# Patient Record
Sex: MALE | Race: WHITE | NOT HISPANIC OR LATINO | Employment: FULL TIME | URBAN - NONMETROPOLITAN AREA
[De-identification: names, ages, dates, MRNs, and addresses within clinical notes are randomized per-mention and may not be internally consistent; named-entity substitution may affect disease eponyms.]

---

## 2019-12-25 ENCOUNTER — APPOINTMENT (EMERGENCY)
Dept: RADIOLOGY | Facility: HOSPITAL | Age: 29
End: 2019-12-25
Payer: COMMERCIAL

## 2019-12-25 ENCOUNTER — HOSPITAL ENCOUNTER (EMERGENCY)
Facility: HOSPITAL | Age: 29
Discharge: HOME | End: 2019-12-26
Attending: EMERGENCY MEDICINE
Payer: COMMERCIAL

## 2019-12-25 VITALS
WEIGHT: 200 LBS | HEIGHT: 67 IN | TEMPERATURE: 98.3 F | HEART RATE: 114 BPM | RESPIRATION RATE: 16 BRPM | OXYGEN SATURATION: 98 % | SYSTOLIC BLOOD PRESSURE: 135 MMHG | DIASTOLIC BLOOD PRESSURE: 97 MMHG | BODY MASS INDEX: 31.39 KG/M2

## 2019-12-25 DIAGNOSIS — S93.602A SPRAIN OF LEFT FOOT, INITIAL ENCOUNTER: Primary | ICD-10-CM

## 2019-12-25 PROCEDURE — 99284 EMERGENCY DEPT VISIT MOD MDM: CPT | Performed by: EMERGENCY MEDICINE

## 2019-12-25 PROCEDURE — 73610 X-RAY EXAM OF ANKLE: CPT

## 2019-12-25 PROCEDURE — 73630 X-RAY EXAM OF FOOT: CPT

## 2019-12-25 PROCEDURE — 96372 THER/PROPH/DIAG INJ SC/IM: CPT

## 2019-12-25 PROCEDURE — 99283 EMERGENCY DEPT VISIT LOW MDM: CPT

## 2019-12-25 RX ORDER — KETOROLAC TROMETHAMINE 30 MG/ML
30 INJECTION, SOLUTION INTRAMUSCULAR; INTRAVENOUS ONCE
Status: COMPLETED | OUTPATIENT
Start: 2019-12-25 | End: 2019-12-25

## 2019-12-25 RX ADMIN — KETOROLAC TROMETHAMINE 30 MG: 30 INJECTION, SOLUTION INTRAMUSCULAR at 23:47

## 2019-12-26 NOTE — ED PROVIDER NOTES
History  Chief Complaint   Patient presents with    Ankle Pain     Patient has left ankle and foot pain since this morning  Patient denies any known injury  Atraumatic left ankle and foot pain since this AM  No fever  No other complaints      History provided by:  Patient   used: No    Ankle Pain   Location:  Foot and ankle  Ankle location:  L ankle  Foot location:  L foot  Pain details:     Quality:  Aching    Radiates to:  Does not radiate    Severity:  Moderate    Onset quality:  Gradual    Timing:  Constant    Progression:  Unchanged  Chronicity:  New  Dislocation: no    Foreign body present:  No foreign bodies  Prior injury to area:  No  Relieved by:  Nothing  Worsened by:  Bearing weight, extension and flexion  Ineffective treatments:  None tried  Associated symptoms: no back pain, no fever, no muscle weakness, no neck pain, no numbness, no stiffness, no swelling and no tingling    Risk factors: no concern for non-accidental trauma        None       History reviewed  No pertinent past medical history  History reviewed  No pertinent surgical history  History reviewed  No pertinent family history  I have reviewed and agree with the history as documented  Social History     Tobacco Use    Smoking status: Current Every Day Smoker     Packs/day: 0 50     Types: Cigarettes    Smokeless tobacco: Never Used   Substance Use Topics    Alcohol use: Yes    Drug use: Never        Review of Systems   Constitutional: Negative for chills and fever  HENT: Negative for ear pain, hearing loss, sore throat, trouble swallowing and voice change  Eyes: Negative for pain and discharge  Respiratory: Negative for cough, shortness of breath and wheezing  Cardiovascular: Negative for chest pain and palpitations  Gastrointestinal: Negative for abdominal pain, blood in stool, constipation, diarrhea, nausea and vomiting     Genitourinary: Negative for dysuria, flank pain, frequency and hematuria  Musculoskeletal: Negative for back pain, joint swelling, neck pain, neck stiffness and stiffness  Skin: Negative for rash and wound  Neurological: Negative for dizziness, seizures, syncope, facial asymmetry and headaches  Psychiatric/Behavioral: Negative for hallucinations, self-injury and suicidal ideas  All other systems reviewed and are negative  Physical Exam  Physical Exam   Constitutional: He appears well-developed and well-nourished  No distress  HENT:   Head: Normocephalic and atraumatic  Eyes: Conjunctivae and EOM are normal    Neck: Normal range of motion  Neck supple  Pulmonary/Chest: Effort normal  No respiratory distress     Musculoskeletal:   LEFT FOOT/ANKLE  No deformity  No bony ttp   Some ttp top of foot  Flexion/extension causes pain  Distal NVI    REMAINDER OF MS EXAM IS NORMAL       Vital Signs  ED Triage Vitals [12/25/19 2331]   Temperature Pulse Respirations Blood Pressure SpO2   98 3 °F (36 8 °C) (!) 114 16 135/97 98 %      Temp Source Heart Rate Source Patient Position - Orthostatic VS BP Location FiO2 (%)   Temporal Monitor Sitting Right arm --      Pain Score       5           Vitals:    12/25/19 2331   BP: 135/97   Pulse: (!) 114   Patient Position - Orthostatic VS: Sitting         Visual Acuity      ED Medications  Medications   ketorolac (TORADOL) injection 30 mg (30 mg Intramuscular Given 12/25/19 2347)       Diagnostic Studies  Results Reviewed     None                 XR ankle 3+ views LEFT   ED Interpretation by Dolores Rice MD (12/26 0200)   No acute fracture      XR foot 3+ views LEFT   ED Interpretation by Dolores Rice MD (12/26 0200)   No acute fracture                 Procedures  Procedures         ED Course  ED Course as of Dec 26 0205   Thu Dec 26, 2019   0203 Splint placed  Crutches provided  Pt advised to f/u ortho/PCP as needed  Ortho outpt f/u info provided                                  MDM  Number of Diagnoses or Management Options     Amount and/or Complexity of Data Reviewed  Tests in the radiology section of CPT®: ordered and reviewed          Disposition  Final diagnoses:   Sprain of left foot, initial encounter     Time reflects when diagnosis was documented in both MDM as applicable and the Disposition within this note     Time User Action Codes Description Comment    12/26/2019  2:04 AM Miguelito Salazar Add [S93 602A] Sprain of left foot, initial encounter       ED Disposition     ED Disposition Condition Date/Time Comment    Discharge Stable Thu Dec 26, 2019  2:04 AM Page Pacheco discharge to home/self care  Follow-up Information     Follow up With Specialties Details Why [address] [facility] Referral Cardiology, Gastroenterology, General Surgery, Anesthesiology, Hematology and Oncology, Urology, Emergency Medicine, Hematology, Oncology In 2 days As needed [address] [facility] [address]  500 [facility] Orthopedic Surgery In 2 days As needed 2669 [facility] 17  [phone]            Patient's Medications    No medications on file     No discharge procedures on file      ED Provider  Electronically Signed by           Chrissie Huff MD  12/26/19 9401

## 2020-07-01 ENCOUNTER — TRANSCRIBE ORDERS (OUTPATIENT)
Dept: ADMINISTRATIVE | Facility: HOSPITAL | Age: 30
End: 2020-07-01

## 2020-07-01 DIAGNOSIS — Z82.49 FAMILY HISTORY OF ISCHEMIC HEART DISEASE: Primary | ICD-10-CM

## 2020-07-21 DIAGNOSIS — Z82.49 FAMILY HISTORY OF ISCHEMIC HEART DISEASE AND OTHER DISEASES OF THE CIRCULATORY SYSTEM: ICD-10-CM

## 2020-08-27 ENCOUNTER — HOSPITAL ENCOUNTER (OUTPATIENT)
Dept: NON INVASIVE DIAGNOSTICS | Facility: HOSPITAL | Age: 30
Discharge: HOME | End: 2020-08-27
Payer: COMMERCIAL

## 2020-08-27 DIAGNOSIS — Z82.49 FAMILY HISTORY OF ISCHEMIC HEART DISEASE AND OTHER DISEASES OF THE CIRCULATORY SYSTEM: ICD-10-CM

## 2020-08-27 PROCEDURE — 93306 TTE W/DOPPLER COMPLETE: CPT

## 2023-10-03 ENCOUNTER — HOSPITAL ENCOUNTER (INPATIENT)
Facility: HOSPITAL | Age: 33
LOS: 2 days | Discharge: HOME | DRG: 872 | End: 2023-10-05
Attending: EMERGENCY MEDICINE | Admitting: FAMILY MEDICINE
Payer: COMMERCIAL

## 2023-10-03 ENCOUNTER — APPOINTMENT (EMERGENCY)
Dept: CT IMAGING | Facility: HOSPITAL | Age: 33
DRG: 872 | End: 2023-10-03
Payer: COMMERCIAL

## 2023-10-03 DIAGNOSIS — N12 PYELONEPHRITIS: Primary | ICD-10-CM

## 2023-10-03 DIAGNOSIS — Z72.0 TOBACCO ABUSE: ICD-10-CM

## 2023-10-03 PROBLEM — A41.9 SEPSIS (HCC): Status: ACTIVE | Noted: 2023-10-03

## 2023-10-03 LAB
ALBUMIN SERPL BCP-MCNC: 4 G/DL (ref 3.5–5)
ALP SERPL-CCNC: 85 U/L (ref 34–104)
ALT SERPL W P-5'-P-CCNC: 40 U/L (ref 7–52)
ANION GAP SERPL CALCULATED.3IONS-SCNC: 10 MMOL/L
AST SERPL W P-5'-P-CCNC: 33 U/L (ref 13–39)
BACTERIA UR QL AUTO: ABNORMAL /HPF
BASOPHILS # BLD AUTO: 0.07 THOUSANDS/ÂΜL (ref 0–0.1)
BASOPHILS NFR BLD AUTO: 1 % (ref 0–1)
BILIRUB SERPL-MCNC: 1.22 MG/DL (ref 0.2–1)
BILIRUB UR QL STRIP: ABNORMAL
BUN SERPL-MCNC: 8 MG/DL (ref 5–25)
CALCIUM SERPL-MCNC: 9.6 MG/DL (ref 8.4–10.2)
CHLORIDE SERPL-SCNC: 100 MMOL/L (ref 96–108)
CLARITY UR: ABNORMAL
CO2 SERPL-SCNC: 26 MMOL/L (ref 21–32)
COLOR UR: ABNORMAL
CREAT SERPL-MCNC: 1.02 MG/DL (ref 0.6–1.3)
EOSINOPHIL # BLD AUTO: 0.56 THOUSAND/ÂΜL (ref 0–0.61)
EOSINOPHIL NFR BLD AUTO: 4 % (ref 0–6)
ERYTHROCYTE [DISTWIDTH] IN BLOOD BY AUTOMATED COUNT: 12.2 % (ref 11.6–15.1)
GFR SERPL CREATININE-BSD FRML MDRD: 96 ML/MIN/1.73SQ M
GLUCOSE SERPL-MCNC: 111 MG/DL (ref 65–140)
GLUCOSE SERPL-MCNC: 94 MG/DL (ref 65–140)
GLUCOSE UR STRIP-MCNC: NEGATIVE MG/DL
HCT VFR BLD AUTO: 43.4 % (ref 36.5–49.3)
HGB BLD-MCNC: 14.4 G/DL (ref 12–17)
HGB UR QL STRIP.AUTO: ABNORMAL
IMM GRANULOCYTES # BLD AUTO: 0.04 THOUSAND/UL (ref 0–0.2)
IMM GRANULOCYTES NFR BLD AUTO: 0 % (ref 0–2)
KETONES UR STRIP-MCNC: NEGATIVE MG/DL
LACTATE SERPL-SCNC: 1.6 MMOL/L (ref 0.5–2)
LEUKOCYTE ESTERASE UR QL STRIP: ABNORMAL
LYMPHOCYTES # BLD AUTO: 1.88 THOUSANDS/ÂΜL (ref 0.6–4.47)
LYMPHOCYTES NFR BLD AUTO: 14 % (ref 14–44)
MCH RBC QN AUTO: 32.4 PG (ref 26.8–34.3)
MCHC RBC AUTO-ENTMCNC: 33.2 G/DL (ref 31.4–37.4)
MCV RBC AUTO: 98 FL (ref 82–98)
MONOCYTES # BLD AUTO: 1.02 THOUSAND/ÂΜL (ref 0.17–1.22)
MONOCYTES NFR BLD AUTO: 7 % (ref 4–12)
NEUTROPHILS # BLD AUTO: 10.14 THOUSANDS/ÂΜL (ref 1.85–7.62)
NEUTS SEG NFR BLD AUTO: 74 % (ref 43–75)
NITRITE UR QL STRIP: NEGATIVE
NON-SQ EPI CELLS URNS QL MICRO: ABNORMAL /HPF
NRBC BLD AUTO-RTO: 0 /100 WBCS
PH UR STRIP.AUTO: 7 [PH]
PLATELET # BLD AUTO: 176 THOUSANDS/UL (ref 149–390)
PMV BLD AUTO: 9.7 FL (ref 8.9–12.7)
POTASSIUM SERPL-SCNC: 4 MMOL/L (ref 3.5–5.3)
PROCALCITONIN SERPL-MCNC: 0.21 NG/ML
PROT SERPL-MCNC: 7.5 G/DL (ref 6.4–8.4)
PROT UR STRIP-MCNC: >=300 MG/DL
RBC # BLD AUTO: 4.44 MILLION/UL (ref 3.88–5.62)
RBC #/AREA URNS AUTO: ABNORMAL /HPF
SODIUM SERPL-SCNC: 136 MMOL/L (ref 135–147)
SP GR UR STRIP.AUTO: 1.02 (ref 1–1.03)
UROBILINOGEN UR QL STRIP.AUTO: 1 E.U./DL
WBC # BLD AUTO: 13.71 THOUSAND/UL (ref 4.31–10.16)
WBC #/AREA URNS AUTO: ABNORMAL /HPF

## 2023-10-03 PROCEDURE — 99223 1ST HOSP IP/OBS HIGH 75: CPT | Performed by: NURSE PRACTITIONER

## 2023-10-03 PROCEDURE — G1004 CDSM NDSC: HCPCS

## 2023-10-03 PROCEDURE — 80053 COMPREHEN METABOLIC PANEL: CPT

## 2023-10-03 PROCEDURE — 87086 URINE CULTURE/COLONY COUNT: CPT

## 2023-10-03 PROCEDURE — 85025 COMPLETE CBC W/AUTO DIFF WBC: CPT

## 2023-10-03 PROCEDURE — 84145 PROCALCITONIN (PCT): CPT | Performed by: NURSE PRACTITIONER

## 2023-10-03 PROCEDURE — 99284 EMERGENCY DEPT VISIT MOD MDM: CPT

## 2023-10-03 PROCEDURE — 81001 URINALYSIS AUTO W/SCOPE: CPT

## 2023-10-03 PROCEDURE — 96361 HYDRATE IV INFUSION ADD-ON: CPT

## 2023-10-03 PROCEDURE — 82948 REAGENT STRIP/BLOOD GLUCOSE: CPT

## 2023-10-03 PROCEDURE — 87040 BLOOD CULTURE FOR BACTERIA: CPT | Performed by: NURSE PRACTITIONER

## 2023-10-03 PROCEDURE — 36415 COLL VENOUS BLD VENIPUNCTURE: CPT

## 2023-10-03 PROCEDURE — 96375 TX/PRO/DX INJ NEW DRUG ADDON: CPT

## 2023-10-03 PROCEDURE — 99285 EMERGENCY DEPT VISIT HI MDM: CPT | Performed by: EMERGENCY MEDICINE

## 2023-10-03 PROCEDURE — 96365 THER/PROPH/DIAG IV INF INIT: CPT

## 2023-10-03 PROCEDURE — 74176 CT ABD & PELVIS W/O CONTRAST: CPT

## 2023-10-03 PROCEDURE — 83605 ASSAY OF LACTIC ACID: CPT | Performed by: NURSE PRACTITIONER

## 2023-10-03 RX ORDER — HYDROMORPHONE HCL/PF 1 MG/ML
1 SYRINGE (ML) INJECTION ONCE
Status: COMPLETED | OUTPATIENT
Start: 2023-10-03 | End: 2023-10-03

## 2023-10-03 RX ORDER — ALLOPURINOL 100 MG/1
100 TABLET ORAL DAILY
Status: DISCONTINUED | OUTPATIENT
Start: 2023-10-04 | End: 2023-10-05 | Stop reason: HOSPADM

## 2023-10-03 RX ORDER — ENOXAPARIN SODIUM 100 MG/ML
40 INJECTION SUBCUTANEOUS DAILY
Status: DISCONTINUED | OUTPATIENT
Start: 2023-10-04 | End: 2023-10-05 | Stop reason: HOSPADM

## 2023-10-03 RX ORDER — FLUOXETINE 20 MG/1
20 TABLET ORAL DAILY
COMMUNITY

## 2023-10-03 RX ORDER — ACETAMINOPHEN 325 MG/1
975 TABLET ORAL EVERY 8 HOURS SCHEDULED
Status: DISCONTINUED | OUTPATIENT
Start: 2023-10-03 | End: 2023-10-05 | Stop reason: HOSPADM

## 2023-10-03 RX ORDER — NICOTINE 21 MG/24HR
21 PATCH, TRANSDERMAL 24 HOURS TRANSDERMAL DAILY
Status: DISCONTINUED | OUTPATIENT
Start: 2023-10-03 | End: 2023-10-05 | Stop reason: HOSPADM

## 2023-10-03 RX ORDER — KETOROLAC TROMETHAMINE 30 MG/ML
30 INJECTION, SOLUTION INTRAMUSCULAR; INTRAVENOUS ONCE
Status: COMPLETED | OUTPATIENT
Start: 2023-10-03 | End: 2023-10-03

## 2023-10-03 RX ORDER — HYDROMORPHONE HCL/PF 1 MG/ML
0.5 SYRINGE (ML) INJECTION
Status: DISCONTINUED | OUTPATIENT
Start: 2023-10-03 | End: 2023-10-05 | Stop reason: HOSPADM

## 2023-10-03 RX ORDER — TAMSULOSIN HYDROCHLORIDE 0.4 MG/1
0.4 CAPSULE ORAL
COMMUNITY
Start: 2023-09-15 | End: 2023-10-15

## 2023-10-03 RX ORDER — INSULIN LISPRO 100 [IU]/ML
1-6 INJECTION, SOLUTION INTRAVENOUS; SUBCUTANEOUS
Status: DISCONTINUED | OUTPATIENT
Start: 2023-10-04 | End: 2023-10-05 | Stop reason: HOSPADM

## 2023-10-03 RX ORDER — INSULIN LISPRO 100 [IU]/ML
1-6 INJECTION, SOLUTION INTRAVENOUS; SUBCUTANEOUS
Status: DISCONTINUED | OUTPATIENT
Start: 2023-10-03 | End: 2023-10-05 | Stop reason: HOSPADM

## 2023-10-03 RX ORDER — NICOTINE 21 MG/24HR
21 PATCH, TRANSDERMAL 24 HOURS TRANSDERMAL DAILY
Status: DISCONTINUED | OUTPATIENT
Start: 2023-10-03 | End: 2023-10-03

## 2023-10-03 RX ORDER — LISINOPRIL 5 MG/1
5 TABLET ORAL DAILY
Status: ON HOLD | COMMUNITY
End: 2023-10-03 | Stop reason: SDUPTHER

## 2023-10-03 RX ORDER — SODIUM CHLORIDE 9 MG/ML
50 INJECTION, SOLUTION INTRAVENOUS CONTINUOUS
Status: DISCONTINUED | OUTPATIENT
Start: 2023-10-03 | End: 2023-10-04

## 2023-10-03 RX ORDER — MORPHINE SULFATE 4 MG/ML
4 INJECTION, SOLUTION INTRAMUSCULAR; INTRAVENOUS ONCE
Status: COMPLETED | OUTPATIENT
Start: 2023-10-03 | End: 2023-10-03

## 2023-10-03 RX ORDER — LISINOPRIL 10 MG/1
10 TABLET ORAL DAILY
COMMUNITY

## 2023-10-03 RX ORDER — MIRTAZAPINE 15 MG/1
15 TABLET, FILM COATED ORAL
COMMUNITY
Start: 2023-08-31

## 2023-10-03 RX ORDER — CEFTRIAXONE 1 G/50ML
1000 INJECTION, SOLUTION INTRAVENOUS EVERY 24 HOURS
Status: DISCONTINUED | OUTPATIENT
Start: 2023-10-04 | End: 2023-10-05 | Stop reason: HOSPADM

## 2023-10-03 RX ORDER — LISINOPRIL 10 MG/1
10 TABLET ORAL DAILY
Status: DISCONTINUED | OUTPATIENT
Start: 2023-10-04 | End: 2023-10-05 | Stop reason: HOSPADM

## 2023-10-03 RX ORDER — PHENAZOPYRIDINE HYDROCHLORIDE 100 MG/1
200 TABLET, FILM COATED ORAL ONCE
Status: COMPLETED | OUTPATIENT
Start: 2023-10-03 | End: 2023-10-03

## 2023-10-03 RX ORDER — TAMSULOSIN HYDROCHLORIDE 0.4 MG/1
0.4 CAPSULE ORAL
Status: DISCONTINUED | OUTPATIENT
Start: 2023-10-03 | End: 2023-10-05 | Stop reason: HOSPADM

## 2023-10-03 RX ORDER — MIRTAZAPINE 15 MG/1
15 TABLET, FILM COATED ORAL
Status: DISCONTINUED | OUTPATIENT
Start: 2023-10-03 | End: 2023-10-05 | Stop reason: HOSPADM

## 2023-10-03 RX ORDER — CEFTRIAXONE 1 G/50ML
1000 INJECTION, SOLUTION INTRAVENOUS ONCE
Status: COMPLETED | OUTPATIENT
Start: 2023-10-03 | End: 2023-10-03

## 2023-10-03 RX ORDER — OXYCODONE HYDROCHLORIDE 5 MG/1
5 TABLET ORAL EVERY 6 HOURS PRN
Status: DISCONTINUED | OUTPATIENT
Start: 2023-10-03 | End: 2023-10-05 | Stop reason: HOSPADM

## 2023-10-03 RX ORDER — ALLOPURINOL 100 MG/1
100 TABLET ORAL DAILY
COMMUNITY
Start: 2023-06-23

## 2023-10-03 RX ORDER — FLUOXETINE 10 MG/1
20 CAPSULE ORAL DAILY
Status: DISCONTINUED | OUTPATIENT
Start: 2023-10-04 | End: 2023-10-05 | Stop reason: HOSPADM

## 2023-10-03 RX ADMIN — MORPHINE SULFATE 4 MG: 4 INJECTION INTRAVENOUS at 21:06

## 2023-10-03 RX ADMIN — NICOTINE 21 MG: 21 PATCH, EXTENDED RELEASE TRANSDERMAL at 23:01

## 2023-10-03 RX ADMIN — CEFTRIAXONE 1000 MG: 1 INJECTION, SOLUTION INTRAVENOUS at 19:35

## 2023-10-03 RX ADMIN — TAMSULOSIN HYDROCHLORIDE 0.4 MG: 0.4 CAPSULE ORAL at 23:01

## 2023-10-03 RX ADMIN — SODIUM CHLORIDE 1000 ML: 0.9 INJECTION, SOLUTION INTRAVENOUS at 18:19

## 2023-10-03 RX ADMIN — HYDROMORPHONE HYDROCHLORIDE 1 MG: 1 INJECTION, SOLUTION INTRAMUSCULAR; INTRAVENOUS; SUBCUTANEOUS at 23:01

## 2023-10-03 RX ADMIN — PHENAZOPYRIDINE 200 MG: 100 TABLET ORAL at 19:34

## 2023-10-03 RX ADMIN — KETOROLAC TROMETHAMINE 30 MG: 30 INJECTION, SOLUTION INTRAMUSCULAR; INTRAVENOUS at 19:33

## 2023-10-03 RX ADMIN — ACETAMINOPHEN 975 MG: 325 TABLET, FILM COATED ORAL at 23:01

## 2023-10-03 RX ADMIN — SODIUM CHLORIDE 150 ML/HR: 0.9 INJECTION, SOLUTION INTRAVENOUS at 22:03

## 2023-10-03 NOTE — ED NOTES
Pt given urinal at this time to provide urine sample     Van Nelson, [address]  10/03/23 6138

## 2023-10-04 PROBLEM — E11.65 TYPE 2 DIABETES MELLITUS WITH HYPERGLYCEMIA, WITHOUT LONG-TERM CURRENT USE OF INSULIN (HCC): Status: ACTIVE | Noted: 2023-10-04

## 2023-10-04 PROBLEM — Z86.79 HISTORY OF HYPERTENSION: Status: ACTIVE | Noted: 2023-10-04

## 2023-10-04 PROBLEM — N12 PYELONEPHRITIS: Status: ACTIVE | Noted: 2023-10-04

## 2023-10-04 PROBLEM — Z72.0 TOBACCO ABUSE: Status: ACTIVE | Noted: 2023-10-04

## 2023-10-04 PROBLEM — Z87.39 HISTORY OF GOUT: Status: ACTIVE | Noted: 2023-10-04

## 2023-10-04 LAB
ANION GAP SERPL CALCULATED.3IONS-SCNC: 7 MMOL/L
BASOPHILS # BLD AUTO: 0.05 THOUSANDS/ÂΜL (ref 0–0.1)
BASOPHILS NFR BLD AUTO: 1 % (ref 0–1)
BUN SERPL-MCNC: 12 MG/DL (ref 5–25)
CALCIUM SERPL-MCNC: 8.4 MG/DL (ref 8.4–10.2)
CHLORIDE SERPL-SCNC: 103 MMOL/L (ref 96–108)
CO2 SERPL-SCNC: 27 MMOL/L (ref 21–32)
CREAT SERPL-MCNC: 1.23 MG/DL (ref 0.6–1.3)
EOSINOPHIL # BLD AUTO: 0.44 THOUSAND/ÂΜL (ref 0–0.61)
EOSINOPHIL NFR BLD AUTO: 6 % (ref 0–6)
ERYTHROCYTE [DISTWIDTH] IN BLOOD BY AUTOMATED COUNT: 12.3 % (ref 11.6–15.1)
GFR SERPL CREATININE-BSD FRML MDRD: 76 ML/MIN/1.73SQ M
GLUCOSE SERPL-MCNC: 101 MG/DL (ref 65–140)
GLUCOSE SERPL-MCNC: 124 MG/DL (ref 65–140)
GLUCOSE SERPL-MCNC: 129 MG/DL (ref 65–140)
GLUCOSE SERPL-MCNC: 146 MG/DL (ref 65–140)
GLUCOSE SERPL-MCNC: 167 MG/DL (ref 65–140)
HCT VFR BLD AUTO: 37.7 % (ref 36.5–49.3)
HGB BLD-MCNC: 12.7 G/DL (ref 12–17)
IMM GRANULOCYTES # BLD AUTO: 0.05 THOUSAND/UL (ref 0–0.2)
IMM GRANULOCYTES NFR BLD AUTO: 1 % (ref 0–2)
LYMPHOCYTES # BLD AUTO: 1.65 THOUSANDS/ÂΜL (ref 0.6–4.47)
LYMPHOCYTES NFR BLD AUTO: 21 % (ref 14–44)
MAGNESIUM SERPL-MCNC: 1.3 MG/DL (ref 1.9–2.7)
MCH RBC QN AUTO: 33.3 PG (ref 26.8–34.3)
MCHC RBC AUTO-ENTMCNC: 33.7 G/DL (ref 31.4–37.4)
MCV RBC AUTO: 99 FL (ref 82–98)
MONOCYTES # BLD AUTO: 0.66 THOUSAND/ÂΜL (ref 0.17–1.22)
MONOCYTES NFR BLD AUTO: 9 % (ref 4–12)
NEUTROPHILS # BLD AUTO: 4.95 THOUSANDS/ÂΜL (ref 1.85–7.62)
NEUTS SEG NFR BLD AUTO: 62 % (ref 43–75)
NRBC BLD AUTO-RTO: 0 /100 WBCS
PLATELET # BLD AUTO: 120 THOUSANDS/UL (ref 149–390)
PMV BLD AUTO: 9.9 FL (ref 8.9–12.7)
POTASSIUM SERPL-SCNC: 3.6 MMOL/L (ref 3.5–5.3)
PROCALCITONIN SERPL-MCNC: 0.22 NG/ML
RBC # BLD AUTO: 3.81 MILLION/UL (ref 3.88–5.62)
SODIUM SERPL-SCNC: 137 MMOL/L (ref 135–147)
WBC # BLD AUTO: 7.8 THOUSAND/UL (ref 4.31–10.16)

## 2023-10-04 PROCEDURE — 99232 SBSQ HOSP IP/OBS MODERATE 35: CPT | Performed by: FAMILY MEDICINE

## 2023-10-04 PROCEDURE — 80048 BASIC METABOLIC PNL TOTAL CA: CPT | Performed by: NURSE PRACTITIONER

## 2023-10-04 PROCEDURE — 83735 ASSAY OF MAGNESIUM: CPT | Performed by: NURSE PRACTITIONER

## 2023-10-04 PROCEDURE — 84145 PROCALCITONIN (PCT): CPT | Performed by: NURSE PRACTITIONER

## 2023-10-04 PROCEDURE — 82948 REAGENT STRIP/BLOOD GLUCOSE: CPT

## 2023-10-04 PROCEDURE — 85025 COMPLETE CBC W/AUTO DIFF WBC: CPT | Performed by: NURSE PRACTITIONER

## 2023-10-04 PROCEDURE — 99222 1ST HOSP IP/OBS MODERATE 55: CPT | Performed by: UROLOGY

## 2023-10-04 RX ORDER — SODIUM CHLORIDE 9 MG/ML
50 INJECTION, SOLUTION INTRAVENOUS CONTINUOUS
Status: DISPENSED | OUTPATIENT
Start: 2023-10-04 | End: 2023-10-05

## 2023-10-04 RX ORDER — POLYETHYLENE GLYCOL 3350 17 G/17G
17 POWDER, FOR SOLUTION ORAL DAILY PRN
Status: DISCONTINUED | OUTPATIENT
Start: 2023-10-04 | End: 2023-10-05 | Stop reason: HOSPADM

## 2023-10-04 RX ORDER — PHENAZOPYRIDINE HYDROCHLORIDE 100 MG/1
100 TABLET, FILM COATED ORAL 3 TIMES DAILY PRN
Status: DISCONTINUED | OUTPATIENT
Start: 2023-10-04 | End: 2023-10-05 | Stop reason: HOSPADM

## 2023-10-04 RX ORDER — MAGNESIUM SULFATE HEPTAHYDRATE 40 MG/ML
2 INJECTION, SOLUTION INTRAVENOUS ONCE
Status: COMPLETED | OUTPATIENT
Start: 2023-10-04 | End: 2023-10-04

## 2023-10-04 RX ORDER — ONDANSETRON 2 MG/ML
4 INJECTION INTRAMUSCULAR; INTRAVENOUS EVERY 6 HOURS PRN
Status: DISCONTINUED | OUTPATIENT
Start: 2023-10-04 | End: 2023-10-05 | Stop reason: HOSPADM

## 2023-10-04 RX ORDER — DOCUSATE SODIUM 100 MG/1
100 CAPSULE, LIQUID FILLED ORAL 2 TIMES DAILY
Status: DISCONTINUED | OUTPATIENT
Start: 2023-10-04 | End: 2023-10-05 | Stop reason: HOSPADM

## 2023-10-04 RX ADMIN — PHENAZOPYRIDINE 100 MG: 100 TABLET ORAL at 15:29

## 2023-10-04 RX ADMIN — SODIUM CHLORIDE 100 ML/HR: 0.9 INJECTION, SOLUTION INTRAVENOUS at 10:53

## 2023-10-04 RX ADMIN — INSULIN LISPRO 1 UNITS: 100 INJECTION, SOLUTION INTRAVENOUS; SUBCUTANEOUS at 16:55

## 2023-10-04 RX ADMIN — LISINOPRIL 10 MG: 10 TABLET ORAL at 09:05

## 2023-10-04 RX ADMIN — MAGNESIUM SULFATE HEPTAHYDRATE 2 G: 40 INJECTION, SOLUTION INTRAVENOUS at 07:47

## 2023-10-04 RX ADMIN — ONDANSETRON 4 MG: 2 INJECTION INTRAMUSCULAR; INTRAVENOUS at 07:47

## 2023-10-04 RX ADMIN — ACETAMINOPHEN 975 MG: 325 TABLET, FILM COATED ORAL at 05:59

## 2023-10-04 RX ADMIN — OXYCODONE HYDROCHLORIDE 5 MG: 5 TABLET ORAL at 18:10

## 2023-10-04 RX ADMIN — OXYCODONE HYDROCHLORIDE 5 MG: 5 TABLET ORAL at 02:56

## 2023-10-04 RX ADMIN — FLUOXETINE HYDROCHLORIDE 20 MG: 10 CAPSULE ORAL at 09:05

## 2023-10-04 RX ADMIN — ACETAMINOPHEN 975 MG: 325 TABLET, FILM COATED ORAL at 13:49

## 2023-10-04 RX ADMIN — HYDROMORPHONE HYDROCHLORIDE 0.5 MG: 1 INJECTION, SOLUTION INTRAMUSCULAR; INTRAVENOUS; SUBCUTANEOUS at 07:54

## 2023-10-04 RX ADMIN — TAMSULOSIN HYDROCHLORIDE 0.4 MG: 0.4 CAPSULE ORAL at 21:47

## 2023-10-04 RX ADMIN — ENOXAPARIN SODIUM 40 MG: 40 INJECTION SUBCUTANEOUS at 09:05

## 2023-10-04 RX ADMIN — ALLOPURINOL 100 MG: 100 TABLET ORAL at 09:05

## 2023-10-04 RX ADMIN — CEFTRIAXONE 1000 MG: 1 INJECTION, SOLUTION INTRAVENOUS at 19:51

## 2023-10-04 RX ADMIN — MAGNESIUM SULFATE HEPTAHYDRATE 2 G: 40 INJECTION, SOLUTION INTRAVENOUS at 12:01

## 2023-10-04 RX ADMIN — HYDROMORPHONE HYDROCHLORIDE 0.5 MG: 1 INJECTION, SOLUTION INTRAMUSCULAR; INTRAVENOUS; SUBCUTANEOUS at 14:41

## 2023-10-04 RX ADMIN — HYDROMORPHONE HYDROCHLORIDE 0.5 MG: 1 INJECTION, SOLUTION INTRAMUSCULAR; INTRAVENOUS; SUBCUTANEOUS at 20:23

## 2023-10-04 RX ADMIN — ACETAMINOPHEN 975 MG: 325 TABLET, FILM COATED ORAL at 21:47

## 2023-10-04 RX ADMIN — OXYCODONE HYDROCHLORIDE 5 MG: 5 TABLET ORAL at 12:02

## 2023-10-04 RX ADMIN — NICOTINE 21 MG: 21 PATCH, EXTENDED RELEASE TRANSDERMAL at 21:47

## 2023-10-04 RX ADMIN — DOCUSATE SODIUM 100 MG: 100 CAPSULE, LIQUID FILLED ORAL at 12:02

## 2023-10-04 NOTE — PROGRESS NOTES
[address] [facility]  Progress Note  Name: Danial Gottron  MRN: [id number]  Unit/Bed#: -01 I Date of Admission: 10/3/2023   Date of Service: 10/4/2023 I Hospital Day: 1    Assessment/Plan   Sepsis [facility])  Assessment & Plan  · Meets criteria with tachycardia, leukocytosis  · Normal lactic acid  · UA with pyuria, bacteriuria  · CT abdomen pelvis C/W pyelonephritis, currently with bilateral ureteral stents  · Blood and urine cultures pending  · Empiric ceftriaxone  · Trend fever curve, leukocytosis    * Pyelonephritis  Assessment & Plan  · Presents with bilateral flank pain and dysuria, hematuria  · Hx ureteral stents placed and scheduled for removal 10/16  · CT abdomen pelvis revealing - obstructive bilateral ureteral calculi with mild bilateral hydronephrosis. Ureteral stents are in place. Numerous subcentimeter right abdominal and retroperitoneal lymph nodes likely present on a reactive basis possibly secondary to pyelonephritis. · Urine and blood cultures pending  · Empiric ceftriaxone  · Continue Flomax  · Pain control  · Appreciate urology consultation  · acute bilateral pyelonephritis which is not related to bilateral ureteral stents placed by [facility] on 9/14/2023 but probably secondary to bilateral obstructive ureterolithiasis    Tobacco abuse  Assessment & Plan  · Tobacco 1/2 pack/day  · Nicotine patch  · Cessation counseling    Type 2 diabetes mellitus with hyperglycemia, without long-term current use of insulin [facility])  Assessment & Plan  Lab Results   Component Value Date    HGBA1C 9.4 (H) 09/29/2023       Recent Labs     10/03/23  2242 10/04/23  0739 10/04/23  1113   POCGLU 94 129 146*       Blood Sugar Average: Last 72 hrs:  (P) 123     · Sliding scale insulin with coverage, Accu-Cheks  · Hypoglycemia protocol  · Carbohydrate controlled diet  · Jardiance and Ozempic on hold.   Blood sugars are well controlled    History of hypertension  Assessment & Plan  · Continue PTA lisinopril  · controlled blood pressures    History of gout  Assessment & Plan  · Continue allopurinol         VTE Pharmacologic Prophylaxis:   Pharmacologic: Enoxaparin (Lovenox)  Mechanical VTE Prophylaxis in Place: Yes    Patient Centered Rounds: I have performed bedside rounds with nursing staff today. Discussions with Specialists or Other Care Team Provider: discussed with urology    Education and Discussions with Family / Patient: Discussed with patient at bedside    Time Spent for Care: 30 minutes. More than 50% of total time spent on counseling and coordination of care as described above. Current Length of Stay: 1 day(s)    Current Patient Status: Inpatient   Certification Statement: The patient will continue to require additional inpatient hospital stay due to Bilateral pyelonephritis    Discharge Plan: discharge in 2 to 3 days    Code Status: Level 1 - Full Code      Subjective:   Patient states that he is having bilateral flank pain but right is worse than the left denies any fever today    Objective:     Vitals:   Temp (24hrs), Av.4 °F (36.9 °C), Min:97.7 °F (36.5 °C), Max:99 °F (37.2 °C)    Temp:  [97.7 °F (36.5 °C)-99 °F (37.2 °C)] 97.7 °F (36.5 °C)  HR:  [] 97  Resp:  [16-17] 16  BP: (112-148)/() 132/90  SpO2:  [96 %-100 %] 100 %  Body mass index is 30.51 kg/m². Input and Output Summary (last 24 hours): Intake/Output Summary (Last 24 hours) at 10/4/2023 1141  Last data filed at 10/4/2023 1053  Gross per 24 hour   Intake 2598.33 ml   Output 600 ml   Net 1998.33 ml       Physical Exam:     Physical Exam  Vitals and nursing note reviewed. Constitutional:       Appearance: Normal appearance. HENT:      Head: Normocephalic and atraumatic. Right Ear: External ear normal.      Left Ear: External ear normal.      Nose: Nose normal.      Mouth/Throat:      Pharynx: Oropharynx is clear. Eyes:      Pupils: Pupils are equal, round, and reactive to light. Cardiovascular:      Rate and Rhythm: Normal rate and regular rhythm. Heart sounds: Normal heart sounds. Pulmonary:      Effort: Pulmonary effort is normal.      Breath sounds: Normal breath sounds. Abdominal:      General: Bowel sounds are normal.      Palpations: Abdomen is soft. Tenderness: There is no abdominal tenderness. There is right CVA tenderness and left CVA tenderness. Musculoskeletal:         General: Normal range of motion. Cervical back: Normal range of motion and neck supple. Skin:     General: Skin is warm and dry. Capillary Refill: Capillary refill takes less than 2 seconds. Neurological:      General: No focal deficit present. Mental Status: He is alert and oriented to person, place, and time. Psychiatric:         Mood and Affect: Mood normal.           Additional Data:     Labs:    Results from last 7 days   Lab Units 10/04/23  0547   WBC Thousand/uL 7.80   HEMOGLOBIN g/dL 12.7   HEMATOCRIT % 37.7   PLATELETS Thousands/uL 120*   NEUTROS PCT % 62   LYMPHS PCT % 21   MONOS PCT % 9   EOS PCT % 6     Results from last 7 days   Lab Units 10/04/23  0547 10/03/23  1819   SODIUM mmol/L 137 136   POTASSIUM mmol/L 3.6 4.0   CHLORIDE mmol/L 103 100   CO2 mmol/L 27 26   BUN mg/dL 12 8   CREATININE mg/dL 1.23 1.02   ANION GAP mmol/L 7 10   CALCIUM mg/dL 8.4 9.6   ALBUMIN g/dL  --  4.0   TOTAL BILIRUBIN mg/dL  --  1.22*   ALK PHOS U/L  --  85   ALT U/L  --  40   AST U/L  --  33   GLUCOSE RANDOM mg/dL 124 111         Results from last 7 days   Lab Units 10/04/23  1113 10/04/23  0739 10/03/23  2242   POC GLUCOSE mg/dl 146* 129 94     Results from last 7 days   Lab Units 09/29/23  0809   HEMOGLOBIN A1C % 9.4*     Results from last 7 days   Lab Units 10/04/23  0547 10/03/23  2247   LACTIC ACID mmol/L  --  1.6   PROCALCITONIN ng/ml 0.22 0.21           * I Have Reviewed All Lab Data Listed Above. * Additional Pertinent Lab Tests Reviewed:  [facility] Admission Reviewed    Imaging:    Imaging Reports Reviewed Today Include: CT renal stone abdomen pelvis  Imaging Personally Reviewed by Myself Includes: cT renal stone abdomen pelvis    Recent Cultures (last 7 days):           Last 24 Hours Medication List:   Current Facility-Administered Medications   Medication Dose Route Frequency Provider Last Rate   • acetaminophen  975 mg Oral Q8H [facility] Yuly S Edenilson, CRNP     • allopurinol  100 mg Oral Daily Yuly S Edenilson, CRNP     • cefTRIAXone  1,000 mg Intravenous Q24H Yuly S Edenilson, CRNP     • enoxaparin  40 mg Subcutaneous Daily Yuly S Edenilson, CRNP     • FLUoxetine  20 mg Oral Daily Yuly S Edenilson, CRNP     • HYDROmorphone  0.5 mg Intravenous Q3H PRN Yuly S Edenilson, CRNP     • insulin lispro  1-6 Units Subcutaneous TID AC Yuly S Edenilson, CRNP     • insulin lispro  1-6 Units Subcutaneous HS Yuly S Edenilson, CRNP     • lisinopril  10 mg Oral Daily Yuly S Edenilson, CRNP     • mirtazapine  15 mg Oral HS PRN Yuly S Edenilson, CRNP     • nicotine  21 mg Transdermal Daily Chas Castro MD     • ondansetron  4 mg Intravenous Q6H PRN Shaylee Hernadez PA-C     • oxyCODONE  5 mg Oral Q6H PRN Yuly S Edenilson, CRNP     • sodium chloride  100 mL/hr Intravenous Continuous Yuly S Edenilson, CRNP 100 mL/hr (10/04/23 1053)   • tamsulosin  0.4 mg Oral HS Yuly S Edenilson, CRNP          Today, Patient Was Seen By: Chas Castro MD    ** Please Note: Dictation voice to text software may have been used in the creation of this document.  **

## 2023-10-04 NOTE — UTILIZATION REVIEW
Initial Clinical Review    Admission: Date/Time/Statement:   Admission Orders (From admission, onward)     Ordered        10/03/23 2118  INPATIENT ADMISSION  Once                      Orders Placed This Encounter   Procedures   • INPATIENT ADMISSION     Standing Status:   Standing     Number of Occurrences:   1     Order Specific Question:   Level of Care     Answer:   Med Surg [16]     Order Specific Question:   Estimated length of stay     Answer:   More than 2 Midnights     Order Specific Question:   Certification     Answer:   I certify that inpatient services are medically necessary for this patient for a duration of greater than two midnights. See H&P and MD Progress Notes for additional information about the patient's course of treatment. ED Arrival Information     Expected   -    Arrival   10/3/2023 17:59    Acuity   Urgent            Means of arrival   Walk-In    Escorted by   Family Member    Service   Hospitalist    Admission type   Emergency            Arrival complaint   blood in urine  flank pain           Chief Complaint   Patient presents with   • Flank Pain     Pt c/o right flank pain radiating to abdomen and penis over past couple week and now noticed blood in urine this morning. Pt seen couple weeks ago at other hospital dx kidney stones and recent stent placement. Surgery scheduled 10/16. Denies fevers       Initial Presentation: 35 y.o. male with a PMH of renal calculi status post cystoscopy with bilateral ureteral stent placement 9/14 at [facility], depression, anxiety, hypertension, diabetes mellitus non-insulin-dependent who presents with flank pain and dysuria. In the emergency department meeting criteria for sepsis, CT abdomen pelvis concerning for  pyelonephritis.  Plan: Inpatient admission for evaluation and treatment of pyelonephritis, sepsis, DM, HTN, gout: urine and blood cultures pending, IV ceftriaxone, continue Flomax, pain control, Urology consult, carb controlled diet, hold oral antihyperglycemics, start SSI, continue lisinopriland allopurinol. Date: 10/4   Day 2:     Urology consult: Agree with Flomax, IV ceftriaxone, Pyridium. Also recommend a heating pad over the penis and bladder area and may also give Ditropan XL 10 mg. No  surgical intervention needed at this time. Internal medicine: acute bilateral pyelonephritis which is not related to bilateral ureteral stents placed by [facility] on 9/14/2023 but probably secondary to bilateral obstructive ureterolithiasis. Blood and urine cultures pending. Continue IV ceftriaxone. Plan for ureteral stent removal 10/16. Continue Flomax. Continue pain control. ED Triage Vitals [10/03/23 1814]   Temperature Pulse Respirations Blood Pressure SpO2   99 °F (37.2 °C) (!) 111 17 146/89 98 %      Temp Source Heart Rate Source Patient Position - Orthostatic VS BP Location FiO2 (%)   Tympanic Monitor Lying Left arm --      Pain Score       6          Wt Readings from Last 1 Encounters:   10/03/23 88.4 kg (194 lb 12.8 oz)     Additional Vital Signs:     Date/Time Temp Pulse Resp BP MAP (mmHg) SpO2 O2 Device   10/04/23 0750 -- -- -- -- -- 100 % None (Room air)   10/04/23 07:39:34 -- 97 -- 132/90 104 99 % --   10/04/23 05:49:39 -- -- -- 127/88 101 -- --   10/04/23 02:59:32 97.7 °F (36.5 °C) 82 -- 112/74 87 98 % --   10/03/23 22:21:31 -- 98 16 148/106 Abnormal  120 97 % --   10/03/23 2210 -- -- -- -- -- 96 % None (Room air)   10/03/23 2200 -- 94 17 141/95 112 98 % None (Room air)   10/03/23 2130 -- 93 16 133/99 114 96 % None (Room air)   10/03/23 1900 -- 107 Abnormal  16 147/96 117 99 % None (Room air)   10/03/23 1830 -- 109 Abnormal  16 138/86 104 97 % None (Room air)     Pertinent Labs/Diagnostic Test Results:   CT renal stone study abdomen pelvis wo contrast   Final Result by Ruben Cabrera MD (10/03 2025)      Obstructive bilateral ureteral calculi with mild bilateral hydronephrosis.  Ureteral stents are in place.      Numerous subcentimeter right abdominal and retroperitoneal lymph nodes likely present on a reactive basis possibly secondary to pyelonephritis. The study was marked in [facility] for immediate notification.       Workstation performed: [id number]               Results from last 7 days   Lab Units 10/04/23  0547 10/03/23  1819   WBC Thousand/uL 7.80 13.71*   HEMOGLOBIN g/dL 12.7 14.4   HEMATOCRIT % 37.7 43.4   PLATELETS Thousands/uL 120* 176   NEUTROS ABS Thousands/µL 4.95 10.14*         Results from last 7 days   Lab Units 10/04/23  0547 10/03/23  1819   SODIUM mmol/L 137 136   POTASSIUM mmol/L 3.6 4.0   CHLORIDE mmol/L 103 100   CO2 mmol/L 27 26   ANION GAP mmol/L 7 10   BUN mg/dL 12 8   CREATININE mg/dL 1.23 1.02   EGFR ml/min/1.73sq m 76 96   CALCIUM mg/dL 8.4 9.6   MAGNESIUM mg/dL 1.3*  --      Results from last 7 days   Lab Units 10/03/23  1819   AST U/L 33   ALT U/L 40   ALK PHOS U/L 85   TOTAL PROTEIN g/dL 7.5   ALBUMIN g/dL 4.0   TOTAL BILIRUBIN mg/dL 1.22*     Results from last 7 days   Lab Units 10/04/23  0739 10/03/23  2242   POC GLUCOSE mg/dl 129 94     Results from last 7 days   Lab Units 10/04/23  0547 10/03/23  1819   GLUCOSE RANDOM mg/dL 124 111         Results from last 7 days   Lab Units 09/29/23  0809   HEMOGLOBIN A1C % 9.4*         Results from last 7 days   Lab Units 10/04/23  0547 10/03/23  2247   PROCALCITONIN ng/ml 0.22 0.21     Results from last 7 days   Lab Units 10/03/23  2247   LACTIC ACID mmol/L 1.6           Results from last 7 days   Lab Units 10/03/23  1853   CLARITY UA  Turbid   COLOR UA  Red   SPEC GRAV UA  1.020   PH UA  7.0   GLUCOSE UA mg/dl Negative   KETONES UA mg/dl Negative   BLOOD UA  Large*   PROTEIN UA mg/dl >=300*   NITRITE UA  Negative   BILIRUBIN UA  Moderate*   UROBILINOGEN UA E.U./dl 1.0   LEUKOCYTES UA  Moderate*   WBC UA /hpf Field obscured, unable to enumerate*   RBC UA /hpf Innumerable*   BACTERIA UA /hpf Field obscured, unable to enumerate* EPITHELIAL CELLS WET PREP /hpf Field obscured, unable to enumerate*         ED Treatment:   Medication Administration from 10/03/2023 1756 to 10/03/2023 2206       Date/Time Order Dose Route Action     10/03/2023 1819 EDT sodium chloride 0.9 % bolus 1,000 mL 1,000 mL Intravenous New Bag     10/03/2023 1933 EDT ketorolac (TORADOL) injection 30 mg 30 mg Intravenous Given     10/03/2023 1934 EDT phenazopyridine (PYRIDIUM) tablet 200 mg 200 mg Oral Given     10/03/2023 1935 EDT cefTRIAXone (ROCEPHIN) IVPB (premix in dextrose) 1,000 mg 50 mL 1,000 mg Intravenous New Bag     10/03/2023 2106 EDT morphine injection 4 mg 4 mg Intravenous Given     10/03/2023 2203 EDT sodium chloride 0.9 % infusion 150 mL/hr Intravenous New Bag        Past Medical History:   Diagnosis Date   • Depression    • Diabetes mellitus ([address])    • Gout    • Hypertension    • Kidney stones      Present on Admission:  • Sepsis ([address])  • Pyelonephritis      Admitting Diagnosis: Pyelonephritis [N12]  Flank pain [R10.9]  Age/Sex: 35 y.o. male  Admission Orders:  Scheduled Medications:  acetaminophen, 975 mg, Oral, Q8H [facility] & assisted  allopurinol, 100 mg, Oral, Daily  cefTRIAXone, 1,000 mg, Intravenous, Q24H  enoxaparin, 40 mg, Subcutaneous, Daily  FLUoxetine, 20 mg, Oral, Daily  insulin lispro, 1-6 Units, Subcutaneous, TID AC  insulin lispro, 1-6 Units, Subcutaneous, HS  lisinopril, 10 mg, Oral, Daily  nicotine, 21 mg, Transdermal, Daily  tamsulosin, 0.4 mg, Oral, HS      Continuous IV Infusions:  sodium chloride, 100 mL/hr, Intravenous, Continuous      PRN Meds:  HYDROmorphone, 0.5 mg, Intravenous, Q3H PRN  mirtazapine, 15 mg, Oral, HS PRN  ondansetron, 4 mg, Intravenous, Q6H PRN  oxyCODONE, 5 mg, Oral, Q6H PRN        IP CONSULT TO UROLOGY    Network Utilization Review Department  ATTENTION: Please call with any questions or concerns to [phone] and carefully listen to the prompts so that you are directed to the right person.  All voicemails are confidential. For Discharge needs, contact Care Management DC Support Team at [phone] opt. [phone]  Send all requests for admission clinical reviews, approved or denied determinations and any other requests to dedicated fax number below belonging to the campus where the patient is receiving treatment.  List of dedicated fax numbers for the Facilities:  [place] DENIALS (Administrative/Medical Necessity) [phone]   DISCHARGE SUPPORT TEAM (NETWORK) [address] [facility] (Maternity/NICU/Pediatrics) [phone]   [address] [place] [address] Drive [address] [place] [address] 1000 [facility] [phone]   [address] 5220 [facility] [address] [address] [facility] [address] [facility] [address] [phone]

## 2023-10-04 NOTE — CONSULTS
Consultation - Urology   Arielle Sol 35 y.o. male MRN: [id number]  Unit/Bed#: -01 Encounter: [id number]      Assessment/Plan      Assessment:  Bilateral stents with bilateral with right renal pelvic calculi large, and x2 left proximal calculi. Patient with stent discomfort, polyuria and pyelonephritis. Plan:  Agree with Flomax, ceftriaxone, Pyridium. Also recommend a heating pad over the penis and bladder area and may also give Ditropan XL. 10 mg. As long as fevers are down. Recommend another 24 hours of IV antibiotics and if doing well discharge on oral Ceftin. Or if cultures come back give culture specific antibiotics. Recommend going home also on Pyridium and Ditropan and patient will keep follow-up with [facility] urology. No  surgical intervention needed at this time. Patient understands and agrees    History of Present Illness   Attending: Radha Modi MD  HPI: Arielle Sol is a 35y.o. year old male who presents with pyuria pyelonephritis with bilateral stents in place. Patient follows with [facility] urology. Patient states 10/16/2023 he is to have bilateral ureteroscopy and bilateral stent exchange. I did review his CT scan that shows a 1.6 cm right renal pelvic stone and a 8 mm and 4 mm left proximal stone along the stent. Patient states he is to have both the stones removed on the above date and then admitted November to have this stents removed. Patient mid to the medical service with bilateral flank pain, dysuria, hematuria. .  Patient patient with stent discomfort. REVIEW OF SYSTEMS  Const: Denies chills, fever and weight loss. CV: Denies chest pain. Resp: Denies SOB. GI: Denies abdominal pain, nausea and vomiting. : Denies symptoms other than stated above. Musculo: Denies back pain.     Historical Information   Past Medical History:   Diagnosis Date   • Depression    • Diabetes mellitus ([address])    • Gout    • Hypertension    • Kidney stones Past Surgical History:   Procedure Laterality Date   • CYSTOSCOPY W/ URETERAL STENT PLACEMENT  09/14/2023   • US GUIDED MASS BIOPSY (UNSPECIFIED)  02/19/2021     Social History   Social History     Substance and Sexual Activity   Alcohol Use Yes     E-Cigarette/Vaping   • E-Cigarette Use Never User      E-Cigarette/Vaping Substances     Social History     Tobacco Use   Smoking Status Every Day   • Packs/day: 0.25   • Types: Cigarettes   Smokeless Tobacco Never         Meds/Allergies   all current active meds have been reviewed, current meds:   Current Facility-Administered Medications   Medication Dose Route Frequency   • acetaminophen (TYLENOL) tablet 975 mg  975 mg Oral Q8H [address]   • allopurinol (ZYLOPRIM) tablet 100 mg  100 mg Oral Daily   • cefTRIAXone (ROCEPHIN) IVPB (premix in dextrose) 1,000 mg 50 mL  1,000 mg Intravenous Q24H   • enoxaparin (LOVENOX) subcutaneous injection 40 mg  40 mg Subcutaneous Daily   • FLUoxetine (PROzac) capsule 20 mg  20 mg Oral Daily   • HYDROmorphone (DILAUDID) injection 0.5 mg  0.5 mg Intravenous Q3H PRN   • insulin lispro (HumaLOG) 100 units/mL subcutaneous injection 1-6 Units  1-6 Units Subcutaneous TID AC   • insulin lispro (HumaLOG) 100 units/mL subcutaneous injection 1-6 Units  1-6 Units Subcutaneous HS   • lisinopril (ZESTRIL) tablet 10 mg  10 mg Oral Daily   • magnesium sulfate 2 g/50 mL IVPB (premix) 2 g  2 g Intravenous Once   • mirtazapine (REMERON) tablet 15 mg  15 mg Oral HS PRN   • nicotine (NICODERM CQ) 21 mg/24 hr TD 24 hr patch 21 mg  21 mg Transdermal Daily   • ondansetron (ZOFRAN) injection 4 mg  4 mg Intravenous Q6H PRN   • oxyCODONE (ROXICODONE) IR tablet 5 mg  5 mg Oral Q6H PRN   • sodium chloride 0.9 % infusion  100 mL/hr Intravenous Continuous   • tamsulosin (FLOMAX) capsule 0.4 mg  0.4 mg Oral HS   , and PTA meds:   Prior to Admission Medications   Prescriptions Last Dose Informant Patient Reported? Taking?    Empagliflozin 25 MG TABS   Yes Yes   Sig: Take 25 mg by mouth daily   Fluoxetine HCl, PMDD, 20 MG TABS   Yes No   Sig: Take 20 mg by mouth daily   allopurinol (ZYLOPRIM) 100 mg tablet   Yes Yes   Sig: Take 100 mg by mouth daily   lisinopril (ZESTRIL) 10 mg tablet   Yes No   Sig: Take 10 mg by mouth daily   mirtazapine (REMERON) 15 mg tablet   Yes Yes   Sig: Take 15 mg by mouth daily at bedtime as needed   semaglutide, 0.25 or 0.5 mg/dose, (Ozempic, 0.25 or 0.5 MG/DOSE,) 2 mg/1.5 mL injection pen   Yes Yes   Sig: Inject 0.5 mg under the skin once a week   tamsulosin (FLOMAX) 0.4 mg   Yes Yes   Sig: Take 0.4 mg by mouth daily at bedtime      Facility-Administered Medications: None     No Known Allergies    Objective   Vitals: Blood pressure 132/90, pulse 97, temperature 97.7 °F (36.5 °C), resp. rate 16, height 5' 7" (1.702 m), weight 88.4 kg (194 lb 12.8 oz), SpO2 100 %. I/O last 24 hours: In: 1050 [IV Piggyback:1050]  Out: 400 [Urine:400]    Invasive Devices     Peripheral Intravenous Line  Duration           Peripheral IV 10/03/23 Right Antecubital <1 day                PHYSICAL EXAM  Const: Appears healthy and well developed. No signs of acute distress present. Resp: Respirations are regular and unlabored. CV: Rate is regular. Rhythm is regular. Abdomen: Abdomen is soft, nontender, and nondistended. Kidneys are not palpable. : nl bilateral mild left flank pain. And mild right flank pain left greater than right. Normal external genitalia exam  Psych: Patient's attitude is cooperative. Mood is normal. Affect is normal.    Lab Results: I have personally reviewed pertinent reports.     CBC:   Lab Results   Component Value Date    WBC 7.80 10/04/2023    HGB 12.7 10/04/2023    HCT 37.7 10/04/2023    MCV 99 (H) 10/04/2023     (L) 10/04/2023    RBC 3.81 (L) 10/04/2023    MCH 33.3 10/04/2023    MCHC 33.7 10/04/2023    RDW 12.3 10/04/2023    MPV 9.9 10/04/2023    NRBC 0 10/04/2023     CMP:   Lab Results   Component Value Date    SODIUM 137 10/04/2023  10/04/2023    CO2 27 10/04/2023    BUN 12 10/04/2023    CREATININE 1.23 10/04/2023    CALCIUM 8.4 10/04/2023    AST 33 10/03/2023    ALT 40 10/03/2023    ALKPHOS 85 10/03/2023    EGFR 76 10/04/2023     Urinalysis:   Lab Results   Component Value Date    COLORU Red 10/03/2023    CLARITYU Turbid 10/03/2023    SPECGRAV 1.020 10/03/2023    PHUR 7.0 10/03/2023    LEUKOCYTESUR Moderate (A) 10/03/2023    NITRITE Negative 10/03/2023    GLUCOSEU Negative 10/03/2023    KETONESU Negative 10/03/2023    BILIRUBINUR Moderate (A) 10/03/2023    BLOODU Large (A) 10/03/2023     Urine Culture: No results found for: "URINECX"  PSA: No results found for: "PSA"  Imaging Studies: I have personally reviewed pertinent reports. and I have personally reviewed pertinent films in PACS  EKG, Pathology, and Other Studies: I have personally reviewed pertinent reports. and I have personally reviewed pertinent films in PACS    Code Status: Level 1 - Full Code  Advance Directive and Living Will:      Power of :    POLST:      Counseling / Coordination of Care  Total floor / unit time spent today  minutes. Greater than 50% of total time was spent with the patient and / or family counseling and / or coordination of care.  A description of the counseling / coordination of care:

## 2023-10-04 NOTE — ASSESSMENT & PLAN NOTE
Lab Results   Component Value Date    HGBA1C 9.4 (H) 09/29/2023       Recent Labs     10/03/23  2242   POCGLU 94       Blood Sugar Average: Last 72 hrs:  (P) 94     · Sliding scale insulin with coverage, Accu-Cheks  · Hypoglycemia protocol  · Carbohydrate controlled diet  · Jardiance and Ozempic on hold

## 2023-10-04 NOTE — ASSESSMENT & PLAN NOTE
Lab Results   Component Value Date    HGBA1C 9.4 (H) 09/29/2023       Recent Labs     10/03/23  2242 10/04/23  0739 10/04/23  1113   POCGLU 94 129 146*       Blood Sugar Average: Last 72 hrs:  (P) 123     · Sliding scale insulin with coverage, Accu-Cheks  · Hypoglycemia protocol  · Carbohydrate controlled diet  · Jardiance and Ozempic on hold.   Blood sugars are well controlled

## 2023-10-04 NOTE — PLAN OF CARE

## 2023-10-04 NOTE — H&P
[facility]  H&P  Name: Carey Mendez 35 y.o. male I MRN: [id number]  Unit/Bed#: -01 I Date of Admission: 10/3/2023   Date of Service: 10/4/2023 I Hospital Day: 1      Assessment/Plan   * Pyelonephritis  Assessment & Plan  · Presents with bilateral flank pain and dysuria, hematuria  · Hx ureteral stents placed for scheduled for removal 10/16  · CT abdomen pelvis revealing - obstructive bilateral ureteral calculi with mild bilateral hydronephrosis. Ureteral stents are in place. Numerous subcentimeter right abdominal and retroperitoneal lymph nodes likely present on a reactive basis possibly secondary to pyelonephritis. · Urine and blood cultures pending  · Empiric ceftriaxone  · Continue Flomax  · Pain control  · Appreciate urology consultation    Sepsis [facility])  Assessment & Plan  · Meets criteria with tachycardia, leukocytosis  · Normal lactic acid  · UA with pyuria, bacteriuria  · CT abdomen pelvis C/W pyelonephritis, currently with bilateral ureteral stents  · Blood and urine cultures pending  · Empiric ceftriaxone  · Trend fever curve, leukocytosis    Type 2 diabetes mellitus with hyperglycemia, without long-term current use of insulin ([facility])  Assessment & Plan  Lab Results   Component Value Date    HGBA1C 9.4 (H) 09/29/2023       Recent Labs     10/03/23  2242   POCGLU 94       Blood Sugar Average: Last 72 hrs:  (P) 94     · Sliding scale insulin with coverage, Accu-Cheks  · Hypoglycemia protocol  · Carbohydrate controlled diet  · Jardiance and Ozempic on hold    History of hypertension  Assessment & Plan  · Continue PTA lisinopril  · Trend blood pressures    History of gout  Assessment & Plan  · Continue allopurinol       VTE Pharmacologic Prophylaxis: VTE Score: 3 High Risk (Score >/= 5) - Pharmacological DVT Prophylaxis Ordered: enoxaparin (Lovenox). Sequential Compression Devices Ordered.   Code Status: Level 1 - Full Code   Discussion with family: Patient declined call to . Anticipated Length of Stay: Patient will be admitted on an inpatient basis with an anticipated length of stay of greater than 2 midnights secondary to Pyelonephritis. Total Time Spent on Date of Encounter in care of patient: 45 mins. This time was spent on one or more of the following: performing physical exam; counseling and coordination of care; obtaining or reviewing history; documenting in the medical record; reviewing/ordering tests, medications or procedures; communicating with other healthcare professionals and discussing with patient's family/caregivers. Chief Complaint: Flank pain    History of Present Illness:  Keshia Valdovinos is a 35 y.o. male with a PMH of renal calculi status post cystoscopy with bilateral ureteral stent placement 9/14 at [facility], depression, anxiety, hypertension, diabetes mellitus non-insulin-dependent who presents with flank pain and dysuria. In the emergency department meeting criteria for sepsis, CT abdomen pelvis concerning for pyelonephritis. Initiated empiric ceftriaxone and presented to the medical service for further treatment and evaluation. Review of Systems:  Review of Systems   Constitutional: Negative for chills and fever. HENT: Negative for ear pain and sore throat. Eyes: Negative for pain and visual disturbance. Respiratory: Negative for cough and shortness of breath. Cardiovascular: Negative for chest pain and palpitations. Gastrointestinal: Negative for abdominal pain and vomiting. Genitourinary: Positive for dysuria, frequency and hematuria. Musculoskeletal: Positive for back pain. Negative for arthralgias. Skin: Negative for color change and rash. Neurological: Negative for seizures and syncope. All other systems reviewed and are negative.       Past Medical and Surgical History:   Past Medical History:   Diagnosis Date   • Depression    • Diabetes mellitus ([facility])    • Gout    • Hypertension    • Kidney stones        Past Surgical History:   Procedure Laterality Date   • CYSTOSCOPY W/ URETERAL STENT PLACEMENT  09/14/2023   • US GUIDED MASS BIOPSY (UNSPECIFIED)  02/19/2021       Meds/Allergies:  Prior to Admission medications    Medication Sig Start Date End Date Taking? Authorizing Provider   lisinopril (ZESTRIL) 5 mg tablet Take 5 mg by mouth daily   Yes Historical Provider, MD     I have reviewed home medications with patient personally. Allergies: No Known Allergies    Social History:  Marital Status: Single   Patient Pre-hospital Living Situation: With spouse  Patient Pre-hospital Level of Mobility: walks  Patient Pre-hospital Diet Restrictions: none  Substance Use History:   Social History     Substance and Sexual Activity   Alcohol Use Yes     Social History     Tobacco Use   Smoking Status Every Day   • Packs/day: 0.25   • Types: Cigarettes   Smokeless Tobacco Never     Social History     Substance and Sexual Activity   Drug Use Never       Family History:  History reviewed. No pertinent family history. Physical Exam:     Vitals:   Blood Pressure: (!) 148/106 (10/03/23 2221)  Pulse: 98 (10/03/23 2221)  Temperature: 99 °F (37.2 °C) (10/03/23 1814)  Temp Source: Tympanic (10/03/23 1814)  Respirations: 16 (10/03/23 2221)  Height: 5' 7" (170.2 cm) (10/03/23 2210)  Weight - Scale: 88.4 kg (194 lb 12.8 oz) (10/03/23 2210)  SpO2: 97 % (10/03/23 2221)    Physical Exam  Vitals and nursing note reviewed. Constitutional:       General: He is not in acute distress. Appearance: He is well-developed. HENT:      Head: Normocephalic and atraumatic. Mouth/Throat:      Mouth: Mucous membranes are dry. Eyes:      Conjunctiva/sclera: Conjunctivae normal.   Cardiovascular:      Rate and Rhythm: Normal rate and regular rhythm. Heart sounds: No murmur heard. Pulmonary:      Effort: Pulmonary effort is normal. No respiratory distress. Breath sounds: Normal breath sounds.    Abdominal: Palpations: Abdomen is soft. Tenderness: There is no abdominal tenderness. There is right CVA tenderness and left CVA tenderness. Musculoskeletal:         General: No swelling. Normal range of motion. Cervical back: Neck supple. Skin:     General: Skin is warm and dry. Capillary Refill: Capillary refill takes less than 2 seconds. Neurological:      General: No focal deficit present. Mental Status: He is alert and oriented to person, place, and time. Psychiatric:         Mood and Affect: Mood normal.         Behavior: Behavior normal.          Additional Data:     Lab Results:  Results from last 7 days   Lab Units 10/03/23  1819   WBC Thousand/uL 13.71*   HEMOGLOBIN g/dL 14.4   HEMATOCRIT % 43.4   PLATELETS Thousands/uL 176   NEUTROS PCT % 74   LYMPHS PCT % 14   MONOS PCT % 7   EOS PCT % 4     Results from last 7 days   Lab Units 10/03/23  1819   SODIUM mmol/L 136   POTASSIUM mmol/L 4.0   CHLORIDE mmol/L 100   CO2 mmol/L 26   BUN mg/dL 8   CREATININE mg/dL 1.02   ANION GAP mmol/L 10   CALCIUM mg/dL 9.6   ALBUMIN g/dL 4.0   TOTAL BILIRUBIN mg/dL 1.22*   ALK PHOS U/L 85   ALT U/L 40   AST U/L 33   GLUCOSE RANDOM mg/dL 111         Results from last 7 days   Lab Units 10/03/23  2242   POC GLUCOSE mg/dl 94     Results from last 7 days   Lab Units 09/29/23  0809   HEMOGLOBIN A1C % 9.4*     Results from last 7 days   Lab Units 10/03/23  2247   LACTIC ACID mmol/L 1.6   PROCALCITONIN ng/ml 0.21       Lines/Drains:  Invasive Devices     Peripheral Intravenous Line  Duration           Peripheral IV 10/03/23 Right Antecubital <1 day                    Imaging: Reviewed radiology reports from this admission including: abdominal/pelvic CT  CT renal stone study abdomen pelvis wo contrast   Final Result by Analia Escalera MD (10/03 2025)      Obstructive bilateral ureteral calculi with mild bilateral hydronephrosis. Ureteral stents are in place.       Numerous subcentimeter right abdominal and retroperitoneal lymph nodes likely present on a reactive basis possibly secondary to pyelonephritis. The study was marked in [facility] for immediate notification. Workstation performed: [id number]             EKG and Other Studies Reviewed on Admission:   · All    ** Please Note: This note has been constructed using a voice recognition system.  **

## 2023-10-04 NOTE — ASSESSMENT & PLAN NOTE
· Presents with bilateral flank pain and dysuria, hematuria  · Hx ureteral stents placed for scheduled for removal 10/16  · CT abdomen pelvis revealing - obstructive bilateral ureteral calculi with mild bilateral hydronephrosis. Ureteral stents are in place. Numerous subcentimeter right abdominal and retroperitoneal lymph nodes likely present on a reactive basis possibly secondary to pyelonephritis.   · Urine and blood cultures pending  · Empiric ceftriaxone  · Continue Flomax  · Pain control  · Appreciate urology consultation

## 2023-10-04 NOTE — PLAN OF CARE
Problem: Potential for Falls  Goal: Patient will remain free of falls  Description: INTERVENTIONS:  - Educate patient/family on patient safety including physical limitations  - Instruct patient to call for assistance with activity   - Consult OT/PT to assist with strengthening/mobility   - Keep Call bell within reach  - Keep bed low and locked with side rails adjusted as appropriate  - Keep care items and personal belongings within reach  - Initiate and maintain comfort rounds  - Make Fall Risk Sign visible to staff  - Offer Toileting every  Hours, in advance of need  - Initiate/Maintain alarm  - Obtain necessary fall risk management equipment:   - Apply yellow socks and bracelet for high fall risk patients  - Consider moving patient to room near nurses station  Outcome: Progressing     Problem: PAIN - ADULT  Goal: Verbalizes/displays adequate comfort level or baseline comfort level  Description: Interventions:  - Encourage patient to monitor pain and request assistance  - Assess pain using appropriate pain scale  - Administer analgesics based on type and severity of pain and evaluate response  - Implement non-pharmacological measures as appropriate and evaluate response  - Consider cultural and social influences on pain and pain management  - Notify physician/advanced practitioner if interventions unsuccessful or patient reports new pain  Outcome: Progressing     Problem: INFECTION - ADULT  Goal: Absence or prevention of progression during hospitalization  Description: INTERVENTIONS:  - Assess and monitor for signs and symptoms of infection  - Monitor lab/diagnostic results  - Monitor all insertion sites, i.e. indwelling lines, tubes, and drains  - Monitor endotracheal if appropriate and nasal secretions for changes in amount and color  - [place] appropriate cooling/warming therapies per order  - Administer medications as ordered  - Instruct and encourage patient and family to use good hand hygiene technique  - Identify and instruct in appropriate isolation precautions for identified infection/condition  Outcome: Progressing  Goal: Absence of fever/infection during neutropenic period  Description: INTERVENTIONS:  - Monitor WBC    Outcome: Progressing     Problem: SAFETY ADULT  Goal: Patient will remain free of falls  Description: INTERVENTIONS:  - Educate patient/family on patient safety including physical limitations  - Instruct patient to call for assistance with activity   - Consult OT/PT to assist with strengthening/mobility   - Keep Call bell within reach  - Keep bed low and locked with side rails adjusted as appropriate  - Keep care items and personal belongings within reach  - Initiate and maintain comfort rounds  - Make Fall Risk Sign visible to staff  - Offer Toileting every  Hours, in advance of need  - Initiate/Maintain alarm  - Obtain necessary fall risk management equipment:   - Apply yellow socks and bracelet for high fall risk patients  - Consider moving patient to room near nurses station  Outcome: Progressing  Goal: Maintain or return to baseline ADL function  Description: INTERVENTIONS:  -  Assess patient's ability to carry out ADLs; assess patient's baseline for ADL function and identify physical deficits which impact ability to perform ADLs (bathing, care of mouth/teeth, toileting, grooming, dressing, etc.)  - Assess/evaluate cause of self-care deficits   - Assess range of motion  - Assess patient's mobility; develop plan if impaired  - Assess patient's need for assistive devices and provide as appropriate  - Encourage maximum independence but intervene and supervise when necessary  - Involve family in performance of ADLs  - Assess for home care needs following discharge   - Consider OT consult to assist with ADL evaluation and planning for discharge  - Provide patient education as appropriate  Outcome: Progressing  Goal: Maintains/Returns to pre admission functional level  Description: INTERVENTIONS:  - Perform BMAT or MOVE assessment daily.   - Set and communicate daily mobility goal to care team and patient/family/caregiver. - Collaborate with rehabilitation services on mobility goals if consulted  - Perform Range of Motion  times a day. - Reposition patient every  hours. - Dangle patient  times a day  - Stand patient  times a day  - Ambulate patient  times a day  - Out of bed to chair  times a day   - Out of bed for meal times a day  - Out of bed for toileting  - Record patient progress and toleration of activity level   Outcome: Progressing     Problem: DISCHARGE PLANNING  Goal: Discharge to home or other facility with appropriate resources  Description: INTERVENTIONS:  - Identify barriers to discharge w/patient and caregiver  - Arrange for needed discharge resources and transportation as appropriate  - Identify discharge learning needs (meds, wound care, etc.)  - Arrange for interpretive services to assist at discharge as needed  - Refer to Case Management Department for coordinating discharge planning if the patient needs post-hospital services based on physician/advanced practitioner order or complex needs related to functional status, cognitive ability, or social support system  Outcome: Progressing     Problem: Knowledge Deficit  Goal: Patient/family/caregiver demonstrates understanding of disease process, treatment plan, medications, and discharge instructions  Description: Complete learning assessment and assess knowledge base.   Interventions:  - Provide teaching at level of understanding  - Provide teaching via preferred learning methods  Outcome: Progressing

## 2023-10-04 NOTE — UTILIZATION REVIEW
NOTIFICATION OF INPATIENT ADMISSION   AUTHORIZATION REQUEST   SERVICING FACILITY:   [address]  Tax ID: [id number]  NPI: [id number] ATTENDING PROVIDER:  Attending Name and NPI#: Enzo Whiteside Md [[id number]]  Address: [address] [place] [address]  Phone: [phone]   ADMISSION INFORMATION:  Place of Service: [address] [place] [address]  Place of Service Code: 21  Inpatient Admission Date/Time: 10/3/23  9:18 PM  Discharge Date/Time: No discharge date for patient encounter. Admitting Diagnosis Code/Description:  Pyelonephritis [N12]  Flank pain [R10.9]     UTILIZATION REVIEW CONTACT:  Marta Jackson Utilization   Network Utilization Review Department  Phone: [phone]  Fax [phone]  Email: [email or web address]  Contact for approvals/pending authorizations, clinical reviews, and discharge. PHYSICIAN ADVISORY SERVICES:  Medical Necessity Denial & Agwq-tf-Cbrv Review  Phone: [phone]  Fax: [phone]  Email: [email or web address]     DISCHARGE SUPPORT TEAM:  For Patients Discharge Needs & Updates  Phone: [phone] opt. 2 Fax: [phone]  Email: [email or web address]

## 2023-10-04 NOTE — ASSESSMENT & PLAN NOTE
· Presents with bilateral flank pain and dysuria, hematuria  · Hx ureteral stents placed and scheduled for removal 10/16  · CT abdomen pelvis revealing - obstructive bilateral ureteral calculi with mild bilateral hydronephrosis. Ureteral stents are in place. Numerous subcentimeter right abdominal and retroperitoneal lymph nodes likely present on a reactive basis possibly secondary to pyelonephritis.   · Urine and blood cultures pending  · Empiric ceftriaxone  · Continue Flomax  · Pain control  · Appreciate urology consultation  · acute bilateral pyelonephritis which is not related to bilateral ureteral stents placed by [facility] on 9/14/2023 but probably secondary to bilateral obstructive ureterolithiasis

## 2023-10-04 NOTE — ED PROVIDER NOTES
History  Chief Complaint   Patient presents with   • Flank Pain     Pt c/o right flank pain radiating to abdomen and penis over past couple week and now noticed blood in urine this morning. Pt seen couple weeks ago at other hospital dx kidney stones and recent stent placement. Surgery scheduled 10/16. Denies fevers     80-year-old male presents to the ED for evaluation of bilateral flank pain radiating to his abdomen for several hours. Patient was seen at [facility] in September and diagnosed with kidney stones and at that time had bilateral ureteral stents placed. Patient states that he has a follow-up outpatient with urology for cystoscopy in October. Patient adamantly denies any fever or chills but states that he became concerned when he noticed he had blood in his urine today. States that his pain is constant and is 10 out of 10. None       History reviewed. No pertinent past medical history. Past Surgical History:   Procedure Laterality Date   • US GUIDED MASS BIOPSY (UNSPECIFIED)  2/19/2021       History reviewed. No pertinent family history. I have reviewed and agree with the history as documented. E-Cigarette/Vaping   • E-Cigarette Use Never User      E-Cigarette/Vaping Substances     Social History     Tobacco Use   • Smoking status: Every Day     Packs/day: 0.25     Types: Cigarettes   • Smokeless tobacco: Never   Vaping Use   • Vaping Use: Never used   Substance Use Topics   • Alcohol use: Yes   • Drug use: Never       Review of Systems   Constitutional: Negative for chills and fever. HENT: Negative for ear pain and sore throat. Eyes: Negative for pain and visual disturbance. Respiratory: Negative for cough and shortness of breath. Cardiovascular: Negative for chest pain and palpitations. Gastrointestinal: Positive for abdominal pain. Negative for vomiting. Genitourinary: Positive for flank pain and hematuria. Negative for dysuria.    Musculoskeletal: Negative for arthralgias and back pain. Skin: Negative for color change and rash. Neurological: Negative for seizures and syncope. All other systems reviewed and are negative. Physical Exam  Physical Exam  Vitals and nursing note reviewed. Constitutional:       General: He is in acute distress. Appearance: Normal appearance. He is well-developed and normal weight. HENT:      Head: Normocephalic and atraumatic. Right Ear: External ear normal.      Left Ear: External ear normal.      Nose: Nose normal. No congestion or rhinorrhea. Mouth/Throat:      Mouth: Mucous membranes are dry. Eyes:      Extraocular Movements: Extraocular movements intact. Conjunctiva/sclera: Conjunctivae normal.   Neck:      Vascular: No carotid bruit. Cardiovascular:      Rate and Rhythm: Normal rate and regular rhythm. Pulses: Normal pulses. Heart sounds: Normal heart sounds. No murmur heard. Pulmonary:      Effort: Pulmonary effort is normal. No respiratory distress. Breath sounds: Normal breath sounds. No stridor. No wheezing, rhonchi or rales. Chest:      Chest wall: No tenderness. Abdominal:      General: Abdomen is flat. Bowel sounds are normal. There is distension. Palpations: Abdomen is soft. There is no mass. Tenderness: There is abdominal tenderness. There is right CVA tenderness. There is no left CVA tenderness, guarding or rebound. Hernia: No hernia is present. Comments: +rlq tenderness, mild right cva tenderness   Musculoskeletal:         General: No swelling, tenderness, deformity or signs of injury. Normal range of motion. Cervical back: Normal range of motion and neck supple. No rigidity or tenderness. Right lower leg: No edema. Left lower leg: No edema. Lymphadenopathy:      Cervical: No cervical adenopathy. Skin:     General: Skin is warm and dry. Capillary Refill: Capillary refill takes less than 2 seconds.       Coloration: Skin is not jaundiced or pale. Findings: No bruising, erythema, lesion or rash. Neurological:      General: No focal deficit present. Mental Status: He is alert and oriented to person, place, and time. Mental status is at baseline. Cranial Nerves: No cranial nerve deficit. Sensory: No sensory deficit. Motor: No weakness.       Coordination: Coordination normal.      Gait: Gait normal.      Deep Tendon Reflexes: Reflexes normal.   Psychiatric:         Mood and Affect: Mood normal.         Vital Signs  ED Triage Vitals [10/03/23 1814]   Temperature Pulse Respirations Blood Pressure SpO2   99 °F (37.2 °C) (!) 111 17 146/89 98 %      Temp Source Heart Rate Source Patient Position - Orthostatic VS BP Location FiO2 (%)   Tympanic Monitor Lying Left arm --      Pain Score       6           Vitals:    10/03/23 1814 10/03/23 1830 10/03/23 1900   BP: 146/89 138/86 147/96   Pulse: (!) 111 (!) 109 (!) 107   Patient Position - Orthostatic VS: Lying Lying          Visual Acuity      ED Medications  Medications   sodium chloride 0.9 % bolus 1,000 mL (0 mL Intravenous Stopped 10/3/23 2024)   ketorolac (TORADOL) injection 30 mg (30 mg Intravenous Given 10/3/23 1933)   phenazopyridine (PYRIDIUM) tablet 200 mg (200 mg Oral Given 10/3/23 1934)   cefTRIAXone (ROCEPHIN) IVPB (premix in dextrose) 1,000 mg 50 mL (0 mg Intravenous Stopped 10/3/23 2025)   morphine injection 4 mg (4 mg Intravenous Given 10/3/23 2106)       Diagnostic Studies  Results Reviewed     Procedure Component Value Units Date/Time    Urine Microscopic [[id number]]  (Abnormal) Collected: 10/03/23 1853    Lab Status: Final result Specimen: Urine, Other Updated: 10/03/23 1907     RBC, UA Innumerable /hpf      WBC, UA       Field obscured, unable to enumerate     /hpf     Epithelial Cells       Field obscured, unable to enumerate     /hpf     Bacteria, UA       Field obscured, unable to enumerate     /hpf    Urine culture [[id number]] Collected: 10/03/23 1853    Lab Status:  In process Specimen: Urine, Other Updated: 10/03/23 1907    UA w Reflex to Microscopic w Reflex to Culture [[id number]]  (Abnormal) Collected: 10/03/23 1853    Lab Status: Final result Specimen: Urine, Other Updated: 10/03/23 1905     Color, UA Red     Clarity, UA Turbid     Specific Gravity, UA 1.020     pH, UA 7.0     Leukocytes, UA Moderate     Nitrite, UA Negative     Protein, UA >=300 mg/dl      Glucose, UA Negative mg/dl      Ketones, UA Negative mg/dl      Urobilinogen, UA 1.0 E.U./dl      Bilirubin, UA Moderate     Occult Blood, UA Large    Comprehensive metabolic panel [[id number]]  (Abnormal) Collected: 10/03/23 1819    Lab Status: Final result Specimen: Blood from Arm, Right Updated: 10/03/23 1848     Sodium 136 mmol/L      Potassium 4.0 mmol/L      Chloride 100 mmol/L      CO2 26 mmol/L      ANION GAP 10 mmol/L      BUN 8 mg/dL      Creatinine 1.02 mg/dL      Glucose 111 mg/dL      Calcium 9.6 mg/dL      AST 33 U/L      ALT 40 U/L      Alkaline Phosphatase 85 U/L      Total Protein 7.5 g/dL      Albumin 4.0 g/dL      Total Bilirubin 1.22 mg/dL      eGFR 96 ml/min/1.73sq m     Narrative:      [place] guidelines for Chronic Kidney Disease (CKD):   •  Stage 1 with normal or high GFR (GFR > 90 mL/min/1.73 square meters)  •  Stage 2 Mild CKD (GFR = 60-89 mL/min/1.73 square meters)  •  Stage 3A Moderate CKD (GFR = 45-59 mL/min/1.73 square meters)  •  Stage 3B Moderate CKD (GFR = 30-44 mL/min/1.73 square meters)  •  Stage 4 Severe CKD (GFR = 15-29 mL/min/1.73 square meters)  •  Stage 5 End Stage CKD (GFR <15 mL/min/1.73 square meters)  Note: GFR calculation is accurate only with a steady state creatinine    CBC and differential [[id number]]  (Abnormal) Collected: 10/03/23 1819    Lab Status: Final result Specimen: Blood from Arm, Right Updated: 10/03/23 1826     WBC 13.71 Thousand/uL      RBC 4.44 Million/uL      Hemoglobin 14.4 g/dL      Hematocrit 43.4 % MCV 98 fL      MCH 32.4 pg      MCHC 33.2 g/dL      RDW 12.2 %      MPV 9.7 fL      Platelets 856 Thousands/uL      nRBC 0 /100 WBCs      Neutrophils Relative 74 %      Immat GRANS % 0 %      Lymphocytes Relative 14 %      Monocytes Relative 7 %      Eosinophils Relative 4 %      Basophils Relative 1 %      Neutrophils Absolute 10.14 Thousands/µL      Immature Grans Absolute 0.04 Thousand/uL      Lymphocytes Absolute 1.88 Thousands/µL      Monocytes Absolute 1.02 Thousand/µL      Eosinophils Absolute 0.56 Thousand/µL      Basophils Absolute 0.07 Thousands/µL                  CT renal stone study abdomen pelvis wo contrast   Final Result by Wendy Stubbs MD (10/03 2025)      Obstructive bilateral ureteral calculi with mild bilateral hydronephrosis. Ureteral stents are in place. Numerous subcentimeter right abdominal and retroperitoneal lymph nodes likely present on a reactive basis possibly secondary to pyelonephritis. The study was marked in [facility] for immediate notification. Workstation performed: [id number]                    Procedures  Procedures         ED Course  ED Course as of 10/03/23 2118   Tue Oct 03, 2023   2037 Case discussed with the urology mid level provider. After careful review they recommend admission and agree with antibiotics   2117 Case was discussed with the hospitalist who agrees with admission                               SBIRT 22yo+    Flowsheet Row Most Recent Value   Initial Alcohol Screen: US AUDIT-C     1. How often do you have a drink containing alcohol? 0 Filed at: 10/03/2023 1817   2. How many drinks containing alcohol do you have on a typical day you are drinking? 0 Filed at: 10/03/2023 1817   3a. Male UNDER 65: How often do you have five or more drinks on one occasion? 0 Filed at: 10/03/2023 1817   3b. FEMALE Any Age, or MALE 65+: How often do you have 4 or more drinks on one occassion?  0 Filed at: 10/03/2023 1817   Audit-C Score 0 Filed at: 10/03/2023 1817   DIANE: How many times in the past year have you. .. Used an illegal drug or used a prescription medication for non-medical reasons? Never Filed at: 10/03/2023 1817                    Medical Decision Making  51-year-old male presents to the ED for evaluation of bilateral flank pain radiating to his abdomen for several hours. Differential diagnosis includes but not limited to: UTI, cystitis, pyelonephritis, kidney stones. Will order CT renal study as well as address patient's pain. He will be hydrated with normal saline. Amount and/or Complexity of Data Reviewed  Radiology: ordered. Risk  Prescription drug management. Disposition  Final diagnoses:   Pyelonephritis     Time reflects when diagnosis was documented in both MDM as applicable and the Disposition within this note     Time User Action Codes Description Comment    10/3/2023  9:17 PM Savita Sawant [N12] Pyelonephritis       ED Disposition     ED Disposition   Admit    Condition   Stable    Date/Time   Tue Oct 3, 2023  9:17 PM    Comment   --         Follow-up Information    None         Patient's Medications    No medications on file       No discharge procedures on file.     PDMP Review     None          ED Provider  Electronically Signed by           Zac Wong DO  10/03/23 3228

## 2023-10-04 NOTE — ASSESSMENT & PLAN NOTE
· Meets criteria with tachycardia, leukocytosis  · Normal lactic acid  · UA with pyuria, bacteriuria  · CT abdomen pelvis C/W pyelonephritis, currently with bilateral ureteral stents  · Blood and urine cultures pending  · Empiric ceftriaxone  · Trend fever curve, leukocytosis

## 2023-10-05 VITALS
OXYGEN SATURATION: 95 % | TEMPERATURE: 97.9 F | HEART RATE: 108 BPM | WEIGHT: 194.8 LBS | BODY MASS INDEX: 30.57 KG/M2 | SYSTOLIC BLOOD PRESSURE: 131 MMHG | DIASTOLIC BLOOD PRESSURE: 85 MMHG | RESPIRATION RATE: 19 BRPM | HEIGHT: 67 IN

## 2023-10-05 LAB
ANION GAP SERPL CALCULATED.3IONS-SCNC: 4 MMOL/L
BACTERIA UR CULT: NORMAL
BUN SERPL-MCNC: 9 MG/DL (ref 5–25)
CALCIUM SERPL-MCNC: 8.2 MG/DL (ref 8.4–10.2)
CHLORIDE SERPL-SCNC: 106 MMOL/L (ref 96–108)
CO2 SERPL-SCNC: 26 MMOL/L (ref 21–32)
CREAT SERPL-MCNC: 0.98 MG/DL (ref 0.6–1.3)
GFR SERPL CREATININE-BSD FRML MDRD: 100 ML/MIN/1.73SQ M
GLUCOSE SERPL-MCNC: 132 MG/DL (ref 65–140)
GLUCOSE SERPL-MCNC: 89 MG/DL (ref 65–140)
MAGNESIUM SERPL-MCNC: 1.8 MG/DL (ref 1.9–2.7)
POTASSIUM SERPL-SCNC: 3.9 MMOL/L (ref 3.5–5.3)
SODIUM SERPL-SCNC: 136 MMOL/L (ref 135–147)

## 2023-10-05 PROCEDURE — 99239 HOSP IP/OBS DSCHRG MGMT >30: CPT | Performed by: FAMILY MEDICINE

## 2023-10-05 PROCEDURE — 83735 ASSAY OF MAGNESIUM: CPT | Performed by: FAMILY MEDICINE

## 2023-10-05 PROCEDURE — 80048 BASIC METABOLIC PNL TOTAL CA: CPT | Performed by: FAMILY MEDICINE

## 2023-10-05 PROCEDURE — 82948 REAGENT STRIP/BLOOD GLUCOSE: CPT

## 2023-10-05 RX ORDER — POLYETHYLENE GLYCOL 3350 17 G/17G
17 POWDER, FOR SOLUTION ORAL
Qty: 100 G | Refills: 0 | Status: SHIPPED | OUTPATIENT
Start: 2023-10-05

## 2023-10-05 RX ORDER — OXYCODONE HYDROCHLORIDE AND ACETAMINOPHEN 5; 325 MG/1; MG/1
1 TABLET ORAL EVERY 4 HOURS PRN
Qty: 10 TABLET | Refills: 0 | Status: SHIPPED | OUTPATIENT
Start: 2023-10-05 | End: 2023-10-15

## 2023-10-05 RX ORDER — PHENAZOPYRIDINE HYDROCHLORIDE 100 MG/1
100 TABLET, FILM COATED ORAL 3 TIMES DAILY PRN
Qty: 20 TABLET | Refills: 0 | Status: SHIPPED | OUTPATIENT
Start: 2023-10-05

## 2023-10-05 RX ORDER — CEFUROXIME AXETIL 500 MG/1
500 TABLET ORAL EVERY 12 HOURS SCHEDULED
Qty: 10 TABLET | Refills: 0 | Status: SHIPPED | OUTPATIENT
Start: 2023-10-05 | End: 2023-10-10

## 2023-10-05 RX ORDER — ACETAMINOPHEN 325 MG/1
650 TABLET ORAL EVERY 8 HOURS SCHEDULED
Refills: 0
Start: 2023-10-05

## 2023-10-05 RX ORDER — NICOTINE 21 MG/24HR
1 PATCH, TRANSDERMAL 24 HOURS TRANSDERMAL DAILY
Qty: 28 PATCH | Refills: 0 | Status: SHIPPED | OUTPATIENT
Start: 2023-10-05

## 2023-10-05 RX ADMIN — ENOXAPARIN SODIUM 40 MG: 40 INJECTION SUBCUTANEOUS at 08:22

## 2023-10-05 RX ADMIN — LISINOPRIL 10 MG: 10 TABLET ORAL at 08:22

## 2023-10-05 RX ADMIN — OXYCODONE HYDROCHLORIDE 5 MG: 5 TABLET ORAL at 08:22

## 2023-10-05 RX ADMIN — OXYCODONE HYDROCHLORIDE 5 MG: 5 TABLET ORAL at 02:21

## 2023-10-05 RX ADMIN — ACETAMINOPHEN 975 MG: 325 TABLET, FILM COATED ORAL at 05:04

## 2023-10-05 RX ADMIN — ALLOPURINOL 100 MG: 100 TABLET ORAL at 08:22

## 2023-10-05 RX ADMIN — FLUOXETINE HYDROCHLORIDE 20 MG: 10 CAPSULE ORAL at 08:23

## 2023-10-05 RX ADMIN — HYDROMORPHONE HYDROCHLORIDE 0.5 MG: 1 INJECTION, SOLUTION INTRAMUSCULAR; INTRAVENOUS; SUBCUTANEOUS at 06:34

## 2023-10-05 RX ADMIN — ONDANSETRON 4 MG: 2 INJECTION INTRAMUSCULAR; INTRAVENOUS at 04:53

## 2023-10-05 NOTE — ASSESSMENT & PLAN NOTE
· Presents with bilateral flank pain and dysuria, hematuria  · Hx ureteral stents placed and scheduled for removal 10/16  · CT abdomen pelvis revealing - obstructive bilateral ureteral calculi with mild bilateral hydronephrosis. Ureteral stents are in place. Numerous subcentimeter right abdominal and retroperitoneal lymph nodes likely present on a reactive basis possibly secondary to pyelonephritis. · Urine and blood cultures neg  · Empiric ceftriaxone. switch to ceftin for 5 more days  · Continue Flomax  · Pain control  · Appreciate urology consultation  · acute bilateral pyelonephritis which is not related to bilateral ureteral stents placed by [facility] on 9/14/2023 but probably secondary to bilateral obstructive ureterolithiasis. plan for urology intervention next week for the stone

## 2023-10-05 NOTE — DISCHARGE SUMMARY
[address] [facility]  Discharge- Niraj Chandler 1990, 35 y.o. male MRN: [id number]  Unit/Bed#: -01 Encounter: [id number]  Primary Care Provider: Ryne Allen Referral   Date and time admitted to hospital: 10/3/2023  6:09 PM    Sepsis [facility])  Assessment & Plan  · Meets criteria with tachycardia, leukocytosis  · Normal lactic acid  · UA with pyuria, bacteriuria  · CT abdomen pelvis C/W pyelonephritis, currently with bilateral ureteral stents  · Blood and urine cultures neg  · Empiric ceftriaxone. switch to ceftin  · Trend fever curve, leukocytosis    * Pyelonephritis  Assessment & Plan  · Presents with bilateral flank pain and dysuria, hematuria  · Hx ureteral stents placed and scheduled for removal 10/16  · CT abdomen pelvis revealing - obstructive bilateral ureteral calculi with mild bilateral hydronephrosis. Ureteral stents are in place. Numerous subcentimeter right abdominal and retroperitoneal lymph nodes likely present on a reactive basis possibly secondary to pyelonephritis. · Urine and blood cultures neg  · Empiric ceftriaxone. switch to ceftin for 5 more days  · Continue Flomax  · Pain control  · Appreciate urology consultation  · acute bilateral pyelonephritis which is not related to bilateral ureteral stents placed by [facility] on 9/14/2023 but probably secondary to bilateral obstructive ureterolithiasis. plan for urology intervention next week for the stone    Tobacco abuse  Assessment & Plan  · Tobacco 1/2 pack/day  · Nicotine patch  · Cessation counseling    Type 2 diabetes mellitus with hyperglycemia, without long-term current use of insulin [facility])  Assessment & Plan  Lab Results   Component Value Date    HGBA1C 9.4 (H) 09/29/2023       Recent Labs     10/04/23  1113 10/04/23  1627 10/04/23  2111 10/05/23  0647   POCGLU 146* 167* 101 89       Blood Sugar Average: Last 72 hrs:  (P) 121     · Sliding scale insulin with coverage, Accu-Cheks  · Hypoglycemia protocol  · Carbohydrate controlled diet  · Jardiance and Ozempic on hold. Blood sugars are well controlled    History of hypertension  Assessment & Plan  · Continue PTA lisinopril  · controlled blood pressures    History of gout  Assessment & Plan  · Continue allopurinol    Discharging Physician / Practitioner: Romeo Laughlin MD  PCP: [id number] [facility] Referral  Admission Date:   Admission Orders (From admission, onward)     Ordered        10/03/23 2118  INPATIENT ADMISSION  Once                      Discharge Date: 10/05/23    Medical Problems     Resolved Problems  Date Reviewed: 10/5/2023   None         Consultations During Hospital Stay:  · urology    Procedures Performed:   · none    Significant Findings / Test Results:   CT renal stone study abdomen pelvis wo contrast    Result Date: 10/3/2023  Impression: Obstructive bilateral ureteral calculi with mild bilateral hydronephrosis. Ureteral stents are in place. Numerous subcentimeter right abdominal and retroperitoneal lymph nodes likely present on a reactive basis possibly secondary to pyelonephritis. The study was marked in [facility] for immediate notification.  Workstation performed: [id number]     Incidental Findings:   · none    Test Results Pending at Discharge (will require follow up):   · none     Outpatient Tests Requested:  · outpt fu with urology    Complications:  none    Reason for Admission: Pyelonephritis    Hospital Course:     Carey Mendez is a 35 y.o. male patient who originally presented to the hospital on 10/3/2023 due to pyelonephritis and already has known obstructive bilateral ureteral calculi with mild hydronephrosis ureteral stents were placed however is waiting to have further definitive treatment done with urology treated with IV fluid hydration Flomax antibiotics cultures are negative recommend continuing on oral Ceftin, Pyridium and follow-up with [facility] for further intervention    The patient, initially admitted to the hospital as inpatient, was discharged earlier than expected given the following: clinical improvement. Please see above list of diagnoses and related plan for additional information. Condition at Discharge: good     Discharge Day Visit / Exam:     Subjective: he denies any chest pain or shortness of breath and his flank pain is also slowly improving  Vitals: Blood Pressure: 131/85 (10/05/23 0648)  Pulse: (!) 108 (10/05/23 0648)  Temperature: 97.9 °F (36.6 °C) (10/05/23 0648)  Temp Source: Tympanic (10/03/23 1814)  Respirations: 19 (10/04/23 2237)  Height: 5' 7" (170.2 cm) (10/03/23 2210)  Weight - Scale: 88.4 kg (194 lb 12.8 oz) (10/03/23 2210)  SpO2: 95 % (10/05/23 0704)  Exam:   Physical Exam  Vitals and nursing note reviewed. Constitutional:       Appearance: Normal appearance. HENT:      Head: Normocephalic and atraumatic. Right Ear: External ear normal.      Left Ear: External ear normal.      Nose: Nose normal.      Mouth/Throat:      Pharynx: Oropharynx is clear. Eyes:      Pupils: Pupils are equal, round, and reactive to light. Cardiovascular:      Rate and Rhythm: Normal rate and regular rhythm. Heart sounds: Normal heart sounds. Pulmonary:      Effort: Pulmonary effort is normal.      Breath sounds: Normal breath sounds. Abdominal:      General: Bowel sounds are normal.      Palpations: Abdomen is soft. Tenderness: There is no abdominal tenderness. Musculoskeletal:         General: Normal range of motion. Cervical back: Normal range of motion and neck supple. Skin:     General: Skin is warm and dry. Capillary Refill: Capillary refill takes less than 2 seconds. Neurological:      General: No focal deficit present. Mental Status: He is alert and oriented to person, place, and time.    Psychiatric:         Mood and Affect: Mood normal.         Discussion with Family: None    Discharge instructions/Information to patient and family:   See after visit summary for information provided to patient and family. Provisions for Follow-Up Care:  See after visit summary for information related to follow-up care and any pertinent home health orders. Disposition:     Home    For Discharges to [facility]:   · Not Applicable to this Patient - Not Applicable to this Patient    Planned Readmission: none     Discharge Statement:  I spent 35 minutes discharging the patient. This time was spent on the day of discharge. I had direct contact with the patient on the day of discharge. Greater than 50% of the total time was spent examining patient, answering all patient questions, arranging and discussing plan of care with patient as well as directly providing post-discharge instructions. Additional time then spent on discharge activities. Discharge Medications:  See after visit summary for reconciled discharge medications provided to patient and family.       ** Please Note: This note has been constructed using a voice recognition system **

## 2023-10-05 NOTE — PLAN OF CARE

## 2023-10-05 NOTE — PLAN OF CARE
Problem: Potential for Falls  Goal: Patient will remain free of falls  Description: INTERVENTIONS:  - Educate patient/family on patient safety including physical limitations  - Instruct patient to call for assistance with activity   - Consult OT/PT to assist with strengthening/mobility   - Keep Call bell within reach  - Keep bed low and locked with side rails adjusted as appropriate  - Keep care items and personal belongings within reach  - Initiate and maintain comfort rounds  - Make Fall Risk Sign visible to staff  - Offer Toileting every  Hours, in advance of need  - Initiate/Maintain alarm  - Obtain necessary fall risk management equipment:   - Apply yellow socks and bracelet for high fall risk patients  - Consider moving patient to room near nurses station  Outcome: Progressing     Problem: PAIN - ADULT  Goal: Verbalizes/displays adequate comfort level or baseline comfort level  Description: Interventions:  - Encourage patient to monitor pain and request assistance  - Assess pain using appropriate pain scale  - Administer analgesics based on type and severity of pain and evaluate response  - Implement non-pharmacological measures as appropriate and evaluate response  - Consider cultural and social influences on pain and pain management  - Notify physician/advanced practitioner if interventions unsuccessful or patient reports new pain  Outcome: Progressing     Problem: INFECTION - ADULT  Goal: Absence or prevention of progression during hospitalization  Description: INTERVENTIONS:  - Assess and monitor for signs and symptoms of infection  - Monitor lab/diagnostic results  - Monitor all insertion sites, i.e. indwelling lines, tubes, and drains  - Monitor endotracheal if appropriate and nasal secretions for changes in amount and color  - [place] appropriate cooling/warming therapies per order  - Administer medications as ordered  - Instruct and encourage patient and family to use good hand hygiene technique  - Identify and instruct in appropriate isolation precautions for identified infection/condition  Outcome: Progressing     Problem: SAFETY ADULT  Goal: Patient will remain free of falls  Description: INTERVENTIONS:  - Educate patient/family on patient safety including physical limitations  - Instruct patient to call for assistance with activity   - Consult OT/PT to assist with strengthening/mobility   - Keep Call bell within reach  - Keep bed low and locked with side rails adjusted as appropriate  - Keep care items and personal belongings within reach  - Initiate and maintain comfort rounds  - Make Fall Risk Sign visible to staff  - Offer Toileting every Hours, in advance of need  - Initiate/Maintain alarm  - Obtain necessary fall risk management equipment:   - Apply yellow socks and bracelet for high fall risk patients  - Consider moving patient to room near nurses station  Outcome: Progressing

## 2023-10-05 NOTE — ASSESSMENT & PLAN NOTE
· Meets criteria with tachycardia, leukocytosis  · Normal lactic acid  · UA with pyuria, bacteriuria  · CT abdomen pelvis C/W pyelonephritis, currently with bilateral ureteral stents  · Blood and urine cultures neg  · Empiric ceftriaxone. switch to ceftin  · Trend fever curve, leukocytosis

## 2023-10-05 NOTE — ASSESSMENT & PLAN NOTE
Lab Results   Component Value Date    HGBA1C 9.4 (H) 09/29/2023       Recent Labs     10/04/23  1113 10/04/23  1627 10/04/23  2111 10/05/23  0647   POCGLU 146* 167* 101 89       Blood Sugar Average: Last 72 hrs:  (P) 121     · Sliding scale insulin with coverage, Accu-Cheks  · Hypoglycemia protocol  · Carbohydrate controlled diet  · Jardiance and Ozempic on hold.   Blood sugars are well controlled

## 2023-10-06 NOTE — UTILIZATION REVIEW
NOTIFICATION OF ADMISSION DISCHARGE   This is a Notification of Discharge from [address] [facility]. Please be advised that this patient has been discharge from our facility. Below you will find the admission and discharge date and time including the patient’s disposition. UTILIZATION REVIEW CONTACT:  Bonita Cao  Utilization   Network Utilization Review Department  Phone: [phone] x carefully listen to the prompts. All voicemails are confidential.  Email: [email or web address]     ADMISSION INFORMATION  PRESENTATION DATE: 10/3/2023  6:09 PM  OBERVATION ADMISSION DATE:   INPATIENT ADMISSION DATE: 10/3/23  9:18 PM   DISCHARGE DATE: 10/5/2023 10:59 AM   DISPOSITION:Home/Self Care    Network Utilization Review Department  ATTENTION: Please call with any questions or concerns to [phone] and carefully listen to the prompts so that you are directed to the right person. All voicemails are confidential.   For Discharge needs, contact Care Management DC Support Team at [phone] opt. [phone]  Send all requests for admission clinical reviews, approved or denied determinations and any other requests to dedicated fax number below belonging to the campus where the patient is receiving treatment.  List of dedicated fax numbers for the Facilities:  [place] DENIALS (Administrative/Medical Necessity) [phone]   DISCHARGE SUPPORT TEAM (Network) [phone] [facility] [phone]   [address] [facility] [address] [place] [address] [address] [phone]

## 2023-10-08 LAB
BACTERIA BLD CULT: NORMAL
BACTERIA BLD CULT: NORMAL

## 2023-10-09 LAB
BACTERIA BLD CULT: NORMAL
BACTERIA BLD CULT: NORMAL

## 2023-10-30 ENCOUNTER — HOSPITAL ENCOUNTER (INPATIENT)
Facility: HOSPITAL | Age: 33
LOS: 3 days | Discharge: HOME | DRG: 872 | End: 2023-11-02
Attending: EMERGENCY MEDICINE | Admitting: FAMILY MEDICINE
Payer: COMMERCIAL

## 2023-10-30 ENCOUNTER — APPOINTMENT (EMERGENCY)
Dept: CT IMAGING | Facility: HOSPITAL | Age: 33
DRG: 872 | End: 2023-10-30
Payer: COMMERCIAL

## 2023-10-30 DIAGNOSIS — N12 PYELONEPHRITIS: ICD-10-CM

## 2023-10-30 DIAGNOSIS — K51.00 PANCOLITIS (HCC): Primary | ICD-10-CM

## 2023-10-30 DIAGNOSIS — Z96.0 URETERAL STENT PRESENT: ICD-10-CM

## 2023-10-30 DIAGNOSIS — N39.0 UTI (URINARY TRACT INFECTION): ICD-10-CM

## 2023-10-30 PROBLEM — N13.4 HYDROURETER: Status: ACTIVE | Noted: 2023-10-30

## 2023-10-30 PROBLEM — N30.00 ACUTE CYSTITIS WITHOUT HEMATURIA: Status: ACTIVE | Noted: 2023-10-30

## 2023-10-30 LAB
ALBUMIN SERPL BCP-MCNC: 4 G/DL (ref 3.5–5)
ALP SERPL-CCNC: 136 U/L (ref 34–104)
ALT SERPL W P-5'-P-CCNC: 43 U/L (ref 7–52)
ANION GAP SERPL CALCULATED.3IONS-SCNC: 16 MMOL/L
APTT PPP: 26 SECONDS (ref 23–37)
AST SERPL W P-5'-P-CCNC: 50 U/L (ref 13–39)
BACTERIA UR QL AUTO: ABNORMAL /HPF
BASOPHILS # BLD MANUAL: 0 THOUSAND/UL (ref 0–0.1)
BASOPHILS NFR MAR MANUAL: 0 % (ref 0–1)
BILIRUB SERPL-MCNC: 0.73 MG/DL (ref 0.2–1)
BILIRUB UR QL STRIP: ABNORMAL
BUN SERPL-MCNC: 5 MG/DL (ref 5–25)
CALCIUM SERPL-MCNC: 9.6 MG/DL (ref 8.4–10.2)
CAOX CRY URNS QL MICRO: ABNORMAL /HPF
CHLORIDE SERPL-SCNC: 101 MMOL/L (ref 96–108)
CLARITY UR: CLEAR
CO2 SERPL-SCNC: 21 MMOL/L (ref 21–32)
COLOR UR: ABNORMAL
CREAT SERPL-MCNC: 1.03 MG/DL (ref 0.6–1.3)
EOSINOPHIL # BLD MANUAL: 0.43 THOUSAND/UL (ref 0–0.4)
EOSINOPHIL NFR BLD MANUAL: 3 % (ref 0–6)
ERYTHROCYTE [DISTWIDTH] IN BLOOD BY AUTOMATED COUNT: 13.7 % (ref 11.6–15.1)
FLUAV RNA RESP QL NAA+PROBE: NEGATIVE
FLUBV RNA RESP QL NAA+PROBE: NEGATIVE
GFR SERPL CREATININE-BSD FRML MDRD: 94 ML/MIN/1.73SQ M
GLUCOSE SERPL-MCNC: 127 MG/DL (ref 65–140)
GLUCOSE SERPL-MCNC: 81 MG/DL (ref 65–140)
GLUCOSE SERPL-MCNC: 99 MG/DL (ref 65–140)
GLUCOSE UR STRIP-MCNC: NEGATIVE MG/DL
HCT VFR BLD AUTO: 42.9 % (ref 36.5–49.3)
HGB BLD-MCNC: 14.9 G/DL (ref 12–17)
HGB UR QL STRIP.AUTO: ABNORMAL
INR PPP: 0.97 (ref 0.84–1.19)
KETONES UR STRIP-MCNC: ABNORMAL MG/DL
LACTATE SERPL-SCNC: 2.3 MMOL/L (ref 0.5–2)
LACTATE SERPL-SCNC: 3.8 MMOL/L (ref 0.5–2)
LEUKOCYTE ESTERASE UR QL STRIP: ABNORMAL
LIPASE SERPL-CCNC: 28 U/L (ref 11–82)
LYMPHOCYTES # BLD AUTO: 16 % (ref 14–44)
LYMPHOCYTES # BLD AUTO: 2.3 THOUSAND/UL (ref 0.6–4.47)
MCH RBC QN AUTO: 32 PG (ref 26.8–34.3)
MCHC RBC AUTO-ENTMCNC: 34.7 G/DL (ref 31.4–37.4)
MCV RBC AUTO: 92 FL (ref 82–98)
MONOCYTES # BLD AUTO: 1.58 THOUSAND/UL (ref 0–1.22)
MONOCYTES NFR BLD: 11 % (ref 4–12)
MUCOUS THREADS UR QL AUTO: ABNORMAL
NEUTROPHILS # BLD MANUAL: 10.06 THOUSAND/UL (ref 1.85–7.62)
NEUTS SEG NFR BLD AUTO: 70 % (ref 43–75)
NITRITE UR QL STRIP: NEGATIVE
NON-SQ EPI CELLS URNS QL MICRO: ABNORMAL /HPF
PH UR STRIP.AUTO: 6.5 [PH]
PLATELET # BLD AUTO: 238 THOUSANDS/UL (ref 149–390)
PLATELET BLD QL SMEAR: ADEQUATE
PMV BLD AUTO: 9.4 FL (ref 8.9–12.7)
POTASSIUM SERPL-SCNC: 3.5 MMOL/L (ref 3.5–5.3)
PROCALCITONIN SERPL-MCNC: 0.17 NG/ML
PROT SERPL-MCNC: 7.7 G/DL (ref 6.4–8.4)
PROT UR STRIP-MCNC: >=300 MG/DL
PROTHROMBIN TIME: 13.1 SECONDS (ref 11.6–14.5)
RBC # BLD AUTO: 4.65 MILLION/UL (ref 3.88–5.62)
RBC #/AREA URNS AUTO: ABNORMAL /HPF
RBC MORPH BLD: NORMAL
RSV RNA RESP QL NAA+PROBE: NEGATIVE
SARS-COV-2 RNA RESP QL NAA+PROBE: NEGATIVE
SODIUM SERPL-SCNC: 138 MMOL/L (ref 135–147)
SP GR UR STRIP.AUTO: 1.02 (ref 1–1.03)
UROBILINOGEN UR QL STRIP.AUTO: 0.2 E.U./DL
WBC # BLD AUTO: 14.37 THOUSAND/UL (ref 4.31–10.16)
WBC #/AREA URNS AUTO: ABNORMAL /HPF

## 2023-10-30 PROCEDURE — 83605 ASSAY OF LACTIC ACID: CPT | Performed by: EMERGENCY MEDICINE

## 2023-10-30 PROCEDURE — 87086 URINE CULTURE/COLONY COUNT: CPT | Performed by: EMERGENCY MEDICINE

## 2023-10-30 PROCEDURE — 81001 URINALYSIS AUTO W/SCOPE: CPT | Performed by: EMERGENCY MEDICINE

## 2023-10-30 PROCEDURE — 83690 ASSAY OF LIPASE: CPT | Performed by: EMERGENCY MEDICINE

## 2023-10-30 PROCEDURE — 85027 COMPLETE CBC AUTOMATED: CPT | Performed by: EMERGENCY MEDICINE

## 2023-10-30 PROCEDURE — 82948 REAGENT STRIP/BLOOD GLUCOSE: CPT

## 2023-10-30 PROCEDURE — 99285 EMERGENCY DEPT VISIT HI MDM: CPT | Performed by: EMERGENCY MEDICINE

## 2023-10-30 PROCEDURE — 74177 CT ABD & PELVIS W/CONTRAST: CPT

## 2023-10-30 PROCEDURE — G1004 CDSM NDSC: HCPCS

## 2023-10-30 PROCEDURE — 99223 1ST HOSP IP/OBS HIGH 75: CPT | Performed by: FAMILY MEDICINE

## 2023-10-30 PROCEDURE — 87040 BLOOD CULTURE FOR BACTERIA: CPT | Performed by: EMERGENCY MEDICINE

## 2023-10-30 PROCEDURE — 99285 EMERGENCY DEPT VISIT HI MDM: CPT

## 2023-10-30 PROCEDURE — 36415 COLL VENOUS BLD VENIPUNCTURE: CPT | Performed by: EMERGENCY MEDICINE

## 2023-10-30 PROCEDURE — 87154 CUL TYP ID BLD PTHGN 6+ TRGT: CPT | Performed by: EMERGENCY MEDICINE

## 2023-10-30 PROCEDURE — 99221 1ST HOSP IP/OBS SF/LOW 40: CPT | Performed by: UROLOGY

## 2023-10-30 PROCEDURE — 85610 PROTHROMBIN TIME: CPT | Performed by: EMERGENCY MEDICINE

## 2023-10-30 PROCEDURE — 93005 ELECTROCARDIOGRAM TRACING: CPT

## 2023-10-30 PROCEDURE — 80053 COMPREHEN METABOLIC PANEL: CPT | Performed by: EMERGENCY MEDICINE

## 2023-10-30 PROCEDURE — 84145 PROCALCITONIN (PCT): CPT | Performed by: FAMILY MEDICINE

## 2023-10-30 PROCEDURE — 83605 ASSAY OF LACTIC ACID: CPT | Performed by: FAMILY MEDICINE

## 2023-10-30 PROCEDURE — 0241U HB NFCT DS VIR RESP RNA 4 TRGT: CPT | Performed by: EMERGENCY MEDICINE

## 2023-10-30 PROCEDURE — 85007 BL SMEAR W/DIFF WBC COUNT: CPT | Performed by: EMERGENCY MEDICINE

## 2023-10-30 PROCEDURE — 96374 THER/PROPH/DIAG INJ IV PUSH: CPT

## 2023-10-30 PROCEDURE — 96375 TX/PRO/DX INJ NEW DRUG ADDON: CPT

## 2023-10-30 PROCEDURE — 85730 THROMBOPLASTIN TIME PARTIAL: CPT | Performed by: EMERGENCY MEDICINE

## 2023-10-30 RX ORDER — KETOROLAC TROMETHAMINE 30 MG/ML
15 INJECTION, SOLUTION INTRAMUSCULAR; INTRAVENOUS ONCE
Status: COMPLETED | OUTPATIENT
Start: 2023-10-30 | End: 2023-10-30

## 2023-10-30 RX ORDER — CELECOXIB 100 MG/1
100 CAPSULE ORAL DAILY
COMMUNITY
Start: 2023-09-16

## 2023-10-30 RX ORDER — NICOTINE 21 MG/24HR
1 PATCH, TRANSDERMAL 24 HOURS TRANSDERMAL DAILY
Status: DISCONTINUED | OUTPATIENT
Start: 2023-10-31 | End: 2023-11-02 | Stop reason: HOSPADM

## 2023-10-30 RX ORDER — INSULIN LISPRO 100 [IU]/ML
1-6 INJECTION, SOLUTION INTRAVENOUS; SUBCUTANEOUS
Status: DISCONTINUED | OUTPATIENT
Start: 2023-10-30 | End: 2023-11-01

## 2023-10-30 RX ORDER — PSEUDOEPHEDRINE HCL 30 MG
100 TABLET ORAL 2 TIMES DAILY
COMMUNITY
Start: 2023-10-16 | End: 2023-11-02

## 2023-10-30 RX ORDER — HYDRALAZINE HYDROCHLORIDE 20 MG/ML
5 INJECTION INTRAMUSCULAR; INTRAVENOUS EVERY 6 HOURS PRN
Status: DISCONTINUED | OUTPATIENT
Start: 2023-10-30 | End: 2023-11-01

## 2023-10-30 RX ORDER — POLYETHYLENE GLYCOL 3350 17 G/17G
17 POWDER, FOR SOLUTION ORAL DAILY
Status: DISCONTINUED | OUTPATIENT
Start: 2023-10-30 | End: 2023-10-30

## 2023-10-30 RX ORDER — LISINOPRIL 10 MG/1
10 TABLET ORAL DAILY
Status: DISCONTINUED | OUTPATIENT
Start: 2023-10-31 | End: 2023-11-02 | Stop reason: HOSPADM

## 2023-10-30 RX ORDER — OMEPRAZOLE 40 MG/1
40 CAPSULE, DELAYED RELEASE ORAL DAILY
COMMUNITY
Start: 2023-06-23

## 2023-10-30 RX ORDER — SODIUM CHLORIDE 9 MG/ML
50 INJECTION, SOLUTION INTRAVENOUS CONTINUOUS
Status: DISCONTINUED | OUTPATIENT
Start: 2023-10-30 | End: 2023-11-01

## 2023-10-30 RX ORDER — SODIUM CHLORIDE, SODIUM LACTATE, POTASSIUM CHLORIDE, CALCIUM CHLORIDE 600; 310; 30; 20 MG/100ML; MG/100ML; MG/100ML; MG/100ML
125 INJECTION, SOLUTION INTRAVENOUS CONTINUOUS
Status: DISCONTINUED | OUTPATIENT
Start: 2023-10-30 | End: 2023-10-30

## 2023-10-30 RX ORDER — FLUOXETINE 10 MG/1
20 CAPSULE ORAL DAILY
Status: DISCONTINUED | OUTPATIENT
Start: 2023-10-31 | End: 2023-11-02 | Stop reason: HOSPADM

## 2023-10-30 RX ORDER — TOLTERODINE 4 MG/1
4 CAPSULE, EXTENDED RELEASE ORAL DAILY
COMMUNITY
Start: 2023-10-16

## 2023-10-30 RX ORDER — COLCHICINE 0.6 MG/1
0.6 TABLET ORAL AS NEEDED
COMMUNITY
Start: 2023-09-17

## 2023-10-30 RX ORDER — ACETAMINOPHEN 325 MG/1
650 TABLET ORAL EVERY 6 HOURS PRN
Status: DISCONTINUED | OUTPATIENT
Start: 2023-10-30 | End: 2023-11-02 | Stop reason: HOSPADM

## 2023-10-30 RX ORDER — DROPERIDOL 2.5 MG/ML
1.25 INJECTION, SOLUTION INTRAMUSCULAR; INTRAVENOUS ONCE
Status: COMPLETED | OUTPATIENT
Start: 2023-10-30 | End: 2023-10-30

## 2023-10-30 RX ORDER — HYDROMORPHONE HCL/PF 1 MG/ML
0.5 SYRINGE (ML) INJECTION EVERY 4 HOURS PRN
Status: DISCONTINUED | OUTPATIENT
Start: 2023-10-30 | End: 2023-10-31

## 2023-10-30 RX ORDER — DOCUSATE SODIUM 100 MG/1
100 CAPSULE, LIQUID FILLED ORAL 2 TIMES DAILY
Status: DISCONTINUED | OUTPATIENT
Start: 2023-10-30 | End: 2023-10-30

## 2023-10-30 RX ORDER — ALLOPURINOL 100 MG/1
100 TABLET ORAL DAILY
Status: DISCONTINUED | OUTPATIENT
Start: 2023-10-31 | End: 2023-11-02 | Stop reason: HOSPADM

## 2023-10-30 RX ORDER — HEPARIN SODIUM 5000 [USP'U]/ML
5000 INJECTION, SOLUTION INTRAVENOUS; SUBCUTANEOUS EVERY 8 HOURS SCHEDULED
Status: DISCONTINUED | OUTPATIENT
Start: 2023-10-30 | End: 2023-11-02 | Stop reason: HOSPADM

## 2023-10-30 RX ORDER — ONDANSETRON 2 MG/ML
4 INJECTION INTRAMUSCULAR; INTRAVENOUS ONCE
Status: COMPLETED | OUTPATIENT
Start: 2023-10-30 | End: 2023-10-30

## 2023-10-30 RX ORDER — CEFTRIAXONE 2 G/50ML
2000 INJECTION, SOLUTION INTRAVENOUS EVERY 24 HOURS
Status: DISCONTINUED | OUTPATIENT
Start: 2023-10-31 | End: 2023-11-01

## 2023-10-30 RX ORDER — ONDANSETRON 4 MG/1
1 TABLET, FILM COATED ORAL EVERY 8 HOURS PRN
COMMUNITY
Start: 2023-06-29 | End: 2023-11-02

## 2023-10-30 RX ORDER — PANTOPRAZOLE SODIUM 20 MG/1
20 TABLET, DELAYED RELEASE ORAL
Status: DISCONTINUED | OUTPATIENT
Start: 2023-10-31 | End: 2023-11-02 | Stop reason: HOSPADM

## 2023-10-30 RX ORDER — DIPHENHYDRAMINE HYDROCHLORIDE 50 MG/ML
12.5 INJECTION INTRAMUSCULAR; INTRAVENOUS ONCE
Status: COMPLETED | OUTPATIENT
Start: 2023-10-30 | End: 2023-10-30

## 2023-10-30 RX ORDER — OXYCODONE HYDROCHLORIDE AND ACETAMINOPHEN 5; 325 MG/1; MG/1
1 TABLET ORAL EVERY 4 HOURS PRN
Status: DISCONTINUED | OUTPATIENT
Start: 2023-10-30 | End: 2023-11-01

## 2023-10-30 RX ORDER — LABETALOL HYDROCHLORIDE 5 MG/ML
10 INJECTION, SOLUTION INTRAVENOUS ONCE
Status: COMPLETED | OUTPATIENT
Start: 2023-10-30 | End: 2023-10-30

## 2023-10-30 RX ORDER — HYDROXYZINE HYDROCHLORIDE 25 MG/1
25 TABLET, FILM COATED ORAL AS NEEDED
COMMUNITY
Start: 2023-08-08

## 2023-10-30 RX ORDER — ONDANSETRON 2 MG/ML
4 INJECTION INTRAMUSCULAR; INTRAVENOUS EVERY 6 HOURS PRN
Status: DISCONTINUED | OUTPATIENT
Start: 2023-10-30 | End: 2023-11-02 | Stop reason: HOSPADM

## 2023-10-30 RX ORDER — NICOTINE 21 MG/24HR
1 PATCH, TRANSDERMAL 24 HOURS TRANSDERMAL DAILY
Status: DISCONTINUED | OUTPATIENT
Start: 2023-10-31 | End: 2023-10-30

## 2023-10-30 RX ORDER — TAMSULOSIN HYDROCHLORIDE 0.4 MG/1
0.4 CAPSULE ORAL
Status: DISCONTINUED | OUTPATIENT
Start: 2023-10-30 | End: 2023-11-02 | Stop reason: HOSPADM

## 2023-10-30 RX ADMIN — KETOROLAC TROMETHAMINE 15 MG: 30 INJECTION, SOLUTION INTRAMUSCULAR; INTRAVENOUS at 13:57

## 2023-10-30 RX ADMIN — SODIUM CHLORIDE 1000 ML: 0.9 INJECTION, SOLUTION INTRAVENOUS at 13:56

## 2023-10-30 RX ADMIN — SODIUM CHLORIDE 1000 ML: 0.9 INJECTION, SOLUTION INTRAVENOUS at 16:40

## 2023-10-30 RX ADMIN — PIPERACILLIN AND TAZOBACTAM 3.38 G: 36; 4.5 INJECTION, POWDER, FOR SOLUTION INTRAVENOUS at 14:52

## 2023-10-30 RX ADMIN — HYDROMORPHONE HYDROCHLORIDE 0.5 MG: 1 INJECTION, SOLUTION INTRAMUSCULAR; INTRAVENOUS; SUBCUTANEOUS at 20:35

## 2023-10-30 RX ADMIN — OXYCODONE HYDROCHLORIDE AND ACETAMINOPHEN 1 TABLET: 5; 325 TABLET ORAL at 17:33

## 2023-10-30 RX ADMIN — LABETALOL HYDROCHLORIDE 10 MG: 5 INJECTION, SOLUTION INTRAVENOUS at 18:43

## 2023-10-30 RX ADMIN — IOHEXOL 100 ML: 350 INJECTION, SOLUTION INTRAVENOUS at 14:00

## 2023-10-30 RX ADMIN — HEPARIN SODIUM 5000 UNITS: 5000 INJECTION INTRAVENOUS; SUBCUTANEOUS at 16:32

## 2023-10-30 RX ADMIN — ONDANSETRON 4 MG: 2 INJECTION INTRAMUSCULAR; INTRAVENOUS at 13:57

## 2023-10-30 RX ADMIN — SODIUM CHLORIDE 1000 ML: 0.9 INJECTION, SOLUTION INTRAVENOUS at 14:39

## 2023-10-30 RX ADMIN — HEPARIN SODIUM 5000 UNITS: 5000 INJECTION INTRAVENOUS; SUBCUTANEOUS at 23:35

## 2023-10-30 RX ADMIN — MORPHINE SULFATE 2 MG: 2 INJECTION, SOLUTION INTRAMUSCULAR; INTRAVENOUS at 13:56

## 2023-10-30 RX ADMIN — OXYCODONE HYDROCHLORIDE AND ACETAMINOPHEN 1 TABLET: 5; 325 TABLET ORAL at 23:35

## 2023-10-30 RX ADMIN — DROPERIDOL 1.25 MG: 2.5 INJECTION, SOLUTION INTRAMUSCULAR; INTRAVENOUS at 13:57

## 2023-10-30 RX ADMIN — SODIUM CHLORIDE 125 ML/HR: 0.9 INJECTION, SOLUTION INTRAVENOUS at 17:33

## 2023-10-30 RX ADMIN — TAMSULOSIN HYDROCHLORIDE 0.4 MG: 0.4 CAPSULE ORAL at 21:51

## 2023-10-30 RX ADMIN — HYDROMORPHONE HYDROCHLORIDE 0.5 MG: 1 INJECTION, SOLUTION INTRAMUSCULAR; INTRAVENOUS; SUBCUTANEOUS at 16:32

## 2023-10-30 RX ADMIN — DIPHENHYDRAMINE HYDROCHLORIDE 12.5 MG: 50 INJECTION, SOLUTION INTRAMUSCULAR; INTRAVENOUS at 13:56

## 2023-10-30 NOTE — ASSESSMENT & PLAN NOTE
Met criteria with tachycardia, leukocytosis with source of infection of UTI/pancolitis  Patient lactic acid also elevated.   Sepsis alert initiated  Patient already received 2 L bolus in ER, will give another liter of bolus in the floor  Patient is status post 1 dose of IV antibiotic, will continue ceftriaxone  For now hydration, pain management,  And lactic acid, blood culture, urine culture

## 2023-10-30 NOTE — ASSESSMENT & PLAN NOTE
On imaging, patient has mild right greater than left hydroureter  Patient also has bilateral ureteral stent. Continue IV hydration, intake and output, retention protocol. Follow urology consult.

## 2023-10-30 NOTE — ASSESSMENT & PLAN NOTE
Lab Results   Component Value Date    HGBA1C 9.4 (H) 09/29/2023       No results for input(s): "POCGLU" in the last 72 hours. Blood Sugar Average: Last 72 hrs:  No signs or symptoms of hyperosmolar state  Continue IV hydration,  Patient takes Ozempic and Luci Muster, which remain on hold  Continue sliding scale, follow hypoglycemia precaution.

## 2023-10-30 NOTE — ASSESSMENT & PLAN NOTE
Imaging reviewed, no obstruction or perforation  Continue IV antibiotic, clear liquid diet  During abdominal exam, if any concern, can rescan and consult with surgery.

## 2023-10-30 NOTE — H&P
[address] [facility]  H&P  Name: Jermaine Cortes 35 y.o. male I MRN: [id number]  Unit/Bed#: -01 I Date of Admission: 10/30/2023   Date of Service: 10/30/2023 I Hospital Day: 0      Assessment/Plan   Acute cystitis without hematuria  Assessment & Plan  Patient does have bilateral stent in place  Patient also reports recently had procedure done for kidney stone at [facility]  Pending urine culture  Continue IV antibiotic, follow urology consult. Per imaging patient does have mild right greater than left hydroureter-follow retention protocol    Pancolitis [facility])  Assessment & Plan  Imaging reviewed, no obstruction or perforation  Continue IV antibiotic, clear liquid diet  During abdominal exam, if any concern, can rescan and consult with surgery. * Sepsis ([address])  Assessment & Plan  Met criteria with tachycardia, leukocytosis with source of infection of UTI/pancolitis  Patient lactic acid also elevated. Sepsis alert initiated  Patient already received 2 L bolus in ER, will give another liter of bolus in the floor  Patient is status post 1 dose of IV antibiotic, will continue ceftriaxone  For now hydration, pain management,  And lactic acid, blood culture, urine culture      Hydroureter  Assessment & Plan  On imaging, patient has mild right greater than left hydroureter  Patient also has bilateral ureteral stent. Continue IV hydration, intake and output, retention protocol. Follow urology consult. Tobacco abuse  Assessment & Plan  NicoDerm patch. Type 2 diabetes mellitus with hyperglycemia, without long-term current use of insulin ([facility])  Assessment & Plan  Lab Results   Component Value Date    HGBA1C 9.4 (H) 09/29/2023       No results for input(s): "POCGLU" in the last 72 hours.     Blood Sugar Average: Last 72 hrs:  No signs or symptoms of hyperosmolar state  Continue IV hydration,  Patient takes Ozempic and Luci Muster, which remain on hold  Continue sliding scale, follow hypoglycemia precaution. VTE Pharmacologic Prophylaxis: VTE Score: 3 Moderate Risk (Score 3-4) - Pharmacological DVT Prophylaxis Ordered: heparin. Code Status: Level 1 - Full Code per patient  Discussion with family: Updated  (family member) at bedside. Anticipated Length of Stay: Patient will be admitted on an inpatient basis with an anticipated length of stay of greater than 2 midnights secondary to to monitor above conditions. Chief Complaint: Abdominal pain, diarrhea, vomiting    History of Present Illness:  Ron Thornton is a 35 y.o. male with a PMH of diabetes, recent history of pyelonephritis who presents with lower abdominal pain, sharp in nature, nonradiating, constant, aggravates with any kind of movements cough sneezing, no significant elevating factor. Associated with diarrhea, and vomiting. Denies any blood. Denies any previous history of surgery in the abdomen. .    Review of Systems:  Review of Systems   Constitutional:  Positive for activity change. Negative for appetite change, chills, diaphoresis, fatigue, fever and unexpected weight change. HENT:  Negative for congestion, dental problem, rhinorrhea, sinus pressure, sinus pain and sneezing. Respiratory:  Negative for apnea, choking, chest tightness, shortness of breath and stridor. Cardiovascular:  Negative for chest pain, palpitations and leg swelling. Gastrointestinal:  Positive for abdominal pain, diarrhea, nausea and vomiting. Negative for abdominal distention. Genitourinary:  Positive for dysuria. Negative for difficulty urinating, hematuria, penile discharge and penile pain. Musculoskeletal:  Negative for arthralgias and back pain. Skin:  Negative for color change, pallor, rash and wound. Neurological:  Negative for dizziness, tremors, syncope, facial asymmetry, speech difficulty, light-headedness and headaches.    Psychiatric/Behavioral:  Negative for agitation, behavioral problems, confusion and decreased concentration. All other systems reviewed and are negative. Past Medical and Surgical History:   Past Medical History:   Diagnosis Date    Depression     Diabetes mellitus ([address])     Gout     Hypertension     Kidney stones        Past Surgical History:   Procedure Laterality Date    CYSTOSCOPY W/ URETERAL STENT PLACEMENT  09/14/2023    US GUIDED MASS BIOPSY (UNSPECIFIED)  02/19/2021       Meds/Allergies:  Prior to Admission medications    Medication Sig Start Date End Date Taking?  Authorizing Provider   acetaminophen (TYLENOL) 325 mg tablet Take 2 tablets (650 mg total) by mouth every 8 (eight) hours 10/5/23  Yes Tita Rodriguez MD   allopurinol (ZYLOPRIM) 100 mg tablet Take 100 mg by mouth daily 6/23/23  Yes Historical Provider, MD   celecoxib (CeleBREX) 100 mg capsule Take 100 mg by mouth daily 9/16/23  Yes Historical Provider, MD   Docusate Sodium (DSS) 100 MG CAPS Take 100 mg by mouth 2 (two) times a day 10/16/23 10/31/23 Yes Historical Provider, MD   Fluoxetine HCl, PMDD, 20 MG TABS Take 20 mg by mouth daily   Yes Historical Provider, MD   lisinopril (ZESTRIL) 10 mg tablet Take 10 mg by mouth daily   Yes Historical Provider, MD   mirtazapine (REMERON) 15 mg tablet Take 15 mg by mouth daily at bedtime as needed 8/31/23  Yes Historical Provider, MD   nicotine (NICODERM CQ) 21 mg/24 hr TD 24 hr patch Place 1 patch on the skin over 24 hours daily 10/5/23  Yes Tita Rodriguez MD   omeprazole (PriLOSEC) 40 MG capsule Take 40 mg by mouth daily 6/23/23  Yes Historical Provider, MD   ondansetron (ZOFRAN) 4 mg tablet Take 1 tablet by mouth every 8 (eight) hours as needed for nausea or vomiting 6/29/23  Yes Historical Provider, MD   phenazopyridine (PYRIDIUM) 100 mg tablet Take 1 tablet (100 mg total) by mouth 3 (three) times a day as needed for bladder spasms 10/5/23  Yes Tita Rodriguez MD   semaglutide, 0.25 or 0.5 mg/dose, (Ozempic, 0.25 or 0.5 MG/DOSE,) 2 mg/1.5 mL injection pen Inject 0.5 mg under the skin once a week 9/29/23  Yes Historical Provider, MD   tolterodine (DETROL LA) 4 mg 24 hr capsule Take 4 mg by mouth daily 10/16/23  Yes Historical Provider, MD   colchicine (COLCRYS) 0.6 mg tablet Take 0.6 mg by mouth if needed for muscle/joint pain 9/17/23   Historical Provider, MD   hydrocortisone 2.5 % ointment Apply 1 Application topically if needed for irritation 8/8/23   Historical Provider, MD   hydrOXYzine HCL (ATARAX) 25 mg tablet Take 25 mg by mouth if needed for anxiety 8/8/23   Historical Provider, MD   polyethylene glycol (MIRALAX) 17 g packet Take 17 g by mouth every other day as needed (if no bm in 2 days)  Patient not taking: Reported on 10/30/2023 10/5/23   Sinan Mckeon MD   [facility]) 0.4 mg Take 0.4 mg by mouth daily at bedtime 9/15/23 10/15/23  Historical Provider, MD     I have reviewed home medications with patient personally. Allergies: No Known Allergies    Social History:  Marital Status: /Civil Union   Occupation: Employed  Patient Pre-hospital Living Situation: Home  Patient Pre-hospital Level of Mobility: walks  Patient Pre-hospital Diet Restrictions: Diabetic diet  Substance Use History:   Social History     Substance and Sexual Activity   Alcohol Use Yes     Social History     Tobacco Use   Smoking Status Every Day    Packs/day: 0.25    Types: Cigarettes   Smokeless Tobacco Never     Social History     Substance and Sexual Activity   Drug Use Never       Family History:  History reviewed. No pertinent family history. Physical Exam:     Vitals:   Blood Pressure: 143/98 (10/30/23 1544)  Pulse: (!) 115 (10/30/23 1600)  Temperature: 98.4 °F (36.9 °C) (10/30/23 1539)  Temp Source: Temporal (10/30/23 1539)  Respirations: 16 (10/30/23 1517)  Height: 5' 7" (170.2 cm) (10/30/23 1334)  Weight - Scale: 87 kg (191 lb 12.8 oz) (10/30/23 1622)  SpO2: 98 % (10/30/23 1600)    Physical Exam  Vitals and nursing note reviewed. Constitutional:       Appearance: Normal appearance. He is obese. He is ill-appearing. He is not diaphoretic. HENT:      Head: Normocephalic. Nose: Nose normal. No congestion. Mouth/Throat:      Mouth: Mucous membranes are moist.      Pharynx: Oropharynx is clear. No oropharyngeal exudate or posterior oropharyngeal erythema. Eyes:      General: No scleral icterus. Left eye: No discharge. Extraocular Movements: Extraocular movements intact. Conjunctiva/sclera: Conjunctivae normal.      Pupils: Pupils are equal, round, and reactive to light. Cardiovascular:      Rate and Rhythm: Tachycardia present. Heart sounds: No murmur heard. No friction rub. No gallop. Pulmonary:      Effort: Pulmonary effort is normal. No respiratory distress. Breath sounds: No stridor. No wheezing or rhonchi. Abdominal:      General: Abdomen is flat. Palpations: Abdomen is soft. Tenderness: There is abdominal tenderness. There is guarding and rebound. There is no right CVA tenderness or left CVA tenderness. Hernia: No hernia is present. Musculoskeletal:      Right lower leg: No edema. Left lower leg: No edema. Skin:     General: Skin is warm. Capillary Refill: Capillary refill takes less than 2 seconds. Coloration: Skin is not jaundiced or pale. Findings: No bruising or erythema. Neurological:      General: No focal deficit present. Mental Status: He is alert and oriented to person, place, and time. Cranial Nerves: No cranial nerve deficit. Sensory: No sensory deficit. Motor: No weakness.       Coordination: Coordination normal.         Additional Data:     Lab Results:  Results from last 7 days   Lab Units 10/30/23  1355   WBC Thousand/uL 14.37*   HEMOGLOBIN g/dL 14.9   HEMATOCRIT % 42.9   PLATELETS Thousands/uL 238   LYMPHO PCT % 16   MONO PCT % 11   EOS PCT % 3     Results from last 7 days   Lab Units 10/30/23  1355   SODIUM mmol/L 138   POTASSIUM mmol/L 3.5   CHLORIDE mmol/L 101 CO2 mmol/L 21   BUN mg/dL 5   CREATININE mg/dL 1.03   ANION GAP mmol/L 16   CALCIUM mg/dL 9.6   ALBUMIN g/dL 4.0   TOTAL BILIRUBIN mg/dL 0.73   ALK PHOS U/L 136*   ALT U/L 43   AST U/L 50*   GLUCOSE RANDOM mg/dL 127     Results from last 7 days   Lab Units 10/30/23  1355   INR  0.97             Results from last 7 days   Lab Units 10/30/23  1355   LACTIC ACID mmol/L 3.8*       Lines/Drains:  Invasive Devices       Peripheral Intravenous Line  Duration             Peripheral IV 10/30/23 Left;Ventral (anterior) Forearm <1 day                        Imaging: Reviewed radiology reports from this admission including: abdominal/pelvic CT  CT abdomen pelvis with contrast   Final Result by Shree Vasquez MD (10/30 1436)      Bilateral ureteral JJ stents in expected position, as detailed above. No obstructive ureteral stone. Mild right greater than left hydroureter. Bilateral renal calculi, nonobstructive. Nonvisualization of the previously seen large calculi in the renal pelves. Diffuse pericolic stranding, and fibrofatty proliferation in the right and transverse colon. Correlate to exclude symptoms of mild nonspecific pancolitis. No abscess or perforation. Other findings, as per the body of the report. Workstation performed: [id number]             EKG and Other Studies Reviewed on Admission:   EKG:  none. ** Please Note: This note has been constructed using a voice recognition system.  **

## 2023-10-30 NOTE — PLAN OF CARE
Problem: GASTROINTESTINAL - ADULT  Goal: Minimal or absence of nausea and/or vomiting  Description: INTERVENTIONS:  - Administer IV fluids if ordered to ensure adequate hydration  - Maintain NPO status until nausea and vomiting are resolved  - Nasogastric tube if ordered  - Administer ordered antiemetic medications as needed  - Provide nonpharmacologic comfort measures as appropriate  - Advance diet as tolerated, if ordered  - Consider nutrition services referral to assist patient with adequate nutrition and appropriate food choices  Outcome: Progressing  Goal: Maintains or returns to baseline bowel function  Description: INTERVENTIONS:  - Assess bowel function  - Encourage oral fluids to ensure adequate hydration  - Administer IV fluids if ordered to ensure adequate hydration  - Administer ordered medications as needed  - Encourage mobilization and activity  - Consider nutritional services referral to assist patient with adequate nutrition and appropriate food choices  Outcome: Progressing  Goal: Maintains adequate nutritional intake  Description: INTERVENTIONS:  - Monitor percentage of each meal consumed  - Identify factors contributing to decreased intake, treat as appropriate  - Assist with meals as needed  - Monitor I&O, weight, and lab values if indicated  - Obtain nutrition services referral as needed  Outcome: Progressing     Problem: GENITOURINARY - ADULT  Goal: Maintains or returns to baseline urinary function  Description: INTERVENTIONS:  - Assess urinary function  - Encourage oral fluids to ensure adequate hydration if ordered  - Administer IV fluids as ordered to ensure adequate hydration  - Administer ordered medications as needed  - Offer frequent toileting  - Follow urinary retention protocol if ordered  Outcome: Progressing  Goal: Absence of urinary retention  Description: INTERVENTIONS:  - Assess patient’s ability to void and empty bladder  - Monitor I/O  - Bladder scan as needed  - Discuss with physician/AP medications to alleviate retention as needed  - Discuss catheterization for long term situations as appropriate  Outcome: Progressing     Problem: METABOLIC, FLUID AND ELECTROLYTES - ADULT  Goal: Electrolytes maintained within normal limits  Description: INTERVENTIONS:  - Monitor labs and assess patient for signs and symptoms of electrolyte imbalances  - Administer electrolyte replacement as ordered  - Monitor response to electrolyte replacements, including repeat lab results as appropriate  - Instruct patient on fluid and nutrition as appropriate  Outcome: Progressing     Problem: PAIN - ADULT  Goal: Verbalizes/displays adequate comfort level or baseline comfort level  Description: Interventions:  - Encourage patient to monitor pain and request assistance  - Assess pain using appropriate pain scale  - Administer analgesics based on type and severity of pain and evaluate response  - Implement non-pharmacological measures as appropriate and evaluate response  - Consider cultural and social influences on pain and pain management  - Notify physician/advanced practitioner if interventions unsuccessful or patient reports new pain  Outcome: Progressing     Problem: DISCHARGE PLANNING  Goal: Discharge to home or other facility with appropriate resources  Description: INTERVENTIONS:  - Identify barriers to discharge w/patient and caregiver  - Arrange for needed discharge resources and transportation as appropriate  - Identify discharge learning needs (meds, wound care, etc.)  - Arrange for interpretive services to assist at discharge as needed  - Refer to Case Management Department for coordinating discharge planning if the patient needs post-hospital services based on physician/advanced practitioner order or complex needs related to functional status, cognitive ability, or social support system  Outcome: Progressing     Problem: Knowledge Deficit  Goal: Patient/family/caregiver demonstrates understanding of disease process, treatment plan, medications, and discharge instructions  Description: Complete learning assessment and assess knowledge base.   Interventions:  - Provide teaching at level of understanding  - Provide teaching via preferred learning methods  Outcome: Progressing

## 2023-10-30 NOTE — ED PROVIDER NOTES
History  Chief Complaint   Patient presents with    Abdominal Pain     Ongoing abdominal pain x few days; worsening today - hx of kidney stones w/ stents placed; scheduled to be removed on wedn     Patient with bilateral ureteral stents in September. Complains of worsening abdominal pain and back pain for the past 3 days. Has nausea and vomiting. No fevers or chills. No dysuria or frequency. Patient was admitted here at the beginning of the month for pyelonephritis. States this is somewhat similar but worse. Patient states he been having issues with diarrhea since the stents were placed. History provided by:  Patient   used: No    Abdominal Pain  Pain location:  Generalized  Pain quality: aching    Pain severity:  Mild  Onset quality:  Gradual  Duration:  3 days  Timing:  Constant  Progression:  Worsening  Chronicity:  New  Context: not alcohol use, not awakening from sleep, not recent sexual activity and not suspicious food intake    Relieved by:  Nothing  Worsened by:  Nothing  Ineffective treatments:  None tried  Associated symptoms: anorexia, chills, nausea and vomiting    Associated symptoms: no chest pain, no constipation, no cough, no diarrhea, no dysuria, no fatigue, no fever, no flatus, no hematuria, no shortness of breath and no sore throat        Prior to Admission Medications   Prescriptions Last Dose Informant Patient Reported? Taking?    Fluoxetine HCl, PMDD, 20 MG TABS   Yes No   Sig: Take 20 mg by mouth daily   acetaminophen (TYLENOL) 325 mg tablet   No No   Sig: Take 2 tablets (650 mg total) by mouth every 8 (eight) hours   allopurinol (ZYLOPRIM) 100 mg tablet   Yes No   Sig: Take 100 mg by mouth daily   lisinopril (ZESTRIL) 10 mg tablet   Yes No   Sig: Take 10 mg by mouth daily   mirtazapine (REMERON) 15 mg tablet   Yes No   Sig: Take 15 mg by mouth daily at bedtime as needed   nicotine (NICODERM CQ) 21 mg/24 hr TD 24 hr patch   No No   Sig: Place 1 patch on the skin over 24 hours daily   phenazopyridine (PYRIDIUM) 100 mg tablet   No No   Sig: Take 1 tablet (100 mg total) by mouth 3 (three) times a day as needed for bladder spasms   polyethylene glycol (MIRALAX) 17 g packet   No No   Sig: Take 17 g by mouth every other day as needed (if no bm in 2 days)   semaglutide, 0.25 or 0.5 mg/dose, (Ozempic, 0.25 or 0.5 MG/DOSE,) 2 mg/1.5 mL injection pen   Yes No   Sig: Inject 0.5 mg under the skin once a week   tamsulosin (FLOMAX) 0.4 mg   Yes No   Sig: Take 0.4 mg by mouth daily at bedtime      Facility-Administered Medications: None       Past Medical History:   Diagnosis Date    Depression     Diabetes mellitus ([address])     Gout     Hypertension     Kidney stones        Past Surgical History:   Procedure Laterality Date    CYSTOSCOPY W/ URETERAL STENT PLACEMENT  09/14/2023    US GUIDED MASS BIOPSY (UNSPECIFIED)  02/19/2021       History reviewed. No pertinent family history. I have reviewed and agree with the history as documented. E-Cigarette/Vaping    E-Cigarette Use Never User      E-Cigarette/Vaping Substances     Social History     Tobacco Use    Smoking status: Every Day     Packs/day: 0.25     Types: Cigarettes    Smokeless tobacco: Never   Vaping Use    Vaping Use: Never used   Substance Use Topics    Alcohol use: Yes    Drug use: Never       Review of Systems   Constitutional:  Positive for chills. Negative for fatigue and fever. HENT:  Negative for ear pain, hearing loss, sore throat, trouble swallowing and voice change. Eyes:  Negative for pain and discharge. Respiratory:  Negative for cough, shortness of breath and wheezing. Cardiovascular:  Negative for chest pain and palpitations. Gastrointestinal:  Positive for abdominal pain, anorexia, nausea and vomiting. Negative for blood in stool, constipation, diarrhea and flatus. Genitourinary:  Negative for dysuria, flank pain, frequency and hematuria.    Musculoskeletal:  Negative for joint swelling, neck pain and neck stiffness. Skin:  Negative for rash and wound. Neurological:  Negative for dizziness, seizures, syncope, facial asymmetry and headaches. Psychiatric/Behavioral:  Negative for hallucinations, self-injury and suicidal ideas. All other systems reviewed and are negative. Physical Exam  Physical Exam  Vitals and nursing note reviewed. Constitutional:       General: He is in acute distress. Appearance: He is well-developed. HENT:      Head: Normocephalic and atraumatic. Right Ear: External ear normal.      Left Ear: External ear normal.   Eyes:      General: No scleral icterus. Right eye: No discharge. Left eye: No discharge. Extraocular Movements: Extraocular movements intact. Conjunctiva/sclera: Conjunctivae normal.   Cardiovascular:      Rate and Rhythm: Normal rate and regular rhythm. Heart sounds: Normal heart sounds. No murmur heard. Pulmonary:      Effort: Pulmonary effort is normal.      Breath sounds: Normal breath sounds. No wheezing or rales. Abdominal:      General: Bowel sounds are normal. There is no distension. Palpations: Abdomen is soft. Tenderness: There is no abdominal tenderness. There is no guarding or rebound. Musculoskeletal:         General: No deformity. Normal range of motion. Cervical back: Normal range of motion and neck supple. Skin:     General: Skin is warm and dry. Findings: No rash. Neurological:      General: No focal deficit present. Mental Status: He is alert and oriented to person, place, and time. Cranial Nerves: No cranial nerve deficit. Psychiatric:         Mood and Affect: Mood normal.         Behavior: Behavior normal.         Thought Content:  Thought content normal.         Judgment: Judgment normal.         Vital Signs  ED Triage Vitals   Temperature Pulse Respirations Blood Pressure SpO2   10/30/23 1334 10/30/23 1335 10/30/23 1334 10/30/23 1335 10/30/23 1334   (!) 97.3 °F (36.3 °C) 80 18 168/97 98 %      Temp Source Heart Rate Source Patient Position - Orthostatic VS BP Location FiO2 (%)   10/30/23 1334 10/30/23 1334 10/30/23 1334 10/30/23 1334 --   Tympanic Monitor Sitting Right arm       Pain Score       10/30/23 1334       10 - Worst Possible Pain           Vitals:    10/30/23 1334 10/30/23 1335   BP:  168/97   Pulse:  80   Patient Position - Orthostatic VS: Sitting          Visual Acuity      ED Medications  Medications   sodium chloride 0.9 % bolus 1,000 mL (1,000 mL Intravenous New Bag 10/30/23 1439)   piperacillin-tazobactam (ZOSYN) 3.375 g in sodium chloride 0.9 % 100 mL IVPB (3.375 g Intravenous New Bag 10/30/23 1452)   sodium chloride 0.9 % bolus 1,000 mL (0 mL Intravenous Stopped 10/30/23 1423)   morphine injection 2 mg (2 mg Intravenous Given 10/30/23 1356)   ondansetron (ZOFRAN) injection 4 mg (4 mg Intravenous Given 10/30/23 1357)   droperidol (INAPSINE) injection 1.25 mg (1.25 mg Intravenous Given 10/30/23 1357)   diphenhydrAMINE (BENADRYL) injection 12.5 mg (12.5 mg Intravenous Given 10/30/23 1356)   ketorolac (TORADOL) injection 15 mg (15 mg Intravenous Given 10/30/23 1357)   iohexol (OMNIPAQUE) 350 MG/ML injection (MULTI-DOSE) 100 mL (100 mL Intravenous Given 10/30/23 1400)       Diagnostic Studies  Results Reviewed       Procedure Component Value Units Date/Time    Lactic acid, plasma (w/reflex if result > 2.0) [[id number]]  (Abnormal) Collected: 10/30/23 1355    Lab Status: Final result Specimen: Blood from Arm, Right Updated: 10/30/23 1439     LACTIC ACID 3.8 mmol/L     Narrative:      Result may be elevated if tourniquet was used during collection.     Lactic acid 2 Hours [[id number]]     Lab Status: No result Specimen: Blood     Comprehensive metabolic panel [[id number]]  (Abnormal) Collected: 10/30/23 1355    Lab Status: Final result Specimen: Blood from Arm, Right Updated: 10/30/23 1434     Sodium 138 mmol/L      Potassium 3.5 mmol/L      Chloride 101 mmol/L CO2 21 mmol/L      ANION GAP 16 mmol/L      BUN 5 mg/dL      Creatinine 1.03 mg/dL      Glucose 127 mg/dL      Calcium 9.6 mg/dL      AST 50 U/L      ALT 43 U/L      Alkaline Phosphatase 136 U/L      Total Protein 7.7 g/dL      Albumin 4.0 g/dL      Total Bilirubin 0.73 mg/dL      eGFR 94 ml/min/1.73sq m     Narrative:      [facility] guidelines for Chronic Kidney Disease (CKD):     Stage 1 with normal or high GFR (GFR > 90 mL/min/1.73 square meters)    Stage 2 Mild CKD (GFR = 60-89 mL/min/1.73 square meters)    Stage 3A Moderate CKD (GFR = 45-59 mL/min/1.73 square meters)    Stage 3B Moderate CKD (GFR = 30-44 mL/min/1.73 square meters)    Stage 4 Severe CKD (GFR = 15-29 mL/min/1.73 square meters)    Stage 5 End Stage CKD (GFR <15 mL/min/1.73 square meters)  Note: GFR calculation is accurate only with a steady state creatinine    Lipase [[id number]]  (Normal) Collected: 10/30/23 1355    Lab Status: Final result Specimen: Blood from Arm, Right Updated: 10/30/23 1434     Lipase 28 u/L     Urine Microscopic [[id number]]  (Abnormal) Collected: 10/30/23 1357    Lab Status: Final result Specimen: Urine, Clean Catch Updated: 10/30/23 1422     RBC, UA 4-10 /hpf      WBC, UA 10-20 /hpf      Epithelial Cells Occasional /hpf      Bacteria, UA Moderate /hpf      Ca Oxalate Kim, UA Occasional /hpf      MUCUS THREADS Occasional    Urine culture [[id number]] Collected: 10/30/23 1357    Lab Status:  In process Specimen: Urine, Clean Catch Updated: 10/30/23 1422    UA w Reflex to Microscopic w Reflex to Culture [[id number]]  (Abnormal) Collected: 10/30/23 1357    Lab Status: Final result Specimen: Urine, Clean Catch Updated: 10/30/23 1421     Color, UA Dk Yellow     Clarity, UA Clear     Specific Gravity, UA 1.025     pH, UA 6.5     Leukocytes, UA Small     Nitrite, UA Negative     Protein, UA >=300 mg/dl      Glucose, UA Negative mg/dl      Ketones, UA Trace mg/dl      Urobilinogen, UA 0.2 E.U./dl Bilirubin, UA Small     Occult Blood, UA Moderate    CBC and differential [[id number]]  (Abnormal) Collected: 10/30/23 1355    Lab Status: Final result Specimen: Blood from Arm, Right Updated: 10/30/23 1420     WBC 14.37 Thousand/uL      RBC 4.65 Million/uL      Hemoglobin 14.9 g/dL      Hematocrit 42.9 %      MCV 92 fL      MCH 32.0 pg      MCHC 34.7 g/dL      RDW 13.7 %      MPV 9.4 fL      Platelets 311 Thousands/uL     Protime-INR [[id number]]  (Normal) Collected: 10/30/23 1355    Lab Status: Final result Specimen: Blood from Arm, Right Updated: 10/30/23 1420     Protime 13.1 seconds      INR 0.97    APTT [[id number]]  (Normal) Collected: 10/30/23 1355    Lab Status: Final result Specimen: Blood from Arm, Right Updated: 10/30/23 1420     PTT 26 seconds     Manual Differential(PHLEBS Do Not Order) [[id number]] Collected: 10/30/23 1355    Lab Status: In process Specimen: Blood from Arm, Right Updated: 10/30/23 1419    FLU/RSV/COVID - if FLU/RSV clinically relevant [[id number]] Collected: 10/30/23 1355    Lab Status: In process Specimen: Nares from Nasopharyngeal Swab Updated: 10/30/23 1405    Blood culture #1 [[id number]] Collected: 10/30/23 1355    Lab Status: In process Specimen: Blood from Arm, Right Updated: 10/30/23 1405    Blood culture #2 [[id number]] Collected: 10/30/23 1355    Lab Status: In process Specimen: Blood from Arm, Left Updated: 10/30/23 1404                   CT abdomen pelvis with contrast   Final Result by Reza Omalley MD (10/30 1436)      Bilateral ureteral JJ stents in expected position, as detailed above. No obstructive ureteral stone. Mild right greater than left hydroureter. Bilateral renal calculi, nonobstructive. Nonvisualization of the previously seen large calculi in the renal pelves. Diffuse pericolic stranding, and fibrofatty proliferation in the right and transverse colon. Correlate to exclude symptoms of mild nonspecific pancolitis. No abscess or perforation.    Other findings, as per the body of the report. Workstation performed: [id number]                    Procedures  ECG 12 Lead Documentation Only    Date/Time: 10/30/2023 1:55 PM    Performed by: Tamica Ridley MD  Authorized by: Tamica Ridley MD    ECG reviewed by me, the ED Provider: yes    Patient location:  ED  Rate:     ECG rate:  130  Rhythm:     Rhythm: sinus rhythm    Ectopy:     Ectopy: none    QRS:     QRS axis:  Normal           ED Course  ED Course as of 10/30/23 1500   Mon Oct 30, 2023   1438 Patient with a BMI greater than 30. We will use the ideal body weight for the IV fluid bolus. SBIRT 20yo+      Flowsheet Row Most Recent Value   Initial Alcohol Screen: US AUDIT-C     1. How often do you have a drink containing alcohol? 0 Filed at: 10/30/2023 1334   2. How many drinks containing alcohol do you have on a typical day you are drinking? 0 Filed at: 10/30/2023 1334   3a. Male UNDER 65: How often do you have five or more drinks on one occasion? 0 Filed at: 10/30/2023 1334   3b. FEMALE Any Age, or MALE 65+: How often do you have 4 or more drinks on one occassion? 0 Filed at: 10/30/2023 1334   Audit-C Score 0 Filed at: 10/30/2023 1334   DIANE: How many times in the past year have you. .. Used an illegal drug or used a prescription medication for non-medical reasons? Never Filed at: 10/30/2023 1334                      Medical Decision Making  Amount and/or Complexity of Data Reviewed  Labs: ordered. Decision-making details documented in ED Course. Radiology: ordered. Decision-making details documented in ED Course. Discussion of management or test interpretation with external provider(s): Differential diagnosis includes but not limited to STEMI, NSTEMI, cholelithiasis, cholecystitis, peptic ulcer disease, gastritis, pancreatitis, diverticulitis, colitis, bowel obstruction, appendicitis, UTI, ureteral stone, kidney stone, inflammatory bowel disease.     Risk  Prescription drug management. Disposition  Final diagnoses:   Pancolitis ([address])   UTI (urinary tract infection)     Time reflects when diagnosis was documented in both MDM as applicable and the Disposition within this note       Time User Action Codes Description Comment    10/30/2023  2:42 PM Ozzy CAZARES Add [K51.00] Pancolitis ([address])     10/30/2023  2:43 PM Pooja Clio Kayser Add [N39.0] UTI (urinary tract infection)           ED Disposition       ED Disposition   Admit    Condition   Stable    Date/Time   Mon Oct 30, 2023 1500    Comment   Case was discussed with Dr. Eva Brian and the patient's admission status was agreed to be Admission Status: inpatient status to the service of Dr. Eva Brian.               Follow-up Information    None         Patient's Medications   Discharge Prescriptions    No medications on file       No discharge procedures on file.     PDMP Review       None            ED Provider  Electronically Signed by             Jennifer Olson MD  10/30/23 1500

## 2023-10-30 NOTE — ASSESSMENT & PLAN NOTE
Patient does have bilateral stent in place  Patient also reports recently had procedure done for kidney stone at [facility]  Pending urine culture  Continue IV antibiotic, follow urology consult.   Per imaging patient does have mild right greater than left hydroureter-follow retention protocol

## 2023-10-31 PROBLEM — R65.20 SEVERE SEPSIS (HCC): Status: ACTIVE | Noted: 2023-10-03

## 2023-10-31 LAB
ALBUMIN SERPL BCP-MCNC: 3.1 G/DL (ref 3.5–5)
ALP SERPL-CCNC: 111 U/L (ref 34–104)
ALT SERPL W P-5'-P-CCNC: 30 U/L (ref 7–52)
ANION GAP SERPL CALCULATED.3IONS-SCNC: 9 MMOL/L
AST SERPL W P-5'-P-CCNC: 33 U/L (ref 13–39)
ATRIAL RATE: 119 BPM
ATRIAL RATE: 134 BPM
BACTERIA UR CULT: NORMAL
BILIRUB SERPL-MCNC: 0.74 MG/DL (ref 0.2–1)
BUN SERPL-MCNC: 5 MG/DL (ref 5–25)
C DIFF TOX A+B STL QL IA: POSITIVE
C DIFF TOX GENS STL QL NAA+PROBE: POSITIVE
CALCIUM ALBUM COR SERPL-MCNC: 8.5 MG/DL (ref 8.3–10.1)
CALCIUM SERPL-MCNC: 7.8 MG/DL (ref 8.4–10.2)
CAMPYLOBACTER DNA SPEC NAA+PROBE: NORMAL
CHLORIDE SERPL-SCNC: 106 MMOL/L (ref 96–108)
CO2 SERPL-SCNC: 23 MMOL/L (ref 21–32)
CREAT SERPL-MCNC: 1.09 MG/DL (ref 0.6–1.3)
ERYTHROCYTE [DISTWIDTH] IN BLOOD BY AUTOMATED COUNT: 14 % (ref 11.6–15.1)
GFR SERPL CREATININE-BSD FRML MDRD: 88 ML/MIN/1.73SQ M
GLUCOSE SERPL-MCNC: 100 MG/DL (ref 65–140)
GLUCOSE SERPL-MCNC: 108 MG/DL (ref 65–140)
GLUCOSE SERPL-MCNC: 111 MG/DL (ref 65–140)
GLUCOSE SERPL-MCNC: 111 MG/DL (ref 65–140)
GLUCOSE SERPL-MCNC: 112 MG/DL (ref 65–140)
GLUCOSE SERPL-MCNC: 86 MG/DL (ref 65–140)
HCT VFR BLD AUTO: 36.1 % (ref 36.5–49.3)
HGB BLD-MCNC: 12.2 G/DL (ref 12–17)
LACTATE SERPL-SCNC: 1.9 MMOL/L (ref 0.5–2)
MCH RBC QN AUTO: 32.5 PG (ref 26.8–34.3)
MCHC RBC AUTO-ENTMCNC: 33.8 G/DL (ref 31.4–37.4)
MCV RBC AUTO: 96 FL (ref 82–98)
P AXIS: 36 DEGREES
P AXIS: 47 DEGREES
PLATELET # BLD AUTO: 132 THOUSANDS/UL (ref 149–390)
PMV BLD AUTO: 10 FL (ref 8.9–12.7)
POTASSIUM SERPL-SCNC: 3.4 MMOL/L (ref 3.5–5.3)
PR INTERVAL: 138 MS
PR INTERVAL: 156 MS
PROCALCITONIN SERPL-MCNC: 0.23 NG/ML
PROT SERPL-MCNC: 6.1 G/DL (ref 6.4–8.4)
QRS AXIS: 14 DEGREES
QRS AXIS: 18 DEGREES
QRSD INTERVAL: 82 MS
QRSD INTERVAL: 88 MS
QT INTERVAL: 304 MS
QT INTERVAL: 310 MS
QTC INTERVAL: 436 MS
QTC INTERVAL: 453 MS
RBC # BLD AUTO: 3.75 MILLION/UL (ref 3.88–5.62)
SALMONELLA DNA SPEC QL NAA+PROBE: NORMAL
SHIGA TOXIN STX GENE SPEC NAA+PROBE: NORMAL
SHIGELLA DNA SPEC QL NAA+PROBE: NORMAL
SODIUM SERPL-SCNC: 138 MMOL/L (ref 135–147)
T WAVE AXIS: 20 DEGREES
T WAVE AXIS: 31 DEGREES
VENTRICULAR RATE: 119 BPM
VENTRICULAR RATE: 134 BPM
WBC # BLD AUTO: 8.07 THOUSAND/UL (ref 4.31–10.16)

## 2023-10-31 PROCEDURE — 99232 SBSQ HOSP IP/OBS MODERATE 35: CPT | Performed by: PHYSICIAN ASSISTANT

## 2023-10-31 PROCEDURE — 82948 REAGENT STRIP/BLOOD GLUCOSE: CPT

## 2023-10-31 PROCEDURE — 87505 NFCT AGENT DETECTION GI: CPT | Performed by: FAMILY MEDICINE

## 2023-10-31 PROCEDURE — 83605 ASSAY OF LACTIC ACID: CPT | Performed by: PHYSICIAN ASSISTANT

## 2023-10-31 PROCEDURE — 84145 PROCALCITONIN (PCT): CPT | Performed by: FAMILY MEDICINE

## 2023-10-31 PROCEDURE — 85027 COMPLETE CBC AUTOMATED: CPT | Performed by: FAMILY MEDICINE

## 2023-10-31 PROCEDURE — 87493 C DIFF AMPLIFIED PROBE: CPT | Performed by: FAMILY MEDICINE

## 2023-10-31 PROCEDURE — 80053 COMPREHEN METABOLIC PANEL: CPT | Performed by: FAMILY MEDICINE

## 2023-10-31 RX ORDER — HYDROMORPHONE HCL/PF 1 MG/ML
0.5 SYRINGE (ML) INJECTION EVERY 4 HOURS PRN
Status: DISCONTINUED | OUTPATIENT
Start: 2023-10-31 | End: 2023-11-02 | Stop reason: HOSPADM

## 2023-10-31 RX ORDER — METRONIDAZOLE 500 MG/1
500 TABLET ORAL EVERY 8 HOURS SCHEDULED
Status: DISCONTINUED | OUTPATIENT
Start: 2023-10-31 | End: 2023-11-01

## 2023-10-31 RX ORDER — OXYCODONE HYDROCHLORIDE AND ACETAMINOPHEN 5; 325 MG/1; MG/1
2 TABLET ORAL EVERY 4 HOURS PRN
Status: DISCONTINUED | OUTPATIENT
Start: 2023-10-31 | End: 2023-11-01

## 2023-10-31 RX ORDER — VANCOMYCIN HYDROCHLORIDE 1 G/200ML
15 INJECTION, SOLUTION INTRAVENOUS ONCE
Status: COMPLETED | OUTPATIENT
Start: 2023-10-31 | End: 2023-10-31

## 2023-10-31 RX ADMIN — OXYCODONE HYDROCHLORIDE AND ACETAMINOPHEN 1 TABLET: 5; 325 TABLET ORAL at 07:50

## 2023-10-31 RX ADMIN — ONDANSETRON 4 MG: 2 INJECTION INTRAMUSCULAR; INTRAVENOUS at 18:15

## 2023-10-31 RX ADMIN — ALLOPURINOL 100 MG: 100 TABLET ORAL at 08:47

## 2023-10-31 RX ADMIN — HYDROMORPHONE HYDROCHLORIDE 0.5 MG: 1 INJECTION, SOLUTION INTRAMUSCULAR; INTRAVENOUS; SUBCUTANEOUS at 08:56

## 2023-10-31 RX ADMIN — HEPARIN SODIUM 5000 UNITS: 5000 INJECTION INTRAVENOUS; SUBCUTANEOUS at 06:10

## 2023-10-31 RX ADMIN — SODIUM CHLORIDE 125 ML/HR: 0.9 INJECTION, SOLUTION INTRAVENOUS at 18:19

## 2023-10-31 RX ADMIN — METRONIDAZOLE 500 MG: 500 TABLET ORAL at 14:04

## 2023-10-31 RX ADMIN — OXYCODONE HYDROCHLORIDE AND ACETAMINOPHEN 1 TABLET: 5; 325 TABLET ORAL at 03:33

## 2023-10-31 RX ADMIN — TAMSULOSIN HYDROCHLORIDE 0.4 MG: 0.4 CAPSULE ORAL at 22:27

## 2023-10-31 RX ADMIN — HEPARIN SODIUM 5000 UNITS: 5000 INJECTION INTRAVENOUS; SUBCUTANEOUS at 22:27

## 2023-10-31 RX ADMIN — VANCOMYCIN HYDROCHLORIDE 1000 MG: 1 INJECTION, SOLUTION INTRAVENOUS at 15:30

## 2023-10-31 RX ADMIN — CEFTRIAXONE 2000 MG: 2 INJECTION, SOLUTION INTRAVENOUS at 06:09

## 2023-10-31 RX ADMIN — SODIUM CHLORIDE 125 ML/HR: 0.9 INJECTION, SOLUTION INTRAVENOUS at 08:58

## 2023-10-31 RX ADMIN — OXYCODONE HYDROCHLORIDE AND ACETAMINOPHEN 2 TABLET: 5; 325 TABLET ORAL at 20:27

## 2023-10-31 RX ADMIN — HYDROMORPHONE HYDROCHLORIDE 0.5 MG: 1 INJECTION, SOLUTION INTRAMUSCULAR; INTRAVENOUS; SUBCUTANEOUS at 14:04

## 2023-10-31 RX ADMIN — PANTOPRAZOLE SODIUM 20 MG: 20 TABLET, DELAYED RELEASE ORAL at 06:10

## 2023-10-31 RX ADMIN — OXYCODONE HYDROCHLORIDE AND ACETAMINOPHEN 2 TABLET: 5; 325 TABLET ORAL at 11:55

## 2023-10-31 RX ADMIN — NICOTINE 1 PATCH: 21 PATCH, EXTENDED RELEASE TRANSDERMAL at 08:48

## 2023-10-31 RX ADMIN — HYDROMORPHONE HYDROCHLORIDE 0.5 MG: 1 INJECTION, SOLUTION INTRAMUSCULAR; INTRAVENOUS; SUBCUTANEOUS at 00:49

## 2023-10-31 RX ADMIN — OXYCODONE HYDROCHLORIDE AND ACETAMINOPHEN 1 TABLET: 5; 325 TABLET ORAL at 16:07

## 2023-10-31 RX ADMIN — HYDROMORPHONE HYDROCHLORIDE 0.5 MG: 1 INJECTION, SOLUTION INTRAMUSCULAR; INTRAVENOUS; SUBCUTANEOUS at 22:27

## 2023-10-31 RX ADMIN — HYDROMORPHONE HYDROCHLORIDE 0.5 MG: 1 INJECTION, SOLUTION INTRAMUSCULAR; INTRAVENOUS; SUBCUTANEOUS at 18:09

## 2023-10-31 RX ADMIN — HEPARIN SODIUM 5000 UNITS: 5000 INJECTION INTRAVENOUS; SUBCUTANEOUS at 14:04

## 2023-10-31 RX ADMIN — HYDROMORPHONE HYDROCHLORIDE 0.5 MG: 1 INJECTION, SOLUTION INTRAMUSCULAR; INTRAVENOUS; SUBCUTANEOUS at 04:36

## 2023-10-31 RX ADMIN — METRONIDAZOLE 500 MG: 500 TABLET ORAL at 22:27

## 2023-10-31 RX ADMIN — LISINOPRIL 10 MG: 10 TABLET ORAL at 08:47

## 2023-10-31 RX ADMIN — SODIUM CHLORIDE 125 ML/HR: 0.9 INJECTION, SOLUTION INTRAVENOUS at 00:52

## 2023-10-31 RX ADMIN — METRONIDAZOLE 500 MG: 500 TABLET ORAL at 09:03

## 2023-10-31 RX ADMIN — FLUOXETINE HYDROCHLORIDE 20 MG: 10 CAPSULE ORAL at 08:47

## 2023-10-31 NOTE — ASSESSMENT & PLAN NOTE
Presented with abdominal pain, n/v/d  Imaging reviewed, no obstruction or perforation  Check stool studies   Continue IV antibiotic.   Will advance diet today and monitor

## 2023-10-31 NOTE — UTILIZATION REVIEW
Initial Clinical Review    Admission: Date/Time/Statement:   Admission Orders (From admission, onward)       Ordered        10/30/23 1500  INPATIENT ADMISSION  Once                          Orders Placed This Encounter   Procedures    INPATIENT ADMISSION     Standing Status:   Standing     Number of Occurrences:   1     Order Specific Question:   Level of Care     Answer:   Med Surg [16]     Order Specific Question:   Estimated length of stay     Answer:   More than 2 Midnights     Order Specific Question:   Certification     Answer:   I certify that inpatient services are medically necessary for this patient for a duration of greater than two midnights. See H&P and MD Progress Notes for additional information about the patient's course of treatment. ED Arrival Information       Expected   -    Arrival   10/30/2023 13:30    Acuity   Urgent              Means of arrival   Walk-In    Escorted by   Spouse    Service   Hospitalist    Admission type   Emergency              Arrival complaint   pain - abdominal, genital area             Chief Complaint   Patient presents with    Abdominal Pain     Ongoing abdominal pain x few days; worsening today - hx of kidney stones w/ stents placed; scheduled to be removed on wedn       Initial Presentation: 35 y.o. male with a PMH of  DM and recent procedure for kidney stone with B/L stent placement at outside hospital who presents to the ED from home for evaluation of lower abdominal pain, associated with diarrhea and vomiting. PE: AOx3. Tachycardia. Abdominal tenderness, guarding and rebound. 10/30 Inpatient admission for evaluation and treatment of sepsis, source of infection UTI/pancolitis:   IV ceftriaxone, IVF, clear liquid diet, follow lactic acid, blood and urine cultures, consult Urology. 10/30 Urology consult:  Patient status post bilateral ureteroscopy with laser lithotripsy of stones and bilateral stent placement at [address] on 10/16. Postop pain.   No intervention indicated. CT scan reviewed. Some residual fragments bilaterally in kidneys. Stents in position. Patient scheduled with his urologist for stent removal and follow up 11/1.     10/31 Internal Medicine: Patient still having intermittent lower abdominal pain, but nausea and vomiting resolved, diarrhea slowing down. Continue ceftriaxone, add Flagyl for pancolitis. Follow urine and blood cultures, monitor WBC. Check stool studies. Continue IVF today, continue Flomax. Advance diet today and monitor. PE:  AOx3. Tachycardia, abdominal tenderness.           ED Triage Vitals   Temperature Pulse Respirations Blood Pressure SpO2   10/30/23 1334 10/30/23 1335 10/30/23 1334 10/30/23 1335 10/30/23 1334   (!) 97.3 °F (36.3 °C) 80 18 168/97 98 %      Temp Source Heart Rate Source Patient Position - Orthostatic VS BP Location FiO2 (%)   10/30/23 1334 10/30/23 1334 10/30/23 1334 10/30/23 1334 --   Tympanic Monitor Sitting Right arm       Pain Score       10/30/23 1334       10 - Worst Possible Pain          Wt Readings from Last 1 Encounters:   10/30/23 87 kg (191 lb 12.8 oz)     Additional Vital Signs:      Date/Time Temp Pulse Resp BP MAP (mmHg) SpO2 O2 Device   10/31/23 08:45:09 98.4 °F (36.9 °C) 118 Abnormal  -- 145/102 Abnormal  116 92 % --   10/31/23 0720 -- -- -- -- -- -- None (Room air)   10/31/23 06:06:09 -- 121 Abnormal  -- 142/98 113 96 % --   10/30/23 23:35:06 98.2 °F (36.8 °C) 108 Abnormal  18 149/103 Abnormal  118 96 % --   10/30/23 2035 -- -- -- -- -- -- None (Room air)   10/30/23 18:19:24 -- 123 Abnormal  -- 154/103 Abnormal  120 95 % --   10/30/23 1733 -- 118 Abnormal  -- 151/110 Abnormal  -- -- --   10/30/23 1715 -- 113 Abnormal  -- 157/113 Abnormal  -- -- --   10/30/23 1700 -- 116 Abnormal  -- 159/110 Abnormal  -- -- --   10/30/23 1645 -- 120 Abnormal  -- 152/107 Abnormal  -- -- --   10/30/23 1600 -- 115 Abnormal  -- -- -- 98 % --   10/30/23 15:44:08 -- 109 Abnormal  -- 143/98 113 98 % --   10/30/23 15:42:48 -- 108 Abnormal  -- 134/87 103 98 % None (Room air)   10/30/23 15:39:02 98.4 °F (36.9 °C) 112 Abnormal  -- 149/106 Abnormal  120 97 % None (Room air)   10/30/23 1517 -- 114 Abnormal  16 124/84 -- 97 % None (Room air)         Pertinent Labs/Diagnostic Test Results:     CT abdomen pelvis with contrast   Final Result by Carolina Cruz MD (10/30 1436)      Bilateral ureteral JJ stents in expected position, as detailed above. No obstructive ureteral stone. Mild right greater than left hydroureter. Bilateral renal calculi, nonobstructive. Nonvisualization of the previously seen large calculi in the renal pelves. Diffuse pericolic stranding, and fibrofatty proliferation in the right and transverse colon. Correlate to exclude symptoms of mild nonspecific pancolitis. No abscess or perforation.            10/30 repeat EKG:      Sinus tachycardia  Otherwise normal ECG  When compared with ECG of 30-OCT-2023 13:51, (unconfirmed)  No significant change was found      10/30 EKG #1:      Sinus tachycardia  Otherwise normal ECG  No previous ECGs available      Results from last 7 days   Lab Units 10/30/23  1355   SARS-COV-2  Negative     Results from last 7 days   Lab Units 10/31/23  0604 10/30/23  1355   WBC Thousand/uL 8.07 14.37*   HEMOGLOBIN g/dL 12.2 14.9   HEMATOCRIT % 36.1* 42.9   PLATELETS Thousands/uL 132* 238         Results from last 7 days   Lab Units 10/31/23  0604 10/30/23  1355   SODIUM mmol/L 138 138   POTASSIUM mmol/L 3.4* 3.5   CHLORIDE mmol/L 106 101   CO2 mmol/L 23 21   ANION GAP mmol/L 9 16   BUN mg/dL 5 5   CREATININE mg/dL 1.09 1.03   EGFR ml/min/1.73sq m 88 94   CALCIUM mg/dL 7.8* 9.6     Results from last 7 days   Lab Units 10/31/23  0604 10/30/23  1355   AST U/L 33 50*   ALT U/L 30 43   ALK PHOS U/L 111* 136*   TOTAL PROTEIN g/dL 6.1* 7.7   ALBUMIN g/dL 3.1* 4.0   TOTAL BILIRUBIN mg/dL 0.74 0.73     Results from last 7 days   Lab Units 10/31/23  1116 10/31/23  0703 10/31/23  5570 10/30/23  2124 10/30/23  1714   POC GLUCOSE mg/dl 108 111 86 81 99     Results from last 7 days   Lab Units 10/31/23  0604 10/30/23  1355   GLUCOSE RANDOM mg/dL 111 127           Results from last 7 days   Lab Units 10/30/23  1355   PROTIME seconds 13.1   INR  0.97   PTT seconds 26         Results from last 7 days   Lab Units 10/31/23  0604 10/30/23  1706   PROCALCITONIN ng/ml 0.23 0.17     Results from last 7 days   Lab Units 10/31/23  0811 10/30/23  1711 10/30/23  1355   LACTIC ACID mmol/L 1.9 2.3* 3.8*                 Results from last 7 days   Lab Units 10/30/23  1355   LIPASE u/L 28                 Results from last 7 days   Lab Units 10/30/23  1357   CLARITY UA  Clear   COLOR UA  Dk Yellow   SPEC GRAV UA  1.025   PH UA  6.5   GLUCOSE UA mg/dl Negative   KETONES UA mg/dl Trace*   BLOOD UA  Moderate*   PROTEIN UA mg/dl >=300*   NITRITE UA  Negative   BILIRUBIN UA  Small*   UROBILINOGEN UA E.U./dl 0.2   LEUKOCYTES UA  Small*   WBC UA /hpf 10-20*   RBC UA /hpf 4-10*   BACTERIA UA /hpf Moderate*   EPITHELIAL CELLS WET PREP /hpf Occasional   MUCUS THREADS  Occasional*     Results from last 7 days   Lab Units 10/30/23  1355   INFLUENZA A PCR  Negative   INFLUENZA B PCR  Negative   RSV PCR  Negative             Results from last 7 days   Lab Units 10/30/23  1357 10/30/23  1355   BLOOD CULTURE   --  Received in Microbiology Lab. Culture in Progress. Received in Microbiology Lab. Culture in Progress.    URINE CULTURE  No Growth <1000 cfu/mL  --                    ED Treatment:   Medication Administration from 10/30/2023 1327 to 10/30/2023 1529         Date/Time Order Dose Route Action     10/30/2023 1423 EDT sodium chloride 0.9 % bolus 1,000 mL 0 mL Intravenous Stopped     10/30/2023 1356 EDT sodium chloride 0.9 % bolus 1,000 mL 1,000 mL Intravenous New Bag     10/30/2023 1356 EDT morphine injection 2 mg 2 mg Intravenous Given     10/30/2023 1357 EDT ondansetron (ZOFRAN) injection 4 mg 4 mg Intravenous Given 10/30/2023 1357 EDT droperidol (INAPSINE) injection 1.25 mg 1.25 mg Intravenous Given     10/30/2023 1356 EDT diphenhydrAMINE (BENADRYL) injection 12.5 mg 12.5 mg Intravenous Given     10/30/2023 1357 EDT ketorolac (TORADOL) injection 15 mg 15 mg Intravenous Given     10/30/2023 1400 EDT iohexol (OMNIPAQUE) 350 MG/ML injection (MULTI-DOSE) 100 mL 100 mL Intravenous Given     10/30/2023 1439 EDT sodium chloride 0.9 % bolus 1,000 mL 1,000 mL Intravenous New Bag     10/30/2023 1520 EDT piperacillin-tazobactam (ZOSYN) 3.375 g in sodium chloride 0.9 % 100 mL IVPB 0 g Intravenous Stopped     10/30/2023 1452 EDT piperacillin-tazobactam (ZOSYN) 3.375 g in sodium chloride 0.9 % 100 mL IVPB 3.375 g Intravenous New Bag          Past Medical History:   Diagnosis Date    Depression     Diabetes mellitus ([address] )     Gout     Hypertension     Kidney stones      Present on Admission:   Severe sepsis ([address])   Type 2 diabetes mellitus with hyperglycemia, without long-term current use of insulin (formerly [facility])   Tobacco abuse   Hydroureter      Admitting Diagnosis: UTI (urinary tract infection) [N39.0]  Pancolitis ([facility]) [K51.00]  Age/Sex: 35 y.o. male      Admission Orders: Clear liquid diet, SCD. Scheduled Medications:    allopurinol, 100 mg, Oral, Daily  cefTRIAXone, 2,000 mg, Intravenous, Q24H  FLUoxetine, 20 mg, Oral, Daily  heparin (porcine), 5,000 Units, Subcutaneous, Q8H MURALI  insulin lispro, 1-6 Units, Subcutaneous, TID AC  insulin lispro, 1-6 Units, Subcutaneous, HS  lisinopril, 10 mg, Oral, Daily  metroNIDAZOLE, 500 mg, Oral, Q8H MURALI  nicotine, 1 patch, Transdermal, Daily  pantoprazole, 20 mg, Oral, Early Morning  tamsulosin, 0.4 mg, Oral, HS      Continuous IV Infusions:      sodium chloride, 125 mL/hr, Intravenous, Continuous      PRN Meds:    acetaminophen, 650 mg, Oral, Q6H PRN  hydrALAZINE, 5 mg, Intravenous, Q6H PRN  HYDROmorphone, 0.5 mg, Intravenous, Q4H PRN x 2 doses 10/30 @ 1632, 2035, x 3 doses 10/31.    ondansetron, 4 mg, Intravenous, Q6H PRN  oxyCODONE-acetaminophen, 1 tablet, Oral, Q4H PRN x 2 doses 10/30, x 2 doses 10/31  oxyCODONE-acetaminophen, 2 tablet, Oral, Q4H PRN              IP CONSULT TO UROLOGY        Network Utilization Review Department  ATTENTION: Please call with any questions or concerns to [phone] and carefully listen to the prompts so that you are directed to the right person. All voicemails are confidential.   For Discharge needs, contact Care Management DC Support Team at [phone] opt. 2  Send all requests for admission clinical reviews, approved or denied determinations and any other requests to dedicated fax number below belonging to the campus where the patient is receiving treatment.  List of dedicated fax numbers for the Facilities:  [place] DENIALS (Administrative/Medical Necessity) [phone]   DISCHARGE SUPPORT TEAM (NETWORK) [id number] [facility] (Maternity/NICU/Pediatrics) [phone]   [address] [facility] [phone]   [address] [place] [address] [address] [place] [address] [address] [address] [facility] [address] [facility] [address] [place] [address] [phone]

## 2023-10-31 NOTE — PLAN OF CARE
Problem: GASTROINTESTINAL - ADULT  Goal: Minimal or absence of nausea and/or vomiting  Description: INTERVENTIONS:  - Administer IV fluids if ordered to ensure adequate hydration  - Maintain NPO status until nausea and vomiting are resolved  - Nasogastric tube if ordered  - Administer ordered antiemetic medications as needed  - Provide nonpharmacologic comfort measures as appropriate  - Advance diet as tolerated, if ordered  - Consider nutrition services referral to assist patient with adequate nutrition and appropriate food choices  Outcome: Progressing     Problem: GENITOURINARY - ADULT  Goal: Maintains or returns to baseline urinary function  Description: INTERVENTIONS:  - Assess urinary function  - Encourage oral fluids to ensure adequate hydration if ordered  - Administer IV fluids as ordered to ensure adequate hydration  - Administer ordered medications as needed  - Offer frequent toileting  - Follow urinary retention protocol if ordered  Outcome: Progressing     Problem: METABOLIC, FLUID AND ELECTROLYTES - ADULT  Goal: Electrolytes maintained within normal limits  Description: INTERVENTIONS:  - Monitor labs and assess patient for signs and symptoms of electrolyte imbalances  - Administer electrolyte replacement as ordered  - Monitor response to electrolyte replacements, including repeat lab results as appropriate  - Instruct patient on fluid and nutrition as appropriate  Outcome: Progressing     Problem: PAIN - ADULT  Goal: Verbalizes/displays adequate comfort level or baseline comfort level  Description: Interventions:  - Encourage patient to monitor pain and request assistance  - Assess pain using appropriate pain scale  - Administer analgesics based on type and severity of pain and evaluate response  - Implement non-pharmacological measures as appropriate and evaluate response  - Consider cultural and social influences on pain and pain management  - Notify physician/advanced practitioner if interventions unsuccessful or patient reports new pain  Outcome: Progressing     Problem: Knowledge Deficit  Goal: Patient/family/caregiver demonstrates understanding of disease process, treatment plan, medications, and discharge instructions  Description: Complete learning assessment and assess knowledge base.   Interventions:  - Provide teaching at level of understanding  - Provide teaching via preferred learning methods  Outcome: Progressing

## 2023-10-31 NOTE — UTILIZATION REVIEW
NOTIFICATION OF INPATIENT ADMISSION   AUTHORIZATION REQUEST   SERVICING FACILITY:   [address]  Tax ID: [id number]  NPI: [id number] ATTENDING PROVIDER:  Attending Name and NPI#: Danilo Diaz Md [[id number]]  Address: [address] [place] [address]  Phone: [phone]   ADMISSION INFORMATION:  Place of Service: Inpatient [address]  Place of Service Code: 21  Inpatient Admission Date/Time: 10/30/23  3:00 PM  Discharge Date/Time: No discharge date for patient encounter. Admitting Diagnosis Code/Description:  UTI (urinary tract infection) [N39.0]  Pancolitis ([address]) [K51.00]     UTILIZATION REVIEW CONTACT:  Steph Rainey Utilization   Network Utilization Review Department  Phone: [phone]  Fax [phone]  Email: [email or web address]  Contact for approvals/pending authorizations, clinical reviews, and discharge. PHYSICIAN ADVISORY SERVICES:  Medical Necessity Denial & [facility] Review  Phone: [phone]  Fax: [phone]  Email: [email or web address]     DISCHARGE SUPPORT TEAM:  For Patients Discharge Needs & Updates  Phone: [phone] opt. 2 Fax: [phone]  Email: [email or web address]

## 2023-10-31 NOTE — ASSESSMENT & PLAN NOTE
Patient does have bilateral stent in place  Patient also reports recently had procedure done for kidney stone at [facility]  Pending urine culture  Continue IV antibiotic

## 2023-10-31 NOTE — PROGRESS NOTES
[address] [facility]  Progress Note  Name: Gail Barron  MRN: [id number]  Unit/Bed#: -01 I Date of Admission: 10/30/2023   Date of Service: 10/31/2023 I Hospital Day: 1    Assessment/Plan   * Severe sepsis [facility])  Assessment & Plan  POA with tachycardia, leukocytosis and lactic acidosis with source of infection of UTI/pancolitis  S/p IV fluid resuscitation  Will contintinue maintenance IV fluids today   Given Zosyn in ED. Continue IV ceftriaxone on admission. With colitis, will add flagyl   F/u urine, blood cultures   Monitor temps, WBCs. Labs this AM pending. Acute cystitis without hematuria  Assessment & Plan  Patient does have bilateral stent in place  Patient also reports recently had procedure done for kidney stone at [facility]  Pending urine culture  Continue IV antibiotic    [facility])  Assessment & Plan  Presented with abdominal pain, n/v/d  Imaging reviewed, no obstruction or perforation  Check stool studies   Continue IV antibiotic. Will advance diet today and monitor     Hydroureter  Assessment & Plan  On imaging, patient has mild right greater than left hydroureter  Patient also has bilateral ureteral stent. Continue IV hydration, intake and output, retention protocol. Urology consult appreciated.   No intervention is recommended at this time, he can f/u with outpt urologist   Continue flomax     Tobacco abuse  Assessment & Plan  NRT     Type 2 diabetes mellitus with hyperglycemia, without long-term current use of insulin [facility])  Assessment & Plan  Lab Results   Component Value Date    HGBA1C 9.4 (H) 09/29/2023       Recent Labs     10/30/23  1714 10/30/23  2124 10/31/23  0337 10/31/23  0703   POCGLU 99 81 86 111       Blood Sugar Average: Last 72 hrs:  (P) 94.25    Not controlled outpt  Patient takes Ozempic and Marvene Geetha, which remain on hold  Continue sliding scale insulin    History of hypertension  Assessment & Plan  Continue lisinopril          VTE Pharmacologic Prophylaxis: VTE Score: 3 Moderate Risk (Score 3-4) - Pharmacological DVT Prophylaxis Ordered: heparin. Patient Centered Rounds: I performed bedside rounds with nursing staff today. Discussions with Specialists or Other Care Team Provider: angela higuera/w RISA. Education and Discussions with Family / Patient: Patient declined call to . Total Time Spent on Date of Encounter in care of patient: 20 mins. This time was spent on one or more of the following: performing physical exam; counseling and coordination of care; obtaining or reviewing history; documenting in the medical record; reviewing/ordering tests, medications or procedures; communicating with other healthcare professionals and discussing with patient's family/caregivers. Current Length of Stay: 1 day(s)  Current Patient Status: Inpatient   Certification Statement: The patient will continue to require additional inpatient hospital stay due to IV abx, IV fluids. Discharge Plan: Anticipate discharge in 24-48 hrs to home. Code Status: Level 1 - Full Code    Subjective:   No acute complaints today. Still having intermittent lower abdominal pain, but n/v resolved. Diarrhea is slowing down. Feels he wants to try solid food. Objective:     Vitals:   Temp (24hrs), Av.1 °F (36.7 °C), Min:97.3 °F (36.3 °C), Max:98.4 °F (36.9 °C)    Temp:  [97.3 °F (36.3 °C)-98.4 °F (36.9 °C)] 98.4 °F (36.9 °C)  HR:  [] 118  Resp:  [16-18] 18  BP: (124-168)/() 145/102  SpO2:  [92 %-98 %] 92 %  Body mass index is 30.04 kg/m². Input and Output Summary (last 24 hours): Intake/Output Summary (Last 24 hours) at 10/31/2023 0856  Last data filed at 10/31/2023 0052  Gross per 24 hour   Intake 2014.58 ml   Output --   Net 2014.58 ml       Physical Exam:   Physical Exam  Constitutional:       General: He is not in acute distress. Appearance: He is not toxic-appearing. HENT:      Head: Normocephalic and atraumatic.    Eyes: Extraocular Movements: Extraocular movements intact. Cardiovascular:      Rate and Rhythm: Tachycardia present. Pulmonary:      Effort: Pulmonary effort is normal. No respiratory distress. Breath sounds: Normal breath sounds. No wheezing or rales. Abdominal:      General: Bowel sounds are normal. There is no distension. Palpations: Abdomen is soft. Tenderness: There is abdominal tenderness. Musculoskeletal:         General: Normal range of motion. Neurological:      General: No focal deficit present. Mental Status: He is alert and oriented to person, place, and time. Psychiatric:         Mood and Affect: Mood normal.         Behavior: Behavior normal.         Thought Content:  Thought content normal.         Additional Data:     Labs:  Results from last 7 days   Lab Units 10/31/23  0604 10/30/23  1355   WBC Thousand/uL 8.07 14.37*   HEMOGLOBIN g/dL 12.2 14.9   HEMATOCRIT % 36.1* 42.9   PLATELETS Thousands/uL 132* 238   LYMPHO PCT %  --  16   MONO PCT %  --  11   EOS PCT %  --  3     Results from last 7 days   Lab Units 10/30/23  1355   SODIUM mmol/L 138   POTASSIUM mmol/L 3.5   CHLORIDE mmol/L 101   CO2 mmol/L 21   BUN mg/dL 5   CREATININE mg/dL 1.03   ANION GAP mmol/L 16   CALCIUM mg/dL 9.6   ALBUMIN g/dL 4.0   TOTAL BILIRUBIN mg/dL 0.73   ALK PHOS U/L 136*   ALT U/L 43   AST U/L 50*   GLUCOSE RANDOM mg/dL 127     Results from last 7 days   Lab Units 10/30/23  1355   INR  0.97     Results from last 7 days   Lab Units 10/31/23  0703 10/31/23  0337 10/30/23  2124 10/30/23  1714   POC GLUCOSE mg/dl 111 86 81 99         Results from last 7 days   Lab Units 10/31/23  0811 10/31/23  0604 10/30/23  1711 10/30/23  1706 10/30/23  1355   LACTIC ACID mmol/L 1.9  --  2.3*  --  3.8*   PROCALCITONIN ng/ml  --  0.23  --  0.17  --        Lines/Drains:  Invasive Devices       Peripheral Intravenous Line  Duration             Peripheral IV 10/30/23 Left;Ventral (anterior) Forearm <1 day Imaging: No pertinent imaging reviewed. Recent Cultures (last 7 days):   Results from last 7 days   Lab Units 10/30/23  1355   BLOOD CULTURE  Received in Microbiology Lab. Culture in Progress. Received in Microbiology Lab. Culture in Progress. Last 24 Hours Medication List:   Current Facility-Administered Medications   Medication Dose Route Frequency Provider Last Rate    acetaminophen  650 mg Oral Q6H PRN Abby Hernandes MD      allopurinol  100 mg Oral Daily Tiburcio Jean MD      cefTRIAXone  2,000 mg Intravenous Q24H Abby Hernandes MD 2,000 mg (10/31/23 8478)    FLUoxetine  20 mg Oral Daily Abby Hernandes MD      heparin (porcine)  5,000 Units Subcutaneous Q8H [address] Tiburcio Jean MD      hydrALAZINE  5 mg Intravenous Q6H PRN Tiburcio Jean MD      HYDROmorphone  0.5 mg Intravenous Q4H PRN Rhonda Chacko PA-C      insulin lispro  1-6 Units Subcutaneous TID AC Tiburcio Jean MD      insulin lispro  1-6 Units Subcutaneous HS Abby Hernandes MD      lisinopril  10 mg Oral Daily Tiburcio Jean MD      metroNIDAZOLE  500 mg Oral Q8H Chapo Mckeon PA-C      nicotine  1 patch Transdermal Daily Abby Hernandes MD      ondansetron  4 mg Intravenous Q6H PRN Tiburcio Jean MD      oxyCODONE-acetaminophen  1 tablet Oral Q4H PRN Deandre Paige Jean MD      oxyCODONE-acetaminophen  2 tablet Oral Q4H PRN Rhonda Chacko PA-C      pantoprazole  20 mg Oral Early Morning Tiburcio Jean MD      sodium chloride  125 mL/hr Intravenous Continuous Rhonda Chacko PA-C 125 mL/hr (10/31/23 0052)    tamsulosin  0.4 mg Oral HS Abby Hernandes MD          Today, Patient Was Seen By: Raven Baca PA-C    **Please Note: This note may have been constructed using a voice recognition system. **

## 2023-10-31 NOTE — ASSESSMENT & PLAN NOTE
On imaging, patient has mild right greater than left hydroureter  Patient also has bilateral ureteral stent. Continue IV hydration, intake and output, retention protocol. Urology consult appreciated.   No intervention is recommended at this time, he can f/u with outpt urologist   Continue flomax

## 2023-10-31 NOTE — QUICK NOTE
Noted 1/2 BC prelim with gram positive cocci in clusters. Identification panel pending and other BC result pending. Will give 1 time dose of IV Vanco for now. F/u culture results. Would consult ID if appears true bacteremia vs contaminant.

## 2023-10-31 NOTE — ASSESSMENT & PLAN NOTE
POA with tachycardia, leukocytosis and lactic acidosis with source of infection of UTI/pancolitis  S/p IV fluid resuscitation  Will contintinue maintenance IV fluids today   Given Zosyn in ED. Continue IV ceftriaxone on admission. With colitis, will add flagyl   F/u urine, blood cultures   Monitor temps, WBCs. Labs this AM pending.

## 2023-10-31 NOTE — ASSESSMENT & PLAN NOTE
Lab Results   Component Value Date    HGBA1C 9.4 (H) 09/29/2023       Recent Labs     10/30/23  1714 10/30/23  2124 10/31/23  0337 10/31/23  0703   POCGLU 99 81 86 111       Blood Sugar Average: Last 72 hrs:  (P) 94.25    Not controlled outpt  Patient takes Ozempic and Jardiance, which remain on hold  Continue sliding scale insulin

## 2023-11-01 PROBLEM — R78.81 STAPHYLOCOCCUS EPIDERMIDIS BACTEREMIA: Status: ACTIVE | Noted: 2023-11-01

## 2023-11-01 PROBLEM — B95.7 STAPHYLOCOCCUS EPIDERMIDIS BACTEREMIA: Status: ACTIVE | Noted: 2023-11-01

## 2023-11-01 LAB
ANION GAP SERPL CALCULATED.3IONS-SCNC: 6 MMOL/L
BASOPHILS # BLD AUTO: 0.03 THOUSANDS/ÂΜL (ref 0–0.1)
BASOPHILS NFR BLD AUTO: 1 % (ref 0–1)
BUN SERPL-MCNC: 4 MG/DL (ref 5–25)
CALCIUM SERPL-MCNC: 8.1 MG/DL (ref 8.4–10.2)
CHLORIDE SERPL-SCNC: 108 MMOL/L (ref 96–108)
CO2 SERPL-SCNC: 25 MMOL/L (ref 21–32)
CREAT SERPL-MCNC: 0.96 MG/DL (ref 0.6–1.3)
EOSINOPHIL # BLD AUTO: 0.22 THOUSAND/ÂΜL (ref 0–0.61)
EOSINOPHIL NFR BLD AUTO: 4 % (ref 0–6)
ERYTHROCYTE [DISTWIDTH] IN BLOOD BY AUTOMATED COUNT: 13.7 % (ref 11.6–15.1)
GFR SERPL CREATININE-BSD FRML MDRD: 103 ML/MIN/1.73SQ M
GLUCOSE SERPL-MCNC: 102 MG/DL (ref 65–140)
GLUCOSE SERPL-MCNC: 103 MG/DL (ref 65–140)
GLUCOSE SERPL-MCNC: 109 MG/DL (ref 65–140)
GLUCOSE SERPL-MCNC: 93 MG/DL (ref 65–140)
HCT VFR BLD AUTO: 35.1 % (ref 36.5–49.3)
HGB BLD-MCNC: 11.7 G/DL (ref 12–17)
IMM GRANULOCYTES # BLD AUTO: 0.05 THOUSAND/UL (ref 0–0.2)
IMM GRANULOCYTES NFR BLD AUTO: 1 % (ref 0–2)
LYMPHOCYTES # BLD AUTO: 1.12 THOUSANDS/ÂΜL (ref 0.6–4.47)
LYMPHOCYTES NFR BLD AUTO: 22 % (ref 14–44)
MCH RBC QN AUTO: 32.2 PG (ref 26.8–34.3)
MCHC RBC AUTO-ENTMCNC: 33.3 G/DL (ref 31.4–37.4)
MCV RBC AUTO: 97 FL (ref 82–98)
MONOCYTES # BLD AUTO: 0.43 THOUSAND/ÂΜL (ref 0.17–1.22)
MONOCYTES NFR BLD AUTO: 8 % (ref 4–12)
NEUTROPHILS # BLD AUTO: 3.36 THOUSANDS/ÂΜL (ref 1.85–7.62)
NEUTS SEG NFR BLD AUTO: 64 % (ref 43–75)
NRBC BLD AUTO-RTO: 0 /100 WBCS
PLATELET # BLD AUTO: 102 THOUSANDS/UL (ref 149–390)
PMV BLD AUTO: 8.9 FL (ref 8.9–12.7)
POTASSIUM SERPL-SCNC: 3.5 MMOL/L (ref 3.5–5.3)
RBC # BLD AUTO: 3.63 MILLION/UL (ref 3.88–5.62)
SODIUM SERPL-SCNC: 139 MMOL/L (ref 135–147)
WBC # BLD AUTO: 5.21 THOUSAND/UL (ref 4.31–10.16)

## 2023-11-01 PROCEDURE — 82948 REAGENT STRIP/BLOOD GLUCOSE: CPT

## 2023-11-01 PROCEDURE — 99232 SBSQ HOSP IP/OBS MODERATE 35: CPT | Performed by: FAMILY MEDICINE

## 2023-11-01 PROCEDURE — 85025 COMPLETE CBC W/AUTO DIFF WBC: CPT | Performed by: PHYSICIAN ASSISTANT

## 2023-11-01 PROCEDURE — 80048 BASIC METABOLIC PNL TOTAL CA: CPT | Performed by: PHYSICIAN ASSISTANT

## 2023-11-01 RX ORDER — VANCOMYCIN HYDROCHLORIDE 125 MG/1
125 CAPSULE ORAL EVERY 6 HOURS SCHEDULED
Qty: 48 CAPSULE | Refills: 0 | Status: SHIPPED | OUTPATIENT
Start: 2023-11-01 | End: 2023-11-13

## 2023-11-01 RX ORDER — VANCOMYCIN HYDROCHLORIDE 125 MG/1
125 CAPSULE ORAL EVERY 6 HOURS SCHEDULED
Status: DISCONTINUED | OUTPATIENT
Start: 2023-11-01 | End: 2023-11-02 | Stop reason: HOSPADM

## 2023-11-01 RX ORDER — SACCHAROMYCES BOULARDII 250 MG
250 CAPSULE ORAL 2 TIMES DAILY
Status: DISCONTINUED | OUTPATIENT
Start: 2023-11-01 | End: 2023-11-02 | Stop reason: HOSPADM

## 2023-11-01 RX ORDER — OXYCODONE HYDROCHLORIDE AND ACETAMINOPHEN 5; 325 MG/1; MG/1
1 TABLET ORAL EVERY 4 HOURS PRN
Status: DISCONTINUED | OUTPATIENT
Start: 2023-11-01 | End: 2023-11-02 | Stop reason: HOSPADM

## 2023-11-01 RX ADMIN — TAMSULOSIN HYDROCHLORIDE 0.4 MG: 0.4 CAPSULE ORAL at 22:10

## 2023-11-01 RX ADMIN — Medication 250 MG: at 17:02

## 2023-11-01 RX ADMIN — HYDROMORPHONE HYDROCHLORIDE 0.5 MG: 1 INJECTION, SOLUTION INTRAMUSCULAR; INTRAVENOUS; SUBCUTANEOUS at 07:43

## 2023-11-01 RX ADMIN — OXYCODONE HYDROCHLORIDE AND ACETAMINOPHEN 2 TABLET: 5; 325 TABLET ORAL at 08:53

## 2023-11-01 RX ADMIN — HYDROMORPHONE HYDROCHLORIDE 0.5 MG: 1 INJECTION, SOLUTION INTRAMUSCULAR; INTRAVENOUS; SUBCUTANEOUS at 16:08

## 2023-11-01 RX ADMIN — HEPARIN SODIUM 5000 UNITS: 5000 INJECTION INTRAVENOUS; SUBCUTANEOUS at 06:42

## 2023-11-01 RX ADMIN — HYDROMORPHONE HYDROCHLORIDE 0.5 MG: 1 INJECTION, SOLUTION INTRAMUSCULAR; INTRAVENOUS; SUBCUTANEOUS at 03:22

## 2023-11-01 RX ADMIN — VANCOMYCIN HYDROCHLORIDE 125 MG: 125 CAPSULE ORAL at 17:03

## 2023-11-01 RX ADMIN — LISINOPRIL 10 MG: 10 TABLET ORAL at 08:53

## 2023-11-01 RX ADMIN — OXYCODONE HYDROCHLORIDE AND ACETAMINOPHEN 2 TABLET: 5; 325 TABLET ORAL at 04:47

## 2023-11-01 RX ADMIN — OXYCODONE HYDROCHLORIDE AND ACETAMINOPHEN 1 TABLET: 5; 325 TABLET ORAL at 22:10

## 2023-11-01 RX ADMIN — CEFTRIAXONE 2000 MG: 2 INJECTION, SOLUTION INTRAVENOUS at 06:42

## 2023-11-01 RX ADMIN — Medication 250 MG: at 08:53

## 2023-11-01 RX ADMIN — METRONIDAZOLE 500 MG: 500 TABLET ORAL at 06:42

## 2023-11-01 RX ADMIN — PANTOPRAZOLE SODIUM 20 MG: 20 TABLET, DELAYED RELEASE ORAL at 06:42

## 2023-11-01 RX ADMIN — FLUOXETINE HYDROCHLORIDE 20 MG: 10 CAPSULE ORAL at 08:53

## 2023-11-01 RX ADMIN — HYDROMORPHONE HYDROCHLORIDE 0.5 MG: 1 INJECTION, SOLUTION INTRAMUSCULAR; INTRAVENOUS; SUBCUTANEOUS at 12:03

## 2023-11-01 RX ADMIN — OXYCODONE HYDROCHLORIDE AND ACETAMINOPHEN 1 TABLET: 5; 325 TABLET ORAL at 13:35

## 2023-11-01 RX ADMIN — HEPARIN SODIUM 5000 UNITS: 5000 INJECTION INTRAVENOUS; SUBCUTANEOUS at 22:09

## 2023-11-01 RX ADMIN — VANCOMYCIN HYDROCHLORIDE 125 MG: 125 CAPSULE ORAL at 08:53

## 2023-11-01 RX ADMIN — ALLOPURINOL 100 MG: 100 TABLET ORAL at 08:53

## 2023-11-01 RX ADMIN — ONDANSETRON 4 MG: 2 INJECTION INTRAMUSCULAR; INTRAVENOUS at 12:03

## 2023-11-01 RX ADMIN — NICOTINE 1 PATCH: 21 PATCH, EXTENDED RELEASE TRANSDERMAL at 08:51

## 2023-11-01 RX ADMIN — VANCOMYCIN HYDROCHLORIDE 125 MG: 125 CAPSULE ORAL at 13:35

## 2023-11-01 RX ADMIN — HEPARIN SODIUM 5000 UNITS: 5000 INJECTION INTRAVENOUS; SUBCUTANEOUS at 13:35

## 2023-11-01 RX ADMIN — OXYCODONE HYDROCHLORIDE AND ACETAMINOPHEN 1 TABLET: 5; 325 TABLET ORAL at 17:47

## 2023-11-01 RX ADMIN — OXYCODONE HYDROCHLORIDE AND ACETAMINOPHEN 2 TABLET: 5; 325 TABLET ORAL at 00:32

## 2023-11-01 RX ADMIN — HYDROMORPHONE HYDROCHLORIDE 0.5 MG: 1 INJECTION, SOLUTION INTRAMUSCULAR; INTRAVENOUS; SUBCUTANEOUS at 20:18

## 2023-11-01 NOTE — ASSESSMENT & PLAN NOTE
Patient does have bilateral stent in place  Patient also reports recently had procedure done for kidney stone at [facility] 10/16  Neg urine culture  stop IV antibiotic

## 2023-11-01 NOTE — ASSESSMENT & PLAN NOTE
POA with tachycardia, leukocytosis and lactic acidosis with source of infection of UTI/pancolitis  S/p IV fluid resuscitation. now stop  Given Zosyn in ED by Rocephin and Flagyl.   Stop all antibiotics as patient has C. difficile pancolitis as main source of infection  neg urine and blood cultures   Severe sepsis has now resolved

## 2023-11-01 NOTE — CASE MANAGEMENT
Case Management Discharge Planning Note    Patient name Dawn Dorsey  Location [address] [facility] [place] 218/-01 MRN [id number]  : 1990 Date 2023       Current Admission Date: 10/30/2023  Current Admission Diagnosis:Severe sepsis [facility])   Patient Active Problem List    Diagnosis Date Noted    Staphylococcus epidermidis bacteremia 2023    Pancolitis ([address]) 10/30/2023    Acute cystitis without hematuria 10/30/2023    Hydroureter 10/30/2023    Pyelonephritis 10/04/2023    History of gout 10/04/2023    History of hypertension 10/04/2023    Type 2 diabetes mellitus with hyperglycemia, without long-term current use of insulin ([address]) 10/04/2023    Tobacco abuse 10/04/2023    Severe sepsis ([address]) 10/03/2023      LOS (days): 2  Geometric Mean LOS (GMLOS) (days):   Days to GMLOS:     OBJECTIVE:  Risk of Unplanned Readmission Score: 11.58         Current admission status: Inpatient   Preferred Pharmacy:   [facility] [address] NEETA MARQUIS [address]  [address] [place] [address]  Phone: [phone] Fax: [phone]    Primary Care Provider: Keily Guillen DO    Primary Insurance: BLUE CROSS  Secondary Insurance:     DISCHARGE DETAILS:    Checked cost of Vanco for dc. According to [facility], there is no copay and the medications is ready for .

## 2023-11-01 NOTE — ASSESSMENT & PLAN NOTE
Lab Results   Component Value Date    HGBA1C 9.4 (H) 09/29/2023       Recent Labs     10/31/23  1116 10/31/23  1550 10/31/23  2051 11/01/23  0723   POCGLU 108 100 112 102         Blood Sugar Average: Last 72 hrs:  (P) 99.875    Not controlled outpt  Patient takes Ozempic and Jardiance, which remain on hold  Continue sliding scale insulin.   Blood sugars are well controlled here

## 2023-11-01 NOTE — ASSESSMENT & PLAN NOTE
Presented with abdominal pain, n/v/d  Imaging reviewed, no obstruction or perforation  C diff positive.enteric panel neg. stop IV antibiotic.  placed on oral vancomycin 125 mg every 6 hours and Florastor. Tolerating advance diet.   Continue stool count for now anticipate discharge home tomorrow

## 2023-11-01 NOTE — PROGRESS NOTES
[address] [facility]  Progress Note  Name: Trenton Mcclure  MRN: [id number]  Unit/Bed#: -01 I Date of Admission: 10/30/2023   Date of Service: 11/1/2023 I Hospital Day: 2    Assessment/Plan   * Severe sepsis [facility])  Assessment & Plan  POA with tachycardia, leukocytosis and lactic acidosis with source of infection of UTI/pancolitis  S/p IV fluid resuscitation. now stop  Given Zosyn in ED by Rocephin and Flagyl. Stop all antibiotics as patient has C. difficile pancolitis as main source of infection  neg urine and blood cultures   Severe sepsis has now resolved    Pancolitis [facility])  Assessment & Plan  Presented with abdominal pain, n/v/d  Imaging reviewed, no obstruction or perforation  C diff positive.enteric panel neg. stop IV antibiotic.  placed on oral vancomycin 125 mg every 6 hours and Florastor. Tolerating advance diet. Continue stool count for now anticipate discharge home tomorrow    Staphylococcus epidermidis bacteremia  Assessment & Plan  1/2.staph epidermidis. it is a colonization and hence stop antibiotics    Hydroureter  Assessment & Plan  On imaging, patient has mild right greater than left hydroureter  Patient also has bilateral ureteral stent. Continue IV hydration, intake and output, retention protocol. Urology consult appreciated.   No intervention is recommended at this time, he can f/u with outpt urologist   Continue flomax     Acute cystitis without hematuria  Assessment & Plan  Patient does have bilateral stent in place  Patient also reports recently had procedure done for kidney stone at [facility] 10/16  Neg urine culture  stop IV antibiotic    Tobacco abuse  Assessment & Plan  NRT     Type 2 diabetes mellitus with hyperglycemia, without long-term current use of insulin [facility])  Assessment & Plan  Lab Results   Component Value Date    HGBA1C 9.4 (H) 09/29/2023       Recent Labs     10/31/23  1116 10/31/23  1550 10/31/23  2051 11/01/23  0723   POCGLU 108 100 112 102 Blood Sugar Average: Last 72 hrs:  (P) 99.875    Not controlled outpt  Patient takes Ozempic and Jardiance, which remain on hold  Continue sliding scale insulin. Blood sugars are well controlled here    History of hypertension  Assessment & Plan  Controlled. Continue lisinopril                VTE Pharmacologic Prophylaxis: VTE Score: 3 Low Risk (Score 0-2) - Encourage Ambulation. Patient Centered Rounds: I performed bedside rounds with nursing staff today. Discussions with Specialists or Other Care Team Provider: none    Education and Discussions with Family / Patient: Updated  (wife) via phone. Total Time Spent on Date of Encounter in care of patient: 35 mins. This time was spent on one or more of the following: performing physical exam; counseling and coordination of care; obtaining or reviewing history; documenting in the medical record; reviewing/ordering tests, medications or procedures; communicating with other healthcare professionals and discussing with patient's family/caregivers. Current Length of Stay: 2 day(s)  Current Patient Status: Inpatient   Certification Statement: The patient will continue to require additional inpatient hospital stay due to c diff pancolitis  Discharge Plan: Anticipate discharge tomorrow to home. Code Status: Level 1 - Full Code    Subjective:   Patient states that he is eating a little bit better now still has some abdominal pain on the left side and diarrhea is slowing down. Denies any nausea or vomiting    Objective:     Vitals:   Temp (24hrs), Av.9 °F (36.6 °C), Min:97.7 °F (36.5 °C), Max:98.1 °F (36.7 °C)    Temp:  [97.7 °F (36.5 °C)-98.1 °F (36.7 °C)] 97.7 °F (36.5 °C)  HR:  [] 97  Resp:  [18-20] 18  BP: (133-150)/(95-99) 150/95  SpO2:  [94 %-98 %] 97 %  Body mass index is 30.04 kg/m².      Input and Output Summary (last 24 hours):   No intake or output data in the 24 hours ending 23 0907    Physical Exam:   Physical Exam  Vitals and nursing note reviewed. Constitutional:       Appearance: Normal appearance. HENT:      Head: Normocephalic and atraumatic. Right Ear: External ear normal.      Left Ear: External ear normal.      Nose: Nose normal.      Mouth/Throat:      Pharynx: Oropharynx is clear. Cardiovascular:      Rate and Rhythm: Normal rate and regular rhythm. Heart sounds: Normal heart sounds. Pulmonary:      Effort: Pulmonary effort is normal.      Breath sounds: Normal breath sounds. Abdominal:      General: Bowel sounds are normal.      Palpations: Abdomen is soft. Tenderness: There is abdominal tenderness. Musculoskeletal:         General: Normal range of motion. Cervical back: Normal range of motion and neck supple. Skin:     General: Skin is warm and dry. Capillary Refill: Capillary refill takes less than 2 seconds. Neurological:      General: No focal deficit present. Mental Status: He is alert and oriented to person, place, and time.    Psychiatric:         Mood and Affect: Mood normal.            Additional Data:     Labs:  Results from last 7 days   Lab Units 11/01/23  0440   WBC Thousand/uL 5.21   HEMOGLOBIN g/dL 11.7*   HEMATOCRIT % 35.1*   PLATELETS Thousands/uL 102*   NEUTROS PCT % 64   LYMPHS PCT % 22   MONOS PCT % 8   EOS PCT % 4     Results from last 7 days   Lab Units 11/01/23  0440 10/31/23  0604   SODIUM mmol/L 139 138   POTASSIUM mmol/L 3.5 3.4*   CHLORIDE mmol/L 108 106   CO2 mmol/L 25 23   BUN mg/dL 4* 5   CREATININE mg/dL 0.96 1.09   ANION GAP mmol/L 6 9   CALCIUM mg/dL 8.1* 7.8*   ALBUMIN g/dL  --  3.1*   TOTAL BILIRUBIN mg/dL  --  0.74   ALK PHOS U/L  --  111*   ALT U/L  --  30   AST U/L  --  33   GLUCOSE RANDOM mg/dL 103 111     Results from last 7 days   Lab Units 10/30/23  1355   INR  0.97     Results from last 7 days   Lab Units 11/01/23  0723 10/31/23  2051 10/31/23  1550 10/31/23  1116 10/31/23  0703 10/31/23  0337 10/30/23  2124 10/30/23  1714 POC GLUCOSE mg/dl 102 112 100 108 111 86 81 99         Results from last 7 days   Lab Units 10/31/23  0811 10/31/23  0604 10/30/23  1711 10/30/23  1706 10/30/23  1355   LACTIC ACID mmol/L 1.9  --  2.3*  --  3.8*   PROCALCITONIN ng/ml  --  0.23  --  0.17  --        Lines/Drains:  Invasive Devices       Peripheral Intravenous Line  Duration             Peripheral IV 10/30/23 Left;Ventral (anterior) Forearm 1 day                          Imaging: Reviewed radiology reports from this admission including: abdominal/pelvic CT    Recent Cultures (last 7 days):   Results from last 7 days   Lab Units 10/31/23  0354 10/30/23  1357 10/30/23  1355   BLOOD CULTURE   --   --  No Growth at 24 hrs.    GRAM STAIN RESULT   --   --  Gram positive cocci in clusters*   URINE CULTURE   --  No Growth <1000 cfu/mL  --    C DIFF TOXIN B BY PCR  Positive*  --   --        Last 24 Hours Medication List:   Current Facility-Administered Medications   Medication Dose Route Frequency Provider Last Rate    acetaminophen  650 mg Oral Q6H PRN Cele Jean MD      allopurinol  100 mg Oral Daily Tiburcio Jean MD      FLUoxetine  20 mg Oral Daily Joel López MD      heparin (porcine)  5,000 Units Subcutaneous Q8H [address] Tiburcio Jean MD      hydrALAZINE  5 mg Intravenous Q6H PRN Tiburcio Jean MD      HYDROmorphone  0.5 mg Intravenous Q4H PRN Rhonda Chacko PA-C      insulin lispro  1-6 Units Subcutaneous TID AC Tiburcio Jean MD      insulin lispro  1-6 Units Subcutaneous HS Joel López MD      lisinopril  10 mg Oral Daily Joel López MD      nicotine  1 patch Transdermal Daily Joel López MD      ondansetron  4 mg Intravenous Q6H PRN Cele Jean MD      oxyCODONE-acetaminophen  1 tablet Oral Q4H PRN Cele Jean MD      oxyCODONE-acetaminophen  2 tablet Oral Q4H PRN Rhonda Chacko PA-C      pantoprazole  20 mg Oral Early Morning Tiburcio [facility], [facility]      saccharomyces boulardii  250 mg Oral BID [facility] MD Shon      tamsulosin  0.4 mg Oral HS Ezra Mcbride MD      vancomycin oral (capsules or solution)  125 mg Oral Q6H [address] Anahi Hayes MD          Today, Patient Was Seen By: Anahi Hayes MD    **Please Note: This note may have been constructed using a voice recognition system. **

## 2023-11-02 VITALS
OXYGEN SATURATION: 97 % | SYSTOLIC BLOOD PRESSURE: 120 MMHG | HEART RATE: 106 BPM | WEIGHT: 191.8 LBS | HEIGHT: 67 IN | DIASTOLIC BLOOD PRESSURE: 83 MMHG | RESPIRATION RATE: 18 BRPM | BODY MASS INDEX: 30.1 KG/M2 | TEMPERATURE: 97.7 F

## 2023-11-02 LAB
ANION GAP SERPL CALCULATED.3IONS-SCNC: 7 MMOL/L
BACTERIA BLD CULT: ABNORMAL
BUN SERPL-MCNC: 4 MG/DL (ref 5–25)
CALCIUM SERPL-MCNC: 8.9 MG/DL (ref 8.4–10.2)
CHLORIDE SERPL-SCNC: 104 MMOL/L (ref 96–108)
CO2 SERPL-SCNC: 27 MMOL/L (ref 21–32)
CREAT SERPL-MCNC: 0.99 MG/DL (ref 0.6–1.3)
GFR SERPL CREATININE-BSD FRML MDRD: 99 ML/MIN/1.73SQ M
GLUCOSE SERPL-MCNC: 92 MG/DL (ref 65–140)
GRAM STN SPEC: ABNORMAL
MAGNESIUM SERPL-MCNC: 1.5 MG/DL (ref 1.9–2.7)
MECA+MECC ISLT/SPM QL: DETECTED
POTASSIUM SERPL-SCNC: 4 MMOL/L (ref 3.5–5.3)
S EPIDERMIDIS DNA BLD POS QL NAA+NON-PRB: DETECTED
SODIUM SERPL-SCNC: 138 MMOL/L (ref 135–147)

## 2023-11-02 PROCEDURE — 80048 BASIC METABOLIC PNL TOTAL CA: CPT | Performed by: FAMILY MEDICINE

## 2023-11-02 PROCEDURE — 83735 ASSAY OF MAGNESIUM: CPT | Performed by: FAMILY MEDICINE

## 2023-11-02 PROCEDURE — 99239 HOSP IP/OBS DSCHRG MGMT >30: CPT | Performed by: FAMILY MEDICINE

## 2023-11-02 RX ORDER — LANOLIN ALCOHOL/MO/W.PET/CERES
400 CREAM (GRAM) TOPICAL 2 TIMES DAILY
Status: DISCONTINUED | OUTPATIENT
Start: 2023-11-02 | End: 2023-11-02 | Stop reason: HOSPADM

## 2023-11-02 RX ORDER — SACCHAROMYCES BOULARDII 250 MG
250 CAPSULE ORAL 2 TIMES DAILY
Qty: 60 CAPSULE | Refills: 0 | Status: SHIPPED | OUTPATIENT
Start: 2023-11-02 | End: 2023-12-02

## 2023-11-02 RX ORDER — LANOLIN ALCOHOL/MO/W.PET/CERES
400 CREAM (GRAM) TOPICAL DAILY
Qty: 30 TABLET | Refills: 0 | Status: SHIPPED | OUTPATIENT
Start: 2023-11-02 | End: 2023-12-02

## 2023-11-02 RX ADMIN — OXYCODONE HYDROCHLORIDE AND ACETAMINOPHEN 1 TABLET: 5; 325 TABLET ORAL at 08:28

## 2023-11-02 RX ADMIN — ALLOPURINOL 100 MG: 100 TABLET ORAL at 08:28

## 2023-11-02 RX ADMIN — Medication 400 MG: at 08:26

## 2023-11-02 RX ADMIN — VANCOMYCIN HYDROCHLORIDE 125 MG: 125 CAPSULE ORAL at 05:05

## 2023-11-02 RX ADMIN — HYDROMORPHONE HYDROCHLORIDE 0.5 MG: 1 INJECTION, SOLUTION INTRAMUSCULAR; INTRAVENOUS; SUBCUTANEOUS at 00:30

## 2023-11-02 RX ADMIN — OXYCODONE HYDROCHLORIDE AND ACETAMINOPHEN 1 TABLET: 5; 325 TABLET ORAL at 02:32

## 2023-11-02 RX ADMIN — Medication 250 MG: at 08:27

## 2023-11-02 RX ADMIN — HEPARIN SODIUM 5000 UNITS: 5000 INJECTION INTRAVENOUS; SUBCUTANEOUS at 05:04

## 2023-11-02 RX ADMIN — VANCOMYCIN HYDROCHLORIDE 125 MG: 125 CAPSULE ORAL at 00:29

## 2023-11-02 RX ADMIN — HYDROMORPHONE HYDROCHLORIDE 0.5 MG: 1 INJECTION, SOLUTION INTRAMUSCULAR; INTRAVENOUS; SUBCUTANEOUS at 04:40

## 2023-11-02 RX ADMIN — PANTOPRAZOLE SODIUM 20 MG: 20 TABLET, DELAYED RELEASE ORAL at 05:05

## 2023-11-02 RX ADMIN — LISINOPRIL 10 MG: 10 TABLET ORAL at 08:28

## 2023-11-02 RX ADMIN — NICOTINE 1 PATCH: 21 PATCH, EXTENDED RELEASE TRANSDERMAL at 08:29

## 2023-11-02 RX ADMIN — FLUOXETINE HYDROCHLORIDE 20 MG: 10 CAPSULE ORAL at 08:27

## 2023-11-02 NOTE — DISCHARGE INSTR - AVS FIRST PAGE
If you need Any work-related papers filled out regarding your hospital stay please fax them to [phone] with attention to dr Naomi dave

## 2023-11-02 NOTE — ASSESSMENT & PLAN NOTE
Presented with abdominal pain, n/v/d  Imaging reviewed, no obstruction or perforation  C diff positive.enteric panel neg. stop IV antibiotic.  placed on oral vancomycin 125 mg every 6 hours and Florastor. Tolerating advance diet. Now improving and diarrhea resolving.

## 2023-11-02 NOTE — CASE MANAGEMENT
Case Management Discharge Planning Note    Patient name Angle Calero  Location [address] [facility] [place] 218/-65 MRN [id number]  : 1990 Date 2023       Current Admission Date: 10/30/2023  Current Admission Diagnosis:Severe sepsis [facility])   Patient Active Problem List    Diagnosis Date Noted    Staphylococcus epidermidis bacteremia 2023    Pancolitis ([address]) 10/30/2023    Acute cystitis without hematuria 10/30/2023    Hydroureter 10/30/2023    Pyelonephritis 10/04/2023    History of gout 10/04/2023    History of hypertension 10/04/2023    Type 2 diabetes mellitus with hyperglycemia, without long-term current use of insulin ([address]) 10/04/2023    Tobacco abuse 10/04/2023    Severe sepsis ([address]) 10/03/2023      LOS (days): 3  Geometric Mean LOS (GMLOS) (days):   Days to GMLOS:     OBJECTIVE:  Risk of Unplanned Readmission Score: 10.01         Current admission status: Inpatient   Preferred Pharmacy:   [facility] [address] NEETA MARQUIS [address]  [address] [place] [address]  Phone: [phone] Fax: [phone]    Primary Care Provider: Francisco Roger DO    Primary Insurance: BLUE CROSS  Secondary Insurance:     DISCHARGE DETAILS:    Patient is being dc today.   PCP appointment was made, on AVS.

## 2023-11-02 NOTE — DISCHARGE SUMMARY
[address] [facility]  Discharge- [facility] 1990, 35 y.o. male MRN: [id number]  Unit/Bed#: -Ashley Encounter: [id number]  Primary Care Provider: Fina Bernard DO   Date and time admitted to hospital: 10/30/2023  1:31 PM    * Severe sepsis [facility])  Assessment & Plan  POA with tachycardia, leukocytosis and lactic acidosis with source of infection of UTI/pancolitis  S/p IV fluid resuscitation. now stop  Given Zosyn in ED by Rocephin and Flagyl. Stop all antibiotics as patient has C. difficile pancolitis as main source of infection  neg urine and blood cultures   Severe sepsis has now resolved    Pancolitis [facility])  Assessment & Plan  Presented with abdominal pain, n/v/d  Imaging reviewed, no obstruction or perforation  C diff positive.enteric panel neg. stop IV antibiotic.  placed on oral vancomycin 125 mg every 6 hours and Florastor. Tolerating advance diet. Now improving and diarrhea resolving. Acute cystitis without hematuria  Assessment & Plan  Patient does have bilateral stent in place  Patient also reports recently had procedure done for kidney stone at [facility] 10/16  Neg urine culture  stop IV antibiotic    Tobacco abuse  Assessment & Plan  NRT     Type 2 diabetes mellitus with hyperglycemia, without long-term current use of insulin [facility])  Assessment & Plan  Lab Results   Component Value Date    HGBA1C 9.4 (H) 09/29/2023       Recent Labs     10/31/23  2051 11/01/23  0723 11/01/23  1154 11/01/23  2115   POCGLU 112 102 109 93         Blood Sugar Average: Last 72 hrs:  (P) 100.1    Not controlled outpt  Patient takes Ozempic and Jardiance, which remain on hold  Continue sliding scale insulin. Blood sugars are well controlled here    History of hypertension  Assessment & Plan  Controlled. Continue lisinopril     Discharging Physician / Practitioner: Lurdes Miner MD  PCP: Fina Bernard DO  Admission Date:   Admission Orders (From admission, onward) Ordered        10/30/23 1500  INPATIENT ADMISSION  Once                          Discharge Date: 11/02/23    Medical Problems       Resolved Problems  Date Reviewed: 11/2/2023   None         Consultations During Hospital Stay:  none    Procedures Performed:   none    Significant Findings / Test Results:   CT abdomen pelvis with contrast    Result Date: 10/30/2023  Impression: Bilateral ureteral JJ stents in expected position, as detailed above. No obstructive ureteral stone. Mild right greater than left hydroureter. Bilateral renal calculi, nonobstructive. Nonvisualization of the previously seen large calculi in the renal pelves. Diffuse pericolic stranding, and fibrofatty proliferation in the right and transverse colon. Correlate to exclude symptoms of mild nonspecific pancolitis. No abscess or perforation. Other findings, as per the body of the report. Workstation performed: [id number]      Incidental Findings:   C diff positive     Test Results Pending at Discharge (will require follow up):   none     Outpatient Tests Requested:  none    Complications:  none    Reason for Admission: Severe sepsis    Hospital Course:     Rebecca Aguilar is a 35 y.o. male patient who originally presented to the hospital on 10/30/2023 due to severe sepsis secondary to pancolitis due to C. difficile. Initially there was also suspicion that patient might have a urinary tract infection and was placed on IV Zosyn followed by Rocephin and Flagyl which were all discontinued. Urine culture was negative. Patient did have 1 out of 2 blood cultures positive for Staph epidermidis which was a colonization and not an active infection. Was placed on oral vancomycin and recommended to complete the full course along with probiotics and also to avoid antibiotics in the future unless necessary    Please see above list of diagnoses and related plan for additional information.      Condition at Discharge: good     Discharge Day Visit / Exam: Subjective: Patient has mild abdominal discomfort otherwise diarrhea is improving and feeling better  Vitals: Blood Pressure: 120/83 (11/02/23 0708)  Pulse: (!) 106 (11/02/23 0708)  Temperature: 97.7 °F (36.5 °C) (11/02/23 0708)  Temp Source: Temporal (10/30/23 1539)  Respirations: 18 (11/02/23 0708)  Height: 5' 7" (170.2 cm) (10/30/23 1334)  Weight - Scale: 87 kg (191 lb 12.8 oz) (10/30/23 1622)  SpO2: 97 % (11/02/23 0708)  Exam:   Physical Exam  Vitals and nursing note reviewed. Constitutional:       Appearance: Normal appearance. HENT:      Head: Normocephalic and atraumatic. Right Ear: External ear normal.      Left Ear: External ear normal.      Nose: Nose normal.      Mouth/Throat:      Pharynx: Oropharynx is clear. Cardiovascular:      Rate and Rhythm: Normal rate and regular rhythm. Heart sounds: Normal heart sounds. Pulmonary:      Effort: Pulmonary effort is normal.      Breath sounds: Normal breath sounds. Abdominal:      General: Bowel sounds are normal.      Palpations: Abdomen is soft. Tenderness: There is abdominal tenderness. Musculoskeletal:         General: Normal range of motion. Cervical back: Normal range of motion and neck supple. Skin:     General: Skin is warm and dry. Capillary Refill: Capillary refill takes less than 2 seconds. Neurological:      General: No focal deficit present. Mental Status: He is alert and oriented to person, place, and time. Psychiatric:         Mood and Affect: Mood normal.         Discussion with Family: dw wife    Discharge instructions/Information to patient and family:   See after visit summary for information provided to patient and family. Provisions for Follow-Up Care:  See after visit summary for information related to follow-up care and any pertinent home health orders. Disposition:     Home      Planned Readmission: none     Discharge Statement:  I spent 35 minutes discharging the patient.  This time was spent on the day of discharge. I had direct contact with the patient on the day of discharge. Greater than 50% of the total time was spent examining patient, answering all patient questions, arranging and discussing plan of care with patient as well as directly providing post-discharge instructions. Additional time then spent on discharge activities. Discharge Medications:  See after visit summary for reconciled discharge medications provided to patient and family.       ** Please Note: This note has been constructed using a voice recognition system **

## 2023-11-02 NOTE — ASSESSMENT & PLAN NOTE
Patient does have bilateral stent in place  Patient also reports recently had procedure done for kidney stone at [facility] 10/16  Neg urine culture  stop IV antibiotic

## 2023-11-02 NOTE — ASSESSMENT & PLAN NOTE
Lab Results   Component Value Date    HGBA1C 9.4 (H) 09/29/2023       Recent Labs     10/31/23  2051 11/01/23  0723 11/01/23  1154 11/01/23 2115   POCGLU 112 102 109 93         Blood Sugar Average: Last 72 hrs:  (P) 100.1    Not controlled outpt  Patient takes Ozempic and Jardiance, which remain on hold  Continue sliding scale insulin.   Blood sugars are well controlled here

## 2023-11-03 NOTE — UTILIZATION REVIEW
NOTIFICATION OF ADMISSION DISCHARGE   This is a Notification of Discharge from [address] [facility]. Please be advised that this patient has been discharge from our facility. Below you will find the admission and discharge date and time including the patient’s disposition. UTILIZATION REVIEW CONTACT:  Xavier Cao  Utilization   Network Utilization Review Department  Phone: [phone] x carefully listen to the prompts. All voicemails are confidential.  Email: [email or web address]     ADMISSION INFORMATION  PRESENTATION DATE: 10/30/2023  1:31 PM  OBERVATION ADMISSION DATE:   INPATIENT ADMISSION DATE: 10/30/23  3:00 PM   DISCHARGE DATE: 11/2/2023  9:52 AM   DISPOSITION:Home/Self Care    Network Utilization Review Department  ATTENTION: Please call with any questions or concerns to [phone] and carefully listen to the prompts so that you are directed to the right person. All voicemails are confidential.   For Discharge needs, contact Care Management DC Support Team at [phone] opt. 2  Send all requests for admission clinical reviews, approved or denied determinations and any other requests to dedicated fax number below belonging to the campus where the patient is receiving treatment.  List of dedicated fax numbers for the Facilities:  [place] DENIALS (Administrative/Medical Necessity) [phone]   DISCHARGE SUPPORT TEAM (Network) [phone] [address] [facility] (Maternity/NICU/Pediatrics) [phone]   [address] [facility] [address] [place] [address] [address] [phone]

## 2023-11-04 LAB — BACTERIA BLD CULT: NORMAL

## 2023-12-03 PROBLEM — N12 PYELONEPHRITIS: Status: RESOLVED | Noted: 2023-10-04 | Resolved: 2023-12-03

## 2023-12-29 PROBLEM — N30.00 ACUTE CYSTITIS WITHOUT HEMATURIA: Status: RESOLVED | Noted: 2023-10-30 | Resolved: 2023-12-29

## 2023-12-30 PROBLEM — A41.9 SEVERE SEPSIS (HCC): Status: RESOLVED | Noted: 2023-10-03 | Resolved: 2023-12-30

## 2023-12-30 PROBLEM — R65.20 SEVERE SEPSIS (HCC): Status: RESOLVED | Noted: 2023-10-03 | Resolved: 2023-12-30

## 2024-01-01 ENCOUNTER — APPOINTMENT (INPATIENT)
Dept: NON INVASIVE DIAGNOSTICS | Facility: HOSPITAL | Age: 34
DRG: 673 | End: 2024-01-01
Payer: COMMERCIAL

## 2024-01-01 ENCOUNTER — APPOINTMENT (INPATIENT)
Dept: DIALYSIS | Facility: HOSPITAL | Age: 34
DRG: 673 | End: 2024-01-01
Payer: COMMERCIAL

## 2024-01-01 ENCOUNTER — APPOINTMENT (INPATIENT)
Dept: RADIOLOGY | Facility: HOSPITAL | Age: 34
DRG: 673 | End: 2024-01-01
Payer: COMMERCIAL

## 2024-01-01 ENCOUNTER — APPOINTMENT (INPATIENT)
Dept: DIALYSIS | Facility: HOSPITAL | Age: 34
DRG: 673 | End: 2024-01-01
Attending: INTERNAL MEDICINE
Payer: COMMERCIAL

## 2024-01-01 ENCOUNTER — APPOINTMENT (INPATIENT)
Dept: INTERVENTIONAL RADIOLOGY/VASCULAR | Facility: HOSPITAL | Age: 34
DRG: 673 | End: 2024-01-01
Attending: RADIOLOGY
Payer: COMMERCIAL

## 2024-01-01 ENCOUNTER — HOSPITAL ENCOUNTER (INPATIENT)
Facility: HOSPITAL | Age: 34
LOS: 33 days | DRG: 673 | End: 2024-03-31
Attending: FAMILY MEDICINE | Admitting: FAMILY MEDICINE
Payer: COMMERCIAL

## 2024-01-01 ENCOUNTER — APPOINTMENT (INPATIENT)
Dept: MRI IMAGING | Facility: HOSPITAL | Age: 34
DRG: 673 | End: 2024-01-01
Payer: COMMERCIAL

## 2024-01-01 VITALS
HEIGHT: 67 IN | DIASTOLIC BLOOD PRESSURE: 81 MMHG | BODY MASS INDEX: 28.93 KG/M2 | SYSTOLIC BLOOD PRESSURE: 138 MMHG | RESPIRATION RATE: 10 BRPM | WEIGHT: 184.3 LBS | OXYGEN SATURATION: 93 % | HEART RATE: 91 BPM | TEMPERATURE: 97 F

## 2024-01-01 DIAGNOSIS — K76.82 HEPATIC ENCEPHALOPATHY (HCC): ICD-10-CM

## 2024-01-01 DIAGNOSIS — I95.1 ORTHOSTATIC HYPOTENSION: ICD-10-CM

## 2024-01-01 DIAGNOSIS — D64.9 ACUTE ANEMIA: ICD-10-CM

## 2024-01-01 DIAGNOSIS — K70.10 ALCOHOLIC HEPATITIS, UNSPECIFIED WHETHER ASCITES PRESENT: ICD-10-CM

## 2024-01-01 DIAGNOSIS — Z99.2 ESRD (END STAGE RENAL DISEASE) ON DIALYSIS (HCC): Primary | ICD-10-CM

## 2024-01-01 DIAGNOSIS — F32.A DEPRESSION, UNSPECIFIED DEPRESSION TYPE: ICD-10-CM

## 2024-01-01 DIAGNOSIS — N18.6 ESRD (END STAGE RENAL DISEASE) ON DIALYSIS (HCC): Primary | ICD-10-CM

## 2024-01-01 LAB
ABO GROUP BLD BPU: NORMAL
ABO GROUP BLD: NORMAL
ABO GROUP BLD: NORMAL
ACANTHOCYTES BLD QL SMEAR: PRESENT
ALBUMIN SERPL BCP-MCNC: 2.3 G/DL (ref 3.5–5)
ALBUMIN SERPL BCP-MCNC: 2.4 G/DL (ref 3.5–5)
ALBUMIN SERPL BCP-MCNC: 2.5 G/DL (ref 3.5–5)
ALBUMIN SERPL BCP-MCNC: 2.7 G/DL (ref 3.5–5)
ALBUMIN SERPL BCP-MCNC: 2.7 G/DL (ref 3.5–5)
ALBUMIN SERPL BCP-MCNC: 2.9 G/DL (ref 3.5–5)
ALBUMIN SERPL BCP-MCNC: 3 G/DL (ref 3.5–5)
ALBUMIN SERPL BCP-MCNC: 3.4 G/DL (ref 3.5–5)
ALDOST SERPL-MCNC: 3.2 NG/DL (ref 0–30)
ALDOST/RENIN PLAS-RTO: 0.7 {RATIO} (ref 0–30)
ALP SERPL-CCNC: 144 U/L (ref 34–104)
ALP SERPL-CCNC: 151 U/L (ref 34–104)
ALP SERPL-CCNC: 159 U/L (ref 34–104)
ALP SERPL-CCNC: 178 U/L (ref 34–104)
ALP SERPL-CCNC: 183 U/L (ref 34–104)
ALP SERPL-CCNC: 184 U/L (ref 34–104)
ALP SERPL-CCNC: 188 U/L (ref 34–104)
ALP SERPL-CCNC: 188 U/L (ref 34–104)
ALP SERPL-CCNC: 195 U/L (ref 34–104)
ALP SERPL-CCNC: 213 U/L (ref 34–104)
ALP SERPL-CCNC: 234 U/L (ref 34–104)
ALP SERPL-CCNC: 262 U/L (ref 34–104)
ALP SERPL-CCNC: 282 U/L (ref 34–104)
ALP SERPL-CCNC: 284 U/L (ref 34–104)
ALP SERPL-CCNC: 288 U/L (ref 34–104)
ALT SERPL W P-5'-P-CCNC: 106 U/L (ref 7–52)
ALT SERPL W P-5'-P-CCNC: 107 U/L (ref 7–52)
ALT SERPL W P-5'-P-CCNC: 123 U/L (ref 7–52)
ALT SERPL W P-5'-P-CCNC: 137 U/L (ref 7–52)
ALT SERPL W P-5'-P-CCNC: 142 U/L (ref 7–52)
ALT SERPL W P-5'-P-CCNC: 145 U/L (ref 7–52)
ALT SERPL W P-5'-P-CCNC: 161 U/L (ref 7–52)
ALT SERPL W P-5'-P-CCNC: 19 U/L (ref 7–52)
ALT SERPL W P-5'-P-CCNC: 20 U/L (ref 7–52)
ALT SERPL W P-5'-P-CCNC: 20 U/L (ref 7–52)
ALT SERPL W P-5'-P-CCNC: 27 U/L (ref 7–52)
ALT SERPL W P-5'-P-CCNC: 33 U/L (ref 7–52)
ALT SERPL W P-5'-P-CCNC: 35 U/L (ref 7–52)
ALT SERPL W P-5'-P-CCNC: 39 U/L (ref 7–52)
ALT SERPL W P-5'-P-CCNC: 54 U/L (ref 7–52)
AMMONIA PLAS-SCNC: 23 UMOL/L (ref 18–72)
AMMONIA PLAS-SCNC: 42 UMOL/L (ref 18–72)
AMMONIA PLAS-SCNC: 70 UMOL/L (ref 18–72)
ANION GAP SERPL CALCULATED.3IONS-SCNC: 10 MMOL/L (ref 4–13)
ANION GAP SERPL CALCULATED.3IONS-SCNC: 11 MMOL/L
ANION GAP SERPL CALCULATED.3IONS-SCNC: 11 MMOL/L (ref 4–13)
ANION GAP SERPL CALCULATED.3IONS-SCNC: 12 MMOL/L (ref 4–13)
ANION GAP SERPL CALCULATED.3IONS-SCNC: 12 MMOL/L (ref 4–13)
ANION GAP SERPL CALCULATED.3IONS-SCNC: 13 MMOL/L
ANION GAP SERPL CALCULATED.3IONS-SCNC: 14 MMOL/L
ANION GAP SERPL CALCULATED.3IONS-SCNC: 14 MMOL/L (ref 4–13)
ANION GAP SERPL CALCULATED.3IONS-SCNC: 15 MMOL/L
ANION GAP SERPL CALCULATED.3IONS-SCNC: 15 MMOL/L
ANION GAP SERPL CALCULATED.3IONS-SCNC: 17 MMOL/L
ANION GAP SERPL CALCULATED.3IONS-SCNC: 18 MMOL/L
ANION GAP SERPL CALCULATED.3IONS-SCNC: 18 MMOL/L
ANION GAP SERPL CALCULATED.3IONS-SCNC: 19 MMOL/L
ANION GAP SERPL CALCULATED.3IONS-SCNC: 20 MMOL/L
ANION GAP SERPL CALCULATED.3IONS-SCNC: 20 MMOL/L
ANION GAP SERPL CALCULATED.3IONS-SCNC: 9 MMOL/L (ref 4–13)
ANION GAP SERPL CALCULATED.3IONS-SCNC: 9 MMOL/L (ref 4–13)
ANISOCYTOSIS BLD QL SMEAR: PRESENT
AORTIC ROOT: 3 CM
APICAL FOUR CHAMBER EJECTION FRACTION: 67 %
AST SERPL W P-5'-P-CCNC: 108 U/L (ref 13–39)
AST SERPL W P-5'-P-CCNC: 30 U/L (ref 13–39)
AST SERPL W P-5'-P-CCNC: 33 U/L (ref 13–39)
AST SERPL W P-5'-P-CCNC: 36 U/L (ref 13–39)
AST SERPL W P-5'-P-CCNC: 39 U/L (ref 13–39)
AST SERPL W P-5'-P-CCNC: 40 U/L (ref 13–39)
AST SERPL W P-5'-P-CCNC: 42 U/L (ref 13–39)
AST SERPL W P-5'-P-CCNC: 47 U/L (ref 13–39)
AST SERPL W P-5'-P-CCNC: 61 U/L (ref 13–39)
AST SERPL W P-5'-P-CCNC: 62 U/L (ref 13–39)
AST SERPL W P-5'-P-CCNC: 67 U/L (ref 13–39)
AST SERPL W P-5'-P-CCNC: 69 U/L (ref 13–39)
AST SERPL W P-5'-P-CCNC: 69 U/L (ref 13–39)
AST SERPL W P-5'-P-CCNC: 83 U/L (ref 13–39)
AST SERPL W P-5'-P-CCNC: 91 U/L (ref 13–39)
ATRIAL RATE: 95 BPM
ATRIAL RATE: 99 BPM
BASOPHILS # BLD AUTO: 0.02 THOUSANDS/ÂΜL (ref 0–0.1)
BASOPHILS # BLD AUTO: 0.03 THOUSANDS/ÂΜL (ref 0–0.1)
BASOPHILS # BLD AUTO: 0.08 THOUSANDS/ÂΜL (ref 0–0.1)
BASOPHILS # BLD AUTO: 0.09 THOUSANDS/ÂΜL (ref 0–0.1)
BASOPHILS # BLD AUTO: 0.11 THOUSANDS/ÂΜL (ref 0–0.1)
BASOPHILS # BLD AUTO: 0.12 THOUSANDS/ÂΜL (ref 0–0.1)
BASOPHILS # BLD AUTO: 0.15 THOUSANDS/ÂΜL (ref 0–0.1)
BASOPHILS # BLD MANUAL: 0.22 THOUSAND/UL (ref 0–0.1)
BASOPHILS NFR BLD AUTO: 0 % (ref 0–1)
BASOPHILS NFR BLD AUTO: 1 % (ref 0–1)
BASOPHILS NFR MAR MANUAL: 1 % (ref 0–1)
BILIRUB SERPL-MCNC: 41.97 MG/DL (ref 0.2–1)
BILIRUB SERPL-MCNC: 42.18 MG/DL (ref 0.2–1)
BILIRUB SERPL-MCNC: 42.52 MG/DL (ref 0.2–1)
BILIRUB SERPL-MCNC: 43.82 MG/DL (ref 0.2–1)
BILIRUB SERPL-MCNC: 44.07 MG/DL (ref 0.2–1)
BILIRUB SERPL-MCNC: 44.91 MG/DL (ref 0.2–1)
BILIRUB SERPL-MCNC: 45.15 MG/DL (ref 0.2–1)
BILIRUB SERPL-MCNC: 47.38 MG/DL (ref 0.2–1)
BILIRUB SERPL-MCNC: 47.67 MG/DL (ref 0.2–1)
BILIRUB SERPL-MCNC: 47.73 MG/DL (ref 0.2–1)
BILIRUB SERPL-MCNC: 48.38 MG/DL (ref 0.2–1)
BILIRUB SERPL-MCNC: 48.69 MG/DL (ref 0.2–1)
BILIRUB SERPL-MCNC: 49.17 MG/DL (ref 0.2–1)
BILIRUB SERPL-MCNC: 49.39 MG/DL (ref 0.2–1)
BILIRUB SERPL-MCNC: 51.34 MG/DL (ref 0.2–1)
BLD GP AB SCN SERPL QL: NEGATIVE
BLD GP AB SCN SERPL QL: NEGATIVE
BPU ID: NORMAL
BSA FOR ECHO PROCEDURE: 1.95 M2
BUN SERPL-MCNC: 22 MG/DL (ref 5–25)
BUN SERPL-MCNC: 24 MG/DL (ref 5–25)
BUN SERPL-MCNC: 28 MG/DL (ref 5–25)
BUN SERPL-MCNC: 30 MG/DL (ref 5–25)
BUN SERPL-MCNC: 31 MG/DL (ref 5–25)
BUN SERPL-MCNC: 31 MG/DL (ref 5–25)
BUN SERPL-MCNC: 36 MG/DL (ref 5–25)
BUN SERPL-MCNC: 36 MG/DL (ref 5–25)
BUN SERPL-MCNC: 41 MG/DL (ref 5–25)
BUN SERPL-MCNC: 43 MG/DL (ref 5–25)
BUN SERPL-MCNC: 46 MG/DL (ref 5–25)
BUN SERPL-MCNC: 53 MG/DL (ref 5–25)
BUN SERPL-MCNC: 55 MG/DL (ref 5–25)
BUN SERPL-MCNC: 56 MG/DL (ref 5–25)
BUN SERPL-MCNC: 60 MG/DL (ref 5–25)
BUN SERPL-MCNC: 61 MG/DL (ref 5–25)
BUN SERPL-MCNC: 64 MG/DL (ref 5–25)
BUN SERPL-MCNC: 64 MG/DL (ref 5–25)
BUN SERPL-MCNC: 70 MG/DL (ref 5–25)
BUN SERPL-MCNC: 73 MG/DL (ref 5–25)
BUN SERPL-MCNC: 78 MG/DL (ref 5–25)
BUN SERPL-MCNC: 84 MG/DL (ref 5–25)
BUN SERPL-MCNC: 87 MG/DL (ref 5–25)
BUN SERPL-MCNC: 98 MG/DL (ref 5–25)
BURR CELLS BLD QL SMEAR: PRESENT
CA-I BLD-SCNC: 0.87 MMOL/L (ref 1.12–1.32)
CALCIUM ALBUM COR SERPL-MCNC: 7.5 MG/DL (ref 8.3–10.1)
CALCIUM ALBUM COR SERPL-MCNC: 7.5 MG/DL (ref 8.3–10.1)
CALCIUM ALBUM COR SERPL-MCNC: 7.6 MG/DL (ref 8.3–10.1)
CALCIUM ALBUM COR SERPL-MCNC: 8.1 MG/DL (ref 8.3–10.1)
CALCIUM ALBUM COR SERPL-MCNC: 8.1 MG/DL (ref 8.3–10.1)
CALCIUM ALBUM COR SERPL-MCNC: 8.4 MG/DL (ref 8.3–10.1)
CALCIUM ALBUM COR SERPL-MCNC: 8.6 MG/DL (ref 8.3–10.1)
CALCIUM ALBUM COR SERPL-MCNC: 8.7 MG/DL (ref 8.3–10.1)
CALCIUM ALBUM COR SERPL-MCNC: 8.8 MG/DL (ref 8.3–10.1)
CALCIUM ALBUM COR SERPL-MCNC: 8.8 MG/DL (ref 8.3–10.1)
CALCIUM ALBUM COR SERPL-MCNC: 8.9 MG/DL (ref 8.3–10.1)
CALCIUM ALBUM COR SERPL-MCNC: 9 MG/DL (ref 8.3–10.1)
CALCIUM ALBUM COR SERPL-MCNC: 9.6 MG/DL (ref 8.3–10.1)
CALCIUM SERPL-MCNC: 6.3 MG/DL (ref 8.4–10.2)
CALCIUM SERPL-MCNC: 6.4 MG/DL (ref 8.4–10.2)
CALCIUM SERPL-MCNC: 6.5 MG/DL (ref 8.4–10.2)
CALCIUM SERPL-MCNC: 6.6 MG/DL (ref 8.4–10.2)
CALCIUM SERPL-MCNC: 6.7 MG/DL (ref 8.4–10.2)
CALCIUM SERPL-MCNC: 6.9 MG/DL (ref 8.4–10.2)
CALCIUM SERPL-MCNC: 7 MG/DL (ref 8.4–10.2)
CALCIUM SERPL-MCNC: 7.1 MG/DL (ref 8.4–10.2)
CALCIUM SERPL-MCNC: 7.1 MG/DL (ref 8.4–10.2)
CALCIUM SERPL-MCNC: 7.3 MG/DL (ref 8.4–10.2)
CALCIUM SERPL-MCNC: 7.3 MG/DL (ref 8.4–10.2)
CALCIUM SERPL-MCNC: 7.4 MG/DL (ref 8.4–10.2)
CALCIUM SERPL-MCNC: 7.5 MG/DL (ref 8.4–10.2)
CALCIUM SERPL-MCNC: 7.6 MG/DL (ref 8.4–10.2)
CALCIUM SERPL-MCNC: 7.9 MG/DL (ref 8.4–10.2)
CALCIUM SERPL-MCNC: 8 MG/DL (ref 8.4–10.2)
CALCIUM SERPL-MCNC: 8.1 MG/DL (ref 8.4–10.2)
CALCIUM SERPL-MCNC: 8.7 MG/DL (ref 8.4–10.2)
CHLORIDE SERPL-SCNC: 100 MMOL/L (ref 96–108)
CHLORIDE SERPL-SCNC: 100 MMOL/L (ref 96–108)
CHLORIDE SERPL-SCNC: 102 MMOL/L (ref 96–108)
CHLORIDE SERPL-SCNC: 103 MMOL/L (ref 96–108)
CHLORIDE SERPL-SCNC: 96 MMOL/L (ref 96–108)
CHLORIDE SERPL-SCNC: 97 MMOL/L (ref 96–108)
CHLORIDE SERPL-SCNC: 98 MMOL/L (ref 96–108)
CHLORIDE SERPL-SCNC: 99 MMOL/L (ref 96–108)
CO2 SERPL-SCNC: 15 MMOL/L (ref 21–32)
CO2 SERPL-SCNC: 16 MMOL/L (ref 21–32)
CO2 SERPL-SCNC: 17 MMOL/L (ref 21–32)
CO2 SERPL-SCNC: 20 MMOL/L (ref 21–32)
CO2 SERPL-SCNC: 21 MMOL/L (ref 21–32)
CO2 SERPL-SCNC: 21 MMOL/L (ref 21–32)
CO2 SERPL-SCNC: 23 MMOL/L (ref 21–32)
CO2 SERPL-SCNC: 24 MMOL/L (ref 21–32)
CO2 SERPL-SCNC: 25 MMOL/L (ref 21–32)
CO2 SERPL-SCNC: 25 MMOL/L (ref 21–32)
CO2 SERPL-SCNC: 26 MMOL/L (ref 21–32)
CO2 SERPL-SCNC: 27 MMOL/L (ref 21–32)
CO2 SERPL-SCNC: 28 MMOL/L (ref 21–32)
CO2 SERPL-SCNC: 28 MMOL/L (ref 21–32)
CO2 SERPL-SCNC: 29 MMOL/L (ref 21–32)
CO2 SERPL-SCNC: 29 MMOL/L (ref 21–32)
CORTIS SERPL-MCNC: 12.1 UG/DL
CORTIS SERPL-MCNC: 12.4 UG/DL
CORTIS SERPL-MCNC: 20 UG/DL
CREAT SERPL-MCNC: 4.98 MG/DL (ref 0.6–1.3)
CREAT SERPL-MCNC: 5.5 MG/DL (ref 0.6–1.3)
CREAT SERPL-MCNC: 5.73 MG/DL (ref 0.6–1.3)
CREAT SERPL-MCNC: 5.87 MG/DL (ref 0.6–1.3)
CREAT SERPL-MCNC: 5.92 MG/DL (ref 0.6–1.3)
CREAT SERPL-MCNC: 6.01 MG/DL (ref 0.6–1.3)
CREAT SERPL-MCNC: 6.12 MG/DL (ref 0.6–1.3)
CREAT SERPL-MCNC: 6.22 MG/DL (ref 0.6–1.3)
CREAT SERPL-MCNC: 6.23 MG/DL (ref 0.6–1.3)
CREAT SERPL-MCNC: 6.24 MG/DL (ref 0.6–1.3)
CREAT SERPL-MCNC: 6.42 MG/DL (ref 0.6–1.3)
CREAT SERPL-MCNC: 6.53 MG/DL (ref 0.6–1.3)
CREAT SERPL-MCNC: 6.64 MG/DL (ref 0.6–1.3)
CREAT SERPL-MCNC: 7.05 MG/DL (ref 0.6–1.3)
CREAT SERPL-MCNC: 7.14 MG/DL (ref 0.6–1.3)
CREAT SERPL-MCNC: 7.25 MG/DL (ref 0.6–1.3)
CREAT SERPL-MCNC: 7.27 MG/DL (ref 0.6–1.3)
CREAT SERPL-MCNC: 7.46 MG/DL (ref 0.6–1.3)
CREAT SERPL-MCNC: 7.59 MG/DL (ref 0.6–1.3)
CREAT SERPL-MCNC: 7.75 MG/DL (ref 0.6–1.3)
CREAT SERPL-MCNC: 7.95 MG/DL (ref 0.6–1.3)
CREAT SERPL-MCNC: 8.12 MG/DL (ref 0.6–1.3)
CREAT SERPL-MCNC: 9.27 MG/DL (ref 0.6–1.3)
CREAT SERPL-MCNC: 9.31 MG/DL (ref 0.6–1.3)
CROSSMATCH: NORMAL
DACRYOCYTES BLD QL SMEAR: PRESENT
DATE SPECIMEN #1: NORMAL
DATE SPECIMEN #2: NORMAL
DATE SPECIMEN #3: NORMAL
E WAVE DECELERATION TIME: 133 MS
E/A RATIO: 1.36
EOSINOPHIL # BLD AUTO: 0.12 THOUSAND/ÂΜL (ref 0–0.61)
EOSINOPHIL # BLD AUTO: 0.13 THOUSAND/ÂΜL (ref 0–0.61)
EOSINOPHIL # BLD AUTO: 0.14 THOUSAND/ÂΜL (ref 0–0.61)
EOSINOPHIL # BLD AUTO: 0.15 THOUSAND/ÂΜL (ref 0–0.61)
EOSINOPHIL # BLD AUTO: 0.16 THOUSAND/ÂΜL (ref 0–0.61)
EOSINOPHIL # BLD AUTO: 0.16 THOUSAND/ÂΜL (ref 0–0.61)
EOSINOPHIL # BLD AUTO: 0.17 THOUSAND/ÂΜL (ref 0–0.61)
EOSINOPHIL # BLD AUTO: 0.18 THOUSAND/ÂΜL (ref 0–0.61)
EOSINOPHIL # BLD AUTO: 0.22 THOUSAND/ÂΜL (ref 0–0.61)
EOSINOPHIL # BLD AUTO: 0.35 THOUSAND/ÂΜL (ref 0–0.61)
EOSINOPHIL # BLD AUTO: 0.44 THOUSAND/ÂΜL (ref 0–0.61)
EOSINOPHIL # BLD MANUAL: 0.22 THOUSAND/UL (ref 0–0.4)
EOSINOPHIL NFR BLD AUTO: 1 % (ref 0–6)
EOSINOPHIL NFR BLD AUTO: 2 % (ref 0–6)
EOSINOPHIL NFR BLD AUTO: 2 % (ref 0–6)
EOSINOPHIL NFR BLD MANUAL: 1 % (ref 0–6)
ERYTHROCYTE [DISTWIDTH] IN BLOOD BY AUTOMATED COUNT: 17.2 % (ref 11.6–15.1)
ERYTHROCYTE [DISTWIDTH] IN BLOOD BY AUTOMATED COUNT: 17.2 % (ref 11.6–15.1)
ERYTHROCYTE [DISTWIDTH] IN BLOOD BY AUTOMATED COUNT: 17.4 % (ref 11.6–15.1)
ERYTHROCYTE [DISTWIDTH] IN BLOOD BY AUTOMATED COUNT: 17.6 % (ref 11.6–15.1)
ERYTHROCYTE [DISTWIDTH] IN BLOOD BY AUTOMATED COUNT: 17.9 % (ref 11.6–15.1)
ERYTHROCYTE [DISTWIDTH] IN BLOOD BY AUTOMATED COUNT: 18 % (ref 11.6–15.1)
ERYTHROCYTE [DISTWIDTH] IN BLOOD BY AUTOMATED COUNT: 18 % (ref 11.6–15.1)
ERYTHROCYTE [DISTWIDTH] IN BLOOD BY AUTOMATED COUNT: 18.2 % (ref 11.6–15.1)
ERYTHROCYTE [DISTWIDTH] IN BLOOD BY AUTOMATED COUNT: 18.3 % (ref 11.6–15.1)
ERYTHROCYTE [DISTWIDTH] IN BLOOD BY AUTOMATED COUNT: 18.4 % (ref 11.6–15.1)
ERYTHROCYTE [DISTWIDTH] IN BLOOD BY AUTOMATED COUNT: 18.4 % (ref 11.6–15.1)
ERYTHROCYTE [DISTWIDTH] IN BLOOD BY AUTOMATED COUNT: 18.5 % (ref 11.6–15.1)
ERYTHROCYTE [DISTWIDTH] IN BLOOD BY AUTOMATED COUNT: 18.9 % (ref 11.6–15.1)
FERRITIN SERPL-MCNC: 4404 NG/ML (ref 24–336)
FRACTIONAL SHORTENING: 40 (ref 28–44)
GFR SERPL CREATININE-BSD FRML MDRD: 10 ML/MIN/1.73SQ M
GFR SERPL CREATININE-BSD FRML MDRD: 11 ML/MIN/1.73SQ M
GFR SERPL CREATININE-BSD FRML MDRD: 12 ML/MIN/1.73SQ M
GFR SERPL CREATININE-BSD FRML MDRD: 14 ML/MIN/1.73SQ M
GFR SERPL CREATININE-BSD FRML MDRD: 6 ML/MIN/1.73SQ M
GFR SERPL CREATININE-BSD FRML MDRD: 6 ML/MIN/1.73SQ M
GFR SERPL CREATININE-BSD FRML MDRD: 7 ML/MIN/1.73SQ M
GFR SERPL CREATININE-BSD FRML MDRD: 8 ML/MIN/1.73SQ M
GFR SERPL CREATININE-BSD FRML MDRD: 9 ML/MIN/1.73SQ M
GLUCOSE SERPL-MCNC: 103 MG/DL (ref 65–140)
GLUCOSE SERPL-MCNC: 103 MG/DL (ref 65–140)
GLUCOSE SERPL-MCNC: 104 MG/DL (ref 65–140)
GLUCOSE SERPL-MCNC: 104 MG/DL (ref 65–140)
GLUCOSE SERPL-MCNC: 105 MG/DL (ref 65–140)
GLUCOSE SERPL-MCNC: 108 MG/DL (ref 65–140)
GLUCOSE SERPL-MCNC: 108 MG/DL (ref 65–140)
GLUCOSE SERPL-MCNC: 109 MG/DL (ref 65–140)
GLUCOSE SERPL-MCNC: 110 MG/DL (ref 65–140)
GLUCOSE SERPL-MCNC: 110 MG/DL (ref 65–140)
GLUCOSE SERPL-MCNC: 111 MG/DL (ref 65–140)
GLUCOSE SERPL-MCNC: 111 MG/DL (ref 65–140)
GLUCOSE SERPL-MCNC: 113 MG/DL (ref 65–140)
GLUCOSE SERPL-MCNC: 113 MG/DL (ref 65–140)
GLUCOSE SERPL-MCNC: 115 MG/DL (ref 65–140)
GLUCOSE SERPL-MCNC: 116 MG/DL (ref 65–140)
GLUCOSE SERPL-MCNC: 116 MG/DL (ref 65–140)
GLUCOSE SERPL-MCNC: 120 MG/DL (ref 65–140)
GLUCOSE SERPL-MCNC: 122 MG/DL (ref 65–140)
GLUCOSE SERPL-MCNC: 123 MG/DL (ref 65–140)
GLUCOSE SERPL-MCNC: 124 MG/DL (ref 65–140)
GLUCOSE SERPL-MCNC: 125 MG/DL (ref 65–140)
GLUCOSE SERPL-MCNC: 126 MG/DL (ref 65–140)
GLUCOSE SERPL-MCNC: 128 MG/DL (ref 65–140)
GLUCOSE SERPL-MCNC: 131 MG/DL (ref 65–140)
GLUCOSE SERPL-MCNC: 132 MG/DL (ref 65–140)
GLUCOSE SERPL-MCNC: 132 MG/DL (ref 65–140)
GLUCOSE SERPL-MCNC: 133 MG/DL (ref 65–140)
GLUCOSE SERPL-MCNC: 135 MG/DL (ref 65–140)
GLUCOSE SERPL-MCNC: 136 MG/DL (ref 65–140)
GLUCOSE SERPL-MCNC: 138 MG/DL (ref 65–140)
GLUCOSE SERPL-MCNC: 138 MG/DL (ref 65–140)
GLUCOSE SERPL-MCNC: 139 MG/DL (ref 65–140)
GLUCOSE SERPL-MCNC: 140 MG/DL (ref 65–140)
GLUCOSE SERPL-MCNC: 141 MG/DL (ref 65–140)
GLUCOSE SERPL-MCNC: 143 MG/DL (ref 65–140)
GLUCOSE SERPL-MCNC: 147 MG/DL (ref 65–140)
GLUCOSE SERPL-MCNC: 147 MG/DL (ref 65–140)
GLUCOSE SERPL-MCNC: 148 MG/DL (ref 65–140)
GLUCOSE SERPL-MCNC: 157 MG/DL (ref 65–140)
GLUCOSE SERPL-MCNC: 157 MG/DL (ref 65–140)
GLUCOSE SERPL-MCNC: 161 MG/DL (ref 65–140)
GLUCOSE SERPL-MCNC: 162 MG/DL (ref 65–140)
GLUCOSE SERPL-MCNC: 164 MG/DL (ref 65–140)
GLUCOSE SERPL-MCNC: 173 MG/DL (ref 65–140)
GLUCOSE SERPL-MCNC: 174 MG/DL (ref 65–140)
GLUCOSE SERPL-MCNC: 178 MG/DL (ref 65–140)
GLUCOSE SERPL-MCNC: 198 MG/DL (ref 65–140)
GLUCOSE SERPL-MCNC: 206 MG/DL (ref 65–140)
GLUCOSE SERPL-MCNC: 221 MG/DL (ref 65–140)
GLUCOSE SERPL-MCNC: 234 MG/DL (ref 65–140)
GLUCOSE SERPL-MCNC: 285 MG/DL (ref 65–140)
GLUCOSE SERPL-MCNC: 47 MG/DL (ref 65–140)
GLUCOSE SERPL-MCNC: 62 MG/DL (ref 65–140)
GLUCOSE SERPL-MCNC: 65 MG/DL (ref 65–140)
GLUCOSE SERPL-MCNC: 66 MG/DL (ref 65–140)
GLUCOSE SERPL-MCNC: 69 MG/DL (ref 65–140)
GLUCOSE SERPL-MCNC: 86 MG/DL (ref 65–140)
GLUCOSE SERPL-MCNC: 90 MG/DL (ref 65–140)
GLUCOSE SERPL-MCNC: 90 MG/DL (ref 65–140)
GLUCOSE SERPL-MCNC: 93 MG/DL (ref 65–140)
GLUCOSE SERPL-MCNC: 94 MG/DL (ref 65–140)
GLUCOSE SERPL-MCNC: 98 MG/DL (ref 65–140)
HBV CORE AB SER QL: NORMAL
HBV CORE IGM SER QL: NORMAL
HBV SURFACE AB SER-ACNC: 392 MIU/ML
HBV SURFACE AG SER QL: NORMAL
HCT VFR BLD AUTO: 17 % (ref 36.5–49.3)
HCT VFR BLD AUTO: 18.5 % (ref 36.5–49.3)
HCT VFR BLD AUTO: 18.6 % (ref 36.5–49.3)
HCT VFR BLD AUTO: 20.9 % (ref 36.5–49.3)
HCT VFR BLD AUTO: 21.4 % (ref 36.5–49.3)
HCT VFR BLD AUTO: 22.2 % (ref 36.5–49.3)
HCT VFR BLD AUTO: 22.6 % (ref 36.5–49.3)
HCT VFR BLD AUTO: 22.9 % (ref 36.5–49.3)
HCT VFR BLD AUTO: 23 % (ref 36.5–49.3)
HCT VFR BLD AUTO: 23.1 % (ref 36.5–49.3)
HCT VFR BLD AUTO: 23.2 % (ref 36.5–49.3)
HCT VFR BLD AUTO: 23.7 % (ref 36.5–49.3)
HCT VFR BLD AUTO: 23.8 % (ref 36.5–49.3)
HCT VFR BLD AUTO: 23.9 % (ref 36.5–49.3)
HCT VFR BLD AUTO: 24 % (ref 36.5–49.3)
HCT VFR BLD AUTO: 24.1 % (ref 36.5–49.3)
HCT VFR BLD AUTO: 24.2 % (ref 36.5–49.3)
HCT VFR BLD AUTO: 24.6 % (ref 36.5–49.3)
HCV AB SER QL: NORMAL
HEMOCCULT SP1 STL QL: NEGATIVE
HEMOCCULT SP2 STL QL: NORMAL
HEMOCCULT SP3 STL QL: NORMAL
HGB BLD-MCNC: 6.2 G/DL (ref 12–17)
HGB BLD-MCNC: 6.7 G/DL (ref 12–17)
HGB BLD-MCNC: 6.7 G/DL (ref 12–17)
HGB BLD-MCNC: 7.5 G/DL (ref 12–17)
HGB BLD-MCNC: 7.5 G/DL (ref 12–17)
HGB BLD-MCNC: 8 G/DL (ref 12–17)
HGB BLD-MCNC: 8.1 G/DL (ref 12–17)
HGB BLD-MCNC: 8.3 G/DL (ref 12–17)
HGB BLD-MCNC: 8.3 G/DL (ref 12–17)
HGB BLD-MCNC: 8.4 G/DL (ref 12–17)
HGB BLD-MCNC: 8.5 G/DL (ref 12–17)
HGB BLD-MCNC: 8.5 G/DL (ref 12–17)
HGB BLD-MCNC: 8.6 G/DL (ref 12–17)
HGB BLD-MCNC: 8.6 G/DL (ref 12–17)
HGB BLD-MCNC: 8.7 G/DL (ref 12–17)
HGB BLD-MCNC: 8.8 G/DL (ref 12–17)
HGB BLD-MCNC: 8.8 G/DL (ref 12–17)
HGB BLD-MCNC: 8.9 G/DL (ref 12–17)
IMM GRANULOCYTES # BLD AUTO: 0.14 THOUSAND/UL (ref 0–0.2)
IMM GRANULOCYTES # BLD AUTO: 0.18 THOUSAND/UL (ref 0–0.2)
IMM GRANULOCYTES # BLD AUTO: 0.18 THOUSAND/UL (ref 0–0.2)
IMM GRANULOCYTES # BLD AUTO: 0.2 THOUSAND/UL (ref 0–0.2)
IMM GRANULOCYTES # BLD AUTO: 0.21 THOUSAND/UL (ref 0–0.2)
IMM GRANULOCYTES # BLD AUTO: 0.24 THOUSAND/UL (ref 0–0.2)
IMM GRANULOCYTES # BLD AUTO: 0.24 THOUSAND/UL (ref 0–0.2)
IMM GRANULOCYTES # BLD AUTO: 0.28 THOUSAND/UL (ref 0–0.2)
IMM GRANULOCYTES # BLD AUTO: 0.31 THOUSAND/UL (ref 0–0.2)
IMM GRANULOCYTES # BLD AUTO: 0.32 THOUSAND/UL (ref 0–0.2)
IMM GRANULOCYTES # BLD AUTO: 0.36 THOUSAND/UL (ref 0–0.2)
IMM GRANULOCYTES # BLD AUTO: 0.43 THOUSAND/UL (ref 0–0.2)
IMM GRANULOCYTES NFR BLD AUTO: 1 % (ref 0–2)
IMM GRANULOCYTES NFR BLD AUTO: 2 % (ref 0–2)
INR PPP: 1.7 (ref 0.84–1.19)
INR PPP: 1.77 (ref 0.84–1.19)
INR PPP: 1.8 (ref 0.84–1.19)
INR PPP: 2.09 (ref 0.84–1.19)
INR PPP: 2.13 (ref 0.84–1.19)
INR PPP: 2.31 (ref 0.84–1.19)
INR PPP: 2.54 (ref 0.84–1.19)
INR PPP: 2.58 (ref 0.84–1.19)
INR PPP: 2.71 (ref 0.84–1.19)
INR PPP: 2.8 (ref 0.84–1.19)
INR PPP: 2.8 (ref 0.84–1.19)
INR PPP: 3.06 (ref 0.84–1.19)
INR PPP: 3.16 (ref 0.84–1.19)
INTERVENTRICULAR SEPTUM IN DIASTOLE (PARASTERNAL SHORT AXIS VIEW): 1.1 CM
INTERVENTRICULAR SEPTUM: 1.1 CM (ref 0.6–1.1)
IRON SERPL-MCNC: 78 UG/DL (ref 50–212)
LAAS-AP2: 10.7 CM2
LAAS-AP4: 10.6 CM2
LACTATE SERPL-SCNC: 0.8 MMOL/L (ref 0.5–2)
LACTATE SERPL-SCNC: 1 MMOL/L (ref 0.5–2)
LEFT ATRIUM SIZE: 3.8 CM
LEFT ATRIUM VOLUME (MOD BIPLANE): 22 ML
LEFT ATRIUM VOLUME INDEX (MOD BIPLANE): 11.2 ML/M2
LEFT INTERNAL DIMENSION IN SYSTOLE: 2.9 CM (ref 2.1–4)
LEFT VENTRICULAR INTERNAL DIMENSION IN DIASTOLE: 4.8 CM (ref 3.5–6)
LEFT VENTRICULAR POSTERIOR WALL IN END DIASTOLE: 1.1 CM
LEFT VENTRICULAR STROKE VOLUME: 78 ML
LG PLATELETS BLD QL SMEAR: PRESENT
LVSV (TEICH): 78 ML
LYMPHOCYTES # BLD AUTO: 0.78 THOUSANDS/ÂΜL (ref 0.6–4.47)
LYMPHOCYTES # BLD AUTO: 0.81 THOUSANDS/ÂΜL (ref 0.6–4.47)
LYMPHOCYTES # BLD AUTO: 0.88 THOUSANDS/ÂΜL (ref 0.6–4.47)
LYMPHOCYTES # BLD AUTO: 0.93 THOUSANDS/ÂΜL (ref 0.6–4.47)
LYMPHOCYTES # BLD AUTO: 1.96 THOUSANDS/ÂΜL (ref 0.6–4.47)
LYMPHOCYTES # BLD AUTO: 11 % (ref 14–44)
LYMPHOCYTES # BLD AUTO: 2.37 THOUSAND/UL (ref 0.6–4.47)
LYMPHOCYTES # BLD AUTO: 2.56 THOUSANDS/ÂΜL (ref 0.6–4.47)
LYMPHOCYTES # BLD AUTO: 2.71 THOUSANDS/ÂΜL (ref 0.6–4.47)
LYMPHOCYTES # BLD AUTO: 2.9 THOUSANDS/ÂΜL (ref 0.6–4.47)
LYMPHOCYTES # BLD AUTO: 3.03 THOUSANDS/ÂΜL (ref 0.6–4.47)
LYMPHOCYTES NFR BLD AUTO: 12 % (ref 14–44)
LYMPHOCYTES NFR BLD AUTO: 14 % (ref 14–44)
LYMPHOCYTES NFR BLD AUTO: 15 % (ref 14–44)
LYMPHOCYTES NFR BLD AUTO: 16 % (ref 14–44)
LYMPHOCYTES NFR BLD AUTO: 4 % (ref 14–44)
LYMPHOCYTES NFR BLD AUTO: 5 % (ref 14–44)
LYMPHOCYTES NFR BLD AUTO: 9 % (ref 14–44)
MAGNESIUM SERPL-MCNC: 1.9 MG/DL (ref 1.9–2.7)
MAGNESIUM SERPL-MCNC: 1.9 MG/DL (ref 1.9–2.7)
MAGNESIUM SERPL-MCNC: 2 MG/DL (ref 1.9–2.7)
MAGNESIUM SERPL-MCNC: 2 MG/DL (ref 1.9–2.7)
MAGNESIUM SERPL-MCNC: 2.2 MG/DL (ref 1.9–2.7)
MAGNESIUM SERPL-MCNC: 2.3 MG/DL (ref 1.9–2.7)
MAGNESIUM SERPL-MCNC: 2.5 MG/DL (ref 1.9–2.7)
MAGNESIUM SERPL-MCNC: 2.5 MG/DL (ref 1.9–2.7)
MCH RBC QN AUTO: 29.1 PG (ref 26.8–34.3)
MCH RBC QN AUTO: 29.4 PG (ref 26.8–34.3)
MCH RBC QN AUTO: 29.5 PG (ref 26.8–34.3)
MCH RBC QN AUTO: 29.7 PG (ref 26.8–34.3)
MCH RBC QN AUTO: 29.9 PG (ref 26.8–34.3)
MCH RBC QN AUTO: 30 PG (ref 26.8–34.3)
MCH RBC QN AUTO: 30.1 PG (ref 26.8–34.3)
MCH RBC QN AUTO: 30.2 PG (ref 26.8–34.3)
MCH RBC QN AUTO: 30.4 PG (ref 26.8–34.3)
MCH RBC QN AUTO: 30.6 PG (ref 26.8–34.3)
MCH RBC QN AUTO: 30.7 PG (ref 26.8–34.3)
MCH RBC QN AUTO: 30.9 PG (ref 26.8–34.3)
MCH RBC QN AUTO: 30.9 PG (ref 26.8–34.3)
MCH RBC QN AUTO: 31.3 PG (ref 26.8–34.3)
MCH RBC QN AUTO: 31.4 PG (ref 26.8–34.3)
MCH RBC QN AUTO: 31.4 PG (ref 26.8–34.3)
MCH RBC QN AUTO: 32.1 PG (ref 26.8–34.3)
MCHC RBC AUTO-ENTMCNC: 35 G/DL (ref 31.4–37.4)
MCHC RBC AUTO-ENTMCNC: 35.1 G/DL (ref 31.4–37.4)
MCHC RBC AUTO-ENTMCNC: 35.2 G/DL (ref 31.4–37.4)
MCHC RBC AUTO-ENTMCNC: 35.8 G/DL (ref 31.4–37.4)
MCHC RBC AUTO-ENTMCNC: 35.9 G/DL (ref 31.4–37.4)
MCHC RBC AUTO-ENTMCNC: 35.9 G/DL (ref 31.4–37.4)
MCHC RBC AUTO-ENTMCNC: 36 G/DL (ref 31.4–37.4)
MCHC RBC AUTO-ENTMCNC: 36.2 G/DL (ref 31.4–37.4)
MCHC RBC AUTO-ENTMCNC: 36.5 G/DL (ref 31.4–37.4)
MCHC RBC AUTO-ENTMCNC: 36.7 G/DL (ref 31.4–37.4)
MCHC RBC AUTO-ENTMCNC: 36.8 G/DL (ref 31.4–37.4)
MCHC RBC AUTO-ENTMCNC: 37 G/DL (ref 31.4–37.4)
MCHC RBC AUTO-ENTMCNC: 37.1 G/DL (ref 31.4–37.4)
MCV RBC AUTO: 82 FL (ref 82–98)
MCV RBC AUTO: 82 FL (ref 82–98)
MCV RBC AUTO: 83 FL (ref 82–98)
MCV RBC AUTO: 84 FL (ref 82–98)
MCV RBC AUTO: 84 FL (ref 82–98)
MCV RBC AUTO: 85 FL (ref 82–98)
MCV RBC AUTO: 86 FL (ref 82–98)
MCV RBC AUTO: 86 FL (ref 82–98)
MCV RBC AUTO: 87 FL (ref 82–98)
METAMYELOCYTES NFR BLD MANUAL: 2 % (ref 0–1)
MONOCYTES # BLD AUTO: 0.78 THOUSAND/ÂΜL (ref 0.17–1.22)
MONOCYTES # BLD AUTO: 0.83 THOUSAND/ÂΜL (ref 0.17–1.22)
MONOCYTES # BLD AUTO: 0.84 THOUSAND/ÂΜL (ref 0.17–1.22)
MONOCYTES # BLD AUTO: 0.87 THOUSAND/ÂΜL (ref 0.17–1.22)
MONOCYTES # BLD AUTO: 0.87 THOUSAND/ÂΜL (ref 0.17–1.22)
MONOCYTES # BLD AUTO: 0.95 THOUSAND/ÂΜL (ref 0.17–1.22)
MONOCYTES # BLD AUTO: 2.37 THOUSAND/UL (ref 0–1.22)
MONOCYTES # BLD AUTO: 2.46 THOUSAND/ÂΜL (ref 0.17–1.22)
MONOCYTES # BLD AUTO: 2.77 THOUSAND/ÂΜL (ref 0.17–1.22)
MONOCYTES # BLD AUTO: 2.91 THOUSAND/ÂΜL (ref 0.17–1.22)
MONOCYTES # BLD AUTO: 3.1 THOUSAND/ÂΜL (ref 0.17–1.22)
MONOCYTES # BLD AUTO: 3.12 THOUSAND/ÂΜL (ref 0.17–1.22)
MONOCYTES NFR BLD AUTO: 13 % (ref 4–12)
MONOCYTES NFR BLD AUTO: 13 % (ref 4–12)
MONOCYTES NFR BLD AUTO: 15 % (ref 4–12)
MONOCYTES NFR BLD AUTO: 4 % (ref 4–12)
MONOCYTES NFR BLD AUTO: 5 % (ref 4–12)
MONOCYTES NFR BLD AUTO: 6 % (ref 4–12)
MONOCYTES NFR BLD: 11 % (ref 4–12)
MRSA NOSE QL CULT: NORMAL
MV E'TISSUE VEL-SEP: 15 CM/S
MV PEAK A VEL: 0.78 M/S
MV PEAK E VEL: 106 CM/S
MV STENOSIS PRESSURE HALF TIME: 39 MS
MV VALVE AREA P 1/2 METHOD: 5.64
NEUTROPHILS # BLD AUTO: 12.75 THOUSANDS/ÂΜL (ref 1.85–7.62)
NEUTROPHILS # BLD AUTO: 13.13 THOUSANDS/ÂΜL (ref 1.85–7.62)
NEUTROPHILS # BLD AUTO: 13.2 THOUSANDS/ÂΜL (ref 1.85–7.62)
NEUTROPHILS # BLD AUTO: 13.76 THOUSANDS/ÂΜL (ref 1.85–7.62)
NEUTROPHILS # BLD AUTO: 14.71 THOUSANDS/ÂΜL (ref 1.85–7.62)
NEUTROPHILS # BLD AUTO: 15.11 THOUSANDS/ÂΜL (ref 1.85–7.62)
NEUTROPHILS # BLD AUTO: 15.53 THOUSANDS/ÂΜL (ref 1.85–7.62)
NEUTROPHILS # BLD AUTO: 16.62 THOUSANDS/ÂΜL (ref 1.85–7.62)
NEUTROPHILS # BLD AUTO: 16.84 THOUSANDS/ÂΜL (ref 1.85–7.62)
NEUTROPHILS # BLD AUTO: 17.15 THOUSANDS/ÂΜL (ref 1.85–7.62)
NEUTROPHILS # BLD AUTO: 18.45 THOUSANDS/ÂΜL (ref 1.85–7.62)
NEUTROPHILS # BLD MANUAL: 15.94 THOUSAND/UL (ref 1.85–7.62)
NEUTS BAND NFR BLD MANUAL: 6 % (ref 0–8)
NEUTS SEG NFR BLD AUTO: 67 % (ref 43–75)
NEUTS SEG NFR BLD AUTO: 68 % (ref 43–75)
NEUTS SEG NFR BLD AUTO: 68 % (ref 43–75)
NEUTS SEG NFR BLD AUTO: 69 % (ref 43–75)
NEUTS SEG NFR BLD AUTO: 70 % (ref 43–75)
NEUTS SEG NFR BLD AUTO: 71 % (ref 43–75)
NEUTS SEG NFR BLD AUTO: 87 % (ref 43–75)
NEUTS SEG NFR BLD AUTO: 88 % (ref 43–75)
NEUTS SEG NFR BLD AUTO: 88 % (ref 43–75)
NEUTS SEG NFR BLD AUTO: 89 % (ref 43–75)
NRBC BLD AUTO-RTO: 0 /100 WBCS
NRBC BLD AUTO-RTO: 1 /100 WBC (ref 0–2)
OVALOCYTES BLD QL SMEAR: PRESENT
P AXIS: 50 DEGREES
P AXIS: 53 DEGREES
PHOSPHATE SERPL-MCNC: 1.4 MG/DL (ref 2.7–4.5)
PHOSPHATE SERPL-MCNC: 2.5 MG/DL (ref 2.7–4.5)
PHOSPHATE SERPL-MCNC: 4.7 MG/DL (ref 2.7–4.5)
PHOSPHATE SERPL-MCNC: 8.2 MG/DL (ref 2.7–4.5)
PHOSPHATE SERPL-MCNC: 9.4 MG/DL (ref 2.7–4.5)
PHOSPHATE SERPL-MCNC: <1 MG/DL (ref 2.7–4.5)
PLATELET # BLD AUTO: 51 THOUSANDS/UL (ref 149–390)
PLATELET # BLD AUTO: 54 THOUSANDS/UL (ref 149–390)
PLATELET # BLD AUTO: 55 THOUSANDS/UL (ref 149–390)
PLATELET # BLD AUTO: 55 THOUSANDS/UL (ref 149–390)
PLATELET # BLD AUTO: 56 THOUSANDS/UL (ref 149–390)
PLATELET # BLD AUTO: 57 THOUSANDS/UL (ref 149–390)
PLATELET # BLD AUTO: 67 THOUSANDS/UL (ref 149–390)
PLATELET # BLD AUTO: 73 THOUSANDS/UL (ref 149–390)
PLATELET # BLD AUTO: 75 THOUSANDS/UL (ref 149–390)
PLATELET # BLD AUTO: 77 THOUSANDS/UL (ref 149–390)
PLATELET # BLD AUTO: 77 THOUSANDS/UL (ref 149–390)
PLATELET # BLD AUTO: 78 THOUSANDS/UL (ref 149–390)
PLATELET # BLD AUTO: 86 THOUSANDS/UL (ref 149–390)
PLATELET # BLD AUTO: 89 THOUSANDS/UL (ref 149–390)
PLATELET # BLD AUTO: 92 THOUSANDS/UL (ref 149–390)
PLATELET BLD QL SMEAR: ABNORMAL
PMV BLD AUTO: 11.9 FL (ref 8.9–12.7)
PMV BLD AUTO: 13.3 FL (ref 8.9–12.7)
POIKILOCYTOSIS BLD QL SMEAR: PRESENT
POLYCHROMASIA BLD QL SMEAR: PRESENT
POTASSIUM SERPL-SCNC: 2.4 MMOL/L (ref 3.5–5.3)
POTASSIUM SERPL-SCNC: 2.7 MMOL/L (ref 3.5–5.3)
POTASSIUM SERPL-SCNC: 2.7 MMOL/L (ref 3.5–5.3)
POTASSIUM SERPL-SCNC: 2.8 MMOL/L (ref 3.5–5.3)
POTASSIUM SERPL-SCNC: 2.9 MMOL/L (ref 3.5–5.3)
POTASSIUM SERPL-SCNC: 3 MMOL/L (ref 3.5–5.3)
POTASSIUM SERPL-SCNC: 3 MMOL/L (ref 3.5–5.3)
POTASSIUM SERPL-SCNC: 3.1 MMOL/L (ref 3.5–5.3)
POTASSIUM SERPL-SCNC: 3.2 MMOL/L (ref 3.5–5.3)
POTASSIUM SERPL-SCNC: 3.4 MMOL/L (ref 3.5–5.3)
POTASSIUM SERPL-SCNC: 3.5 MMOL/L (ref 3.5–5.3)
POTASSIUM SERPL-SCNC: 3.6 MMOL/L (ref 3.5–5.3)
POTASSIUM SERPL-SCNC: 3.9 MMOL/L (ref 3.5–5.3)
PR INTERVAL: 154 MS
PR INTERVAL: 162 MS
PROT SERPL-MCNC: 3 G/DL (ref 6.4–8.4)
PROT SERPL-MCNC: 3.1 G/DL (ref 6.4–8.4)
PROT SERPL-MCNC: 3.1 G/DL (ref 6.4–8.4)
PROT SERPL-MCNC: 3.2 G/DL (ref 6.4–8.4)
PROT SERPL-MCNC: 3.2 G/DL (ref 6.4–8.4)
PROT SERPL-MCNC: 3.3 G/DL (ref 6.4–8.4)
PROT SERPL-MCNC: 3.3 G/DL (ref 6.4–8.4)
PROT SERPL-MCNC: 3.4 G/DL (ref 6.4–8.4)
PROT SERPL-MCNC: 3.5 G/DL (ref 6.4–8.4)
PROT SERPL-MCNC: 3.5 G/DL (ref 6.4–8.4)
PROT SERPL-MCNC: 3.7 G/DL (ref 6.4–8.4)
PROT SERPL-MCNC: 3.8 G/DL (ref 6.4–8.4)
PROT SERPL-MCNC: 3.8 G/DL (ref 6.4–8.4)
PROTHROMBIN TIME: 20.3 SECONDS (ref 11.6–14.5)
PROTHROMBIN TIME: 20.9 SECONDS (ref 11.6–14.5)
PROTHROMBIN TIME: 21.2 SECONDS (ref 11.6–14.5)
PROTHROMBIN TIME: 23.8 SECONDS (ref 11.6–14.5)
PROTHROMBIN TIME: 24.2 SECONDS (ref 11.6–14.5)
PROTHROMBIN TIME: 25.7 SECONDS (ref 11.6–14.5)
PROTHROMBIN TIME: 27.6 SECONDS (ref 11.6–14.5)
PROTHROMBIN TIME: 28 SECONDS (ref 11.6–14.5)
PROTHROMBIN TIME: 29.1 SECONDS (ref 11.6–14.5)
PROTHROMBIN TIME: 29.8 SECONDS (ref 11.6–14.5)
PROTHROMBIN TIME: 29.8 SECONDS (ref 11.6–14.5)
PROTHROMBIN TIME: 32 SECONDS (ref 11.6–14.5)
PROTHROMBIN TIME: 32.7 SECONDS (ref 11.6–14.5)
PTH-INTACT SERPL-MCNC: 354.4 PG/ML (ref 12–88)
QRS AXIS: 13 DEGREES
QRS AXIS: 15 DEGREES
QRSD INTERVAL: 90 MS
QRSD INTERVAL: 94 MS
QT INTERVAL: 394 MS
QT INTERVAL: 404 MS
QTC INTERVAL: 505 MS
QTC INTERVAL: 507 MS
RBC # BLD AUTO: 2.17 MILLION/UL (ref 3.88–5.62)
RBC # BLD AUTO: 2.23 MILLION/UL (ref 3.88–5.62)
RBC # BLD AUTO: 2.51 MILLION/UL (ref 3.88–5.62)
RBC # BLD AUTO: 2.58 MILLION/UL (ref 3.88–5.62)
RBC # BLD AUTO: 2.65 MILLION/UL (ref 3.88–5.62)
RBC # BLD AUTO: 2.68 MILLION/UL (ref 3.88–5.62)
RBC # BLD AUTO: 2.7 MILLION/UL (ref 3.88–5.62)
RBC # BLD AUTO: 2.72 MILLION/UL (ref 3.88–5.62)
RBC # BLD AUTO: 2.76 MILLION/UL (ref 3.88–5.62)
RBC # BLD AUTO: 2.78 MILLION/UL (ref 3.88–5.62)
RBC # BLD AUTO: 2.8 MILLION/UL (ref 3.88–5.62)
RBC # BLD AUTO: 2.8 MILLION/UL (ref 3.88–5.62)
RBC # BLD AUTO: 2.81 MILLION/UL (ref 3.88–5.62)
RBC # BLD AUTO: 2.83 MILLION/UL (ref 3.88–5.62)
RBC # BLD AUTO: 2.85 MILLION/UL (ref 3.88–5.62)
RBC # BLD AUTO: 2.86 MILLION/UL (ref 3.88–5.62)
RBC # BLD AUTO: 2.88 MILLION/UL (ref 3.88–5.62)
RBC MORPH BLD: PRESENT
RENIN PLAS-CCNC: 4.45 NG/ML/HR (ref 0.17–5.38)
RENIN PLAS-CCNC: 5.05 NG/ML/HR (ref 0.17–5.38)
RH BLD: POSITIVE
RH BLD: POSITIVE
RIGHT ATRIUM AREA SYSTOLE A4C: 10.8 CM2
RIGHT VENTRICLE ID DIMENSION: 2.8 CM
SCHISTOCYTES BLD QL SMEAR: PRESENT
SL CV LEFT ATRIUM LENGTH A2C: 4 CM
SL CV PED ECHO LEFT VENTRICLE DIASTOLIC VOLUME (MOD BIPLANE) 2D: 109 ML
SL CV PED ECHO LEFT VENTRICLE SYSTOLIC VOLUME (MOD BIPLANE) 2D: 31 ML
SODIUM SERPL-SCNC: 133 MMOL/L (ref 135–147)
SODIUM SERPL-SCNC: 134 MMOL/L (ref 135–147)
SODIUM SERPL-SCNC: 135 MMOL/L (ref 135–147)
SODIUM SERPL-SCNC: 136 MMOL/L (ref 135–147)
SODIUM SERPL-SCNC: 137 MMOL/L (ref 135–147)
SODIUM SERPL-SCNC: 139 MMOL/L (ref 135–147)
SPECIMEN EXPIRATION DATE: NORMAL
SPECIMEN EXPIRATION DATE: NORMAL
T WAVE AXIS: 34 DEGREES
T WAVE AXIS: 46 DEGREES
T4 FREE SERPL-MCNC: 0.37 NG/DL (ref 0.61–1.12)
TARGETS BLD QL SMEAR: PRESENT
TIBC SERPL-MCNC: <133 UG/DL (ref 250–450)
TSH SERPL DL<=0.05 MIU/L-ACNC: 2.56 UIU/ML (ref 0.45–4.5)
UIBC SERPL-MCNC: <55 UG/DL (ref 155–355)
UNIT DISPENSE STATUS: NORMAL
UNIT PRODUCT CODE: NORMAL
UNIT PRODUCT VOLUME: 350 ML
UNIT RH: NORMAL
VENTRICULAR RATE: 95 BPM
VENTRICULAR RATE: 99 BPM
WBC # BLD AUTO: 15.12 THOUSAND/UL (ref 4.31–10.16)
WBC # BLD AUTO: 17.09 THOUSAND/UL (ref 4.31–10.16)
WBC # BLD AUTO: 17.56 THOUSAND/UL (ref 4.31–10.16)
WBC # BLD AUTO: 17.98 THOUSAND/UL (ref 4.31–10.16)
WBC # BLD AUTO: 18.14 THOUSAND/UL (ref 4.31–10.16)
WBC # BLD AUTO: 18.53 THOUSAND/UL (ref 4.31–10.16)
WBC # BLD AUTO: 18.59 THOUSAND/UL (ref 4.31–10.16)
WBC # BLD AUTO: 19.04 THOUSAND/UL (ref 4.31–10.16)
WBC # BLD AUTO: 19.29 THOUSAND/UL (ref 4.31–10.16)
WBC # BLD AUTO: 19.43 THOUSAND/UL (ref 4.31–10.16)
WBC # BLD AUTO: 20.27 THOUSAND/UL (ref 4.31–10.16)
WBC # BLD AUTO: 20.54 THOUSAND/UL (ref 4.31–10.16)
WBC # BLD AUTO: 20.54 THOUSAND/UL (ref 4.31–10.16)
WBC # BLD AUTO: 20.91 THOUSAND/UL (ref 4.31–10.16)
WBC # BLD AUTO: 20.97 THOUSAND/UL (ref 4.31–10.16)
WBC # BLD AUTO: 21.54 THOUSAND/UL (ref 4.31–10.16)
WBC # BLD AUTO: 22.21 THOUSAND/UL (ref 4.31–10.16)

## 2024-01-01 PROCEDURE — 83735 ASSAY OF MAGNESIUM: CPT | Performed by: INTERNAL MEDICINE

## 2024-01-01 PROCEDURE — 80053 COMPREHEN METABOLIC PANEL: CPT | Performed by: FAMILY MEDICINE

## 2024-01-01 PROCEDURE — 85027 COMPLETE CBC AUTOMATED: CPT | Performed by: FAMILY MEDICINE

## 2024-01-01 PROCEDURE — 99233 SBSQ HOSP IP/OBS HIGH 50: CPT | Performed by: INTERNAL MEDICINE

## 2024-01-01 PROCEDURE — 85025 COMPLETE CBC W/AUTO DIFF WBC: CPT | Performed by: FAMILY MEDICINE

## 2024-01-01 PROCEDURE — 90935 HEMODIALYSIS ONE EVALUATION: CPT | Performed by: INTERNAL MEDICINE

## 2024-01-01 PROCEDURE — 90935 HEMODIALYSIS ONE EVALUATION: CPT | Performed by: STUDENT IN AN ORGANIZED HEALTH CARE EDUCATION/TRAINING PROGRAM

## 2024-01-01 PROCEDURE — 87340 HEPATITIS B SURFACE AG IA: CPT | Performed by: STUDENT IN AN ORGANIZED HEALTH CARE EDUCATION/TRAINING PROGRAM

## 2024-01-01 PROCEDURE — 80048 BASIC METABOLIC PNL TOTAL CA: CPT | Performed by: INTERNAL MEDICINE

## 2024-01-01 PROCEDURE — 02H633Z INSERTION OF INFUSION DEVICE INTO RIGHT ATRIUM, PERCUTANEOUS APPROACH: ICD-10-PCS | Performed by: RADIOLOGY

## 2024-01-01 PROCEDURE — 83735 ASSAY OF MAGNESIUM: CPT | Performed by: NURSE PRACTITIONER

## 2024-01-01 PROCEDURE — 99232 SBSQ HOSP IP/OBS MODERATE 35: CPT | Performed by: INTERNAL MEDICINE

## 2024-01-01 PROCEDURE — C9113 INJ PANTOPRAZOLE SODIUM, VIA: HCPCS | Performed by: FAMILY MEDICINE

## 2024-01-01 PROCEDURE — 99233 SBSQ HOSP IP/OBS HIGH 50: CPT | Performed by: FAMILY MEDICINE

## 2024-01-01 PROCEDURE — 99231 SBSQ HOSP IP/OBS SF/LOW 25: CPT | Performed by: HOSPITALIST

## 2024-01-01 PROCEDURE — 71045 X-RAY EXAM CHEST 1 VIEW: CPT

## 2024-01-01 PROCEDURE — 86850 RBC ANTIBODY SCREEN: CPT | Performed by: FAMILY MEDICINE

## 2024-01-01 PROCEDURE — 82533 TOTAL CORTISOL: CPT | Performed by: INTERNAL MEDICINE

## 2024-01-01 PROCEDURE — 82948 REAGENT STRIP/BLOOD GLUCOSE: CPT

## 2024-01-01 PROCEDURE — 99233 SBSQ HOSP IP/OBS HIGH 50: CPT | Performed by: NURSE PRACTITIONER

## 2024-01-01 PROCEDURE — 84244 ASSAY OF RENIN: CPT | Performed by: INTERNAL MEDICINE

## 2024-01-01 PROCEDURE — 92610 EVALUATE SWALLOWING FUNCTION: CPT

## 2024-01-01 PROCEDURE — 99232 SBSQ HOSP IP/OBS MODERATE 35: CPT | Performed by: STUDENT IN AN ORGANIZED HEALTH CARE EDUCATION/TRAINING PROGRAM

## 2024-01-01 PROCEDURE — 85027 COMPLETE CBC AUTOMATED: CPT | Performed by: NURSE PRACTITIONER

## 2024-01-01 PROCEDURE — 82140 ASSAY OF AMMONIA: CPT | Performed by: FAMILY MEDICINE

## 2024-01-01 PROCEDURE — 99232 SBSQ HOSP IP/OBS MODERATE 35: CPT | Performed by: FAMILY MEDICINE

## 2024-01-01 PROCEDURE — 99233 SBSQ HOSP IP/OBS HIGH 50: CPT | Performed by: STUDENT IN AN ORGANIZED HEALTH CARE EDUCATION/TRAINING PROGRAM

## 2024-01-01 PROCEDURE — 80048 BASIC METABOLIC PNL TOTAL CA: CPT | Performed by: FAMILY MEDICINE

## 2024-01-01 PROCEDURE — 86705 HEP B CORE ANTIBODY IGM: CPT | Performed by: STUDENT IN AN ORGANIZED HEALTH CARE EDUCATION/TRAINING PROGRAM

## 2024-01-01 PROCEDURE — 83550 IRON BINDING TEST: CPT | Performed by: INTERNAL MEDICINE

## 2024-01-01 PROCEDURE — NC001 PR NO CHARGE: Performed by: STUDENT IN AN ORGANIZED HEALTH CARE EDUCATION/TRAINING PROGRAM

## 2024-01-01 PROCEDURE — 80048 BASIC METABOLIC PNL TOTAL CA: CPT | Performed by: NURSE PRACTITIONER

## 2024-01-01 PROCEDURE — 73060 X-RAY EXAM OF HUMERUS: CPT

## 2024-01-01 PROCEDURE — 85610 PROTHROMBIN TIME: CPT | Performed by: FAMILY MEDICINE

## 2024-01-01 PROCEDURE — 82088 ASSAY OF ALDOSTERONE: CPT | Performed by: INTERNAL MEDICINE

## 2024-01-01 PROCEDURE — 86901 BLOOD TYPING SEROLOGIC RH(D): CPT | Performed by: FAMILY MEDICINE

## 2024-01-01 PROCEDURE — 83540 ASSAY OF IRON: CPT | Performed by: INTERNAL MEDICINE

## 2024-01-01 PROCEDURE — 5A1D70Z PERFORMANCE OF URINARY FILTRATION, INTERMITTENT, LESS THAN 6 HOURS PER DAY: ICD-10-PCS | Performed by: INTERNAL MEDICINE

## 2024-01-01 PROCEDURE — 82533 TOTAL CORTISOL: CPT | Performed by: FAMILY MEDICINE

## 2024-01-01 PROCEDURE — 97530 THERAPEUTIC ACTIVITIES: CPT

## 2024-01-01 PROCEDURE — 86923 COMPATIBILITY TEST ELECTRIC: CPT

## 2024-01-01 PROCEDURE — 86900 BLOOD TYPING SEROLOGIC ABO: CPT | Performed by: FAMILY MEDICINE

## 2024-01-01 PROCEDURE — 86704 HEP B CORE ANTIBODY TOTAL: CPT | Performed by: STUDENT IN AN ORGANIZED HEALTH CARE EDUCATION/TRAINING PROGRAM

## 2024-01-01 PROCEDURE — 82330 ASSAY OF CALCIUM: CPT | Performed by: NURSE PRACTITIONER

## 2024-01-01 PROCEDURE — 93005 ELECTROCARDIOGRAM TRACING: CPT

## 2024-01-01 PROCEDURE — 87081 CULTURE SCREEN ONLY: CPT | Performed by: FAMILY MEDICINE

## 2024-01-01 PROCEDURE — 99231 SBSQ HOSP IP/OBS SF/LOW 25: CPT | Performed by: FAMILY MEDICINE

## 2024-01-01 PROCEDURE — 85014 HEMATOCRIT: CPT | Performed by: FAMILY MEDICINE

## 2024-01-01 PROCEDURE — 85610 PROTHROMBIN TIME: CPT | Performed by: NURSE PRACTITIONER

## 2024-01-01 PROCEDURE — 85007 BL SMEAR W/DIFF WBC COUNT: CPT | Performed by: FAMILY MEDICINE

## 2024-01-01 PROCEDURE — 97162 PT EVAL MOD COMPLEX 30 MIN: CPT

## 2024-01-01 PROCEDURE — 0JH63XZ INSERTION OF TUNNELED VASCULAR ACCESS DEVICE INTO CHEST SUBCUTANEOUS TISSUE AND FASCIA, PERCUTANEOUS APPROACH: ICD-10-PCS | Performed by: RADIOLOGY

## 2024-01-01 PROCEDURE — 99232 SBSQ HOSP IP/OBS MODERATE 35: CPT | Performed by: NURSE PRACTITIONER

## 2024-01-01 PROCEDURE — 80053 COMPREHEN METABOLIC PANEL: CPT | Performed by: NURSE PRACTITIONER

## 2024-01-01 PROCEDURE — 93010 ELECTROCARDIOGRAM REPORT: CPT | Performed by: INTERNAL MEDICINE

## 2024-01-01 PROCEDURE — 85018 HEMOGLOBIN: CPT | Performed by: FAMILY MEDICINE

## 2024-01-01 PROCEDURE — 82728 ASSAY OF FERRITIN: CPT | Performed by: INTERNAL MEDICINE

## 2024-01-01 PROCEDURE — P9016 RBC LEUKOCYTES REDUCED: HCPCS

## 2024-01-01 PROCEDURE — 97116 GAIT TRAINING THERAPY: CPT

## 2024-01-01 PROCEDURE — C1769 GUIDE WIRE: HCPCS

## 2024-01-01 PROCEDURE — 30233N1 TRANSFUSION OF NONAUTOLOGOUS RED BLOOD CELLS INTO PERIPHERAL VEIN, PERCUTANEOUS APPROACH: ICD-10-PCS | Performed by: HOSPITALIST

## 2024-01-01 PROCEDURE — 77001 FLUOROGUIDE FOR VEIN DEVICE: CPT

## 2024-01-01 PROCEDURE — 84100 ASSAY OF PHOSPHORUS: CPT | Performed by: INTERNAL MEDICINE

## 2024-01-01 PROCEDURE — 83970 ASSAY OF PARATHORMONE: CPT | Performed by: NURSE PRACTITIONER

## 2024-01-01 PROCEDURE — 83735 ASSAY OF MAGNESIUM: CPT | Performed by: FAMILY MEDICINE

## 2024-01-01 PROCEDURE — 97167 OT EVAL HIGH COMPLEX 60 MIN: CPT

## 2024-01-01 PROCEDURE — 84100 ASSAY OF PHOSPHORUS: CPT | Performed by: NURSE PRACTITIONER

## 2024-01-01 PROCEDURE — 86706 HEP B SURFACE ANTIBODY: CPT | Performed by: STUDENT IN AN ORGANIZED HEALTH CARE EDUCATION/TRAINING PROGRAM

## 2024-01-01 PROCEDURE — 70551 MRI BRAIN STEM W/O DYE: CPT

## 2024-01-01 PROCEDURE — 93306 TTE W/DOPPLER COMPLETE: CPT | Performed by: INTERNAL MEDICINE

## 2024-01-01 PROCEDURE — 92526 ORAL FUNCTION THERAPY: CPT

## 2024-01-01 PROCEDURE — 83605 ASSAY OF LACTIC ACID: CPT | Performed by: FAMILY MEDICINE

## 2024-01-01 PROCEDURE — 97110 THERAPEUTIC EXERCISES: CPT

## 2024-01-01 PROCEDURE — 93306 TTE W/DOPPLER COMPLETE: CPT

## 2024-01-01 PROCEDURE — 83605 ASSAY OF LACTIC ACID: CPT | Performed by: NURSE PRACTITIONER

## 2024-01-01 PROCEDURE — 99497 ADVNCD CARE PLAN 30 MIN: CPT

## 2024-01-01 PROCEDURE — 84439 ASSAY OF FREE THYROXINE: CPT | Performed by: INTERNAL MEDICINE

## 2024-01-01 PROCEDURE — 82270 OCCULT BLOOD FECES: CPT | Performed by: INTERNAL MEDICINE

## 2024-01-01 PROCEDURE — 36558 INSERT TUNNELED CV CATH: CPT

## 2024-01-01 PROCEDURE — 99223 1ST HOSP IP/OBS HIGH 75: CPT | Performed by: NURSE PRACTITIONER

## 2024-01-01 PROCEDURE — C1750 CATH, HEMODIALYSIS,LONG-TERM: HCPCS

## 2024-01-01 PROCEDURE — 99255 IP/OBS CONSLTJ NEW/EST HI 80: CPT | Performed by: INTERNAL MEDICINE

## 2024-01-01 PROCEDURE — 97535 SELF CARE MNGMENT TRAINING: CPT

## 2024-01-01 PROCEDURE — 86803 HEPATITIS C AB TEST: CPT | Performed by: STUDENT IN AN ORGANIZED HEALTH CARE EDUCATION/TRAINING PROGRAM

## 2024-01-01 PROCEDURE — 84443 ASSAY THYROID STIM HORMONE: CPT | Performed by: INTERNAL MEDICINE

## 2024-01-01 RX ORDER — FLUDROCORTISONE ACETATE 0.1 MG/1
0.1 TABLET ORAL DAILY
Status: DISCONTINUED | OUTPATIENT
Start: 2024-01-01 | End: 2024-01-01

## 2024-01-01 RX ORDER — DIPHENHYDRAMINE HYDROCHLORIDE 50 MG/ML
25 INJECTION INTRAMUSCULAR; INTRAVENOUS ONCE
Status: COMPLETED | OUTPATIENT
Start: 2024-01-01 | End: 2024-01-01

## 2024-01-01 RX ORDER — LACTULOSE 10 G/15ML
10 SOLUTION ORAL DAILY
Status: DISCONTINUED | OUTPATIENT
Start: 2024-01-01 | End: 2024-01-01

## 2024-01-01 RX ORDER — CALCIUM CARBONATE 500(1250)
1 TABLET ORAL
Status: DISCONTINUED | OUTPATIENT
Start: 2024-01-01 | End: 2024-01-01

## 2024-01-01 RX ORDER — INSULIN GLARGINE 100 [IU]/ML
10 INJECTION, SOLUTION SUBCUTANEOUS EVERY 12 HOURS SCHEDULED
Status: DISCONTINUED | OUTPATIENT
Start: 2024-01-01 | End: 2024-01-01

## 2024-01-01 RX ORDER — LORAZEPAM 2 MG/ML
1 INJECTION INTRAMUSCULAR
Status: COMPLETED | OUTPATIENT
Start: 2024-01-01 | End: 2024-01-01

## 2024-01-01 RX ORDER — METHOCARBAMOL 500 MG/1
500 TABLET, FILM COATED ORAL ONCE
Status: COMPLETED | OUTPATIENT
Start: 2024-01-01 | End: 2024-01-01

## 2024-01-01 RX ORDER — MIDODRINE HYDROCHLORIDE 5 MG/1
2.5 TABLET ORAL ONCE
Status: COMPLETED | OUTPATIENT
Start: 2024-01-01 | End: 2024-01-01

## 2024-01-01 RX ORDER — MIDODRINE HYDROCHLORIDE 5 MG/1
15 TABLET ORAL
Status: DISCONTINUED | OUTPATIENT
Start: 2024-01-01 | End: 2024-01-01

## 2024-01-01 RX ORDER — ALBUMIN (HUMAN) 12.5 G/50ML
12.5 SOLUTION INTRAVENOUS ONCE
Status: COMPLETED | OUTPATIENT
Start: 2024-01-01 | End: 2024-01-01

## 2024-01-01 RX ORDER — HYDROXYZINE HYDROCHLORIDE 25 MG/1
25 TABLET, FILM COATED ORAL EVERY 6 HOURS PRN
Status: DISCONTINUED | OUTPATIENT
Start: 2024-01-01 | End: 2024-01-01 | Stop reason: HOSPADM

## 2024-01-01 RX ORDER — POTASSIUM CHLORIDE 20 MEQ/1
40 TABLET, EXTENDED RELEASE ORAL ONCE
Status: COMPLETED | OUTPATIENT
Start: 2024-01-01 | End: 2024-01-01

## 2024-01-01 RX ORDER — CIPROFLOXACIN 250 MG/1
250 TABLET, FILM COATED ORAL EVERY 24 HOURS
COMMUNITY

## 2024-01-01 RX ORDER — INSULIN GLARGINE 100 [IU]/ML
20 INJECTION, SOLUTION SUBCUTANEOUS EVERY 12 HOURS SCHEDULED
Status: DISCONTINUED | OUTPATIENT
Start: 2024-01-01 | End: 2024-01-01

## 2024-01-01 RX ORDER — MORPHINE SULFATE 100 MG/5ML
5 SOLUTION, CONCENTRATE ORAL
Status: DISCONTINUED | OUTPATIENT
Start: 2024-01-01 | End: 2024-01-01 | Stop reason: HOSPADM

## 2024-01-01 RX ORDER — ALBUMIN (HUMAN) 12.5 G/50ML
25 SOLUTION INTRAVENOUS EVERY 6 HOURS
Status: DISCONTINUED | OUTPATIENT
Start: 2024-01-01 | End: 2024-01-01

## 2024-01-01 RX ORDER — INSULIN GLARGINE 100 [IU]/ML
10 INJECTION, SOLUTION SUBCUTANEOUS
Status: DISCONTINUED | OUTPATIENT
Start: 2024-01-01 | End: 2024-01-01

## 2024-01-01 RX ORDER — NICOTINE 21 MG/24HR
1 PATCH, TRANSDERMAL 24 HOURS TRANSDERMAL DAILY
Status: DISCONTINUED | OUTPATIENT
Start: 2024-01-01 | End: 2024-01-01

## 2024-01-01 RX ORDER — LORAZEPAM 2 MG/ML
1 INJECTION INTRAMUSCULAR
Status: DISCONTINUED | OUTPATIENT
Start: 2024-01-01 | End: 2024-01-01

## 2024-01-01 RX ORDER — GLYCOPYRROLATE 0.2 MG/ML
0.1 INJECTION INTRAMUSCULAR; INTRAVENOUS EVERY 4 HOURS PRN
Status: DISCONTINUED | OUTPATIENT
Start: 2024-01-01 | End: 2024-01-01 | Stop reason: HOSPADM

## 2024-01-01 RX ORDER — PANTOPRAZOLE SODIUM 40 MG/1
40 TABLET, DELAYED RELEASE ORAL 2 TIMES DAILY
Status: DISCONTINUED | OUTPATIENT
Start: 2024-01-01 | End: 2024-01-01 | Stop reason: HOSPADM

## 2024-01-01 RX ORDER — LANOLIN ALCOHOL/MO/W.PET/CERES
3 CREAM (GRAM) TOPICAL
Status: DISCONTINUED | OUTPATIENT
Start: 2024-01-01 | End: 2024-01-01 | Stop reason: HOSPADM

## 2024-01-01 RX ORDER — HYDROCORTISONE SODIUM SUCCINATE 100 MG/2ML
25 INJECTION, POWDER, FOR SOLUTION INTRAMUSCULAR; INTRAVENOUS DAILY
COMMUNITY

## 2024-01-01 RX ORDER — OLANZAPINE 15 MG/1
15 TABLET ORAL
COMMUNITY

## 2024-01-01 RX ORDER — POTASSIUM CHLORIDE 20 MEQ/1
40 TABLET, EXTENDED RELEASE ORAL 2 TIMES DAILY
Status: DISCONTINUED | OUTPATIENT
Start: 2024-01-01 | End: 2024-01-01

## 2024-01-01 RX ORDER — FLUDROCORTISONE ACETATE 0.1 MG/1
0.2 TABLET ORAL DAILY
Status: DISCONTINUED | OUTPATIENT
Start: 2024-01-01 | End: 2024-01-01

## 2024-01-01 RX ORDER — ALBUMIN, HUMAN INJ 5% 5 %
12.5 SOLUTION INTRAVENOUS ONCE
Status: COMPLETED | OUTPATIENT
Start: 2024-01-01 | End: 2024-01-01

## 2024-01-01 RX ORDER — LANOLIN ALCOHOL/MO/W.PET/CERES
6 CREAM (GRAM) TOPICAL
Status: DISCONTINUED | OUTPATIENT
Start: 2024-01-01 | End: 2024-01-01

## 2024-01-01 RX ORDER — MIDODRINE HYDROCHLORIDE 5 MG/1
5 TABLET ORAL
Status: DISCONTINUED | OUTPATIENT
Start: 2024-01-01 | End: 2024-01-01

## 2024-01-01 RX ORDER — MIDODRINE HYDROCHLORIDE 5 MG/1
17.5 TABLET ORAL
Status: DISCONTINUED | OUTPATIENT
Start: 2024-01-01 | End: 2024-01-01

## 2024-01-01 RX ORDER — POTASSIUM CHLORIDE 14.9 MG/ML
20 INJECTION INTRAVENOUS ONCE
Status: COMPLETED | OUTPATIENT
Start: 2024-01-01 | End: 2024-01-01

## 2024-01-01 RX ORDER — INSULIN LISPRO 100 [IU]/ML
1-6 INJECTION, SOLUTION INTRAVENOUS; SUBCUTANEOUS
Status: DISCONTINUED | OUTPATIENT
Start: 2024-01-01 | End: 2024-01-01

## 2024-01-01 RX ORDER — LANOLIN ALCOHOL/MO/W.PET/CERES
9 CREAM (GRAM) TOPICAL
Status: DISCONTINUED | OUTPATIENT
Start: 2024-01-01 | End: 2024-01-01

## 2024-01-01 RX ORDER — INSULIN GLARGINE 100 [IU]/ML
20 INJECTION, SOLUTION SUBCUTANEOUS EVERY 12 HOURS SCHEDULED
COMMUNITY

## 2024-01-01 RX ORDER — PANTOPRAZOLE SODIUM 40 MG/10ML
40 INJECTION, POWDER, LYOPHILIZED, FOR SOLUTION INTRAVENOUS EVERY 12 HOURS SCHEDULED
Status: DISCONTINUED | OUTPATIENT
Start: 2024-01-01 | End: 2024-01-01

## 2024-01-01 RX ORDER — HALOPERIDOL 2 MG/ML
2 SOLUTION ORAL EVERY 6 HOURS PRN
Status: DISCONTINUED | OUTPATIENT
Start: 2024-01-01 | End: 2024-01-01 | Stop reason: HOSPADM

## 2024-01-01 RX ORDER — LIDOCAINE 50 MG/G
1 PATCH TOPICAL EVERY 24 HOURS
Status: DISCONTINUED | OUTPATIENT
Start: 2024-01-01 | End: 2024-01-01 | Stop reason: HOSPADM

## 2024-01-01 RX ORDER — OLANZAPINE 2.5 MG/1
7.5 TABLET, FILM COATED ORAL
Status: DISCONTINUED | OUTPATIENT
Start: 2024-01-01 | End: 2024-01-01

## 2024-01-01 RX ORDER — LACTULOSE 20 G/30ML
20 SOLUTION ORAL 2 TIMES DAILY
COMMUNITY

## 2024-01-01 RX ORDER — MAGNESIUM SULFATE 1 G/100ML
1 INJECTION INTRAVENOUS ONCE
Status: DISCONTINUED | OUTPATIENT
Start: 2024-01-01 | End: 2024-01-01

## 2024-01-01 RX ORDER — OXYCODONE HYDROCHLORIDE 5 MG/1
2.5 TABLET ORAL ONCE
Status: COMPLETED | OUTPATIENT
Start: 2024-01-01 | End: 2024-01-01

## 2024-01-01 RX ORDER — CALCIUM ACETATE 667 MG/1
1334 CAPSULE ORAL
Status: DISCONTINUED | OUTPATIENT
Start: 2024-01-01 | End: 2024-01-01

## 2024-01-01 RX ORDER — ALBUMIN (HUMAN) 12.5 G/50ML
25 SOLUTION INTRAVENOUS ONCE
Status: COMPLETED | OUTPATIENT
Start: 2024-01-01 | End: 2024-01-01

## 2024-01-01 RX ORDER — LACTULOSE 10 G/15ML
10 SOLUTION ORAL 2 TIMES DAILY
Status: DISCONTINUED | OUTPATIENT
Start: 2024-01-01 | End: 2024-01-01

## 2024-01-01 RX ORDER — LANOLIN ALCOHOL/MO/W.PET/CERES
100 CREAM (GRAM) TOPICAL DAILY
Status: DISCONTINUED | OUTPATIENT
Start: 2024-01-01 | End: 2024-01-01

## 2024-01-01 RX ORDER — CIPROFLOXACIN 250 MG/1
250 TABLET, FILM COATED ORAL EVERY 24 HOURS
Status: DISCONTINUED | OUTPATIENT
Start: 2024-01-01 | End: 2024-01-01

## 2024-01-01 RX ORDER — OLANZAPINE 2.5 MG/1
10 TABLET, FILM COATED ORAL
Status: DISCONTINUED | OUTPATIENT
Start: 2024-01-01 | End: 2024-01-01

## 2024-01-01 RX ORDER — ESCITALOPRAM OXALATE 5 MG/1
5 TABLET ORAL DAILY
Status: DISCONTINUED | OUTPATIENT
Start: 2024-01-01 | End: 2024-01-01 | Stop reason: HOSPADM

## 2024-01-01 RX ORDER — ONDANSETRON 2 MG/ML
4 INJECTION INTRAMUSCULAR; INTRAVENOUS EVERY 8 HOURS PRN
Status: DISCONTINUED | OUTPATIENT
Start: 2024-01-01 | End: 2024-01-01 | Stop reason: HOSPADM

## 2024-01-01 RX ORDER — POTASSIUM CHLORIDE 14.9 MG/ML
20 INJECTION INTRAVENOUS
Status: COMPLETED | OUTPATIENT
Start: 2024-01-01 | End: 2024-01-01

## 2024-01-01 RX ORDER — DIPHENHYDRAMINE HYDROCHLORIDE AND LIDOCAINE HYDROCHLORIDE AND ALUMINUM HYDROXIDE AND MAGNESIUM HYDRO
10 KIT EVERY 6 HOURS PRN
Status: DISCONTINUED | OUTPATIENT
Start: 2024-01-01 | End: 2024-01-01 | Stop reason: HOSPADM

## 2024-01-01 RX ORDER — GUAIFENESIN/DEXTROMETHORPHAN 100-10MG/5
10 SYRUP ORAL EVERY 4 HOURS PRN
Status: DISCONTINUED | OUTPATIENT
Start: 2024-01-01 | End: 2024-01-01

## 2024-01-01 RX ORDER — ENOXAPARIN SODIUM 100 MG/ML
40 INJECTION SUBCUTANEOUS DAILY
Status: DISCONTINUED | OUTPATIENT
Start: 2024-01-01 | End: 2024-01-01

## 2024-01-01 RX ORDER — MIDODRINE HYDROCHLORIDE 5 MG/1
20 TABLET ORAL
Status: DISCONTINUED | OUTPATIENT
Start: 2024-01-01 | End: 2024-01-01

## 2024-01-01 RX ORDER — POTASSIUM CHLORIDE 14.9 MG/ML
20 INJECTION INTRAVENOUS
Qty: 200 ML | Refills: 0 | Status: COMPLETED | OUTPATIENT
Start: 2024-01-01 | End: 2024-01-01

## 2024-01-01 RX ORDER — ACETAMINOPHEN 325 MG/1
650 TABLET ORAL EVERY 6 HOURS PRN
Status: DISCONTINUED | OUTPATIENT
Start: 2024-01-01 | End: 2024-01-01

## 2024-01-01 RX ORDER — LANOLIN ALCOHOL/MO/W.PET/CERES
100 CREAM (GRAM) TOPICAL DAILY
COMMUNITY

## 2024-01-01 RX ORDER — ALBUMIN (HUMAN) 12.5 G/50ML
25 SOLUTION INTRAVENOUS EVERY 6 HOURS
Status: COMPLETED | OUTPATIENT
Start: 2024-01-01 | End: 2024-01-01

## 2024-01-01 RX ORDER — PANTOPRAZOLE SODIUM 40 MG/1
40 TABLET, DELAYED RELEASE ORAL
Status: DISCONTINUED | OUTPATIENT
Start: 2024-01-01 | End: 2024-01-01

## 2024-01-01 RX ORDER — MIDODRINE HYDROCHLORIDE 5 MG/1
5 TABLET ORAL
COMMUNITY

## 2024-01-01 RX ORDER — LACTULOSE 10 G/15ML
20 SOLUTION ORAL 2 TIMES DAILY
Status: DISCONTINUED | OUTPATIENT
Start: 2024-01-01 | End: 2024-01-01

## 2024-01-01 RX ORDER — LIDOCAINE WITH 8.4% SOD BICARB 0.9%(10ML)
SYRINGE (ML) INJECTION AS NEEDED
Status: COMPLETED | OUTPATIENT
Start: 2024-01-01 | End: 2024-01-01

## 2024-01-01 RX ORDER — SODIUM BICARBONATE 650 MG/1
1300 TABLET ORAL
Status: DISCONTINUED | OUTPATIENT
Start: 2024-01-01 | End: 2024-01-01

## 2024-01-01 RX ORDER — MIDODRINE HYDROCHLORIDE 5 MG/1
10 TABLET ORAL
Status: DISCONTINUED | OUTPATIENT
Start: 2024-01-01 | End: 2024-01-01

## 2024-01-01 RX ORDER — CALCITRIOL 0.25 UG/1
0.25 CAPSULE, LIQUID FILLED ORAL DAILY
Status: DISCONTINUED | OUTPATIENT
Start: 2024-01-01 | End: 2024-01-01

## 2024-01-01 RX ORDER — LACTULOSE 10 G/15ML
20 SOLUTION ORAL DAILY
Status: DISCONTINUED | OUTPATIENT
Start: 2024-01-01 | End: 2024-01-01

## 2024-01-01 RX ORDER — CALCIUM ACETATE 667 MG/1
667 CAPSULE ORAL
Status: DISCONTINUED | OUTPATIENT
Start: 2024-01-01 | End: 2024-01-01

## 2024-01-01 RX ORDER — CHLORHEXIDINE GLUCONATE 4 %
12 LIQUID (ML) TOPICAL
COMMUNITY

## 2024-01-01 RX ORDER — FENTANYL CITRATE 50 UG/ML
INJECTION, SOLUTION INTRAMUSCULAR; INTRAVENOUS AS NEEDED
Status: COMPLETED | OUTPATIENT
Start: 2024-01-01 | End: 2024-01-01

## 2024-01-01 RX ORDER — OLANZAPINE 2.5 MG/1
5 TABLET, FILM COATED ORAL
Status: DISCONTINUED | OUTPATIENT
Start: 2024-01-01 | End: 2024-01-01

## 2024-01-01 RX ADMIN — INSULIN GLARGINE 20 UNITS: 100 INJECTION, SOLUTION SUBCUTANEOUS at 21:20

## 2024-01-01 RX ADMIN — NICOTINE 1 PATCH: 14 PATCH, EXTENDED RELEASE TRANSDERMAL at 12:56

## 2024-01-01 RX ADMIN — PANTOPRAZOLE SODIUM 40 MG: 40 TABLET, DELAYED RELEASE ORAL at 05:54

## 2024-01-01 RX ADMIN — MIDODRINE HYDROCHLORIDE 15 MG: 5 TABLET ORAL at 11:55

## 2024-01-01 RX ADMIN — CALCITRIOL 0.25 MCG: 0.25 CAPSULE, LIQUID FILLED ORAL at 08:32

## 2024-01-01 RX ADMIN — HYDROXYZINE HYDROCHLORIDE 25 MG: 25 TABLET ORAL at 21:00

## 2024-01-01 RX ADMIN — Medication 3 MG: at 20:45

## 2024-01-01 RX ADMIN — SODIUM BICARBONATE 650 MG TABLET 1300 MG: at 08:05

## 2024-01-01 RX ADMIN — ALBUMIN (HUMAN) 25 G: 0.25 INJECTION, SOLUTION INTRAVENOUS at 08:48

## 2024-01-01 RX ADMIN — MORPHINE SULFATE 5 MG: 100 SOLUTION ORAL at 03:42

## 2024-01-01 RX ADMIN — POTASSIUM CHLORIDE 40 MEQ: 1500 TABLET, EXTENDED RELEASE ORAL at 17:39

## 2024-01-01 RX ADMIN — CALCIUM ACETATE 1334 MG: 667 CAPSULE ORAL at 11:35

## 2024-01-01 RX ADMIN — LIDOCAINE 5% 1 PATCH: 700 PATCH TOPICAL at 12:00

## 2024-01-01 RX ADMIN — FLUDROCORTISONE ACETATE 0.2 MG: 0.1 TABLET ORAL at 08:31

## 2024-01-01 RX ADMIN — HYDROCORTISONE SODIUM SUCCINATE 25 MG: 100 INJECTION, POWDER, FOR SOLUTION INTRAMUSCULAR; INTRAVENOUS at 12:55

## 2024-01-01 RX ADMIN — RIFAXIMIN 550 MG: 550 TABLET ORAL at 08:41

## 2024-01-01 RX ADMIN — RIFAXIMIN 550 MG: 550 TABLET ORAL at 08:22

## 2024-01-01 RX ADMIN — LACTULOSE 20 G: 20 SOLUTION ORAL at 07:58

## 2024-01-01 RX ADMIN — PANTOPRAZOLE SODIUM 40 MG: 40 TABLET, DELAYED RELEASE ORAL at 05:32

## 2024-01-01 RX ADMIN — PANTOPRAZOLE SODIUM 40 MG: 40 TABLET, DELAYED RELEASE ORAL at 06:17

## 2024-01-01 RX ADMIN — PANTOPRAZOLE SODIUM 40 MG: 40 TABLET, DELAYED RELEASE ORAL at 05:53

## 2024-01-01 RX ADMIN — SODIUM BICARBONATE 650 MG TABLET 1300 MG: at 17:43

## 2024-01-01 RX ADMIN — Medication 10 ML: at 13:06

## 2024-01-01 RX ADMIN — INSULIN LISPRO 1 UNITS: 100 INJECTION, SOLUTION INTRAVENOUS; SUBCUTANEOUS at 11:53

## 2024-01-01 RX ADMIN — THIAMINE HCL TAB 100 MG 100 MG: 100 TAB at 12:54

## 2024-01-01 RX ADMIN — PANTOPRAZOLE SODIUM 40 MG: 40 TABLET, DELAYED RELEASE ORAL at 08:37

## 2024-01-01 RX ADMIN — PANTOPRAZOLE SODIUM 40 MG: 40 INJECTION, POWDER, FOR SOLUTION INTRAVENOUS at 09:34

## 2024-01-01 RX ADMIN — RIFAXIMIN 550 MG: 550 TABLET ORAL at 09:34

## 2024-01-01 RX ADMIN — THIAMINE HCL TAB 100 MG 100 MG: 100 TAB at 08:09

## 2024-01-01 RX ADMIN — POTASSIUM CHLORIDE 40 MEQ: 1500 TABLET, EXTENDED RELEASE ORAL at 16:16

## 2024-01-01 RX ADMIN — POTASSIUM CHLORIDE 40 MEQ: 1500 TABLET, EXTENDED RELEASE ORAL at 17:15

## 2024-01-01 RX ADMIN — MIDODRINE HYDROCHLORIDE 17.5 MG: 5 TABLET ORAL at 11:15

## 2024-01-01 RX ADMIN — POTASSIUM CHLORIDE 40 MEQ: 1500 TABLET, EXTENDED RELEASE ORAL at 08:33

## 2024-01-01 RX ADMIN — MIDODRINE HYDROCHLORIDE 15 MG: 5 TABLET ORAL at 16:09

## 2024-01-01 RX ADMIN — RIFAXIMIN 550 MG: 550 TABLET ORAL at 20:43

## 2024-01-01 RX ADMIN — ALBUMIN (HUMAN) 25 G: 0.25 INJECTION, SOLUTION INTRAVENOUS at 21:20

## 2024-01-01 RX ADMIN — OLANZAPINE 15 MG: 10 TABLET, FILM COATED ORAL at 21:31

## 2024-01-01 RX ADMIN — MORPHINE SULFATE 5 MG: 100 SOLUTION ORAL at 19:54

## 2024-01-01 RX ADMIN — MIDODRINE HYDROCHLORIDE 20 MG: 5 TABLET ORAL at 16:58

## 2024-01-01 RX ADMIN — RIFAXIMIN 550 MG: 550 TABLET ORAL at 08:06

## 2024-01-01 RX ADMIN — FLUDROCORTISONE ACETATE 0.2 MG: 0.1 TABLET ORAL at 08:04

## 2024-01-01 RX ADMIN — POTASSIUM CHLORIDE 40 MEQ: 1500 TABLET, EXTENDED RELEASE ORAL at 08:41

## 2024-01-01 RX ADMIN — MIDODRINE HYDROCHLORIDE 17.5 MG: 5 TABLET ORAL at 16:19

## 2024-01-01 RX ADMIN — ESCITALOPRAM OXALATE 5 MG: 5 TABLET, FILM COATED ORAL at 08:37

## 2024-01-01 RX ADMIN — HYDROXYZINE HYDROCHLORIDE 25 MG: 25 TABLET ORAL at 00:23

## 2024-01-01 RX ADMIN — MORPHINE SULFATE 5 MG: 100 SOLUTION ORAL at 19:37

## 2024-01-01 RX ADMIN — PANTOPRAZOLE SODIUM 40 MG: 40 TABLET, DELAYED RELEASE ORAL at 21:11

## 2024-01-01 RX ADMIN — FLUDROCORTISONE ACETATE 0.2 MG: 0.1 TABLET ORAL at 08:06

## 2024-01-01 RX ADMIN — PANTOPRAZOLE SODIUM 40 MG: 40 TABLET, DELAYED RELEASE ORAL at 05:44

## 2024-01-01 RX ADMIN — FLUDROCORTISONE ACETATE 0.2 MG: 0.1 TABLET ORAL at 08:55

## 2024-01-01 RX ADMIN — POTASSIUM CHLORIDE 40 MEQ: 1500 TABLET, EXTENDED RELEASE ORAL at 17:13

## 2024-01-01 RX ADMIN — RIFAXIMIN 550 MG: 550 TABLET ORAL at 08:33

## 2024-01-01 RX ADMIN — LACTULOSE 20 G: 20 SOLUTION ORAL at 08:45

## 2024-01-01 RX ADMIN — Medication 3 MG: at 21:12

## 2024-01-01 RX ADMIN — RIFAXIMIN 550 MG: 550 TABLET ORAL at 08:39

## 2024-01-01 RX ADMIN — MIDODRINE HYDROCHLORIDE 17.5 MG: 5 TABLET ORAL at 15:37

## 2024-01-01 RX ADMIN — HYDROXYZINE HYDROCHLORIDE 25 MG: 25 TABLET ORAL at 20:46

## 2024-01-01 RX ADMIN — NICOTINE 7 MG: 7 PATCH, EXTENDED RELEASE TRANSDERMAL at 08:03

## 2024-01-01 RX ADMIN — RIFAXIMIN 550 MG: 550 TABLET ORAL at 08:55

## 2024-01-01 RX ADMIN — CALCIUM ACETATE 667 MG: 667 CAPSULE ORAL at 16:16

## 2024-01-01 RX ADMIN — INSULIN GLARGINE 20 UNITS: 100 INJECTION, SOLUTION SUBCUTANEOUS at 21:30

## 2024-01-01 RX ADMIN — DIPHENHYDRAMINE HYDROCHLORIDE AND LIDOCAINE HYDROCHLORIDE AND ALUMINUM HYDROXIDE AND MAGNESIUM HYDRO 10 ML: KIT at 10:05

## 2024-01-01 RX ADMIN — MIDODRINE HYDROCHLORIDE 17.5 MG: 5 TABLET ORAL at 16:59

## 2024-01-01 RX ADMIN — LACTULOSE 20 G: 20 SOLUTION ORAL at 10:12

## 2024-01-01 RX ADMIN — INSULIN LISPRO 1 UNITS: 100 INJECTION, SOLUTION INTRAVENOUS; SUBCUTANEOUS at 11:19

## 2024-01-01 RX ADMIN — MIDODRINE HYDROCHLORIDE 20 MG: 5 TABLET ORAL at 12:14

## 2024-01-01 RX ADMIN — OLANZAPINE 15 MG: 10 TABLET, FILM COATED ORAL at 02:45

## 2024-01-01 RX ADMIN — PANTOPRAZOLE SODIUM 40 MG: 40 INJECTION, POWDER, FOR SOLUTION INTRAVENOUS at 20:43

## 2024-01-01 RX ADMIN — CALCIUM ACETATE 667 MG: 667 CAPSULE ORAL at 11:55

## 2024-01-01 RX ADMIN — NICOTINE 7 MG: 7 PATCH, EXTENDED RELEASE TRANSDERMAL at 08:58

## 2024-01-01 RX ADMIN — LACTULOSE 10 G: 10 SOLUTION ORAL at 08:41

## 2024-01-01 RX ADMIN — INSULIN GLARGINE 20 UNITS: 100 INJECTION, SOLUTION SUBCUTANEOUS at 08:28

## 2024-01-01 RX ADMIN — LACTULOSE 10 G: 10 SOLUTION ORAL at 08:22

## 2024-01-01 RX ADMIN — NICOTINE 7 MG: 7 PATCH, EXTENDED RELEASE TRANSDERMAL at 09:34

## 2024-01-01 RX ADMIN — CALCIUM ACETATE 1334 MG: 667 CAPSULE ORAL at 17:38

## 2024-01-01 RX ADMIN — PANTOPRAZOLE SODIUM 40 MG: 40 TABLET, DELAYED RELEASE ORAL at 06:05

## 2024-01-01 RX ADMIN — NICOTINE 1 PATCH: 14 PATCH, EXTENDED RELEASE TRANSDERMAL at 08:46

## 2024-01-01 RX ADMIN — PANTOPRAZOLE SODIUM 40 MG: 40 INJECTION, POWDER, FOR SOLUTION INTRAVENOUS at 20:55

## 2024-01-01 RX ADMIN — DIPHENHYDRAMINE HYDROCHLORIDE AND LIDOCAINE HYDROCHLORIDE AND ALUMINUM HYDROXIDE AND MAGNESIUM HYDRO 10 ML: KIT at 08:00

## 2024-01-01 RX ADMIN — GUAIFENESIN AND DEXTROMETHORPHAN 10 ML: 100; 10 SYRUP ORAL at 22:32

## 2024-01-01 RX ADMIN — MIDODRINE HYDROCHLORIDE 10 MG: 5 TABLET ORAL at 11:18

## 2024-01-01 RX ADMIN — DIPHENHYDRAMINE HYDROCHLORIDE AND LIDOCAINE HYDROCHLORIDE AND ALUMINUM HYDROXIDE AND MAGNESIUM HYDRO 10 ML: KIT at 17:42

## 2024-01-01 RX ADMIN — FLUDROCORTISONE ACETATE 0.2 MG: 0.1 TABLET ORAL at 08:38

## 2024-01-01 RX ADMIN — MIDODRINE HYDROCHLORIDE 17.5 MG: 5 TABLET ORAL at 05:46

## 2024-01-01 RX ADMIN — POTASSIUM CHLORIDE 40 MEQ: 1500 TABLET, EXTENDED RELEASE ORAL at 08:38

## 2024-01-01 RX ADMIN — PANTOPRAZOLE SODIUM 40 MG: 40 TABLET, DELAYED RELEASE ORAL at 06:18

## 2024-01-01 RX ADMIN — PANTOPRAZOLE SODIUM 40 MG: 40 TABLET, DELAYED RELEASE ORAL at 08:01

## 2024-01-01 RX ADMIN — LACTULOSE 10 G: 10 SOLUTION ORAL at 08:33

## 2024-01-01 RX ADMIN — LIDOCAINE 5% 1 PATCH: 700 PATCH TOPICAL at 12:14

## 2024-01-01 RX ADMIN — POTASSIUM CHLORIDE 40 MEQ: 1500 TABLET, EXTENDED RELEASE ORAL at 19:58

## 2024-01-01 RX ADMIN — CALCIUM ACETATE 667 MG: 667 CAPSULE ORAL at 16:08

## 2024-01-01 RX ADMIN — INSULIN LISPRO 2 UNITS: 100 INJECTION, SOLUTION INTRAVENOUS; SUBCUTANEOUS at 11:55

## 2024-01-01 RX ADMIN — CALCIUM 1 TABLET: 500 TABLET ORAL at 09:14

## 2024-01-01 RX ADMIN — CIPROFLOXACIN HYDROCHLORIDE 250 MG: 250 TABLET, FILM COATED ORAL at 08:12

## 2024-01-01 RX ADMIN — DIPHENHYDRAMINE HYDROCHLORIDE AND LIDOCAINE HYDROCHLORIDE AND ALUMINUM HYDROXIDE AND MAGNESIUM HYDRO 10 ML: KIT at 08:31

## 2024-01-01 RX ADMIN — MIDODRINE HYDROCHLORIDE 17.5 MG: 5 TABLET ORAL at 11:52

## 2024-01-01 RX ADMIN — PANTOPRAZOLE SODIUM 40 MG: 40 TABLET, DELAYED RELEASE ORAL at 21:30

## 2024-01-01 RX ADMIN — LIDOCAINE 5% 1 PATCH: 700 PATCH TOPICAL at 13:07

## 2024-01-01 RX ADMIN — NICOTINE 1 PATCH: 14 PATCH, EXTENDED RELEASE TRANSDERMAL at 08:29

## 2024-01-01 RX ADMIN — PANTOPRAZOLE SODIUM 40 MG: 40 TABLET, DELAYED RELEASE ORAL at 06:13

## 2024-01-01 RX ADMIN — OLANZAPINE 10 MG: 2.5 TABLET, FILM COATED ORAL at 21:00

## 2024-01-01 RX ADMIN — CALCIUM 1 TABLET: 500 TABLET ORAL at 08:31

## 2024-01-01 RX ADMIN — CALCIUM ACETATE 1334 MG: 667 CAPSULE ORAL at 08:39

## 2024-01-01 RX ADMIN — MIDODRINE HYDROCHLORIDE 17.5 MG: 5 TABLET ORAL at 06:07

## 2024-01-01 RX ADMIN — LACTULOSE 20 G: 20 SOLUTION ORAL at 17:08

## 2024-01-01 RX ADMIN — MORPHINE SULFATE 5 MG: 100 SOLUTION ORAL at 14:51

## 2024-01-01 RX ADMIN — CALCIUM 1 TABLET: 500 TABLET ORAL at 07:53

## 2024-01-01 RX ADMIN — POTASSIUM CHLORIDE 40 MEQ: 1500 TABLET, EXTENDED RELEASE ORAL at 08:39

## 2024-01-01 RX ADMIN — LACTULOSE 20 G: 20 SOLUTION ORAL at 17:27

## 2024-01-01 RX ADMIN — MIDODRINE HYDROCHLORIDE 17.5 MG: 5 TABLET ORAL at 17:38

## 2024-01-01 RX ADMIN — MIDODRINE HYDROCHLORIDE 10 MG: 5 TABLET ORAL at 05:02

## 2024-01-01 RX ADMIN — MIDODRINE HYDROCHLORIDE 17.5 MG: 5 TABLET ORAL at 11:40

## 2024-01-01 RX ADMIN — THIAMINE HCL TAB 100 MG 100 MG: 100 TAB at 09:44

## 2024-01-01 RX ADMIN — ESCITALOPRAM OXALATE 5 MG: 5 TABLET, FILM COATED ORAL at 08:38

## 2024-01-01 RX ADMIN — CALCIUM 1 TABLET: 500 TABLET ORAL at 08:45

## 2024-01-01 RX ADMIN — Medication 3 MG: at 21:33

## 2024-01-01 RX ADMIN — HYDROXYZINE HYDROCHLORIDE 25 MG: 25 TABLET ORAL at 03:08

## 2024-01-01 RX ADMIN — LACTULOSE 20 G: 20 SOLUTION ORAL at 22:00

## 2024-01-01 RX ADMIN — CALCIUM ACETATE 667 MG: 667 CAPSULE ORAL at 17:04

## 2024-01-01 RX ADMIN — MIDODRINE HYDROCHLORIDE 17.5 MG: 5 TABLET ORAL at 11:36

## 2024-01-01 RX ADMIN — CALCIUM ACETATE 1334 MG: 667 CAPSULE ORAL at 11:42

## 2024-01-01 RX ADMIN — FLUDROCORTISONE ACETATE 0.2 MG: 0.1 TABLET ORAL at 08:41

## 2024-01-01 RX ADMIN — Medication 9 MG: at 21:00

## 2024-01-01 RX ADMIN — CALCIUM ACETATE 667 MG: 667 CAPSULE ORAL at 10:11

## 2024-01-01 RX ADMIN — ESCITALOPRAM OXALATE 5 MG: 5 TABLET, FILM COATED ORAL at 08:19

## 2024-01-01 RX ADMIN — POTASSIUM CHLORIDE 40 MEQ: 1500 TABLET, EXTENDED RELEASE ORAL at 11:57

## 2024-01-01 RX ADMIN — MIDODRINE HYDROCHLORIDE 17.5 MG: 5 TABLET ORAL at 17:00

## 2024-01-01 RX ADMIN — POTASSIUM CHLORIDE 40 MEQ: 1500 TABLET, EXTENDED RELEASE ORAL at 08:56

## 2024-01-01 RX ADMIN — DIPHENHYDRAMINE HYDROCHLORIDE AND LIDOCAINE HYDROCHLORIDE AND ALUMINUM HYDROXIDE AND MAGNESIUM HYDRO 10 ML: KIT at 23:47

## 2024-01-01 RX ADMIN — GUAIFENESIN AND DEXTROMETHORPHAN 10 ML: 100; 10 SYRUP ORAL at 21:45

## 2024-01-01 RX ADMIN — ALBUMIN (HUMAN) 12.5 G: 0.25 INJECTION, SOLUTION INTRAVENOUS at 12:25

## 2024-01-01 RX ADMIN — MIDODRINE HYDROCHLORIDE 17.5 MG: 5 TABLET ORAL at 06:43

## 2024-01-01 RX ADMIN — PANTOPRAZOLE SODIUM 40 MG: 40 TABLET, DELAYED RELEASE ORAL at 05:11

## 2024-01-01 RX ADMIN — DIPHENHYDRAMINE HYDROCHLORIDE AND LIDOCAINE HYDROCHLORIDE AND ALUMINUM HYDROXIDE AND MAGNESIUM HYDRO 10 ML: KIT at 01:28

## 2024-01-01 RX ADMIN — CALCIUM ACETATE 1334 MG: 667 CAPSULE ORAL at 11:55

## 2024-01-01 RX ADMIN — POTASSIUM CHLORIDE 20 MEQ: 14.9 INJECTION, SOLUTION INTRAVENOUS at 09:30

## 2024-01-01 RX ADMIN — THIAMINE HCL TAB 100 MG 100 MG: 100 TAB at 08:35

## 2024-01-01 RX ADMIN — GUAIFENESIN AND DEXTROMETHORPHAN 10 ML: 100; 10 SYRUP ORAL at 22:00

## 2024-01-01 RX ADMIN — Medication 3 MG: at 21:11

## 2024-01-01 RX ADMIN — LACTULOSE 10 G: 10 SOLUTION ORAL at 09:39

## 2024-01-01 RX ADMIN — ALBUMIN (HUMAN) 12.5 G: 12.5 INJECTION, SOLUTION INTRAVENOUS at 06:43

## 2024-01-01 RX ADMIN — CIPROFLOXACIN HYDROCHLORIDE 250 MG: 250 TABLET, FILM COATED ORAL at 09:14

## 2024-01-01 RX ADMIN — CALCIUM 1 TABLET: 500 TABLET ORAL at 08:22

## 2024-01-01 RX ADMIN — MIDODRINE HYDROCHLORIDE 17.5 MG: 5 TABLET ORAL at 17:11

## 2024-01-01 RX ADMIN — MIDODRINE HYDROCHLORIDE 5 MG: 5 TABLET ORAL at 06:13

## 2024-01-01 RX ADMIN — RIFAXIMIN 550 MG: 550 TABLET ORAL at 08:38

## 2024-01-01 RX ADMIN — MORPHINE SULFATE 5 MG: 100 SOLUTION ORAL at 23:08

## 2024-01-01 RX ADMIN — MIDODRINE HYDROCHLORIDE 10 MG: 5 TABLET ORAL at 11:53

## 2024-01-01 RX ADMIN — RIFAXIMIN 550 MG: 550 TABLET ORAL at 21:08

## 2024-01-01 RX ADMIN — Medication 3 MG: at 22:12

## 2024-01-01 RX ADMIN — Medication 3 MG: at 22:29

## 2024-01-01 RX ADMIN — PANTOPRAZOLE SODIUM 40 MG: 40 TABLET, DELAYED RELEASE ORAL at 08:07

## 2024-01-01 RX ADMIN — POTASSIUM CHLORIDE 40 MEQ: 1500 TABLET, EXTENDED RELEASE ORAL at 09:14

## 2024-01-01 RX ADMIN — MIDODRINE HYDROCHLORIDE 5 MG: 5 TABLET ORAL at 06:18

## 2024-01-01 RX ADMIN — OLANZAPINE 15 MG: 10 TABLET, FILM COATED ORAL at 22:46

## 2024-01-01 RX ADMIN — MIDODRINE HYDROCHLORIDE 17.5 MG: 5 TABLET ORAL at 06:37

## 2024-01-01 RX ADMIN — LORAZEPAM 1 MG: 2 INJECTION INTRAMUSCULAR; INTRAVENOUS at 06:05

## 2024-01-01 RX ADMIN — MIDODRINE HYDROCHLORIDE 10 MG: 5 TABLET ORAL at 16:16

## 2024-01-01 RX ADMIN — CALCIUM ACETATE 1334 MG: 667 CAPSULE ORAL at 17:02

## 2024-01-01 RX ADMIN — FLUDROCORTISONE ACETATE 0.2 MG: 0.1 TABLET ORAL at 08:22

## 2024-01-01 RX ADMIN — DIPHENHYDRAMINE HYDROCHLORIDE AND LIDOCAINE HYDROCHLORIDE AND ALUMINUM HYDROXIDE AND MAGNESIUM HYDRO 10 ML: KIT at 17:33

## 2024-01-01 RX ADMIN — MIDODRINE HYDROCHLORIDE 10 MG: 5 TABLET ORAL at 16:08

## 2024-01-01 RX ADMIN — PANTOPRAZOLE SODIUM 40 MG: 40 TABLET, DELAYED RELEASE ORAL at 05:59

## 2024-01-01 RX ADMIN — CIPROFLOXACIN HYDROCHLORIDE 250 MG: 250 TABLET, FILM COATED ORAL at 10:11

## 2024-01-01 RX ADMIN — FLUDROCORTISONE ACETATE 0.1 MG: 0.1 TABLET ORAL at 16:19

## 2024-01-01 RX ADMIN — CALCIUM ACETATE 1334 MG: 667 CAPSULE ORAL at 07:53

## 2024-01-01 RX ADMIN — CALCIUM ACETATE 1334 MG: 667 CAPSULE ORAL at 11:15

## 2024-01-01 RX ADMIN — DIPHENHYDRAMINE HYDROCHLORIDE AND LIDOCAINE HYDROCHLORIDE AND ALUMINUM HYDROXIDE AND MAGNESIUM HYDRO 10 ML: KIT at 17:05

## 2024-01-01 RX ADMIN — MIDODRINE HYDROCHLORIDE 17.5 MG: 5 TABLET ORAL at 11:14

## 2024-01-01 RX ADMIN — POTASSIUM CHLORIDE 40 MEQ: 1500 TABLET, EXTENDED RELEASE ORAL at 08:06

## 2024-01-01 RX ADMIN — THIAMINE HCL TAB 100 MG 100 MG: 100 TAB at 08:31

## 2024-01-01 RX ADMIN — Medication 3 MG: at 23:28

## 2024-01-01 RX ADMIN — SODIUM BICARBONATE 650 MG TABLET 1300 MG: at 12:44

## 2024-01-01 RX ADMIN — Medication 1 TABLET: at 12:14

## 2024-01-01 RX ADMIN — CALCIUM ACETATE 1334 MG: 667 CAPSULE ORAL at 17:39

## 2024-01-01 RX ADMIN — Medication 9 MG: at 21:43

## 2024-01-01 RX ADMIN — DIPHENHYDRAMINE HYDROCHLORIDE AND LIDOCAINE HYDROCHLORIDE AND ALUMINUM HYDROXIDE AND MAGNESIUM HYDRO 10 ML: KIT at 17:08

## 2024-01-01 RX ADMIN — DIPHENHYDRAMINE HYDROCHLORIDE AND LIDOCAINE HYDROCHLORIDE AND ALUMINUM HYDROXIDE AND MAGNESIUM HYDRO 10 ML: KIT at 01:15

## 2024-01-01 RX ADMIN — INSULIN GLARGINE 20 UNITS: 100 INJECTION, SOLUTION SUBCUTANEOUS at 09:51

## 2024-01-01 RX ADMIN — POTASSIUM CHLORIDE 20 MEQ: 14.9 INJECTION, SOLUTION INTRAVENOUS at 12:36

## 2024-01-01 RX ADMIN — NICOTINE 7 MG: 7 PATCH, EXTENDED RELEASE TRANSDERMAL at 09:36

## 2024-01-01 RX ADMIN — MIDODRINE HYDROCHLORIDE 5 MG: 5 TABLET ORAL at 15:04

## 2024-01-01 RX ADMIN — CALCIUM ACETATE 667 MG: 667 CAPSULE ORAL at 17:27

## 2024-01-01 RX ADMIN — MIDODRINE HYDROCHLORIDE 17.5 MG: 5 TABLET ORAL at 05:44

## 2024-01-01 RX ADMIN — MORPHINE SULFATE 5 MG: 100 SOLUTION ORAL at 17:07

## 2024-01-01 RX ADMIN — MORPHINE SULFATE 2 MG: 2 INJECTION, SOLUTION INTRAMUSCULAR; INTRAVENOUS at 04:42

## 2024-01-01 RX ADMIN — INSULIN GLARGINE 10 UNITS: 100 INJECTION, SOLUTION SUBCUTANEOUS at 21:57

## 2024-01-01 RX ADMIN — OLANZAPINE 5 MG: 2.5 TABLET, FILM COATED ORAL at 21:35

## 2024-01-01 RX ADMIN — CALCIUM ACETATE 1334 MG: 667 CAPSULE ORAL at 09:44

## 2024-01-01 RX ADMIN — GUAIFENESIN AND DEXTROMETHORPHAN 10 ML: 100; 10 SYRUP ORAL at 06:21

## 2024-01-01 RX ADMIN — FLUDROCORTISONE ACETATE 0.1 MG: 0.1 TABLET ORAL at 10:12

## 2024-01-01 RX ADMIN — CALCIUM ACETATE 1334 MG: 667 CAPSULE ORAL at 08:22

## 2024-01-01 RX ADMIN — MIDODRINE HYDROCHLORIDE 17.5 MG: 5 TABLET ORAL at 15:40

## 2024-01-01 RX ADMIN — PANTOPRAZOLE SODIUM 40 MG: 40 TABLET, DELAYED RELEASE ORAL at 07:50

## 2024-01-01 RX ADMIN — PANTOPRAZOLE SODIUM 40 MG: 40 TABLET, DELAYED RELEASE ORAL at 05:02

## 2024-01-01 RX ADMIN — INSULIN GLARGINE 20 UNITS: 100 INJECTION, SOLUTION SUBCUTANEOUS at 02:45

## 2024-01-01 RX ADMIN — MIDODRINE HYDROCHLORIDE 17.5 MG: 5 TABLET ORAL at 10:31

## 2024-01-01 RX ADMIN — Medication 3 MG: at 21:08

## 2024-01-01 RX ADMIN — POTASSIUM CHLORIDE 40 MEQ: 1500 TABLET, EXTENDED RELEASE ORAL at 17:11

## 2024-01-01 RX ADMIN — CIPROFLOXACIN HYDROCHLORIDE 250 MG: 250 TABLET, FILM COATED ORAL at 08:09

## 2024-01-01 RX ADMIN — MIDODRINE HYDROCHLORIDE 17.5 MG: 5 TABLET ORAL at 11:24

## 2024-01-01 RX ADMIN — LIDOCAINE 5% 1 PATCH: 700 PATCH TOPICAL at 11:07

## 2024-01-01 RX ADMIN — INSULIN GLARGINE 20 UNITS: 100 INJECTION, SOLUTION SUBCUTANEOUS at 21:43

## 2024-01-01 RX ADMIN — DIPHENHYDRAMINE HYDROCHLORIDE AND LIDOCAINE HYDROCHLORIDE AND ALUMINUM HYDROXIDE AND MAGNESIUM HYDRO 10 ML: KIT at 22:32

## 2024-01-01 RX ADMIN — LORAZEPAM 1 MG: 2 INJECTION INTRAMUSCULAR; INTRAVENOUS at 01:31

## 2024-01-01 RX ADMIN — NICOTINE 1 PATCH: 14 PATCH, EXTENDED RELEASE TRANSDERMAL at 08:18

## 2024-01-01 RX ADMIN — MIDODRINE HYDROCHLORIDE 10 MG: 5 TABLET ORAL at 15:03

## 2024-01-01 RX ADMIN — INSULIN LISPRO 2 UNITS: 100 INJECTION, SOLUTION INTRAVENOUS; SUBCUTANEOUS at 17:15

## 2024-01-01 RX ADMIN — LACTULOSE 20 G: 20 SOLUTION ORAL at 17:01

## 2024-01-01 RX ADMIN — ONDANSETRON 4 MG: 2 INJECTION INTRAMUSCULAR; INTRAVENOUS at 12:10

## 2024-01-01 RX ADMIN — ALBUMIN (HUMAN) 25 G: 0.25 INJECTION, SOLUTION INTRAVENOUS at 09:52

## 2024-01-01 RX ADMIN — POTASSIUM CHLORIDE 40 MEQ: 1500 TABLET, EXTENDED RELEASE ORAL at 16:58

## 2024-01-01 RX ADMIN — ALBUMIN (HUMAN) 25 G: 0.25 INJECTION, SOLUTION INTRAVENOUS at 08:04

## 2024-01-01 RX ADMIN — CALCITRIOL 0.25 MCG: 0.25 CAPSULE, LIQUID FILLED ORAL at 10:13

## 2024-01-01 RX ADMIN — LACTULOSE 20 G: 20 SOLUTION ORAL at 17:29

## 2024-01-01 RX ADMIN — CALCIUM ACETATE 667 MG: 667 CAPSULE ORAL at 08:31

## 2024-01-01 RX ADMIN — CALCIUM ACETATE 1334 MG: 667 CAPSULE ORAL at 08:06

## 2024-01-01 RX ADMIN — CALCITRIOL 0.25 MCG: 0.25 CAPSULE, LIQUID FILLED ORAL at 07:58

## 2024-01-01 RX ADMIN — EPOETIN ALFA 9000 UNITS: 10000 SOLUTION INTRAVENOUS; SUBCUTANEOUS at 13:29

## 2024-01-01 RX ADMIN — POTASSIUM CHLORIDE 20 MEQ: 14.9 INJECTION, SOLUTION INTRAVENOUS at 08:40

## 2024-01-01 RX ADMIN — CALCITRIOL 0.25 MCG: 0.25 CAPSULE, LIQUID FILLED ORAL at 08:41

## 2024-01-01 RX ADMIN — PANTOPRAZOLE SODIUM 40 MG: 40 INJECTION, POWDER, FOR SOLUTION INTRAVENOUS at 21:33

## 2024-01-01 RX ADMIN — MIDODRINE HYDROCHLORIDE 10 MG: 5 TABLET ORAL at 06:19

## 2024-01-01 RX ADMIN — POTASSIUM CHLORIDE 40 MEQ: 1500 TABLET, EXTENDED RELEASE ORAL at 09:34

## 2024-01-01 RX ADMIN — LACTULOSE 20 G: 20 SOLUTION ORAL at 18:25

## 2024-01-01 RX ADMIN — MIDODRINE HYDROCHLORIDE 5 MG: 5 TABLET ORAL at 06:05

## 2024-01-01 RX ADMIN — PANTOPRAZOLE SODIUM 40 MG: 40 TABLET, DELAYED RELEASE ORAL at 06:31

## 2024-01-01 RX ADMIN — EPOETIN ALFA 9000 UNITS: 10000 SOLUTION INTRAVENOUS; SUBCUTANEOUS at 16:03

## 2024-01-01 RX ADMIN — NICOTINE 7 MG: 7 PATCH, EXTENDED RELEASE TRANSDERMAL at 14:12

## 2024-01-01 RX ADMIN — CALCIUM 1 TABLET: 500 TABLET ORAL at 07:58

## 2024-01-01 RX ADMIN — LIDOCAINE 5% 1 PATCH: 700 PATCH TOPICAL at 11:15

## 2024-01-01 RX ADMIN — DIPHENHYDRAMINE HYDROCHLORIDE 25 MG: 50 INJECTION, SOLUTION INTRAMUSCULAR; INTRAVENOUS at 02:29

## 2024-01-01 RX ADMIN — ESCITALOPRAM OXALATE 5 MG: 5 TABLET, FILM COATED ORAL at 08:06

## 2024-01-01 RX ADMIN — MIDODRINE HYDROCHLORIDE 10 MG: 5 TABLET ORAL at 05:32

## 2024-01-01 RX ADMIN — METHOCARBAMOL TABLETS 500 MG: 500 TABLET, COATED ORAL at 04:03

## 2024-01-01 RX ADMIN — ONDANSETRON 4 MG: 2 INJECTION INTRAMUSCULAR; INTRAVENOUS at 15:03

## 2024-01-01 RX ADMIN — NICOTINE 7 MG: 7 PATCH, EXTENDED RELEASE TRANSDERMAL at 08:34

## 2024-01-01 RX ADMIN — LACTULOSE 20 G: 20 SOLUTION ORAL at 07:56

## 2024-01-01 RX ADMIN — CALCIUM 1 TABLET: 500 TABLET ORAL at 08:06

## 2024-01-01 RX ADMIN — NICOTINE 7 MG: 7 PATCH, EXTENDED RELEASE TRANSDERMAL at 08:56

## 2024-01-01 RX ADMIN — POTASSIUM CHLORIDE 20 MEQ: 14.9 INJECTION, SOLUTION INTRAVENOUS at 08:57

## 2024-01-01 RX ADMIN — MIDODRINE HYDROCHLORIDE 17.5 MG: 5 TABLET ORAL at 06:17

## 2024-01-01 RX ADMIN — DIPHENHYDRAMINE HYDROCHLORIDE AND LIDOCAINE HYDROCHLORIDE AND ALUMINUM HYDROXIDE AND MAGNESIUM HYDRO 10 ML: KIT at 02:55

## 2024-01-01 RX ADMIN — POTASSIUM CHLORIDE 40 MEQ: 1500 TABLET, EXTENDED RELEASE ORAL at 16:20

## 2024-01-01 RX ADMIN — ALBUMIN (HUMAN) 25 G: 0.25 INJECTION, SOLUTION INTRAVENOUS at 03:36

## 2024-01-01 RX ADMIN — PANTOPRAZOLE SODIUM 40 MG: 40 INJECTION, POWDER, FOR SOLUTION INTRAVENOUS at 08:42

## 2024-01-01 RX ADMIN — SODIUM BICARBONATE 650 MG TABLET 1300 MG: at 17:15

## 2024-01-01 RX ADMIN — PANTOPRAZOLE SODIUM 40 MG: 40 INJECTION, POWDER, FOR SOLUTION INTRAVENOUS at 21:08

## 2024-01-01 RX ADMIN — MIDODRINE HYDROCHLORIDE 15 MG: 5 TABLET ORAL at 17:14

## 2024-01-01 RX ADMIN — MIDODRINE HYDROCHLORIDE 17.5 MG: 5 TABLET ORAL at 06:08

## 2024-01-01 RX ADMIN — POTASSIUM CHLORIDE 40 MEQ: 1500 TABLET, EXTENDED RELEASE ORAL at 11:24

## 2024-01-01 RX ADMIN — ALBUMIN (HUMAN) 25 G: 0.25 INJECTION, SOLUTION INTRAVENOUS at 02:49

## 2024-01-01 RX ADMIN — ALBUMIN (HUMAN) 25 G: 0.25 INJECTION, SOLUTION INTRAVENOUS at 16:23

## 2024-01-01 RX ADMIN — POTASSIUM CHLORIDE 20 MEQ: 14.9 INJECTION, SOLUTION INTRAVENOUS at 06:16

## 2024-01-01 RX ADMIN — MIDODRINE HYDROCHLORIDE 5 MG: 5 TABLET ORAL at 17:42

## 2024-01-01 RX ADMIN — GUAIFENESIN AND DEXTROMETHORPHAN 10 ML: 100; 10 SYRUP ORAL at 02:55

## 2024-01-01 RX ADMIN — DIPHENHYDRAMINE HYDROCHLORIDE AND LIDOCAINE HYDROCHLORIDE AND ALUMINUM HYDROXIDE AND MAGNESIUM HYDRO 10 ML: KIT at 09:27

## 2024-01-01 RX ADMIN — MIDODRINE HYDROCHLORIDE 17.5 MG: 5 TABLET ORAL at 06:09

## 2024-01-01 RX ADMIN — DIPHENHYDRAMINE HYDROCHLORIDE AND LIDOCAINE HYDROCHLORIDE AND ALUMINUM HYDROXIDE AND MAGNESIUM HYDRO 10 ML: KIT at 17:14

## 2024-01-01 RX ADMIN — LIDOCAINE 5% 1 PATCH: 700 PATCH TOPICAL at 12:41

## 2024-01-01 RX ADMIN — NICOTINE 7 MG: 7 PATCH, EXTENDED RELEASE TRANSDERMAL at 08:27

## 2024-01-01 RX ADMIN — CALCIUM ACETATE 1334 MG: 667 CAPSULE ORAL at 17:15

## 2024-01-01 RX ADMIN — RIFAXIMIN 550 MG: 550 TABLET ORAL at 08:04

## 2024-01-01 RX ADMIN — POTASSIUM CHLORIDE 40 MEQ: 1500 TABLET, EXTENDED RELEASE ORAL at 10:12

## 2024-01-01 RX ADMIN — LIDOCAINE 5% 1 PATCH: 700 PATCH TOPICAL at 12:20

## 2024-01-01 RX ADMIN — Medication 6 MG: at 21:00

## 2024-01-01 RX ADMIN — LACTULOSE 10 G: 10 SOLUTION ORAL at 09:12

## 2024-01-01 RX ADMIN — MIDODRINE HYDROCHLORIDE 5 MG: 5 TABLET ORAL at 12:12

## 2024-01-01 RX ADMIN — CALCITRIOL 0.25 MCG: 0.25 CAPSULE, LIQUID FILLED ORAL at 08:06

## 2024-01-01 RX ADMIN — CALCITRIOL 0.25 MCG: 0.25 CAPSULE, LIQUID FILLED ORAL at 08:38

## 2024-01-01 RX ADMIN — NICOTINE 1 PATCH: 14 PATCH, EXTENDED RELEASE TRANSDERMAL at 08:09

## 2024-01-01 RX ADMIN — MIDODRINE HYDROCHLORIDE 10 MG: 5 TABLET ORAL at 11:50

## 2024-01-01 RX ADMIN — INSULIN GLARGINE 20 UNITS: 100 INJECTION, SOLUTION SUBCUTANEOUS at 22:00

## 2024-01-01 RX ADMIN — CALCIUM ACETATE 667 MG: 667 CAPSULE ORAL at 11:59

## 2024-01-01 RX ADMIN — POTASSIUM CHLORIDE 40 MEQ: 1500 TABLET, EXTENDED RELEASE ORAL at 07:58

## 2024-01-01 RX ADMIN — Medication 3 MG: at 22:04

## 2024-01-01 RX ADMIN — CALCIUM ACETATE 1334 MG: 667 CAPSULE ORAL at 15:37

## 2024-01-01 RX ADMIN — MIDODRINE HYDROCHLORIDE 10 MG: 5 TABLET ORAL at 11:57

## 2024-01-01 RX ADMIN — LACTULOSE 20 G: 20 SOLUTION ORAL at 08:09

## 2024-01-01 RX ADMIN — POTASSIUM CHLORIDE 40 MEQ: 1500 TABLET, EXTENDED RELEASE ORAL at 17:01

## 2024-01-01 RX ADMIN — CALCIUM ACETATE 1334 MG: 667 CAPSULE ORAL at 12:44

## 2024-01-01 RX ADMIN — RIFAXIMIN 550 MG: 550 TABLET ORAL at 21:11

## 2024-01-01 RX ADMIN — RIFAXIMIN 550 MG: 550 TABLET ORAL at 21:12

## 2024-01-01 RX ADMIN — Medication 3 MG: at 21:30

## 2024-01-01 RX ADMIN — CALCITRIOL 0.25 MCG: 0.25 CAPSULE, LIQUID FILLED ORAL at 08:22

## 2024-01-01 RX ADMIN — PANTOPRAZOLE SODIUM 40 MG: 40 INJECTION, POWDER, FOR SOLUTION INTRAVENOUS at 08:05

## 2024-01-01 RX ADMIN — Medication 3 MG: at 21:52

## 2024-01-01 RX ADMIN — ALBUMIN (HUMAN) 12.5 G: 12.5 INJECTION, SOLUTION INTRAVENOUS at 11:28

## 2024-01-01 RX ADMIN — ALBUMIN (HUMAN) 25 G: 0.25 INJECTION, SOLUTION INTRAVENOUS at 04:50

## 2024-01-01 RX ADMIN — HYDROCORTISONE SODIUM SUCCINATE 25 MG: 100 INJECTION, POWDER, FOR SOLUTION INTRAMUSCULAR; INTRAVENOUS at 08:45

## 2024-01-01 RX ADMIN — LACTULOSE 20 G: 20 SOLUTION ORAL at 08:11

## 2024-01-01 RX ADMIN — Medication 1 TABLET: at 16:58

## 2024-01-01 RX ADMIN — OXYCODONE HYDROCHLORIDE 2.5 MG: 5 TABLET ORAL at 23:56

## 2024-01-01 RX ADMIN — PANTOPRAZOLE SODIUM 40 MG: 40 TABLET, DELAYED RELEASE ORAL at 21:52

## 2024-01-01 RX ADMIN — POTASSIUM CHLORIDE 40 MEQ: 1500 TABLET, EXTENDED RELEASE ORAL at 06:26

## 2024-01-01 RX ADMIN — MIDODRINE HYDROCHLORIDE 17.5 MG: 5 TABLET ORAL at 11:07

## 2024-01-01 RX ADMIN — CALCIUM ACETATE 1334 MG: 667 CAPSULE ORAL at 17:11

## 2024-01-01 RX ADMIN — LACTULOSE 20 G: 20 SOLUTION ORAL at 17:42

## 2024-01-01 RX ADMIN — LIDOCAINE 5% 1 PATCH: 700 PATCH TOPICAL at 12:38

## 2024-01-01 RX ADMIN — HYDROXYZINE HYDROCHLORIDE 25 MG: 25 TABLET ORAL at 08:44

## 2024-01-01 RX ADMIN — LACTULOSE 10 G: 10 SOLUTION ORAL at 08:31

## 2024-01-01 RX ADMIN — SODIUM BICARBONATE 650 MG TABLET 1300 MG: at 08:41

## 2024-01-01 RX ADMIN — MORPHINE SULFATE 5 MG: 100 SOLUTION ORAL at 17:37

## 2024-01-01 RX ADMIN — INSULIN LISPRO 1 UNITS: 100 INJECTION, SOLUTION INTRAVENOUS; SUBCUTANEOUS at 17:02

## 2024-01-01 RX ADMIN — Medication 3 MG: at 21:35

## 2024-01-01 RX ADMIN — POTASSIUM PHOSPHATE, MONOBASIC AND POTASSIUM PHOSPHATE, DIBASIC 12 MMOL: 224; 236 INJECTION, SOLUTION, CONCENTRATE INTRAVENOUS at 12:14

## 2024-01-01 RX ADMIN — CALCIUM ACETATE 1334 MG: 667 CAPSULE ORAL at 11:14

## 2024-01-01 RX ADMIN — LIDOCAINE 5% 1 PATCH: 700 PATCH TOPICAL at 12:30

## 2024-01-01 RX ADMIN — DIPHENHYDRAMINE HYDROCHLORIDE AND LIDOCAINE HYDROCHLORIDE AND ALUMINUM HYDROXIDE AND MAGNESIUM HYDRO 10 ML: KIT at 23:57

## 2024-01-01 RX ADMIN — MIDODRINE HYDROCHLORIDE 17.5 MG: 5 TABLET ORAL at 06:01

## 2024-01-01 RX ADMIN — CALCITRIOL 0.25 MCG: 0.25 CAPSULE, LIQUID FILLED ORAL at 08:01

## 2024-01-01 RX ADMIN — CALCIUM ACETATE 1334 MG: 667 CAPSULE ORAL at 11:52

## 2024-01-01 RX ADMIN — POTASSIUM CHLORIDE 40 MEQ: 1500 TABLET, EXTENDED RELEASE ORAL at 16:45

## 2024-01-01 RX ADMIN — THIAMINE HCL TAB 100 MG 100 MG: 100 TAB at 08:11

## 2024-01-01 RX ADMIN — LACTULOSE 20 G: 20 SOLUTION ORAL at 16:41

## 2024-01-01 RX ADMIN — MIDODRINE HYDROCHLORIDE 17.5 MG: 5 TABLET ORAL at 17:31

## 2024-01-01 RX ADMIN — Medication 3 MG: at 21:59

## 2024-01-01 RX ADMIN — LACTULOSE 20 G: 20 SOLUTION ORAL at 09:14

## 2024-01-01 RX ADMIN — CALCIUM ACETATE 1334 MG: 667 CAPSULE ORAL at 09:34

## 2024-01-01 RX ADMIN — POTASSIUM CHLORIDE 40 MEQ: 1500 TABLET, EXTENDED RELEASE ORAL at 08:01

## 2024-01-01 RX ADMIN — Medication 1 TABLET: at 11:24

## 2024-01-01 RX ADMIN — OXYCODONE HYDROCHLORIDE 2.5 MG: 5 TABLET ORAL at 21:00

## 2024-01-01 RX ADMIN — MIDODRINE HYDROCHLORIDE 17.5 MG: 5 TABLET ORAL at 16:50

## 2024-01-01 RX ADMIN — CALCIUM 1 TABLET: 500 TABLET ORAL at 10:11

## 2024-01-01 RX ADMIN — MIDODRINE HYDROCHLORIDE 17.5 MG: 5 TABLET ORAL at 11:28

## 2024-01-01 RX ADMIN — RIFAXIMIN 550 MG: 550 TABLET ORAL at 21:35

## 2024-01-01 RX ADMIN — POTASSIUM CHLORIDE 20 MEQ: 14.9 INJECTION, SOLUTION INTRAVENOUS at 15:22

## 2024-01-01 RX ADMIN — MIDODRINE HYDROCHLORIDE 5 MG: 5 TABLET ORAL at 10:47

## 2024-01-01 RX ADMIN — MIDODRINE HYDROCHLORIDE 17.5 MG: 5 TABLET ORAL at 05:59

## 2024-01-01 RX ADMIN — ESCITALOPRAM OXALATE 5 MG: 5 TABLET, FILM COATED ORAL at 08:55

## 2024-01-01 RX ADMIN — NICOTINE 1 PATCH: 14 PATCH, EXTENDED RELEASE TRANSDERMAL at 08:31

## 2024-01-01 RX ADMIN — CALCIUM 1 TABLET: 500 TABLET ORAL at 08:43

## 2024-01-01 RX ADMIN — INSULIN GLARGINE 10 UNITS: 100 INJECTION, SOLUTION SUBCUTANEOUS at 21:00

## 2024-01-01 RX ADMIN — LIDOCAINE 5% 1 PATCH: 700 PATCH TOPICAL at 13:27

## 2024-01-01 RX ADMIN — OXYCODONE HYDROCHLORIDE 2.5 MG: 5 TABLET ORAL at 07:21

## 2024-01-01 RX ADMIN — MIDODRINE HYDROCHLORIDE 2.5 MG: 5 TABLET ORAL at 13:21

## 2024-01-01 RX ADMIN — PANTOPRAZOLE SODIUM 40 MG: 40 TABLET, DELAYED RELEASE ORAL at 20:45

## 2024-01-01 RX ADMIN — ACETAMINOPHEN 325MG 650 MG: 325 TABLET ORAL at 05:46

## 2024-01-01 RX ADMIN — HYDROXYZINE HYDROCHLORIDE 25 MG: 25 TABLET ORAL at 00:36

## 2024-01-01 RX ADMIN — RIFAXIMIN 550 MG: 550 TABLET ORAL at 21:59

## 2024-01-01 RX ADMIN — Medication 9 MG: at 21:20

## 2024-01-01 RX ADMIN — MIDODRINE HYDROCHLORIDE 17.5 MG: 5 TABLET ORAL at 06:31

## 2024-01-01 RX ADMIN — CALCITRIOL 0.25 MCG: 0.25 CAPSULE, LIQUID FILLED ORAL at 08:57

## 2024-01-01 RX ADMIN — POTASSIUM CHLORIDE 40 MEQ: 1500 TABLET, EXTENDED RELEASE ORAL at 14:02

## 2024-01-01 RX ADMIN — CIPROFLOXACIN HYDROCHLORIDE 250 MG: 250 TABLET, FILM COATED ORAL at 08:27

## 2024-01-01 RX ADMIN — CALCIUM 1 TABLET: 500 TABLET ORAL at 08:10

## 2024-01-01 RX ADMIN — OLANZAPINE 15 MG: 10 TABLET, FILM COATED ORAL at 21:01

## 2024-01-01 RX ADMIN — DIPHENHYDRAMINE HYDROCHLORIDE AND LIDOCAINE HYDROCHLORIDE AND ALUMINUM HYDROXIDE AND MAGNESIUM HYDRO 10 ML: KIT at 08:11

## 2024-01-01 RX ADMIN — ALBUMIN (HUMAN) 25 G: 0.25 INJECTION, SOLUTION INTRAVENOUS at 16:58

## 2024-01-01 RX ADMIN — CALCIUM ACETATE 667 MG: 667 CAPSULE ORAL at 17:14

## 2024-01-01 RX ADMIN — ESCITALOPRAM OXALATE 5 MG: 5 TABLET, FILM COATED ORAL at 08:32

## 2024-01-01 RX ADMIN — OLANZAPINE 15 MG: 10 TABLET, FILM COATED ORAL at 22:11

## 2024-01-01 RX ADMIN — INSULIN LISPRO 1 UNITS: 100 INJECTION, SOLUTION INTRAVENOUS; SUBCUTANEOUS at 16:16

## 2024-01-01 RX ADMIN — DIPHENHYDRAMINE HYDROCHLORIDE AND LIDOCAINE HYDROCHLORIDE AND ALUMINUM HYDROXIDE AND MAGNESIUM HYDRO 10 ML: KIT at 17:04

## 2024-01-01 RX ADMIN — ESCITALOPRAM OXALATE 5 MG: 5 TABLET, FILM COATED ORAL at 08:56

## 2024-01-01 RX ADMIN — CALCIUM ACETATE 1334 MG: 667 CAPSULE ORAL at 16:46

## 2024-01-01 RX ADMIN — PANTOPRAZOLE SODIUM 40 MG: 40 INJECTION, POWDER, FOR SOLUTION INTRAVENOUS at 08:38

## 2024-01-01 RX ADMIN — MIDODRINE HYDROCHLORIDE 15 MG: 5 TABLET ORAL at 08:31

## 2024-01-01 RX ADMIN — Medication 9 MG: at 21:29

## 2024-01-01 RX ADMIN — CALCIUM ACETATE 1334 MG: 667 CAPSULE ORAL at 12:26

## 2024-01-01 RX ADMIN — CIPROFLOXACIN HYDROCHLORIDE 250 MG: 250 TABLET, FILM COATED ORAL at 09:54

## 2024-01-01 RX ADMIN — OLANZAPINE 7.5 MG: 2.5 TABLET, FILM COATED ORAL at 21:00

## 2024-01-01 RX ADMIN — LACTULOSE 10 G: 10 SOLUTION ORAL at 08:00

## 2024-01-01 RX ADMIN — DIPHENHYDRAMINE HYDROCHLORIDE AND LIDOCAINE HYDROCHLORIDE AND ALUMINUM HYDROXIDE AND MAGNESIUM HYDRO 10 ML: KIT at 06:03

## 2024-01-01 RX ADMIN — CALCIUM ACETATE 1334 MG: 667 CAPSULE ORAL at 16:20

## 2024-01-01 RX ADMIN — CALCIUM ACETATE 667 MG: 667 CAPSULE ORAL at 11:57

## 2024-01-01 RX ADMIN — LACTULOSE 20 G: 20 SOLUTION ORAL at 12:55

## 2024-01-01 RX ADMIN — LACTULOSE 10 G: 10 SOLUTION ORAL at 09:51

## 2024-01-01 RX ADMIN — LACTULOSE 10 G: 10 SOLUTION ORAL at 08:05

## 2024-01-01 RX ADMIN — OLANZAPINE 15 MG: 10 TABLET, FILM COATED ORAL at 21:43

## 2024-01-01 RX ADMIN — PANTOPRAZOLE SODIUM 40 MG: 40 TABLET, DELAYED RELEASE ORAL at 22:12

## 2024-01-01 RX ADMIN — OLANZAPINE 15 MG: 10 TABLET, FILM COATED ORAL at 22:00

## 2024-01-01 RX ADMIN — RIFAXIMIN 550 MG: 550 TABLET ORAL at 20:45

## 2024-01-01 RX ADMIN — OLANZAPINE 15 MG: 10 TABLET, FILM COATED ORAL at 21:21

## 2024-01-01 RX ADMIN — POTASSIUM CHLORIDE 40 MEQ: 1500 TABLET, EXTENDED RELEASE ORAL at 17:27

## 2024-01-01 RX ADMIN — ESCITALOPRAM OXALATE 5 MG: 5 TABLET, FILM COATED ORAL at 08:43

## 2024-01-01 RX ADMIN — POTASSIUM CHLORIDE 40 MEQ: 1500 TABLET, EXTENDED RELEASE ORAL at 16:19

## 2024-01-01 RX ADMIN — FLUDROCORTISONE ACETATE 0.1 MG: 0.1 TABLET ORAL at 07:58

## 2024-01-01 RX ADMIN — MORPHINE SULFATE 5 MG: 100 SOLUTION ORAL at 14:50

## 2024-01-01 RX ADMIN — CALCIUM ACETATE 1334 MG: 667 CAPSULE ORAL at 08:32

## 2024-01-01 RX ADMIN — MIDODRINE HYDROCHLORIDE 17.5 MG: 5 TABLET ORAL at 17:39

## 2024-01-01 RX ADMIN — POTASSIUM CHLORIDE 20 MEQ: 14.9 INJECTION, SOLUTION INTRAVENOUS at 09:17

## 2024-01-01 RX ADMIN — DIPHENHYDRAMINE HYDROCHLORIDE AND LIDOCAINE HYDROCHLORIDE AND ALUMINUM HYDROXIDE AND MAGNESIUM HYDRO 10 ML: KIT at 17:34

## 2024-01-01 RX ADMIN — NICOTINE 1 PATCH: 14 PATCH, EXTENDED RELEASE TRANSDERMAL at 08:34

## 2024-01-01 RX ADMIN — PANTOPRAZOLE SODIUM 40 MG: 40 TABLET, DELAYED RELEASE ORAL at 06:19

## 2024-01-01 RX ADMIN — DIPHENHYDRAMINE HYDROCHLORIDE AND LIDOCAINE HYDROCHLORIDE AND ALUMINUM HYDROXIDE AND MAGNESIUM HYDRO 10 ML: KIT at 08:10

## 2024-01-01 RX ADMIN — MORPHINE SULFATE 2 MG: 2 INJECTION, SOLUTION INTRAMUSCULAR; INTRAVENOUS at 02:19

## 2024-01-01 RX ADMIN — POTASSIUM CHLORIDE 20 MEQ: 14.9 INJECTION, SOLUTION INTRAVENOUS at 18:09

## 2024-01-01 RX ADMIN — INSULIN GLARGINE 20 UNITS: 100 INJECTION, SOLUTION SUBCUTANEOUS at 08:45

## 2024-01-01 RX ADMIN — DIPHENHYDRAMINE HYDROCHLORIDE AND LIDOCAINE HYDROCHLORIDE AND ALUMINUM HYDROXIDE AND MAGNESIUM HYDRO 10 ML: KIT at 20:39

## 2024-01-01 RX ADMIN — ALBUMIN (HUMAN) 25 G: 0.25 INJECTION, SOLUTION INTRAVENOUS at 21:33

## 2024-01-01 RX ADMIN — PANTOPRAZOLE SODIUM 40 MG: 40 TABLET, DELAYED RELEASE ORAL at 08:58

## 2024-01-01 RX ADMIN — PANTOPRAZOLE SODIUM 40 MG: 40 TABLET, DELAYED RELEASE ORAL at 08:43

## 2024-01-01 RX ADMIN — ESCITALOPRAM OXALATE 5 MG: 5 TABLET, FILM COATED ORAL at 07:51

## 2024-01-01 RX ADMIN — LORAZEPAM 1 MG: 2 INJECTION INTRAMUSCULAR; INTRAVENOUS at 13:59

## 2024-01-01 RX ADMIN — EPOETIN ALFA 9000 UNITS: 10000 SOLUTION INTRAVENOUS; SUBCUTANEOUS at 17:38

## 2024-01-01 RX ADMIN — LACTULOSE 20 G: 20 SOLUTION ORAL at 17:14

## 2024-01-01 RX ADMIN — MIDODRINE HYDROCHLORIDE 17.5 MG: 5 TABLET ORAL at 16:46

## 2024-01-01 RX ADMIN — OLANZAPINE 10 MG: 2.5 TABLET, FILM COATED ORAL at 21:57

## 2024-01-01 RX ADMIN — DIPHENHYDRAMINE HYDROCHLORIDE AND LIDOCAINE HYDROCHLORIDE AND ALUMINUM HYDROXIDE AND MAGNESIUM HYDRO 10 ML: KIT at 11:14

## 2024-01-01 RX ADMIN — RIFAXIMIN 550 MG: 550 TABLET ORAL at 08:56

## 2024-01-01 RX ADMIN — PANTOPRAZOLE SODIUM 40 MG: 40 INJECTION, POWDER, FOR SOLUTION INTRAVENOUS at 21:59

## 2024-01-01 RX ADMIN — LACTULOSE 20 G: 20 SOLUTION ORAL at 08:28

## 2024-01-01 RX ADMIN — Medication 9 MG: at 22:00

## 2024-01-01 RX ADMIN — RIFAXIMIN 550 MG: 550 TABLET ORAL at 11:28

## 2024-01-01 RX ADMIN — CALCIUM ACETATE 1334 MG: 667 CAPSULE ORAL at 11:41

## 2024-01-01 RX ADMIN — POTASSIUM CHLORIDE 20 MEQ: 14.9 INJECTION, SOLUTION INTRAVENOUS at 10:47

## 2024-01-01 RX ADMIN — CIPROFLOXACIN HYDROCHLORIDE 250 MG: 250 TABLET, FILM COATED ORAL at 08:46

## 2024-01-01 RX ADMIN — HYDROXYZINE HYDROCHLORIDE 25 MG: 25 TABLET ORAL at 10:05

## 2024-01-01 RX ADMIN — MIDODRINE HYDROCHLORIDE 17.5 MG: 5 TABLET ORAL at 11:55

## 2024-01-01 RX ADMIN — Medication 3 MG: at 21:00

## 2024-01-01 RX ADMIN — MIDODRINE HYDROCHLORIDE 10 MG: 5 TABLET ORAL at 05:52

## 2024-01-01 RX ADMIN — CALCITRIOL 0.25 MCG: 0.25 CAPSULE, LIQUID FILLED ORAL at 07:53

## 2024-01-01 RX ADMIN — CIPROFLOXACIN HYDROCHLORIDE 250 MG: 250 TABLET, FILM COATED ORAL at 08:35

## 2024-01-01 RX ADMIN — LACTULOSE 20 G: 20 SOLUTION ORAL at 08:35

## 2024-01-01 RX ADMIN — CALCIUM ACETATE 1334 MG: 667 CAPSULE ORAL at 08:45

## 2024-01-01 RX ADMIN — MIDODRINE HYDROCHLORIDE 10 MG: 5 TABLET ORAL at 17:01

## 2024-01-01 RX ADMIN — DIPHENHYDRAMINE HYDROCHLORIDE AND LIDOCAINE HYDROCHLORIDE AND ALUMINUM HYDROXIDE AND MAGNESIUM HYDRO 10 ML: KIT at 16:50

## 2024-01-01 RX ADMIN — POTASSIUM CHLORIDE 40 MEQ: 1500 TABLET, EXTENDED RELEASE ORAL at 08:22

## 2024-01-01 RX ADMIN — MORPHINE SULFATE 5 MG: 100 SOLUTION ORAL at 13:35

## 2024-01-01 RX ADMIN — CALCIUM ACETATE 1334 MG: 667 CAPSULE ORAL at 08:44

## 2024-01-01 RX ADMIN — CALCIUM ACETATE 1334 MG: 667 CAPSULE ORAL at 15:40

## 2024-01-01 RX ADMIN — HALOPERIDOL 2 MG: 2 SOLUTION ORAL at 17:58

## 2024-01-01 RX ADMIN — PANTOPRAZOLE SODIUM 40 MG: 40 TABLET, DELAYED RELEASE ORAL at 08:39

## 2024-01-01 RX ADMIN — INSULIN LISPRO 1 UNITS: 100 INJECTION, SOLUTION INTRAVENOUS; SUBCUTANEOUS at 20:44

## 2024-01-01 RX ADMIN — INSULIN LISPRO 1 UNITS: 100 INJECTION, SOLUTION INTRAVENOUS; SUBCUTANEOUS at 21:20

## 2024-01-01 RX ADMIN — Medication 1 TABLET: at 08:55

## 2024-01-01 RX ADMIN — LIDOCAINE 5% 1 PATCH: 700 PATCH TOPICAL at 11:44

## 2024-01-01 RX ADMIN — PANTOPRAZOLE SODIUM 40 MG: 40 TABLET, DELAYED RELEASE ORAL at 08:19

## 2024-01-01 RX ADMIN — POTASSIUM CHLORIDE 40 MEQ: 1500 TABLET, EXTENDED RELEASE ORAL at 17:14

## 2024-01-01 RX ADMIN — Medication 9 MG: at 21:42

## 2024-01-01 RX ADMIN — DIPHENHYDRAMINE HYDROCHLORIDE AND LIDOCAINE HYDROCHLORIDE AND ALUMINUM HYDROXIDE AND MAGNESIUM HYDRO 10 ML: KIT at 19:31

## 2024-01-01 RX ADMIN — FLUDROCORTISONE ACETATE 0.1 MG: 0.1 TABLET ORAL at 07:53

## 2024-01-01 RX ADMIN — PANTOPRAZOLE SODIUM 40 MG: 40 INJECTION, POWDER, FOR SOLUTION INTRAVENOUS at 20:59

## 2024-01-01 RX ADMIN — ALBUMIN (HUMAN) 25 G: 0.25 INJECTION, SOLUTION INTRAVENOUS at 09:39

## 2024-01-01 RX ADMIN — LACTULOSE 10 G: 10 SOLUTION ORAL at 08:39

## 2024-01-01 RX ADMIN — Medication 9 MG: at 22:11

## 2024-01-01 RX ADMIN — GLYCOPYRROLATE 0.1 MG: 0.2 INJECTION, SOLUTION INTRAMUSCULAR; INTRAVENOUS at 04:06

## 2024-01-01 RX ADMIN — NICOTINE 1 PATCH: 14 PATCH, EXTENDED RELEASE TRANSDERMAL at 10:13

## 2024-01-01 RX ADMIN — Medication 6 MG: at 21:57

## 2024-01-01 RX ADMIN — INSULIN LISPRO 1 UNITS: 100 INJECTION, SOLUTION INTRAVENOUS; SUBCUTANEOUS at 17:42

## 2024-01-01 RX ADMIN — NICOTINE 7 MG: 7 PATCH, EXTENDED RELEASE TRANSDERMAL at 07:55

## 2024-01-01 RX ADMIN — FLUDROCORTISONE ACETATE 0.2 MG: 0.1 TABLET ORAL at 08:56

## 2024-01-01 RX ADMIN — HYDROXYZINE HYDROCHLORIDE 25 MG: 25 TABLET ORAL at 20:56

## 2024-01-01 RX ADMIN — CALCIUM ACETATE 667 MG: 667 CAPSULE ORAL at 12:54

## 2024-01-01 RX ADMIN — POTASSIUM CHLORIDE 40 MEQ: 1500 TABLET, EXTENDED RELEASE ORAL at 08:31

## 2024-01-01 RX ADMIN — ESCITALOPRAM OXALATE 5 MG: 5 TABLET, FILM COATED ORAL at 08:41

## 2024-01-01 RX ADMIN — CALCIUM ACETATE 667 MG: 667 CAPSULE ORAL at 08:09

## 2024-01-01 RX ADMIN — MIDODRINE HYDROCHLORIDE 17.5 MG: 5 TABLET ORAL at 16:20

## 2024-01-01 RX ADMIN — FENTANYL CITRATE 50 MCG: 50 INJECTION, SOLUTION INTRAMUSCULAR; INTRAVENOUS at 12:57

## 2024-01-01 RX ADMIN — PANTOPRAZOLE SODIUM 40 MG: 40 INJECTION, POWDER, FOR SOLUTION INTRAVENOUS at 08:56

## 2024-01-01 RX ADMIN — HYDROCORTISONE SODIUM SUCCINATE 25 MG: 100 INJECTION, POWDER, FOR SOLUTION INTRAMUSCULAR; INTRAVENOUS at 08:09

## 2024-01-01 RX ADMIN — RIFAXIMIN 550 MG: 550 TABLET ORAL at 21:33

## 2024-01-01 RX ADMIN — PANTOPRAZOLE SODIUM 40 MG: 40 INJECTION, POWDER, FOR SOLUTION INTRAVENOUS at 08:28

## 2024-01-01 RX ADMIN — CALCIUM ACETATE 1334 MG: 667 CAPSULE ORAL at 07:57

## 2024-01-01 RX ADMIN — CALCITRIOL 0.25 MCG: 0.25 CAPSULE, LIQUID FILLED ORAL at 09:34

## 2024-01-01 RX ADMIN — NICOTINE 7 MG: 7 PATCH, EXTENDED RELEASE TRANSDERMAL at 08:22

## 2024-01-01 RX ADMIN — NICOTINE 1 PATCH: 14 PATCH, EXTENDED RELEASE TRANSDERMAL at 09:24

## 2024-01-01 RX ADMIN — MORPHINE SULFATE 5 MG: 100 SOLUTION ORAL at 18:33

## 2024-01-01 RX ADMIN — POTASSIUM CHLORIDE 40 MEQ: 1500 TABLET, EXTENDED RELEASE ORAL at 17:04

## 2024-01-01 RX ADMIN — THIAMINE HCL TAB 100 MG 100 MG: 100 TAB at 08:46

## 2024-01-01 RX ADMIN — PANTOPRAZOLE SODIUM 40 MG: 40 INJECTION, POWDER, FOR SOLUTION INTRAVENOUS at 21:12

## 2024-01-01 RX ADMIN — LACTULOSE 20 G: 20 SOLUTION ORAL at 08:31

## 2024-01-01 RX ADMIN — HYDROCORTISONE SODIUM SUCCINATE 25 MG: 100 INJECTION, POWDER, FOR SOLUTION INTRAMUSCULAR; INTRAVENOUS at 08:11

## 2024-01-01 RX ADMIN — MORPHINE SULFATE 5 MG: 100 SOLUTION ORAL at 23:27

## 2024-01-01 RX ADMIN — ESCITALOPRAM OXALATE 5 MG: 5 TABLET, FILM COATED ORAL at 09:34

## 2024-01-01 RX ADMIN — HYDROXYZINE HYDROCHLORIDE 25 MG: 25 TABLET ORAL at 21:52

## 2024-01-01 RX ADMIN — MAGNESIUM SULFATE HEPTAHYDRATE 1 G: 1 INJECTION, SOLUTION INTRAVENOUS at 12:51

## 2024-01-01 RX ADMIN — CIPROFLOXACIN HYDROCHLORIDE 250 MG: 250 TABLET, FILM COATED ORAL at 08:31

## 2024-01-01 RX ADMIN — CALCIUM 1 TABLET: 500 TABLET ORAL at 09:34

## 2024-01-01 RX ADMIN — POTASSIUM CHLORIDE 40 MEQ: 1500 TABLET, EXTENDED RELEASE ORAL at 07:53

## 2024-01-01 RX ADMIN — POTASSIUM CHLORIDE 40 MEQ: 1500 TABLET, EXTENDED RELEASE ORAL at 08:55

## 2024-01-01 RX ADMIN — RIFAXIMIN 550 MG: 550 TABLET ORAL at 20:56

## 2024-01-01 RX ADMIN — EPOETIN ALFA 9000 UNITS: 10000 SOLUTION INTRAVENOUS; SUBCUTANEOUS at 08:05

## 2024-01-01 RX ADMIN — POTASSIUM CHLORIDE 40 MEQ: 1500 TABLET, EXTENDED RELEASE ORAL at 17:38

## 2024-01-01 RX ADMIN — INSULIN LISPRO 3 UNITS: 100 INJECTION, SOLUTION INTRAVENOUS; SUBCUTANEOUS at 11:50

## 2024-01-01 RX ADMIN — MIDODRINE HYDROCHLORIDE 17.5 MG: 5 TABLET ORAL at 10:41

## 2024-01-01 RX ADMIN — HYDROCORTISONE SODIUM SUCCINATE 25 MG: 100 INJECTION, POWDER, FOR SOLUTION INTRAMUSCULAR; INTRAVENOUS at 08:28

## 2024-01-01 RX ADMIN — ESCITALOPRAM OXALATE 5 MG: 5 TABLET, FILM COATED ORAL at 08:07

## 2024-01-01 RX ADMIN — NICOTINE 7 MG: 7 PATCH, EXTENDED RELEASE TRANSDERMAL at 08:05

## 2024-01-01 RX ADMIN — DIPHENHYDRAMINE HYDROCHLORIDE AND LIDOCAINE HYDROCHLORIDE AND ALUMINUM HYDROXIDE AND MAGNESIUM HYDRO 10 ML: KIT at 09:38

## 2024-01-01 RX ADMIN — CALCIUM ACETATE 667 MG: 667 CAPSULE ORAL at 09:15

## 2024-01-01 RX ADMIN — INSULIN GLARGINE 20 UNITS: 100 INJECTION, SOLUTION SUBCUTANEOUS at 20:28

## 2024-01-01 RX ADMIN — THIAMINE HCL TAB 100 MG 100 MG: 100 TAB at 08:28

## 2024-01-01 RX ADMIN — OLANZAPINE 10 MG: 2.5 TABLET, FILM COATED ORAL at 22:04

## 2024-01-01 RX ADMIN — MIDODRINE HYDROCHLORIDE 17.5 MG: 5 TABLET ORAL at 12:44

## 2024-01-01 RX ADMIN — HYDROCORTISONE SODIUM SUCCINATE 25 MG: 100 INJECTION, POWDER, FOR SOLUTION INTRAMUSCULAR; INTRAVENOUS at 09:45

## 2024-01-01 RX ADMIN — DIPHENHYDRAMINE HYDROCHLORIDE AND LIDOCAINE HYDROCHLORIDE AND ALUMINUM HYDROXIDE AND MAGNESIUM HYDRO 10 ML: KIT at 16:58

## 2024-01-01 RX ADMIN — NICOTINE 1 PATCH: 14 PATCH, EXTENDED RELEASE TRANSDERMAL at 09:50

## 2024-01-01 RX ADMIN — NICOTINE 7 MG: 7 PATCH, EXTENDED RELEASE TRANSDERMAL at 08:40

## 2024-01-01 RX ADMIN — ESCITALOPRAM OXALATE 5 MG: 5 TABLET, FILM COATED ORAL at 08:04

## 2024-01-01 RX ADMIN — CALCIUM ACETATE 1334 MG: 667 CAPSULE ORAL at 11:28

## 2024-01-01 RX ADMIN — CIPROFLOXACIN HYDROCHLORIDE 250 MG: 250 TABLET, FILM COATED ORAL at 12:54

## 2024-01-01 RX ADMIN — MIDODRINE HYDROCHLORIDE 15 MG: 5 TABLET ORAL at 11:59

## 2024-01-01 RX ADMIN — FLUDROCORTISONE ACETATE 0.2 MG: 0.1 TABLET ORAL at 13:49

## 2024-01-01 RX ADMIN — MIDODRINE HYDROCHLORIDE 17.5 MG: 5 TABLET ORAL at 06:39

## 2024-01-01 RX ADMIN — MIDODRINE HYDROCHLORIDE 15 MG: 5 TABLET ORAL at 09:15

## 2024-01-01 RX ADMIN — INSULIN LISPRO 4 UNITS: 100 INJECTION, SOLUTION INTRAVENOUS; SUBCUTANEOUS at 09:15

## 2024-01-01 RX ADMIN — MIDODRINE HYDROCHLORIDE 10 MG: 5 TABLET ORAL at 10:53

## 2024-01-01 RX ADMIN — LIDOCAINE 5% 1 PATCH: 700 PATCH TOPICAL at 12:36

## 2024-01-01 RX ADMIN — FLUDROCORTISONE ACETATE 0.2 MG: 0.1 TABLET ORAL at 09:34

## 2024-01-01 RX ADMIN — CALCIUM 1 TABLET: 500 TABLET ORAL at 08:04

## 2024-01-01 RX ADMIN — DIPHENHYDRAMINE HYDROCHLORIDE AND LIDOCAINE HYDROCHLORIDE AND ALUMINUM HYDROXIDE AND MAGNESIUM HYDRO 10 ML: KIT at 17:29

## 2024-01-01 RX ADMIN — DIPHENHYDRAMINE HYDROCHLORIDE AND LIDOCAINE HYDROCHLORIDE AND ALUMINUM HYDROXIDE AND MAGNESIUM HYDRO 10 ML: KIT at 13:14

## 2024-01-01 RX ADMIN — INSULIN GLARGINE 20 UNITS: 100 INJECTION, SOLUTION SUBCUTANEOUS at 20:43

## 2024-01-01 RX ADMIN — HYDROCORTISONE SODIUM SUCCINATE 25 MG: 100 INJECTION, POWDER, FOR SOLUTION INTRAMUSCULAR; INTRAVENOUS at 08:35

## 2024-01-01 RX ADMIN — POTASSIUM CHLORIDE 40 MEQ: 1500 TABLET, EXTENDED RELEASE ORAL at 18:13

## 2024-01-21 ENCOUNTER — APPOINTMENT (INPATIENT)
Dept: CT IMAGING | Facility: HOSPITAL | Age: 34
DRG: 441 | End: 2024-01-21
Payer: COMMERCIAL

## 2024-01-21 ENCOUNTER — APPOINTMENT (EMERGENCY)
Dept: CT IMAGING | Facility: HOSPITAL | Age: 34
DRG: 441 | End: 2024-01-21
Payer: COMMERCIAL

## 2024-01-21 ENCOUNTER — APPOINTMENT (EMERGENCY)
Dept: ULTRASOUND IMAGING | Facility: HOSPITAL | Age: 34
DRG: 441 | End: 2024-01-21
Payer: COMMERCIAL

## 2024-01-21 ENCOUNTER — HOSPITAL ENCOUNTER (INPATIENT)
Facility: HOSPITAL | Age: 34
LOS: 10 days | Discharge: ANOTHER HOSPITAL | DRG: 441 | End: 2024-01-31
Attending: EMERGENCY MEDICINE | Admitting: STUDENT IN AN ORGANIZED HEALTH CARE EDUCATION/TRAINING PROGRAM
Payer: COMMERCIAL

## 2024-01-21 DIAGNOSIS — E11.01 HHNC (HYPERGLYCEMIC HYPEROSMOLAR NONKETOTIC COMA) (HCC): ICD-10-CM

## 2024-01-21 DIAGNOSIS — R74.01 TRANSAMINITIS: ICD-10-CM

## 2024-01-21 DIAGNOSIS — N20.1 URETERAL STONE: Primary | ICD-10-CM

## 2024-01-21 DIAGNOSIS — N20.1 LEFT URETERAL STONE: ICD-10-CM

## 2024-01-21 DIAGNOSIS — N17.9 AKI (ACUTE KIDNEY INJURY) (HCC): ICD-10-CM

## 2024-01-21 DIAGNOSIS — F10.10 ETOH ABUSE: ICD-10-CM

## 2024-01-21 DIAGNOSIS — K76.82 HEPATIC ENCEPHALOPATHY (HCC): ICD-10-CM

## 2024-01-21 DIAGNOSIS — K92.2 GI BLEED: ICD-10-CM

## 2024-01-21 DIAGNOSIS — R17 JAUNDICE: ICD-10-CM

## 2024-01-21 DIAGNOSIS — R73.9 HYPERGLYCEMIA: ICD-10-CM

## 2024-01-21 DIAGNOSIS — R65.10 SIRS (SYSTEMIC INFLAMMATORY RESPONSE SYNDROME) (HCC): ICD-10-CM

## 2024-01-21 PROBLEM — F12.90 MARIJUANA USE: Chronic | Status: ACTIVE | Noted: 2024-01-21

## 2024-01-21 PROBLEM — F32.A DEPRESSION: Chronic | Status: ACTIVE | Noted: 2024-01-21

## 2024-01-21 PROBLEM — F12.90 MARIJUANA USE: Status: ACTIVE | Noted: 2024-01-21

## 2024-01-21 PROBLEM — N13.4 HYDROURETER: Status: RESOLVED | Noted: 2023-10-30 | Resolved: 2024-01-21

## 2024-01-21 PROBLEM — E87.29 METABOLIC ACIDOSIS, INCREASED ANION GAP: Status: ACTIVE | Noted: 2024-01-21

## 2024-01-21 PROBLEM — Z72.0 TOBACCO ABUSE: Chronic | Status: ACTIVE | Noted: 2023-10-04

## 2024-01-21 PROBLEM — D69.6 THROMBOCYTOPENIA (HCC): Status: ACTIVE | Noted: 2024-01-21

## 2024-01-21 PROBLEM — Z87.39 HISTORY OF GOUT: Chronic | Status: ACTIVE | Noted: 2023-10-04

## 2024-01-21 PROBLEM — E87.20 LACTIC ACIDOSIS: Status: ACTIVE | Noted: 2024-01-21

## 2024-01-21 PROBLEM — R78.81 STAPHYLOCOCCUS EPIDERMIDIS BACTEREMIA: Status: RESOLVED | Noted: 2023-11-01 | Resolved: 2024-01-21

## 2024-01-21 PROBLEM — B95.7 STAPHYLOCOCCUS EPIDERMIDIS BACTEREMIA: Status: RESOLVED | Noted: 2023-11-01 | Resolved: 2024-01-21

## 2024-01-21 PROBLEM — Z86.79 HISTORY OF HYPERTENSION: Chronic | Status: ACTIVE | Noted: 2023-10-04

## 2024-01-21 PROBLEM — E72.20 HYPERAMMONEMIA (HCC): Status: ACTIVE | Noted: 2024-01-21

## 2024-01-21 PROBLEM — K51.00 PANCOLITIS (HCC): Status: RESOLVED | Noted: 2023-10-30 | Resolved: 2024-01-21

## 2024-01-21 PROBLEM — R11.2 N&V (NAUSEA AND VOMITING): Status: ACTIVE | Noted: 2024-01-21

## 2024-01-21 PROBLEM — B33.8 RSV INFECTION: Status: ACTIVE | Noted: 2024-01-21

## 2024-01-21 PROBLEM — K21.9 GERD (GASTROESOPHAGEAL REFLUX DISEASE): Chronic | Status: ACTIVE | Noted: 2024-01-21

## 2024-01-21 PROBLEM — F32.A DEPRESSION: Status: ACTIVE | Noted: 2024-01-21

## 2024-01-21 PROBLEM — N40.0 BPH (BENIGN PROSTATIC HYPERPLASIA): Status: ACTIVE | Noted: 2024-01-21

## 2024-01-21 PROBLEM — N40.0 BPH (BENIGN PROSTATIC HYPERPLASIA): Chronic | Status: ACTIVE | Noted: 2024-01-21

## 2024-01-21 PROBLEM — E87.1 HYPONATREMIA: Status: ACTIVE | Noted: 2024-01-21

## 2024-01-21 PROBLEM — K21.9 GERD (GASTROESOPHAGEAL REFLUX DISEASE): Status: ACTIVE | Noted: 2024-01-21

## 2024-01-21 PROBLEM — R93.89 ABNORMAL CT SCAN: Status: ACTIVE | Noted: 2024-01-21

## 2024-01-21 PROBLEM — E11.65 TYPE 2 DIABETES MELLITUS WITH HYPERGLYCEMIA, WITHOUT LONG-TERM CURRENT USE OF INSULIN (HCC): Chronic | Status: ACTIVE | Noted: 2023-10-04

## 2024-01-21 LAB
2HR DELTA HS TROPONIN: 2 NG/L
4HR DELTA HS TROPONIN: 4 NG/L
AFP-TM SERPL-MCNC: 8.42 NG/ML (ref 0–9)
ALBUMIN SERPL BCP-MCNC: 2.8 G/DL (ref 3.5–5)
ALP SERPL-CCNC: 135 U/L (ref 34–104)
ALT SERPL W P-5'-P-CCNC: 81 U/L (ref 7–52)
AMMONIA PLAS-SCNC: 85 UMOL/L (ref 18–72)
AMPHETAMINES SERPL QL SCN: NEGATIVE
ANION GAP SERPL CALCULATED.3IONS-SCNC: 17 MMOL/L
ANION GAP SERPL CALCULATED.3IONS-SCNC: 21 MMOL/L
ANION GAP SERPL CALCULATED.3IONS-SCNC: 23 MMOL/L
APAP SERPL-MCNC: <2 UG/ML (ref 10–20)
APTT PPP: 40 SECONDS (ref 23–37)
AST SERPL W P-5'-P-CCNC: 190 U/L (ref 13–39)
BACTERIA UR QL AUTO: ABNORMAL /HPF
BARBITURATES UR QL: NEGATIVE
BASE EX.OXY STD BLDV CALC-SCNC: 67.7 % (ref 60–80)
BASE EXCESS BLDV CALC-SCNC: -13.2 MMOL/L
BASOPHILS # BLD AUTO: 0.06 THOUSANDS/ÂΜL (ref 0–0.1)
BASOPHILS NFR BLD AUTO: 1 % (ref 0–1)
BENZODIAZ UR QL: NEGATIVE
BETA-HYDROXYBUTYRATE: 0.2 MMOL/L
BILIRUB DIRECT SERPL-MCNC: 9.84 MG/DL (ref 0–0.2)
BILIRUB SERPL-MCNC: 13.82 MG/DL (ref 0.2–1)
BILIRUB UR QL STRIP: ABNORMAL
BLD SMEAR INTERP: NORMAL
BUN SERPL-MCNC: 5 MG/DL (ref 5–25)
BUN SERPL-MCNC: 6 MG/DL (ref 5–25)
BUN SERPL-MCNC: 7 MG/DL (ref 5–25)
CALCIUM ALBUM COR SERPL-MCNC: 8.8 MG/DL (ref 8.3–10.1)
CALCIUM SERPL-MCNC: 7.4 MG/DL (ref 8.4–10.2)
CALCIUM SERPL-MCNC: 7.4 MG/DL (ref 8.4–10.2)
CALCIUM SERPL-MCNC: 7.8 MG/DL (ref 8.4–10.2)
CARDIAC TROPONIN I PNL SERPL HS: 11 NG/L
CARDIAC TROPONIN I PNL SERPL HS: 13 NG/L
CARDIAC TROPONIN I PNL SERPL HS: 15 NG/L
CHLORIDE SERPL-SCNC: 90 MMOL/L (ref 96–108)
CHLORIDE SERPL-SCNC: 97 MMOL/L (ref 96–108)
CHLORIDE SERPL-SCNC: 98 MMOL/L (ref 96–108)
CK SERPL-CCNC: 72 U/L (ref 39–308)
CLARITY UR: CLEAR
CO2 SERPL-SCNC: 13 MMOL/L (ref 21–32)
CO2 SERPL-SCNC: 13 MMOL/L (ref 21–32)
CO2 SERPL-SCNC: 17 MMOL/L (ref 21–32)
COCAINE UR QL: NEGATIVE
COLOR UR: YELLOW
CREAT SERPL-MCNC: 1.07 MG/DL (ref 0.6–1.3)
CREAT SERPL-MCNC: 1.14 MG/DL (ref 0.6–1.3)
CREAT SERPL-MCNC: 1.38 MG/DL (ref 0.6–1.3)
EOSINOPHIL # BLD AUTO: 0.01 THOUSAND/ÂΜL (ref 0–0.61)
EOSINOPHIL NFR BLD AUTO: 0 % (ref 0–6)
ERYTHROCYTE [DISTWIDTH] IN BLOOD BY AUTOMATED COUNT: 17 % (ref 11.6–15.1)
EST. AVERAGE GLUCOSE BLD GHB EST-MCNC: 206 MG/DL
ETHANOL SERPL-MCNC: 349 MG/DL
FLUAV RNA RESP QL NAA+PROBE: NEGATIVE
FLUBV RNA RESP QL NAA+PROBE: NEGATIVE
GFR SERPL CREATININE-BSD FRML MDRD: 66 ML/MIN/1.73SQ M
GFR SERPL CREATININE-BSD FRML MDRD: 84 ML/MIN/1.73SQ M
GFR SERPL CREATININE-BSD FRML MDRD: 90 ML/MIN/1.73SQ M
GGT SERPL-CCNC: 797 U/L (ref 9–64)
GLUCOSE SERPL-MCNC: 213 MG/DL (ref 65–140)
GLUCOSE SERPL-MCNC: 219 MG/DL (ref 65–140)
GLUCOSE SERPL-MCNC: 227 MG/DL (ref 65–140)
GLUCOSE SERPL-MCNC: 246 MG/DL (ref 65–140)
GLUCOSE SERPL-MCNC: 248 MG/DL (ref 65–140)
GLUCOSE SERPL-MCNC: 250 MG/DL (ref 65–140)
GLUCOSE SERPL-MCNC: 252 MG/DL (ref 65–140)
GLUCOSE SERPL-MCNC: 259 MG/DL (ref 65–140)
GLUCOSE SERPL-MCNC: 268 MG/DL (ref 65–140)
GLUCOSE SERPL-MCNC: 278 MG/DL (ref 65–140)
GLUCOSE SERPL-MCNC: 339 MG/DL (ref 65–140)
GLUCOSE SERPL-MCNC: 410 MG/DL (ref 65–140)
GLUCOSE SERPL-MCNC: 452 MG/DL (ref 65–140)
GLUCOSE SERPL-MCNC: 559 MG/DL (ref 65–140)
GLUCOSE SERPL-MCNC: >500 MG/DL (ref 65–140)
GLUCOSE UR STRIP-MCNC: ABNORMAL MG/DL
HBA1C MFR BLD: 8.8 %
HCO3 BLDV-SCNC: 13 MMOL/L (ref 24–30)
HCT VFR BLD AUTO: 43.3 % (ref 36.5–49.3)
HGB BLD-MCNC: 14.6 G/DL (ref 12–17)
HGB UR QL STRIP.AUTO: ABNORMAL
IMM GRANULOCYTES # BLD AUTO: 0.2 THOUSAND/UL (ref 0–0.2)
IMM GRANULOCYTES NFR BLD AUTO: 2 % (ref 0–2)
INR PPP: 1.27 (ref 0.84–1.19)
KETONES UR STRIP-MCNC: NEGATIVE MG/DL
LACTATE SERPL-SCNC: 10.1 MMOL/L (ref 0.5–2)
LACTATE SERPL-SCNC: 10.3 MMOL/L (ref 0.5–2)
LACTATE SERPL-SCNC: 14.5 MMOL/L (ref 0.5–2)
LACTATE SERPL-SCNC: 6.8 MMOL/L (ref 0.5–2)
LDH SERPL-CCNC: 255 U/L (ref 140–271)
LEUKOCYTE ESTERASE UR QL STRIP: ABNORMAL
LIPASE SERPL-CCNC: 123 U/L (ref 11–82)
LYMPHOCYTES # BLD AUTO: 1.03 THOUSANDS/ÂΜL (ref 0.6–4.47)
LYMPHOCYTES NFR BLD AUTO: 10 % (ref 14–44)
MAGNESIUM SERPL-MCNC: 1.7 MG/DL (ref 1.9–2.7)
MAGNESIUM SERPL-MCNC: 2 MG/DL (ref 1.9–2.7)
MCH RBC QN AUTO: 34 PG (ref 26.8–34.3)
MCHC RBC AUTO-ENTMCNC: 33.7 G/DL (ref 31.4–37.4)
MCV RBC AUTO: 101 FL (ref 82–98)
METHADONE UR QL: NEGATIVE
MONOCYTES # BLD AUTO: 0.63 THOUSAND/ÂΜL (ref 0.17–1.22)
MONOCYTES NFR BLD AUTO: 6 % (ref 4–12)
NEUTROPHILS # BLD AUTO: 8.19 THOUSANDS/ÂΜL (ref 1.85–7.62)
NEUTS SEG NFR BLD AUTO: 81 % (ref 43–75)
NITRITE UR QL STRIP: NEGATIVE
NON-SQ EPI CELLS URNS QL MICRO: ABNORMAL /HPF
NRBC BLD AUTO-RTO: 0 /100 WBCS
O2 CT BLDV-SCNC: 12.4 ML/DL
OPIATES UR QL SCN: POSITIVE
OXYCODONE+OXYMORPHONE UR QL SCN: NEGATIVE
PCO2 BLDV: 31.5 MM HG (ref 42–50)
PCP UR QL: NEGATIVE
PH BLDV: 7.24 [PH] (ref 7.3–7.4)
PH UR STRIP.AUTO: 5.5 [PH]
PHOSPHATE SERPL-MCNC: 1.7 MG/DL (ref 2.7–4.5)
PHOSPHATE SERPL-MCNC: 1.7 MG/DL (ref 2.7–4.5)
PLATELET # BLD AUTO: 72 THOUSANDS/UL (ref 149–390)
PMV BLD AUTO: 11.6 FL (ref 8.9–12.7)
PO2 BLDV: 43.3 MM HG (ref 35–45)
POTASSIUM SERPL-SCNC: 3.6 MMOL/L (ref 3.5–5.3)
POTASSIUM SERPL-SCNC: 3.7 MMOL/L (ref 3.5–5.3)
POTASSIUM SERPL-SCNC: 4 MMOL/L (ref 3.5–5.3)
PROT SERPL-MCNC: 5.9 G/DL (ref 6.4–8.4)
PROT UR STRIP-MCNC: ABNORMAL MG/DL
PROTHROMBIN TIME: 16.2 SECONDS (ref 11.6–14.5)
RBC # BLD AUTO: 4.3 MILLION/UL (ref 3.88–5.62)
RBC #/AREA URNS AUTO: ABNORMAL /HPF
RSV RNA RESP QL NAA+PROBE: POSITIVE
SARS-COV-2 RNA RESP QL NAA+PROBE: NEGATIVE
SODIUM SERPL-SCNC: 126 MMOL/L (ref 135–147)
SODIUM SERPL-SCNC: 131 MMOL/L (ref 135–147)
SODIUM SERPL-SCNC: 132 MMOL/L (ref 135–147)
SP GR UR STRIP.AUTO: <=1.005 (ref 1–1.03)
THC UR QL: POSITIVE
UROBILINOGEN UR QL STRIP.AUTO: 1 E.U./DL
WBC # BLD AUTO: 10.12 THOUSAND/UL (ref 4.31–10.16)
WBC #/AREA URNS AUTO: ABNORMAL /HPF

## 2024-01-21 PROCEDURE — 85610 PROTHROMBIN TIME: CPT | Performed by: EMERGENCY MEDICINE

## 2024-01-21 PROCEDURE — 84484 ASSAY OF TROPONIN QUANT: CPT | Performed by: EMERGENCY MEDICINE

## 2024-01-21 PROCEDURE — 80143 DRUG ASSAY ACETAMINOPHEN: CPT | Performed by: NURSE PRACTITIONER

## 2024-01-21 PROCEDURE — 80074 ACUTE HEPATITIS PANEL: CPT | Performed by: NURSE PRACTITIONER

## 2024-01-21 PROCEDURE — C9113 INJ PANTOPRAZOLE SODIUM, VIA: HCPCS | Performed by: EMERGENCY MEDICINE

## 2024-01-21 PROCEDURE — 82140 ASSAY OF AMMONIA: CPT | Performed by: EMERGENCY MEDICINE

## 2024-01-21 PROCEDURE — 99291 CRITICAL CARE FIRST HOUR: CPT | Performed by: EMERGENCY MEDICINE

## 2024-01-21 PROCEDURE — 82977 ASSAY OF GGT: CPT | Performed by: NURSE PRACTITIONER

## 2024-01-21 PROCEDURE — 82010 KETONE BODYS QUAN: CPT | Performed by: EMERGENCY MEDICINE

## 2024-01-21 PROCEDURE — 96376 TX/PRO/DX INJ SAME DRUG ADON: CPT

## 2024-01-21 PROCEDURE — 87040 BLOOD CULTURE FOR BACTERIA: CPT | Performed by: EMERGENCY MEDICINE

## 2024-01-21 PROCEDURE — 82948 REAGENT STRIP/BLOOD GLUCOSE: CPT

## 2024-01-21 PROCEDURE — 81001 URINALYSIS AUTO W/SCOPE: CPT | Performed by: EMERGENCY MEDICINE

## 2024-01-21 PROCEDURE — 96375 TX/PRO/DX INJ NEW DRUG ADDON: CPT

## 2024-01-21 PROCEDURE — 76705 ECHO EXAM OF ABDOMEN: CPT

## 2024-01-21 PROCEDURE — 80307 DRUG TEST PRSMV CHEM ANLYZR: CPT | Performed by: EMERGENCY MEDICINE

## 2024-01-21 PROCEDURE — 83605 ASSAY OF LACTIC ACID: CPT | Performed by: NURSE PRACTITIONER

## 2024-01-21 PROCEDURE — 82248 BILIRUBIN DIRECT: CPT | Performed by: NURSE PRACTITIONER

## 2024-01-21 PROCEDURE — G1004 CDSM NDSC: HCPCS

## 2024-01-21 PROCEDURE — 96367 TX/PROPH/DG ADDL SEQ IV INF: CPT

## 2024-01-21 PROCEDURE — 99291 CRITICAL CARE FIRST HOUR: CPT | Performed by: NURSE PRACTITIONER

## 2024-01-21 PROCEDURE — 82805 BLOOD GASES W/O2 SATURATION: CPT | Performed by: EMERGENCY MEDICINE

## 2024-01-21 PROCEDURE — 83036 HEMOGLOBIN GLYCOSYLATED A1C: CPT | Performed by: NURSE PRACTITIONER

## 2024-01-21 PROCEDURE — 82550 ASSAY OF CK (CPK): CPT | Performed by: NURSE PRACTITIONER

## 2024-01-21 PROCEDURE — 86663 EPSTEIN-BARR ANTIBODY: CPT | Performed by: NURSE PRACTITIONER

## 2024-01-21 PROCEDURE — 86664 EPSTEIN-BARR NUCLEAR ANTIGEN: CPT | Performed by: NURSE PRACTITIONER

## 2024-01-21 PROCEDURE — 83605 ASSAY OF LACTIC ACID: CPT | Performed by: STUDENT IN AN ORGANIZED HEALTH CARE EDUCATION/TRAINING PROGRAM

## 2024-01-21 PROCEDURE — 83690 ASSAY OF LIPASE: CPT | Performed by: EMERGENCY MEDICINE

## 2024-01-21 PROCEDURE — 71250 CT THORAX DX C-: CPT

## 2024-01-21 PROCEDURE — 93005 ELECTROCARDIOGRAM TRACING: CPT

## 2024-01-21 PROCEDURE — 85025 COMPLETE CBC W/AUTO DIFF WBC: CPT | Performed by: EMERGENCY MEDICINE

## 2024-01-21 PROCEDURE — 96365 THER/PROPH/DIAG IV INF INIT: CPT

## 2024-01-21 PROCEDURE — 83735 ASSAY OF MAGNESIUM: CPT | Performed by: NURSE PRACTITIONER

## 2024-01-21 PROCEDURE — 83615 LACTATE (LD) (LDH) ENZYME: CPT | Performed by: NURSE PRACTITIONER

## 2024-01-21 PROCEDURE — 84100 ASSAY OF PHOSPHORUS: CPT | Performed by: NURSE PRACTITIONER

## 2024-01-21 PROCEDURE — 0241U HB NFCT DS VIR RESP RNA 4 TRGT: CPT | Performed by: EMERGENCY MEDICINE

## 2024-01-21 PROCEDURE — 83605 ASSAY OF LACTIC ACID: CPT | Performed by: EMERGENCY MEDICINE

## 2024-01-21 PROCEDURE — 86665 EPSTEIN-BARR CAPSID VCA: CPT | Performed by: NURSE PRACTITIONER

## 2024-01-21 PROCEDURE — 74177 CT ABD & PELVIS W/CONTRAST: CPT

## 2024-01-21 PROCEDURE — 96361 HYDRATE IV INFUSION ADD-ON: CPT

## 2024-01-21 PROCEDURE — 82077 ASSAY SPEC XCP UR&BREATH IA: CPT | Performed by: EMERGENCY MEDICINE

## 2024-01-21 PROCEDURE — C9113 INJ PANTOPRAZOLE SODIUM, VIA: HCPCS | Performed by: NURSE PRACTITIONER

## 2024-01-21 PROCEDURE — 82105 ALPHA-FETOPROTEIN SERUM: CPT | Performed by: NURSE PRACTITIONER

## 2024-01-21 PROCEDURE — 80048 BASIC METABOLIC PNL TOTAL CA: CPT | Performed by: STUDENT IN AN ORGANIZED HEALTH CARE EDUCATION/TRAINING PROGRAM

## 2024-01-21 PROCEDURE — 85730 THROMBOPLASTIN TIME PARTIAL: CPT | Performed by: EMERGENCY MEDICINE

## 2024-01-21 PROCEDURE — 85384 FIBRINOGEN ACTIVITY: CPT | Performed by: NURSE PRACTITIONER

## 2024-01-21 PROCEDURE — 80048 BASIC METABOLIC PNL TOTAL CA: CPT | Performed by: NURSE PRACTITIONER

## 2024-01-21 PROCEDURE — 99285 EMERGENCY DEPT VISIT HI MDM: CPT

## 2024-01-21 PROCEDURE — 80053 COMPREHEN METABOLIC PANEL: CPT | Performed by: EMERGENCY MEDICINE

## 2024-01-21 PROCEDURE — 83010 ASSAY OF HAPTOGLOBIN QUANT: CPT | Performed by: NURSE PRACTITIONER

## 2024-01-21 PROCEDURE — 36415 COLL VENOUS BLD VENIPUNCTURE: CPT | Performed by: EMERGENCY MEDICINE

## 2024-01-21 RX ORDER — POTASSIUM CHLORIDE 20 MEQ/1
20 TABLET, EXTENDED RELEASE ORAL ONCE
Status: COMPLETED | OUTPATIENT
Start: 2024-01-21 | End: 2024-01-21

## 2024-01-21 RX ORDER — ONDANSETRON 4 MG/1
1 TABLET, FILM COATED ORAL EVERY 6 HOURS PRN
COMMUNITY
Start: 2024-01-17

## 2024-01-21 RX ORDER — NICOTINE 21 MG/24HR
14 PATCH, TRANSDERMAL 24 HOURS TRANSDERMAL DAILY
Status: DISCONTINUED | OUTPATIENT
Start: 2024-01-21 | End: 2024-01-31 | Stop reason: HOSPADM

## 2024-01-21 RX ORDER — CALCIUM GLUCONATE 20 MG/ML
2 INJECTION, SOLUTION INTRAVENOUS ONCE
Status: COMPLETED | OUTPATIENT
Start: 2024-01-21 | End: 2024-01-21

## 2024-01-21 RX ORDER — DEXTROSE, SODIUM CHLORIDE, SODIUM LACTATE, POTASSIUM CHLORIDE, AND CALCIUM CHLORIDE 5; .6; .31; .03; .02 G/100ML; G/100ML; G/100ML; G/100ML; G/100ML
250 INJECTION, SOLUTION INTRAVENOUS CONTINUOUS
Status: DISCONTINUED | OUTPATIENT
Start: 2024-01-21 | End: 2024-01-22

## 2024-01-21 RX ORDER — CHLORPROMAZINE HYDROCHLORIDE 25 MG/ML
25 INJECTION INTRAMUSCULAR ONCE
Status: COMPLETED | OUTPATIENT
Start: 2024-01-21 | End: 2024-01-21

## 2024-01-21 RX ORDER — SODIUM CHLORIDE 9 MG/ML
3 INJECTION INTRAVENOUS
Status: DISCONTINUED | OUTPATIENT
Start: 2024-01-21 | End: 2024-01-23

## 2024-01-21 RX ORDER — SODIUM CHLORIDE, SODIUM GLUCONATE, SODIUM ACETATE, POTASSIUM CHLORIDE, MAGNESIUM CHLORIDE, SODIUM PHOSPHATE, DIBASIC, AND POTASSIUM PHOSPHATE .53; .5; .37; .037; .03; .012; .00082 G/100ML; G/100ML; G/100ML; G/100ML; G/100ML; G/100ML; G/100ML
250 INJECTION, SOLUTION INTRAVENOUS CONTINUOUS
Status: DISCONTINUED | OUTPATIENT
Start: 2024-01-21 | End: 2024-01-22

## 2024-01-21 RX ORDER — FENTANYL CITRATE 50 UG/ML
50 INJECTION, SOLUTION INTRAMUSCULAR; INTRAVENOUS ONCE
Status: COMPLETED | OUTPATIENT
Start: 2024-01-21 | End: 2024-01-21

## 2024-01-21 RX ORDER — SODIUM CHLORIDE, SODIUM GLUCONATE, SODIUM ACETATE, POTASSIUM CHLORIDE, MAGNESIUM CHLORIDE, SODIUM PHOSPHATE, DIBASIC, AND POTASSIUM PHOSPHATE .53; .5; .37; .037; .03; .012; .00082 G/100ML; G/100ML; G/100ML; G/100ML; G/100ML; G/100ML; G/100ML
500 INJECTION, SOLUTION INTRAVENOUS CONTINUOUS
Status: DISCONTINUED | OUTPATIENT
Start: 2024-01-21 | End: 2024-01-21

## 2024-01-21 RX ORDER — METOPROLOL TARTRATE 1 MG/ML
5 INJECTION, SOLUTION INTRAVENOUS ONCE
Status: COMPLETED | OUTPATIENT
Start: 2024-01-21 | End: 2024-01-21

## 2024-01-21 RX ORDER — CHLORHEXIDINE GLUCONATE ORAL RINSE 1.2 MG/ML
15 SOLUTION DENTAL EVERY 12 HOURS SCHEDULED
Status: DISCONTINUED | OUTPATIENT
Start: 2024-01-21 | End: 2024-01-23

## 2024-01-21 RX ORDER — MAGNESIUM SULFATE HEPTAHYDRATE 40 MG/ML
4 INJECTION, SOLUTION INTRAVENOUS ONCE
Status: COMPLETED | OUTPATIENT
Start: 2024-01-21 | End: 2024-01-22

## 2024-01-21 RX ORDER — ONDANSETRON 2 MG/ML
4 INJECTION INTRAMUSCULAR; INTRAVENOUS EVERY 4 HOURS PRN
Status: DISCONTINUED | OUTPATIENT
Start: 2024-01-21 | End: 2024-01-31 | Stop reason: HOSPADM

## 2024-01-21 RX ORDER — HYDROMORPHONE HCL/PF 1 MG/ML
0.5 SYRINGE (ML) INJECTION
Status: DISCONTINUED | OUTPATIENT
Start: 2024-01-21 | End: 2024-01-22

## 2024-01-21 RX ORDER — PANTOPRAZOLE SODIUM 40 MG/10ML
40 INJECTION, POWDER, LYOPHILIZED, FOR SOLUTION INTRAVENOUS DAILY
Status: DISCONTINUED | OUTPATIENT
Start: 2024-01-21 | End: 2024-01-22

## 2024-01-21 RX ORDER — ONDANSETRON 2 MG/ML
4 INJECTION INTRAMUSCULAR; INTRAVENOUS ONCE
Status: COMPLETED | OUTPATIENT
Start: 2024-01-21 | End: 2024-01-21

## 2024-01-21 RX ORDER — PANTOPRAZOLE SODIUM 40 MG/10ML
40 INJECTION, POWDER, LYOPHILIZED, FOR SOLUTION INTRAVENOUS ONCE
Status: COMPLETED | OUTPATIENT
Start: 2024-01-21 | End: 2024-01-21

## 2024-01-21 RX ADMIN — POTASSIUM CHLORIDE 20 MEQ: 1500 TABLET, EXTENDED RELEASE ORAL at 17:30

## 2024-01-21 RX ADMIN — POTASSIUM PHOSPHATE, MONOBASIC POTASSIUM PHOSPHATE, DIBASIC 30 MMOL: 224; 236 INJECTION, SOLUTION, CONCENTRATE INTRAVENOUS at 16:44

## 2024-01-21 RX ADMIN — INSULIN HUMAN 10 UNITS: 100 INJECTION, SOLUTION PARENTERAL at 11:48

## 2024-01-21 RX ADMIN — HYDROMORPHONE HYDROCHLORIDE 0.5 MG: 1 INJECTION, SOLUTION INTRAMUSCULAR; INTRAVENOUS; SUBCUTANEOUS at 17:20

## 2024-01-21 RX ADMIN — FENTANYL CITRATE 50 MCG: 50 INJECTION INTRAMUSCULAR; INTRAVENOUS at 12:02

## 2024-01-21 RX ADMIN — THIAMINE HYDROCHLORIDE: 100 INJECTION, SOLUTION INTRAMUSCULAR; INTRAVENOUS at 15:45

## 2024-01-21 RX ADMIN — HYDROMORPHONE HYDROCHLORIDE 0.5 MG: 1 INJECTION, SOLUTION INTRAMUSCULAR; INTRAVENOUS; SUBCUTANEOUS at 23:48

## 2024-01-21 RX ADMIN — PANTOPRAZOLE SODIUM 40 MG: 40 INJECTION, POWDER, FOR SOLUTION INTRAVENOUS at 16:24

## 2024-01-21 RX ADMIN — SODIUM CHLORIDE 1000 ML: 0.9 INJECTION, SOLUTION INTRAVENOUS at 12:21

## 2024-01-21 RX ADMIN — SODIUM CHLORIDE 1000 ML: 0.9 INJECTION, SOLUTION INTRAVENOUS at 12:03

## 2024-01-21 RX ADMIN — HYDROMORPHONE HYDROCHLORIDE 0.5 MG: 1 INJECTION, SOLUTION INTRAMUSCULAR; INTRAVENOUS; SUBCUTANEOUS at 20:20

## 2024-01-21 RX ADMIN — CALCIUM GLUCONATE 2 G: 20 INJECTION, SOLUTION INTRAVENOUS at 20:47

## 2024-01-21 RX ADMIN — SODIUM CHLORIDE 2000 ML: 0.9 INJECTION, SOLUTION INTRAVENOUS at 11:24

## 2024-01-21 RX ADMIN — NICOTINE 14 MG: 14 PATCH, EXTENDED RELEASE TRANSDERMAL at 16:24

## 2024-01-21 RX ADMIN — ONDANSETRON 4 MG: 2 INJECTION INTRAMUSCULAR; INTRAVENOUS at 11:10

## 2024-01-21 RX ADMIN — MORPHINE SULFATE 2 MG: 2 INJECTION, SOLUTION INTRAMUSCULAR; INTRAVENOUS at 11:22

## 2024-01-21 RX ADMIN — DEXTROSE, SODIUM CHLORIDE, SODIUM LACTATE, POTASSIUM CHLORIDE, AND CALCIUM CHLORIDE 250 ML/HR: 5; .6; .31; .03; .02 INJECTION, SOLUTION INTRAVENOUS at 17:18

## 2024-01-21 RX ADMIN — SODIUM CHLORIDE, SODIUM GLUCONATE, SODIUM ACETATE, POTASSIUM CHLORIDE, MAGNESIUM CHLORIDE, SODIUM PHOSPHATE, DIBASIC, AND POTASSIUM PHOSPHATE 500 ML/HR: .53; .5; .37; .037; .03; .012; .00082 INJECTION, SOLUTION INTRAVENOUS at 17:06

## 2024-01-21 RX ADMIN — SODIUM CHLORIDE 8.9 UNITS/HR: 9 INJECTION, SOLUTION INTRAVENOUS at 13:37

## 2024-01-21 RX ADMIN — DEXTROSE, SODIUM CHLORIDE, SODIUM LACTATE, POTASSIUM CHLORIDE, AND CALCIUM CHLORIDE 250 ML/HR: 5; .6; .31; .03; .02 INJECTION, SOLUTION INTRAVENOUS at 22:05

## 2024-01-21 RX ADMIN — PIPERACILLIN AND TAZOBACTAM 3.38 G: 36; 4.5 INJECTION, POWDER, FOR SOLUTION INTRAVENOUS at 12:22

## 2024-01-21 RX ADMIN — CHLORHEXIDINE GLUCONATE 0.12% ORAL RINSE 15 ML: 1.2 LIQUID ORAL at 20:16

## 2024-01-21 RX ADMIN — IOHEXOL 100 ML: 350 INJECTION, SOLUTION INTRAVENOUS at 11:37

## 2024-01-21 RX ADMIN — METOROPROLOL TARTRATE 5 MG: 5 INJECTION, SOLUTION INTRAVENOUS at 21:18

## 2024-01-21 RX ADMIN — SODIUM PHOSPHATE, MONOBASIC, MONOHYDRATE AND SODIUM PHOSPHATE, DIBASIC, ANHYDROUS 21 MMOL: 142; 276 INJECTION, SOLUTION INTRAVENOUS at 22:06

## 2024-01-21 RX ADMIN — SODIUM CHLORIDE, SODIUM GLUCONATE, SODIUM ACETATE, POTASSIUM CHLORIDE, MAGNESIUM CHLORIDE, SODIUM PHOSPHATE, DIBASIC, AND POTASSIUM PHOSPHATE 500 ML/HR: .53; .5; .37; .037; .03; .012; .00082 INJECTION, SOLUTION INTRAVENOUS at 15:12

## 2024-01-21 RX ADMIN — MAGNESIUM SULFATE HEPTAHYDRATE 4 G: 40 INJECTION, SOLUTION INTRAVENOUS at 21:20

## 2024-01-21 RX ADMIN — FENTANYL CITRATE 50 MCG: 50 INJECTION INTRAMUSCULAR; INTRAVENOUS at 12:58

## 2024-01-21 RX ADMIN — HYDROMORPHONE HYDROCHLORIDE 0.5 MG: 1 INJECTION, SOLUTION INTRAMUSCULAR; INTRAVENOUS; SUBCUTANEOUS at 14:33

## 2024-01-21 RX ADMIN — PANTOPRAZOLE SODIUM 40 MG: 40 INJECTION, POWDER, FOR SOLUTION INTRAVENOUS at 11:11

## 2024-01-21 RX ADMIN — SODIUM CHLORIDE, SODIUM GLUCONATE, SODIUM ACETATE, POTASSIUM CHLORIDE, MAGNESIUM CHLORIDE, SODIUM PHOSPHATE, DIBASIC, AND POTASSIUM PHOSPHATE 250 ML/HR: .53; .5; .37; .037; .03; .012; .00082 INJECTION, SOLUTION INTRAVENOUS at 19:42

## 2024-01-21 RX ADMIN — CHLORPROMAZINE HYDROCHLORIDE 25 MG: 25 INJECTION INTRAMUSCULAR at 20:16

## 2024-01-21 RX ADMIN — ONDANSETRON 4 MG: 2 INJECTION INTRAMUSCULAR; INTRAVENOUS at 14:33

## 2024-01-21 RX ADMIN — Medication 2 TABLET: at 22:10

## 2024-01-21 NOTE — ASSESSMENT & PLAN NOTE
Concern for alcoholic hepatitis  Hepatocellular pattern  RUQ hepatomegaly/hepatic steatosis (t/c liver dopplers if LFT rise)  CTAP fatty liver/varices/portal htn  MELD 22  DF 28.5 hold on prednisolone unless >32  Check D bili/GGT/Hepatitis panel/Tylenol leve/CK/EBV  Consult GI  T/C nadolol for portal htn prior to d/c  Eventual EGD

## 2024-01-21 NOTE — ASSESSMENT & PLAN NOTE
Lab Results   Component Value Date    HGBA1C 9.4 (H) 09/29/2023     Recent Labs     01/21/24  1128 01/21/24  1332 01/21/24  1430   POCGLU >500* 452* 339*     Blood Sugar Average: Last 72 hrs:  (P) 395.5  BHB 0.2  UA neg ketones +glucose  A1C 8.8%    Plan:  DKA insulin gtt transitioned to regular insulin gtt ovn  Cont POCT glu Q1H  Replace electrolytes PRN  Continue IVF until tolerating PO  Hold home ozempic/jardiance

## 2024-01-21 NOTE — ASSESSMENT & PLAN NOTE
AEB: Tachycardia/elevated T. bili/lactic acidosis  Afebrile  No source  CT and abdomen and pelvis, chest: no infectious source  Blood cultures pending  Given 1 dose of Zosyn will hold on further antibiotics

## 2024-01-21 NOTE — H&P
[facility]  H&P  Name: Michael Koch 33 y.o. male I MRN: [id number]  Unit/Bed#: -01 I Date of Admission: 2024   Date of Service: 2024 I Hospital Day: 0      Assessment/Plan   * HHNC (hyperglycemic hyperosmolar nonketotic coma) (HCC)  Assessment & Plan  Lab Results   Component Value Date    HGBA1C 9.4 (H) 2023       Recent Labs     24  1128 24  1332 24  1430   POCGLU >500* 452* 339*         Blood Sugar Average: Last 72 hrs:  (P) 395.5  BHB 0.2  UA neg ketones +glucose  Check A1C  DKA insulin gtt to prevent rapid decrease in BS  Transition to regular insulin gtt once gap closed x 2  Hold home ozempic/jardiance    SIRS (systemic inflammatory response syndrome) (HCC)  Assessment & Plan  AEB: Tachycardia/elevated T. bili/lactic acidosis  Afebrile  No source  CT and abdomen and pelvis no infectious source  Follow-up CT of the chest  Procalcitonin  Blood cultures pending  Given 1 dose of Zosyn will hold on further antibiotics    Lactic acidosis  Assessment & Plan  Abd benign  Volume resuscitate and trend    Metabolic acidosis, increased anion gap  Assessment & Plan  2/2 LA  Volume resuscitate/trend/insulin gtt  Serial bmp    N&V (nausea and vomiting)  Assessment & Plan  ?? 2/2 HHNK  PRN zofran  NPO      Transaminitis w/ elevated bili  Assessment & Plan  Concern for alcoholic hepatitis  Hepatocellular pattern  RUQ hepatomegaly/hepatic steatosis (t/c liver dopplers if LFT rise)  CTAP fatty liver/varices/portal htn  MELD 22  DF 28.5 hold on prednisolone unless >32  Check D bili/GGT/Hepatitis panel/Tylenol leve/CK/EBV  Consult GI  T/C nadolol for portal htn prior to d/c  Eventual EGD    ETOH abuse  Assessment & Plan  Per pt drinks 2 24 oz twisted tea/smirinoff per day (family notes pt drinks more than this)  Drinks since brother  3 years ago. Has stopped for a week or two without symptoms. No DTs.  CIWA protocol  Thiamine/folate    Abnormal CT  scan  Assessment & Plan  CTAP-1.6cm soft tissue nodule of distal esophagus  F/u CTC     DUANE (acute kidney injury) (HCC)  Assessment & Plan  Prerenal in setting of HHNK/ACEI use/ rule out obstruction  Baseline 0.9-1  CT nephrolithiasis mild left hydro  UA +blood/glucose/protein  CK pending  Volume resuscitate  Check Renal u/s in am f/u on L hydro and consider urology consult if worsening  Trend bmp  I/O    Hyperammonemia (HCC)  Assessment & Plan  Mild  Repeat in am  T/c lactulose once taking oral if remains elevated    Hyponatremia  Assessment & Plan  Mild corrected at 133  Serial BMPs  Hold on further normal saline solution given 4 L    Thrombocytopenia (HCC)  Assessment & Plan  Likely 2/2 ETOH abuse  Check hemolysis smear/ldh/haptoglobin/fibrinogen  trend    RSV infection  Assessment & Plan  F/u CTC given esophageal nodule  Supportive care    Marijuana use  Assessment & Plan  Cessation education    BPH (benign prostatic hyperplasia)  Assessment & Plan  NPO holding home flomax/detrol    GERD (gastroesophageal reflux disease)  Assessment & Plan  IV PPI as NPO    Depression  Assessment & Plan  NPO hold home fluoxetine/remeron/prn atarax    Tobacco abuse  Assessment & Plan  Nicotine patch  Smoking cessation    History of hypertension  Assessment & Plan  NPO/DUANE hold home lisinopril    History of gout  Assessment & Plan  NPO hold home allopurinol/colchicine          History of Present Illness     HPI: Michael Koch is a 33 y.o. with PMH of DM 2, EtOH abuse, tobacco abuse, marijuana use, gout, depression anxiety, hypertension, GERD, nephrolithiasis status post lithotripsy and stent removal who presents with back pain/nausea and vomiting x 1 week.  Per patient he had a video visit last week and was given Zofran.  No improvement and had an in person visit with no additional workup.  Review of systems is positive for chills/weakness/decreased appetite/nausea/mucus production/low back pain.  Denies any fevers/chest  pain/diarrhea/abdominal pain.    On arrival to the ER patient was noted to be tachycardic.  Blood pressure was stable.  CT of the abdomen and pelvis was done revealing fatty liver, collateral/varices adjacent to the spleen suggesting portal hypertension.  1.6 cm soft tissue tissue nodule at the distal esophagus.  9 mm calculus in the mid left ureter.  5 mm calculus at the left UVJ with mild left hydro-.  2 mm calculus in the right renal pelvis.  Labs revealed platelets of 72/bicarb of 13/anion gap of 23/creatinine of 1.38/lactic acid of 14.5/AST of 190/ALT of 81/alk phos of 135/T. bili of 13.82/ammonia of 85/lipase of 123.  Upper quadrant ultrasound was done revealing hepatomegaly and hepatic steatosis.  Insulin drip was initiated.    Critical care was asked to evaluate the patient secondary to the lactic acidosis/metabolic acidosis.      History obtained from chart review and the patient.  Review of Systems   Constitutional:  Positive for appetite change and chills. Negative for fever.   Respiratory:  Positive for cough.    Cardiovascular:  Positive for palpitations.   Gastrointestinal:  Positive for nausea and vomiting. Negative for abdominal distention, abdominal pain and diarrhea.   Genitourinary:  Negative for decreased urine volume and dysuria.   Musculoskeletal:  Positive for back pain.   Neurological:  Positive for weakness.   All other systems reviewed and are negative.    Disposition: Stepdown Level 1  Historical Information   Past Medical History:  No date: Depression  No date: Diabetes mellitus (HCC)  No date: Gout  No date: Hypertension  No date: Kidney stones Past Surgical History:  09/14/2023: CYSTOSCOPY W/ URETERAL STENT PLACEMENT  02/19/2021: US GUIDED MASS BIOPSY (UNSPECIFIED)   Current Outpatient Medications   Medication Instructions    acetaminophen (TYLENOL) 650 mg, Oral, Every 8 hours scheduled    allopurinol (ZYLOPRIM) 100 mg, Oral, Daily    celecoxib (CELEBREX) 100 mg, Oral, Daily     "colchicine (COLCRYS) 0.6 mg, Oral, As needed    Fluoxetine HCl (PMDD) 20 mg, Oral, Daily    hydrOXYzine HCL (ATARAX) 25 mg, Oral, As needed    lisinopril (ZESTRIL) 10 mg, Oral, Daily    magnesium Oxide (MAG-OX) 400 mg, Oral, Daily    mirtazapine (REMERON) 15 mg, Oral, Daily at bedtime PRN    nicotine (NICODERM CQ) 21 mg/24 hr TD 24 hr patch 1 patch, Transdermal, Daily    omeprazole (PRILOSEC) 40 mg, Oral, Daily    semaglutide (0.25 or 0.5 mg/dose) (OZEMPIC (0.25 OR 0.5 MG/DOSE)) 0.5 mg, Subcutaneous, Weekly    tamsulosin (FLOMAX) 0.4 mg, Oral, Daily at bedtime    tolterodine (DETROL LA) 4 mg, Oral, Daily    No Known Allergies   Social History     Tobacco Use    Smoking status: Every Day     Current packs/day: 0.25     Types: Cigarettes    Smokeless tobacco: Never   Vaping Use    Vaping status: Never Used   Substance Use Topics    Alcohol use: Yes     Comment: \"couple 24 oz cans per day\"    Drug use: Never    History reviewed. No pertinent family history.       Objective                            Vitals I/O      Most Recent Min/Max in 24hrs   Temp 98.8 °F (37.1 °C) Temp  Min: 98.1 °F (36.7 °C)  Max: 98.8 °F (37.1 °C)   Pulse (!) 118 Pulse  Min: 107  Max: 121   Resp 22 Resp  Min: 13  Max: 22   /71 BP  Min: 102/60  Max: 136/73   O2 Sat 94 % SpO2  Min: 92 %  Max: 98 %      Intake/Output Summary (Last 24 hours) at 1/21/2024 1530  Last data filed at 1/21/2024 1433  Gross per 24 hour   Intake 4100 ml   Output --   Net 4100 ml       No diet orders on file    Invasive Monitoring           Physical Exam   Physical Exam  Eyes:      General: Scleral icterus present.      Pupils: Pupils are equal, round, and reactive to light.   Skin:     General: Skin is warm and dry.   HENT:      Head: Normocephalic and atraumatic.      Mouth/Throat:      Mouth: Mucous membranes are dry.   Cardiovascular:      Rate and Rhythm: Regular rhythm. Tachycardia present.      Pulses: Normal pulses.      Heart sounds: Normal heart sounds. "   Musculoskeletal:         General: No swelling. Normal range of motion.      Right lower leg: No edema.      Left lower leg: No edema.   Abdominal: General: Bowel sounds are normal. There is distension.     Palpations: Abdomen is soft.      Tenderness: There is no abdominal tenderness.   Constitutional:       General: He is not in acute distress.     Appearance: He is ill-appearing.   Pulmonary:      Effort: Pulmonary effort is normal. No respiratory distress.      Breath sounds: Normal breath sounds.   Neurological:      General: No focal deficit present.      Mental Status: He is alert and oriented to person, place and time.      GCS: GCS eye subscore is 4. GCS verbal subscore is 5. GCS motor subscore is 6.      Motor: Strength full and intact in all extremities. No motor deficit.            Diagnostic Studies      EKG: per er normal will f/u awaiting tx to [facility]  Imaging:  I have personally reviewed pertinent reports.   and I have personally reviewed pertinent films in PACS     Medications:  Scheduled PRN   folic acid 1 mg, thiamine (VITAMIN B1) 100 mg in sodium chloride 0.9 % 100 mL IV piggyback, , Daily      HYDROmorphone, 0.5 mg, Q3H PRN  ondansetron, 4 mg, Q4H PRN  sodium chloride (PF), 3 mL, Q1H PRN       Continuous    dextrose 5% lactated ringer's, 250 mL/hr, Last Rate: Stopped (01/21/24 1516)  insulin regular (HumuLIN R,NovoLIN R) 1 Units/mL in sodium chloride 0.9 % 100 mL infusion, 0.1-30 Units/hr, Last Rate: 8.9 Units/hr (01/21/24 1511)  multi-electrolyte, 500 mL/hr, Last Rate: 500 mL/hr (01/21/24 1512)   Followed by  multi-electrolyte, 250 mL/hr         Labs:    CBC    Recent Labs     01/21/24  1104   WBC 10.12   HGB 14.6   HCT 43.3   PLT 72*     BMP    Recent Labs     01/21/24  1219   SODIUM 126*   K 3.7   CL 90*   CO2 13*   AGAP 23   BUN 7   CREATININE 1.38*   CALCIUM 7.8*       Coags    Recent Labs     01/21/24  1104   INR 1.27*   PTT 40*        Additional Electrolytes  Recent Labs     01/21/24  1219    MG 2.0   PHOS 1.7*          Blood Gas    No recent results  Recent Labs     01/21/24  1146   PHVEN 7.235*   [id number] 31.5*   PO2VEN 43.3   ECD4DTD 13.0*   BEVEN -13.2   W8SUBOS 67.7    LFTs  Recent Labs     01/21/24  1219   ALT 81*   *   ALKPHOS 135*   ALB 2.8*   TBILI 13.82*       Infectious  No recent results  Glucose  Recent Labs     01/21/24  1219   GLUC 559*             Critical Care Time Statement: Upon my evaluation, this patient had a high probability of imminent or life-threatening deterioration due to metabolic acidosis, which required my direct attention, intervention, and personal management.  I spent a total of 55 minutes directly providing critical care services, including interpretation of complex medical databases, evaluating for the presence of life-threatening injuries or illnesses, management of organ system failure(s) , and titration of continuous IV medications (drips). This time is exclusive of procedures, teaching, family meetings, and any prior time recorded by providers other than myself.    Anticipated Length of Stay is > 2 midnights  JENSEN Estrada

## 2024-01-21 NOTE — ASSESSMENT & PLAN NOTE
Per pt drinks 2 24 oz twisted tea/smirinoff per day (family notes pt drinks more than this)  Drinks since brother  3 years ago. Has stopped for a week or two without symptoms.  CIWA protocol.  Unclear whether patient is confused because of delirium tremens versus hepatic encephalopathy due to elevated ammonia level.  Will hold off on Serax at this time continue CIWA protocol alone and reassess after completing some treatment for his elevated ammonia level.  Thiamine/folate

## 2024-01-21 NOTE — ASSESSMENT & PLAN NOTE
Per pt drinks 2 24oz twisted tea/smirinoff per day (family notes pt drinks more than this)  Drinks since brother  3 years ago. Has stopped for a week or two without symptoms. No DTs.  CIWA protocol  Thiamine/folate

## 2024-01-21 NOTE — ED PROVIDER NOTES
"History  Chief Complaint   Patient presents with    Back Pain     Worsening abdominal pain, back pain over the past few weeks. State he is vomiting and unable to tolerate PO intake. Reports hiccups, \"yellow eyes\". Seen by PCP recently for same.      Patient with abdominal pain and back pain over the last few weeks but worse over the last 1 week.  Was seen by PCP on Wednesday and tested for COVID and flu and tested negative.  Now with yellow eyes.  Dark urine.  Decreased appetite.  Does drink regularly.  No history of liver disease.  No regular Tylenol or aspirin use.  No cough or cold symptoms.      History provided by:  Patient   used: No    Abdominal Pain  Pain location:  Generalized  Pain quality: aching    Pain radiates to:  Does not radiate  Pain severity:  Mild  Onset quality:  Gradual  Duration:  1 week  Timing:  Constant  Progression:  Worsening  Chronicity:  New  Context: alcohol use    Context: not recent illness and not sick contacts    Relieved by:  Nothing  Worsened by:  Movement  Associated symptoms: anorexia, nausea and vomiting    Associated symptoms: no chest pain, no chills, no constipation, no cough, no diarrhea, no dysuria, no fever, no hematemesis, no hematuria, no shortness of breath and no sore throat        Prior to Admission Medications   Prescriptions Last Dose Informant Patient Reported? Taking?   Fluoxetine HCl, PMDD, 20 MG TABS   Yes No   Sig: Take 20 mg by mouth daily   acetaminophen (TYLENOL) 325 mg tablet   No No   Sig: Take 2 tablets (650 mg total) by mouth every 8 (eight) hours   allopurinol (ZYLOPRIM) 100 mg tablet   Yes No   Sig: Take 100 mg by mouth daily   celecoxib (CeleBREX) 100 mg capsule   Yes No   Sig: Take 100 mg by mouth daily   colchicine (COLCRYS) 0.6 mg tablet   Yes No   Sig: Take 0.6 mg by mouth if needed for muscle/joint pain   hydrOXYzine HCL (ATARAX) 25 mg tablet   Yes No   Sig: Take 25 mg by mouth if needed for anxiety   lisinopril (ZESTRIL) 10 " "mg tablet   Yes No   Sig: Take 10 mg by mouth daily   magnesium Oxide (MAG-OX) 400 mg TABS   No No   Sig: Take 1 tablet (400 mg total) by mouth daily   mirtazapine (REMERON) 15 mg tablet   Yes No   Sig: Take 15 mg by mouth daily at bedtime as needed   nicotine (NICODERM CQ) 21 mg/24 hr TD 24 hr patch   No No   Sig: Place 1 patch on the skin over 24 hours daily   omeprazole (PriLOSEC) 40 MG capsule   Yes No   Sig: Take 40 mg by mouth daily   semaglutide, 0.25 or 0.5 mg/dose, (Ozempic, 0.25 or 0.5 MG/DOSE,) 2 mg/1.5 mL injection pen   Yes No   Sig: Inject 0.5 mg under the skin once a week   tamsulosin (FLOMAX) 0.4 mg   Yes No   Sig: Take 0.4 mg by mouth daily at bedtime   tolterodine (DETROL LA) 4 mg 24 hr capsule   Yes No   Sig: Take 4 mg by mouth daily      Facility-Administered Medications: None       Past Medical History:   Diagnosis Date    Depression     Diabetes mellitus (HCC)     Gout     Hypertension     Kidney stones        Past Surgical History:   Procedure Laterality Date    CYSTOSCOPY W/ URETERAL STENT PLACEMENT  09/14/2023    US GUIDED MASS BIOPSY (UNSPECIFIED)  02/19/2021       History reviewed. No pertinent family history.  I have reviewed and agree with the history as documented.    E-Cigarette/Vaping    E-Cigarette Use Never User      E-Cigarette/Vaping Substances     Social History     Tobacco Use    Smoking status: Every Day     Current packs/day: 0.25     Types: Cigarettes    Smokeless tobacco: Never   Vaping Use    Vaping status: Never Used   Substance Use Topics    Alcohol use: Yes     Comment: \"couple 24 oz cans per day\"    Drug use: Never       Review of Systems   Constitutional:  Negative for chills and fever.   HENT:  Negative for ear pain, hearing loss, sore throat, trouble swallowing and voice change.    Eyes:  Negative for pain and discharge.   Respiratory:  Negative for cough, shortness of breath and wheezing.    Cardiovascular:  Negative for chest pain and palpitations. "   Gastrointestinal:  Positive for abdominal pain, anorexia, nausea and vomiting. Negative for blood in stool, constipation, diarrhea and hematemesis.   Genitourinary:  Negative for dysuria, flank pain, frequency and hematuria.   Musculoskeletal:  Negative for joint swelling, neck pain and neck stiffness.   Skin:  Positive for color change. Negative for rash and wound.   Neurological:  Positive for weakness. Negative for dizziness, seizures, syncope, facial asymmetry and headaches.   Psychiatric/Behavioral:  Negative for hallucinations, self-injury and suicidal ideas.    All other systems reviewed and are negative.      Physical Exam  Physical Exam  Vitals and nursing note reviewed.   Constitutional:       General: He is not in acute distress.     Appearance: He is well-developed.   HENT:      Head: Normocephalic and atraumatic.      Right Ear: External ear normal.      Left Ear: External ear normal.   Eyes:      General: Scleral icterus present.         Right eye: No discharge.         Left eye: No discharge.      Extraocular Movements: Extraocular movements intact.      Conjunctiva/sclera: Conjunctivae normal.   Cardiovascular:      Rate and Rhythm: Normal rate and regular rhythm.      Heart sounds: Normal heart sounds. No murmur heard.  Pulmonary:      Effort: Pulmonary effort is normal.      Breath sounds: Normal breath sounds. No wheezing or rales.   Abdominal:      General: Bowel sounds are normal. There is no distension.      Palpations: Abdomen is soft.      Tenderness: There is no abdominal tenderness. There is no guarding or rebound.   Musculoskeletal:         General: No deformity. Normal range of motion.      Cervical back: Normal range of motion and neck supple.   Skin:     General: Skin is warm and dry.      Findings: No rash.   Neurological:      General: No focal deficit present.      Mental Status: He is alert and oriented to person, place, and time.      Cranial Nerves: No cranial nerve deficit.    Psychiatric:         Mood and Affect: Mood normal.         Behavior: Behavior normal.         Thought Content: Thought content normal.         Judgment: Judgment normal.         Vital Signs  ED Triage Vitals   Temperature Pulse Respirations Blood Pressure SpO2   01/21/24 1100 01/21/24 1100 01/21/24 1100 01/21/24 1100 01/21/24 1100   98.1 °F (36.7 °C) (!) 113 16 136/73 97 %      Temp Source Heart Rate Source Patient Position - Orthostatic VS BP Location FiO2 (%)   01/21/24 1100 01/21/24 1100 01/21/24 1100 01/21/24 1100 --   Temporal Monitor Lying Left arm       Pain Score       01/21/24 1122       8           Vitals:    01/21/24 1300 01/21/24 1315 01/21/24 1330 01/21/24 1345   BP: 128/83 120/79 128/82 133/81   Pulse: (!) 111 (!) 112 (!) 115 (!) 118   Patient Position - Orthostatic VS: Lying Lying Lying Lying         Visual Acuity      ED Medications  Medications   sodium chloride (PF) 0.9 % injection 3 mL (has no administration in time range)   insulin regular (HumuLIN R,NovoLIN R) 1 Units/mL in sodium chloride 0.9 % 100 mL infusion (8.9 Units/hr Intravenous New Bag 1/21/24 1337)   dextrose 5 % in lactated Ringer's infusion (has no administration in time range)   multi-electrolyte (ISOLYTE-S PH 7.4 equivalent) IV solution (has no administration in time range)     Followed by   multi-electrolyte (ISOLYTE-S PH 7.4 equivalent) IV solution (has no administration in time range)   sodium chloride 0.9 % bolus 2,000 mL (2,000 mL Intravenous New Bag 1/21/24 1124)   ondansetron (ZOFRAN) injection 4 mg (4 mg Intravenous Given 1/21/24 1110)   pantoprazole (PROTONIX) injection 40 mg (40 mg Intravenous Given 1/21/24 1111)   morphine injection 2 mg (2 mg Intravenous Given 1/21/24 1122)   sodium chloride 0.9 % bolus 1,000 mL (0 mL Intravenous Stopped 1/21/24 1310)   insulin regular (HumuLIN R,NovoLIN R) injection 10 Units (10 Units Intravenous Given 1/21/24 1148)   iohexol (OMNIPAQUE) 350 MG/ML injection (MULTI-DOSE) 100 mL (100  mL Intravenous Given 1/21/24 1137)   fentanyl citrate (PF) 100 MCG/2ML 50 mcg (50 mcg Intravenous Given 1/21/24 1202)   sodium chloride 0.9 % bolus 1,000 mL (0 mL Intravenous Stopped 1/21/24 1222)   piperacillin-tazobactam (ZOSYN) 3.375 g in sodium chloride 0.9 % 100 mL IVPB (0 g Intravenous Stopped 1/21/24 1300)   fentanyl citrate (PF) 100 MCG/2ML 50 mcg (50 mcg Intravenous Given 1/21/24 1258)       Diagnostic Studies  Results Reviewed       Procedure Component Value Units Date/Time    HS Troponin I 2hr [[id number]]  (Normal) Collected: 01/21/24 1318    Lab Status: Final result Specimen: Blood from Arm, Right Updated: 01/21/24 1345     hs TnI 2hr 13 ng/L      Delta 2hr hsTnI 2 ng/L     Fingerstick Glucose (POCT) [[id number]]  (Abnormal) Collected: 01/21/24 1332    Lab Status: Final result Updated: 01/21/24 1334     POC Glucose 452 mg/dl     Urine Microscopic [[id number]]  (Abnormal) Collected: 01/21/24 1240    Lab Status: Final result Specimen: Urine, Other Updated: 01/21/24 1319     RBC, UA 20-30 /hpf      WBC, UA None Seen /hpf      Epithelial Cells Occasional /hpf      Bacteria, UA Moderate /hpf     Beta Hydroxybutyrate [[id number]]  (Normal) Collected: 01/21/24 1243    Lab Status: Final result Specimen: Blood from Arm, Right Updated: 01/21/24 1315     BETA-HYDROXYBUTYRATE 0.2 mmol/L     Rapid drug screen, urine [[id number]]  (Abnormal) Collected: 01/21/24 1240    Lab Status: Final result Specimen: Urine, Other Updated: 01/21/24 1310     Amph/Meth UR Negative     Barbiturate Ur Negative     Benzodiazepine Urine Negative     Cocaine Urine Negative     Methadone Urine Negative     Opiate Urine Positive     PCP Ur Negative     THC Urine Positive     Oxycodone Urine Negative    Narrative:      Presumptive report. If requested, specimen will be sent to reference lab for confirmation.  FOR MEDICAL PURPOSES ONLY.   IF CONFIRMATION NEEDED PLEASE CONTACT THE LAB WITHIN 5 DAYS.    Drug Screen Cutoff  Levels:  AMPHETAMINE/METHAMPHETAMINES  1000 ng/mL  BARBITURATES     200 ng/mL  BENZODIAZEPINES     200 ng/mL  COCAINE      300 ng/mL  METHADONE      300 ng/mL  OPIATES      300 ng/mL  PHENCYCLIDINE     25 ng/mL  THC       50 ng/mL  OXYCODONE      100 ng/mL    UA w Reflex to Microscopic w Reflex to Culture [[id number]]  (Abnormal) Collected: 01/21/24 1240    Lab Status: Final result Specimen: Urine, Other Updated: 01/21/24 1303     Color, UA Yellow     Clarity, UA Clear     Specific Gravity, UA <=1.005     pH, UA 5.5     Leukocytes, UA Elevated glucose may cause decreased leukocyte values. See urine microscopic for UWBC result     Nitrite, UA Negative     Protein, UA 30 (1+) mg/dl      Glucose, UA >=1000 (1%) mg/dl      Ketones, UA Negative mg/dl      Urobilinogen, UA 1.0 E.U./dl      Bilirubin, UA Large     Occult Blood, UA Large    Lactic acid, plasma (w/reflex if result > 2.0) [[id number]]  (Abnormal) Collected: 01/21/24 1219    Lab Status: Final result Specimen: Blood from Arm, Right Updated: 01/21/24 1257     LACTIC ACID 14.5 mmol/L     Narrative:      Specimen Icteric.  Result may be elevated if tourniquet was used during collection.    Lactic acid 2 Hours [[id number]]     Lab Status: No result Specimen: Blood     Comprehensive metabolic panel [[id number]]  (Abnormal) Collected: 01/21/24 1219    Lab Status: Final result Specimen: Blood from Arm, Right Updated: 01/21/24 1254     Sodium 126 mmol/L      Potassium 3.7 mmol/L      Chloride 90 mmol/L      CO2 13 mmol/L      ANION GAP 23 mmol/L      BUN 7 mg/dL      Creatinine 1.38 mg/dL      Glucose 559 mg/dL      Calcium 7.8 mg/dL      Corrected Calcium 8.8 mg/dL       U/L      ALT 81 U/L      Alkaline Phosphatase 135 U/L      Total Protein 5.9 g/dL      Albumin 2.8 g/dL      Total Bilirubin 13.82 mg/dL      eGFR 66 ml/min/1.73sq m     Narrative:      Specimen Icteric.  National Kidney Disease Foundation guidelines for Chronic Kidney Disease (CKD):     Stage 1  with normal or high GFR (GFR > 90 mL/min/1.73 square meters)    Stage 2 Mild CKD (GFR = 60-89 mL/min/1.73 square meters)    Stage 3A Moderate CKD (GFR = 45-59 mL/min/1.73 square meters)    Stage 3B Moderate CKD (GFR = 30-44 mL/min/1.73 square meters)    Stage 4 Severe CKD (GFR = 15-29 mL/min/1.73 square meters)    Stage 5 End Stage CKD (GFR <15 mL/min/1.73 square meters)  Note: GFR calculation is accurate only with a steady state creatinine    HS Troponin I 4hr [[id number]]     Lab Status: No result Specimen: Blood     Blood gas, venous [[id number]]  (Abnormal) Collected: 01/21/24 1146    Lab Status: Final result Specimen: Blood from Arm, Right Updated: 01/21/24 1156     pH, Cory 7.235     pCO2, Cory 31.5 mm Hg      pO2, Cory 43.3 mm Hg      HCO3, Cory 13.0 mmol/L      Base Excess, Cory -13.2 mmol/L      O2 Content, Cory 12.4 ml/dL      O2 HGB, VENOUS 67.7 %     FLU/RSV/COVID - if FLU/RSV clinically relevant [[id number]]  (Abnormal) Collected: 01/21/24 1104    Lab Status: Final result Specimen: Nares from Nose Updated: 01/21/24 1150     SARS-CoV-2 Negative     INFLUENZA A PCR Negative     INFLUENZA B PCR Negative     RSV PCR Positive    Narrative:      FOR PEDIATRIC PATIENTS - copy/paste COVID Guidelines URL to browser: [email or web address]    SARS-CoV-2 assay is a Nucleic Acid Amplification assay intended for the  qualitative detection of nucleic acid from SARS-CoV-2 in nasopharyngeal  swabs. Results are for the presumptive identification of SARS-CoV-2 RNA.    Positive results are indicative of infection with SARS-CoV-2, the virus  causing COVID-19, but do not rule out bacterial infection or co-infection  with other viruses. Laboratories within the [place] and its  territories are required to report all positive results to the appropriate  public health authorities. Negative results do not preclude SARS-CoV-2  infection and should not be used as the sole basis for  treatment or other  patient management decisions. Negative results must be combined with  clinical observations, patient history, and epidemiological information.  This test has not been FDA cleared or approved.    This test has been authorized by FDA under an Emergency Use Authorization  (EUA). This test is only authorized for the duration of time the  declaration that circumstances exist justifying the authorization of the  emergency use of an in vitro diagnostic tests for detection of SARS-CoV-2  virus and/or diagnosis of COVID-19 infection under section 564(b)(1) of  the Act, 21 U.S.C. 360bbb-3(b)(1), unless the authorization is terminated  or revoked sooner. The test has been validated but independent review by FDA  and CLIA is pending.    Test performed using 10Sixpert: This RT-PCR assay targets N2,  a region unique to SARS-CoV-2. A conserved region in the E-gene was chosen  for pan-Sarbecovirus detection which includes SARS-CoV-2.    According to CMS-2020-01-R, this platform meets the definition of high-throughput technology.    HS Troponin 0hr (reflex protocol) [[id number]]  (Normal) Collected: 01/21/24 1118    Lab Status: Final result Specimen: Blood from Arm, Left Updated: 01/21/24 1147     hs TnI 0hr 11 ng/L     Ethanol [[id number]]  (Abnormal) Collected: 01/21/24 1118    Lab Status: Final result Specimen: Blood from Arm, Left Updated: 01/21/24 1142     Ethanol Lvl 349 mg/dL     Protime-INR [[id number]]  (Abnormal) Collected: 01/21/24 1104    Lab Status: Final result Specimen: Blood from Arm, Right Updated: 01/21/24 1135     Protime 16.2 seconds      INR 1.27    APTT [[id number]]  (Abnormal) Collected: 01/21/24 1104    Lab Status: Final result Specimen: Blood from Arm, Right Updated: 01/21/24 1135     PTT 40 seconds     Ammonia [[id number]]  (Abnormal) Collected: 01/21/24 1106    Lab Status: Final result Specimen: Blood from Arm, Right Updated: 01/21/24 1135     Ammonia 85 umol/L     Narrative:       Specimen Icteric.    Lipase [[id number]]  (Abnormal) Collected: 01/21/24 1104    Lab Status: Final result Specimen: Blood from Arm, Right Updated: 01/21/24 1135     Lipase 123 u/L     Narrative:      Specimen Icteric.    Fingerstick Glucose (POCT) [[id number]]  (Abnormal) Collected: 01/21/24 1128    Lab Status: Final result Updated: 01/21/24 1129     POC Glucose >500 mg/dl     Blood culture #1 [[id number]] Collected: 01/21/24 1118    Lab Status: In process Specimen: Blood from Arm, Left Updated: 01/21/24 1123    CBC and differential [[id number]]  (Abnormal) Collected: 01/21/24 1104    Lab Status: Final result Specimen: Blood from Arm, Right Updated: 01/21/24 1117     WBC 10.12 Thousand/uL      RBC 4.30 Million/uL      Hemoglobin 14.6 g/dL      Hematocrit 43.3 %       fL      MCH 34.0 pg      MCHC 33.7 g/dL      RDW 17.0 %      MPV 11.6 fL      Platelets 72 Thousands/uL      nRBC 0 /100 WBCs      Neutrophils Relative 81 %      Immat GRANS % 2 %      Lymphocytes Relative 10 %      Monocytes Relative 6 %      Eosinophils Relative 0 %      Basophils Relative 1 %      Neutrophils Absolute 8.19 Thousands/µL      Immature Grans Absolute 0.20 Thousand/uL      Lymphocytes Absolute 1.03 Thousands/µL      Monocytes Absolute 0.63 Thousand/µL      Eosinophils Absolute 0.01 Thousand/µL      Basophils Absolute 0.06 Thousands/µL     Blood culture #2 [[id number]] Collected: 01/21/24 1104    Lab Status: In process Specimen: Blood from Arm, Right Updated: 01/21/24 1111                   US right upper quadrant   Final Result by Bi Larson MD (01/21 1302)      1. Normal gallbladder.   2. Hepatomegaly with hepatic steatosis.   3. Right intrarenal calculi.         Workstation performed: [id number]         CT abdomen pelvis with contrast   Final Result by Zan Boyd MD (01/21 1215)      1. Enlarged fatty liver. Collaterals/varices adjacent to the spleen suggest an element of portal hypertension.   2. 1.6 cm soft tissue  nodule identified adjacent to the visualized distal esophagus. A dedicated nonemergent CT scan of the chest is recommended for further evaluation purposes.   3. 9 mm calculus in the mid left ureter. 5 mm calculus at the left ureterovesical junction. This is associated with mild left hydronephrosis. The left kidney is atrophic.   4. Bilateral nephrolithiasis.   5. 2 mm calculus in the right renal pelvis. Minimal if any right hydronephrosis.   6. Mild edema along the right paracolic gutter/right anterior pararenal space. Indeterminate etiology. Possibly related to kidney findings.      The study was marked in EPIC for immediate notification.         Workstation performed: [id number]                    Procedures  ECG 12 Lead Documentation Only    Date/Time: 1/21/2024 11:09 AM    Performed by: Kenny Patton MD  Authorized by: Kenny Patton MD    ECG reviewed by me, the ED Provider: yes    Patient location:  ED  Rate:     ECG rate:  110  Rhythm:     Rhythm: sinus rhythm    Ectopy:     Ectopy: none    QRS:     QRS axis:  Normal  CriticalCare Time    Date/Time: 1/21/2024 1:20 PM    Performed by: Kenny Patton MD  Authorized by: Kenny Patton MD    Critical care provider statement:     Critical care time (minutes):  35    Critical care time was exclusive of:  Separately billable procedures and treating other patients and teaching time    Critical care was necessary to treat or prevent imminent or life-threatening deterioration of the following conditions:  Metabolic crisis    Critical care was time spent personally by me on the following activities:  Discussions with consultants, evaluation of patient's response to treatment, examination of patient, review of old charts, re-evaluation of patient's condition, ordering and review of radiographic studies, ordering and review of laboratory studies and ordering and performing treatments and interventions  Comments:      Patient with an elevated blood sugar  greater than 500.  Requiring insulin bolus and insulin infusion and repeated IV fluids.           ED Course  ED Course as of 01/21/24 1359   Sun Jan 21, 2024   1321 Discussed with urology on-call, Beba Phillips.  Does not need transfer at this time.  Unstable for any procedure.  Needs medical stabilization before any urologic procedure can be done.  Urology will be in house tomorrow.                               SBIRT 22yo+      Flowsheet Row Most Recent Value   Initial Alcohol Screen: US AUDIT-C     1. How often do you have a drink containing alcohol? 6 Filed at: 01/21/2024 1300   2. How many drinks containing alcohol do you have on a typical day you are drinking?  2 Filed at: 01/21/2024 1300   3a. Male UNDER 65: How often do you have five or more drinks on one occasion? 1 Filed at: 01/21/2024 1300   3b. FEMALE Any Age, or MALE 65+: How often do you have 4 or more drinks on one occassion? 1 Filed at: 01/21/2024 1300   Audit-C Score 10 Filed at: 01/21/2024 1300   Full Alcohol Screen: US AUDIT    4. How often during the last year have you found that you were not able to stop drinking once you had started? 0 Filed at: 01/21/2024 1300   5. How often during past year have you failed to do what was normally expected of you because of drinking?  0 Filed at: 01/21/2024 1300   6. How often in past year have you needed a first drink in the morning to get yourself going after a heavy drinking session?  2 Filed at: 01/21/2024 1300   7. How often in past year have you had feeling of guilt or remorse after drinking?  2 Filed at: 01/21/2024 1300   8. How often in past year have you been unable to remember what happened night before because you had been drinking?  1 Filed at: 01/21/2024 1300   9. Have you or someone else been injured as a result of your drinking?  0 Filed at: 01/21/2024 1300   10. Has a relative, friend, doctor or other health worker been concerned about your drinking and suggested you cut down?  4 Filed at:  01/21/2024 1300   AUDIT Total Score 19 Filed at: 01/21/2024 1300   DIANE: How many times in the past year have you...    Used an illegal drug or used a prescription medication for non-medical reasons? Never Filed at: 01/21/2024 1300                      Medical Decision Making  Amount and/or Complexity of Data Reviewed  Labs: ordered. Decision-making details documented in ED Course.  Radiology: ordered and independent interpretation performed. Decision-making details documented in ED Course.  ECG/medicine tests: ordered and independent interpretation performed. Decision-making details documented in ED Course.  Discussion of management or test interpretation with external provider(s): Differential diagnosis includes but not limited to STEMI, NSTEMI, cholelithiasis, cholecystitis, peptic ulcer disease, gastritis, pancreatitis, diverticulitis, colitis, bowel obstruction, appendicitis, UTI, ureteral stone, kidney stone, inflammatory bowel disease.    Risk  OTC drugs.  Prescription drug management.  Decision regarding hospitalization.             Disposition  Final diagnoses:   Ureteral stone   Left ureteral stone   Hyperglycemia   Jaundice     Time reflects when diagnosis was documented in both MDM as applicable and the Disposition within this note       Time User Action Codes Description Comment    1/21/2024 12:35 PM Visperas, Kenny P Add [N20.1] Ureteral stone     1/21/2024 12:48 PM Visperas, Kenny P Add [N20.1] Left ureteral stone     1/21/2024  1:02 PM Visjeremy, Kenny P Add [R73.9] Hyperglycemia     1/21/2024  1:19 PM Visperas, Kenny P Add [R17] Jaundice           ED Disposition       ED Disposition   Admit    Condition   Stable    Date/Time   Sun Jan 21, 2024 0902    Comment     Admit to Dr. Reyna.             Follow-up Information    None         Patient's Medications   Discharge Prescriptions    No medications on file       No discharge procedures on file.    PDMP Review         Value Time User    PDMP Reviewed   Yes 10/30/2023  3:58 PM Tiburcio Jean MD            ED Provider  Electronically Signed by             Kenny Patton MD  01/21/24 8481

## 2024-01-21 NOTE — ASSESSMENT & PLAN NOTE
Lab Results   Component Value Date    HGBA1C 9.4 (H) 09/29/2023       Recent Labs     01/21/24  1128 01/21/24  1332 01/21/24  1430   POCGLU >500* 452* 339*       Blood Sugar Average: Last 72 hrs:  (P) 395.5  BHB 0.2  UA neg ketones +glucose  Check A1C  DKA insulin gtt to prevent rapid decrease in BS  Transition to regular insulin gtt once gap closed x 2  Hold home ozempic/jardiance

## 2024-01-21 NOTE — ASSESSMENT & PLAN NOTE
Prerenal in setting of HHNK/ACEI use/ rule out obstruction  Baseline 0.9-1  CT nephrolithiasis mild left hydro  UA +blood/glucose/protein  CK WNL  Renal U/S: Right intrarenal calculi;  Hepatomegaly with hepatic steatosis    Plan:  Continue to vol resuscitate  Trend BMP  Monitor I/O

## 2024-01-21 NOTE — ASSESSMENT & PLAN NOTE
Prerenal in setting of HHNK/ACEI use/ rule out obstruction  Baseline 0.9-1  CT nephrolithiasis mild left hydro  UA +blood/glucose/protein  CK pending  Volume resuscitate  Check Renal u/s in am f/u on L hydro and consider urology consult if worsening  Trend bmp  I/O

## 2024-01-21 NOTE — ASSESSMENT & PLAN NOTE
Concern for alcoholic hepatitis  Hepatocellular pattern  RUQ hepatomegaly/hepatic steatosis (t/c liver dopplers if LFT rise)  CTAP fatty liver/varices/portal htn  MELD 22  DF 28.5 hold on prednisolone unless >32  LD, CK, Tylenol level are WNL  D Bili elevated at 9.8    AFP upper limit of NML    Plan:  Hepatitis panel/EBV Pending  Consult GI  T/C nadolol for portal htn prior to d/c  Eventual EGD

## 2024-01-21 NOTE — ASSESSMENT & PLAN NOTE
Per pt drinks 2 24 oz twisted tea/smirinoff per day (family notes pt drinks more than this)  Drinks since brother  3 years ago. Has stopped for a week or two without symptoms. No DTs.  CIWA protocol  Thiamine/folate

## 2024-01-21 NOTE — ASSESSMENT & PLAN NOTE
CTAP-1.6cm soft tissue nodule of distal esophagus  CTC-  Slightly enlarged lower right paraesophageal node, stable since an abd CT 10/30/2023, of uncertain etiology and significance

## 2024-01-21 NOTE — ASSESSMENT & PLAN NOTE
Likely 2/2 ETOH abuse  Hemolysis smear NML, LD wnl   Haptoglobin/fibrinogen pending  Continue to trend

## 2024-01-22 ENCOUNTER — APPOINTMENT (INPATIENT)
Dept: ULTRASOUND IMAGING | Facility: HOSPITAL | Age: 34
DRG: 441 | End: 2024-01-22
Payer: COMMERCIAL

## 2024-01-22 PROBLEM — E72.20 HYPERAMMONEMIA (HCC): Status: RESOLVED | Noted: 2024-01-21 | Resolved: 2024-01-22

## 2024-01-22 PROBLEM — E87.29 METABOLIC ACIDOSIS, INCREASED ANION GAP: Status: RESOLVED | Noted: 2024-01-21 | Resolved: 2024-01-22

## 2024-01-22 LAB
ABO GROUP BLD: NORMAL
ABO GROUP BLD: NORMAL
ALBUMIN SERPL BCP-MCNC: 2.6 G/DL (ref 3.5–5)
ALP SERPL-CCNC: 115 U/L (ref 34–104)
ALT SERPL W P-5'-P-CCNC: 84 U/L (ref 7–52)
AMMONIA PLAS-SCNC: 69 UMOL/L (ref 18–72)
ANION GAP SERPL CALCULATED.3IONS-SCNC: 10 MMOL/L
ANION GAP SERPL CALCULATED.3IONS-SCNC: 10 MMOL/L
ANISOCYTOSIS BLD QL SMEAR: PRESENT
AST SERPL W P-5'-P-CCNC: 254 U/L (ref 13–39)
ATRIAL RATE: 109 BPM
ATRIAL RATE: 113 BPM
BASOPHILS # BLD MANUAL: 0.06 THOUSAND/UL (ref 0–0.1)
BASOPHILS NFR MAR MANUAL: 1 % (ref 0–1)
BILIRUB SERPL-MCNC: 15.01 MG/DL (ref 0.2–1)
BLD GP AB SCN SERPL QL: NEGATIVE
BUN SERPL-MCNC: 5 MG/DL (ref 5–25)
BUN SERPL-MCNC: 5 MG/DL (ref 5–25)
CALCIUM ALBUM COR SERPL-MCNC: 9.2 MG/DL (ref 8.3–10.1)
CALCIUM SERPL-MCNC: 7.8 MG/DL (ref 8.4–10.2)
CALCIUM SERPL-MCNC: 8.1 MG/DL (ref 8.4–10.2)
CHLORIDE SERPL-SCNC: 101 MMOL/L (ref 96–108)
CHLORIDE SERPL-SCNC: 101 MMOL/L (ref 96–108)
CO2 SERPL-SCNC: 21 MMOL/L (ref 21–32)
CO2 SERPL-SCNC: 22 MMOL/L (ref 21–32)
CREAT SERPL-MCNC: 1.12 MG/DL (ref 0.6–1.3)
CREAT SERPL-MCNC: 1.32 MG/DL (ref 0.6–1.3)
EOSINOPHIL # BLD MANUAL: 0.06 THOUSAND/UL (ref 0–0.4)
EOSINOPHIL NFR BLD MANUAL: 1 % (ref 0–6)
ERYTHROCYTE [DISTWIDTH] IN BLOOD BY AUTOMATED COUNT: 17.2 % (ref 11.6–15.1)
FIBRINOGEN PPP-MCNC: 280 MG/DL (ref 207–520)
GFR SERPL CREATININE-BSD FRML MDRD: 70 ML/MIN/1.73SQ M
GFR SERPL CREATININE-BSD FRML MDRD: 85 ML/MIN/1.73SQ M
GLUCOSE SERPL-MCNC: 110 MG/DL (ref 65–140)
GLUCOSE SERPL-MCNC: 111 MG/DL (ref 65–140)
GLUCOSE SERPL-MCNC: 113 MG/DL (ref 65–140)
GLUCOSE SERPL-MCNC: 114 MG/DL (ref 65–140)
GLUCOSE SERPL-MCNC: 130 MG/DL (ref 65–140)
GLUCOSE SERPL-MCNC: 131 MG/DL (ref 65–140)
GLUCOSE SERPL-MCNC: 132 MG/DL (ref 65–140)
GLUCOSE SERPL-MCNC: 135 MG/DL (ref 65–140)
GLUCOSE SERPL-MCNC: 144 MG/DL (ref 65–140)
GLUCOSE SERPL-MCNC: 144 MG/DL (ref 65–140)
GLUCOSE SERPL-MCNC: 145 MG/DL (ref 65–140)
GLUCOSE SERPL-MCNC: 146 MG/DL (ref 65–140)
GLUCOSE SERPL-MCNC: 154 MG/DL (ref 65–140)
GLUCOSE SERPL-MCNC: 169 MG/DL (ref 65–140)
GLUCOSE SERPL-MCNC: 184 MG/DL (ref 65–140)
GLUCOSE SERPL-MCNC: 185 MG/DL (ref 65–140)
GLUCOSE SERPL-MCNC: 205 MG/DL (ref 65–140)
HAV IGM SER QL: NORMAL
HBV CORE IGM SER QL: NORMAL
HBV SURFACE AG SER QL: NORMAL
HCT VFR BLD AUTO: 30.9 % (ref 36.5–49.3)
HCV AB SER QL: NORMAL
HGB BLD-MCNC: 10.8 G/DL (ref 12–17)
INR PPP: 1.28 (ref 0.84–1.19)
LACTATE SERPL-SCNC: 3.1 MMOL/L (ref 0.5–2)
LACTATE SERPL-SCNC: 3.4 MMOL/L (ref 0.5–2)
LACTATE SERPL-SCNC: 4 MMOL/L (ref 0.5–2)
LACTATE SERPL-SCNC: 6.1 MMOL/L (ref 0.5–2)
LYMPHOCYTES # BLD AUTO: 0.46 THOUSAND/UL (ref 0.6–4.47)
LYMPHOCYTES # BLD AUTO: 8 % (ref 14–44)
MAGNESIUM SERPL-MCNC: 3 MG/DL (ref 1.9–2.7)
MCH RBC QN AUTO: 33.8 PG (ref 26.8–34.3)
MCHC RBC AUTO-ENTMCNC: 35 G/DL (ref 31.4–37.4)
MCV RBC AUTO: 97 FL (ref 82–98)
METAMYELOCYTES NFR BLD MANUAL: 1 % (ref 0–1)
MONOCYTES # BLD AUTO: 0.4 THOUSAND/UL (ref 0–1.22)
MONOCYTES NFR BLD: 7 % (ref 4–12)
NEUTROPHILS # BLD MANUAL: 4.69 THOUSAND/UL (ref 1.85–7.62)
NEUTS BAND NFR BLD MANUAL: 8 % (ref 0–8)
NEUTS SEG NFR BLD AUTO: 74 % (ref 43–75)
P AXIS: 47 DEGREES
P AXIS: 48 DEGREES
PHOSPHATE SERPL-MCNC: 1.9 MG/DL (ref 2.7–4.5)
PLATELET # BLD AUTO: 37 THOUSANDS/UL (ref 149–390)
PLATELET BLD QL SMEAR: ABNORMAL
PMV BLD AUTO: 11.6 FL (ref 8.9–12.7)
POTASSIUM SERPL-SCNC: 3.3 MMOL/L (ref 3.5–5.3)
POTASSIUM SERPL-SCNC: 4.6 MMOL/L (ref 3.5–5.3)
PR INTERVAL: 144 MS
PR INTERVAL: 144 MS
PROT SERPL-MCNC: 5.2 G/DL (ref 6.4–8.4)
PROTHROMBIN TIME: 16.3 SECONDS (ref 11.6–14.5)
QRS AXIS: 20 DEGREES
QRS AXIS: 33 DEGREES
QRSD INTERVAL: 82 MS
QRSD INTERVAL: 84 MS
QT INTERVAL: 334 MS
QT INTERVAL: 350 MS
QTC INTERVAL: 458 MS
QTC INTERVAL: 471 MS
RBC # BLD AUTO: 3.2 MILLION/UL (ref 3.88–5.62)
RBC MORPH BLD: NORMAL
RH BLD: POSITIVE
RH BLD: POSITIVE
SODIUM SERPL-SCNC: 132 MMOL/L (ref 135–147)
SODIUM SERPL-SCNC: 133 MMOL/L (ref 135–147)
SPECIMEN EXPIRATION DATE: NORMAL
T WAVE AXIS: 32 DEGREES
T WAVE AXIS: 33 DEGREES
VENTRICULAR RATE: 109 BPM
VENTRICULAR RATE: 113 BPM
WBC # BLD AUTO: 5.72 THOUSAND/UL (ref 4.31–10.16)

## 2024-01-22 PROCEDURE — C9113 INJ PANTOPRAZOLE SODIUM, VIA: HCPCS | Performed by: NURSE PRACTITIONER

## 2024-01-22 PROCEDURE — 99233 SBSQ HOSP IP/OBS HIGH 50: CPT | Performed by: ANESTHESIOLOGY

## 2024-01-22 PROCEDURE — 82140 ASSAY OF AMMONIA: CPT | Performed by: NURSE PRACTITIONER

## 2024-01-22 PROCEDURE — 76775 US EXAM ABDO BACK WALL LIM: CPT

## 2024-01-22 PROCEDURE — 86850 RBC ANTIBODY SCREEN: CPT | Performed by: NURSE PRACTITIONER

## 2024-01-22 PROCEDURE — 85027 COMPLETE CBC AUTOMATED: CPT | Performed by: NURSE PRACTITIONER

## 2024-01-22 PROCEDURE — 86901 BLOOD TYPING SEROLOGIC RH(D): CPT | Performed by: NURSE PRACTITIONER

## 2024-01-22 PROCEDURE — 85007 BL SMEAR W/DIFF WBC COUNT: CPT | Performed by: NURSE PRACTITIONER

## 2024-01-22 PROCEDURE — 82948 REAGENT STRIP/BLOOD GLUCOSE: CPT

## 2024-01-22 PROCEDURE — 85610 PROTHROMBIN TIME: CPT | Performed by: NURSE PRACTITIONER

## 2024-01-22 PROCEDURE — 99222 1ST HOSP IP/OBS MODERATE 55: CPT | Performed by: UROLOGY

## 2024-01-22 PROCEDURE — 86900 BLOOD TYPING SEROLOGIC ABO: CPT | Performed by: NURSE PRACTITIONER

## 2024-01-22 PROCEDURE — NC001 PR NO CHARGE

## 2024-01-22 PROCEDURE — 83605 ASSAY OF LACTIC ACID: CPT | Performed by: NURSE PRACTITIONER

## 2024-01-22 PROCEDURE — 83735 ASSAY OF MAGNESIUM: CPT | Performed by: NURSE PRACTITIONER

## 2024-01-22 PROCEDURE — 80048 BASIC METABOLIC PNL TOTAL CA: CPT | Performed by: NURSE PRACTITIONER

## 2024-01-22 PROCEDURE — 80053 COMPREHEN METABOLIC PANEL: CPT | Performed by: NURSE PRACTITIONER

## 2024-01-22 PROCEDURE — 84100 ASSAY OF PHOSPHORUS: CPT | Performed by: NURSE PRACTITIONER

## 2024-01-22 RX ORDER — COLCHICINE 0.6 MG/1
0.6 TABLET ORAL DAILY PRN
Status: DISCONTINUED | OUTPATIENT
Start: 2024-01-22 | End: 2024-01-23

## 2024-01-22 RX ORDER — TRAMADOL HYDROCHLORIDE 50 MG/1
50 TABLET ORAL EVERY 6 HOURS PRN
Status: DISCONTINUED | OUTPATIENT
Start: 2024-01-22 | End: 2024-01-23

## 2024-01-22 RX ORDER — INSULIN LISPRO 100 [IU]/ML
2-12 INJECTION, SOLUTION INTRAVENOUS; SUBCUTANEOUS
Status: DISCONTINUED | OUTPATIENT
Start: 2024-01-22 | End: 2024-01-25

## 2024-01-22 RX ORDER — HYDROXYZINE HYDROCHLORIDE 25 MG/1
25 TABLET, FILM COATED ORAL AS NEEDED
Status: DISCONTINUED | OUTPATIENT
Start: 2024-01-22 | End: 2024-01-22

## 2024-01-22 RX ORDER — ACETAMINOPHEN 325 MG/1
650 TABLET ORAL EVERY 8 HOURS SCHEDULED
Status: DISCONTINUED | OUTPATIENT
Start: 2024-01-22 | End: 2024-01-23

## 2024-01-22 RX ORDER — INSULIN LISPRO 100 [IU]/ML
1-6 INJECTION, SOLUTION INTRAVENOUS; SUBCUTANEOUS
Status: DISCONTINUED | OUTPATIENT
Start: 2024-01-23 | End: 2024-01-23

## 2024-01-22 RX ORDER — INSULIN LISPRO 100 [IU]/ML
8 INJECTION, SOLUTION INTRAVENOUS; SUBCUTANEOUS
Status: DISCONTINUED | OUTPATIENT
Start: 2024-01-22 | End: 2024-01-23

## 2024-01-22 RX ORDER — LORAZEPAM 1 MG/1
2 TABLET ORAL ONCE
Status: COMPLETED | OUTPATIENT
Start: 2024-01-22 | End: 2024-01-22

## 2024-01-22 RX ORDER — POTASSIUM CHLORIDE 14.9 MG/ML
20 INJECTION INTRAVENOUS
Status: DISPENSED | OUTPATIENT
Start: 2024-01-22 | End: 2024-01-22

## 2024-01-22 RX ORDER — PANTOPRAZOLE SODIUM 40 MG/1
40 TABLET, DELAYED RELEASE ORAL
Status: DISCONTINUED | OUTPATIENT
Start: 2024-01-23 | End: 2024-01-23

## 2024-01-22 RX ORDER — SODIUM CHLORIDE, SODIUM GLUCONATE, SODIUM ACETATE, POTASSIUM CHLORIDE, MAGNESIUM CHLORIDE, SODIUM PHOSPHATE, DIBASIC, AND POTASSIUM PHOSPHATE .53; .5; .37; .037; .03; .012; .00082 G/100ML; G/100ML; G/100ML; G/100ML; G/100ML; G/100ML; G/100ML
125 INJECTION, SOLUTION INTRAVENOUS CONTINUOUS
Status: DISCONTINUED | OUTPATIENT
Start: 2024-01-22 | End: 2024-01-22

## 2024-01-22 RX ORDER — CELECOXIB 100 MG/1
100 CAPSULE ORAL DAILY
Status: DISCONTINUED | OUTPATIENT
Start: 2024-01-22 | End: 2024-01-23

## 2024-01-22 RX ORDER — HYDROXYZINE HYDROCHLORIDE 25 MG/1
25 TABLET, FILM COATED ORAL EVERY 6 HOURS PRN
Status: DISCONTINUED | OUTPATIENT
Start: 2024-01-22 | End: 2024-01-23

## 2024-01-22 RX ORDER — SODIUM CHLORIDE, SODIUM GLUCONATE, SODIUM ACETATE, POTASSIUM CHLORIDE, MAGNESIUM CHLORIDE, SODIUM PHOSPHATE, DIBASIC, AND POTASSIUM PHOSPHATE .53; .5; .37; .037; .03; .012; .00082 G/100ML; G/100ML; G/100ML; G/100ML; G/100ML; G/100ML; G/100ML
1000 INJECTION, SOLUTION INTRAVENOUS ONCE
Status: COMPLETED | OUTPATIENT
Start: 2024-01-22 | End: 2024-01-22

## 2024-01-22 RX ORDER — GABAPENTIN 300 MG/1
300 CAPSULE ORAL 3 TIMES DAILY
Status: DISCONTINUED | OUTPATIENT
Start: 2024-01-22 | End: 2024-01-23

## 2024-01-22 RX ORDER — FLUOXETINE HYDROCHLORIDE 20 MG/1
20 CAPSULE ORAL DAILY
Status: DISCONTINUED | OUTPATIENT
Start: 2024-01-22 | End: 2024-01-23

## 2024-01-22 RX ORDER — INSULIN LISPRO 100 [IU]/ML
1-6 INJECTION, SOLUTION INTRAVENOUS; SUBCUTANEOUS
Status: DISCONTINUED | OUTPATIENT
Start: 2024-01-22 | End: 2024-01-22

## 2024-01-22 RX ORDER — COLCHICINE 0.6 MG/1
0.6 TABLET ORAL AS NEEDED
Status: DISCONTINUED | OUTPATIENT
Start: 2024-01-22 | End: 2024-01-22

## 2024-01-22 RX ORDER — ALLOPURINOL 100 MG/1
100 TABLET ORAL DAILY
Status: DISCONTINUED | OUTPATIENT
Start: 2024-01-22 | End: 2024-01-23

## 2024-01-22 RX ORDER — INSULIN GLARGINE 100 [IU]/ML
24 INJECTION, SOLUTION SUBCUTANEOUS
Status: DISCONTINUED | OUTPATIENT
Start: 2024-01-22 | End: 2024-01-24

## 2024-01-22 RX ORDER — LORAZEPAM 1 MG/1
2 TABLET ORAL ONCE
Status: COMPLETED | OUTPATIENT
Start: 2024-01-23 | End: 2024-01-23

## 2024-01-22 RX ADMIN — THIAMINE HYDROCHLORIDE: 100 INJECTION, SOLUTION INTRAMUSCULAR; INTRAVENOUS at 08:45

## 2024-01-22 RX ADMIN — SODIUM CHLORIDE 1.5 UNITS/HR: 9 INJECTION, SOLUTION INTRAVENOUS at 04:17

## 2024-01-22 RX ADMIN — HYDROMORPHONE HYDROCHLORIDE 0.5 MG: 1 INJECTION, SOLUTION INTRAMUSCULAR; INTRAVENOUS; SUBCUTANEOUS at 07:58

## 2024-01-22 RX ADMIN — SODIUM PHOSPHATE, MONOBASIC, MONOHYDRATE AND SODIUM PHOSPHATE, DIBASIC, ANHYDROUS 21 MMOL: 142; 276 INJECTION, SOLUTION INTRAVENOUS at 08:45

## 2024-01-22 RX ADMIN — POTASSIUM CHLORIDE 20 MEQ: 14.9 INJECTION, SOLUTION INTRAVENOUS at 04:23

## 2024-01-22 RX ADMIN — GABAPENTIN 300 MG: 300 CAPSULE ORAL at 11:02

## 2024-01-22 RX ADMIN — PANTOPRAZOLE SODIUM 40 MG: 40 INJECTION, POWDER, FOR SOLUTION INTRAVENOUS at 08:03

## 2024-01-22 RX ADMIN — Medication 2 TABLET: at 07:58

## 2024-01-22 RX ADMIN — LORAZEPAM 2 MG: 1 TABLET ORAL at 00:28

## 2024-01-22 RX ADMIN — SODIUM CHLORIDE 500 ML: 0.9 INJECTION, SOLUTION INTRAVENOUS at 00:51

## 2024-01-22 RX ADMIN — TRAMADOL HYDROCHLORIDE 50 MG: 50 TABLET, COATED ORAL at 17:42

## 2024-01-22 RX ADMIN — CELECOXIB 100 MG: 100 CAPSULE ORAL at 14:03

## 2024-01-22 RX ADMIN — ONDANSETRON 4 MG: 2 INJECTION INTRAMUSCULAR; INTRAVENOUS at 14:03

## 2024-01-22 RX ADMIN — SODIUM CHLORIDE, SODIUM GLUCONATE, SODIUM ACETATE, POTASSIUM CHLORIDE, MAGNESIUM CHLORIDE, SODIUM PHOSPHATE, DIBASIC, AND POTASSIUM PHOSPHATE 1000 ML: .53; .5; .37; .037; .03; .012; .00082 INJECTION, SOLUTION INTRAVENOUS at 08:11

## 2024-01-22 RX ADMIN — POTASSIUM CHLORIDE 20 MEQ: 14.9 INJECTION, SOLUTION INTRAVENOUS at 05:47

## 2024-01-22 RX ADMIN — HYDROMORPHONE HYDROCHLORIDE 0.5 MG: 1 INJECTION, SOLUTION INTRAMUSCULAR; INTRAVENOUS; SUBCUTANEOUS at 03:55

## 2024-01-22 RX ADMIN — SODIUM CHLORIDE 500 ML: 0.9 INJECTION, SOLUTION INTRAVENOUS at 04:09

## 2024-01-22 RX ADMIN — ACETAMINOPHEN 650 MG: 325 TABLET, FILM COATED ORAL at 14:03

## 2024-01-22 RX ADMIN — GABAPENTIN 300 MG: 300 CAPSULE ORAL at 20:03

## 2024-01-22 RX ADMIN — CHLORHEXIDINE GLUCONATE 0.12% ORAL RINSE 15 ML: 1.2 LIQUID ORAL at 20:03

## 2024-01-22 RX ADMIN — ALLOPURINOL 100 MG: 100 TABLET ORAL at 14:03

## 2024-01-22 RX ADMIN — FLUOXETINE 20 MG: 20 CAPSULE ORAL at 14:03

## 2024-01-22 RX ADMIN — INSULIN GLARGINE 24 UNITS: 100 INJECTION, SOLUTION SUBCUTANEOUS at 22:15

## 2024-01-22 RX ADMIN — ACETAMINOPHEN 650 MG: 325 TABLET, FILM COATED ORAL at 22:15

## 2024-01-22 RX ADMIN — HYDROXYZINE HYDROCHLORIDE 25 MG: 25 TABLET, FILM COATED ORAL at 15:15

## 2024-01-22 RX ADMIN — TRAMADOL HYDROCHLORIDE 50 MG: 50 TABLET, COATED ORAL at 11:02

## 2024-01-22 RX ADMIN — METOPROLOL TARTRATE 25 MG: 25 TABLET, FILM COATED ORAL at 10:06

## 2024-01-22 RX ADMIN — SODIUM CHLORIDE, SODIUM GLUCONATE, SODIUM ACETATE, POTASSIUM CHLORIDE, MAGNESIUM CHLORIDE, SODIUM PHOSPHATE, DIBASIC, AND POTASSIUM PHOSPHATE 125 ML/HR: .53; .5; .37; .037; .03; .012; .00082 INJECTION, SOLUTION INTRAVENOUS at 04:18

## 2024-01-22 RX ADMIN — GABAPENTIN 300 MG: 300 CAPSULE ORAL at 16:38

## 2024-01-22 RX ADMIN — NICOTINE 14 MG: 14 PATCH, EXTENDED RELEASE TRANSDERMAL at 08:05

## 2024-01-22 RX ADMIN — CHLORHEXIDINE GLUCONATE 0.12% ORAL RINSE 15 ML: 1.2 LIQUID ORAL at 08:03

## 2024-01-22 NOTE — CONSULTS
Consultation - [facility]  Michael Koch 33 y.o. male MRN: [id number]  Unit/Bed#: -01 Encounter: [id number]      ASSESSMENT & PLAN    Nausea with vomiting   Hepatomegaly w/ hepatic steatosis  ETOH abuse   Elevated LFTs  Hyperbilirubinemia  Thrombocytopenia  Ureteral stone   Patient presented with back pain, jaundice and N/V found to be in HHNC with a bilirubin of 13.82 with elevated AST > ALT  RUQ US with hepatomegaly and hepatic steatosis  CT A/P with enlarged fatty liver, evidence of portal HTN, 1.6cm nodule adjacent to distal esophagus, two left uretal calculi, mild left hydronephrosis  Reports daily alcohol use x3 years since brother's death  Bilirubin 15.01 this AM  Concern for alcoholic hepatitis  Maddrey's 30.2, do NOT recommend steroids at this time   MELD-Na 24  Hepatitis panel negative, EBV pending, acetaminophen <2  Recommend cessation of alcohol, he may benefit from rehab  Should have eventual EGD once recovered from acute illness, can be done as outpatient   Continue to monitor daily CMP      Reason for Consult / Principal Problem: transaminitis, ETOH abuse    HPI: Michael Koch is a 33 y.o. year old male with a PMHx type 2 DM, gout, HTN, hx nephrolithiasis, depression/anxiety, ETOH abuse, tobacco abuse, marijuana use who presented with back pain with associated nausea and vomiting x 1 week.     In the ED, patient found to be tachycardic. CT A/P with enlarged fatty liver, evidence of portal HTN, 1.6cm nodule adjacent to distal esophagus, two left uretal calculi, mild left hydronephrosis, b/l nephrolithiasis, mild edema along right paracolic gutter/right anterior pararenal space, possibly related to kidney findings. Labs significant for Na 126, Cr 1.38, glucose 559, , ALT 81, alk phos 135, t bili 13.82, direct bili 9.84, lactic acid 14.5, Hgb 14.6, WBC 10.12, platelets 72, INR 1.27, HgbA1c 8.8. RUQ US with hepatomegaly with hepatic steatosis. He was diagnosed with  "lactic acidosis/metabolic acidosis and was admitted to the ICU.     Today patient reports feeling unwell. He reports he started with nausea and vomiting about 1 week ago with associated back pain. He states it felt similar to when he had kidney stones in the past. He reports he drinks alcohol daily for the past 3 years since his brother . He typically drinks 2 \"pounders\" a day. Per chart review, family reports he drinks more. He has never been told he had liver issues. He has never been seen by GI or hepatology in the past. He denies any dysphagia. He is jaundiced on exam today with a tight distended abdomen which he states is normal for him. No ascites noted on US.     No prior EGD or colonoscopy.       Past Medical History:   Diagnosis Date    Depression     Diabetes mellitus (HCC)     Gout     Hypertension     Kidney stones      Past Surgical History:   Procedure Laterality Date    CYSTOSCOPY W/ URETERAL STENT PLACEMENT  2023    US GUIDED MASS BIOPSY (UNSPECIFIED)  2021     Social History   Social History     Substance and Sexual Activity   Alcohol Use Yes    Comment: \"couple 24 oz cans per day\"     Social History     Substance and Sexual Activity   Drug Use Never     Social History     Tobacco Use   Smoking Status Every Day    Current packs/day: 0.25    Types: Cigarettes   Smokeless Tobacco Never       History reviewed. No pertinent family history.    Medications Prior to Admission   Medication    acetaminophen (TYLENOL) 325 mg tablet    allopurinol (ZYLOPRIM) 100 mg tablet    colchicine (COLCRYS) 0.6 mg tablet    Fluoxetine HCl, PMDD, 20 MG TABS    hydrOXYzine HCL (ATARAX) 25 mg tablet    lisinopril (ZESTRIL) 10 mg tablet    mirtazapine (REMERON) 15 mg tablet    nicotine (NICODERM CQ) 21 mg/24 hr TD 24 hr patch    omeprazole (PriLOSEC) 40 MG capsule    ondansetron (ZOFRAN) 4 mg tablet    semaglutide, 0.25 or 0.5 mg/dose, (Ozempic, 0.25 or 0.5 MG/DOSE,) 2 mg/1.5 mL injection pen    tolterodine " (DETROL LA) 4 mg 24 hr capsule    celecoxib (CeleBREX) 100 mg capsule    magnesium Oxide (MAG-OX) 400 mg TABS    tamsulosin (FLOMAX) 0.4 mg     Current Facility-Administered Medications   Medication Dose Route Frequency    chlorhexidine (PERIDEX) 0.12 % oral rinse 15 mL  15 mL Mouth/Throat Q12H MURALI    folic acid 1 mg, thiamine (VITAMIN B1) 100 mg in sodium chloride 0.9 % 100 mL IV piggyback   Intravenous Daily    HYDROmorphone (DILAUDID) injection 0.5 mg  0.5 mg Intravenous Q3H PRN    insulin regular (HumuLIN R,NovoLIN R) 1 Units/mL in sodium chloride 0.9 % 100 mL infusion  0.3-21 Units/hr Intravenous Titrated    multi-electrolyte (ISOLYTE-S PH 7.4) bolus 1,000 mL  1,000 mL Intravenous Once    nicotine (NICODERM CQ) 14 mg/24hr TD 24 hr patch 14 mg  14 mg Transdermal Daily    ondansetron (ZOFRAN) injection 4 mg  4 mg Intravenous Q4H PRN    pantoprazole (PROTONIX) injection 40 mg  40 mg Intravenous Daily    sodium chloride (PF) 0.9 % injection 3 mL  3 mL Intravenous Q1H PRN    sodium phosphate 21 mmol in dextrose 5 % 250 mL Infusion  21 mmol Intravenous Once     No Known Allergies    Physical Exam  Constitutional:       General: He is not in acute distress.     Appearance: He is obese. He is ill-appearing.      Interventions: Nasal cannula in place.   Eyes:      General: Scleral icterus present.   Cardiovascular:      Rate and Rhythm: Regular rhythm. Tachycardia present.   Pulmonary:      Effort: Pulmonary effort is normal. No respiratory distress.   Abdominal:      General: Abdomen is protuberant. Bowel sounds are normal. There is distension.      Tenderness: There is no abdominal tenderness. There is no guarding.   Skin:     General: Skin is warm and dry.      Coloration: Skin is jaundiced.   Neurological:      Mental Status: He is alert and oriented to person, place, and time.       Most Recent Vital Signs:  Vitals:    01/22/24 0500 01/22/24 0559 01/22/24 0600 01/22/24 0700   BP: 108/72  117/74 116/77   BP  Location:    Right arm   Pulse: (!) 135  (!) 135 (!) 134   Resp: 20  (!) 23 (!) 28   Temp:    98.6 °F (37 °C)   TempSrc:    Oral   SpO2: 92%  94% 91%   Weight:  90.1 kg (198 lb 10.2 oz)     Height:           Intake/Output Summary (Last 24 hours) at 1/22/2024 0826  Last data filed at 1/22/2024 0540  Gross per 24 hour   Intake 9804.56 ml   Output 1300 ml   Net 8504.56 ml       LABS/IMAGING  Lab Results: I have reviewed all relevant lab results during this hospitalization.    Imaging Studies:  I have reviewed all the relevant images during this hospitalizations    Counseling / Coordination of Care  Total time spent today 45 minutes. Greater than 50% of total time was spent with the patient and / or family counseling and / or coordination of care.    Kayleigh Stahl PA-C

## 2024-01-22 NOTE — UTILIZATION REVIEW
NOTIFICATION OF INPATIENT ADMISSION   AUTHORIZATION REQUEST   SERVICING FACILITY:   [place] [address]  Tax ID: [id number]  NPI: [id number] ATTENDING PROVIDER:  Attending Name and NPI#: Melody Reyna Do [[id number]]  Address: [address] [place] [address]  Phone: [phone]   ADMISSION INFORMATION:  Place of Service: Inpatient [facility]  Place of Service Code: 21  Inpatient Admission Date/Time: 1/21/24  1:59 PM  Discharge Date/Time: No discharge date for patient encounter.  Admitting Diagnosis Code/Description:  Ureteral stone [N20.1]  Back pain [M54.9]  Jaundice [R17]  Hyperglycemia [R73.9]  Left ureteral stone [N20.1]     UTILIZATION REVIEW CONTACT:  Malaika Cao, Utilization   Network Utilization Review Department  Phone: [phone]  Fax [phone]  Email: [email or web address]  Contact for approvals/pending authorizations, clinical reviews, and discharge.     PHYSICIAN ADVISORY SERVICES:  Medical Necessity Denial & Mqhz-de-Fqxp Review  Phone: [phone]  Fax: [phone]  Email: [email or web address]     DISCHARGE SUPPORT TEAM:  For Patients Discharge Needs & Updates  Phone: [phone] opt. 2 Fax: [phone]  Email: [email or web address]

## 2024-01-22 NOTE — CONSULTS
"Consultation - Urology   Michael Koch 33 y.o. male MRN: [id number]  Unit/Bed#: -01 Encounter: [id number]      Assessment/Plan      Assessment:  Patient with 2 left ureteral calculi.  Multiple other comorbidities which need to be addressed for stabilization prior to any intervention for his calculi.    Plan:  Patient had been previously seen at [facility] and had undergone intervention for ureteral calculi about 1 to 2 years ago.  Once acute medical issues have been addressed and stabilized, we would plan left ureteral stent placement with ureteroscopy and laser lithotripsy.  Creatinine is normal.  Present pain is related to significant tense ascites.     History of Present Illness   Attending: Melody Reyna DO  HPI: Michael Koch is a 33 y.o. year old male who presents with 2 left ureteral calculi and multiple other significant and acute medical issues.  Patient had been seen at [facility] previously for his stone disease.  Last procedure about 1 to 2 years ago.  Primarily has abdominal pain at this point this radiates to his back but is generalized and does not appear to be related to his stone disease.      REVIEW OF SYSTEMS  Const: Denies chills, fever and weight loss.  CV: Denies chest pain.  Resp: Denies SOB.  GI: Denies abdominal pain, nausea and vomiting.  : Denies symptoms other than stated above.  Musculo: Denies back pain.    Historical Information   Past Medical History:   Diagnosis Date    Depression     Diabetes mellitus (HCC)     Gout     Hypertension     Kidney stones      Past Surgical History:   Procedure Laterality Date    CYSTOSCOPY W/ URETERAL STENT PLACEMENT  09/14/2023    US GUIDED MASS BIOPSY (UNSPECIFIED)  02/19/2021     Social History   Social History     Substance and Sexual Activity   Alcohol Use Yes    Comment: \"couple 24 oz cans per day\"     E-Cigarette/Vaping    E-Cigarette Use Never User      E-Cigarette/Vaping Substances     Social History     Tobacco Use   Smoking Status Every Day    " Current packs/day: 0.25    Types: Cigarettes   Smokeless Tobacco Never         Meds/Allergies   all current active meds have been reviewed, current meds:   Current Facility-Administered Medications   Medication Dose Route Frequency    chlorhexidine (PERIDEX) 0.12 % oral rinse 15 mL  15 mL Mouth/Throat Q12H MURALI    folic acid 1 mg, thiamine (VITAMIN B1) 100 mg in sodium chloride 0.9 % 100 mL IV piggyback   Intravenous Daily    HYDROmorphone (DILAUDID) injection 0.5 mg  0.5 mg Intravenous Q3H PRN    insulin regular (HumuLIN R,NovoLIN R) 1 Units/mL in sodium chloride 0.9 % 100 mL infusion  0.3-21 Units/hr Intravenous Titrated    metoprolol tartrate (LOPRESSOR) tablet 25 mg  25 mg Oral Q12H MURALI    nicotine (NICODERM CQ) 14 mg/24hr TD 24 hr patch 14 mg  14 mg Transdermal Daily    ondansetron (ZOFRAN) injection 4 mg  4 mg Intravenous Q4H PRN    pantoprazole (PROTONIX) injection 40 mg  40 mg Intravenous Daily    sodium chloride (PF) 0.9 % injection 3 mL  3 mL Intravenous Q1H PRN    sodium phosphate 21 mmol in dextrose 5 % 250 mL Infusion  21 mmol Intravenous Once   , and PTA meds:   Prior to Admission Medications   Prescriptions Last Dose Informant Patient Reported? Taking?   Fluoxetine HCl, PMDD, 20 MG TABS Past Week  Yes Yes   Sig: Take 20 mg by mouth daily   acetaminophen (TYLENOL) 325 mg tablet 1/20/2024  No Yes   Sig: Take 2 tablets (650 mg total) by mouth every 8 (eight) hours   allopurinol (ZYLOPRIM) 100 mg tablet Past Week  Yes Yes   Sig: Take 100 mg by mouth daily   celecoxib (CeleBREX) 100 mg capsule Unknown  Yes No   Sig: Take 100 mg by mouth daily   colchicine (COLCRYS) 0.6 mg tablet Past Week  Yes Yes   Sig: Take 0.6 mg by mouth if needed for muscle/joint pain   hydrOXYzine HCL (ATARAX) 25 mg tablet Past Week  Yes Yes   Sig: Take 25 mg by mouth if needed for anxiety   lisinopril (ZESTRIL) 10 mg tablet Past Week  Yes Yes   Sig: Take 10 mg by mouth daily   magnesium Oxide (MAG-OX) 400 mg TABS   No No   Sig: Take  "1 tablet (400 mg total) by mouth daily   mirtazapine (REMERON) 15 mg tablet Past Week  Yes Yes   Sig: Take 15 mg by mouth daily at bedtime as needed   nicotine (NICODERM CQ) 21 mg/24 hr TD 24 hr patch Past Week  No Yes   Sig: Place 1 patch on the skin over 24 hours daily   omeprazole (PriLOSEC) 40 MG capsule Past Week  Yes Yes   Sig: Take 40 mg by mouth daily   ondansetron (ZOFRAN) 4 mg tablet   Yes Yes   Sig: Take 1 tablet by mouth every 6 (six) hours as needed for nausea   semaglutide, 0.25 or 0.5 mg/dose, (Ozempic, 0.25 or 0.5 MG/DOSE,) 2 mg/1.5 mL injection pen Past Week  Yes Yes   Sig: Inject 0.5 mg under the skin once a week   tamsulosin (FLOMAX) 0.4 mg   Yes No   Sig: Take 0.4 mg by mouth daily at bedtime   tolterodine (DETROL LA) 4 mg 24 hr capsule Past Week  Yes Yes   Sig: Take 4 mg by mouth daily      Facility-Administered Medications: None     No Known Allergies    Objective   Vitals: Blood pressure (!) 86/60, pulse (!) 132, temperature 98.6 °F (37 °C), temperature source Oral, resp. rate 22, height 5' 7\" (1.702 m), weight 90.1 kg (198 lb 10.2 oz), SpO2 93%.    I/O last 24 hours:  In: 46598.7 [P.O.:600; I.V.:5246.7; IV Piggyback:6000]  Out: 1500 [Urine:1500]    Invasive Devices       Peripheral Intravenous Line  Duration             Peripheral IV 01/21/24 Left;Proximal;Ventral (anterior) Forearm <1 day    Peripheral IV 01/21/24 Left;Ventral (anterior) Forearm <1 day    Peripheral IV 01/21/24 Right Antecubital <1 day                    PHYSICAL EXAM  Const: Appears healthy and well developed. No signs of acute distress present.  Resp: Respirations are regular and unlabored.   CV: Rate is regular. Rhythm is regular.  Abdomen: Abdomen is soft, nontender, and nondistended. Kidneys are not palpable.  : No Ferguson catheter presently.  Significant ascites tense on abdominal exam no distinct flank tenderness  Psych: Patient's attitude is cooperative. Mood is normal. Affect is normal.    Lab Results: I have " "personally reviewed pertinent reports.    CBC:   Lab Results   Component Value Date    WBC 5.72 01/22/2024    HGB 10.8 (L) 01/22/2024    HCT 30.9 (L) 01/22/2024    MCV 97 01/22/2024    PLT 37 (LL) 01/22/2024    RBC 3.20 (L) 01/22/2024    MCH 33.8 01/22/2024    MCHC 35.0 01/22/2024    RDW 17.2 (H) 01/22/2024    MPV 11.6 01/22/2024    NRBC 0 01/21/2024     CMP:   Lab Results   Component Value Date    SODIUM 133 (L) 01/22/2024     01/22/2024    CO2 22 01/22/2024    BUN 5 01/22/2024    CREATININE 1.32 (H) 01/22/2024    CALCIUM 7.8 (L) 01/22/2024     (H) 01/22/2024    ALT 84 (H) 01/22/2024    ALKPHOS 115 (H) 01/22/2024    EGFR 70 01/22/2024     Urinalysis:   Lab Results   Component Value Date    COLORU Yellow 01/21/2024    CLARITYU Clear 01/21/2024    SPECGRAV <=1.005 01/21/2024    PHUR 5.5 01/21/2024    LEUKOCYTESUR (A) 01/21/2024     Elevated glucose may cause decreased leukocyte values. See urine microscopic for UWBC result    NITRITE Negative 01/21/2024    GLUCOSEU >=1000 (1%) (A) 01/21/2024    KETONESU Negative 01/21/2024    BILIRUBINUR Large (A) 01/21/2024    BLOODU Large (A) 01/21/2024     Urine Culture: No results found for: \"URINECX\"  PSA: No results found for: \"PSA\"  Imaging Studies: I have personally reviewed pertinent reports.   and I have personally reviewed pertinent films in PACS  EKG, Pathology, and Other Studies: I have personally reviewed pertinent reports.   and I have personally reviewed pertinent films in PACS    Code Status: Level 1 - Full Code  Advance Directive and Living Will:      Power of :    POLST:      Counseling / Coordination of Care  Total floor / unit time spent today 15 minutes. Greater than 50% of total time was spent with the patient and / or family counseling and / or coordination of care. A description of the counseling / coordination of care: Diagnosis and treatment options  "

## 2024-01-22 NOTE — PROGRESS NOTES
Critical Care Interval Transfer Note:    Please refer to progress note from earlier today for full details.     Barriers to discharge:   Bridge off Insulin gtt to SQ Insulin  Endo consult and evaluation      Consults: IP CONSULT TO CASE MANAGEMENT  IP CONSULT TO GASTROENTEROLOGY  IP CONSULT TO ENDOCRINOLOGY    Recommended to review admission imaging for incidental findings and document in discharge navigator: Chart reviewed, no known incidental findings noted at this time.      Discharge Plan: Anticipate discharge in 24-48 hrs to home.            Patient seen and evaluated by Critical Care today and deemed to be appropriate for transfer to Stepdown Level 2. Spoke to Dr. Jean from [facility] to accept transfer. Critical care can be contacted via [facility] with any questions or concerns.

## 2024-01-22 NOTE — ASSESSMENT & PLAN NOTE
Patient noted to have worsening confusion today and restless and climbing out of bed.  Currently placed on a one-to-one observation.    Bilirubin level is worsening and still has transaminitis.  Also noted to have elevated ammonia level of 149 today.    Concern for worsening liver failure.  Asked GI to reassess whether he is now a candidate for steroids  RUQ hepatomegaly/hepatic steatosis (t/c liver dopplers if LFT rise)  CTAP fatty liver/varices/portal htn  MELD 30.52.6% 3 month mortality  DF 44   LD, CK, Tylenol level are WNL.stop tylenol     Plan:  Hepatitis panel pending  Will place on lactulose 20 g every 2 hours until patient has at least 4 bowel movements and repeat ammonia level in AM.  Continue CIWA protocol.  T/C nadolol for portal htn prior to d/c but currently hypotensive  Eventual EGD

## 2024-01-22 NOTE — CONSULTS
TeleConsultation - Endocrine  Michael Koch 33 y.o. male MRN: [id number]  Unit/Bed#: -01 Encounter: [id number]        REQUIRED DOCUMENTATION:     1. This service was provided via Telemedicine.  2. Provider located at [place], [place].  3. TeleMed provider: Santosh Hawkins DO.  4. Identify all parties in room with patient during tele consult:  Myself and patient  5.Patient was then informed that this was a Telemedicine visit and that the exam was being conducted confidentially over secure lines. My office door was closed. No one else was in the room.  Patient acknowledged consent and understanding of privacy and security of the Telemedicine visit, and gave us permission to have the assistant stay in the room in order to assist with the history and to conduct the exam.  I informed the patient that I have reviewed their record in Epic and presented the opportunity for them to ask any questions regarding the visit today.  The patient agreed to participate.               Assessment/Plan     Assessment:    Diabetes mellitus with severe hyperglycemia - resolved  Metabolic acidosis 2/2 lactic acidosis - resolved  Transaminitis/hepatomegaly    Plan:    Patient has interest in food. Transition off insulin drip tonight. Plan for lantus 24 units qHS, humalog 8 units qAC plus scale #4/3 AC/HS. Decrease carb controlled diet 75 g per meal. Anticipate need for insulin therapy on hospital discharge, which I advised patient. Would plan to hold ozempic and jardiance on discharge given severity of metabolic disturbances, and history of alcoholism. Will continue to follow while patient remains admitted. He would benefit from outpatient endocrinology referral on discharge. Will follow    History of Present Illness     HPI: Michael Koch is a 33 y.o. year old male who presented with N/V x1 week duration, also polydipsia. Found to have a number of metabolic abnormalities, including severe hyperglycemia, AGMA with lactic  "acidosis, transaminitis. Patient with history of EtOH use and was acutely intoxicated on presentation. Blood and urine ketones were negative. 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     Patient with history of diabetes. As outpatient, was on ozempic and jardiance. He had prior experience with metformin, which caused GI upset. He did not perform CBG testing. There is diabetes in his mother.     Patient feeling better now, and has intact appetite.     Insulin requirements yesterday were quite high, but have lowered today upon cessation of dextrose infusion. Rates have varied between 1.5 - 3u/h for most of the day.     Inpatient consult to Endocrinology  Consult performed by: Santosh Hawkins DO  Consult ordered by: JENSEN Bruno          Review of Systems   Constitutional:  Negative for unexpected weight change.   Gastrointestinal:  Negative for nausea and vomiting.   Endocrine: Positive for polydipsia.   All other systems reviewed and are negative.      Historical Information   Past Medical History:   Diagnosis Date    Depression     Diabetes mellitus (HCC)     Gout     Hypertension     Kidney stones      Past Surgical History:   Procedure Laterality Date    CYSTOSCOPY W/ URETERAL STENT PLACEMENT  09/14/2023    US GUIDED MASS BIOPSY (UNSPECIFIED)  02/19/2021     Social History   Social History     Substance and Sexual Activity   Alcohol Use Yes    Comment: \"couple 24 oz cans per day\"     Social History     Substance and Sexual Activity   Drug Use Never     E-Cigarette/Vaping    E-Cigarette Use Never User      E-Cigarette/Vaping Substances     Social History     Tobacco Use   Smoking Status Every Day    Current packs/day: 0.25    Types: Cigarettes   Smokeless Tobacco Never     Family History: History reviewed. No " "pertinent family history.    Meds/Allergies   all current active meds have been reviewed  No Known Allergies    Objective   Vitals: Blood pressure 125/68, pulse (!) 107, temperature 98.8 °F (37.1 °C), temperature source Temporal, resp. rate (!) 29, height 5' 7\" (1.702 m), weight 90.1 kg (198 lb 10.2 oz), SpO2 92%.    Intake/Output Summary (Last 24 hours) at 1/22/2024 1648  Last data filed at 1/22/2024 0901  Gross per 24 hour   Intake 7626.66 ml   Output 1500 ml   Net 6126.66 ml     Invasive Devices       Peripheral Intravenous Line  Duration             Peripheral IV 01/21/24 Left;Proximal;Ventral (anterior) Forearm 1 day    Peripheral IV 01/21/24 Left;Ventral (anterior) Forearm 1 day    Peripheral IV 01/21/24 Right Antecubital 1 day                    Physical Exam  Vitals reviewed.   Constitutional:       General: He is not in acute distress.     Appearance: Normal appearance.   HENT:      Head: Normocephalic and atraumatic.      Nose: Nose normal.   Pulmonary:      Effort: Pulmonary effort is normal. No respiratory distress.   Musculoskeletal:      Cervical back: Normal range of motion.   Neurological:      General: No focal deficit present.      Mental Status: He is alert.   Psychiatric:         Mood and Affect: Mood normal.         Behavior: Behavior normal.         Lab Results: I have personally reviewed pertinent reports.      Lab Results   Component Value Date    SODIUM 133 (L) 01/22/2024    K 4.6 01/22/2024     01/22/2024    CO2 22 01/22/2024    AGAP 10 01/22/2024    BUN 5 01/22/2024    CREATININE 1.32 (H) 01/22/2024    GLUC 130 01/22/2024    CALCIUM 7.8 (L) 01/22/2024     (H) 01/22/2024    ALT 84 (H) 01/22/2024    ALKPHOS 115 (H) 01/22/2024    TP 5.2 (L) 01/22/2024    TBILI 15.01 (H) 01/22/2024    EGFR 70 01/22/2024     Lab Results   Component Value Date    HGBA1C 8.8 (H) 01/21/2024         Imaging Studies: I have personally reviewed pertinent reports.        Counseling / Coordination of " Care  I have spent a total time of 40 minutes on 01/22/24 in caring for this patient including Diagnostic results, Instructions for management, Patient and family education, Impressions, Counseling / Coordination of care, Documenting in the medical record, Reviewing / ordering tests, medicine, procedures  , Obtaining or reviewing history  , and Communicating with other healthcare professionals .

## 2024-01-22 NOTE — ASSESSMENT & PLAN NOTE
Lab Results   Component Value Date    HGBA1C 8.8 (H) 01/21/2024       Recent Labs     01/22/24  0620 01/22/24  0757 01/22/24  0953 01/22/24  1201   POCGLU 110 135 146* 169*       Blood Sugar Average: Last 72 hrs:  (P) 226.95  UA neg ketones +glucose  A1C 8.8%     Plan:  DKA insulin gtt transitioned to regular insulin gtt yesterday  currently patient is not eating and drinking much has low blood sugars will adjust insulin regimen to avoid hypoglycemia  Hold home ozempic/jardiance

## 2024-01-22 NOTE — UTILIZATION REVIEW
"Initial Clinical Review    Admission: Date/Time/Statement:   Admission Orders (From admission, onward)       Ordered        01/21/24 1359  INPATIENT ADMISSION  Once                          Orders Placed This Encounter   Procedures    INPATIENT ADMISSION     Standing Status:   Standing     Number of Occurrences:   1     Order Specific Question:   Level of Care     Answer:   Level 1 Stepdown [13]     Order Specific Question:   Estimated length of stay     Answer:   More than 2 Midnights     Order Specific Question:   Certification     Answer:   I certify that inpatient services are medically necessary for this patient for a duration of greater than two midnights. See H&P and MD Progress Notes for additional information about the patient's course of treatment.     ED Arrival Information       Expected   -    Arrival   1/21/2024 10:55    Acuity   Emergent              Means of arrival   Walk-In    Escorted by   Spouse    Service   Hospitalist    Admission type   Emergency              Arrival complaint   Vomiting/Lower Back Pain/Eyes yellow/hiccups             Chief Complaint   Patient presents with    Back Pain     Worsening abdominal pain, back pain over the past few weeks. State he is vomiting and unable to tolerate PO intake. Reports hiccups, \"yellow eyes\". Seen by PCP recently for same.        Initial Presentation: 33 y.o. male to ED via walk-in from home  Present to ED with back pain/nausea and vomiting x 1 week.  Per patient he had a video visit last week and was given Zofran.  No improvement and had an in person visit with no additional workup.   PMHX DM 2, EtOH abuse, tobacco abuse, marijuana use, gout, depression anxiety, hypertension, GERD, nephrolithiasis status post lithotripsy and stent removal    Admitted to Level 1 Stepdown  with DX: HHNC (hyperglycemic hyperosmolar nonketotic coma)   on exam: MELD 22; positive for chills/weakness/decreased appetite/nausea/mucus production/low back pain. Scleral " icterus.  Tachy; hypertensive; platelets of 72/bicarb of 13/anion gap of 23/creatinine of 1.38 (baseline 0.9-1.0) /lactic acid of 14.5/AST of 190/ALT of 81/alk phos of 135/T. bili of 13.82/ammonia of 85/lipase of 123. alcohol level on admission was 349, UA +blood/glucose/protein   CT of the abdomen and pelvis was done revealing fatty liver, collateral/varices adjacent to the spleen suggesting portal hypertension.  1.6 cm soft tissue tissue nodule at the distal esophagus.  9 mm calculus in the mid left ureter.  5 mm calculus at the left UVJ with mild left hydro-.  2 mm calculus in the right renal pelvis.   Upper quadrant ultrasound was done revealing hepatomegaly and hepatic steatosis.     PLAN: cont insulin gtt; start Folic acid / thiamine ivpb; cont ivf; cont iv protonix; rec'd Ca Gluc iv x1; rec'd throazine iv x1; rec'd lopressor iv x1; rec'd Mg Sulf iv x1; rec'd Sodium Phos iv x1; rec'd Potassium Phos iv x1; monitor labs; Cardiopulmonary monitoring; Accuchecks with ssic; neuro checks; pain / nausea control (see below); f/u blood cx; Consult GI; npo      Date: 1/22/24       Day 2  T max 99.7; hypotensive; jaundiced; tachy; abdominal rigid and distended with abdominal tenderness - Tenderness to light palpation of lower abdomen, both sides, extends to flanks, does not continue to lower back; pain 5-7/10; metaabilic acidosis - resolved; D Bili elevated at 9.8;   Plan: cont insulin gtt; start Folic acid / thiamine ivpb; cont iv protonix; cont ivf; rec'd ivf bolus 1L x1; rec'd ivf bolus 500 x2; rec'd KCL iv x2; rec'd Sodium Phos iv x1; monitor labs; Cardiopulmonary monitoring; Accuchecks with ssic; neuro checks; pain / nausea control (see below); f/u blood cx; f/u  Haptoglobin/fibrinogen     GI CONSULT: transaminitis, ETOH abuse   Nausea with vomiting / Hepatomegaly w/ hepatic steatosis / ETOH abuse / Elevated LFTs / Hyperbilirubinemia / Thrombocytopenia / Ureteral stone   Patient presented with back pain,  jaundice and N/V found to be in HHNC with a bilirubin of 13.82 with elevated AST > ALT.  RUQ US with hepatomegaly and hepatic steatosis.  CT A/P with enlarged fatty liver, evidence of portal HTN, 1.6cm nodule adjacent to distal esophagus, two left uretal calculi, mild left hydronephrosis.  Reports daily alcohol use x3 years since brother's death.  Bilirubin 15.01 this AM.  Concern for alcoholic hepatitis.  Maddrey's 30.2, do NOT recommend steroids at this time.  MELD-Na 24.  Hepatitis panel negative, EBV pending, acetaminophen <2  Plan: Should have eventual EGD once recovered from acute illness, can be done as outpatient .  Continue to monitor daily CMP    UROLOGY CONSULT  Assessment: Patient with 2 left ureteral calculi.  Multiple other comorbidities which need to be addressed for stabilization prior to any intervention for his calculi.  Plan: Patient had been previously seen at [facility] and had undergone intervention for ureteral calculi about 1 to 2 years ago.  Once acute medical issues have been addressed and stabilized, we would plan left ureteral stent placement with ureteroscopy and laser lithotripsy.  Creatinine is normal.  Present pain is related to significant tense ascites.       ED Triage Vitals   Temperature Pulse Respirations Blood Pressure SpO2   01/21/24 1100 01/21/24 1100 01/21/24 1100 01/21/24 1100 01/21/24 1100   98.1 °F (36.7 °C) (!) 113 16 136/73 97 %      Temp Source Heart Rate Source Patient Position - Orthostatic VS BP Location FiO2 (%)   01/21/24 1100 01/21/24 1100 01/21/24 1100 01/21/24 1100 --   Temporal Monitor Lying Left arm       Pain Score       01/21/24 1122       8          Wt Readings from Last 1 Encounters:   01/22/24 90.1 kg (198 lb 10.2 oz)     Additional Vital Signs:   Date/Time Temp Pulse Resp BP MAP (mmHg) SpO2 Calculated FIO2 (%) - Nasal Cannula Nasal Cannula O2 Flow Rate (L/min) O2 Device Patient Position - Orthostatic VS   01/22/24 1429 98.8 °F (37.1 °C) -- -- -- -- -- -- -- --  Lying   01/22/24 1202 -- 107 Abnormal  27 Abnormal  -- -- 92 % -- -- -- --   01/22/24 1046 98.7 °F (37.1 °C) 124 Abnormal  28 Abnormal  123/69 90 94 % 28 2 L/min Nasal cannula --   01/22/24 0927 -- 132 Abnormal  26 Abnormal  112/75 90 95 % 28 2 L/min Nasal cannula Lying - Orthostatic VS   01/22/24 0900 -- 132 Abnormal  22 86/60 Abnormal  70 93 % 28 2 L/min Nasal cannula Lying   01/22/24 0800 -- 138 Abnormal  25 Abnormal  101/63 74 93 % 28 2 L/min Nasal cannula Lying   01/22/24 0700 98.6 °F (37 °C) 134 Abnormal  28 Abnormal  116/77 90 91 % 28 2 L/min Nasal cannula Lying   01/22/24 0600 -- 135 Abnormal  23 Abnormal  117/74 91 94 % -- -- -- --   01/22/24 0500 -- 135 Abnormal  20 108/72 87 92 % -- -- -- --   01/22/24 0430 99.7 °F (37.6 °C) 134 Abnormal  19 104/67 81 91 % 28 2 L/min Nasal cannula Lying   01/22/24 0215 -- 134 Abnormal  23 Abnormal  103/61 76 92 % -- -- -- --   01/22/24 0100 -- 135 Abnormal  20 -- -- 93 % -- -- -- --   01/21/24 2345 -- 133 Abnormal  19 93/55 69 94 % -- -- -- --   01/21/24 2300 99.8 °F (37.7 °C) 135 Abnormal  18 93/55 69 91 % -- -- None (Room air) Lying   01/21/24 1930 99.4 °F (37.4 °C) 128 Abnormal  23 Abnormal  140/84 101 94 % -- -- -- Lying   01/21/24 1720 -- 117 Abnormal  21 -- -- 94 % -- -- -- --   01/21/24 1502 -- 118 Abnormal  22 121/71 90 94 % -- -- None (Room air) --   01/21/24 1433 98.8 °F (37.1 °C) 121 Abnormal  22 102/60 74 92 % -- -- -- --   01/21/24 1400 -- 112 Abnormal  21 127/83 99 94 % -- -- None (Room air) Lying   01/21/24 1345 -- 118 Abnormal  21 133/81 102 96 % -- -- None (Room air) Lying   01/21/24 1330 -- 115 Abnormal  17 128/82 101 93 % -- -- None (Room air) Lying   01/21/24 1315 -- 112 Abnormal  21 120/79 95 94 % -- -- None (Room air) Lying   01/21/24 1300 -- 111 Abnormal  21 128/83 101 96 % -- -- None (Room air) Lying   01/21/24 1230 -- 113 Abnormal  13 135/82 100 96 % -- -- None (Room air) Lying   01/21/24 1200 -- 107 Abnormal  22 122/59 84 98 % -- -- None (Room  air) Lying   01/21/24 1100 98.1 °F (36.7 °C) 113 Abnormal  16 136/73 -- 97 % -- -- None (Room air) Lying         EKG: Sinus tachycardia  Otherwise normal ECG  When compared with ECG of 30-OCT-2023 18:37,  No significant change was found      Pertinent Labs/Diagnostic Test Results:   CT chest wo contrast   Final Result by Sabrina Baker MD (01/21 2042)      Slightly enlarged lower right paraesophageal node, corresponding with the abdomen CT from earlier today, stable since an abdomen CT from 10/30/2023, of uncertain etiology and significance.      Redemonstration of hepatic steatosis.         Workstation performed: [id number]         US right upper quadrant   Final Result by Bi Larson MD (01/21 1302)      1. Normal gallbladder.   2. Hepatomegaly with hepatic steatosis.   3. Right intrarenal calculi.         Workstation performed: [id number]         CT abdomen pelvis with contrast   Final Result by Zan Boyd MD (01/21 1215)      1. Enlarged fatty liver. Collaterals/varices adjacent to the spleen suggest an element of portal hypertension.   2. 1.6 cm soft tissue nodule identified adjacent to the visualized distal esophagus. A dedicated nonemergent CT scan of the chest is recommended for further evaluation purposes.   3. 9 mm calculus in the mid left ureter. 5 mm calculus at the left ureterovesical junction. This is associated with mild left hydronephrosis. The left kidney is atrophic.   4. Bilateral nephrolithiasis.   5. 2 mm calculus in the right renal pelvis. Minimal if any right hydronephrosis.   6. Mild edema along the right paracolic gutter/right anterior pararenal space. Indeterminate etiology. Possibly related to kidney findings.      The study was marked in EPIC for immediate notification.         Workstation performed: [id number]         US kidney and bladder with pvr    (Results Pending)     Results from last 7 days   Lab Units 01/21/24  1104   SARS-COV-2  Negative     Results from last 7  days   Lab Units 01/22/24  0243 01/21/24  1104   WBC Thousand/uL 5.72 10.12   HEMOGLOBIN g/dL 10.8* 14.6   HEMATOCRIT % 30.9* 43.3   PLATELETS Thousands/uL 37* 72*   NEUTROS ABS Thousands/µL  --  8.19*   BANDS PCT % 8  --         Results from last 7 days   Lab Units 01/22/24  0808 01/22/24  0243 01/21/24  1935 01/21/24  1607 01/21/24  1219   SODIUM mmol/L 133* 132* 132* 131* 126*   POTASSIUM mmol/L 4.6 3.3* 4.0 3.6 3.7   CHLORIDE mmol/L 101 101 98 97 90*   CO2 mmol/L 22 21 17* 13* 13*   ANION GAP mmol/L 10 10 17 21 23   BUN mg/dL 5 5 5 6 7   CREATININE mg/dL 1.32* 1.12 1.07 1.14 1.38*   EGFR ml/min/1.73sq m 70 85 90 84 66   CALCIUM mg/dL 7.8* 8.1* 7.4* 7.4* 7.8*   MAGNESIUM mg/dL  --  3.0* 1.7*  --  2.0   PHOSPHORUS mg/dL  --  1.9* 1.7*  --  1.7*     Results from last 7 days   Lab Units 01/22/24  0243 01/21/24  1219 01/21/24  1118 01/21/24  1106   AST U/L 254* 190*  --   --    ALT U/L 84* 81*  --   --    ALK PHOS U/L 115* 135*  --   --    TOTAL PROTEIN g/dL 5.2* 5.9*  --   --    ALBUMIN g/dL 2.6* 2.8*  --   --    TOTAL BILIRUBIN mg/dL 15.01* 13.82*  --   --    BILIRUBIN DIRECT mg/dL  --   --  9.84*  --    AMMONIA umol/L 69  --   --  85*     Results from last 7 days   Lab Units 01/22/24  1429 01/22/24  1201 01/22/24  0953 01/22/24  0757 01/22/24  0620 01/22/24  0417 01/22/24  0310 01/22/24  0207 01/22/24  0102 01/22/24  0015 01/21/24  2311 01/21/24  2156   POC GLUCOSE mg/dl 144* 169* 146* 135 110 113 145* 184* 185* 205* 252* 259*     Results from last 7 days   Lab Units 01/22/24  0808 01/22/24  0243 01/21/24  1935 01/21/24  1607 01/21/24  1219   GLUCOSE RANDOM mg/dL 130 144* 246* 268* 559*        Results from last 7 days   Lab Units 01/21/24  1104   HEMOGLOBIN A1C % 8.8*   EAG mg/dl 206     BETA-HYDROXYBUTYRATE   Date Value Ref Range Status   01/21/2024 0.2 <0.6 mmol/L Final         Results from last 7 days   Lab Units 01/21/24  1146   PH TEVIN  7.235*   PCO2 TEVIN mm Hg 31.5*   PO2 TEVIN mm Hg 43.3   HCO3 TEVIN mmol/L 13.0*    BASE EXC TEVIN mmol/L -13.2   O2 CONTENT TEVIN ml/dL 12.4   O2 HGB, VENOUS % 67.7        Results from last 7 days   Lab Units 01/21/24  1118   CK TOTAL U/L 72     Results from last 7 days   Lab Units 01/21/24  1607 01/21/24  1318 01/21/24  1118   HS TNI 0HR ng/L  --   --  11   HS TNI 2HR ng/L  --  13  --    HSTNI D2 ng/L  --  2  --    HS TNI 4HR ng/L 15  --   --    HSTNI D4 ng/L 4  --   --          Results from last 7 days   Lab Units 01/22/24  0243 01/21/24  1104   PROTIME seconds 16.3* 16.2*   INR  1.28* 1.27*   PTT seconds  --  40*        Results from last 7 days   Lab Units 01/22/24  1122 01/22/24  0808 01/22/24  0243 01/21/24  2354 01/21/24  1935 01/21/24  1614 01/21/24  1427   LACTIC ACID mmol/L 3.4* 3.1* 4.0* 6.1* 6.8* 10.1* 10.3*        Results from last 7 days   Lab Units 01/21/24  1607   HEP B S AG  Non-reactive   HEP C AB  Non-reactive   HEP B C IGM  Non-reactive     Results from last 7 days   Lab Units 01/21/24  1104   LIPASE u/L 123*        Results from last 7 days   Lab Units 01/21/24  1240   CLARITY UA  Clear   COLOR UA  Yellow   SPEC GRAV UA  <=1.005   PH UA  5.5   GLUCOSE UA mg/dl >=1000 (1%)*   KETONES UA mg/dl Negative   BLOOD UA  Large*   PROTEIN UA mg/dl 30 (1+)*   NITRITE UA  Negative   BILIRUBIN UA  Large*   UROBILINOGEN UA E.U./dl 1.0   LEUKOCYTES UA  Elevated glucose may cause decreased leukocyte values. See urine microscopic for UWBC result*   WBC UA /hpf None Seen   RBC UA /hpf 20-30*   BACTERIA UA /hpf Moderate*   EPITHELIAL CELLS WET PREP /hpf Occasional     Results from last 7 days   Lab Units 01/21/24  1104   INFLUENZA A PCR  Negative   INFLUENZA B PCR  Negative   RSV PCR  Positive*        Results from last 7 days   Lab Units 01/21/24  1240   AMPH/METH  Negative   BARBITURATE UR  Negative   BENZODIAZEPINE UR  Negative   COCAINE UR  Negative   METHADONE URINE  Negative   OPIATE UR  Positive*   PCP UR  Negative   THC UR  Positive*     Results from last 7 days   Lab Units 01/21/24  111    ETHANOL LVL mg/dL 349*   ACETAMINOPHEN LVL ug/mL <2*        Results from last 7 days   Lab Units 01/21/24  1118 01/21/24  1104   BLOOD CULTURE  Received in Microbiology Lab. Culture in Progress. Received in Microbiology Lab. Culture in Progress.          ED Treatment:   Medication Administration from 01/21/2024 1053 to 01/21/2024 1455         Date/Time Order Dose Route Action     01/21/2024 1124 EST sodium chloride 0.9 % bolus 2,000 mL 2,000 mL Intravenous New Bag     01/21/2024 1110 EST ondansetron (ZOFRAN) injection 4 mg 4 mg Intravenous Given     01/21/2024 1111 EST pantoprazole (PROTONIX) injection 40 mg 40 mg Intravenous Given     01/21/2024 1122 EST morphine injection 2 mg 2 mg Intravenous Given     01/21/2024 1203 EST sodium chloride 0.9 % bolus 1,000 mL 1,000 mL Intravenous New Bag     01/21/2024 1148 EST insulin regular (HumuLIN R,NovoLIN R) injection 10 Units 10 Units Intravenous Given     01/21/2024 1137 EST iohexol (OMNIPAQUE) 350 MG/ML injection (MULTI-DOSE) 100 mL 100 mL Intravenous Given     01/21/2024 1202 EST fentanyl citrate (PF) 100 MCG/2ML 50 mcg 50 mcg Intravenous Given     01/21/2024 1221 EST sodium chloride 0.9 % bolus 1,000 mL 1,000 mL Intravenous New Bag     01/21/2024 1222 EST piperacillin-tazobactam (ZOSYN) 3.375 g in sodium chloride 0.9 % 100 mL IVPB 3.375 g Intravenous New Bag     01/21/2024 1258 EST fentanyl citrate (PF) 100 MCG/2ML 50 mcg 50 mcg Intravenous Given     01/21/2024 1337 EST insulin regular (HumuLIN R,NovoLIN R) 1 Units/mL in sodium chloride 0.9 % 100 mL infusion 8.9 Units/hr Intravenous New Bag     01/21/2024 1433 EST HYDROmorphone (DILAUDID) injection 0.5 mg 0.5 mg Intravenous Given     01/21/2024 1433 EST ondansetron (ZOFRAN) injection 4 mg 4 mg Intravenous Given            Present on Admission:   Tobacco abuse   ETOH abuse   Depression   History of gout   GERD (gastroesophageal reflux disease)   BPH (benign prostatic hyperplasia)   RSV infection   Abnormal CT scan    Transaminitis w/ elevated bili   (Resolved) Hyperammonemia (HCC)   N&V (nausea and vomiting)   DUANE (acute kidney injury) (HCC)   Thrombocytopenia (HCC)   (Resolved) Metabolic acidosis, increased anion gap   Lactic acidosis   HHNC (hyperglycemic hyperosmolar nonketotic coma) (HCC)   Marijuana use   Hyponatremia   SIRS (systemic inflammatory response syndrome) (HCC)      Admitting Diagnosis: Ureteral stone [N20.1]  Back pain [M54.9]  Jaundice [R17]  Hyperglycemia [R73.9]  Left ureteral stone [N20.1]    Age/Sex: 33 y.o. male    Admission Orders: SCDs; Cardiopulmonary monitoring; I/O; Daily wts; neuro checks; Consistent Carbohydrate Diet. Blood glucose checks ACHS.      Scheduled Medications:  acetaminophen, 650 mg, Oral, Q8H MURALI  allopurinol, 100 mg, Oral, Daily  celecoxib, 100 mg, Oral, Daily  chlorhexidine, 15 mL, Mouth/Throat, Q12H MURALI  FLUoxetine, 20 mg, Oral, Daily  folic acid 1 mg, thiamine (VITAMIN B1) 100 mg in sodium chloride 0.9 % 100 mL IV piggyback, , Intravenous, Daily  gabapentin, 300 mg, Oral, TID  metoprolol tartrate, 25 mg, Oral, Q12H MURALI  nicotine, 14 mg, Transdermal, Daily  pantoprazole (PROTONIX) injection 40 mg, Intravenous, Daily      calcium gluconate 2 g in sodium chloride 0.9% 100 mL (premix)  Dose: 2 g  Freq: Once Route: IV  Last Dose: Stopped (01/21/24 2155)  Start: 01/21/24 2045 End: 01/21/24 2155     chlorproMAZINE (THORAZINE) injection SOLN 25 mg  Dose: 25 mg  Freq: Once Route: IM  Indications of Use: INTRACTABLE HICCUPS  Start: 01/21/24 1845 End: 01/21/24 201     magnesium sulfate 4 g/100 mL IVPB (premix) 4 g  Dose: 4 g  Freq: Once Route: IV  Last Dose: Stopped (01/22/24 0417)  Start: 01/21/24 2045 End: 01/22/24 0417     metoprolol (LOPRESSOR) injection 5 mg  Dose: 5 mg  Freq: Once Route: IV  Start: 01/21/24 2100 End: 01/21/24 2118     potassium phosphates 30 mmol in sodium chloride 0.9 % 250 mL infusion  Dose: 30 mmol  Freq: Once Route: IV  Last Dose: Stopped (01/21/24 2206)  Start:  01/21/24 1630 End: 01/21/24 2206     sodium phosphate 21 mmol in dextrose 5 % 250 mL Infusion  Dose: 21 mmol  Freq: Once Route: IV  Last Dose: Stopped (01/22/24 0417)  Start: 01/21/24 2045 End: 01/22/24 041       multi-electrolyte (ISOLYTE-S PH 7.4) bolus 1,000 mL  Dose: 1,000 mL  Freq: Once Route: IV  Start: 01/22/24 0800 End: 01/22/24 0911     potassium chloride 20 mEq IVPB (premix)  Dose: 20 mEq  Freq: Every 1 hour scheduled Route: IV  Last Dose: Stopped (01/22/24 0800)  Start: 01/22/24 0400 End: 01/22/24 0659     sodium chloride 0.9 % bolus 500 mL  Dose: 500 mL  Freq: Once Route: IV  Last Dose: Stopped (01/22/24 0540)  Start: 01/22/24 0330 End: 01/22/24 0540     sodium chloride 0.9 % bolus 500 mL  Dose: 500 mL  Freq: Once Route: IV  Last Dose: Stopped (01/22/24 0409)  Start: 01/22/24 0100 End: 01/22/24 040     sodium phosphate 21 mmol in dextrose 5 % 250 mL Infusion  Dose: 21 mmol  Freq: Once Route: IV  Last Dose: 21 mmol (01/22/24 0845)  Start: 01/22/24 0830 End: 01/22/24 1245       Continuous IV Infusions:  insulin regular (HumuLIN R,NovoLIN R) 1 Units/mL in sodium chloride 0.9 % 100 mL infusion, 0.3-21 Units/hr, Intravenous, Titrated    dextrose 5 % in lactated Ringer's infusion  Rate: 250 mL/hr Dose: 250 mL/hr  Freq: Continuous Route: IV  Last Dose: Stopped (01/22/24 0400)  Start: 01/21/24 1345 End: 01/22/24 0326     multi-electrolyte (ISOLYTE-S PH 7.4 equivalent) IV solution  Rate: 500 mL/hr Dose: 500 mL/hr  Freq: Continuous Route: IV  Last Dose: Stopped (01/21/24 1718)  Start: 01/21/24 1345 End: 01/21/24 1911     multi-electrolyte (PLASMALYTE-A/ISOLYTE-S PH 7.4) IV solution  Rate: 125 mL/hr Dose: 125 mL/hr  Freq: Continuous Route: IV  Last Dose: Stopped (01/22/24 0806)  Start: 01/22/24 0330 End: 01/22/24 0753       PRN Meds:  colchicine, 0.6 mg, Oral, Daily PRN  hydrOXYzine HCL, 25 mg, Oral, Q6H PRN  ondansetron, 4 mg, Intravenous, Q4H PRN (1/21 rec'd x1)  (1/22 rec'd x1 so far today)   sodium chloride  (PF), 3 mL, Intravenous, Q1H PRN  traMADol, 50 mg, Oral, Q6H PRN   (1/22 rec'd x1 so far today)     HYDROmorphone (DILAUDID) injection 0.5 mg, Intravenous, Q3H PRN  (1/21 rec'd x4)  (1/22 rec'd x2 so far today)         IP CONSULT TO CASE MANAGEMENT  IP CONSULT TO GASTROENTEROLOGY  IP CONSULT TO ENDOCRINOLOGY    Network Utilization Review Department  ATTENTION: Please call with any questions or concerns to [phone] and carefully listen to the prompts so that you are directed to the right person. All voicemails are confidential.   For Discharge needs, contact Care Management DC Support Team at [phone] opt. [phone]  Send all requests for admission clinical reviews, approved or denied determinations and any other requests to dedicated fax number below belonging to the campus where the patient is receiving treatment. List of dedicated fax numbers for the Facilities:  FACILITY NAME UR FAX NUMBER   ADMISSION DENIALS (Administrative/Medical Necessity) [phone]   DISCHARGE SUPPORT TEAM (NETWORK) [phone]   PARENT CHILD HEALTH (Maternity/NICU/Pediatrics) [phone]   [facility] [phone]   [facility] [phone]   [facility] [phone]   [facility] [phone]   [facility] [phone]   [facility] [phone]   [facility] [phone]   [facility] [phone]   [facility] [phone]   [facility] [phone]   [facility] [phone]

## 2024-01-22 NOTE — PLAN OF CARE
Problem: Potential for Falls  Goal: Patient will remain free of falls  Description: INTERVENTIONS:  - Educate patient/family on patient safety including physical limitations  - Instruct patient to call for assistance with activity   - Consult OT/PT to assist with strengthening/mobility   - Keep Call bell within reach  - Keep bed low and locked with side rails adjusted as appropriate  - Keep care items and personal belongings within reach  - Initiate and maintain comfort rounds  - Make Fall Risk Sign visible to staff  - Offer Toileting every 2 Hours, in advance of need  - Initiate/Maintain bed alarm  - Obtain necessary fall risk management equipment: yellow socks  - Apply yellow socks and bracelet for high fall risk patients  - Consider moving patient to room near nurses station  Outcome: Progressing     Problem: PAIN - ADULT  Goal: Verbalizes/displays adequate comfort level or baseline comfort level  Description: Interventions:  - Encourage patient to monitor pain and request assistance  - Assess pain using appropriate pain scale  - Administer analgesics based on type and severity of pain and evaluate response  - Implement non-pharmacological measures as appropriate and evaluate response  - Consider cultural and social influences on pain and pain management  - Notify physician/advanced practitioner if interventions unsuccessful or patient reports new pain  Outcome: Progressing     Problem: INFECTION - ADULT  Goal: Absence or prevention of progression during hospitalization  Description: INTERVENTIONS:  - Assess and monitor for signs and symptoms of infection  - Monitor lab/diagnostic results  - Monitor all insertion sites, i.e. indwelling lines, tubes  - Administer medications as ordered  - Instruct and encourage patient and family to use good hand hygiene technique  - Identify and instruct in appropriate isolation precautions for identified infection/condition  Outcome: Progressing

## 2024-01-22 NOTE — CASE MANAGEMENT
Case Management Assessment & Discharge Planning Note    Patient name Michael Koch  Location /-01 MRN [id number]  : 1990 Date 2024       Current Admission Date: 2024  Current Admission Diagnosis:HHNC (hyperglycemic hyperosmolar nonketotic coma) ([facility])   Patient Active Problem List    Diagnosis Date Noted    ETOH abuse 2024    Depression 2024    GERD (gastroesophageal reflux disease) 2024    BPH (benign prostatic hyperplasia) 2024    RSV infection 2024    Abnormal CT scan 2024    Transaminitis w/ elevated bili 2024    N&V (nausea and vomiting) 2024    DUANE (acute kidney injury) ([facility]) 2024    Thrombocytopenia ([facility]) 2024    Lactic acidosis 2024    HHNC (hyperglycemic hyperosmolar nonketotic coma) ([facility]) 2024    Marijuana use 2024    Hyponatremia 2024    SIRS (systemic inflammatory response syndrome) ([facility]) 2024    History of gout 10/04/2023    History of hypertension 10/04/2023    Type 2 diabetes mellitus with hyperglycemia, without long-term current use of insulin ([facility]) 10/04/2023    Tobacco abuse 10/04/2023      LOS (days): 1  Geometric Mean LOS (GMLOS) (days):   Days to GMLOS:     OBJECTIVE:    Risk of Unplanned Readmission Score: 29.57         Current admission status: Inpatient  Referral Reason: Drug/Alcohol Abuse    Preferred Pharmacy:   [id number] - [place] - [address]  [address]  [place] [address]  Phone: [phone] Fax: [phone]    Primary Care Provider: Louie Odonnell DO    Primary Insurance: BLUE CROSS  Secondary Insurance:     CM met with patient at the bedside,baseline information  was obtained. CM discussed the role of CM in helping the patient develop a discharge plan and assist the patient in carry out their plan.      ASSESSMENT:  Active Health Care Proxies    There are no active Health Care Proxies on file.       Advance  Directives  Does patient have a Health Care POA?: No  Was patient offered paperwork?: Yes (declined)  Does patient currently have a Health Care decision maker?: Yes, please see Health Care Proxy section  Does patient have Advance Directives?: No  Was patient offered paperwork?: Yes (declined)  Primary Contact: antonia galvan (Spouse)  [phone]         Readmission Root Cause  30 Day Readmission: No    Patient Information  Mental Status: Alert  During Assessment patient was accompanied by: Not accompanied during assessment  Assessment information provided by:: Patient  Primary Caregiver: Self  Support Systems: Spouse/significant other  County of Residence: [facility]  What [facility] do you live in?: [place]  Home entry access options. Select all that apply.: Stairs  Number of steps to enter home.:  (unsure 12 steps or so)  Do the steps have railings?: Yes  Type of Current Residence: 2 story home  Upon entering residence, is there a bedroom on the main floor (no further steps)?: No  A bedroom is located on the following floor levels of residence (select all that apply):: 2nd Floor  Upon entering residence, is there a bathroom on the main floor (no further steps)?: No  Indicate which floors of current residence have a bathroom (select all the apply):: 2nd Floor  Number of steps to 2nd floor from main floor: One Flight  Living Arrangements: Lives w/ Spouse/significant other, Other (Comment) (Children at 2,10,13)  Is patient a ?: No    Activities of Daily Living Prior to Admission  Functional Status: Independent  Completes ADLs independently?: Yes  Ambulates independently?: Yes  Does patient use assisted devices?: No  Does patient currently own DME?: No  Does patient have a history of Outpatient Therapy (PT/OT)?: No  Does the patient have a history of Short-Term Rehab?: No  Does patient have a history of HHC?: No  Does patient currently have HHC?: No         Patient Information Continued  Income Source:  Employed  Does patient have prescription coverage?: Yes  Does patient receive dialysis treatments?: No  Does patient have a history of substance abuse?: Yes  Historical substance use preference: Alcohol/ETOH  History of Withdrawal Symptoms: Denies past symptoms  Is patient currently in treatment for substance abuse?: No. Treatment options provided (Agreeable for a Warm handoff)  Does patient have a history of Mental Health Diagnosis?: Yes  Is patient receiving treatment for mental health?: Yes ( on Demand, Dr cody Calvo to assist with depression and anxiety)  Has patient received inpatient treatment related to mental health in the last 2 years?: No         Means of Transportation  Means of Transport to Appts:: Drives Self      Housing Stability: Low Risk  (1/22/2024)    Housing Stability Vital Sign     Unable to Pay for Housing in the Last Year: No     Number of Places Lived in the Last Year: 1     Unstable Housing in the Last Year: No   Food Insecurity: No Food Insecurity (1/22/2024)    Hunger Vital Sign     Worried About Running Out of Food in the Last Year: Never true     Ran Out of Food in the Last Year: Never true   Transportation Needs: No Transportation Needs (1/22/2024)    PRAPARE - Transportation     Lack of Transportation (Medical): No     Lack of Transportation (Non-Medical): No   Utilities: Not At Risk (1/22/2024)    [facility] Utilities     Threatened with loss of utilities: No     Patient resides with spouse and 3 young children, 13, 10 and 2    DISCHARGE DETAILS:    Discharge planning discussed with:: Patient  Freedom of Choice: Yes  Comments - Freedom of Choice: Agreeable for a Warm handoff to discuss etoh resources and assistance  CM contacted family/caregiver?: No- see comments (declined)                  Requested Home Health Care         Is the patient interested in HHC at discharge?: No    DME Referral Provided  Referral made for DME?: No    Other Referral/Resources/Interventions  Provided:  Interventions: D&A Warm Handoff  Referral Comments: Did forward referral to [facility]         Treatment Team Recommendation: Home, Substance Abuse Treatment (home vs Substance Abuse Treatment to be determined)         Will follow up with Drug and ETOH counselors.

## 2024-01-22 NOTE — ASSESSMENT & PLAN NOTE
Prerenal in setting of HHNK/ACEI use/ hepatorenal syndrome, IV contrast use and hypotension  Baseline 0.9-1  CT nephrolithiasis mild left hydro  UA +blood/glucose/protein  CK WNL  Renal U/S: Right intrarenal calculi;  Hepatomegaly with hepatic steatosis  Consulted Nephrology as creatinine is worsening.  Will give a dose of albumin and placed on IV fluid hydration as patient is currently hypotensive.  Will check urine protein urine protein creatinine ratio and urine sodium.  Avoid hypotension and nephrotoxic agents

## 2024-01-22 NOTE — PROGRESS NOTES
[facility]  Progress Note  Name: Michael Koch I  MRN: [id number]  Unit/Bed#: -01 I Date of Admission: 2024   Date of Service: 2024 I Hospital Day: 1    Assessment/Plan   * HHNC (hyperglycemic hyperosmolar nonketotic coma) (HCC)  Assessment & Plan  Lab Results   Component Value Date    HGBA1C 9.4 (H) 2023     Recent Labs     24  1128 24  1332 24  1430   POCGLU >500* 452* 339*     Blood Sugar Average: Last 72 hrs:  (P) 395.5  BHB 0.2  UA neg ketones +glucose  A1C 8.8%    Plan:  DKA insulin gtt transitioned to regular insulin gtt ovn  Cont POCT glu Q1H  Replace electrolytes PRN  Continue IVF until tolerating PO  Hold home ozempic/jardiance    SIRS (systemic inflammatory response syndrome) (HCC)  Assessment & Plan  AEB: Tachycardia/elevated T. bili/lactic acidosis  Afebrile  No source  CT and abdomen and pelvis, chest: no infectious source  Blood cultures pending  Given 1 dose of Zosyn will hold on further antibiotics    Lactic acidosis  Assessment & Plan  Abd benign  Volume resuscitate and trend    Metabolic acidosis, increased anion gap  Assessment & Plan  2/2 LA  Volume resuscitate/trend/insulin gtt, Serial bmp  Now resolved    N&V (nausea and vomiting)  Assessment & Plan  Likely 2/2 HHNK  PRN zofran  NPO, sips as tolerated    Transaminitis w/ elevated bili  Assessment & Plan  Concern for alcoholic hepatitis  Hepatocellular pattern  RUQ hepatomegaly/hepatic steatosis (t/c liver dopplers if LFT rise)  CTAP fatty liver/varices/portal htn  MELD 22  DF 28.5 hold on prednisolone unless >32  LD, CK, Tylenol level are WNL  D Bili elevated at 9.8    AFP upper limit of NML    Plan:  Hepatitis panel/EBV Pending  Consult GI  T/C nadolol for portal htn prior to d/c  Eventual EGD    ETOH abuse  Assessment & Plan  Per pt drinks 2 24oz twisted tea/smirinoff per day (family notes pt drinks more than this)  Drinks since brother  3 years ago. Has  stopped for a week or two without symptoms. No DTs.  CIWA protocol  Thiamine/folate    Abnormal CT scan  Assessment & Plan  CTAP-1.6cm soft tissue nodule of distal esophagus  CTC-  Slightly enlarged lower right paraesophageal node, stable since an abd CT 10/30/2023, of uncertain etiology and significance    DUANE (acute kidney injury) (HCC)  Assessment & Plan  Prerenal in setting of HHNK/ACEI use/ rule out obstruction  Baseline 0.9-1  CT nephrolithiasis mild left hydro  UA +blood/glucose/protein  CK WNL  Renal U/S: Right intrarenal calculi;  Hepatomegaly with hepatic steatosis    Plan:  Continue to vol resuscitate  Trend BMP  Monitor I/O    Hyperammonemia (HCC)  Assessment & Plan  Mild, resolved    Hyponatremia  Assessment & Plan  Mild corrected at 133  Hold on further normal saline solution given 4 L    Thrombocytopenia (HCC)  Assessment & Plan  Likely 2/2 ETOH abuse  Hemolysis smear NML, LD wnl   Haptoglobin/fibrinogen pending  Continue to trend    RSV infection  Assessment & Plan  F/u CTC given esophageal nodule  Supportive care    Marijuana use  Assessment & Plan  Cessation education    BPH (benign prostatic hyperplasia)  Assessment & Plan  NPO holding home flomax/detrol    GERD (gastroesophageal reflux disease)  Assessment & Plan  IV PPI as NPO    Depression  Assessment & Plan  NPO hold home fluoxetine/remeron/prn atarax    Tobacco abuse  Assessment & Plan  Nicotine patch  Smoking cessation counseling     History of hypertension  Assessment & Plan  NPO/DUANE hold home lisinopril    History of gout  Assessment & Plan  NPO hold home allopurinol/colchicine         Disposition: Stepdown Level 1    ICU Core Measures     A: Assess, Prevent, and Manage Pain Has pain been assessed? NA  Need for changes to pain regimen? NA   B: Both SAT/SAT  N/A   C: Choice of Sedation RASS Goal: 0 Alert and Calm or N/A patient not on sedation  Need for changes to sedation or analgesia regimen? NA   D: Delirium CAM-ICU: Negative   E: Early  Mobility  Plan for early mobility? Yes   F: Family Engagement Plan for family engagement today? Yes         Prophylaxis:  VTE Contraindicated secondary to: Low Risk   Stress Ulcer  covered byomeprazole (PriLOSEC) 40 MG capsule [[id number]] (Long-Term Med), pantoprazole (PROTONIX) injection 40 mg [[id number]]         Significant 24hr Events     24hr events: No acute events OVN     Subjective   Review of Systems   Constitutional:  Positive for appetite change and fatigue. Negative for chills and fever.   HENT:  Negative for congestion, ear pain, postnasal drip, rhinorrhea and sore throat.    Eyes:  Negative for pain and visual disturbance.   Respiratory:  Negative for cough, chest tightness, shortness of breath and wheezing.    Cardiovascular:  Negative for chest pain and palpitations.   Gastrointestinal:  Positive for abdominal distention, abdominal pain and nausea. Negative for constipation, diarrhea and vomiting.   Genitourinary:  Negative for dysuria, frequency and hematuria.   Musculoskeletal:  Positive for back pain. Negative for arthralgias.   Skin:  Negative for color change and rash.   Neurological:  Positive for tremors. Negative for dizziness, seizures, syncope, light-headedness and headaches.   Psychiatric/Behavioral:  Negative for agitation, confusion and sleep disturbance. The patient is nervous/anxious.    All other systems reviewed and are negative.     Objective                            Vitals I/O      Most Recent Min/Max in 24hrs   Temp 98.6 °F (37 °C) Temp  Min: 98.1 °F (36.7 °C)  Max: 99.8 °F (37.7 °C)   Pulse (!) 134 Pulse  Min: 107  Max: 135   Resp (!) 28 Resp  Min: 13  Max: 28   /77 BP  Min: 93/55  Max: 140/84   O2 Sat 91 % SpO2  Min: 91 %  Max: 98 %      Intake/Output Summary (Last 24 hours) at 1/22/2024 0820  Last data filed at 1/22/2024 0540  Gross per 24 hour   Intake 9804.56 ml   Output 1300 ml   Net 8504.56 ml       Diet Prashant/CHO Controlled; Consistent Carbohydrate Diet Level 3 (6  carb servings/90 grams CHO/meal)    Invasive Monitoring           Physical Exam   Physical Exam  Vitals and nursing note reviewed.   Eyes:      General: Scleral icterus present.      Extraocular Movements: Extraocular movements intact.   Skin:     General: Skin is warm and dry.      Coloration: Skin is jaundiced.   HENT:      Head: Normocephalic and atraumatic.      Nose: No congestion or rhinorrhea.      Mouth/Throat:      Mouth: Mucous membranes are dry.   Cardiovascular:      Rate and Rhythm: Regular rhythm. Tachycardia present.      Heart sounds: Normal heart sounds.   Musculoskeletal:         General: No swelling or tenderness. Normal range of motion.   Abdominal: General: Bowel sounds are normal. There is distension.     Palpations: Abdomen is rigid.      Tenderness: There is abdominal tenderness. There is no guarding.      Comments: Tenderness to light palpation of lower abdomen, both sides, extends to flanks, does not continue to lower back   Constitutional:       General: He is not in acute distress.     Appearance: He is well-developed. He is ill-appearing.   Pulmonary:      Effort: Pulmonary effort is normal.      Breath sounds: Normal breath sounds. No wheezing or rhonchi.   Neurological:      General: No focal deficit present.      Mental Status: He is alert and oriented to person, place and time. Mental status is at baseline. He is not agitated.      Comments: Tremor present at rest            Diagnostic Studies      EKG: NSR on admission  Imaging: Per A&P as above I have personally reviewed pertinent reports.       Medications:  Scheduled PRN   chlorhexidine, 15 mL, Q12H MURALI  folic acid 1 mg, thiamine (VITAMIN B1) 100 mg in sodium chloride 0.9 % 100 mL IV piggyback, , Daily  multi-electrolyte, 1,000 mL, Once  nicotine, 14 mg, Daily  pantoprazole, 40 mg, Daily  sodium phosphate, 21 mmol, Once      HYDROmorphone, 0.5 mg, Q3H PRN  ondansetron, 4 mg, Q4H PRN  sodium chloride (PF), 3 mL, Q1H PRN        Continuous    insulin regular (HumuLIN R,NovoLIN R) 1 Units/mL in sodium chloride 0.9 % 100 mL infusion, 0.3-21 Units/hr, Last Rate: 1.5 Units/hr (01/22/24 0417)         Labs:    CBC    Recent Labs     01/21/24  1104 01/22/24  0243   WBC 10.12 5.72   HGB 14.6 10.8*   HCT 43.3 30.9*   PLT 72* 37*   BANDSPCT  --  8     BMP    Recent Labs     01/21/24  1935 01/22/24  0243   SODIUM 132* 132*   K 4.0 3.3*   CL 98 101   CO2 17* 21   AGAP 17 10   BUN 5 5   CREATININE 1.07 1.12   CALCIUM 7.4* 8.1*       Coags    Recent Labs     01/21/24  1104 01/22/24  0243   INR 1.27* 1.28*   PTT 40*  --         Additional Electrolytes  Recent Labs     01/21/24 1935 01/22/24  0243   MG 1.7* 3.0*   PHOS 1.7* 1.9*          Blood Gas    No recent results  Recent Labs     01/21/24  1146   PHVEN 7.235*   FWS6FTS 31.5*   PO2VEN 43.3   ZPV2RMU 13.0*   BEVEN -13.2   [id number] 67.7    LFTs  Recent Labs     01/21/24  1219 01/22/24  0243   ALT 81* 84*   * 254*   ALKPHOS 135* 115*   ALB 2.8* 2.6*   TBILI 13.82* 15.01*       Infectious  No recent results  Glucose  Recent Labs     01/21/24  1219 01/21/24  1607 01/21/24 1935 01/22/24  0243   GLUC 559* 268* 246* 144*                   Toshia Hawkins MD

## 2024-01-22 NOTE — ASSESSMENT & PLAN NOTE
sodium down to 130.  Check urine sodium, urine osmolarity and serum osmolarity placed on IV fluid hydration due to hypotension will monitor sodium level closely

## 2024-01-23 ENCOUNTER — APPOINTMENT (INPATIENT)
Dept: RADIOLOGY | Facility: HOSPITAL | Age: 34
DRG: 441 | End: 2024-01-23
Payer: COMMERCIAL

## 2024-01-23 PROBLEM — E11.10 DIABETIC KETOACIDOSIS WITHOUT COMA ASSOCIATED WITH TYPE 2 DIABETES MELLITUS (HCC): Status: ACTIVE | Noted: 2024-01-21

## 2024-01-23 PROBLEM — E11.10 DIABETIC KETOACIDOSIS WITHOUT COMA ASSOCIATED WITH TYPE 2 DIABETES MELLITUS (HCC): Status: RESOLVED | Noted: 2024-01-21 | Resolved: 2024-01-23

## 2024-01-23 PROBLEM — K70.10 ALCOHOLIC HEPATITIS: Status: ACTIVE | Noted: 2024-01-23

## 2024-01-23 PROBLEM — K76.82 HEPATIC ENCEPHALOPATHY (HCC): Status: ACTIVE | Noted: 2024-01-21

## 2024-01-23 LAB
ALBUMIN SERPL BCP-MCNC: 2.7 G/DL (ref 3.5–5)
ALP SERPL-CCNC: 127 U/L (ref 34–104)
ALT SERPL W P-5'-P-CCNC: 83 U/L (ref 7–52)
AMMONIA PLAS-SCNC: 148 UMOL/L (ref 18–72)
ANION GAP SERPL CALCULATED.3IONS-SCNC: 10 MMOL/L
AST SERPL W P-5'-P-CCNC: 254 U/L (ref 13–39)
BASOPHILS # BLD AUTO: 0.04 THOUSANDS/ÂΜL (ref 0–0.1)
BASOPHILS NFR BLD AUTO: 1 % (ref 0–1)
BILIRUB SERPL-MCNC: 21.38 MG/DL (ref 0.2–1)
BUN SERPL-MCNC: 9 MG/DL (ref 5–25)
CALCIUM ALBUM COR SERPL-MCNC: 9.3 MG/DL (ref 8.3–10.1)
CALCIUM SERPL-MCNC: 8.3 MG/DL (ref 8.4–10.2)
CHLORIDE SERPL-SCNC: 97 MMOL/L (ref 96–108)
CO2 SERPL-SCNC: 23 MMOL/L (ref 21–32)
CREAT SERPL-MCNC: 2.06 MG/DL (ref 0.6–1.3)
EBV NA IGG SER IA-ACNC: 133 U/ML (ref 0–17.9)
EBV VCA IGG SER IA-ACNC: 38.4 U/ML (ref 0–17.9)
EBV VCA IGM SER IA-ACNC: <36 U/ML (ref 0–35.9)
EOSINOPHIL # BLD AUTO: 0.04 THOUSAND/ÂΜL (ref 0–0.61)
EOSINOPHIL NFR BLD AUTO: 1 % (ref 0–6)
ERYTHROCYTE [DISTWIDTH] IN BLOOD BY AUTOMATED COUNT: 18.8 % (ref 11.6–15.1)
GFR SERPL CREATININE-BSD FRML MDRD: 41 ML/MIN/1.73SQ M
GLUCOSE SERPL-MCNC: 123 MG/DL (ref 65–140)
GLUCOSE SERPL-MCNC: 129 MG/DL (ref 65–140)
GLUCOSE SERPL-MCNC: 133 MG/DL (ref 65–140)
GLUCOSE SERPL-MCNC: 159 MG/DL (ref 65–140)
GLUCOSE SERPL-MCNC: 160 MG/DL (ref 65–140)
GLUCOSE SERPL-MCNC: 84 MG/DL (ref 65–140)
HAPTOGLOB SERPL-MCNC: 87 MG/DL (ref 17–317)
HCT VFR BLD AUTO: 29.4 % (ref 36.5–49.3)
HGB BLD-MCNC: 10.8 G/DL (ref 12–17)
IMM GRANULOCYTES # BLD AUTO: 0.14 THOUSAND/UL (ref 0–0.2)
IMM GRANULOCYTES NFR BLD AUTO: 2 % (ref 0–2)
INTERPRETATION: ABNORMAL
LYMPHOCYTES # BLD AUTO: 0.7 THOUSANDS/ÂΜL (ref 0.6–4.47)
LYMPHOCYTES NFR BLD AUTO: 11 % (ref 14–44)
MAGNESIUM SERPL-MCNC: 2.3 MG/DL (ref 1.9–2.7)
MCH RBC QN AUTO: 34.7 PG (ref 26.8–34.3)
MCHC RBC AUTO-ENTMCNC: 36.7 G/DL (ref 31.4–37.4)
MCV RBC AUTO: 95 FL (ref 82–98)
MONOCYTES # BLD AUTO: 0.5 THOUSAND/ÂΜL (ref 0.17–1.22)
MONOCYTES NFR BLD AUTO: 8 % (ref 4–12)
NEUTROPHILS # BLD AUTO: 4.92 THOUSANDS/ÂΜL (ref 1.85–7.62)
NEUTS SEG NFR BLD AUTO: 77 % (ref 43–75)
NRBC BLD AUTO-RTO: 0 /100 WBCS
OSMOLALITY UR/SERPL-RTO: 283 MMOL/KG (ref 282–298)
PHOSPHATE SERPL-MCNC: 2.5 MG/DL (ref 2.7–4.5)
PLATELET # BLD AUTO: 183 THOUSANDS/UL (ref 149–390)
PMV BLD AUTO: 14.1 FL (ref 8.9–12.7)
POTASSIUM SERPL-SCNC: 3.6 MMOL/L (ref 3.5–5.3)
PROT SERPL-MCNC: 5.7 G/DL (ref 6.4–8.4)
RBC # BLD AUTO: 3.11 MILLION/UL (ref 3.88–5.62)
SODIUM SERPL-SCNC: 130 MMOL/L (ref 135–147)
WBC # BLD AUTO: 6.34 THOUSAND/UL (ref 4.31–10.16)

## 2024-01-23 PROCEDURE — 84100 ASSAY OF PHOSPHORUS: CPT

## 2024-01-23 PROCEDURE — 99233 SBSQ HOSP IP/OBS HIGH 50: CPT | Performed by: ANESTHESIOLOGY

## 2024-01-23 PROCEDURE — 83930 ASSAY OF BLOOD OSMOLALITY: CPT | Performed by: FAMILY MEDICINE

## 2024-01-23 PROCEDURE — 99233 SBSQ HOSP IP/OBS HIGH 50: CPT | Performed by: FAMILY MEDICINE

## 2024-01-23 PROCEDURE — 85025 COMPLETE CBC W/AUTO DIFF WBC: CPT

## 2024-01-23 PROCEDURE — 82140 ASSAY OF AMMONIA: CPT | Performed by: FAMILY MEDICINE

## 2024-01-23 PROCEDURE — 80053 COMPREHEN METABOLIC PANEL: CPT

## 2024-01-23 PROCEDURE — 82948 REAGENT STRIP/BLOOD GLUCOSE: CPT

## 2024-01-23 PROCEDURE — 83735 ASSAY OF MAGNESIUM: CPT

## 2024-01-23 PROCEDURE — 99255 IP/OBS CONSLTJ NEW/EST HI 80: CPT | Performed by: INTERNAL MEDICINE

## 2024-01-23 PROCEDURE — 71045 X-RAY EXAM CHEST 1 VIEW: CPT

## 2024-01-23 RX ORDER — LORAZEPAM 2 MG/ML
2 INJECTION INTRAMUSCULAR ONCE
Status: COMPLETED | OUTPATIENT
Start: 2024-01-23 | End: 2024-01-23

## 2024-01-23 RX ORDER — PREDNISONE 20 MG/1
40 TABLET ORAL DAILY
Status: DISCONTINUED | OUTPATIENT
Start: 2024-01-23 | End: 2024-01-24

## 2024-01-23 RX ORDER — LORAZEPAM 2 MG/ML
4 INJECTION INTRAMUSCULAR ONCE
Status: COMPLETED | OUTPATIENT
Start: 2024-01-23 | End: 2024-01-23

## 2024-01-23 RX ORDER — ALBUMIN (HUMAN) 12.5 G/50ML
25 SOLUTION INTRAVENOUS EVERY 6 HOURS
Status: DISCONTINUED | OUTPATIENT
Start: 2024-01-23 | End: 2024-01-27

## 2024-01-23 RX ORDER — HEPARIN SODIUM 5000 [USP'U]/ML
5000 INJECTION, SOLUTION INTRAVENOUS; SUBCUTANEOUS EVERY 8 HOURS SCHEDULED
Status: DISCONTINUED | OUTPATIENT
Start: 2024-01-23 | End: 2024-01-26

## 2024-01-23 RX ORDER — INSULIN LISPRO 100 [IU]/ML
1-6 INJECTION, SOLUTION INTRAVENOUS; SUBCUTANEOUS
Status: DISCONTINUED | OUTPATIENT
Start: 2024-01-23 | End: 2024-01-26

## 2024-01-23 RX ORDER — LACTULOSE 10 G/15ML
20 SOLUTION ORAL 3 TIMES DAILY
Status: DISCONTINUED | OUTPATIENT
Start: 2024-01-23 | End: 2024-01-23

## 2024-01-23 RX ORDER — MIDODRINE HYDROCHLORIDE 5 MG/1
5 TABLET ORAL 3 TIMES DAILY
Status: DISCONTINUED | OUTPATIENT
Start: 2024-01-23 | End: 2024-01-24

## 2024-01-23 RX ORDER — LORAZEPAM 2 MG/ML
INJECTION INTRAMUSCULAR
Status: COMPLETED
Start: 2024-01-23 | End: 2024-01-23

## 2024-01-23 RX ORDER — LACTULOSE 10 G/15ML
20 SOLUTION ORAL
Status: DISCONTINUED | OUTPATIENT
Start: 2024-01-23 | End: 2024-01-24

## 2024-01-23 RX ORDER — OXAZEPAM 10 MG
10 CAPSULE ORAL
Status: DISCONTINUED | OUTPATIENT
Start: 2024-01-23 | End: 2024-01-23

## 2024-01-23 RX ORDER — OCTREOTIDE ACETATE 100 UG/ML
100 INJECTION, SOLUTION INTRAVENOUS; SUBCUTANEOUS EVERY 8 HOURS SCHEDULED
Status: DISCONTINUED | OUTPATIENT
Start: 2024-01-23 | End: 2024-01-27

## 2024-01-23 RX ORDER — LORAZEPAM 2 MG/ML
2 INJECTION INTRAMUSCULAR ONCE
Status: COMPLETED | OUTPATIENT
Start: 2024-01-24 | End: 2024-01-23

## 2024-01-23 RX ORDER — ALBUMIN, HUMAN INJ 5% 5 %
12.5 SOLUTION INTRAVENOUS ONCE
Status: COMPLETED | OUTPATIENT
Start: 2024-01-23 | End: 2024-01-23

## 2024-01-23 RX ORDER — FLUOXETINE 10 MG/1
10 CAPSULE ORAL DAILY
Status: DISCONTINUED | OUTPATIENT
Start: 2024-01-24 | End: 2024-01-23

## 2024-01-23 RX ORDER — PANTOPRAZOLE SODIUM 40 MG/10ML
40 INJECTION, POWDER, LYOPHILIZED, FOR SOLUTION INTRAVENOUS
Status: DISCONTINUED | OUTPATIENT
Start: 2024-01-24 | End: 2024-01-29

## 2024-01-23 RX ORDER — LORAZEPAM 1 MG/1
2 TABLET ORAL ONCE
Status: COMPLETED | OUTPATIENT
Start: 2024-01-23 | End: 2024-01-23

## 2024-01-23 RX ORDER — SODIUM CHLORIDE 9 MG/ML
75 INJECTION, SOLUTION INTRAVENOUS CONTINUOUS
Status: DISCONTINUED | OUTPATIENT
Start: 2024-01-23 | End: 2024-01-23

## 2024-01-23 RX ORDER — METOPROLOL TARTRATE 50 MG/1
50 TABLET, FILM COATED ORAL EVERY 12 HOURS SCHEDULED
Status: DISCONTINUED | OUTPATIENT
Start: 2024-01-23 | End: 2024-01-27

## 2024-01-23 RX ADMIN — OXAZEPAM 10 MG: 10 CAPSULE, GELATIN COATED ORAL at 13:23

## 2024-01-23 RX ADMIN — TRAMADOL HYDROCHLORIDE 50 MG: 50 TABLET, COATED ORAL at 09:42

## 2024-01-23 RX ADMIN — FLUOXETINE 20 MG: 20 CAPSULE ORAL at 08:36

## 2024-01-23 RX ADMIN — LACTULOSE 20 G: 10 SOLUTION ORAL at 14:35

## 2024-01-23 RX ADMIN — LORAZEPAM 2 MG: 2 INJECTION, SOLUTION INTRAMUSCULAR; INTRAVENOUS at 23:33

## 2024-01-23 RX ADMIN — ALBUMIN (HUMAN) 25 G: 0.25 INJECTION, SOLUTION INTRAVENOUS at 21:43

## 2024-01-23 RX ADMIN — LORAZEPAM 2 MG: 1 TABLET ORAL at 10:53

## 2024-01-23 RX ADMIN — LORAZEPAM 2 MG: 1 TABLET ORAL at 00:02

## 2024-01-23 RX ADMIN — PANTOPRAZOLE SODIUM 40 MG: 40 TABLET, DELAYED RELEASE ORAL at 05:00

## 2024-01-23 RX ADMIN — ALBUMIN (HUMAN) 25 G: 0.25 INJECTION, SOLUTION INTRAVENOUS at 16:27

## 2024-01-23 RX ADMIN — OCTREOTIDE ACETATE 100 MCG: 100 INJECTION, SOLUTION INTRAVENOUS; SUBCUTANEOUS at 16:27

## 2024-01-23 RX ADMIN — PREDNISONE 40 MG: 20 TABLET ORAL at 14:53

## 2024-01-23 RX ADMIN — THIAMINE HYDROCHLORIDE: 100 INJECTION, SOLUTION INTRAMUSCULAR; INTRAVENOUS at 09:43

## 2024-01-23 RX ADMIN — OCTREOTIDE ACETATE 100 MCG: 100 INJECTION, SOLUTION INTRAVENOUS; SUBCUTANEOUS at 22:01

## 2024-01-23 RX ADMIN — HEPARIN SODIUM 5000 UNITS: 5000 INJECTION, SOLUTION INTRAVENOUS; SUBCUTANEOUS at 18:00

## 2024-01-23 RX ADMIN — LACTULOSE 20 G: 10 SOLUTION ORAL at 21:43

## 2024-01-23 RX ADMIN — MIDODRINE HYDROCHLORIDE 5 MG: 5 TABLET ORAL at 20:02

## 2024-01-23 RX ADMIN — LACTULOSE 20 G: 10 SOLUTION ORAL at 19:59

## 2024-01-23 RX ADMIN — SODIUM CHLORIDE, SODIUM LACTATE, POTASSIUM CHLORIDE, AND CALCIUM CHLORIDE 1000 ML: .6; .31; .03; .02 INJECTION, SOLUTION INTRAVENOUS at 18:45

## 2024-01-23 RX ADMIN — LORAZEPAM 4 MG: 2 INJECTION INTRAMUSCULAR at 16:12

## 2024-01-23 RX ADMIN — NICOTINE 14 MG: 14 PATCH, EXTENDED RELEASE TRANSDERMAL at 08:40

## 2024-01-23 RX ADMIN — ACETAMINOPHEN 650 MG: 325 TABLET, FILM COATED ORAL at 05:00

## 2024-01-23 RX ADMIN — ALBUMIN (HUMAN) 12.5 G: 12.5 INJECTION, SOLUTION INTRAVENOUS at 14:35

## 2024-01-23 RX ADMIN — INSULIN LISPRO 8 UNITS: 100 INJECTION, SOLUTION INTRAVENOUS; SUBCUTANEOUS at 08:35

## 2024-01-23 RX ADMIN — LORAZEPAM 4 MG: 2 INJECTION, SOLUTION INTRAMUSCULAR; INTRAVENOUS at 16:12

## 2024-01-23 RX ADMIN — ACETAMINOPHEN 650 MG: 325 TABLET, FILM COATED ORAL at 13:23

## 2024-01-23 RX ADMIN — INSULIN GLARGINE 24 UNITS: 100 INJECTION, SOLUTION SUBCUTANEOUS at 21:43

## 2024-01-23 RX ADMIN — ALLOPURINOL 100 MG: 100 TABLET ORAL at 08:36

## 2024-01-23 RX ADMIN — SODIUM CHLORIDE 75 ML/HR: 0.9 INJECTION, SOLUTION INTRAVENOUS at 14:53

## 2024-01-23 RX ADMIN — LACTULOSE 20 G: 10 SOLUTION ORAL at 17:57

## 2024-01-23 RX ADMIN — GABAPENTIN 300 MG: 300 CAPSULE ORAL at 08:36

## 2024-01-23 RX ADMIN — MIDODRINE HYDROCHLORIDE 5 MG: 5 TABLET ORAL at 17:57

## 2024-01-23 RX ADMIN — LORAZEPAM 2 MG: 2 INJECTION, SOLUTION INTRAMUSCULAR; INTRAVENOUS at 23:48

## 2024-01-23 RX ADMIN — CHLORHEXIDINE GLUCONATE 0.12% ORAL RINSE 15 ML: 1.2 LIQUID ORAL at 09:42

## 2024-01-23 RX ADMIN — INSULIN LISPRO 8 UNITS: 100 INJECTION, SOLUTION INTRAVENOUS; SUBCUTANEOUS at 13:27

## 2024-01-23 RX ADMIN — TRAMADOL HYDROCHLORIDE 50 MG: 50 TABLET, COATED ORAL at 00:02

## 2024-01-23 RX ADMIN — HYDROXYZINE HYDROCHLORIDE 25 MG: 25 TABLET, FILM COATED ORAL at 02:07

## 2024-01-23 NOTE — CASE MANAGEMENT
Case Management Discharge Planning Note    Patient name Michael Koch  Location /-01 MRN [id number]  : 1990 Date 2024       Current Admission Date: 2024  Current Admission Diagnosis:Diabetic ketoacidosis without coma associated with type 2 diabetes mellitus (HCC)   Patient Active Problem List    Diagnosis Date Noted    ETOH abuse 2024    Depression 2024    GERD (gastroesophageal reflux disease) 2024    BPH (benign prostatic hyperplasia) 2024    RSV infection 2024    Abnormal CT scan 2024    Transaminitis w/ elevated bili 2024    N&V (nausea and vomiting) 2024    DUANE (acute kidney injury) ([facility]) 2024    Thrombocytopenia ([facility]) 2024    Lactic acidosis 2024    Diabetic ketoacidosis without coma associated with type 2 diabetes mellitus ([facility]) 2024    Marijuana use 2024    Hyponatremia 2024    SIRS (systemic inflammatory response syndrome) ([facility]) 2024    History of gout 10/04/2023    History of hypertension 10/04/2023    Type 2 diabetes mellitus with hyperglycemia, without long-term current use of insulin ([facility]) 10/04/2023    Tobacco abuse 10/04/2023      LOS (days): 2  Geometric Mean LOS (GMLOS) (days):   Days to GMLOS:     OBJECTIVE:  Risk of Unplanned Readmission Score: 24.76         Current admission status: Inpatient   Preferred Pharmacy:   [id number] - [place] - [address]  [address]  [place] [address]  Phone: [phone] Fax: [phone]    Primary Care Provider: Louie Odonnell DO    Primary Insurance: BLUE CROSS  Secondary Insurance:     DISCHARGE DETAILS:    Discussed in rounds today, patient with some confusion. Notified Warm Handoff person to hold off assessment today. Anticipate Warm Handoff will come to see Michael tomorrow.  CM will continue to follow and assist with a safe dc plan.

## 2024-01-23 NOTE — NURSING NOTE
Pt began yelling at 1:1 sitter saying that he needs to leave the hospital to go to a wrestling match.  Supervisor present and RN, earlene, supervisor at pt's bedside.  Pt threatening to leave, ripping pulse ox off and fighting staff.  Dr. Menendez notified.  Pt confused and unable to sign out AMA.  Control team called, pt fighting staff, pt placed in 4 point restraints, CIWA score 32, 4mg IV ativan administered per CIWA protocol.  Dr. Menendez at bedside.

## 2024-01-23 NOTE — PLAN OF CARE
Problem: Potential for Falls  Goal: Patient will remain free of falls  Description: INTERVENTIONS:  - Educate patient/family on patient safety including physical limitations  - Instruct patient to call for assistance with activity   - Consult OT/PT to assist with strengthening/mobility   - Keep Call bell within reach  - Keep bed low and locked with side rails adjusted as appropriate  - Keep care items and personal belongings within reach  - Initiate and maintain comfort rounds  - Make Fall Risk Sign visible to staff  - Offer Toileting every 2 Hours, in advance of need  - Initiate/Maintain bed alarm  - Obtain necessary fall risk management equipment: yellow socks  - Apply yellow socks and bracelet for high fall risk patients  - Consider moving patient to room near nurses station  Outcome: Progressing     Problem: PAIN - ADULT  Goal: Verbalizes/displays adequate comfort level or baseline comfort level  Description: Interventions:  - Encourage patient to monitor pain and request assistance  - Assess pain using appropriate pain scale  - Administer analgesics based on type and severity of pain and evaluate response  - Implement non-pharmacological measures as appropriate and evaluate response  - Consider cultural and social influences on pain and pain management  - Notify physician/advanced practitioner if interventions unsuccessful or patient reports new pain  Outcome: Progressing     Problem: INFECTION - ADULT  Goal: Absence or prevention of progression during hospitalization  Description: INTERVENTIONS:  - Assess and monitor for signs and symptoms of infection  - Monitor lab/diagnostic results  - Monitor all insertion sites, i.e. indwelling lines, tubes  - Administer medications as ordered  - Instruct and encourage patient and family to use good hand hygiene technique  - Identify and instruct in appropriate isolation precautions for identified infection/condition  Outcome: Progressing     Problem: SAFETY  ADULT  Goal: Patient will remain free of falls  Description: INTERVENTIONS:  - Educate patient/family on patient safety including physical limitations  - Instruct patient to call for assistance with activity   - Consult OT/PT to assist with strengthening/mobility   - Keep Call bell within reach  - Keep bed low and locked with side rails adjusted as appropriate  - Keep care items and personal belongings within reach  - Initiate and maintain comfort rounds  - Make Fall Risk Sign visible to staff  - Offer Toileting every 2 Hours, in advance of need  - Initiate/Maintain bed alarm  - Obtain necessary fall risk management equipment: yellow socks  - Apply yellow socks and bracelet for high fall risk patients  - Consider moving patient to room near nurses station  Outcome: Progressing  Goal: Maintain or return to baseline ADL function  Description: INTERVENTIONS:  -  Assess patient's ability to carry out ADLs; assess patient's baseline for ADL function and identify physical deficits which impact ability to perform ADLs (bathing, care of mouth/teeth, toileting, grooming, dressing, etc.)  - Assess/evaluate cause of self-care deficits   - Assess range of motion  - Assess patient's mobility; develop plan if impaired  - Assess patient's need for assistive devices and provide as appropriate  - Encourage maximum independence but intervene and supervise when necessary  - Involve family in performance of ADLs  - Assess for home care needs following discharge   - Consider OT consult to assist with ADL evaluation and planning for discharge  - Provide patient education as appropriate  Outcome: Progressing  Goal: Maintains/Returns to pre admission functional level  Description: INTERVENTIONS:  - Perform AM-PAC 6 Click Basic Mobility/ Daily Activity assessment daily.  - Set and communicate daily mobility goal to care team and patient/family/caregiver.   - Collaborate with rehabilitation services on mobility goals if consulted  - Perform  Range of Motion 3 times a day.  - Reposition patient every 2 hours.  - Record patient progress and toleration of activity level   Outcome: Progressing     Problem: DISCHARGE PLANNING  Goal: Discharge to home or other facility with appropriate resources  Description: INTERVENTIONS:  - Identify barriers to discharge w/patient and caregiver  - Arrange for needed discharge resources and transportation as appropriate  - Identify discharge learning needs (meds, etc.)  - Refer to Case Management Department for coordinating discharge planning if the patient needs post-hospital services based on physician/advanced practitioner order or complex needs related to functional status, cognitive ability, or social support system  Outcome: Progressing     Problem: Knowledge Deficit  Goal: Patient/family/caregiver demonstrates understanding of disease process, treatment plan, medications, and discharge instructions  Description: Complete learning assessment and assess knowledge base.  Interventions:  - Provide teaching at level of understanding  - Provide teaching via preferred learning methods  Outcome: Progressing     Problem: NEUROSENSORY - ADULT  Goal: Achieves stable or improved neurological status  Description: INTERVENTIONS  - Monitor and report changes in neurological status  - Monitor vital signs such as temperature, blood pressure, glucose, and any other labs ordered       Outcome: Progressing     Problem: CARDIOVASCULAR - ADULT  Goal: Maintains optimal cardiac output and hemodynamic stability  Description: INTERVENTIONS:  - Monitor I/O, vital signs and rhythm  - Monitor for S/S and trends of decreased cardiac output  - Administer and titrate ordered vasoactive medications to optimize hemodynamic stability  - Assess quality of pulses, skin color and temperature  - Assess for signs of decreased coronary artery perfusion  - Instruct patient to report change in severity of symptoms  Outcome: Progressing  Goal: Absence of  cardiac dysrhythmias or at baseline rhythm  Description: INTERVENTIONS:  - Continuous cardiac monitoring, vital signs, obtain 12 lead EKG if ordered  - Administer antiarrhythmic and heart rate control medications as ordered  - Monitor electrolytes and administer replacement therapy as ordered  Outcome: Progressing     Problem: RESPIRATORY - ADULT  Goal: Achieves optimal ventilation and oxygenation  Description: INTERVENTIONS:  - Assess for changes in respiratory status  - Assess for changes in mentation and behavior  - Position to facilitate oxygenation and minimize respiratory effort  - Oxygen administered by appropriate delivery if ordered  - Initiate smoking cessation education as indicated  - Encourage broncho-pulmonary hygiene including cough, deep breathe, Incentive Spirometry  - Assess the need for suctioning and aspirate as needed  - Assess and instruct to report SOB or any respiratory difficulty  - Respiratory Therapy support as indicated  Outcome: Progressing     Problem: GASTROINTESTINAL - ADULT  Goal: Minimal or absence of nausea and/or vomiting  Description: INTERVENTIONS:  - Administer IV fluids if ordered to ensure adequate hydration  - Maintain NPO status until nausea and vomiting are resolved  - Nasogastric tube if ordered  - Administer ordered antiemetic medications as needed  - Provide nonpharmacologic comfort measures as appropriate  - Advance diet as tolerated, if ordered  - Consider nutrition services referral to assist patient with adequate nutrition and appropriate food choices  Outcome: Progressing  Goal: Maintains or returns to baseline bowel function  Description: INTERVENTIONS:  - Assess bowel function  - Encourage oral fluids to ensure adequate hydration  - Administer IV fluids if ordered to ensure adequate hydration  - Administer ordered medications as needed  - Encourage mobilization and activity  - Consider nutritional services referral to assist patient with adequate nutrition and  appropriate food choices  Outcome: Progressing  Goal: Maintains adequate nutritional intake  Description: INTERVENTIONS:  - Monitor percentage of each meal consumed  - Identify factors contributing to decreased intake, treat as appropriate  - Assist with meals as needed  - Monitor I&O, weight, and lab values if indicated  - Obtain nutrition services referral as needed  Outcome: Progressing  Goal: Oral mucous membranes remain intact  Description: INTERVENTIONS  - Assess oral mucosa and hygiene practices  - Implement preventative oral hygiene regimen  - Implement oral medicated treatments as ordered  - Initiate Nutrition services referral as needed  Outcome: Progressing     Problem: GENITOURINARY - ADULT  Goal: Maintains or returns to baseline urinary function  Description: INTERVENTIONS:  - Assess urinary function  - Encourage oral fluids to ensure adequate hydration if ordered  - Administer IV fluids as ordered to ensure adequate hydration  - Administer ordered medications as needed  - Offer frequent toileting  - Follow urinary retention protocol if ordered  Outcome: Progressing     Problem: METABOLIC, FLUID AND ELECTROLYTES - ADULT  Goal: Electrolytes maintained within normal limits  Description: INTERVENTIONS:  - Monitor labs and assess patient for signs and symptoms of electrolyte imbalances  - Administer electrolyte replacement as ordered  - Monitor response to electrolyte replacements, including repeat lab results as appropriate  - Instruct patient on fluid and nutrition as appropriate  Outcome: Progressing  Goal: Fluid balance maintained  Description: INTERVENTIONS:  - Monitor labs   - Monitor I/O and WT  - Instruct patient on fluid and nutrition as appropriate  - Assess for signs & symptoms of volume excess or deficit  Outcome: Progressing  Goal: Glucose maintained within target range  Description: INTERVENTIONS:  - Monitor Blood Glucose as ordered  - Assess for signs and symptoms of hyperglycemia and  hypoglycemia  - Administer ordered medications to maintain glucose within target range  - Assess nutritional intake and initiate nutrition service referral as needed  Outcome: Progressing     Problem: HEMATOLOGIC - ADULT  Goal: Maintains hematologic stability  Description: INTERVENTIONS  - Assess for signs and symptoms of bleeding or hemorrhage  - Monitor labs  - Administer supportive blood products/factors as ordered and appropriate  Outcome: Progressing     Problem: MUSCULOSKELETAL - ADULT  Goal: Maintain or return mobility to safest level of function  Description: INTERVENTIONS:  - Assess patient's ability to carry out ADLs; assess patient's baseline for ADL function and identify physical deficits which impact ability to perform ADLs (bathing, care of mouth/teeth, toileting, grooming, dressing, etc.)  - Assess/evaluate cause of self-care deficits   - Assess range of motion  - Assess patient's mobility  - Assess patient's need for assistive devices and provide as appropriate  - Encourage maximum independence but intervene and supervise when necessary  - Involve family in performance of ADLs  - Assess for home care needs following discharge   - Consider OT consult to assist with ADL evaluation and planning for discharge  - Provide patient education as appropriate  Outcome: Progressing

## 2024-01-23 NOTE — ASSESSMENT & PLAN NOTE
Lab Results   Component Value Date    HGBA1C 8.8 (H) 01/21/2024       Recent Labs     01/23/24  0529 01/23/24  0738 01/23/24  1124 01/23/24  1643   POCGLU 160* 123 129 84       Blood Sugar Average: Last 72 hrs:  (P) [id number]    Initially presented in DKA   Resolved DKA.   Continue Lantus and SSI   AccuChecks TID with meals

## 2024-01-23 NOTE — ASSESSMENT & PLAN NOTE
MELD: 28   AST> ALT   RUQ: hepatomegaly,hepatic steatosis  CTAP: fatty liver/varices/portal htn  Started predinose 1/23   LD, CK, Tylenol level are WNL  D Bili elevated at 9.8  Ammonia: 187  GGT:797  AFP upper limit of NML    Plan:   Trend hepatic panel  Continue prednisone   Continue lactulose   Serial abdominal exams  PRN zofran for n/v

## 2024-01-23 NOTE — PROGRESS NOTES
"Pt able to give diet recall during time of visit. Reports sometimes eating breakfast such as fried egg sandwich on wheat toast and fruit cup, seems to be skipping lunch at work, reports dinner is main meal such as roasted chicken or beef stew, etc. Admits to eating large portions of pasta. Noted ETOH abuse and depression per H&P. Pt reports n/v x 2 weeks PTA, says mostly experiencing n/v at night.     Discussed current A1c level with pt. Discussed appropriate portion sizes of carb containing foods with pt. Encouraged whole grains. Advised avoidance of skipping meals. During discussion pt asked \"can you let me wife come in so she can hear this?\" Pt stating wife is \"at the door holding a box\" wife was not at the door, no one was there. Relayed this confusion to RN. At that time, finished diet instruction, will provide further diet ed as appropriate. Left Portions Booklet at bedside with RD contact info.     Will monitor po, consider supplements at follow up, noting pt at < 50% of B this AM.   "

## 2024-01-23 NOTE — ASSESSMENT & PLAN NOTE
Likely 2/2 ETOH abuse and acute liver disease    Hemolysis smear NML, LD wnl   Haptoglobin: 87   Fibrinogen: 280  Plan:  Continue to trend  Restarted DVT ppx since platelets horacio over 40,000.

## 2024-01-23 NOTE — PROGRESS NOTES
[facility]  Progress Note  Name: Michael Koch I  MRN: [id number]  Unit/Bed#: -01 I Date of Admission: 1/21/2024   Date of Service: 1/23/2024 I Hospital Day: 2    Assessment/Plan   * Hepatic encephalopathy (HCC)  Assessment & Plan  Patient noted to have worsening confusion today and restless and climbing out of bed.  Currently placed on a one-to-one observation.    Bilirubin level is worsening and still has transaminitis.  Also noted to have elevated ammonia level of 149 today.    Concern for worsening liver failure.  Asked GI to reassess whether he is now a candidate for steroids  RUQ hepatomegaly/hepatic steatosis (t/c liver dopplers if LFT rise)  CTAP fatty liver/varices/portal htn  MELD 30.52.6% 3 month mortality  DF 44   LD, CK, Tylenol level are WNL.stop tylenol     Plan:  Hepatitis panel pending  Will place on lactulose 20 g every 2 hours until patient has at least 4 bowel movements and repeat ammonia level in AM.  Continue CIWA protocol.  T/C nadolol for portal htn prior to d/c but currently hypotensive  Eventual EGD    DUANE (acute kidney injury) (HCC)  Assessment & Plan  Prerenal in setting of HHNK/ACEI use/ hepatorenal syndrome,iv contrast use and hypotension  Baseline 0.9-1  CT nephrolithiasis mild left hydro  UA +blood/glucose/protein  CK WNL  Renal U/S: Right intrarenal calculi;  Hepatomegaly with hepatic steatosis  Consulted Nephrology as creatinine is worsening.  Will give a dose of albumin and placed on IV fluid hydration as patient is currently hypotensive.  Will check urine protein urine protein creatinine ratio and urine sodium.  Avoid hypotension and nephrotoxic agents      Hyponatremia  Assessment & Plan  sodium down to 130.  Check urine sodium, urine osmolarity and serum osmolarity placed on IV fluid hydration due to hypotension will monitor sodium level closely    Diabetic ketoacidosis without coma associated with type 2 diabetes mellitus (HCC)  Assessment  & Plan  Lab Results   Component Value Date    HGBA1C 8.8 (H) 2024       Recent Labs     24  0620 24  0757 24  0953 24  1201   POCGLU 110 135 146* 169*       Blood Sugar Average: Last 72 hrs:  (P) 226.95  UA neg ketones +glucose  A1C 8.8%     Plan:  DKA insulin gtt transitioned to regular insulin gtt yesterday  currently patient is not eating and drinking much has low blood sugars will adjust insulin regimen to avoid hypoglycemia  Hold home ozempic/jardiance    Thrombocytopenia (HCC)  Assessment & Plan  Likely 2/2 ETOH abuse  Hemolysis smear NML, LD wnl   Haptoglobin/fibrinogen pending  Continue to trend    Abnormal CT scan  Assessment & Plan  CTAP-1.6cm soft tissue nodule of distal esophagus  CTC-  Slightly enlarged lower right paraesophageal node, stable since an abd CT 10/30/2023, of uncertain etiology and significance    RSV infection  Assessment & Plan    Supportive care.  resp status is currently stable    Depression  Assessment & Plan  Continue Prozac at low-dose of 10 mg daily instead of home dose of 20 mg daily.    ETOH abuse  Assessment & Plan  Per pt drinks 2 24 oz twisted tea/smirinoff per day (family notes pt drinks more than this)  Drinks since brother  3 years ago. Has stopped for a week or two without symptoms.  CIWA protocol.  Unclear whether patient is confused because of delirium tremens versus hepatic encephalopathy due to elevated ammonia level.  Will hold off on Serax at this time continue CIWA protocol alone and reassess after completing some treatment for his elevated ammonia level.  Thiamine/folate    Tobacco abuse  Assessment & Plan  Nicotine patch  Smoking cessation    History of gout  Assessment & Plan  hold colchicine/allopurinol due to hepatorenal failure             VTE Pharmacologic Prophylaxis: VTE Score: 1 Moderate Risk (Score 3-4) - Pharmacological DVT Prophylaxis Contraindicated. Sequential Compression Devices Ordered.    Mobility:   Basic Mobility  Inpatient Raw Score: 20  JH-HLM Goal: 6: Walk 10 steps or more  JH-HLM Achieved: 8: Walk 250 feet ot more  HLM Goal achieved. Continue to encourage appropriate mobility.    Patient Centered Rounds: I performed bedside rounds with nursing staff today.   Discussions with Specialists or Other Care Team Provider: [facility]    Education and Discussions with Family / Patient: unable to reach spouse    Total Time Spent on Date of Encounter in care of patient: 45 mins. This time was spent on one or more of the following: performing physical exam; counseling and coordination of care; obtaining or reviewing history; documenting in the medical record; reviewing/ordering tests, medications or procedures; communicating with other healthcare professionals and discussing with patient's family/caregivers.    Current Length of Stay: 2 day(s)  Current Patient Status: Inpatient   Certification Statement: The patient will continue to require additional inpatient hospital stay due to acute hepatic encephalopathy  Discharge Plan: Anticipate discharge in 48-72 hrs to discharge location to be determined pending rehab evaluations.    Code Status: Level 1 - Full Code    Subjective:   Patient is currently very confused.  He thinks he is in a bus was climbing out of bed earlier looks very yellow.noted to be tachycardic but no seizure like activity noted    Objective:     Vitals:   Temp (24hrs), Av.5 °F (36.9 °C), Min:98.3 °F (36.8 °C), Max:98.8 °F (37.1 °C)    Temp:  [98.3 °F (36.8 °C)-98.8 °F (37.1 °C)] 98.3 °F (36.8 °C)  HR:  [102-119] 117  Resp:  [17-29] 25  BP: ()/(55-78) 97/61  SpO2:  [92 %-95 %] 93 %  Body mass index is 31.04 kg/m².     Input and Output Summary (last 24 hours):     Intake/Output Summary (Last 24 hours) at 2024 1413  Last data filed at 2024 1013  Gross per 24 hour   Intake 353.6 ml   Output 100 ml   Net 253.6 ml       Physical Exam:   Physical Exam  Vitals and nursing note reviewed.    Constitutional:       Appearance: He is ill-appearing.   HENT:      Head: Normocephalic and atraumatic.      Right Ear: External ear normal.      Left Ear: External ear normal.      Nose: Nose normal.      Mouth/Throat:      Pharynx: Oropharynx is clear.   Eyes:      General: Scleral icterus present.      Pupils: Pupils are equal, round, and reactive to light.   Cardiovascular:      Rate and Rhythm: Regular rhythm. Tachycardia present.      Heart sounds: Normal heart sounds.   Pulmonary:      Effort: Pulmonary effort is normal.      Comments: mod air entry bilaterally with decreased breath sounds bilateral bases  Abdominal:      General: Bowel sounds are normal.      Palpations: Abdomen is soft.      Tenderness: There is no abdominal tenderness.   Musculoskeletal:         General: Normal range of motion.      Cervical back: Normal range of motion and neck supple.   Skin:     General: Skin is warm and dry.      Capillary Refill: Capillary refill takes less than 2 seconds.      Coloration: Skin is jaundiced.   Neurological:      Comments: confused.  Not oriented to person place or time appears to be hallucinating            Additional Data:     Labs:  Results from last 7 days   Lab Units 01/23/24  0453 01/22/24  0243   WBC Thousand/uL 6.34 5.72   HEMOGLOBIN g/dL 10.8* 10.8*   HEMATOCRIT % 29.4* 30.9*   PLATELETS Thousands/uL 183 37*   BANDS PCT %  --  8   NEUTROS PCT % 77*  --    LYMPHS PCT % 11*  --    LYMPHO PCT %  --  8*   MONOS PCT % 8  --    MONO PCT %  --  7   EOS PCT % 1 1     Results from last 7 days   Lab Units 01/23/24  0530   SODIUM mmol/L 130*   POTASSIUM mmol/L 3.6   CHLORIDE mmol/L 97   CO2 mmol/L 23   BUN mg/dL 9   CREATININE mg/dL 2.06*   ANION GAP mmol/L 10   CALCIUM mg/dL 8.3*   ALBUMIN g/dL 2.7*   TOTAL BILIRUBIN mg/dL 21.38*   ALK PHOS U/L 127*   ALT U/L 83*   AST U/L 254*   GLUCOSE RANDOM mg/dL 159*     Results from last 7 days   Lab Units 01/22/24  0243   INR  1.28*     Results from last 7 days    Lab Units 01/23/24  1124 01/23/24  0738 01/23/24  0529 01/22/24  2344 01/22/24  2214 01/22/24  2005 01/22/24  1824 01/22/24  1604 01/22/24  1429 01/22/24  1201 01/22/24  0953 01/22/24  0757   POC GLUCOSE mg/dl 129 123 160* 114 154* 132 131 111 144* 169* 146* 135     Results from last 7 days   Lab Units 01/21/24  1104   HEMOGLOBIN A1C % 8.8*     Results from last 7 days   Lab Units 01/22/24  1122 01/22/24  0808 01/22/24  0243 01/21/24  2354   LACTIC ACID mmol/L 3.4* 3.1* 4.0* 6.1*       Lines/Drains:  Invasive Devices       Peripheral Intravenous Line  Duration             Peripheral IV 01/21/24 Right Antecubital 2 days    Peripheral IV 01/21/24 Left;Proximal;Ventral (anterior) Forearm 1 day                          Imaging: Reviewed radiology reports from this admission including: abdominal/pelvic CT and ultrasound(s)    Recent Cultures (last 7 days):   Results from last 7 days   Lab Units 01/21/24  1118 01/21/24  1104   BLOOD CULTURE  No Growth at 24 hrs. No Growth at 24 hrs.       Last 24 Hours Medication List:   Current Facility-Administered Medications   Medication Dose Route Frequency Provider Last Rate    Albumin 5%  12.5 g Intravenous Once Constance Menendez MD      [START ON 1/24/2024] FLUoxetine  10 mg Oral Daily Constance Menendez MD      folic acid 1 mg, thiamine (VITAMIN B1) 100 mg in sodium chloride 0.9 % 100 mL IV piggyback   Intravenous Daily JENSEN Bruno Stopped (01/23/24 1013)    hydrOXYzine HCL  25 mg Oral Q6H PRN JENSEN Bruno      insulin glargine  24 Units Subcutaneous HS Santosh Hawkins DO      insulin lispro  1-6 Units Subcutaneous HS Chau Bernard Jr., PA-BEATRIS      insulin lispro  2-12 Units Subcutaneous TID AC Santosh Hawkins, DO      lactulose  20 g Oral Q2H Constance Menendez MD      metoprolol tartrate  25 mg Oral Q12H [facility] JENSEN Bruno      nicotine  14 mg Transdermal Daily Kindra T Edu, CRNP      ondansetron  4 mg Intravenous Q4H PRN Kindra HOOPER  JENSEN Sorensen      pantoprazole  40 mg Oral Early Morning JENSEN Bruno      sodium chloride (PF)  3 mL Intravenous Q1H PRN JENSEN Bruno      sodium chloride  75 mL/hr Intravenous Continuous Constance Menendez MD          Today, Patient Was Seen By: Constance Menendez MD    **Please Note: This note may have been constructed using a voice recognition system.**

## 2024-01-23 NOTE — ASSESSMENT & PLAN NOTE
Lab Results   Component Value Date    HGBA1C 8.8 (H) 01/21/2024     Recent Labs     01/23/24  0529 01/23/24  0738 01/23/24  1124 01/23/24  1643   POCGLU 160* 123 129 84     Blood Sugar Average: Last 72 hrs:  (P) [id number]  BHB 0.2  UA neg ketones +glucose  A1C 8.8%    Plan:  DKA insulin gtt transitioned to regular insulin gtt ovn  Cont POCT glu Q1H  Replace electrolytes PRN  Continue IVF until tolerating PO  Hold home ozempic/jardiance

## 2024-01-23 NOTE — ASSESSMENT & PLAN NOTE
Concern for alcoholic hepatitis  Confused and hallucinating  Mixed with ETOH withdrawal concerns      Plan:  Reorient as able   Precedex if agitation occurs.  Continue benzos for CIWA   Continue lactulose - place NG tube for administration   Delirium precautions

## 2024-01-23 NOTE — ASSESSMENT & PLAN NOTE
Initially Prerenal in setting of HHNK/ACEI  Now with concerns for biliary nephrosis in setting of elevated t/ bili   Baseline 0.9-1  CT nephrolithiasis mild left hydro  UA +blood/glucose/protein  CK WNL  Renal U/S: Right intrarenal calculi;  Hepatomegaly with hepatic steatosis    Plan:  Continue Albumin, Octreotide, and Midodrine   Trend BMP  Monitor I/O  Avoid nephrotoxic agents   Nephrology following.

## 2024-01-23 NOTE — ASSESSMENT & PLAN NOTE
Per pt drinks 2 24oz twisted tea/smirinoff per day (family notes pt drinks more than this)  Drinks since brother  3 years ago. Has stopped for a week or two without symptoms. No DTs.  CIWA protocol  Thiamine/folate    Plan:   Continue CIWA   Plan for precedex if patient is agitated   Continue 1:1 for safety   Continue b/l wrist restraint

## 2024-01-23 NOTE — PROGRESS NOTES
[facility]  Progress Note  Name: Michael Koch I  MRN: [id number]  Unit/Bed#: -01 I Date of Admission: 2024   Date of Service: 2024 I Hospital Day: 2    Assessment/Plan   * Alcoholic hepatitis  Assessment & Plan  MELD: 28   AST> ALT   RUQ: hepatomegaly,hepatic steatosis  CTAP: fatty liver/varices/portal htn  Started predinose    LD, CK, Tylenol level are WNL  D Bili elevated at 9.8  Ammonia: 187  GGT:797  AFP upper limit of NML    Plan:   Trend hepatic panel  Continue prednisone   Continue lactulose   Serial abdominal exams  PRN zofran for n/v    Diabetic ketoacidosis without coma associated with type 2 diabetes mellitus (HCC)-resolved as of 2024  Assessment & Plan  Lab Results   Component Value Date    HGBA1C 8.8 (H) 2024     Recent Labs     24  0529 24  0738 24  1124 24  1643   POCGLU 160* 123 129 84     Blood Sugar Average: Last 72 hrs:  (P) [id number]  BHB 0.2  UA neg ketones +glucose  A1C 8.8%    Plan:  DKA insulin gtt transitioned to regular insulin gtt ovn  Cont POCT glu Q1H  Replace electrolytes PRN  Continue IVF until tolerating PO  Hold home ozempic/jardiance    Hepatic encephalopathy (HCC)  Assessment & Plan  Concern for alcoholic hepatitis  Confused and hallucinating  Mixed with ETOH withdrawal concerns      Plan:  Reorient as able   Precedex if agitation occurs.  Continue benzos for CIWA   Continue lactulose - place NG tube for administration   Delirium precautions     N&V (nausea and vomiting)  Assessment & Plan  Likely 2/2 HHNK  PRN zofran    ETOH abuse  Assessment & Plan  Per pt drinks 2 24oz twisted tea/smirinoff per day (family notes pt drinks more than this)  Drinks since brother  3 years ago. Has stopped for a week or two without symptoms. No DTs.  CIWA protocol  Thiamine/folate    Plan:   Continue CIWA   Plan for precedex if patient is agitated   Continue 1:1 for safety   Continue b/l wrist  restraint     Abnormal CT scan  Assessment & Plan  CTAP-1.6cm soft tissue nodule of distal esophagus  CTC-  Slightly enlarged lower right paraesophageal node, stable since an abd CT 10/30/2023, of uncertain etiology and significance    DUANE (acute kidney injury) (HCC)  Assessment & Plan  Initially Prerenal in setting of HHNK/ACEI  Now with concerns for biliary nephrosis in setting of elevated t/ bili   Baseline 0.9-1  CT nephrolithiasis mild left hydro  UA +blood/glucose/protein  CK WNL  Renal U/S: Right intrarenal calculi;  Hepatomegaly with hepatic steatosis    Plan:  Continue Albumin, Octreotide, and Midodrine   Trend BMP  Monitor I/O  Avoid nephrotoxic agents   Nephrology following.     Hyponatremia  Assessment & Plan  Mild, likely in setting of liver disease     Plan:   Continue to trend       Thrombocytopenia (HCC)  Assessment & Plan  Likely 2/2 ETOH abuse and acute liver disease    Hemolysis smear NML, LD wnl   Haptoglobin: 87   Fibrinogen: 280  Plan:  Continue to trend  Restarted DVT ppx since platelets horacio over 40,000.     RSV infection  Assessment & Plan  F/u CTC given esophageal nodule  Supportive care    Depression  Assessment & Plan  NPO hold home fluoxetine/remeron/prn atarax    Tobacco abuse  Assessment & Plan  Nicotine patch  Smoking cessation counseling     Type 2 diabetes mellitus with hyperglycemia, without long-term current use of insulin (HCC)  Assessment & Plan  Lab Results   Component Value Date    HGBA1C 8.8 (H) 01/21/2024       Recent Labs     01/23/24  0529 01/23/24  0738 01/23/24  1124 01/23/24  1643   POCGLU 160* 123 129 84       Blood Sugar Average: Last 72 hrs:  (P) [id number]    Initially presented in DKA   Resolved DKA.   Continue Lantus and SSI   AccuChecks TID with meals     History of gout  Assessment & Plan  Continue allopurinol/colchicine when able to PO              Disposition: Stepdown Level 1    ICU Core Measures     A: Assess, Prevent, and Manage Pain Has pain been  assessed? Yes  Need for changes to pain regimen? No   B: Both SAT/SAT  N/A   C: Choice of Sedation RASS Goal: 0 Alert and Calm or N/A patient not on sedation  Need for changes to sedation or analgesia regimen? NA   D: Delirium CAM-ICU: Positive   E: Early Mobility  Plan for early mobility? Yes   F: Family Engagement Plan for family engagement today? Yes         Prophylaxis:  VTE VTE covered by:  heparin (porcine), Subcutaneous       Stress Ulcer  covered byomeprazole (PriLOSEC) 40 MG capsule [[id number]] (Long-Term Med), pantoprazole (PROTONIX) injection 40 mg [[id number]]         Significant 24hr Events     24hr events:   Admitted 3 days prior for DKA. Heavy ETOH abuse whom presented to the hospital intoxicated. Evidence of acute liver disease on presentation. GI following with concern for alcoholic liver failure.  Patient with worsening encephalopathy secondary to hepatic disease and ETOH withdrawal. Became very agitated and was climbing out of bed. He resulted in a control team. He was given 4mg Ativan per the CIWA protocol and he did calm down. He was placed in 4pt restraints at that time and 1:1 for safety.   Critical care accepted patient for service for better management of encephalopathy. Plan for Precedex gtt if encephalopathy worsens. Started on prednisone and lactulose in setting of worsening liver failure. Nephrology consulted and added octreotide, albumin and midodrine.      Subjective   Review of Systems   Unable to perform ROS: Mental status change (lethargic post ativan administration)      Objective                            Vitals I/O      Most Recent Min/Max in 24hrs   Temp 98.3 °F (36.8 °C) Temp  Min: 98.3 °F (36.8 °C)  Max: 98.6 °F (37 °C)   Pulse (!) 122 Pulse  Min: 105  Max: 126   Resp (!) 25 Resp  Min: 17  Max: 27   /67 BP  Min: 86/56  Max: 128/78   O2 Sat (!) 88 % SpO2  Min: 88 %  Max: 95 %      Intake/Output Summary (Last 24 hours) at 1/23/2024 2254  Last data filed at 1/23/2024  1546  Gross per 24 hour   Intake 669.85 ml   Output --   Net 669.85 ml       Diet Prashant/CHO Controlled; Consistent Carbohydrate Diet Level 2 (5 carb servings/75 grams CHO/meal)    Invasive Monitoring           Physical Exam   Physical Exam  Eyes:      Pupils: Pupils are equal, round, and reactive to light.   Skin:     General: Skin is warm.      Capillary Refill: Capillary refill takes less than 2 seconds.      Coloration: Skin is jaundiced.   HENT:      Mouth/Throat:      Mouth: Mucous membranes are moist.   Cardiovascular:      Rate and Rhythm: Regular rhythm. Tachycardia present.      Pulses: Normal pulses.   Abdominal: General: There is distension.     Palpations: Abdomen is soft.   Neurological:      General: No focal deficit present.      Mental Status: He is calm, disoriented to place, disoriented to time and disoriented to situation.      Motor: Strength full and intact in all extremities.            Diagnostic Studies      EKG: ST bpm: 112  Imaging:  I have personally reviewed pertinent films in PACS     Medications:  Scheduled PRN   Albumin 25%, 25 g, Q6H  folic acid 1 mg, thiamine (VITAMIN B1) 100 mg in sodium chloride 0.9 % 100 mL IV piggyback, , Daily  heparin (porcine), 5,000 Units, Q8H MURALI  insulin glargine, 24 Units, HS  insulin lispro, 1-6 Units, HS  insulin lispro, 2-12 Units, TID AC  lactulose, 20 g, Q2H  metoprolol tartrate, 50 mg, Q12H MURALI  midodrine, 5 mg, TID  nicotine, 14 mg, Daily  octreotide, 100 mcg, Q8H MURALI  [START ON 1/24/2024] pantoprazole, 40 mg, Q24H MURALI  predniSONE, 40 mg, Daily      ondansetron, 4 mg, Q4H PRN  sodium chloride (PF), 3 mL, Q1H PRN       Continuous          Labs:    CBC    Recent Labs     01/22/24  0243 01/23/24  0453   WBC 5.72 6.34   HGB 10.8* 10.8*   HCT 30.9* 29.4*   PLT 37* 183   BANDSPCT 8  --      BMP    Recent Labs     01/22/24  0808 01/23/24  0530   SODIUM 133* 130*   K 4.6 3.6    97   CO2 22 23   AGAP 10 10   BUN 5 9   CREATININE 1.32* 2.06*   CALCIUM  7.8* 8.3*       Coags    Recent Labs     01/22/24  0243   INR 1.28*        Additional Electrolytes  Recent Labs     01/22/24  0243 01/23/24  0530   MG 3.0* 2.3   PHOS 1.9* 2.5*          Blood Gas    No recent results  No recent results LFTs  Recent Labs     01/22/24  0243 01/23/24  0530   ALT 84* 83*   * 254*   ALKPHOS 115* 127*   ALB 2.6* 2.7*   TBILI 15.01* 21.38*       Infectious  No recent results  Glucose  Recent Labs     01/21/24  1935 01/22/24  0243 01/22/24  0808 01/23/24  0530   GLUC 246* 144* 130 159*               Critical Care Time Statement: Upon my evaluation, this patient had a high probability of imminent or life-threatening deterioration due to alcoholic encephalopathy, ETOH withdrawal, Acute liver disease, DUANE and type II DM , which required my direct attention, intervention, and personal management.  I spent a total of 20 minutes directly providing critical care services, including interpretation of complex medical databases, management of organ system failure(s) , and complex medical decision making (to support/prevent further life-threatening deterioration).. This time is exclusive of procedures, teaching, family meetings, and any prior time recorded by providers other than myself.      JENSEN Bruno

## 2024-01-23 NOTE — CONSULTS
Consultation - Nephrology   Michael Koch 33 y.o. male MRN: [id number]  Unit/Bed#: -01 Encounter: [id number]    ASSESSMENT & PLAN    Acute kidney injury likely multifactorial in the setting of hypotension likely cause ATN, contrast associated nephropathy, HRS in the setting of fulminant hepatic failure?  Bile cast nephropathy?  Prerenal azotemia in the setting of DKA?  Creatinine at baseline around 1.1 now increased to 2.06 currently nonoliguric  Urinalysis shows 1000 glucose large blood elevated leukocytes 1+ protein 20-30 RBCs and moderate bacteria from January 21  CT scan from January 21 shows a 9 mm calculi in the left ureter with proximal hydroureter and mild left hydronephrosis, 5 mm calculi in the left ureterovesicular junction with the left kidney that is atrophic  Hyponatremia in the setting of fulminant hepatic failure and hyperglycemia  Acute alcohol liver disease severe hepatic steatosis  Acute hepatic encephalopathy  Severe hyperbilirubinemia and hypoalbuminemia  RSV    Acute kidney injury-the patient is making urine although unclear how much, overall has a positive fluid balance respiratory rate is stable.  At this point would hold further normal saline.  Start albumin 25 g every 6 hours, start midodrine 5 mg 3 times daily, start octreotide 100 mcg every 8 hours.  Check a urine sodium.  Monitor intakes and outputs closely  Hyponatremia- sodium level is stable around 130 with a blood glucose of 254 and a corrected sodium around 132-133 we will monitor this while treating acute illness  Acute alcohol liver disease with severe hepatic steatosis-now started on steroids and GI following  Acute hepatic encephalopathy-noted on lactulose  Severe hyperbilirubinemia and hypoalbuminemia-in the setting of acute liver disease-GI following  RSV-continue supportive care      HISTORY OF PRESENT ILLNESS:  Requesting Physician: Constance Menendez MD  Reason for Consult: Acute kidney injury    Michael Koch is a 33  "y.o. year old male who was admitted for a change in mental status he has a history of type 2 diabetes, ethyl alcohol abuse, tobacco abuse, gout depression anxiety, nephrolithiasis status post lithotripsy and stent removal in who presented with nausea vomiting pain for 1 week continue to have chills weakness decreased appetite.  Now going through DTs is somewhat more alert and oriented although remains lethargic.  His breathing is stable, states he is making urine.  Thinks he is at a friend's house, but knows the date    PAST MEDICAL HISTORY:  Past Medical History:   Diagnosis Date    Depression     Diabetes mellitus (HCC)     Gout     Hypertension     Kidney stones        PAST SURGICAL HISTORY:  Past Surgical History:   Procedure Laterality Date    CYSTOSCOPY W/ URETERAL STENT PLACEMENT  09/14/2023    US GUIDED MASS BIOPSY (UNSPECIFIED)  02/19/2021       ALLERGIES:  No Known Allergies    SOCIAL HISTORY:  Social History     Substance and Sexual Activity   Alcohol Use Yes    Comment: \"couple 24 oz cans per day\"     Social History     Substance and Sexual Activity   Drug Use Never     Social History     Tobacco Use   Smoking Status Every Day    Current packs/day: 0.25    Types: Cigarettes   Smokeless Tobacco Never       FAMILY HISTORY:  History reviewed. No pertinent family history.    MEDICATIONS:    Current Facility-Administered Medications:     albumin human (FLEXBUMIN) 25 % injection 25 g, 25 g, Intravenous, Q6H, Jeff Bentley DO    [START ON 1/24/2024] FLUoxetine (PROzac) capsule 10 mg, 10 mg, Oral, Daily, Constance Menendez MD    folic acid 1 mg, thiamine (VITAMIN B1) 100 mg in sodium chloride 0.9 % 100 mL IV piggyback, , Intravenous, Daily, JENSEN Bruno, Stopped at 01/23/24 1013    hydrOXYzine HCL (ATARAX) tablet 25 mg, 25 mg, Oral, Q6H PRN, JENSEN Bruno, 25 mg at 01/23/24 0207    insulin glargine (LANTUS) subcutaneous injection 24 Units 0.24 mL, 24 Units, Subcutaneous, Santosh BHARDWAJ " [facility], DO, 24 Units at 01/22/24 2215    insulin lispro (HumaLOG) 100 units/mL subcutaneous injection 1-6 Units, 1-6 Units, Subcutaneous, HS **AND** Fingerstick Glucose (POCT), , , TID AC, Chau Bernard Jr., [place]    insulin lispro (HumaLOG) 100 units/mL subcutaneous injection 2-12 Units, 2-12 Units, Subcutaneous, TID AC **AND** Fingerstick Glucose (POCT), , , TID AC, Santosh Yeung [facility], DO    lactulose (CHRONULAC) oral solution 20 g, 20 g, Oral, Q2H, Constance Menendez MD, 20 g at 01/23/24 1435    metoprolol tartrate (LOPRESSOR) tablet 25 mg, 25 mg, Oral, Q12H MURALI, JENSEN Bruno, 25 mg at 01/22/24 1006    midodrine (PROAMATINE) tablet 5 mg, 5 mg, Oral, TID, Jeffsimran Bentley, DO    nicotine (NICODERM CQ) 14 mg/24hr TD 24 hr patch 14 mg, 14 mg, Transdermal, Daily, JENSEN Bruno, 14 mg at 01/23/24 0840    octreotide (SandoSTATIN) injection 100 mcg, 100 mcg, Intravenous, Q8H MURAIL, Jeff Bentley,     ondansetron (ZOFRAN) injection 4 mg, 4 mg, Intravenous, Q4H PRN, JENSEN Bruno, 4 mg at 01/22/24 1403    pantoprazole (PROTONIX) EC tablet 40 mg, 40 mg, Oral, Early Morning, JENSEN Bruno, 40 mg at 01/23/24 0500    predniSONE tablet 40 mg, 40 mg, Oral, Daily, Constance Menendez MD, 40 mg at 01/23/24 1453    Insert peripheral IV, , , Once **AND** sodium chloride (PF) 0.9 % injection 3 mL, 3 mL, Intravenous, Q1H PRN, JENSEN Bruno    REVIEW OF SYSTEMS:    A 12 point review of systems was performed and was negative besides what is mentioned in HPI    OBJECTIVE:    Vitals:    01/23/24 1100 01/23/24 1200 01/23/24 1445 01/23/24 1500   BP:  97/61 118/61 106/58   BP Location:  Right arm     Pulse:  (!) 117 (!) 126 (!) 121   Resp:  (!) 25 (!) 27 (!) 25   Temp:  98.3 °F (36.8 °C)     TempSrc:  Oral     SpO2: 93% 93% 91% (!) 89%   Weight:       Height:            Temp:  [98.3 °F (36.8 °C)-98.6 °F (37 °C)] 98.3 °F (36.8 °C)  HR:  [102-126] 121  Resp:  [17-27] 25  BP: ()/(55-78)  106/58  SpO2:  [89 %-95 %] 89 %   Body mass index is 31.04 kg/m².    I/O last 3 completed shifts:  In: 6710.3 [P.O.:580; I.V.:4230.3; IV Piggyback:1900]  Out: 900 [Urine:900]  I/O this shift:  In: 100 [IV Piggyback:100]  Out: -     Weight (last 2 days)       Date/Time Weight    01/23/24 0538 89.9 (198.19)    01/22/24 0559 90.1 (198.63)    01/21/24 1101 88.5 (195.11)             Physical exam:    General: no acute distress, cooperative  Eyes: conjunctivae pink, anicteric sclerae  ENT: lips and mucous membranes moist, no exudates, normal external ears  Neck: ROM intact, no JVD  Chest: No respiratory distress, no accessory muscle use  CVS: normal rate, non pericardial friction rub  Abdomen: soft, non-tender, non-distended, normoactive bowel sounds  Extremities: 2+ edema  Skin: no rash  Neuro: Positive asterixis  Psych:  Pleasant affect      Invasive Devices:      Lab Results:   Results from last 7 days   Lab Units 01/23/24  0530 01/23/24  0453 01/22/24  0808 01/22/24  0243 01/21/24  1935 01/21/24  1607 01/21/24  1240 01/21/24  1219 01/21/24  1219 01/21/24  1118 01/21/24  1104   WBC Thousand/uL  --  6.34  --  5.72  --   --   --   --   --   --  10.12   HEMOGLOBIN g/dL  --  10.8*  --  10.8*  --   --   --   --   --   --  14.6   HEMATOCRIT %  --  29.4*  --  30.9*  --   --   --   --   --   --  43.3   PLATELETS Thousands/uL  --  183  --  37*  --   --   --   --   --   --  72*   POTASSIUM mmol/L 3.6  --  4.6 3.3* 4.0 3.6  --    < > 3.7  --   --    CHLORIDE mmol/L 97  --  101 101 98 97  --    < > 90*  --   --    CO2 mmol/L 23  --  22 21 17* 13*  --    < > 13*  --   --    BUN mg/dL 9  --  5 5 5 6  --    < > 7  --   --    CREATININE mg/dL 2.06*  --  1.32* 1.12 1.07 1.14  --    < > 1.38*  --   --    CALCIUM mg/dL 8.3*  --  7.8* 8.1* 7.4* 7.4*  --    < > 7.8*  --   --    MAGNESIUM mg/dL 2.3  --   --  3.0* 1.7*  --   --   --  2.0  --   --    PHOSPHORUS mg/dL 2.5*  --   --  1.9* 1.7*  --   --   --  1.7*  --   --    ALK PHOS U/L 127*   "--   --  115*  --   --   --   --  135*  --   --    ALT U/L 83*  --   --  84*  --   --   --   --  81*  --   --    AST U/L 254*  --   --  254*  --   --   --   --  190*  --   --    BLOOD CULTURE   --   --   --   --   --   --   --   --   --  No Growth at 24 hrs. No Growth at 24 hrs.   NITRITE UA   --   --   --   --   --   --  Negative  --   --   --   --    BLOOD UA   --   --   --   --   --   --  Large*  --   --   --   --    LEUKOCYTES UA   --   --   --   --   --   --  Elevated glucose may cause decreased leukocyte values. See urine microscopic for UWBC result*  --   --   --   --     < > = values in this interval not displayed.         Portions of the record may have been created with voice recognition software. Occasional wrong word or \"sound a like\" substitutions may have occurred due to the inherent limitations of voice recognition software. Read the chart carefully and recognize, using context, where substitutions have occurred.If you have any questions, please contact the dictating provider.    "

## 2024-01-24 ENCOUNTER — TELEPHONE (OUTPATIENT)
Dept: NEPHROLOGY | Facility: CLINIC | Age: 34
End: 2024-01-24

## 2024-01-24 LAB
ALBUMIN SERPL BCP-MCNC: 3.1 G/DL (ref 3.5–5)
ALP SERPL-CCNC: 105 U/L (ref 34–104)
ALT SERPL W P-5'-P-CCNC: 62 U/L (ref 7–52)
AMMONIA PLAS-SCNC: 103 UMOL/L (ref 18–72)
AMMONIA PLAS-SCNC: 128 UMOL/L (ref 18–72)
ANION GAP SERPL CALCULATED.3IONS-SCNC: 13 MMOL/L
ANION GAP SERPL CALCULATED.3IONS-SCNC: 15 MMOL/L
APAP SERPL-MCNC: <2 UG/ML (ref 10–20)
AST SERPL W P-5'-P-CCNC: 173 U/L (ref 13–39)
BASE EX.OXY STD BLDV CALC-SCNC: 75 % (ref 60–80)
BASE EXCESS BLDV CALC-SCNC: -5.5 MMOL/L
BILIRUB SERPL-MCNC: 23.46 MG/DL (ref 0.2–1)
BUN SERPL-MCNC: 15 MG/DL (ref 5–25)
BUN SERPL-MCNC: 19 MG/DL (ref 5–25)
CALCIUM ALBUM COR SERPL-MCNC: 9.2 MG/DL (ref 8.3–10.1)
CALCIUM SERPL-MCNC: 8.5 MG/DL (ref 8.4–10.2)
CALCIUM SERPL-MCNC: 9 MG/DL (ref 8.4–10.2)
CHLORIDE SERPL-SCNC: 100 MMOL/L (ref 96–108)
CHLORIDE SERPL-SCNC: 102 MMOL/L (ref 96–108)
CO2 SERPL-SCNC: 16 MMOL/L (ref 21–32)
CO2 SERPL-SCNC: 20 MMOL/L (ref 21–32)
CREAT SERPL-MCNC: 2.17 MG/DL (ref 0.6–1.3)
CREAT SERPL-MCNC: 2.26 MG/DL (ref 0.6–1.3)
CREAT UR-MCNC: 181.9 MG/DL
ERYTHROCYTE [DISTWIDTH] IN BLOOD BY AUTOMATED COUNT: 18.8 % (ref 11.6–15.1)
GFR SERPL CREATININE-BSD FRML MDRD: 36 ML/MIN/1.73SQ M
GFR SERPL CREATININE-BSD FRML MDRD: 38 ML/MIN/1.73SQ M
GLUCOSE SERPL-MCNC: 181 MG/DL (ref 65–140)
GLUCOSE SERPL-MCNC: 199 MG/DL (ref 65–140)
GLUCOSE SERPL-MCNC: 200 MG/DL (ref 65–140)
GLUCOSE SERPL-MCNC: 214 MG/DL (ref 65–140)
GLUCOSE SERPL-MCNC: 224 MG/DL (ref 65–140)
GLUCOSE SERPL-MCNC: 298 MG/DL (ref 65–140)
HAV IGM SER QL: NORMAL
HBV CORE IGM SER QL: NORMAL
HBV SURFACE AG SER QL: NORMAL
HCO3 BLDV-SCNC: 19.8 MMOL/L (ref 24–30)
HCT VFR BLD AUTO: 26.8 % (ref 36.5–49.3)
HCV AB SER QL: NORMAL
HGB BLD-MCNC: 9.4 G/DL (ref 12–17)
INR PPP: 1.72 (ref 0.84–1.19)
MAGNESIUM SERPL-MCNC: 2.3 MG/DL (ref 1.9–2.7)
MCH RBC QN AUTO: 34.3 PG (ref 26.8–34.3)
MCHC RBC AUTO-ENTMCNC: 35.1 G/DL (ref 31.4–37.4)
MCV RBC AUTO: 98 FL (ref 82–98)
NASAL CANNULA: 6
O2 CT BLDV-SCNC: 11.5 ML/DL
OSMOLALITY UR/SERPL-RTO: 306 MMOL/KG (ref 282–298)
OSMOLALITY UR: 576 MMOL/KG
PCO2 BLDV: 38.2 MM HG (ref 42–50)
PH BLDV: 7.33 [PH] (ref 7.3–7.4)
PHOSPHATE SERPL-MCNC: 2.1 MG/DL (ref 2.7–4.5)
PLATELET # BLD AUTO: 56 THOUSANDS/UL (ref 149–390)
PMV BLD AUTO: 12.3 FL (ref 8.9–12.7)
PO2 BLDV: 46.7 MM HG (ref 35–45)
POTASSIUM SERPL-SCNC: 3.5 MMOL/L (ref 3.5–5.3)
POTASSIUM SERPL-SCNC: 3.6 MMOL/L (ref 3.5–5.3)
PROT SERPL-MCNC: 5.6 G/DL (ref 6.4–8.4)
PROT UR-MCNC: 188 MG/DL
PROT UR-MCNC: 188 MG/DL
PROT/CREAT UR: 1.03 MG/G{CREAT} (ref 0–0.1)
PROTHROMBIN TIME: 20.5 SECONDS (ref 11.6–14.5)
RBC # BLD AUTO: 2.74 MILLION/UL (ref 3.88–5.62)
SODIUM 24H UR-SCNC: 44 MOL/L
SODIUM SERPL-SCNC: 131 MMOL/L (ref 135–147)
SODIUM SERPL-SCNC: 135 MMOL/L (ref 135–147)
WBC # BLD AUTO: 5.92 THOUSAND/UL (ref 4.31–10.16)

## 2024-01-24 PROCEDURE — 83735 ASSAY OF MAGNESIUM: CPT

## 2024-01-24 PROCEDURE — 85610 PROTHROMBIN TIME: CPT

## 2024-01-24 PROCEDURE — C9113 INJ PANTOPRAZOLE SODIUM, VIA: HCPCS

## 2024-01-24 PROCEDURE — 84156 ASSAY OF PROTEIN URINE: CPT

## 2024-01-24 PROCEDURE — 83935 ASSAY OF URINE OSMOLALITY: CPT

## 2024-01-24 PROCEDURE — 84100 ASSAY OF PHOSPHORUS: CPT

## 2024-01-24 PROCEDURE — 80048 BASIC METABOLIC PNL TOTAL CA: CPT

## 2024-01-24 PROCEDURE — 80074 ACUTE HEPATITIS PANEL: CPT

## 2024-01-24 PROCEDURE — 82948 REAGENT STRIP/BLOOD GLUCOSE: CPT

## 2024-01-24 PROCEDURE — 82140 ASSAY OF AMMONIA: CPT

## 2024-01-24 PROCEDURE — 80143 DRUG ASSAY ACETAMINOPHEN: CPT

## 2024-01-24 PROCEDURE — 99233 SBSQ HOSP IP/OBS HIGH 50: CPT | Performed by: INTERNAL MEDICINE

## 2024-01-24 PROCEDURE — 84300 ASSAY OF URINE SODIUM: CPT

## 2024-01-24 PROCEDURE — 85027 COMPLETE CBC AUTOMATED: CPT

## 2024-01-24 PROCEDURE — 82570 ASSAY OF URINE CREATININE: CPT

## 2024-01-24 PROCEDURE — 80053 COMPREHEN METABOLIC PANEL: CPT

## 2024-01-24 PROCEDURE — 99233 SBSQ HOSP IP/OBS HIGH 50: CPT | Performed by: ANESTHESIOLOGY

## 2024-01-24 PROCEDURE — 83930 ASSAY OF BLOOD OSMOLALITY: CPT

## 2024-01-24 PROCEDURE — 82805 BLOOD GASES W/O2 SATURATION: CPT

## 2024-01-24 RX ORDER — INSULIN GLARGINE 100 [IU]/ML
28 INJECTION, SOLUTION SUBCUTANEOUS
Status: DISCONTINUED | OUTPATIENT
Start: 2024-01-24 | End: 2024-01-25

## 2024-01-24 RX ORDER — BUMETANIDE 0.25 MG/ML
3 INJECTION INTRAMUSCULAR; INTRAVENOUS ONCE
Status: COMPLETED | OUTPATIENT
Start: 2024-01-24 | End: 2024-01-24

## 2024-01-24 RX ORDER — POTASSIUM CHLORIDE 20MEQ/15ML
40 LIQUID (ML) ORAL ONCE
Status: COMPLETED | OUTPATIENT
Start: 2024-01-24 | End: 2024-01-24

## 2024-01-24 RX ORDER — LACTULOSE 10 G/15ML
20 SOLUTION ORAL 3 TIMES DAILY
Status: DISCONTINUED | OUTPATIENT
Start: 2024-01-25 | End: 2024-01-25

## 2024-01-24 RX ORDER — LORAZEPAM 1 MG/1
2 TABLET ORAL ONCE
Status: COMPLETED | OUTPATIENT
Start: 2024-01-24 | End: 2024-01-24

## 2024-01-24 RX ORDER — DEXMEDETOMIDINE HYDROCHLORIDE 4 UG/ML
.1-.7 INJECTION, SOLUTION INTRAVENOUS
Status: DISCONTINUED | OUTPATIENT
Start: 2024-01-24 | End: 2024-01-25

## 2024-01-24 RX ORDER — MIDODRINE HYDROCHLORIDE 5 MG/1
15 TABLET ORAL 3 TIMES DAILY
Status: DISCONTINUED | OUTPATIENT
Start: 2024-01-24 | End: 2024-01-27

## 2024-01-24 RX ORDER — METHYLPREDNISOLONE SODIUM SUCCINATE 40 MG/ML
40 INJECTION, POWDER, LYOPHILIZED, FOR SOLUTION INTRAMUSCULAR; INTRAVENOUS DAILY
Status: DISCONTINUED | OUTPATIENT
Start: 2024-01-25 | End: 2024-01-26

## 2024-01-24 RX ADMIN — METOPROLOL TARTRATE 50 MG: 50 TABLET, FILM COATED ORAL at 10:06

## 2024-01-24 RX ADMIN — DEXMEDETOMIDINE HYDROCHLORIDE 0.4 MCG/KG/HR: 400 INJECTION INTRAVENOUS at 03:16

## 2024-01-24 RX ADMIN — LACTULOSE 20 G: 10 SOLUTION ORAL at 21:26

## 2024-01-24 RX ADMIN — MIDODRINE HYDROCHLORIDE 15 MG: 5 TABLET ORAL at 09:43

## 2024-01-24 RX ADMIN — HEPARIN SODIUM 5000 UNITS: 5000 INJECTION, SOLUTION INTRAVENOUS; SUBCUTANEOUS at 16:36

## 2024-01-24 RX ADMIN — MIDODRINE HYDROCHLORIDE 15 MG: 5 TABLET ORAL at 20:43

## 2024-01-24 RX ADMIN — OCTREOTIDE ACETATE 100 MCG: 100 INJECTION, SOLUTION INTRAVENOUS; SUBCUTANEOUS at 21:26

## 2024-01-24 RX ADMIN — THIAMINE HYDROCHLORIDE: 100 INJECTION, SOLUTION INTRAMUSCULAR; INTRAVENOUS at 11:33

## 2024-01-24 RX ADMIN — INSULIN LISPRO 2 UNITS: 100 INJECTION, SOLUTION INTRAVENOUS; SUBCUTANEOUS at 21:26

## 2024-01-24 RX ADMIN — LORAZEPAM 2 MG: 1 TABLET ORAL at 03:17

## 2024-01-24 RX ADMIN — ALBUMIN (HUMAN) 25 G: 0.25 INJECTION, SOLUTION INTRAVENOUS at 21:26

## 2024-01-24 RX ADMIN — LACTULOSE 20 G: 10 SOLUTION ORAL at 13:13

## 2024-01-24 RX ADMIN — OCTREOTIDE ACETATE 100 MCG: 100 INJECTION, SOLUTION INTRAVENOUS; SUBCUTANEOUS at 05:58

## 2024-01-24 RX ADMIN — LACTULOSE 20 G: 10 SOLUTION ORAL at 11:36

## 2024-01-24 RX ADMIN — BUMETANIDE 3 MG: 0.25 INJECTION INTRAMUSCULAR; INTRAVENOUS at 15:24

## 2024-01-24 RX ADMIN — LACTULOSE 20 G: 10 SOLUTION ORAL at 20:43

## 2024-01-24 RX ADMIN — LACTULOSE 20 G: 10 SOLUTION ORAL at 10:19

## 2024-01-24 RX ADMIN — OCTREOTIDE ACETATE 100 MCG: 100 INJECTION, SOLUTION INTRAVENOUS; SUBCUTANEOUS at 13:13

## 2024-01-24 RX ADMIN — ALBUMIN (HUMAN) 25 G: 0.25 INJECTION, SOLUTION INTRAVENOUS at 15:25

## 2024-01-24 RX ADMIN — INSULIN LISPRO 4 UNITS: 100 INJECTION, SOLUTION INTRAVENOUS; SUBCUTANEOUS at 11:38

## 2024-01-24 RX ADMIN — LACTULOSE 20 G: 10 SOLUTION ORAL at 05:58

## 2024-01-24 RX ADMIN — NICOTINE 14 MG: 14 PATCH, EXTENDED RELEASE TRANSDERMAL at 09:50

## 2024-01-24 RX ADMIN — METOPROLOL TARTRATE 50 MG: 50 TABLET, FILM COATED ORAL at 20:43

## 2024-01-24 RX ADMIN — INSULIN LISPRO 6 UNITS: 100 INJECTION, SOLUTION INTRAVENOUS; SUBCUTANEOUS at 16:36

## 2024-01-24 RX ADMIN — PREDNISONE 40 MG: 20 TABLET ORAL at 10:06

## 2024-01-24 RX ADMIN — LACTULOSE 20 G: 10 SOLUTION ORAL at 15:25

## 2024-01-24 RX ADMIN — ALBUMIN (HUMAN) 25 G: 0.25 INJECTION, SOLUTION INTRAVENOUS at 04:26

## 2024-01-24 RX ADMIN — POTASSIUM CHLORIDE 40 MEQ: 1.5 SOLUTION ORAL at 16:35

## 2024-01-24 RX ADMIN — LACTULOSE 20 G: 10 SOLUTION ORAL at 02:12

## 2024-01-24 RX ADMIN — LACTULOSE 20 G: 10 SOLUTION ORAL at 17:42

## 2024-01-24 RX ADMIN — LACTULOSE 20 G: 10 SOLUTION ORAL at 00:10

## 2024-01-24 RX ADMIN — INSULIN LISPRO 2 UNITS: 100 INJECTION, SOLUTION INTRAVENOUS; SUBCUTANEOUS at 07:52

## 2024-01-24 RX ADMIN — MIDODRINE HYDROCHLORIDE 15 MG: 5 TABLET ORAL at 15:25

## 2024-01-24 RX ADMIN — HEPARIN SODIUM 5000 UNITS: 5000 INJECTION, SOLUTION INTRAVENOUS; SUBCUTANEOUS at 10:06

## 2024-01-24 RX ADMIN — LACTULOSE 20 G: 10 SOLUTION ORAL at 09:39

## 2024-01-24 RX ADMIN — DEXMEDETOMIDINE HYDROCHLORIDE 0.4 MCG/KG/HR: 400 INJECTION INTRAVENOUS at 10:28

## 2024-01-24 RX ADMIN — PANTOPRAZOLE SODIUM 40 MG: 40 INJECTION, POWDER, FOR SOLUTION INTRAVENOUS at 10:07

## 2024-01-24 RX ADMIN — BUMETANIDE 3 MG: 0.25 INJECTION INTRAMUSCULAR; INTRAVENOUS at 09:46

## 2024-01-24 RX ADMIN — LACTULOSE 20 G: 10 SOLUTION ORAL at 04:26

## 2024-01-24 RX ADMIN — ALBUMIN (HUMAN) 25 G: 0.25 INJECTION, SOLUTION INTRAVENOUS at 09:39

## 2024-01-24 RX ADMIN — INSULIN GLARGINE 28 UNITS: 100 INJECTION, SOLUTION SUBCUTANEOUS at 21:46

## 2024-01-24 NOTE — TELEPHONE ENCOUNTER
Pt's spouse called the office stating she received a call from pt's sister to call Dr. Bentley and inform him if pt could receive dialysis, if needed. Pt is currently admitted to [facility] and is not able to make his own decisions at the moment. Pt's spouse said she does give permission for the hospital to give pt dialysis.     TT sent to Dr. Bentley.

## 2024-01-24 NOTE — ASSESSMENT & PLAN NOTE
Per pt drinks 2 24oz twisted tea/smirinoff per day (family notes pt drinks more than this)  Drinks since brother  3 years ago. Has stopped for a week or two without symptoms. No DTs.  [facility] protocol  Thiamine/folate    Plan:   Continue CIWA   Precedex gtt initiated  - d/c

## 2024-01-24 NOTE — ASSESSMENT & PLAN NOTE
Lab Results   Component Value Date    HGBA1C 8.8 (H) 01/21/2024       Recent Labs     01/23/24  0738 01/23/24  1124 01/23/24  1643 01/23/24 2124   POCGLU 123 129 84 133       Blood Sugar Average: Last 72 hrs:  (P) [id number]    Initially presented in DKA   Resolved DKA.   Continue Lantus and SSI   AccuChecks TID with meals

## 2024-01-24 NOTE — PROGRESS NOTES
NEPHROLOGY PROGRESS NOTE   Michael Koch 33 y.o. male MRN: [id number]  Unit/Bed#: -01 Encounter: [id number]      Addendum: The patient has now made 900 cc of urine consent was obtained and scanned into the media section of the chart for dialysis is needed we will watch closely I spoke with patient's sister and patient's spouse over the phone    ASSESSMENT & PLAN    Acute kidney injury likely multifactorial in the setting of hypotension likely cause ATN, contrast associated nephropathy, HRS in the setting of fulminant hepatic failure?  Bile cast nephropathy?  Prerenal azotemia in the setting of DKA?  Creatinine at baseline around 1.1 now increased to 2.06 currently nonoliguric  Urinalysis shows 1000 glucose large blood elevated leukocytes 1+ protein 20-30 RBCs and moderate bacteria from January 21  CT scan from January 21 shows a 9 mm calculi in the left ureter with proximal hydroureter and mild left hydronephrosis, 5 mm calculi in the left ureterovesicular junction with the left kidney that is atrophic  Urine sodium 44  Hyponatremia in the setting of fulminant hepatic failure and hyperglycemia  Acute alcohol liver disease severe hepatic steatosis  Acute hepatic encephalopathy  Severe hyperbilirubinemia and hypoalbuminemia  RSV      DUANE-now oliguric, urine sodium 44, this is likely multifactorial also with a distended abdomen.  Will attempt Bumex 3 mg x 1.  Continue midodrine but uptitrate to 15 mg 3 times daily, continue octreotide for now.  Continue lactulose monitor for ascites and paracenteses and if meets any clinical endpoints will plan on dialysis today.  I did speak with Cathryn Gonzalez patient's sister at [phone].  I called patient's spouse Jo Koch at [phone] and left a message.  I have also asked patient's Sister Cathryn to get the message to the patient and left my office phone number for her to call back  Hyponatremia in the setting of hyperglycemia and volume overload will attempt diuresis  remains above 130 and corrected is around 133  Alcoholic liver disease with severe hepatic steatosis-unfortunately bilirubin continues to climb now up to 23.46, albumin is 3.1, thrombocytopenic with a platelet count of 56, anemic with a hemoglobin of 9.4.  Currently on steroids.  Discussed with critical care team and we were in agreement with consideration at a transplant center  Acute hepatic encephalopathy-remains on lactulose but unfortunately has not had many bowel movements abdomen is distended, consider abdominal imaging  RSV-continue supportive care    SUBJECTIVE:    Patient was seen today  Remains encephalopathic  Oliguric now has a Ferguson catheter not intubated on a one-to-one    12 point review of systems was otherwise negative besides what is mentioned above.    Medications:    Current Facility-Administered Medications:     albumin human (FLEXBUMIN) 25 % injection 25 g, 25 g, Intravenous, Q6H, JENSEN Bruno, Last Rate: 0 mL/hr at 01/24/24 0634, 25 g at 01/24/24 0939    dexmedeTOMIDine (Precedex) 400 mcg in sodium chloride 0.9% 100 mL, 0.1-0.7 mcg/kg/hr, Intravenous, Titrated, Chau Bernard Jr., PA-C, Last Rate: 9 mL/hr at 01/24/24 0316, 0.4 mcg/kg/hr at 01/24/24 0316    folic acid 1 mg, thiamine (VITAMIN B1) 100 mg in sodium chloride 0.9 % 100 mL IV piggyback, , Intravenous, Daily, JENSEN Bruno, Stopped at 01/23/24 1013    heparin (porcine) subcutaneous injection 5,000 Units, 5,000 Units, Subcutaneous, Q8H MURALI, JENSEN Bruno, 5,000 Units at 01/24/24 1006    insulin glargine (LANTUS) subcutaneous injection 24 Units 0.24 mL, 24 Units, Subcutaneous, HS, JENSEN Bruno, 24 Units at 01/23/24 2143    insulin lispro (HumaLOG) 100 units/mL subcutaneous injection 1-6 Units, 1-6 Units, Subcutaneous, HS **AND** Fingerstick Glucose (POCT), , , At bedtime, Chau Bernard Jr., PA-C    insulin lispro (HumaLOG) 100 units/mL subcutaneous injection 2-12 Units, 2-12  Units, Subcutaneous, TID AC, 2 Units at 01/24/24 0752 **AND** Fingerstick Glucose (POCT), , , TID AC, JENSEN Bruno    lactulose (CHRONULAC) oral solution 20 g, 20 g, Oral, Q2H, Kindra Sorensen, JENSEN, 20 g at 01/24/24 0939    metoprolol tartrate (LOPRESSOR) tablet 50 mg, 50 mg, Oral, Q12H MURALI, EJ BrunoNP, 50 mg at 01/24/24 1006    midodrine (PROAMATINE) tablet 15 mg, 15 mg, Oral, TID, Jeff Bentley, DO, 15 mg at 01/24/24 0943    nicotine (NICODERM CQ) 14 mg/24hr TD 24 hr patch 14 mg, 14 mg, Transdermal, Daily, EJ BrunoNP, 14 mg at 01/24/24 0950    octreotide (SandoSTATIN) injection 100 mcg, 100 mcg, Intravenous, Q8H MURALI, EJ BrunoNP, 100 mcg at 01/24/24 0558    ondansetron (ZOFRAN) injection 4 mg, 4 mg, Intravenous, Q4H PRN, EJ BrunoNP, 4 mg at 01/22/24 1403    pantoprazole (PROTONIX) injection 40 mg, 40 mg, Intravenous, Q24H MURALI, EJ BrunoNP, 40 mg at 01/24/24 1007    predniSONE tablet 40 mg, 40 mg, Oral, Daily, JENSEN Bruno, 40 mg at 01/24/24 1006    OBJECTIVE:    Vitals:    01/24/24 0751 01/24/24 0800 01/24/24 0900 01/24/24 1000   BP: 98/61 102/60 95/61 94/56   BP Location: Right arm      Pulse: 102 92 86 91   Resp: (!) 33 (!) 23 19 20   Temp: 97.9 °F (36.6 °C)      TempSrc: Temporal      SpO2: (!) 89% (!) 89% 94% 93%   Weight:       Height:            Temp:  [97.9 °F (36.6 °C)-100 °F (37.8 °C)] 97.9 °F (36.6 °C)  HR:  [] 91  Resp:  [17-33] 20  BP: ()/(52-73) 94/56  SpO2:  [88 %-95 %] 93 %     Body mass index is 35.08 kg/m².    Weight (last 2 days)       Date/Time Weight    01/24/24 0400 102 (223.99)    01/23/24 0538 89.9 (198.19)    01/22/24 0559 90.1 (198.63)            I/O last 3 completed shifts:  In: 3405.1 [P.O.:700; I.V.:125.1; NG/GT:830; IV Piggyback:1750]  Out: 525 [Urine:525]    No intake/output data recorded.      Physical exam:    General: no acute distress, cooperative  Eyes:  conjunctivae pink, anicteric sclerae  ENT: lips and mucous membranes moist, no exudates, normal external ears  Neck: ROM intact, no JVD  Chest: No respiratory distress, no accessory muscle use  CVS: normal rate, non pericardial friction rub  Abdomen: Distended abdomen  Extremities: Positive edema  Skin: Jaundiced  Neuro: Encephalopathic    Invasive Devices:   Urethral Catheter 16 Fr. (Active)   Amt returned on insertion(mL) 525 mL 01/24/24 0628   Reasons to continue Urinary Catheter  Acute urinary retention/obstruction failing urinary retention protocol;Accurate I&O assessment in critically ill patients (48 hr. max) 01/24/24 0628   Site Assessment Clean;Skin intact 01/24/24 0628   Collection Container Standard drainage bag 01/24/24 0628   Securement Method Securing device (Describe) 01/24/24 0628     Lab Results:   Results from last 7 days   Lab Units 01/24/24  0446 01/23/24  0530 01/23/24  0453 01/22/24  0808 01/22/24  0243 01/21/24  1935 01/21/24  1607 01/21/24  1240 01/21/24  1219 01/21/24  1118 01/21/24  1104   WBC Thousand/uL 5.92  --  6.34  --  5.72  --   --   --   --   --  10.12   HEMOGLOBIN g/dL 9.4*  --  10.8*  --  10.8*  --   --   --   --   --  14.6   HEMATOCRIT % 26.8*  --  29.4*  --  30.9*  --   --   --   --   --  43.3   PLATELETS Thousands/uL 56*  --  183  --  37*  --   --   --   --   --  72*   POTASSIUM mmol/L 3.6 3.6  --  4.6 3.3* 4.0   < >  --  3.7  --   --    CHLORIDE mmol/L 100 97  --  101 101 98   < >  --  90*  --   --    CO2 mmol/L 16* 23  --  22 21 17*   < >  --  13*  --   --    BUN mg/dL 15 9  --  5 5 5   < >  --  7  --   --    CREATININE mg/dL 2.17* 2.06*  --  1.32* 1.12 1.07   < >  --  1.38*  --   --    CALCIUM mg/dL 8.5 8.3*  --  7.8* 8.1* 7.4*   < >  --  7.8*  --   --    MAGNESIUM mg/dL  --  2.3  --   --  3.0* 1.7*  --   --  2.0  --   --    PHOSPHORUS mg/dL  --  2.5*  --   --  1.9* 1.7*  --   --  1.7*  --   --    ALK PHOS U/L 105* 127*  --   --  115*  --   --   --  135*  --   --    ALT U/L  "62* 83*  --   --  84*  --   --   --  81*  --   --    AST U/L 173* 254*  --   --  254*  --   --   --  190*  --   --    BLOOD CULTURE   --   --   --   --   --   --   --   --   --  No Growth at 48 hrs. No Growth at 48 hrs.   NITRITE UA   --   --   --   --   --   --   --  Negative  --   --   --    LEUKOCYTES UA   --   --   --   --   --   --   --  Elevated glucose may cause decreased leukocyte values. See urine microscopic for UWBC result*  --   --   --    BLOOD UA   --   --   --   --   --   --   --  Large*  --   --   --     < > = values in this interval not displayed.         Portions of the record may have been created with voice recognition software. Occasional wrong word or \"sound a like\" substitutions may have occurred due to the inherent limitations of voice recognition software. Read the chart carefully and recognize, using context, where substitutions have occurred.If you have any questions, please contact the dictating provider.    "

## 2024-01-24 NOTE — QUICK NOTE
Spoke with wife, Jo at her visit this evening.   Gave her an update regarding his progress.   We discussed the severity of his liver disease and the compromise on his kidneys.   Informed her that given his active drinking status he would not be a liver transplant candidate if things were to worsen to that state. Informed her that we did speak with [facility] Liver team whom stated he is not a candidate due to his active drinking.   His liver enzymes were slightly improved today. We trend his markers to evaluate the severity of his liver disease regularly and we will inform her if things worsen clinically.   Jo discussed that he lost his 's license from DUIs and they have been working opposite shifts. His drinking has been ongoing for years in varying severity. He drinks multiple 24oz cans and [place] in a day.   She is going to take his phone and can be contacted on that. I will update her contact information.   She is agreeable to dialysis if he would require it. We did discuss that he is making urine from the diuretics and his ammonia is coming down so, we are moving in a positive direction in terms of recovery of his kidney.   She is aware of the severity of his current clinical state. She would like detox programs, rehabs, etc. to be dicussed with him at the appropriate time.   She was grateful for all the updates the team has given even at a time that she has been difficult reach. All of her questions were answered at this time.     JENSEN Lucia.

## 2024-01-24 NOTE — ASSESSMENT & PLAN NOTE
Lab Results   Component Value Date    HGBA1C 8.8 (H) 01/21/2024       Recent Labs     01/24/24  0747 01/24/24  1126 01/24/24  1537 01/24/24  2110   POCGLU 199* 200* 298* 214*       Blood Sugar Average: Last 72 hrs:  (P) 192.2375277262329520    Initially presented in DKA   Resolved DKA.   Continue Lantus and SSI   AccuChecks TID with meals

## 2024-01-24 NOTE — PROGRESS NOTES
Progress Note - [facility]  Michael Koch 33 y.o. male MRN: [id number]  Unit/Bed#: -01 Encounter: [id number]      ASSESSMENT & PLAN    Alcoholic hepatitis  ETOH abuse  Severe hyperbilirubinemia  Elevated LFTs   Acute hepatic encephalopathy  ANIL 58 - steroids started 1/23  MELD-Na 25  T bili 13.82 > 15.01 > 21.38 > 23.46  Worsening DUANE requiring dialysis today   Started on lactulose due to encephalopathy   Discussed with ICU team regarding patient's rapid decline in clinical status since admission, recommend reaching out to transplant centers at this time  We will continue to follow closely   Daily CMP and INR  Continue IV solumedrol 40mg daily    Subjective: Michael Koch is a 33 y.o. year old male with a PMHx type 2 DM, gout, HTN, hx nephrolithiasis, depression/anxiety, ETOH abuse, tobacco abuse, marijuana use who presented with back pain with associated nausea and vomiting x 1 week.       Patient seen at bedside today. Lethargic and unable to awake.     Past Medical History:   Diagnosis Date    Depression     Diabetes mellitus (HCC)     Gout     Hypertension     Kidney stones      Medications Prior to Admission   Medication    acetaminophen (TYLENOL) 325 mg tablet    allopurinol (ZYLOPRIM) 100 mg tablet    colchicine (COLCRYS) 0.6 mg tablet    Fluoxetine HCl, PMDD, 20 MG TABS    hydrOXYzine HCL (ATARAX) 25 mg tablet    lisinopril (ZESTRIL) 10 mg tablet    mirtazapine (REMERON) 15 mg tablet    nicotine (NICODERM CQ) 21 mg/24 hr TD 24 hr patch    omeprazole (PriLOSEC) 40 MG capsule    ondansetron (ZOFRAN) 4 mg tablet    semaglutide, 0.25 or 0.5 mg/dose, (Ozempic, 0.25 or 0.5 MG/DOSE,) 2 mg/1.5 mL injection pen    tolterodine (DETROL LA) 4 mg 24 hr capsule    celecoxib (CeleBREX) 100 mg capsule    magnesium Oxide (MAG-OX) 400 mg TABS    tamsulosin (FLOMAX) 0.4 mg     Current Facility-Administered Medications   Medication Dose Route Frequency    albumin human (FLEXBUMIN) 25 %  injection 25 g  25 g Intravenous Q6H    dexmedeTOMIDine (Precedex) 400 mcg in sodium chloride 0.9% 100 mL  0.1-0.7 mcg/kg/hr Intravenous Titrated    folic acid 1 mg, thiamine (VITAMIN B1) 100 mg in sodium chloride 0.9 % 100 mL IV piggyback   Intravenous Daily    heparin (porcine) subcutaneous injection 5,000 Units  5,000 Units Subcutaneous Q8H MURALI    insulin glargine (LANTUS) subcutaneous injection 24 Units 0.24 mL  24 Units Subcutaneous HS    insulin lispro (HumaLOG) 100 units/mL subcutaneous injection 1-6 Units  1-6 Units Subcutaneous HS    insulin lispro (HumaLOG) 100 units/mL subcutaneous injection 2-12 Units  2-12 Units Subcutaneous TID AC    lactulose (CHRONULAC) oral solution 20 g  20 g Oral Q2H    metoprolol tartrate (LOPRESSOR) tablet 50 mg  50 mg Oral Q12H MURALI    midodrine (PROAMATINE) tablet 15 mg  15 mg Oral TID    nicotine (NICODERM CQ) 14 mg/24hr TD 24 hr patch 14 mg  14 mg Transdermal Daily    octreotide (SandoSTATIN) injection 100 mcg  100 mcg Intravenous Q8H MURALI    ondansetron (ZOFRAN) injection 4 mg  4 mg Intravenous Q4H PRN    pantoprazole (PROTONIX) injection 40 mg  40 mg Intravenous Q24H MURALI    predniSONE tablet 40 mg  40 mg Oral Daily     No Known Allergies    Physical Exam  Constitutional:       General: He is not in acute distress.     Appearance: He is ill-appearing.      Comments: NGT in place   HENT:      Head: Normocephalic and atraumatic.      Mouth/Throat:      Mouth: Mucous membranes are moist.   Eyes:      General: Scleral icterus present.   Abdominal:      General: Abdomen is protuberant. There is distension.   Skin:     General: Skin is warm and dry.      Coloration: Skin is jaundiced.       Most Recent Vital Signs:  Vitals:    01/24/24 1100 01/24/24 1130 01/24/24 1200 01/24/24 1300   BP: 103/58 125/56 106/59 107/61   BP Location:       Pulse: 85 87 87 89   Resp: 22 22 20 19   Temp:  97.9 °F (36.6 °C)     TempSrc:  Temporal     SpO2: 91% 92% 93% 93%   Weight:       Height:            Intake/Output Summary (Last 24 hours) at 1/24/2024 1441  Last data filed at 1/24/2024 1301  Gross per 24 hour   Intake 3268.1 ml   Output 2025 ml   Net 1243.1 ml       Counseling / Coordination of Care  Total time spent today 15 minutes. Greater than 50% of total time was spent with the patient and / or family counseling and / or coordination of care.    Kayleigh Stahl PA-C

## 2024-01-24 NOTE — ASSESSMENT & PLAN NOTE
Concern for alcoholic hepatitis  Confused and hallucinating  Mixed with ETOH withdrawal concerns      Plan:  Reorient as able   Precedex gtt initiated 1/24 - d/c 1/25.  Continue benzos for CIWA   Continue lactulose  Delirium precautions

## 2024-01-24 NOTE — PROGRESS NOTES
[facility]  Progress Note  Name: Michael Koch I  MRN: [id number]  Unit/Bed#: -01 I Date of Admission: 2024   Date of Service: 2024 I Hospital Day: 3    Assessment/Plan   * Alcoholic hepatitis  Assessment & Plan  MELD: 28   AST> ALT   RUQ: hepatomegaly,hepatic steatosis  CTAP: fatty liver/varices/portal htn  Started predinose    LD, CK, Tylenol level are WNL  D Bili elevated at 9.8  Ammonia: 187  GGT:797  AFP upper limit of NML    Plan:   Trend hepatic panel  Continue prednisone   Continue lactulose   Serial abdominal exams  PRN zofran for n/v    Hepatic encephalopathy (HCC)  Assessment & Plan  Concern for alcoholic hepatitis  Confused and hallucinating  Mixed with ETOH withdrawal concerns      Plan:  Reorient as able   Precedex gtt initiated .  Continue benzos for CIWA   Continue lactulose - place NG tube for administration   Delirium precautions     ETOH abuse  Assessment & Plan  Per pt drinks 2 24oz twisted tea/smirinoff per day (family notes pt drinks more than this)  Drinks since brother  3 years ago. Has stopped for a week or two without symptoms. No DTs.  CIWA protocol  Thiamine/folate    Plan:   Continue CIWA   Precedex gtt initiated .  Continue 1:1 for safety   Continue b/l wrist restraint     Abnormal CT scan  Assessment & Plan  CTAP-1.6cm soft tissue nodule of distal esophagus  CTC-  Slightly enlarged lower right paraesophageal node, stable since an abd CT 10/30/2023, of uncertain etiology and significance    DUANE (acute kidney injury) (HCC)  Assessment & Plan  Initially Prerenal in setting of HHNK/ACEI  Now with concerns for biliary nephrosis in setting of elevated t/ bili   Baseline 0.9-1  CT nephrolithiasis mild left hydro  UA +blood/glucose/protein  CK WNL  Renal U/S: Right intrarenal calculi;  Hepatomegaly with hepatic steatosis    Plan:  Continue Albumin, Octreotide, and Midodrine   Trend BMP  Monitor I/O  Avoid nephrotoxic agents    Nephrology following.     Hyponatremia  Assessment & Plan  Mild, likely in setting of liver disease     Plan:   Continue to trend       Thrombocytopenia (HCC)  Assessment & Plan  Likely 2/2 ETOH abuse and acute liver disease    Hemolysis smear NML, LD wnl   Haptoglobin: 87   Fibrinogen: 280  Plan:  Continue to trend  Restarted DVT ppx since platelets horacio over 40,000.     RSV infection  Assessment & Plan  F/u CTC given esophageal nodule  Supportive care    Depression  Assessment & Plan  NPO hold home fluoxetine/remeron/prn atarax    Tobacco abuse  Assessment & Plan  Nicotine patch  Smoking cessation counseling     Type 2 diabetes mellitus with hyperglycemia, without long-term current use of insulin (HCC)  Assessment & Plan  Lab Results   Component Value Date    HGBA1C 8.8 (H) 01/21/2024       Recent Labs     01/23/24  0738 01/23/24  1124 01/23/24  1643 01/23/24  2124   POCGLU 123 129 84 133         Blood Sugar Average: Last 72 hrs:  (P) [id number]    Initially presented in DKA   Resolved DKA.   Continue Lantus and SSI   AccuChecks TID with meals     History of gout  Assessment & Plan  Continue allopurinol/colchicine when able to PO              Disposition: Stepdown Level 1    ICU Core Measures     A: Assess, Prevent, and Manage Pain Has pain been assessed? NA  Need for changes to pain regimen? NA   B: Both SAT/SAT  N/A   C: Choice of Sedation RASS Goal: 0 Alert and Calm  Need for changes to sedation or analgesia regimen? No   D: Delirium CAM-ICU: Unable to perform secondary to Acute cognitive dysfunction   E: Early Mobility  Plan for early mobility? Yes   F: Family Engagement Plan for family engagement today? Yes         Prophylaxis:  VTE VTE covered by:  heparin (porcine), Subcutaneous, 5,000 Units at 01/23/24 1800       Stress Ulcer  covered byomeprazole (PriLOSEC) 40 MG capsule [[id number]] (Long-Term Med), pantoprazole (PROTONIX) injection 40 mg [[id number]]         Significant 24hr Events     24hr  events: Patient transferred to the critical care service yesterday due to increased confusion/agitation.  He was placed in restraints and placed on continual observation.  CIWA continued for ETOH withdrawal.  He received an additional ativan 2 mg IV dose overnight for agitation in conjunction with CIWA ativan dosage.  Precedex gtt initiated for continued agitation. Repeat ammonia is pending with this morning's labs. Patient's wife was updated on his condition last night and all of her questions were answered.      Subjective   Review of Systems   Psychiatric/Behavioral:  Positive for agitation.       Objective                            Vitals I/O      Most Recent Min/Max in 24hrs   Temp 99.5 °F (37.5 °C) Temp  Min: 98.3 °F (36.8 °C)  Max: 100 °F (37.8 °C)   Pulse (!) 116 Pulse  Min: 108  Max: 127   Resp 21 Resp  Min: 17  Max: 27   /61 BP  Min: 86/56  Max: 118/61   O2 Sat 92 % SpO2  Min: 88 %  Max: 95 %      Intake/Output Summary (Last 24 hours) at 1/24/2024 0257  Last data filed at 1/24/2024 0214  Gross per 24 hour   Intake 2726.25 ml   Output --   Net 2726.25 ml       Diet Prashant/CHO Controlled; Consistent Carbohydrate Diet Level 2 (5 carb servings/75 grams CHO/meal)    Invasive Monitoring   Arterial Line  Sara BP    No data recorded   MAP    No data recorded           Physical Exam   Physical Exam  Eyes:      General:         Right eye: No discharge.         Left eye: No discharge.      Extraocular Movements: Extraocular movements intact.      Conjunctiva/sclera: Conjunctivae normal.      Pupils: Pupils are equal, round, and reactive to light.   Skin:     General: Skin is warm and dry.      Coloration: Skin is not jaundiced or pale.      Findings: No rash.   HENT:      Head: Normocephalic and atraumatic.      Nose: No congestion or rhinorrhea.      Mouth/Throat:      Mouth: Mucous membranes are moist.   Neck:      Vascular: No JVD.   no midline tenderness Cardiovascular:      Rate and Rhythm: Regular rhythm.  Tachycardia present.   Musculoskeletal:         General: No swelling, tenderness, deformity or signs of injury. Normal range of motion.      Right lower leg: No edema.      Left lower leg: No edema.   Constitutional:       General: He is in acute distress.      Appearance: He is well-developed and well-nourished. He is ill-appearing.   Pulmonary:      Effort: Pulmonary effort is normal.      Breath sounds: Normal breath sounds.   Neurological:      General: No focal deficit present.      Mental Status: He is alert. He is agitated, disoriented to place, disoriented to time and disoriented to situation.      Sensory: Sensation is intact.      Motor: Strength full and intact in all extremities.            Diagnostic Studies        Imaging:  I have personally reviewed pertinent reports.       Medications:  Scheduled PRN   Albumin 25%, 25 g, Q6H  folic acid 1 mg, thiamine (VITAMIN B1) 100 mg in sodium chloride 0.9 % 100 mL IV piggyback, , Daily  heparin (porcine), 5,000 Units, Q8H MURALI  insulin glargine, 24 Units, HS  insulin lispro, 1-6 Units, HS  insulin lispro, 2-12 Units, TID AC  lactulose, 20 g, Q2H  metoprolol tartrate, 50 mg, Q12H MURALI  midodrine, 5 mg, TID  nicotine, 14 mg, Daily  octreotide, 100 mcg, Q8H MURALI  pantoprazole, 40 mg, Q24H MURALI  predniSONE, 40 mg, Daily      ondansetron, 4 mg, Q4H PRN       Continuous          Labs:    CBC    Recent Labs     01/23/24  0453   WBC 6.34   HGB 10.8*   HCT 29.4*        BMP    Recent Labs     01/22/24  0808 01/23/24  0530   SODIUM 133* 130*   K 4.6 3.6    97   CO2 22 23   AGAP 10 10   BUN 5 9   CREATININE 1.32* 2.06*   CALCIUM 7.8* 8.3*       Coags    No recent results     Additional Electrolytes  Recent Labs     01/23/24  0530   MG 2.3   PHOS 2.5*          Blood Gas    No recent results  No recent results LFTs  Recent Labs     01/23/24  0530   ALT 83*   *   ALKPHOS 127*   ALB 2.7*   TBILI 21.38*       Infectious  No recent results  Glucose  Recent Labs      01/22/24  0808 01/23/24  0530   GLUC 130 159*               Chau Bernard Jr, MORENA

## 2024-01-24 NOTE — NURSING NOTE
Pt's sister, Cathryn called and said that pt's wife's cellphone is broken she said she would get in contact with pt's wife and have her call the unit.     Pt's wife, Jo called and said to call sister - Cathryn Gonzalez [phone] if needed, and she will be able to get in contact with her.

## 2024-01-24 NOTE — PLAN OF CARE
Problem: SAFETY,RESTRAINT: NV/NON-SELF DESTRUCTIVE BEHAVIOR  Goal: Remains free of harm/injury (restraint for non violent/non self-detsructive behavior)  Description: INTERVENTIONS:  - Instruct patient/family regarding restraint use   - Assess and monitor physiologic and psychological status   - Provide interventions and comfort measures to meet assessed patient needs   - Identify and implement measures to help patient regain control  - Assess readiness for release of restraint   Outcome: Progressing    Problem: SAFETY, RESTRAINT - VIOLENT/SELF-DESTRUCTIVE  Goal: Returns to optimal restraint-free functioning  Description: INTERVENTIONS:  - Assess the patient's behavior and symptoms that indicate continued need for restraint  - Identify and implement measures to help patient regain control  - Assess readiness for release of restraint   Outcome: Completed  Goal: Remains free of harm/injury from restraints (Restraint for Violent/Self-Destructive Behavior)  Description: INTERVENTIONS:  - Assess the patient's behavior and symptoms that indicate continued need for restraint  - Identify and implement measures to help patient regain control  - Assess readiness for release of restraint   Outcome: Completed

## 2024-01-24 NOTE — CASE MANAGEMENT
Case Management Discharge Planning Note    Patient name Michael Koch  Location /-01 MRN [id number]  : 1990 Date 2024       Current Admission Date: 2024  Current Admission Diagnosis:Alcoholic hepatitis   Patient Active Problem List    Diagnosis Date Noted    Alcoholic hepatitis 2024    ETOH abuse 2024    Depression 2024    GERD (gastroesophageal reflux disease) 2024    BPH (benign prostatic hyperplasia) 2024    RSV infection 2024    Abnormal CT scan 2024    Hepatic encephalopathy (HCC) 2024    N&V (nausea and vomiting) 2024    DUANE (acute kidney injury) (HCC) 2024    Thrombocytopenia (HCC) 2024    Lactic acidosis 2024    Marijuana use 2024    Hyponatremia 2024    SIRS (systemic inflammatory response syndrome) (HCC) 2024    History of gout 10/04/2023    History of hypertension 10/04/2023    Type 2 diabetes mellitus with hyperglycemia, without long-term current use of insulin (HCC) 10/04/2023    Tobacco abuse 10/04/2023      LOS (days): 3  Geometric Mean LOS (GMLOS) (days):   Days to GMLOS:     OBJECTIVE:  Risk of Unplanned Readmission Score: 30.01         Current admission status: Inpatient   Preferred Pharmacy:   [id number]  [place], [place] - [address]  [address]  [place] [address]  Phone: [phone] Fax: [phone]    Primary Care Provider: Louie Odonnell DO    Primary Insurance: BLUE CROSS  Secondary Insurance:     DISCHARGE DETAILS:    Discussed in rounds today:  ^ LFTS, on precedex infusion   With a sitter at this time.  Notified Warm handoff rep that patient is not appropriate for an assessment at this time.  CM will continue to follow.

## 2024-01-25 ENCOUNTER — APPOINTMENT (INPATIENT)
Dept: RADIOLOGY | Facility: HOSPITAL | Age: 34
DRG: 441 | End: 2024-01-25
Payer: COMMERCIAL

## 2024-01-25 PROBLEM — E87.1 HYPONATREMIA: Status: RESOLVED | Noted: 2024-01-21 | Resolved: 2024-01-25

## 2024-01-25 LAB
ALBUMIN SERPL BCP-MCNC: 4.1 G/DL (ref 3.5–5)
ALP SERPL-CCNC: 98 U/L (ref 34–104)
ALT SERPL W P-5'-P-CCNC: 58 U/L (ref 7–52)
AMMONIA PLAS-SCNC: 85 UMOL/L (ref 18–72)
ANION GAP SERPL CALCULATED.3IONS-SCNC: 10 MMOL/L
ANION GAP SERPL CALCULATED.3IONS-SCNC: 11 MMOL/L
ANION GAP SERPL CALCULATED.3IONS-SCNC: 11 MMOL/L
ANION GAP SERPL CALCULATED.3IONS-SCNC: 8 MMOL/L
AST SERPL W P-5'-P-CCNC: 139 U/L (ref 13–39)
BASOPHILS # BLD AUTO: 0.02 THOUSANDS/ÂΜL (ref 0–0.1)
BASOPHILS NFR BLD AUTO: 0 % (ref 0–1)
BILIRUB SERPL-MCNC: 27.17 MG/DL (ref 0.2–1)
BUN SERPL-MCNC: 21 MG/DL (ref 5–25)
BUN SERPL-MCNC: 22 MG/DL (ref 5–25)
CALCIUM SERPL-MCNC: 9.1 MG/DL (ref 8.4–10.2)
CALCIUM SERPL-MCNC: 9.1 MG/DL (ref 8.4–10.2)
CALCIUM SERPL-MCNC: 9.2 MG/DL (ref 8.4–10.2)
CALCIUM SERPL-MCNC: 9.2 MG/DL (ref 8.4–10.2)
CHLORIDE SERPL-SCNC: 101 MMOL/L (ref 96–108)
CHLORIDE SERPL-SCNC: 102 MMOL/L (ref 96–108)
CHLORIDE SERPL-SCNC: 104 MMOL/L (ref 96–108)
CHLORIDE SERPL-SCNC: 106 MMOL/L (ref 96–108)
CO2 SERPL-SCNC: 21 MMOL/L (ref 21–32)
CO2 SERPL-SCNC: 23 MMOL/L (ref 21–32)
CO2 SERPL-SCNC: 24 MMOL/L (ref 21–32)
CO2 SERPL-SCNC: 24 MMOL/L (ref 21–32)
CREAT SERPL-MCNC: 1.59 MG/DL (ref 0.6–1.3)
CREAT SERPL-MCNC: 1.78 MG/DL (ref 0.6–1.3)
CREAT SERPL-MCNC: 1.9 MG/DL (ref 0.6–1.3)
CREAT SERPL-MCNC: 1.95 MG/DL (ref 0.6–1.3)
EOSINOPHIL # BLD AUTO: 0.03 THOUSAND/ÂΜL (ref 0–0.61)
EOSINOPHIL NFR BLD AUTO: 1 % (ref 0–6)
ERYTHROCYTE [DISTWIDTH] IN BLOOD BY AUTOMATED COUNT: 19.9 % (ref 11.6–15.1)
GFR SERPL CREATININE-BSD FRML MDRD: 43 ML/MIN/1.73SQ M
GFR SERPL CREATININE-BSD FRML MDRD: 45 ML/MIN/1.73SQ M
GFR SERPL CREATININE-BSD FRML MDRD: 49 ML/MIN/1.73SQ M
GFR SERPL CREATININE-BSD FRML MDRD: 56 ML/MIN/1.73SQ M
GLUCOSE SERPL-MCNC: 155 MG/DL (ref 65–140)
GLUCOSE SERPL-MCNC: 183 MG/DL (ref 65–140)
GLUCOSE SERPL-MCNC: 191 MG/DL (ref 65–140)
GLUCOSE SERPL-MCNC: 195 MG/DL (ref 65–140)
GLUCOSE SERPL-MCNC: 200 MG/DL (ref 65–140)
GLUCOSE SERPL-MCNC: 204 MG/DL (ref 65–140)
GLUCOSE SERPL-MCNC: 225 MG/DL (ref 65–140)
GLUCOSE SERPL-MCNC: 280 MG/DL (ref 65–140)
HCT VFR BLD AUTO: 27.2 % (ref 36.5–49.3)
HGB BLD-MCNC: 9.5 G/DL (ref 12–17)
IMM GRANULOCYTES # BLD AUTO: 0.29 THOUSAND/UL (ref 0–0.2)
IMM GRANULOCYTES NFR BLD AUTO: 4 % (ref 0–2)
INR PPP: 1.63 (ref 0.84–1.19)
LYMPHOCYTES # BLD AUTO: 0.67 THOUSANDS/ÂΜL (ref 0.6–4.47)
LYMPHOCYTES NFR BLD AUTO: 10 % (ref 14–44)
MAGNESIUM SERPL-MCNC: 2 MG/DL (ref 1.9–2.7)
MCH RBC QN AUTO: 34.3 PG (ref 26.8–34.3)
MCHC RBC AUTO-ENTMCNC: 34.9 G/DL (ref 31.4–37.4)
MCV RBC AUTO: 98 FL (ref 82–98)
MONOCYTES # BLD AUTO: 0.72 THOUSAND/ÂΜL (ref 0.17–1.22)
MONOCYTES NFR BLD AUTO: 11 % (ref 4–12)
NEUTROPHILS # BLD AUTO: 4.82 THOUSANDS/ÂΜL (ref 1.85–7.62)
NEUTS SEG NFR BLD AUTO: 74 % (ref 43–75)
NRBC BLD AUTO-RTO: 0 /100 WBCS
PHOSPHATE SERPL-MCNC: 2.7 MG/DL (ref 2.7–4.5)
PHOSPHATE SERPL-MCNC: <1 MG/DL (ref 2.7–4.5)
PHOSPHATE SERPL-MCNC: <1 MG/DL (ref 2.7–4.5)
PLATELET # BLD AUTO: 91 THOUSANDS/UL (ref 149–390)
PMV BLD AUTO: 11.4 FL (ref 8.9–12.7)
POTASSIUM SERPL-SCNC: 2.8 MMOL/L (ref 3.5–5.3)
POTASSIUM SERPL-SCNC: 3.1 MMOL/L (ref 3.5–5.3)
POTASSIUM SERPL-SCNC: 3.7 MMOL/L (ref 3.5–5.3)
POTASSIUM SERPL-SCNC: 3.7 MMOL/L (ref 3.5–5.3)
PROT SERPL-MCNC: 6.2 G/DL (ref 6.4–8.4)
PROTHROMBIN TIME: 19.7 SECONDS (ref 11.6–14.5)
RBC # BLD AUTO: 2.77 MILLION/UL (ref 3.88–5.62)
SODIUM SERPL-SCNC: 135 MMOL/L (ref 135–147)
SODIUM SERPL-SCNC: 136 MMOL/L (ref 135–147)
SODIUM SERPL-SCNC: 136 MMOL/L (ref 135–147)
SODIUM SERPL-SCNC: 138 MMOL/L (ref 135–147)
WBC # BLD AUTO: 6.55 THOUSAND/UL (ref 4.31–10.16)

## 2024-01-25 PROCEDURE — 99232 SBSQ HOSP IP/OBS MODERATE 35: CPT | Performed by: INTERNAL MEDICINE

## 2024-01-25 PROCEDURE — 85025 COMPLETE CBC W/AUTO DIFF WBC: CPT

## 2024-01-25 PROCEDURE — 99233 SBSQ HOSP IP/OBS HIGH 50: CPT | Performed by: ANESTHESIOLOGY

## 2024-01-25 PROCEDURE — 80053 COMPREHEN METABOLIC PANEL: CPT

## 2024-01-25 PROCEDURE — 82140 ASSAY OF AMMONIA: CPT | Performed by: PHYSICIAN ASSISTANT

## 2024-01-25 PROCEDURE — 85610 PROTHROMBIN TIME: CPT

## 2024-01-25 PROCEDURE — 82948 REAGENT STRIP/BLOOD GLUCOSE: CPT

## 2024-01-25 PROCEDURE — 80048 BASIC METABOLIC PNL TOTAL CA: CPT | Performed by: NURSE PRACTITIONER

## 2024-01-25 PROCEDURE — 84100 ASSAY OF PHOSPHORUS: CPT

## 2024-01-25 PROCEDURE — C9113 INJ PANTOPRAZOLE SODIUM, VIA: HCPCS

## 2024-01-25 PROCEDURE — 71045 X-RAY EXAM CHEST 1 VIEW: CPT

## 2024-01-25 PROCEDURE — 83735 ASSAY OF MAGNESIUM: CPT

## 2024-01-25 PROCEDURE — 84100 ASSAY OF PHOSPHORUS: CPT | Performed by: NURSE PRACTITIONER

## 2024-01-25 PROCEDURE — 80048 BASIC METABOLIC PNL TOTAL CA: CPT

## 2024-01-25 RX ORDER — DEXMEDETOMIDINE HYDROCHLORIDE 4 UG/ML
.1-.7 INJECTION, SOLUTION INTRAVENOUS
Status: DISCONTINUED | OUTPATIENT
Start: 2024-01-25 | End: 2024-01-27

## 2024-01-25 RX ORDER — INSULIN GLARGINE 100 [IU]/ML
30 INJECTION, SOLUTION SUBCUTANEOUS
Status: DISCONTINUED | OUTPATIENT
Start: 2024-01-25 | End: 2024-01-29

## 2024-01-25 RX ORDER — LORAZEPAM 2 MG/ML
4 INJECTION INTRAMUSCULAR ONCE
Status: COMPLETED | OUTPATIENT
Start: 2024-01-25 | End: 2024-01-25

## 2024-01-25 RX ORDER — INSULIN LISPRO 100 [IU]/ML
10 INJECTION, SOLUTION INTRAVENOUS; SUBCUTANEOUS
Status: DISCONTINUED | OUTPATIENT
Start: 2024-01-26 | End: 2024-01-26

## 2024-01-25 RX ORDER — LORAZEPAM 1 MG/1
2 TABLET ORAL ONCE
Status: COMPLETED | OUTPATIENT
Start: 2024-01-25 | End: 2024-01-25

## 2024-01-25 RX ORDER — LORAZEPAM 2 MG/ML
INJECTION INTRAMUSCULAR
Status: COMPLETED
Start: 2024-01-25 | End: 2024-01-25

## 2024-01-25 RX ORDER — INSULIN LISPRO 100 [IU]/ML
2-12 INJECTION, SOLUTION INTRAVENOUS; SUBCUTANEOUS
Status: DISCONTINUED | OUTPATIENT
Start: 2024-01-25 | End: 2024-01-26

## 2024-01-25 RX ORDER — POTASSIUM CHLORIDE 20MEQ/15ML
40 LIQUID (ML) ORAL ONCE
Status: COMPLETED | OUTPATIENT
Start: 2024-01-25 | End: 2024-01-25

## 2024-01-25 RX ORDER — POTASSIUM CHLORIDE 14.9 MG/ML
20 INJECTION INTRAVENOUS ONCE
Status: COMPLETED | OUTPATIENT
Start: 2024-01-25 | End: 2024-01-25

## 2024-01-25 RX ORDER — GABAPENTIN 300 MG/1
300 CAPSULE ORAL 3 TIMES DAILY
Status: DISCONTINUED | OUTPATIENT
Start: 2024-01-25 | End: 2024-01-27

## 2024-01-25 RX ORDER — INSULIN LISPRO 100 [IU]/ML
8 INJECTION, SOLUTION INTRAVENOUS; SUBCUTANEOUS
Status: DISCONTINUED | OUTPATIENT
Start: 2024-01-25 | End: 2024-01-25

## 2024-01-25 RX ORDER — LORAZEPAM 2 MG/ML
2 INJECTION INTRAMUSCULAR ONCE
Status: COMPLETED | OUTPATIENT
Start: 2024-01-25 | End: 2024-01-25

## 2024-01-25 RX ORDER — POTASSIUM CHLORIDE 20MEQ/15ML
40 LIQUID (ML) ORAL ONCE
Status: DISCONTINUED | OUTPATIENT
Start: 2024-01-25 | End: 2024-01-25

## 2024-01-25 RX ORDER — LACTULOSE 10 G/15ML
30 SOLUTION ORAL 3 TIMES DAILY
Status: DISCONTINUED | OUTPATIENT
Start: 2024-01-25 | End: 2024-01-27

## 2024-01-25 RX ORDER — POTASSIUM CHLORIDE 20 MEQ/1
40 TABLET, EXTENDED RELEASE ORAL
Status: COMPLETED | OUTPATIENT
Start: 2024-01-25 | End: 2024-01-25

## 2024-01-25 RX ORDER — POTASSIUM CHLORIDE 20 MEQ/1
40 TABLET, EXTENDED RELEASE ORAL ONCE
Status: COMPLETED | OUTPATIENT
Start: 2024-01-25 | End: 2024-01-25

## 2024-01-25 RX ADMIN — SODIUM PHOSPHATE, MONOBASIC, MONOHYDRATE AND SODIUM PHOSPHATE, DIBASIC, ANHYDROUS 30 MMOL: 142; 276 INJECTION, SOLUTION INTRAVENOUS at 20:27

## 2024-01-25 RX ADMIN — POTASSIUM CHLORIDE 40 MEQ: 1500 TABLET, EXTENDED RELEASE ORAL at 10:11

## 2024-01-25 RX ADMIN — INSULIN LISPRO 8 UNITS: 100 INJECTION, SOLUTION INTRAVENOUS; SUBCUTANEOUS at 11:59

## 2024-01-25 RX ADMIN — METOPROLOL TARTRATE 50 MG: 50 TABLET, FILM COATED ORAL at 08:22

## 2024-01-25 RX ADMIN — INSULIN LISPRO 2 UNITS: 100 INJECTION, SOLUTION INTRAVENOUS; SUBCUTANEOUS at 08:15

## 2024-01-25 RX ADMIN — THIAMINE HYDROCHLORIDE: 100 INJECTION, SOLUTION INTRAMUSCULAR; INTRAVENOUS at 10:53

## 2024-01-25 RX ADMIN — LACTULOSE 30 G: 20 SOLUTION ORAL at 20:14

## 2024-01-25 RX ADMIN — POTASSIUM CHLORIDE 40 MEQ: 1500 TABLET, EXTENDED RELEASE ORAL at 06:48

## 2024-01-25 RX ADMIN — PANTOPRAZOLE SODIUM 40 MG: 40 INJECTION, POWDER, FOR SOLUTION INTRAVENOUS at 08:21

## 2024-01-25 RX ADMIN — HEPARIN SODIUM 5000 UNITS: 5000 INJECTION, SOLUTION INTRAVENOUS; SUBCUTANEOUS at 16:33

## 2024-01-25 RX ADMIN — POTASSIUM CHLORIDE 40 MEQ: 1500 TABLET, EXTENDED RELEASE ORAL at 14:11

## 2024-01-25 RX ADMIN — OCTREOTIDE ACETATE 100 MCG: 100 INJECTION, SOLUTION INTRAVENOUS; SUBCUTANEOUS at 05:28

## 2024-01-25 RX ADMIN — LACTULOSE 30 G: 20 SOLUTION ORAL at 15:22

## 2024-01-25 RX ADMIN — OCTREOTIDE ACETATE 100 MCG: 100 INJECTION, SOLUTION INTRAVENOUS; SUBCUTANEOUS at 22:13

## 2024-01-25 RX ADMIN — GABAPENTIN 300 MG: 300 CAPSULE ORAL at 20:14

## 2024-01-25 RX ADMIN — OCTREOTIDE ACETATE 100 MCG: 100 INJECTION, SOLUTION INTRAVENOUS; SUBCUTANEOUS at 14:13

## 2024-01-25 RX ADMIN — ALBUMIN (HUMAN) 25 G: 0.25 INJECTION, SOLUTION INTRAVENOUS at 04:11

## 2024-01-25 RX ADMIN — INSULIN LISPRO 8 UNITS: 100 INJECTION, SOLUTION INTRAVENOUS; SUBCUTANEOUS at 16:58

## 2024-01-25 RX ADMIN — INSULIN LISPRO 1 UNITS: 100 INJECTION, SOLUTION INTRAVENOUS; SUBCUTANEOUS at 21:43

## 2024-01-25 RX ADMIN — ALBUMIN (HUMAN) 25 G: 0.25 INJECTION, SOLUTION INTRAVENOUS at 21:44

## 2024-01-25 RX ADMIN — MIDODRINE HYDROCHLORIDE 15 MG: 5 TABLET ORAL at 20:14

## 2024-01-25 RX ADMIN — INSULIN GLARGINE 30 UNITS: 100 INJECTION, SOLUTION SUBCUTANEOUS at 21:43

## 2024-01-25 RX ADMIN — MIDODRINE HYDROCHLORIDE 15 MG: 5 TABLET ORAL at 08:20

## 2024-01-25 RX ADMIN — LORAZEPAM 2 MG: 1 TABLET ORAL at 14:11

## 2024-01-25 RX ADMIN — HEPARIN SODIUM 5000 UNITS: 5000 INJECTION, SOLUTION INTRAVENOUS; SUBCUTANEOUS at 00:58

## 2024-01-25 RX ADMIN — LORAZEPAM 2 MG: 1 TABLET ORAL at 15:24

## 2024-01-25 RX ADMIN — LACTULOSE 30 G: 20 SOLUTION ORAL at 08:19

## 2024-01-25 RX ADMIN — Medication 2 TABLET: at 18:31

## 2024-01-25 RX ADMIN — ALBUMIN (HUMAN) 25 G: 0.25 INJECTION, SOLUTION INTRAVENOUS at 10:11

## 2024-01-25 RX ADMIN — ALBUMIN (HUMAN) 25 G: 0.25 INJECTION, SOLUTION INTRAVENOUS at 15:20

## 2024-01-25 RX ADMIN — POTASSIUM PHOSPHATE, MONOBASIC POTASSIUM PHOSPHATE, DIBASIC 30 MMOL: 224; 236 INJECTION, SOLUTION, CONCENTRATE INTRAVENOUS at 08:34

## 2024-01-25 RX ADMIN — LORAZEPAM 2 MG: 2 INJECTION, SOLUTION INTRAMUSCULAR; INTRAVENOUS at 20:14

## 2024-01-25 RX ADMIN — LORAZEPAM 2 MG: 1 TABLET ORAL at 16:33

## 2024-01-25 RX ADMIN — POTASSIUM CHLORIDE 40 MEQ: 1.5 SOLUTION ORAL at 02:06

## 2024-01-25 RX ADMIN — METOPROLOL TARTRATE 50 MG: 50 TABLET, FILM COATED ORAL at 20:14

## 2024-01-25 RX ADMIN — LORAZEPAM 2 MG: 2 INJECTION INTRAMUSCULAR at 20:14

## 2024-01-25 RX ADMIN — HEPARIN SODIUM 5000 UNITS: 5000 INJECTION, SOLUTION INTRAVENOUS; SUBCUTANEOUS at 08:20

## 2024-01-25 RX ADMIN — INSULIN LISPRO 2 UNITS: 100 INJECTION, SOLUTION INTRAVENOUS; SUBCUTANEOUS at 16:57

## 2024-01-25 RX ADMIN — POTASSIUM PHOSPHATE, MONOBASIC POTASSIUM PHOSPHATE, DIBASIC 30 MMOL: 224; 236 INJECTION, SOLUTION, CONCENTRATE INTRAVENOUS at 15:19

## 2024-01-25 RX ADMIN — MIDODRINE HYDROCHLORIDE 15 MG: 5 TABLET ORAL at 15:22

## 2024-01-25 RX ADMIN — GABAPENTIN 300 MG: 300 CAPSULE ORAL at 15:22

## 2024-01-25 RX ADMIN — METHYLPREDNISOLONE SODIUM SUCCINATE 40 MG: 40 INJECTION, POWDER, FOR SOLUTION INTRAMUSCULAR; INTRAVENOUS at 08:20

## 2024-01-25 RX ADMIN — NICOTINE 14 MG: 14 PATCH, EXTENDED RELEASE TRANSDERMAL at 08:18

## 2024-01-25 RX ADMIN — POTASSIUM CHLORIDE 20 MEQ: 14.9 INJECTION, SOLUTION INTRAVENOUS at 02:06

## 2024-01-25 RX ADMIN — Medication 2 TABLET: at 21:16

## 2024-01-25 RX ADMIN — INSULIN LISPRO 8 UNITS: 100 INJECTION, SOLUTION INTRAVENOUS; SUBCUTANEOUS at 08:15

## 2024-01-25 RX ADMIN — LORAZEPAM 4 MG: 2 INJECTION INTRAMUSCULAR; INTRAVENOUS at 21:56

## 2024-01-25 RX ADMIN — DEXMEDETOMIDINE HYDROCHLORIDE 0.4 MCG/KG/HR: 400 INJECTION INTRAVENOUS at 21:43

## 2024-01-25 RX ADMIN — GABAPENTIN 300 MG: 300 CAPSULE ORAL at 10:11

## 2024-01-25 RX ADMIN — INSULIN LISPRO 4 UNITS: 100 INJECTION, SOLUTION INTRAVENOUS; SUBCUTANEOUS at 11:58

## 2024-01-25 NOTE — PROGRESS NOTES
[facility]  Progress Note  Name: Michael Koch I  MRN: [id number]  Unit/Bed#: -01 I Date of Admission: 2024   Date of Service: 2024 I Hospital Day: 4    Assessment/Plan   * Alcoholic hepatitis  Assessment & Plan  MELD: 28   AST> ALT   RUQ: hepatomegaly,hepatic steatosis  CTAP: fatty liver/varices/portal htn  Started predinose    LD, CK, Tylenol level are WNL  D Bili elevated at 9.8  Ammonia: 187  GGT:797  AFP upper limit of NML    Plan:   Trend hepatic panel  Continue prednisone   Continue lactulose   Serial abdominal exams  PRN zofran for n/v    Hepatic encephalopathy (HCC)  Assessment & Plan  Concern for alcoholic hepatitis  Confused and hallucinating  Mixed with ETOH withdrawal concerns      Plan:  Reorient as able   Precedex gtt initiated  - d/c .  Continue benzos for CIWA   Continue lactulose  Delirium precautions     ETOH abuse  Assessment & Plan  Per pt drinks 2 24oz twisted tea/smirinoff per day (family notes pt drinks more than this)  Drinks since brother  3 years ago. Has stopped for a week or two without symptoms. No DTs.  CIWA protocol  Thiamine/folate    Plan:   Continue CIWA   Precedex gtt initiated  - d/c     Abnormal CT scan  Assessment & Plan  CTAP-1.6cm soft tissue nodule of distal esophagus  CTC-  Slightly enlarged lower right paraesophageal node, stable since an abd CT 10/30/2023, of uncertain etiology and significance    DUANE (acute kidney injury) (HCC)  Assessment & Plan  Initially Prerenal in setting of HHNK/ACEI  Now with concerns for biliary nephrosis in setting of elevated t/ bili   Baseline 0.9-1  CT nephrolithiasis mild left hydro  UA +blood/glucose/protein  CK WNL  Renal U/S: Right intrarenal calculi;  Hepatomegaly with hepatic steatosis    Plan:  Continue Albumin, Octreotide, and Midodrine   Trend BMP  Monitor I/O  Avoid nephrotoxic agents   Nephrology following.     Thrombocytopenia (HCC)  Assessment & Plan  Likely  2/2 ETOH abuse and acute liver disease    Hemolysis smear NML, LD wnl   Haptoglobin: 87   Fibrinogen: 280  Plan:  Continue to trend  Restarted DVT ppx since platelets horacio over 40,000.     Hyponatremia-resolved as of 1/25/2024  Assessment & Plan  Mild, likely in setting of liver disease     Plan:   Continue to trend       RSV infection  Assessment & Plan  F/u CTC given esophageal nodule  Supportive care    Depression  Assessment & Plan  NPO hold home fluoxetine/remeron/prn atarax    Tobacco abuse  Assessment & Plan  Nicotine patch  Smoking cessation counseling     Type 2 diabetes mellitus with hyperglycemia, without long-term current use of insulin (HCC)  Assessment & Plan  Lab Results   Component Value Date    HGBA1C 8.8 (H) 01/21/2024       Recent Labs     01/24/24  0747 01/24/24  1126 01/24/24  1537 01/24/24  2110   POCGLU 199* 200* 298* 214*       Blood Sugar Average: Last 72 hrs:  (P) [id number]    Initially presented in DKA   Resolved DKA.   Continue Lantus and SSI   AccuChecks TID with meals     History of gout  Assessment & Plan  Continue allopurinol/colchicine when able to PO              Disposition: Stepdown Level 1    ICU Core Measures     A: Assess, Prevent, and Manage Pain Has pain been assessed? Yes  Need for changes to pain regimen? No   B: Both SAT/SAT  N/A   C: Choice of Sedation RASS Goal: N/A patient not on sedation  Need for changes to sedation or analgesia regimen? NA   D: Delirium CAM-ICU: Negative   E: Early Mobility  Plan for early mobility? Yes   F: Family Engagement Plan for family engagement today? Yes       Review of Invasive Devices:    Ferguson Plan: Voiding trial after improvement in ambulation         Prophylaxis:  VTE VTE covered by:  heparin (porcine), Subcutaneous, 5,000 Units at 01/25/24 0058       Stress Ulcer  covered byomeprazole (PriLOSEC) 40 MG capsule [[id number]] (Long-Term Med), pantoprazole (PROTONIX) injection 40 mg [[id number]]         Significant 24hr Events      24hr events: Patient's family visited last evening.  Patient does not remember them visiting.  He was confused and on Precedex at the time.  Later in the evening, he was evaluated and was found to be A&Ox3.  Precedex was weaned off and the continual observation 1:1 was discontinued.  The NGT was also discontinued.  The lactulose frequency was reduced as the patient had continuous liquid stools requiring an FMS/rectal tube be placed.  Patient requested to eat and a diet was ordered.        Objective                            Vitals I/O      Most Recent Min/Max in 24hrs   Temp 98.1 °F (36.7 °C) Temp  Min: 97.9 °F (36.6 °C)  Max: 99.2 °F (37.3 °C)   Pulse 82 Pulse  Min: 82  Max: 124   Resp 16 Resp  Min: 16  Max: 33   /74 BP  Min: 92/59  Max: 125/56   O2 Sat 94 % SpO2  Min: 85 %  Max: 94 %      Intake/Output Summary (Last 24 hours) at 1/25/2024 0246  Last data filed at 1/25/2024 0217  Gross per 24 hour   Intake 2222.42 ml   Output 6850 ml   Net -4627.58 ml       Diet Prashant/CHO Controlled; Consistent Carbohydrate Diet Level 2 (5 carb servings/75 grams CHO/meal); Renal Restrictive; Yes; Fluid Restriction 2000 ML; No    Invasive Monitoring   Arterial Line  [place] BP    No data recorded   MAP    No data recorded           Physical Exam   Physical Exam  Eyes:      General:         Right eye: No discharge.         Left eye: No discharge.      Extraocular Movements: Extraocular movements intact.      Conjunctiva/sclera: Conjunctivae normal.      Pupils: Pupils are equal, round, and reactive to light.   Skin:     General: Skin is warm and dry.      Coloration: Skin is jaundiced. Skin is not pale.      Findings: No rash.   HENT:      Head: Normocephalic.      Right Ear: Hearing normal.      Left Ear: Hearing normal.      Nose: No congestion or rhinorrhea.      Mouth/Throat:      Mouth: Mucous membranes are moist.   Neck:      Vascular: No JVD.   no midline tenderness Cardiovascular:      Rate and Rhythm: Normal rate and  regular rhythm.      Pulses: Normal pulses.   Musculoskeletal:         General: No swelling, tenderness, deformity or signs of injury. Normal range of motion.      Right lower leg: No edema.      Left lower leg: No edema.   Abdominal: General: Bowel sounds are normal. There is no distension.      Palpations: Abdomen is rigid.      Tenderness: There is no abdominal tenderness. There is no guarding.   Constitutional:       General: He is not in acute distress.     Appearance: He is well-developed and overweight. He is ill-appearing. He is not diaphoretic.   Pulmonary:      Effort: Pulmonary effort is normal.      Breath sounds: Normal breath sounds.   Neurological:      General: No focal deficit present.      Mental Status: He is alert and oriented to person, place and time.      Sensory: Sensation is intact.      Motor: Strength full and intact in all extremities.   Genitourinary/Anorectal:     Rectum: Rectal tube present.   Ferguson present.          Diagnostic Studies             Medications:  Scheduled PRN   Albumin 25%, 25 g, Q6H  folic acid 1 mg, thiamine (VITAMIN B1) 100 mg in sodium chloride 0.9 % 100 mL IV piggyback, , Daily  heparin (porcine), 5,000 Units, Q8H MURALI  insulin glargine, 28 Units, HS  insulin lispro, 1-6 Units, HS  insulin lispro, 2-12 Units, TID AC  lactulose, 20 g, TID  methylPREDNISolone sodium succinate, 40 mg, Daily  metoprolol tartrate, 50 mg, Q12H MURALI  midodrine, 15 mg, TID  nicotine, 14 mg, Daily  octreotide, 100 mcg, Q8H MURALI  pantoprazole, 40 mg, Q24H MURALI  potassium chloride, 20 mEq, Once      ondansetron, 4 mg, Q4H PRN       Continuous          Labs:    CBC    Recent Labs     01/23/24  0453 01/24/24  0446   WBC 6.34 5.92   HGB 10.8* 9.4*   HCT 29.4* 26.8*    56*     BMP    Recent Labs     01/24/24  1522 01/25/24  0002   SODIUM 135 136   K 3.5 2.8*    102   CO2 20* 24   AGAP 13 10   BUN 19 21   CREATININE 2.26* 1.90*   CALCIUM 9.0 9.1       Coags    Recent Labs      01/24/24  0446   INR 1.72*        Additional Electrolytes  Recent Labs     01/23/24  0530 01/24/24  1522   MG 2.3 2.3   PHOS 2.5* 2.1*          Blood Gas    No recent results  Recent Labs     01/24/24  1001   PHVEN 7.333   OTG9BWO 38.2*   PO2VEN 46.7*   VJG3LPU 19.8*   BEVEN -5.5   B7RBUKS 75.0    LFTs  Recent Labs     01/23/24  0530 01/24/24  0446   ALT 83* 62*   * 173*   ALKPHOS 127* 105*   ALB 2.7* 3.1*   TBILI 21.38* 23.46*       Infectious  No recent results  Glucose  Recent Labs     01/23/24  0530 01/24/24  0446 01/24/24  1522 01/25/24  0002   GLUC 159* 181* 224* 280*             Chau Bernard Jr, MORENA

## 2024-01-25 NOTE — QUICK NOTE
Endocrinology quick note:    Chart reviewed. Patient not seen or examined by me personally.     Patient receiving high dose methylprednisolone for alcoholic hepatitis. Stay has been complicated by encephalopathy. Patient was NPO, diet now resumed, carb controlled 75g meals. Recommend resuming humalog 8 units with meals. Lantus increased 28 units last night. Continue correct scale. Will follow

## 2024-01-25 NOTE — PLAN OF CARE
Problem: Potential for Falls  Goal: Patient will remain free of falls  Description: INTERVENTIONS:  - Educate patient/family on patient safety including physical limitations  - Instruct patient to call for assistance with activity   - Consult OT/PT to assist with strengthening/mobility   - Keep Call bell within reach  - Keep bed low and locked with side rails adjusted as appropriate  - Keep care items and personal belongings within reach  - Initiate and maintain comfort rounds  - Make Fall Risk Sign visible to staff  - Offer Toileting every 2 Hours, in advance of need  - Initiate/Maintain bed alarm  - Obtain necessary fall risk management equipment: yellow socks  - Apply yellow socks and bracelet for high fall risk patients  - Consider moving patient to room near nurses station  Outcome: Progressing     Problem: PAIN - ADULT  Goal: Verbalizes/displays adequate comfort level or baseline comfort level  Description: Interventions:  - Encourage patient to monitor pain and request assistance  - Assess pain using appropriate pain scale  - Administer analgesics based on type and severity of pain and evaluate response  - Implement non-pharmacological measures as appropriate and evaluate response  - Consider cultural and social influences on pain and pain management  - Notify physician/advanced practitioner if interventions unsuccessful or patient reports new pain  Outcome: Progressing     Problem: INFECTION - ADULT  Goal: Absence or prevention of progression during hospitalization  Description: INTERVENTIONS:  - Assess and monitor for signs and symptoms of infection  - Monitor lab/diagnostic results  - Monitor all insertion sites, i.e. indwelling lines, tubes  - Administer medications as ordered  - Instruct and encourage patient and family to use good hand hygiene technique  - Identify and instruct in appropriate isolation precautions for identified infection/condition  Outcome: Progressing     Problem: SAFETY  ADULT  Goal: Patient will remain free of falls  Description: INTERVENTIONS:  - Educate patient/family on patient safety including physical limitations  - Instruct patient to call for assistance with activity   - Consult OT/PT to assist with strengthening/mobility   - Keep Call bell within reach  - Keep bed low and locked with side rails adjusted as appropriate  - Keep care items and personal belongings within reach  - Initiate and maintain comfort rounds  - Make Fall Risk Sign visible to staff  - Offer Toileting every 2 Hours, in advance of need  - Initiate/Maintain bed alarm  - Obtain necessary fall risk management equipment: yellow socks  - Apply yellow socks and bracelet for high fall risk patients  - Consider moving patient to room near nurses station  Outcome: Progressing  Goal: Maintain or return to baseline ADL function  Description: INTERVENTIONS:  -  Assess patient's ability to carry out ADLs; assess patient's baseline for ADL function and identify physical deficits which impact ability to perform ADLs (bathing, care of mouth/teeth, toileting, grooming, dressing, etc.)  - Assess/evaluate cause of self-care deficits   - Assess range of motion  - Assess patient's mobility; develop plan if impaired  - Assess patient's need for assistive devices and provide as appropriate  - Encourage maximum independence but intervene and supervise when necessary  - Involve family in performance of ADLs  - Assess for home care needs following discharge   - Consider OT consult to assist with ADL evaluation and planning for discharge  - Provide patient education as appropriate  Outcome: Progressing  Goal: Maintains/Returns to pre admission functional level  Description: INTERVENTIONS:  - Perform AM-PAC 6 Click Basic Mobility/ Daily Activity assessment daily.  - Set and communicate daily mobility goal to care team and patient/family/caregiver.   - Collaborate with rehabilitation services on mobility goals if consulted  - Perform  Range of Motion 3 times a day.  - Reposition patient every 2 hours.  - Record patient progress and toleration of activity level   Outcome: Progressing     Problem: DISCHARGE PLANNING  Goal: Discharge to home or other facility with appropriate resources  Description: INTERVENTIONS:  - Identify barriers to discharge w/patient and caregiver  - Arrange for needed discharge resources and transportation as appropriate  - Identify discharge learning needs (meds, etc.)  - Refer to Case Management Department for coordinating discharge planning if the patient needs post-hospital services based on physician/advanced practitioner order or complex needs related to functional status, cognitive ability, or social support system  Outcome: Progressing     Problem: Knowledge Deficit  Goal: Patient/family/caregiver demonstrates understanding of disease process, treatment plan, medications, and discharge instructions  Description: Complete learning assessment and assess knowledge base.  Interventions:  - Provide teaching at level of understanding  - Provide teaching via preferred learning methods  Outcome: Progressing     Problem: NEUROSENSORY - ADULT  Goal: Achieves stable or improved neurological status  Description: INTERVENTIONS  - Monitor and report changes in neurological status  - Monitor vital signs such as temperature, blood pressure, glucose, and any other labs ordered       Outcome: Progressing     Problem: CARDIOVASCULAR - ADULT  Goal: Maintains optimal cardiac output and hemodynamic stability  Description: INTERVENTIONS:  - Monitor I/O, vital signs and rhythm  - Monitor for S/S and trends of decreased cardiac output  - Administer and titrate ordered vasoactive medications to optimize hemodynamic stability  - Assess quality of pulses, skin color and temperature  - Assess for signs of decreased coronary artery perfusion  - Instruct patient to report change in severity of symptoms  Outcome: Progressing  Goal: Absence of  cardiac dysrhythmias or at baseline rhythm  Description: INTERVENTIONS:  - Continuous cardiac monitoring, vital signs, obtain 12 lead EKG if ordered  - Administer antiarrhythmic and heart rate control medications as ordered  - Monitor electrolytes and administer replacement therapy as ordered  Outcome: Progressing     Problem: RESPIRATORY - ADULT  Goal: Achieves optimal ventilation and oxygenation  Description: INTERVENTIONS:  - Assess for changes in respiratory status  - Assess for changes in mentation and behavior  - Position to facilitate oxygenation and minimize respiratory effort  - Oxygen administered by appropriate delivery if ordered  - Initiate smoking cessation education as indicated  - Encourage broncho-pulmonary hygiene including cough, deep breathe, Incentive Spirometry  - Assess the need for suctioning and aspirate as needed  - Assess and instruct to report SOB or any respiratory difficulty  - Respiratory Therapy support as indicated  Outcome: Progressing     Problem: GASTROINTESTINAL - ADULT  Goal: Minimal or absence of nausea and/or vomiting  Description: INTERVENTIONS:  - Administer IV fluids if ordered to ensure adequate hydration  - Maintain NPO status until nausea and vomiting are resolved  - Nasogastric tube if ordered  - Administer ordered antiemetic medications as needed  - Provide nonpharmacologic comfort measures as appropriate  - Advance diet as tolerated, if ordered  - Consider nutrition services referral to assist patient with adequate nutrition and appropriate food choices  Outcome: Progressing  Goal: Maintains or returns to baseline bowel function  Description: INTERVENTIONS:  - Assess bowel function  - Encourage oral fluids to ensure adequate hydration  - Administer IV fluids if ordered to ensure adequate hydration  - Administer ordered medications as needed  - Encourage mobilization and activity  - Consider nutritional services referral to assist patient with adequate nutrition and  appropriate food choices  Outcome: Progressing  Goal: Maintains adequate nutritional intake  Description: INTERVENTIONS:  - Monitor percentage of each meal consumed  - Identify factors contributing to decreased intake, treat as appropriate  - Assist with meals as needed  - Monitor I&O, weight, and lab values if indicated  - Obtain nutrition services referral as needed  Outcome: Progressing  Goal: Oral mucous membranes remain intact  Description: INTERVENTIONS  - Assess oral mucosa and hygiene practices  - Implement preventative oral hygiene regimen  - Implement oral medicated treatments as ordered  - Initiate Nutrition services referral as needed  Outcome: Progressing

## 2024-01-25 NOTE — ASSESSMENT & PLAN NOTE
Concern for alcoholic hepatitis  Confused and hallucinating  Mixed with ETOH withdrawal concerns      Plan:  Reorient as able   Precedex gtt initiated 1/24 - d/c 1/25 morning.  Increased hallucinations and impulsive behavior evening of 1/25. Precedex restarted.  Continue benzos for CIWA   Continue lactulose - reduced to 30g TID  Delirium precautions

## 2024-01-25 NOTE — PROGRESS NOTES
NEPHROLOGY PROGRESS NOTE   Michael Kohc 33 y.o. male MRN: [id number]  Unit/Bed#: -01 Encounter: [id number]      ASSESSMENT & PLAN     Acute kidney injury likely multifactorial in the setting of hypotension likely cause ATN, contrast associated nephropathy, HRS in the setting of fulminant hepatic failure?  Bile cast nephropathy?  Prerenal azotemia in the setting of DKA?  Creatinine at baseline around 1.1  Urinalysis shows 1000 glucose large blood elevated leukocytes 1+ protein 20-30 RBCs and moderate bacteria from January 21  CT scan from January 21 shows a 9 mm calculi in the left ureter with proximal hydroureter and mild left hydronephrosis, 5 mm calculi in the left ureterovesicular junction with the left kidney that is atrophic  Urine sodium 44  Hyponatremia in the setting of fulminant hepatic failure and hyperglycemia  Hypokalemia in the setting of large volume diuresis  Acute alcohol liver disease severe hepatic steatosis  Acute hepatic encephalopathy  Severe hyperbilirubinemia and hypoalbuminemia  RSV        DUANE-is improving now nonoliguric likely related to ATN responding to diuretic therapy and converted from a nonoliguric state now over 4 L output and could have an auto diuresis monitor blood pressure, continue albumin.  Consent was obtained for dialysis  Hyponatremia-this has resolved with diuresis  Hypokalemia-replete and potassium at this point given potassium chloride oral and potassium phosphate  Alcoholic liver disease-currently on octreotide and midodrine this can be continued with de-escalation of octreotide if creatinine continues to improve  Acute encephalopathy-mental status is somewhat improving now more alert and oriented  Severe hyperbilirubinemia and hypoalbuminemia-on Solu-Medrol total bilirubin is trending upwards  RSV-continue supportive care      SUBJECTIVE:    Patient was seen today  Mentating better  No chest pain or shortness of breath    12 point review of systems was  otherwise negative besides what is mentioned above.    Medications:    Current Facility-Administered Medications:     albumin human (FLEXBUMIN) 25 % injection 25 g, 25 g, Intravenous, Q6H, JENSEN Bruno, Stopped at 01/25/24 0441    folic acid 1 mg, thiamine (VITAMIN B1) 100 mg in sodium chloride 0.9 % 100 mL IV piggyback, , Intravenous, Daily, JENSEN Bruno, Last Rate: 0 mL/hr at 01/23/24 1013, New Bag at 01/24/24 1133    gabapentin (NEURONTIN) capsule 300 mg, 300 mg, Oral, TID, JENSEN Stephens    heparin (porcine) subcutaneous injection 5,000 Units, 5,000 Units, Subcutaneous, Q8H MURALI, JENSEN Bruno, 5,000 Units at 01/25/24 0820    insulin glargine (LANTUS) subcutaneous injection 28 Units 0.28 mL, 28 Units, Subcutaneous, HS, Santosh Yeung [facility], DO, 28 Units at 01/24/24 2146    insulin lispro (HumaLOG) 100 units/mL subcutaneous injection 1-6 Units, 1-6 Units, Subcutaneous, HS, 2 Units at 01/24/24 2126 **AND** [CANCELED] Fingerstick Glucose (POCT), , , At bedtime, Chau Bernard Jr., PA-C    insulin lispro (HumaLOG) 100 units/mL subcutaneous injection 2-12 Units, 2-12 Units, Subcutaneous, TID AC, 2 Units at 01/25/24 0815 **AND** Fingerstick Glucose (POCT), , , 4x Daily AC and at bedtime, Chau Bernard Jr., PA-C    insulin lispro (HumaLOG) 100 units/mL subcutaneous injection 8 Units, 8 Units, Subcutaneous, TID With Meals, Santosh Hawkins, DO, 8 Units at 01/25/24 0815    lactulose (CHRONULAC) oral solution 30 g, 30 g, Oral, TID, JENSEN Stephens, 30 g at 01/25/24 0819    methylPREDNISolone sodium succinate (Solu-MEDROL) injection 40 mg, 40 mg, Intravenous, Daily, Paz Macias MD, 40 mg at 01/25/24 0820    metoprolol tartrate (LOPRESSOR) tablet 50 mg, 50 mg, Oral, Q12H MURALI, JENSEN Bruno, 50 mg at 01/25/24 0822    midodrine (PROAMATINE) tablet 15 mg, 15 mg, Oral, TID, Jeff Bentley DO, 15 mg at 01/25/24 0820    nicotine (NICODERM CQ) 14 mg/24hr  TD 24 hr patch 14 mg, 14 mg, Transdermal, Daily, JENSEN Bruno, 14 mg at 01/25/24 0818    octreotide (SandoSTATIN) injection 100 mcg, 100 mcg, Intravenous, Q8H MURALI, EJ BrunoNP, 100 mcg at 01/25/24 0528    ondansetron (ZOFRAN) injection 4 mg, 4 mg, Intravenous, Q4H PRN, EJ BrunoNP, 4 mg at 01/22/24 1403    pantoprazole (PROTONIX) injection 40 mg, 40 mg, Intravenous, Q24H MURALI, EJ BrunoNP, 40 mg at 01/25/24 0821    potassium chloride (K-DUR,KLOR-CON) CR tablet 40 mEq, 40 mEq, Oral, Q3H, JENSEN Stephens, 40 mEq at 01/25/24 0648    potassium phosphates 30 mmol in sodium chloride 0.9 % 250 mL infusion, 30 mmol, Intravenous, Once, JENSEN Stephens, Last Rate: 41.7 mL/hr at 01/25/24 0834, 30 mmol at 01/25/24 0834    OBJECTIVE:    Vitals:    01/25/24 0600 01/25/24 0706 01/25/24 0800 01/25/24 0822   BP: 95/54 115/57 105/63 112/71   BP Location:  Right arm     Pulse: 87 93 97 (!) 110   Resp: (!) 24 (!) 35 (!) 30 (!) 37   Temp:  98 °F (36.7 °C)     TempSrc:  Oral     SpO2: 96% 96% 96% 95%   Weight:       Height:            Temp:  [97.9 °F (36.6 °C)-98.4 °F (36.9 °C)] 98 °F (36.7 °C)  HR:  [] 110  Resp:  [16-37] 37  BP: ()/(54-81) 112/71  SpO2:  [85 %-96 %] 95 %     Body mass index is 33.25 kg/m².    Weight (last 2 days)       Date/Time Weight    01/25/24 0434 96.3 (212.3)    01/24/24 0400 102 (223.99)    01/23/24 0538 89.9 (198.19)            I/O last 3 completed shifts:  In: 4552.4 [P.O.:1080; I.V.:122.4; NG/GT:1450; IV Piggyback:1900]  Out: 7175 [Urine:4975; Stool:2200]    I/O this shift:  In: 300.5 [P.O.:240; IV Piggyback:60.5]  Out: 675 [Urine:200; Stool:475]      Physical exam:    General: no acute distress, cooperative  Eyes: Positive scleral icterus  ENT: lips and mucous membranes moist, no exudates, normal external ears  Neck: ROM intact, positive JVD  Chest: Decreased breath sounds  CVS: normal rate, non pericardial friction  rub  Abdomen: Distended abdomen  Extremities: Positive  Skin: Jaundice  Neuro: awake, alert, oriented, grossly intact  Psych:  Pleasant affect    Invasive Devices:   Urethral Catheter 16 Fr. (Active)   Amt returned on insertion(mL) 525 mL 01/24/24 0628   Reasons to continue Urinary Catheter  Acute urinary retention/obstruction failing urinary retention protocol 01/24/24 2047   Goal for Removal Voiding trial when ambulation improves 01/24/24 2047   Site Assessment Clean;Skin intact 01/24/24 2047   [facility] Care Done 01/24/24 2100   Collection Container Standard drainage bag 01/24/24 2047   Securement Method Securing device (Describe) 01/24/24 2047   Securing Device Change Date 01/31/24 01/24/24 0801   Output (mL) 75 mL 01/25/24 1001     Lab Results:   Results from last 7 days   Lab Units 01/25/24  0528 01/25/24  0002 01/24/24  1522 01/24/24  0446 01/23/24  0530 01/23/24  0453 01/22/24  0808 01/22/24  0243 01/21/24  1935 01/21/24  1607 01/21/24  1240 01/21/24  1219 01/21/24  1118 01/21/24  1104   0000   WBC Thousand/uL 6.55  --   --  5.92  --  6.34  --  5.72  --   --   --   --   --  10.12  --    HEMOGLOBIN g/dL 9.5*  --   --  9.4*  --  10.8*  --  10.8*  --   --   --   --   --  14.6  --    HEMATOCRIT % 27.2*  --   --  26.8*  --  29.4*  --  30.9*  --   --   --   --   --  43.3  --    PLATELETS Thousands/uL 91*  --   --  56*  --  183  --  37*  --   --   --   --   --  72*  --    POTASSIUM mmol/L 3.1* 2.8* 3.5 3.6 3.6  --    < > 3.3* 4.0   < >  --  3.7  --   --   --    CHLORIDE mmol/L 101 102 102 100 97  --    < > 101 98   < >  --  90*  --   --   --    CO2 mmol/L 24 24 20* 16* 23  --    < > 21 17*   < >  --  13*  --   --   --    BUN mg/dL 22 21 19 15 9  --    < > 5 5   < >  --  7  --   --   --    CREATININE mg/dL 1.95* 1.90* 2.26* 2.17* 2.06*  --    < > 1.12 1.07   < >  --  1.38*  --   --   --    CALCIUM mg/dL 9.2 9.1 9.0 8.5 8.3*  --    < > 8.1* 7.4*   < >  --  7.8*  --   --   --    MAGNESIUM mg/dL 2.0  --  2.3  --  2.3  --    "--  3.0* 1.7*  --   --  2.0  --   --    < >   PHOSPHORUS mg/dL <1.0*  --  2.1*  --  2.5*  --   --  1.9* 1.7*  --   --  1.7*  --   --    < >   ALK PHOS U/L 98  --   --  105* 127*  --   --  115*  --   --   --  135*  --   --   --    ALT U/L 58*  --   --  62* 83*  --   --  84*  --   --   --  81*  --   --   --    AST U/L 139*  --   --  173* 254*  --   --  254*  --   --   --  190*  --   --   --    BLOOD CULTURE   --   --   --   --   --   --   --   --   --   --   --   --  No Growth at 72 hrs. No Growth at 72 hrs.  --    NITRITE UA   --   --   --   --   --   --   --   --   --   --  Negative  --   --   --   --    LEUKOCYTES UA   --   --   --   --   --   --   --   --   --   --  Elevated glucose may cause decreased leukocyte values. See urine microscopic for UWBC result*  --   --   --   --    BLOOD UA   --   --   --   --   --   --   --   --   --   --  Large*  --   --   --   --     < > = values in this interval not displayed.         Portions of the record may have been created with voice recognition software. Occasional wrong word or \"sound a like\" substitutions may have occurred due to the inherent limitations of voice recognition software. Read the chart carefully and recognize, using context, where substitutions have occurred.If you have any questions, please contact the dictating provider.    "

## 2024-01-25 NOTE — CONSULTS
Received consult for malnutrition and TF, consult sent in error, verified with NP. Pt does not meet criteria for malnutrition. Author already following with pt. Will provide diet instruction as appropriate, previously attempted though hallucinating and not able to fully complete. Will continue to follow with pt.

## 2024-01-25 NOTE — UTILIZATION REVIEW
Continued Stay Review    Date: 1/25/24                          Current Patient Class: IP   Current Level of Care: ICU    HPI:33 y.o. male initially admitted on 1/21/24 - DX:  Alcoholic hepatitis  / Hepatic encephalopathy  / ETOH abuse  / Abnormal CT scan  / DUANE (acute kidney injury)  / Thrombocytopenia / Type 2 diabetes mellitus with hyperglycemia, without long-term current use of insulin       Assessment/Plan:   1/25/24: Patient's family visited last evening.  Patient does not remember them visiting.  He was confused and on Precedex at the time.  Later in the evening, he was evaluated and was found to be A&Ox3.  Precedex was weaned off and the continual observation 1:1 was discontinued.  The NGT was also discontinued.  The lactulose frequency was reduced as the patient had continuous liquid stools requiring an FMS/rectal tube be placed.  Patient requested to eat and a diet was ordered.   Plan: cont iv albumin; cont iv thiamine; cont solumedrol iv; cont sandostatin gtt; rec'd KCL iv x1; Accuchecks with ssic; neuro checks; monitor labs      Vital Signs:   Date/Time Temp Pulse Resp BP MAP (mmHg) SpO2 Calculated FIO2 (%) - Nasal Cannula Nasal Cannula O2 Flow Rate (L/min) O2 Device Patient Position - Orthostatic VS   01/25/24 0822 -- 110 Abnormal  -- 112/71 -- -- -- -- -- --   01/25/24 0800 -- 97 30 Abnormal  105/63 77 96 % -- -- -- --   01/25/24 0706 98 °F (36.7 °C) 93 35 Abnormal  115/57 78 96 % 28 2 L/min Nasal cannula Lying   01/25/24 0600 -- 87 24 Abnormal  95/54 68 96 % -- -- -- --   01/25/24 0500 -- 90 24 Abnormal  103/66 79 96 % -- -- -- --   01/25/24 0400 98.2 °F (36.8 °C) 85 24 Abnormal  101/61 76 96 % 28 2 L/min Nasal cannula Lying   01/25/24 0100 98.1 °F (36.7 °C) 82 16 115/74 89 94 % 36 4 L/min Nasal cannula Lying       Pertinent Labs/Diagnostic Results:   Results from last 7 days   Lab Units 01/21/24  1104   SARS-COV-2  Negative     Results from last 7 days   Lab Units 01/25/24  0528 01/24/24  0446  01/23/24  0453 01/22/24  0243 01/21/24  1104   WBC Thousand/uL 6.55 5.92 6.34 5.72 10.12   HEMOGLOBIN g/dL 9.5* 9.4* 10.8* 10.8* 14.6   HEMATOCRIT % 27.2* 26.8* 29.4* 30.9* 43.3   PLATELETS Thousands/uL 91* 56* 183 37* 72*   NEUTROS ABS Thousands/µL 4.82  --  4.92  --  8.19*   BANDS PCT %  --   --   --  8  --         Results from last 7 days   Lab Units 01/25/24  0528 01/25/24  0002 01/24/24  1522 01/24/24  0446 01/23/24  0530 01/22/24  0808 01/22/24  0243 01/21/24  1935   SODIUM mmol/L 136 136 135 131* 130*   < > 132* 132*   POTASSIUM mmol/L 3.1* 2.8* 3.5 3.6 3.6   < > 3.3* 4.0   CHLORIDE mmol/L 101 102 102 100 97   < > 101 98   CO2 mmol/L 24 24 20* 16* 23   < > 21 17*   ANION GAP mmol/L 11 10 13 15 10   < > 10 17   BUN mg/dL 22 21 19 15 9   < > 5 5   CREATININE mg/dL 1.95* 1.90* 2.26* 2.17* 2.06*   < > 1.12 1.07   EGFR ml/min/1.73sq m 43 45 36 38 41   < > 85 90   CALCIUM mg/dL 9.2 9.1 9.0 8.5 8.3*   < > 8.1* 7.4*   MAGNESIUM mg/dL 2.0  --  2.3  --  2.3  --  3.0* 1.7*   PHOSPHORUS mg/dL <1.0*  --  2.1*  --  2.5*  --  1.9* 1.7*    < > = values in this interval not displayed.     Results from last 7 days   Lab Units 01/25/24  0615 01/25/24  0528 01/24/24  1522 01/24/24  0446 01/23/24  1325 01/23/24  0530 01/22/24  0243 01/21/24  1219 01/21/24  1118   AST U/L  --  139*  --  173*  --  254* 254* 190*  --    ALT U/L  --  58*  --  62*  --  83* 84* 81*  --    ALK PHOS U/L  --  98  --  105*  --  127* 115* 135*  --    TOTAL PROTEIN g/dL  --  6.2*  --  5.6*  --  5.7* 5.2* 5.9*  --    ALBUMIN g/dL  --  4.1  --  3.1*  --  2.7* 2.6* 2.8*  --    TOTAL BILIRUBIN mg/dL  --  27.17*  --  23.46*  --  21.38* 15.01* 13.82*  --    BILIRUBIN DIRECT mg/dL  --   --   --   --   --   --   --   --  9.84*   AMMONIA umol/L 85*  --  103* 128* 148*  --  69  --   --      Results from last 7 days   Lab Units 01/25/24  0712 01/24/24 2110 01/24/24  1537 01/24/24  1126 01/24/24  0747 01/23/24  2124 01/23/24  1643 01/23/24  1124 01/23/24  0738  01/23/24  0529 01/22/24  2344 01/22/24  2214   POC GLUCOSE mg/dl 155* 214* 298* 200* 199* 133 84 129 123 160* 114 154*     Results from last 7 days   Lab Units 01/25/24  0528 01/25/24  0002 01/24/24  1522 01/24/24  0446 01/23/24  0530 01/22/24  0808 01/22/24  0243 01/21/24  1935 01/21/24  1607 01/21/24  1219   GLUCOSE RANDOM mg/dL 191* 280* 224* 181* 159* 130 144* 246* 268* 559*     Results from last 7 days   Lab Units 01/24/24  1522 01/23/24  1325   OSMOLALITY, SERUM mmol/* 283     Results from last 7 days   Lab Units 01/21/24  1104   HEMOGLOBIN A1C % 8.8*   EAG mg/dl 206     BETA-HYDROXYBUTYRATE   Date Value Ref Range Status   01/21/2024 0.2 <0.6 mmol/L Final         Results from last 7 days   Lab Units 01/24/24  1001 01/21/24  1146   PH TEVIN  7.333 7.235*   PCO2 TEVIN mm Hg 38.2* 31.5*   PO2 TEVIN mm Hg 46.7* 43.3   HCO3 TEVIN mmol/L 19.8* 13.0*   BASE EXC TEVIN mmol/L -5.5 -13.2   O2 CONTENT TEVIN ml/dL 11.5 12.4   O2 HGB, VENOUS % 75.0 67.7        Results from last 7 days   Lab Units 01/21/24  1118   CK TOTAL U/L 72     Results from last 7 days   Lab Units 01/21/24  1607 01/21/24  1318 01/21/24  1118   HS TNI 0HR ng/L  --   --  11   HS TNI 2HR ng/L  --  13  --    HSTNI D2 ng/L  --  2  --    HS TNI 4HR ng/L 15  --   --    HSTNI D4 ng/L 4  --   --         Results from last 7 days   Lab Units 01/25/24  0528 01/24/24  0446 01/22/24  0243 01/21/24  1104   PROTIME seconds 19.7* 20.5* 16.3* 16.2*   INR  1.63* 1.72* 1.28* 1.27*   PTT seconds  --   --   --  40*        Results from last 7 days   Lab Units 01/22/24  1122 01/22/24  0808 01/22/24  0243 01/21/24  2354 01/21/24  1935 01/21/24  1614 01/21/24  1427   LACTIC ACID mmol/L 3.4* 3.1* 4.0* 6.1* 6.8* 10.1* 10.3*        Results from last 7 days   Lab Units 01/24/24  0446 01/21/24  1607   HEP B S AG  Non-reactive Non-reactive   HEP C AB  Non-reactive Non-reactive   HEP B C IGM  Non-reactive Non-reactive     Results from last 7 days   Lab Units 01/21/24  1104   LIPASE u/L  123*        Results from last 7 days   Lab Units 01/24/24  1522 01/24/24  0629 01/23/24  1325   OSMOLALITY, SERUM mmol/*  --  283   OSMO UR mmol/KG  --  576  --      Results from last 7 days   Lab Units 01/24/24  0629 01/21/24  1240   CLARITY UA   --  Clear   COLOR UA   --  Yellow   SPEC GRAV UA   --  <=1.005   PH UA   --  5.5   GLUCOSE UA mg/dl  --  >=1000 (1%)*   KETONES UA mg/dl  --  Negative   BLOOD UA   --  Large*   PROTEIN UA mg/dl  --  30 (1+)*   NITRITE UA   --  Negative   BILIRUBIN UA   --  Large*   UROBILINOGEN UA E.U./dl  --  1.0   LEUKOCYTES UA   --  Elevated glucose may cause decreased leukocyte values. See urine microscopic for UWBC result*   WBC UA /hpf  --  None Seen   RBC UA /hpf  --  20-30*   BACTERIA UA /hpf  --  Moderate*   EPITHELIAL CELLS WET PREP /hpf  --  Occasional   SODIUM UR  44  --    CREATININE UR mg/dL 181.9  --    PROTEIN UR mg/dL 188  188  --    PROT/CREAT RATIO UR  1.03*  --      Results from last 7 days   Lab Units 01/21/24  1104   INFLUENZA A PCR  Negative   INFLUENZA B PCR  Negative   RSV PCR  Positive*        Results from last 7 days   Lab Units 01/21/24  1240   AMPH/METH  Negative   BARBITURATE UR  Negative   BENZODIAZEPINE UR  Negative   COCAINE UR  Negative   METHADONE URINE  Negative   OPIATE UR  Positive*   PCP UR  Negative   THC UR  Positive*     Results from last 7 days   Lab Units 01/24/24  0446 01/21/24  1118   ETHANOL LVL mg/dL  --  349*   ACETAMINOPHEN LVL ug/mL <2* <2*        Results from last 7 days   Lab Units 01/21/24  1118 01/21/24  1104   BLOOD CULTURE  No Growth at 72 hrs. No Growth at 72 hrs.     Medications:   Scheduled Medications:  Albumin 25%, 25 g, Intravenous, Q6H  folic acid 1 mg, thiamine (VITAMIN B1) 100 mg in sodium chloride 0.9 % 100 mL IV piggyback, , Intravenous, Daily  heparin (porcine), 5,000 Units, Subcutaneous, Q8H MURALI  insulin glargine, 28 Units, Subcutaneous, HS  insulin lispro, 1-6 Units, Subcutaneous, HS  insulin lispro, 2-12 Units,  Subcutaneous, TID AC  insulin lispro, 8 Units, Subcutaneous, TID With Meals  lactulose, 30 g, Oral, TID  methylPREDNISolone sodium succinate, 40 mg, Intravenous, Daily  metoprolol tartrate, 50 mg, Oral, Q12H MURALI  midodrine, 15 mg, Oral, TID  nicotine, 14 mg, Transdermal, Daily  octreotide, 100 mcg, Intravenous, Q8H MURALI  pantoprazole, 40 mg, Intravenous, Q24H MURALI  potassium chloride, 40 mEq, Oral, Q3H  potassium phosphate, 30 mmol, Intravenous, Once    potassium chloride 20 mEq IVPB (premix)  Dose: 20 mEq  Freq: Once Route: IV  Last Dose: Stopped (01/25/24 0406)  Start: 01/25/24 0145 End: 01/25/24 0406         Continuous IV Infusions:  dexmedeTOMIDine (Precedex) 400 mcg in sodium chloride 0.9% 100 mL        PRN Meds:  ondansetron, 4 mg, Intravenous, Q4H PRN        Discharge Plan: TBD    Network Utilization Review Department  ATTENTION: Please call with any questions or concerns to [phone] and carefully listen to the prompts so that you are directed to the right person. All voicemails are confidential.   For Discharge needs, contact Care Management DC Support Team at [phone] opt. [phone]  Send all requests for admission clinical reviews, approved or denied determinations and any other requests to dedicated fax number below belonging to the [place] where the patient is receiving treatment. List of dedicated fax numbers for the Facilities:  FACILITY NAME UR FAX NUMBER   ADMISSION DENIALS (Administrative/Medical Necessity) [phone]   DISCHARGE SUPPORT TEAM (NETWORK) [phone]   PARENT CHILD HEALTH (Maternity/NICU/Pediatrics) [phone]   [facility] [phone]   [facility] [phone]   Pending sale to [facility] [phone]   [facility] [phone]   [facility] [phone]   [facility] [phone]   [facility] [phone]   [facility]  Critical access hospital [phone]   [facility] [phone]   [facility] [phone]   [facility] [phone]

## 2024-01-25 NOTE — QUICK NOTE
Patient's spouse updated via telephone on plan of care. All questions answered to her satisfaction.

## 2024-01-25 NOTE — PLAN OF CARE
Problem: Potential for Falls  Goal: Patient will remain free of falls  Description: INTERVENTIONS:  - Educate patient/family on patient safety including physical limitations  - Instruct patient to call for assistance with activity   - Consult OT/PT to assist with strengthening/mobility   - Keep Call bell within reach  - Keep bed low and locked with side rails adjusted as appropriate  - Keep care items and personal belongings within reach  - Initiate and maintain comfort rounds  - Make Fall Risk Sign visible to staff  - Offer Toileting every 2 Hours, in advance of need  - Initiate/Maintain bed alarm  - Obtain necessary fall risk management equipment: yellow socks  - Apply yellow socks and bracelet for high fall risk patients  - Consider moving patient to room near nurses station  Outcome: Progressing     Problem: PAIN - ADULT  Goal: Verbalizes/displays adequate comfort level or baseline comfort level  Description: Interventions:  - Encourage patient to monitor pain and request assistance  - Assess pain using appropriate pain scale  - Administer analgesics based on type and severity of pain and evaluate response  - Implement non-pharmacological measures as appropriate and evaluate response  - Consider cultural and social influences on pain and pain management  - Notify physician/advanced practitioner if interventions unsuccessful or patient reports new pain  Outcome: Progressing     Problem: INFECTION - ADULT  Goal: Absence or prevention of progression during hospitalization  Description: INTERVENTIONS:  - Assess and monitor for signs and symptoms of infection  - Monitor lab/diagnostic results  - Monitor all insertion sites, i.e. indwelling lines, tubes  - Administer medications as ordered  - Instruct and encourage patient and family to use good hand hygiene technique  - Identify and instruct in appropriate isolation precautions for identified infection/condition  Outcome: Progressing     Problem: SAFETY  ADULT  Goal: Patient will remain free of falls  Description: INTERVENTIONS:  - Educate patient/family on patient safety including physical limitations  - Instruct patient to call for assistance with activity   - Consult OT/PT to assist with strengthening/mobility   - Keep Call bell within reach  - Keep bed low and locked with side rails adjusted as appropriate  - Keep care items and personal belongings within reach  - Initiate and maintain comfort rounds  - Make Fall Risk Sign visible to staff  - Offer Toileting every 2 Hours, in advance of need  - Initiate/Maintain bed alarm  - Obtain necessary fall risk management equipment: yellow socks  - Apply yellow socks and bracelet for high fall risk patients  - Consider moving patient to room near nurses station  Outcome: Progressing  Goal: Maintain or return to baseline ADL function  Description: INTERVENTIONS:  -  Assess patient's ability to carry out ADLs; assess patient's baseline for ADL function and identify physical deficits which impact ability to perform ADLs (bathing, care of mouth/teeth, toileting, grooming, dressing, etc.)  - Assess/evaluate cause of self-care deficits   - Assess range of motion  - Assess patient's mobility; develop plan if impaired  - Assess patient's need for assistive devices and provide as appropriate  - Encourage maximum independence but intervene and supervise when necessary  - Involve family in performance of ADLs  - Assess for home care needs following discharge   - Consider OT consult to assist with ADL evaluation and planning for discharge  - Provide patient education as appropriate  Outcome: Progressing  Goal: Maintains/Returns to pre admission functional level  Description: INTERVENTIONS:  - Perform AM-PAC 6 Click Basic Mobility/ Daily Activity assessment daily.  - Set and communicate daily mobility goal to care team and patient/family/caregiver.   - Collaborate with rehabilitation services on mobility goals if consulted  - Perform  Range of Motion 3 times a day.  - Reposition patient every 2 hours.  - Record patient progress and toleration of activity level   Outcome: Progressing     Problem: DISCHARGE PLANNING  Goal: Discharge to home or other facility with appropriate resources  Description: INTERVENTIONS:  - Identify barriers to discharge w/patient and caregiver  - Arrange for needed discharge resources and transportation as appropriate  - Identify discharge learning needs (meds, etc.)  - Refer to Case Management Department for coordinating discharge planning if the patient needs post-hospital services based on physician/advanced practitioner order or complex needs related to functional status, cognitive ability, or social support system  Outcome: Progressing     Problem: Knowledge Deficit  Goal: Patient/family/caregiver demonstrates understanding of disease process, treatment plan, medications, and discharge instructions  Description: Complete learning assessment and assess knowledge base.  Interventions:  - Provide teaching at level of understanding  - Provide teaching via preferred learning methods  Outcome: Progressing     Problem: NEUROSENSORY - ADULT  Goal: Achieves stable or improved neurological status  Description: INTERVENTIONS  - Monitor and report changes in neurological status  - Monitor vital signs such as temperature, blood pressure, glucose, and any other labs ordered       Outcome: Progressing     Problem: CARDIOVASCULAR - ADULT  Goal: Maintains optimal cardiac output and hemodynamic stability  Description: INTERVENTIONS:  - Monitor I/O, vital signs and rhythm  - Monitor for S/S and trends of decreased cardiac output  - Administer and titrate ordered vasoactive medications to optimize hemodynamic stability  - Assess quality of pulses, skin color and temperature  - Assess for signs of decreased coronary artery perfusion  - Instruct patient to report change in severity of symptoms  Outcome: Progressing  Goal: Absence of  cardiac dysrhythmias or at baseline rhythm  Description: INTERVENTIONS:  - Continuous cardiac monitoring, vital signs, obtain 12 lead EKG if ordered  - Administer antiarrhythmic and heart rate control medications as ordered  - Monitor electrolytes and administer replacement therapy as ordered  Outcome: Progressing     Problem: RESPIRATORY - ADULT  Goal: Achieves optimal ventilation and oxygenation  Description: INTERVENTIONS:  - Assess for changes in respiratory status  - Assess for changes in mentation and behavior  - Position to facilitate oxygenation and minimize respiratory effort  - Oxygen administered by appropriate delivery if ordered  - Initiate smoking cessation education as indicated  - Encourage broncho-pulmonary hygiene including cough, deep breathe, Incentive Spirometry  - Assess the need for suctioning and aspirate as needed  - Assess and instruct to report SOB or any respiratory difficulty  - Respiratory Therapy support as indicated  Outcome: Progressing     Problem: GASTROINTESTINAL - ADULT  Goal: Minimal or absence of nausea and/or vomiting  Description: INTERVENTIONS:  - Administer IV fluids if ordered to ensure adequate hydration  - Maintain NPO status until nausea and vomiting are resolved  - Nasogastric tube if ordered  - Administer ordered antiemetic medications as needed  - Provide nonpharmacologic comfort measures as appropriate  - Advance diet as tolerated, if ordered  - Consider nutrition services referral to assist patient with adequate nutrition and appropriate food choices  Outcome: Progressing  Goal: Maintains or returns to baseline bowel function  Description: INTERVENTIONS:  - Assess bowel function  - Encourage oral fluids to ensure adequate hydration  - Administer IV fluids if ordered to ensure adequate hydration  - Administer ordered medications as needed  - Encourage mobilization and activity  - Consider nutritional services referral to assist patient with adequate nutrition and  appropriate food choices  Outcome: Progressing  Goal: Maintains adequate nutritional intake  Description: INTERVENTIONS:  - Monitor percentage of each meal consumed  - Identify factors contributing to decreased intake, treat as appropriate  - Assist with meals as needed  - Monitor I&O, weight, and lab values if indicated  - Obtain nutrition services referral as needed  Outcome: Progressing  Goal: Oral mucous membranes remain intact  Description: INTERVENTIONS  - Assess oral mucosa and hygiene practices  - Implement preventative oral hygiene regimen  - Implement oral medicated treatments as ordered  - Initiate Nutrition services referral as needed  Outcome: Progressing     Problem: GENITOURINARY - ADULT  Goal: Maintains or returns to baseline urinary function  Description: INTERVENTIONS:  - Assess urinary function  - Encourage oral fluids to ensure adequate hydration if ordered  - Administer IV fluids as ordered to ensure adequate hydration  - Administer ordered medications as needed  - Offer frequent toileting  - Follow urinary retention protocol if ordered  Outcome: Progressing     Problem: METABOLIC, FLUID AND ELECTROLYTES - ADULT  Goal: Electrolytes maintained within normal limits  Description: INTERVENTIONS:  - Monitor labs and assess patient for signs and symptoms of electrolyte imbalances  - Administer electrolyte replacement as ordered  - Monitor response to electrolyte replacements, including repeat lab results as appropriate  - Instruct patient on fluid and nutrition as appropriate  Outcome: Progressing  Goal: Fluid balance maintained  Description: INTERVENTIONS:  - Monitor labs   - Monitor I/O and WT  - Instruct patient on fluid and nutrition as appropriate  - Assess for signs & symptoms of volume excess or deficit  Outcome: Progressing  Goal: Glucose maintained within target range  Description: INTERVENTIONS:  - Monitor Blood Glucose as ordered  - Assess for signs and symptoms of hyperglycemia and  hypoglycemia  - Administer ordered medications to maintain glucose within target range  - Assess nutritional intake and initiate nutrition service referral as needed  Outcome: Progressing        Problem: HEMATOLOGIC - ADULT  Goal: Maintains hematologic stability  Description: INTERVENTIONS  - Assess for signs and symptoms of bleeding or hemorrhage  - Monitor labs  - Administer supportive blood products/factors as ordered and appropriate  Outcome: Progressing     Problem: MUSCULOSKELETAL - ADULT  Goal: Maintain or return mobility to safest level of function  Description: INTERVENTIONS:  - Assess patient's ability to carry out ADLs; assess patient's baseline for ADL function and identify physical deficits which impact ability to perform ADLs (bathing, care of mouth/teeth, toileting, grooming, dressing, etc.)  - Assess/evaluate cause of self-care deficits   - Assess range of motion  - Assess patient's mobility  - Assess patient's need for assistive devices and provide as appropriate  - Encourage maximum independence but intervene and supervise when necessary  - Involve family in performance of ADLs  - Assess for home care needs following discharge   - Consider OT consult to assist with ADL evaluation and planning for discharge  - Provide patient education as appropriate  Outcome: Progressing     Problem: Nutrition/Hydration-ADULT  Goal: Nutrient/Hydration intake appropriate for improving, restoring or maintaining nutritional needs  Description: Monitor and assess patient's nutrition/hydration status for malnutrition. Collaborate with interdisciplinary team and initiate plan and interventions as ordered.  Monitor patient's weight and dietary intake as ordered or per policy. Utilize nutrition screening tool and intervene as necessary. Determine patient's food preferences and provide high-protein, high-caloric foods as appropriate.     INTERVENTIONS:  - Monitor oral intake, urinary output, labs, and treatment plans  -  Assess nutrition and hydration status and recommend course of action  - Evaluate amount of meals eaten  - Assist patient with eating if necessary   - Allow adequate time for meals  - Recommend/ encourage appropriate diets, oral nutritional supplements, and vitamin/mineral supplements  - Order, calculate, and assess calorie counts as needed  - Recommend, monitor, and adjust tube feedings and TPN/PPN based on assessed needs  - Assess need for intravenous fluids  - Provide specific nutrition/hydration education as appropriate  - Include patient/family/caregiver in decisions related to nutrition  Outcome: Progressing     Problem: SAFETY,RESTRAINT: NV/NON-SELF DESTRUCTIVE BEHAVIOR  Goal: Remains free of harm/injury (restraint for non violent/non self-detsructive behavior)  Description: INTERVENTIONS:  - Instruct patient/family regarding restraint use   - Assess and monitor physiologic and psychological status   - Provide interventions and comfort measures to meet assessed patient needs   - Identify and implement measures to help patient regain control  - Assess readiness for release of restraint   Outcome: Completed  Goal: Returns to optimal restraint-free functioning  Description: INTERVENTIONS:  - Assess the patient's behavior and symptoms that indicate continued need for restraint  - Identify and implement measures to help patient regain control  - Assess readiness for release of restraint   Outcome: Completed

## 2024-01-26 ENCOUNTER — APPOINTMENT (INPATIENT)
Dept: CT IMAGING | Facility: HOSPITAL | Age: 34
DRG: 441 | End: 2024-01-26
Payer: COMMERCIAL

## 2024-01-26 ENCOUNTER — APPOINTMENT (INPATIENT)
Dept: RADIOLOGY | Facility: HOSPITAL | Age: 34
DRG: 441 | End: 2024-01-26
Payer: COMMERCIAL

## 2024-01-26 PROBLEM — K92.2 GI BLEED: Status: ACTIVE | Noted: 2024-01-26

## 2024-01-26 PROBLEM — J96.01 ACUTE RESPIRATORY FAILURE WITH HYPOXIA AND HYPERCAPNIA (HCC): Status: ACTIVE | Noted: 2024-01-26

## 2024-01-26 PROBLEM — J96.02 ACUTE RESPIRATORY FAILURE WITH HYPOXIA AND HYPERCAPNIA (HCC): Status: ACTIVE | Noted: 2024-01-26

## 2024-01-26 PROBLEM — R06.89 RESPIRATORY INSUFFICIENCY: Status: ACTIVE | Noted: 2024-01-26

## 2024-01-26 LAB
ABO GROUP BLD: NORMAL
ALBUMIN SERPL BCP-MCNC: 4.1 G/DL (ref 3.5–5)
ALP SERPL-CCNC: 106 U/L (ref 34–104)
ALT SERPL W P-5'-P-CCNC: 53 U/L (ref 7–52)
ANION GAP SERPL CALCULATED.3IONS-SCNC: 10 MMOL/L
AST SERPL W P-5'-P-CCNC: 125 U/L (ref 13–39)
BACTERIA BLD CULT: NORMAL
BACTERIA BLD CULT: NORMAL
BASE EX.OXY STD BLDV CALC-SCNC: 30.1 % (ref 60–80)
BASE EXCESS BLDV CALC-SCNC: -4 MMOL/L
BILIRUB SERPL-MCNC: 27.25 MG/DL (ref 0.2–1)
BLD GP AB SCN SERPL QL: NEGATIVE
BUN SERPL-MCNC: 21 MG/DL (ref 5–25)
CA-I BLD-SCNC: 1.13 MMOL/L (ref 1.12–1.32)
CALCIUM SERPL-MCNC: 8.7 MG/DL (ref 8.4–10.2)
CHLORIDE SERPL-SCNC: 106 MMOL/L (ref 96–108)
CO2 SERPL-SCNC: 23 MMOL/L (ref 21–32)
CREAT SERPL-MCNC: 1.47 MG/DL (ref 0.6–1.3)
ERYTHROCYTE [DISTWIDTH] IN BLOOD BY AUTOMATED COUNT: 21.4 % (ref 11.6–15.1)
FERRITIN SERPL-MCNC: 716 NG/ML (ref 24–336)
GFR SERPL CREATININE-BSD FRML MDRD: 61 ML/MIN/1.73SQ M
GLUCOSE SERPL-MCNC: 132 MG/DL (ref 65–140)
GLUCOSE SERPL-MCNC: 155 MG/DL (ref 65–140)
GLUCOSE SERPL-MCNC: 156 MG/DL (ref 65–140)
GLUCOSE SERPL-MCNC: 189 MG/DL (ref 65–140)
GLUCOSE SERPL-MCNC: 252 MG/DL (ref 65–140)
HCO3 BLDV-SCNC: 23.5 MMOL/L (ref 24–30)
HCT VFR BLD AUTO: 24.8 % (ref 36.5–49.3)
HCT VFR BLD AUTO: 26.9 % (ref 36.5–49.3)
HGB BLD-MCNC: 8.5 G/DL (ref 12–17)
HGB BLD-MCNC: 9.1 G/DL (ref 12–17)
INR PPP: 1.56 (ref 0.84–1.19)
IRON SATN MFR SERPL: 23 % (ref 15–50)
IRON SERPL-MCNC: 25 UG/DL (ref 50–212)
MAGNESIUM SERPL-MCNC: 2 MG/DL (ref 1.9–2.7)
MCH RBC QN AUTO: 34.5 PG (ref 26.8–34.3)
MCHC RBC AUTO-ENTMCNC: 33.8 G/DL (ref 31.4–37.4)
MCV RBC AUTO: 102 FL (ref 82–98)
NASAL CANNULA: 13
O2 CT BLDV-SCNC: 4.4 ML/DL
PCO2 BLDV: 54.5 MM HG (ref 42–50)
PH BLDV: 7.25 [PH] (ref 7.3–7.4)
PHOSPHATE SERPL-MCNC: 6.5 MG/DL (ref 2.7–4.5)
PLATELET # BLD AUTO: 110 THOUSANDS/UL (ref 149–390)
PMV BLD AUTO: 10.9 FL (ref 8.9–12.7)
PO2 BLDV: 24.9 MM HG (ref 35–45)
POTASSIUM SERPL-SCNC: 3.7 MMOL/L (ref 3.5–5.3)
PROCALCITONIN SERPL-MCNC: 0.54 NG/ML
PROT SERPL-MCNC: 6 G/DL (ref 6.4–8.4)
PROTHROMBIN TIME: 19 SECONDS (ref 11.6–14.5)
RBC # BLD AUTO: 2.64 MILLION/UL (ref 3.88–5.62)
RH BLD: POSITIVE
SODIUM SERPL-SCNC: 139 MMOL/L (ref 135–147)
SPECIMEN EXPIRATION DATE: NORMAL
TIBC SERPL-MCNC: 107 UG/DL (ref 250–450)
UIBC SERPL-MCNC: 82 UG/DL (ref 155–355)
WBC # BLD AUTO: 7.54 THOUSAND/UL (ref 4.31–10.16)

## 2024-01-26 PROCEDURE — 86900 BLOOD TYPING SEROLOGIC ABO: CPT | Performed by: PHYSICIAN ASSISTANT

## 2024-01-26 PROCEDURE — 85610 PROTHROMBIN TIME: CPT | Performed by: NURSE PRACTITIONER

## 2024-01-26 PROCEDURE — 83550 IRON BINDING TEST: CPT | Performed by: PHYSICIAN ASSISTANT

## 2024-01-26 PROCEDURE — 85018 HEMOGLOBIN: CPT | Performed by: PHYSICIAN ASSISTANT

## 2024-01-26 PROCEDURE — 82805 BLOOD GASES W/O2 SATURATION: CPT | Performed by: NURSE PRACTITIONER

## 2024-01-26 PROCEDURE — 83735 ASSAY OF MAGNESIUM: CPT | Performed by: NURSE PRACTITIONER

## 2024-01-26 PROCEDURE — 84100 ASSAY OF PHOSPHORUS: CPT | Performed by: NURSE PRACTITIONER

## 2024-01-26 PROCEDURE — 82330 ASSAY OF CALCIUM: CPT | Performed by: NURSE PRACTITIONER

## 2024-01-26 PROCEDURE — C9113 INJ PANTOPRAZOLE SODIUM, VIA: HCPCS

## 2024-01-26 PROCEDURE — 94760 N-INVAS EAR/PLS OXIMETRY 1: CPT

## 2024-01-26 PROCEDURE — 86901 BLOOD TYPING SEROLOGIC RH(D): CPT | Performed by: PHYSICIAN ASSISTANT

## 2024-01-26 PROCEDURE — 80053 COMPREHEN METABOLIC PANEL: CPT | Performed by: NURSE PRACTITIONER

## 2024-01-26 PROCEDURE — 83540 ASSAY OF IRON: CPT | Performed by: PHYSICIAN ASSISTANT

## 2024-01-26 PROCEDURE — 99233 SBSQ HOSP IP/OBS HIGH 50: CPT | Performed by: INTERNAL MEDICINE

## 2024-01-26 PROCEDURE — 82272 OCCULT BLD FECES 1-3 TESTS: CPT | Performed by: PHYSICIAN ASSISTANT

## 2024-01-26 PROCEDURE — 84145 PROCALCITONIN (PCT): CPT | Performed by: NURSE PRACTITIONER

## 2024-01-26 PROCEDURE — 82728 ASSAY OF FERRITIN: CPT | Performed by: PHYSICIAN ASSISTANT

## 2024-01-26 PROCEDURE — 71275 CT ANGIOGRAPHY CHEST: CPT

## 2024-01-26 PROCEDURE — 71045 X-RAY EXAM CHEST 1 VIEW: CPT

## 2024-01-26 PROCEDURE — 85027 COMPLETE CBC AUTOMATED: CPT | Performed by: NURSE PRACTITIONER

## 2024-01-26 PROCEDURE — G1004 CDSM NDSC: HCPCS

## 2024-01-26 PROCEDURE — 82948 REAGENT STRIP/BLOOD GLUCOSE: CPT

## 2024-01-26 PROCEDURE — 86850 RBC ANTIBODY SCREEN: CPT | Performed by: PHYSICIAN ASSISTANT

## 2024-01-26 PROCEDURE — 85014 HEMATOCRIT: CPT | Performed by: PHYSICIAN ASSISTANT

## 2024-01-26 PROCEDURE — 99233 SBSQ HOSP IP/OBS HIGH 50: CPT | Performed by: ANESTHESIOLOGY

## 2024-01-26 RX ORDER — LORAZEPAM 2 MG/ML
4 INJECTION INTRAMUSCULAR ONCE
Status: COMPLETED | OUTPATIENT
Start: 2024-01-26 | End: 2024-01-26

## 2024-01-26 RX ORDER — LORAZEPAM 1 MG/1
2 TABLET ORAL ONCE
Status: COMPLETED | OUTPATIENT
Start: 2024-01-26 | End: 2024-01-26

## 2024-01-26 RX ORDER — POTASSIUM CHLORIDE 14.9 MG/ML
20 INJECTION INTRAVENOUS ONCE
Status: COMPLETED | OUTPATIENT
Start: 2024-01-26 | End: 2024-01-26

## 2024-01-26 RX ORDER — QUETIAPINE FUMARATE 25 MG/1
25 TABLET, FILM COATED ORAL
Status: DISCONTINUED | OUTPATIENT
Start: 2024-01-26 | End: 2024-01-27

## 2024-01-26 RX ORDER — INSULIN LISPRO 100 [IU]/ML
2-12 INJECTION, SOLUTION INTRAVENOUS; SUBCUTANEOUS EVERY 6 HOURS
Status: DISCONTINUED | OUTPATIENT
Start: 2024-01-26 | End: 2024-01-27

## 2024-01-26 RX ADMIN — DEXMEDETOMIDINE HYDROCHLORIDE 0.6 MCG/KG/HR: 400 INJECTION INTRAVENOUS at 02:29

## 2024-01-26 RX ADMIN — ALBUMIN (HUMAN) 25 G: 0.25 INJECTION, SOLUTION INTRAVENOUS at 03:44

## 2024-01-26 RX ADMIN — LORAZEPAM 2 MG: 1 TABLET ORAL at 20:06

## 2024-01-26 RX ADMIN — MIDODRINE HYDROCHLORIDE 15 MG: 5 TABLET ORAL at 20:06

## 2024-01-26 RX ADMIN — IOHEXOL 85 ML: 350 INJECTION, SOLUTION INTRAVENOUS at 12:25

## 2024-01-26 RX ADMIN — ALBUMIN (HUMAN) 25 G: 0.25 INJECTION, SOLUTION INTRAVENOUS at 21:29

## 2024-01-26 RX ADMIN — GABAPENTIN 300 MG: 300 CAPSULE ORAL at 16:54

## 2024-01-26 RX ADMIN — METHYLPREDNISOLONE SODIUM SUCCINATE 40 MG: 40 INJECTION, POWDER, FOR SOLUTION INTRAMUSCULAR; INTRAVENOUS at 09:19

## 2024-01-26 RX ADMIN — ALBUMIN (HUMAN) 25 G: 0.25 INJECTION, SOLUTION INTRAVENOUS at 16:56

## 2024-01-26 RX ADMIN — INSULIN LISPRO 10 UNITS: 100 INJECTION, SOLUTION INTRAVENOUS; SUBCUTANEOUS at 16:54

## 2024-01-26 RX ADMIN — METOPROLOL TARTRATE 50 MG: 50 TABLET, FILM COATED ORAL at 20:07

## 2024-01-26 RX ADMIN — DEXMEDETOMIDINE HYDROCHLORIDE 0.4 MCG/KG/HR: 400 INJECTION INTRAVENOUS at 04:26

## 2024-01-26 RX ADMIN — NICOTINE 14 MG: 14 PATCH, EXTENDED RELEASE TRANSDERMAL at 09:19

## 2024-01-26 RX ADMIN — INSULIN GLARGINE 30 UNITS: 100 INJECTION, SOLUTION SUBCUTANEOUS at 21:29

## 2024-01-26 RX ADMIN — LORAZEPAM 2 MG: 1 TABLET ORAL at 15:57

## 2024-01-26 RX ADMIN — QUETIAPINE FUMARATE 25 MG: 25 TABLET ORAL at 20:07

## 2024-01-26 RX ADMIN — LACTULOSE 30 G: 20 SOLUTION ORAL at 20:07

## 2024-01-26 RX ADMIN — THIAMINE HYDROCHLORIDE: 100 INJECTION, SOLUTION INTRAMUSCULAR; INTRAVENOUS at 10:56

## 2024-01-26 RX ADMIN — POTASSIUM CHLORIDE 20 MEQ: 14.9 INJECTION, SOLUTION INTRAVENOUS at 08:02

## 2024-01-26 RX ADMIN — HEPARIN SODIUM 5000 UNITS: 5000 INJECTION, SOLUTION INTRAVENOUS; SUBCUTANEOUS at 09:19

## 2024-01-26 RX ADMIN — OCTREOTIDE ACETATE 100 MCG: 100 INJECTION, SOLUTION INTRAVENOUS; SUBCUTANEOUS at 14:43

## 2024-01-26 RX ADMIN — LORAZEPAM 4 MG: 2 INJECTION INTRAMUSCULAR; INTRAVENOUS at 02:36

## 2024-01-26 RX ADMIN — ALBUMIN (HUMAN) 25 G: 0.25 INJECTION, SOLUTION INTRAVENOUS at 09:19

## 2024-01-26 RX ADMIN — INSULIN LISPRO 2 UNITS: 100 INJECTION, SOLUTION INTRAVENOUS; SUBCUTANEOUS at 12:56

## 2024-01-26 RX ADMIN — INSULIN LISPRO 2 UNITS: 100 INJECTION, SOLUTION INTRAVENOUS; SUBCUTANEOUS at 08:50

## 2024-01-26 RX ADMIN — ONDANSETRON 4 MG: 2 INJECTION INTRAMUSCULAR; INTRAVENOUS at 01:58

## 2024-01-26 RX ADMIN — LACTULOSE 30 G: 20 SOLUTION ORAL at 16:45

## 2024-01-26 RX ADMIN — GABAPENTIN 300 MG: 300 CAPSULE ORAL at 20:06

## 2024-01-26 RX ADMIN — INSULIN LISPRO 10 UNITS: 100 INJECTION, SOLUTION INTRAVENOUS; SUBCUTANEOUS at 12:56

## 2024-01-26 RX ADMIN — OCTREOTIDE ACETATE 100 MCG: 100 INJECTION, SOLUTION INTRAVENOUS; SUBCUTANEOUS at 21:29

## 2024-01-26 RX ADMIN — INSULIN LISPRO 2 UNITS: 100 INJECTION, SOLUTION INTRAVENOUS; SUBCUTANEOUS at 21:29

## 2024-01-26 RX ADMIN — HEPARIN SODIUM 5000 UNITS: 5000 INJECTION, SOLUTION INTRAVENOUS; SUBCUTANEOUS at 16:45

## 2024-01-26 RX ADMIN — MIDODRINE HYDROCHLORIDE 15 MG: 5 TABLET ORAL at 16:54

## 2024-01-26 RX ADMIN — OCTREOTIDE ACETATE 100 MCG: 100 INJECTION, SOLUTION INTRAVENOUS; SUBCUTANEOUS at 05:04

## 2024-01-26 RX ADMIN — PANTOPRAZOLE SODIUM 40 MG: 40 INJECTION, POWDER, FOR SOLUTION INTRAVENOUS at 09:19

## 2024-01-26 NOTE — ASSESSMENT & PLAN NOTE
MELD: 28   AST> ALT   RUQ: hepatomegaly,hepatic steatosis  CTAP: fatty liver/varices/portal htn  Started predinose 1/23   LD, CK, Tylenol level are WNL  D Bili elevated at 9.8  Ammonia: 187 -> 85  GGT:797  AFP upper limit of NML    Plan:   Trend hepatic panel and INR  Continue prednisone   Continue lactulose   Serial abdominal exams  PRN zofran for n/v

## 2024-01-26 NOTE — ASSESSMENT & PLAN NOTE
Lab Results   Component Value Date    HGBA1C 8.8 (H) 01/21/2024       Recent Labs     01/25/24  1158 01/25/24  1637 01/25/24 2024 01/26/24  0716   POCGLU 204* 195* 183* 189*         Blood Sugar Average: Last 72 hrs:  (P) [id number]    Initially presented in DKA   Resolved DKA.   Continue Lantus and SSI   AccuChecks TID with meals

## 2024-01-26 NOTE — PROGRESS NOTES
Pastoral Care Progress Note    2024  Patient: Michael Koch : 1990  Admission Date & Time: 2024 1057  MRN: [id number] CSN: [id number]      CH initiated support visit to pt and family. Pt received CH reclined in bed, mother bedside.  Mother shared that pt is youngest of 7 children. Family has lost one child. Mother of pt shared she is feeling very emotional and anxious about the pt.  Mother of pt attests she is well supported by her SO. Mother of pt described pt's nuclear family: he has 3 children and a spouse. Mother of pt shared that Michael's children are confused and concerned, and are struggling to understand why their dad is not home.  CH provided empathetic listening and support. CH facilitated conversation with dietary as pt wished to change his dinner order.  CH remains available.             Chaplaincy Interventions Utilized:   Exploration: Explored relational needs & resources    Relationship Building: Listened empathically    Chaplaincy Outcomes Achieved:  Expressed intermediate hope    Spiritual Coping Strategies Utilized:   Connectedness       24 1300   Clinical Encounter Type   Visited With Patient and family together  (mother)   Routine Visit Introduction   Crisis Visit Critical Care

## 2024-01-26 NOTE — ASSESSMENT & PLAN NOTE
Nursing reported bright red blood per rectum on 1/26.  Occult blood stool positive.  SQ heparin d/c.   NPO after midnight.

## 2024-01-26 NOTE — ASSESSMENT & PLAN NOTE
After sedation with ativan and precedex, decreased mental status and escalation to midflow plus NRB  Precedex placed on hold and now awake, weaned to midflow only  Oxygen requirement likely related to mental status and oversedation    Check CXR now

## 2024-01-26 NOTE — ASSESSMENT & PLAN NOTE
Likely 2/2 ETOH abuse and acute liver disease    Hemolysis smear NML, LD wnl   Haptoglobin: 87   Fibrinogen: 280  Plan:  Continue to trend  Cont DVT ppx since platelets >50K

## 2024-01-26 NOTE — RESPIRATORY THERAPY NOTE
"RT Protocol Note  Michael Koch 33 y.o. male MRN: [id number]  Unit/Bed#: -01 Encounter: [id number]    Assessment    Principal Problem:    Alcoholic hepatitis  Active Problems:    History of gout    Type 2 diabetes mellitus with hyperglycemia, without long-term current use of insulin (HCC)    Tobacco abuse    ETOH abuse    Depression    RSV infection    Abnormal CT scan    Hepatic encephalopathy (HCC)    DUANE (acute kidney injury) (HCC)    Thrombocytopenia (HCC)    Respiratory insufficiency      Home Pulmonary Medications:         Past Medical History:   Diagnosis Date    Depression     Diabetes mellitus (HCC)     Gout     Hypertension     Hyponatremia 01/21/2024    Kidney stones      Social History     Socioeconomic History    Marital status: /Civil Union     Spouse name: None    Number of children: None    Years of education: None    Highest education level: None   Occupational History    None   Tobacco Use    Smoking status: Every Day     Current packs/day: 0.25     Types: Cigarettes    Smokeless tobacco: Never   Vaping Use    Vaping status: Never Used   Substance and Sexual Activity    Alcohol use: Yes     Comment: \"couple 24 oz cans per day\"    Drug use: Never    Sexual activity: None   Other Topics Concern    None   Social History Narrative    None     Social Determinants of Health     Financial Resource Strain: Medium Risk (9/14/2023)    Received from [facility]    Overall Financial Resource Strain (CARDIA)     Difficulty of Paying Living Expenses: Somewhat hard   Food Insecurity: No Food Insecurity (1/22/2024)    Hunger Vital Sign     Worried About Running Out of Food in the Last Year: Never true     Ran Out of Food in the Last Year: Never true   Transportation Needs: No Transportation Needs (1/22/2024)    PRAPARE - Transportation     Lack of Transportation (Medical): No     Lack of Transportation (Non-Medical): No   Physical Activity: Not on file   Stress: Not on file " "  Social Connections: Not on file   Intimate Partner Violence: Not At Risk (9/14/2023)    Received from [facility]    Humiliation, Afraid, Rape, and Kick questionnaire     Fear of Current or Ex-Partner: No     Emotionally Abused: No     Physically Abused: No     Sexually Abused: No   Housing Stability: Low Risk  (1/22/2024)    Housing Stability Vital Sign     Unable to Pay for Housing in the Last Year: No     Number of Places Lived in the Last Year: 1     Unstable Housing in the Last Year: No       Subjective         Objective    Physical Exam:   Assessment Type: (P) Assess only  General Appearance: (P) Sleeping  Respiratory Pattern: (P) Normal  Chest Assessment: (P) Chest expansion symmetrical  Bilateral Breath Sounds: (P) Diminished  O2 Device: (P) 12LPM midflow NC    Vitals:  Blood pressure 115/81, pulse 81, temperature 98 °F (36.7 °C), temperature source Axillary, resp. rate (!) 42, height 5' 7\" (1.702 m), weight 97.5 kg (214 lb 15.2 oz), SpO2 96%.          Imaging and other studies: I have personally reviewed pertinent reports.      O2 Device: (P) 12LPM midflow NC     Plan             Resp Comments: (P) Pt was placed on MFNC on previous shift by RN for low SpO2.   "

## 2024-01-26 NOTE — PROGRESS NOTES
Pastoral Care Progress Note    2024  Patient: Michael Koch : 1990  Admission Date & Time: 2024 1057  MRN: [id number] CSN: [id number]     initiated support visit to family of pt. No family currently bedside.                  24 0900   Clinical Encounter Type   Visited With Patient not available

## 2024-01-26 NOTE — CASE MANAGEMENT
Case Management Discharge Planning Note    Patient name Michael Koch  Location /-01 MRN [id number]  : 1990 Date 2024       Current Admission Date: 2024  Current Admission Diagnosis:Alcoholic hepatitis   Patient Active Problem List    Diagnosis Date Noted    Acute respiratory failure with hypoxia and hypercapnia (HCC) 2024    Alcoholic hepatitis 2024    ETOH abuse 2024    Depression 2024    GERD (gastroesophageal reflux disease) 2024    BPH (benign prostatic hyperplasia) 2024    RSV infection 2024    Abnormal CT scan 2024    Hepatic encephalopathy (HCC) 2024    N&V (nausea and vomiting) 2024    DUANE (acute kidney injury) (HCC) 2024    Thrombocytopenia (HCC) 2024    Lactic acidosis 2024    Marijuana use 2024    SIRS (systemic inflammatory response syndrome) (HCC) 2024    History of gout 10/04/2023    History of hypertension 10/04/2023    Type 2 diabetes mellitus with hyperglycemia, without long-term current use of insulin (HCC) 10/04/2023    Tobacco abuse 10/04/2023      LOS (days): 5  Geometric Mean LOS (GMLOS) (days): 4.8  Days to GMLOS:-0.2     OBJECTIVE:  Risk of Unplanned Readmission Score: 27.64         Current admission status: Inpatient   Preferred Pharmacy:   [id number] - [place], [place] - [address]  [address]  [place] [address]  Phone: [phone] Fax: [phone]    Primary Care Provider: Louie Odonnell DO    Primary Insurance: BLUE CROSS  Secondary Insurance:     DISCHARGE DETAILS:        RISA updated Jose D, from [facility], that pt is not appropriate for an assessment at this time.  As per Jose D, he will touch base with CM Monday AM

## 2024-01-26 NOTE — PROGRESS NOTES
[facility]  Progress Note  Name: Michael Koch I  MRN: [id number]  Unit/Bed#: -01 I Date of Admission: 2024   Date of Service: 2024 I Hospital Day: 5    Assessment/Plan   * Alcoholic hepatitis  Assessment & Plan  MELD: 28   AST> ALT   RUQ: hepatomegaly,hepatic steatosis  CTAP: fatty liver/varices/portal htn  Started predinose    LD, CK, Tylenol level are WNL  D Bili elevated at 9.8  Ammonia: 187 -> 85  GGT:797  AFP upper limit of NML    Plan:   Trend hepatic panel and INR  Continue prednisone   Continue lactulose   Serial abdominal exams  PRN zofran for n/v    Hepatic encephalopathy (HCC)  Assessment & Plan  Concern for alcoholic hepatitis  Confused and hallucinating  Mixed with ETOH withdrawal concerns      Plan:  Reorient as able   Precedex gtt initiated  - d/c  morning.  Increased hallucinations and impulsive behavior evening of . Precedex restarted.  Continue benzos for CIWA   Continue lactulose - reduced to 30g TID  Delirium precautions     ETOH abuse  Assessment & Plan  Per pt drinks 2 24oz twisted tea/smirinoff per day (family notes pt drinks more than this)  Drinks since brother  3 years ago. Has stopped for a week or two without symptoms. No DTs.  CIWA protocol  Thiamine/folate    Plan:   Continue CIWA   Precedex gtt initiated  - d/c  morning.  Increased hallucinations and impulsive behavior evening of . Precedex restarted     Abnormal CT scan  Assessment & Plan  CTAP-1.6cm soft tissue nodule of distal esophagus  CTC-  Slightly enlarged lower right paraesophageal node, stable since an abd CT 10/30/2023, of uncertain etiology and significance    DUANE (acute kidney injury) (HCC)  Assessment & Plan  Initially Prerenal in setting of HHNK/ACEI  Now with concerns for biliary nephrosis in setting of elevated t/ bili   Baseline 0.9-1  CT nephrolithiasis mild left hydro  UA +blood/glucose/protein  CK WNL  Renal U/S: Right intrarenal  calculi;  Hepatomegaly with hepatic steatosis    Plan:  Continue Albumin, Octreotide, and Midodrine   Trend BMP  Monitor I/O  Avoid nephrotoxic agents   Nephrology following.     Thrombocytopenia (HCC)  Assessment & Plan  Likely 2/2 ETOH abuse and acute liver disease    Hemolysis smear NML, LD wnl   Haptoglobin: 87   Fibrinogen: 280  Plan:  Continue to trend  Cont DVT ppx since platelets >50K    RSV infection  Assessment & Plan  F/u CTC given esophageal nodule  Supportive care    Respiratory insufficiency  Assessment & Plan  After sedation with ativan and precedex, decreased mental status and escalation to midflow plus NRB  Precedex placed on hold and now awake, weaned to midflow only  Oxygen requirement likely related to mental status and oversedation    Check CXR now    Depression  Assessment & Plan  NPO hold home fluoxetine/remeron/prn atarax    Tobacco abuse  Assessment & Plan  Nicotine patch  Smoking cessation counseling     Type 2 diabetes mellitus with hyperglycemia, without long-term current use of insulin (HCC)  Assessment & Plan  Lab Results   Component Value Date    HGBA1C 8.8 (H) 01/21/2024       Recent Labs     01/25/24  1158 01/25/24  1637 01/25/24 2024 01/26/24  0716   POCGLU 204* 195* 183* 189*         Blood Sugar Average: Last 72 hrs:  (P) [id number]    Initially presented in DKA   Resolved DKA.   Continue Lantus and SSI   AccuChecks TID with meals     History of gout  Assessment & Plan  Continue allopurinol/colchicine when able to PO              Disposition: Stepdown Level 1    ICU Core Measures     A: Assess, Prevent, and Manage Pain Has pain been assessed? Yes  Need for changes to pain regimen? No   B: Both SAT/SAT  N/A   C: Choice of Sedation RASS Goal: 0 Alert and Calm  Need for changes to sedation or analgesia regimen? No   D: Delirium CAM-ICU: Positive   E: Early Mobility  Plan for early mobility? Yes   F: Family Engagement Plan for family engagement today? Yes       Review of  Invasive Devices:    Ferguson Plan: Continue for accurate I/O monitoring for 48 hours        Prophylaxis:  VTE VTE covered by:  heparin (porcine), Subcutaneous, 5,000 Units at 24 1633       Stress Ulcer  covered byomeprazole (PriLOSEC) 40 MG capsule [[id number]] (Long-Term Med), pantoprazole (PROTONIX) injection 40 mg [[id number]]         Significant 24hr Events     24hr events: Increasingly confused and agitated overnight. Medicated with ativan per CIWA protocol and precedex gtt resumed. Increased oxygen requirements after sedation. Required NRB and 15L MF early this am. Weaned back to 13L MF when more awake. Labs and CXR pending. 1:1 started due to agitation and getting OOB.      Subjective   Review of Systems   Unable to perform ROS: Mental status change      Objective                            Vitals I/O      Most Recent Min/Max in 24hrs   Temp 98 °F (36.7 °C) Temp  Min: 98 °F (36.7 °C)  Max: 99.2 °F (37.3 °C)   Pulse 81 Pulse  Min: 79  Max: 110   Resp (!) 42 Resp  Min: 18  Max: 52   /81 BP  Min: 105/63  Max: 147/94   O2 Sat 96 % SpO2  Min: 86 %  Max: 98 %      Intake/Output Summary (Last 24 hours) at 2024 0741  Last data filed at 2024 0615  Gross per 24 hour   Intake 1723.68 ml   Output 2570 ml   Net -846.32 ml       Diet Prashant/CHO Controlled; Consistent Carbohydrate Diet Level 2 (5 carb servings/75 grams CHO/meal); Renal Restrictive; Yes; Fluid Restriction 2000 ML; No    Invasive Monitoring           Physical Exam   Physical Exam  Eyes:      General: Scleral icterus present.      Pupils: Pupils are equal, round, and reactive to light.   Skin:     Capillary Refill: Capillary refill takes less than 2 seconds.      Coloration: Skin is jaundiced.   HENT:      Head: Normocephalic and atraumatic.      Mouth/Throat:      Mouth: Mucous membranes are moist.   Cardiovascular:      Rate and Rhythm: Normal rate and regular rhythm.   Musculoskeletal:      Right lower le+ Edema present.      Left lower  le+ Edema present.   Abdominal: General: Bowel sounds are normal.      Palpations: Abdomen is soft.   Constitutional:       Appearance: He is ill-appearing.   Pulmonary:      Breath sounds: Rhonchi present.   Psychiatric:         Behavior: Behavior is uncooperative.      Comments: Angry, agitated    Neurological:      General: No focal deficit present.      Mental Status: He is somnolent, disoriented to person, disoriented to place, disoriented to time and disoriented to situation.      GCS: GCS eye subscore is 2. GCS verbal subscore is 2. GCS motor subscore is 5.   Genitourinary/Anorectal:  Ferguson present.          Diagnostic Studies      EKG:   Imaging:  I have personally reviewed pertinent reports.   and I have personally reviewed pertinent films in PACS     Medications:  Scheduled PRN   Albumin 25%, 25 g, Q6H  folic acid 1 mg, thiamine (VITAMIN B1) 100 mg in sodium chloride 0.9 % 100 mL IV piggyback, , Daily  gabapentin, 300 mg, TID  heparin (porcine), 5,000 Units, Q8H MURALI  insulin glargine, 30 Units, HS  insulin lispro, 1-6 Units, HS  insulin lispro, 10 Units, TID With Meals  insulin lispro, 2-12 Units, TID AC  lactulose, 30 g, TID  methylPREDNISolone sodium succinate, 40 mg, Daily  metoprolol tartrate, 50 mg, Q12H MURALI  midodrine, 15 mg, TID  nicotine, 14 mg, Daily  octreotide, 100 mcg, Q8H MURALI  pantoprazole, 40 mg, Q24H MURALI  potassium chloride, 20 mEq, Once  potassium-sodium phosphateS, 2 tablet, 4x Daily (with meals and at bedtime)      ondansetron, 4 mg, Q4H PRN       Continuous    dexmedetomidine, 0.1-0.7 mcg/kg/hr, Last Rate: Stopped (24 0615)         Labs:    CBC    Recent Labs     24   WBC 6.55 7.54   HGB 9.5* 9.1*   HCT 27.2* 26.9*   PLT 91* 110*     BMP    Recent Labs     24   SODIUM 138 139   K 3.7 3.7    106   CO2 21 23   AGAP 11 10   BUN 21 21   CREATININE 1.59* 1.47*   CALCIUM 9.2 8.7       Coags    Recent Labs     24  0553  01/26/24  0427   INR 1.63* 1.56*        Additional Electrolytes  Recent Labs     01/25/24 0528 01/25/24  1252 01/25/24 2015 01/26/24 0427   MG 2.0  --   --  2.0   PHOS <1.0*   < > 2.7 6.5*   CAIONIZED  --   --   --  1.13    < > = values in this interval not displayed.          Blood Gas    No recent results  Recent Labs     01/24/24  1001   PHVEN 7.333   UAH1IQV 38.2*   PO2VEN 46.7*   QRJ8LIF 19.8*   BEVEN -5.5   [id number] 75.0    LFTs  Recent Labs     01/25/24 0528 01/26/24 0427   ALT 58* 53*   * 125*   ALKPHOS 98 106*   ALB 4.1 4.1   TBILI 27.17* 27.25*       Infectious  No recent results  Glucose  Recent Labs     01/25/24 0528 01/25/24  1252 01/25/24 2015 01/26/24  0427   GLUC 191* 225* 200* 252*               Non-Critical Care Time Statement: I have spent a total time of 35 minutes in caring for this patient including Diagnostic results, Prognosis, Risks and benefits of tx options, Instructions for management, Patient and family education, Counseling / Coordination of care, Documenting in the medical record, and Reviewing / ordering tests, medicine, procedures  .     JENSEN Stephens

## 2024-01-26 NOTE — ASSESSMENT & PLAN NOTE
Per pt drinks 2 24oz twisted tea/smirinoff per day (family notes pt drinks more than this)  Drinks since brother  3 years ago. Has stopped for a week or two without symptoms. No DTs.  [facility] protocol  Thiamine/folate    Plan:   Continue CIWA   Precedex gtt initiated  - d/c  morning.  Increased hallucinations and impulsive behavior evening of . Precedex restarted

## 2024-01-26 NOTE — PLAN OF CARE
Problem: Potential for Falls  Goal: Patient will remain free of falls  Description: INTERVENTIONS:  - Educate patient/family on patient safety including physical limitations  - Instruct patient to call for assistance with activity   - Consult OT/PT to assist with strengthening/mobility   - Keep Call bell within reach  - Keep bed low and locked with side rails adjusted as appropriate  - Keep care items and personal belongings within reach  - Initiate and maintain comfort rounds  - Make Fall Risk Sign visible to staff  - Offer Toileting every 2 Hours, in advance of need  - Initiate/Maintain bed alarm  - Obtain necessary fall risk management equipment: yellow socks  - Apply yellow socks and bracelet for high fall risk patients  - Consider moving patient to room near nurses station  Outcome: Progressing     Problem: PAIN - ADULT  Goal: Verbalizes/displays adequate comfort level or baseline comfort level  Description: Interventions:  - Encourage patient to monitor pain and request assistance  - Assess pain using appropriate pain scale  - Administer analgesics based on type and severity of pain and evaluate response  - Implement non-pharmacological measures as appropriate and evaluate response  - Consider cultural and social influences on pain and pain management  - Notify physician/advanced practitioner if interventions unsuccessful or patient reports new pain  Outcome: Progressing     Problem: INFECTION - ADULT  Goal: Absence or prevention of progression during hospitalization  Description: INTERVENTIONS:  - Assess and monitor for signs and symptoms of infection  - Monitor lab/diagnostic results  - Monitor all insertion sites, i.e. indwelling lines, tubes  - Administer medications as ordered  - Instruct and encourage patient and family to use good hand hygiene technique  - Identify and instruct in appropriate isolation precautions for identified infection/condition  Outcome: Progressing     Problem: SAFETY  ADULT  Goal: Patient will remain free of falls  Description: INTERVENTIONS:  - Educate patient/family on patient safety including physical limitations  - Instruct patient to call for assistance with activity   - Consult OT/PT to assist with strengthening/mobility   - Keep Call bell within reach  - Keep bed low and locked with side rails adjusted as appropriate  - Keep care items and personal belongings within reach  - Initiate and maintain comfort rounds  - Make Fall Risk Sign visible to staff  - Offer Toileting every 2 Hours, in advance of need  - Initiate/Maintain bed alarm  - Obtain necessary fall risk management equipment: yellow socks  - Apply yellow socks and bracelet for high fall risk patients  - Consider moving patient to room near nurses station  Outcome: Progressing  Goal: Maintain or return to baseline ADL function  Description: INTERVENTIONS:  -  Assess patient's ability to carry out ADLs; assess patient's baseline for ADL function and identify physical deficits which impact ability to perform ADLs (bathing, care of mouth/teeth, toileting, grooming, dressing, etc.)  - Assess/evaluate cause of self-care deficits   - Assess range of motion  - Assess patient's mobility; develop plan if impaired  - Assess patient's need for assistive devices and provide as appropriate  - Encourage maximum independence but intervene and supervise when necessary  - Involve family in performance of ADLs  - Assess for home care needs following discharge   - Consider OT consult to assist with ADL evaluation and planning for discharge  - Provide patient education as appropriate  Outcome: Progressing  Goal: Maintains/Returns to pre admission functional level  Description: INTERVENTIONS:  - Perform AM-PAC 6 Click Basic Mobility/ Daily Activity assessment daily.  - Set and communicate daily mobility goal to care team and patient/family/caregiver.   - Collaborate with rehabilitation services on mobility goals if consulted  - Perform  Range of Motion 3 times a day.  - Reposition patient every 2 hours.  - Record patient progress and toleration of activity level   Outcome: Progressing     Problem: DISCHARGE PLANNING  Goal: Discharge to home or other facility with appropriate resources  Description: INTERVENTIONS:  - Identify barriers to discharge w/patient and caregiver  - Arrange for needed discharge resources and transportation as appropriate  - Identify discharge learning needs (meds, etc.)  - Refer to Case Management Department for coordinating discharge planning if the patient needs post-hospital services based on physician/advanced practitioner order or complex needs related to functional status, cognitive ability, or social support system  Outcome: Progressing     Problem: Knowledge Deficit  Goal: Patient/family/caregiver demonstrates understanding of disease process, treatment plan, medications, and discharge instructions  Description: Complete learning assessment and assess knowledge base.  Interventions:  - Provide teaching at level of understanding  - Provide teaching via preferred learning methods  Outcome: Progressing     Problem: NEUROSENSORY - ADULT  Goal: Achieves stable or improved neurological status  Description: INTERVENTIONS  - Monitor and report changes in neurological status  - Monitor vital signs such as temperature, blood pressure, glucose, and any other labs ordered       Outcome: Progressing     Problem: CARDIOVASCULAR - ADULT  Goal: Maintains optimal cardiac output and hemodynamic stability  Description: INTERVENTIONS:  - Monitor I/O, vital signs and rhythm  - Monitor for S/S and trends of decreased cardiac output  - Administer and titrate ordered vasoactive medications to optimize hemodynamic stability  - Assess quality of pulses, skin color and temperature  - Assess for signs of decreased coronary artery perfusion  - Instruct patient to report change in severity of symptoms  Outcome: Progressing  Goal: Absence of  cardiac dysrhythmias or at baseline rhythm  Description: INTERVENTIONS:  - Continuous cardiac monitoring, vital signs, obtain 12 lead EKG if ordered  - Administer antiarrhythmic and heart rate control medications as ordered  - Monitor electrolytes and administer replacement therapy as ordered  Outcome: Progressing     Problem: RESPIRATORY - ADULT  Goal: Achieves optimal ventilation and oxygenation  Description: INTERVENTIONS:  - Assess for changes in respiratory status  - Assess for changes in mentation and behavior  - Position to facilitate oxygenation and minimize respiratory effort  - Oxygen administered by appropriate delivery if ordered  - Initiate smoking cessation education as indicated  - Encourage broncho-pulmonary hygiene including cough, deep breathe, Incentive Spirometry  - Assess the need for suctioning and aspirate as needed  - Assess and instruct to report SOB or any respiratory difficulty  - Respiratory Therapy support as indicated  Outcome: Progressing     Problem: GASTROINTESTINAL - ADULT  Goal: Minimal or absence of nausea and/or vomiting  Description: INTERVENTIONS:  - Administer IV fluids if ordered to ensure adequate hydration  - Maintain NPO status until nausea and vomiting are resolved  - Nasogastric tube if ordered  - Administer ordered antiemetic medications as needed  - Provide nonpharmacologic comfort measures as appropriate  - Advance diet as tolerated, if ordered  - Consider nutrition services referral to assist patient with adequate nutrition and appropriate food choices  Outcome: Progressing  Goal: Maintains or returns to baseline bowel function  Description: INTERVENTIONS:  - Assess bowel function  - Encourage oral fluids to ensure adequate hydration  - Administer IV fluids if ordered to ensure adequate hydration  - Administer ordered medications as needed  - Encourage mobilization and activity  - Consider nutritional services referral to assist patient with adequate nutrition and  appropriate food choices  Outcome: Progressing  Goal: Maintains adequate nutritional intake  Description: INTERVENTIONS:  - Monitor percentage of each meal consumed  - Identify factors contributing to decreased intake, treat as appropriate  - Assist with meals as needed  - Monitor I&O, weight, and lab values if indicated  - Obtain nutrition services referral as needed  Outcome: Progressing  Goal: Oral mucous membranes remain intact  Description: INTERVENTIONS  - Assess oral mucosa and hygiene practices  - Implement preventative oral hygiene regimen  - Implement oral medicated treatments as ordered  - Initiate Nutrition services referral as needed  Outcome: Progressing     Problem: GENITOURINARY - ADULT  Goal: Maintains or returns to baseline urinary function  Description: INTERVENTIONS:  - Assess urinary function  - Encourage oral fluids to ensure adequate hydration if ordered  - Administer IV fluids as ordered to ensure adequate hydration  - Administer ordered medications as needed  - Offer frequent toileting  - Follow urinary retention protocol if ordered  Outcome: Progressing     Problem: METABOLIC, FLUID AND ELECTROLYTES - ADULT  Goal: Electrolytes maintained within normal limits  Description: INTERVENTIONS:  - Monitor labs and assess patient for signs and symptoms of electrolyte imbalances  - Administer electrolyte replacement as ordered  - Monitor response to electrolyte replacements, including repeat lab results as appropriate  - Instruct patient on fluid and nutrition as appropriate  Outcome: Progressing  Goal: Fluid balance maintained  Description: INTERVENTIONS:  - Monitor labs   - Monitor I/O and WT  - Instruct patient on fluid and nutrition as appropriate  - Assess for signs & symptoms of volume excess or deficit  Outcome: Progressing  Goal: Glucose maintained within target range  Description: INTERVENTIONS:  - Monitor Blood Glucose as ordered  - Assess for signs and symptoms of hyperglycemia and  hypoglycemia  - Administer ordered medications to maintain glucose within target range  - Assess nutritional intake and initiate nutrition service referral as needed  Outcome: Progressing     Problem: SKIN/TISSUE INTEGRITY - ADULT  Goal: Skin Integrity remains intact(Skin Breakdown Prevention)  Description: Assess:  -Perform Brad assessment  -Clean and moisturize skin  -Inspect skin when repositioning, toileting, and assisting with ADLS  -Assess under medical devices  -Assess extremities for adequate circulation and sensation     Bed Management:  -Have minimal linens on bed & keep smooth, unwrinkled  -Change linens as needed when moist or perspiring  -Avoid sitting or lying in one position for more than 2 hours while in bed  -Keep HOB at 30 degrees     Toileting:  -Offer bedside commode  -Assess for incontinence  -Use incontinent care products after each incontinent episode    Activity:  -Mobilize patient 3 times a day  -Encourage activity and walks on unit  -Encourage or provide ROM exercises   -Turn and reposition patient every 2 Hours  -Use appropriate equipment to lift or move patient in bed  -Instruct/ Assist with weight shifting every 2 when out of bed in chair  -Consider limitation of chair time 3 hour intervals    Skin Care:  -Avoid use of baby powder, tape, friction and shearing, hot water or constrictive clothing  -Relieve pressure over bony prominences  -Do not massage red bony areas    Next Steps:  -Teach patient strategies to minimize risks   -Consider consults to  interdisciplinary teams   Outcome: Progressing     Problem: HEMATOLOGIC - ADULT  Goal: Maintains hematologic stability  Description: INTERVENTIONS  - Assess for signs and symptoms of bleeding or hemorrhage  - Monitor labs  - Administer supportive blood products/factors as ordered and appropriate  Outcome: Progressing     Problem: MUSCULOSKELETAL - ADULT  Goal: Maintain or return mobility to safest level of function  Description:  INTERVENTIONS:  - Assess patient's ability to carry out ADLs; assess patient's baseline for ADL function and identify physical deficits which impact ability to perform ADLs (bathing, care of mouth/teeth, toileting, grooming, dressing, etc.)  - Assess/evaluate cause of self-care deficits   - Assess range of motion  - Assess patient's mobility  - Assess patient's need for assistive devices and provide as appropriate  - Encourage maximum independence but intervene and supervise when necessary  - Involve family in performance of ADLs  - Assess for home care needs following discharge   - Consider OT consult to assist with ADL evaluation and planning for discharge  - Provide patient education as appropriate  Outcome: Progressing     Problem: Nutrition/Hydration-ADULT  Goal: Nutrient/Hydration intake appropriate for improving, restoring or maintaining nutritional needs  Description: Monitor and assess patient's nutrition/hydration status for malnutrition. Collaborate with interdisciplinary team and initiate plan and interventions as ordered.  Monitor patient's weight and dietary intake as ordered or per policy. Utilize nutrition screening tool and intervene as necessary. Determine patient's food preferences and provide high-protein, high-caloric foods as appropriate.     INTERVENTIONS:  - Monitor oral intake, urinary output, labs, and treatment plans  - Assess nutrition and hydration status and recommend course of action  - Evaluate amount of meals eaten  - Assist patient with eating if necessary   - Allow adequate time for meals  - Recommend/ encourage appropriate diets, oral nutritional supplements, and vitamin/mineral supplements  - Order, calculate, and assess calorie counts as needed  - Recommend, monitor, and adjust tube feedings and TPN/PPN based on assessed needs  - Assess need for intravenous fluids  - Provide specific nutrition/hydration education as appropriate  - Include patient/family/caregiver in decisions  related to nutrition  Outcome: Progressing

## 2024-01-26 NOTE — PROGRESS NOTES
NEPHROLOGY PROGRESS NOTE   Michael Koch 33 y.o. male MRN: [id number]  Unit/Bed#: -01 Encounter: [id number]      ASSESSMENT & PLAN     Acute kidney injury likely multifactorial in the setting of hypotension likely cause ATN, contrast associated nephropathy, HRS in the setting of fulminant hepatic failure?  Bile cast nephropathy?  Prerenal azotemia in the setting of DKA?  Creatinine at baseline around 1.1  Urinalysis shows 1000 glucose large blood elevated leukocytes 1+ protein 20-30 RBCs and moderate bacteria from January 21  CT scan from January 21 shows a 9 mm calculi in the left ureter with proximal hydroureter and mild left hydronephrosis, 5 mm calculi in the left ureterovesicular junction with the left kidney that is atrophic  Urine sodium 44  Hyponatremia in the setting of fulminant hepatic failure and hyperglycemia  Hypokalemia in the setting of large volume diuresis  Acute alcohol liver disease severe hepatic steatosis  Acute hepatic encephalopathy  Severe hyperbilirubinemia and hypoalbuminemia  RSV     Today's recommendations    DUANE-this is improving with a creatinine down to 1.47.  This did peak in the 2 range patient is now making adequate urine.  1.5 L total.  Blood pressures are stable.  Still receiving albumin 25 g every 6 hours and midodrine 15 mg 3 times daily as well as octreotide 100 mg every 8 hours.  I would continue these medications for another 24 hours.  Could potentially de-escalate octreotide tomorrow if blood pressures are above a systolic of 140 can de-escalate midodrine  Hyponatremia-patient's sodium levels have improved  Hypokalemia-potassium levels have improved with diuresis  Acute alcohol liver disease-GI is following, off steroids remains on lactulose  Acute hepatic encephalopathy-remains on a one-to-one, encephalopathic does open his eyes to his name  Severe hyperbilirubinemia-total bilirubin seems to be plateauing     SUBJECTIVE:    The patient opens his eyes to his name  but remains critically ill now nonoliguric and creatinine is improving    12 point review of systems was otherwise negative besides what is mentioned above.    Medications:    Current Facility-Administered Medications:     albumin human (FLEXBUMIN) 25 % injection 25 g, 25 g, Intravenous, Q6H, JENSEN Bruno, Last Rate: 0 mL/hr at 01/25/24 1021, 25 g at 01/26/24 0919    dexmedeTOMIDine (Precedex) 400 mcg in sodium chloride 0.9% 100 mL, 0.1-0.7 mcg/kg/hr, Intravenous, Titrated, Chau Bernard Jr., PA-C, Stopped at 01/26/24 0615    folic acid 1 mg, thiamine (VITAMIN B1) 100 mg in sodium chloride 0.9 % 100 mL IV piggyback, , Intravenous, Daily, JENSEN Bruno, Stopped at 01/25/24 1123    gabapentin (NEURONTIN) capsule 300 mg, 300 mg, Oral, TID, JENSEN Stephens, 300 mg at 01/25/24 2014    heparin (porcine) subcutaneous injection 5,000 Units, 5,000 Units, Subcutaneous, Q8H MURALI, JENSEN Bruno, 5,000 Units at 01/26/24 0919    insulin glargine (LANTUS) subcutaneous injection 30 Units 0.3 mL, 30 Units, Subcutaneous, HS, Santosh Yeung [facility], DO, 30 Units at 01/25/24 2143    insulin lispro (HumaLOG) 100 units/mL subcutaneous injection 1-6 Units, 1-6 Units, Subcutaneous, HS, 1 Units at 01/25/24 2143 **AND** [CANCELED] Fingerstick Glucose (POCT), , , At bedtime, Chau Bernard Jr., PA-C    insulin lispro (HumaLOG) 100 units/mL subcutaneous injection 10 Units, 10 Units, Subcutaneous, TID With Meals, Santosh Yeung [facility], DO    insulin lispro (HumaLOG) 100 units/mL subcutaneous injection 2-12 Units, 2-12 Units, Subcutaneous, TID AC, 2 Units at 01/26/24 0850 **AND** Fingerstick Glucose (POCT), , , 4x Daily AC and at bedtime, Chau J Hoysock Jr., PA-C    lactulose (CHRONULAC) oral solution 30 g, 30 g, Oral, TID, JENSEN Stephens, 30 g at 01/25/24 2014    metoprolol tartrate (LOPRESSOR) tablet 50 mg, 50 mg, Oral, Q12H MURALI, JENSEN Bruno, 50 mg at 01/25/24 2014     midodrine (PROAMATINE) tablet 15 mg, 15 mg, Oral, TID, Jeff Bentley, DO, 15 mg at 01/25/24 2014    nicotine (NICODERM CQ) 14 mg/24hr TD 24 hr patch 14 mg, 14 mg, Transdermal, Daily, JENSEN Bruno, 14 mg at 01/26/24 0919    octreotide (SandoSTATIN) injection 100 mcg, 100 mcg, Intravenous, Q8H MURALI, JENSEN Bruno, 100 mcg at 01/26/24 0504    ondansetron (ZOFRAN) injection 4 mg, 4 mg, Intravenous, Q4H PRN, JENSEN Bruno, 4 mg at 01/26/24 0158    pantoprazole (PROTONIX) injection 40 mg, 40 mg, Intravenous, Q24H MURALI, JENSEN Bruno, 40 mg at 01/26/24 0919    potassium chloride 20 mEq IVPB (premix), 20 mEq, Intravenous, Once, JENSEN Stephens, Last Rate: 50 mL/hr at 01/26/24 0802, 20 mEq at 01/26/24 0802    potassium-sodium phosphateS (K-PHOS,PHOSPHA 250) -250 mg tablet 2 tablet, 2 tablet, Oral, 4x Daily (with meals and at bedtime), JENSEN Stephens, 2 tablet at 01/25/24 2116    OBJECTIVE:    Vitals:    01/26/24 0505 01/26/24 0600 01/26/24 0655 01/26/24 0936   BP: 132/87 115/81     BP Location: Right arm      Pulse: 79 79 81 89   Resp: (!) 29 (!) 35 (!) 42 (!) 34   Temp: 98 °F (36.7 °C)      TempSrc: Axillary      SpO2: 97% 96% 96% 94%   Weight: 97.5 kg (214 lb 15.2 oz)      Height:            Temp:  [98 °F (36.7 °C)-99.2 °F (37.3 °C)] 98 °F (36.7 °C)  HR:  [] 89  Resp:  [18-52] 34  BP: (110-147)/() 115/81  SpO2:  [86 %-98 %] 94 %     Body mass index is 33.67 kg/m².    Weight (last 2 days)       Date/Time Weight    01/26/24 0505 97.5 (214.95)    01/25/24 0434 96.3 (212.3)    01/24/24 0400 102 (223.99)            I/O last 3 completed shifts:  In: 3247.3 [P.O.:2040; I.V.:108.9; NG/GT:120; IV Piggyback:978.3]  Out: 5995 [Urine:3820; Stool:2175]    No intake/output data recorded.      Physical exam:    General: no acute distress, cooperative  Eyes: conjunctivae pink, anicteric sclerae  ENT: lips and mucous membranes moist, no exudates, normal  external ears  Neck: ROM intact, no JVD  Chest: No respiratory distress, no accessory muscle use  CVS: normal rate, non pericardial friction rub  Abdomen: Distended abdomen  Extremities: no edema of both legs  Skin: Jaundice  Neuro: Encephalopathic    Invasive Devices:   Urethral Catheter 16 Fr. (Active)   Amt returned on insertion(mL) 525 mL 01/24/24 0628   Reasons to continue Urinary Catheter  Acute urinary retention/obstruction failing urinary retention protocol 01/24/24 2047   Goal for Removal Voiding trial when ambulation improves 01/24/24 2047   Site Assessment Clean;Skin intact 01/26/24 0001   [place] Care Done 01/26/24 0001   Collection Container Standard drainage bag 01/26/24 0001   Securement Method Securing device (Describe) 01/26/24 0001   Securing Device Change Date 01/31/24 01/24/24 0801   Output (mL) 150 mL 01/26/24 0600     Lab Results:   Results from last 7 days   Lab Units 01/26/24  0427 01/25/24 2015 01/25/24  1252 01/25/24  0528 01/25/24  0002 01/24/24  1522 01/24/24  0446 01/23/24  0530 01/23/24  0453 01/22/24  0808 01/22/24  0243 01/21/24  1607 01/21/24  1240 01/21/24  1219 01/21/24  1118 01/21/24  1104   WBC Thousand/uL 7.54  --   --  6.55  --   --  5.92  --  6.34  --  5.72  --   --   --   --  10.12   HEMOGLOBIN g/dL 9.1*  --   --  9.5*  --   --  9.4*  --  10.8*  --  10.8*  --   --   --   --  14.6   HEMATOCRIT % 26.9*  --   --  27.2*  --   --  26.8*  --  29.4*  --  30.9*  --   --   --   --  43.3   PLATELETS Thousands/uL 110*  --   --  91*  --   --  56*  --  183  --  37*  --   --   --   --  72*   POTASSIUM mmol/L 3.7 3.7 3.7 3.1* 2.8* 3.5 3.6 3.6  --    < > 3.3*   < >  --    < >  --   --    CHLORIDE mmol/L 106 106 104 101 102 102 100 97  --    < > 101   < >  --    < >  --   --    CO2 mmol/L 23 21 23 24 24 20* 16* 23  --    < > 21   < >  --    < >  --   --    BUN mg/dL 21 21 21 22 21 19 15 9  --    < > 5   < >  --    < >  --   --    CREATININE mg/dL 1.47* 1.59* 1.78* 1.95* 1.90* 2.26* 2.17* 2.06*  " --    < > 1.12   < >  --    < >  --   --    CALCIUM mg/dL 8.7 9.2 9.1 9.2 9.1 9.0 8.5 8.3*  --    < > 8.1*   < >  --    < >  --   --    MAGNESIUM mg/dL 2.0  --   --  2.0  --  2.3  --  2.3  --   --  3.0*   < >  --    < >  --   --    PHOSPHORUS mg/dL 6.5* 2.7 <1.0* <1.0*  --  2.1*  --  2.5*  --   --  1.9*   < >  --    < >  --   --    ALK PHOS U/L 106*  --   --  98  --   --  105* 127*  --   --  115*  --   --    < >  --   --    ALT U/L 53*  --   --  58*  --   --  62* 83*  --   --  84*  --   --    < >  --   --    AST U/L 125*  --   --  139*  --   --  173* 254*  --   --  254*  --   --    < >  --   --    BLOOD CULTURE   --   --   --   --   --   --   --   --   --   --   --   --   --   --  No Growth After 4 Days. No Growth After 4 Days.   NITRITE UA   --   --   --   --   --   --   --   --   --   --   --   --  Negative  --   --   --    LEUKOCYTES UA   --   --   --   --   --   --   --   --   --   --   --   --  Elevated glucose may cause decreased leukocyte values. See urine microscopic for UWBC result*  --   --   --    BLOOD UA   --   --   --   --   --   --   --   --   --   --   --   --  Large*  --   --   --     < > = values in this interval not displayed.         Portions of the record may have been created with voice recognition software. Occasional wrong word or \"sound a like\" substitutions may have occurred due to the inherent limitations of voice recognition software. Read the chart carefully and recognize, using context, where substitutions have occurred.If you have any questions, please contact the dictating provider.    "

## 2024-01-26 NOTE — ASSESSMENT & PLAN NOTE
MELD: 28   AST> ALT   RUQ: hepatomegaly,hepatic steatosis  CTAP: fatty liver/varices/portal htn  Started predinose 1/23   LD, CK, Tylenol level are WNL  D Bili elevated at 9.8  Ammonia: 187 -> 85  GGT:797  AFP upper limit of NML    Plan:   Trend hepatic panel and INR  D/c steroids per GI 1/26  Continue lactulose   Serial abdominal exams  PRN zofran for n/v

## 2024-01-26 NOTE — ASSESSMENT & PLAN NOTE
In the setting of +RSV  After sedation with ativan and precedex, decreased mental status and escalation to midflow plus NRB  Precedex placed on hold and now awake, weaned to midflow only  Oxygen requirement likely related to mental status and oversedation  VBG respiratory acidosis  Hemoptysis    CTA PE study: No pulmonary embolus. Septal thickening and groundglass opacity due to pulmonary edema with trace effusions. Extensive lower lobe consolidation could also be due to edema but pneumonia/aspiration not excluded in the appropriate clinical setting. Redemonstration of enlarged lower right paraesophageal node, slightly increased from 5 days earlier. Hepatic steatosis.  O2 requirements improving with less sedation. Cont to monitor and wean able.

## 2024-01-26 NOTE — PLAN OF CARE
Problem: Potential for Falls  Goal: Patient will remain free of falls  Description: INTERVENTIONS:  - Educate patient/family on patient safety including physical limitations  - Instruct patient to call for assistance with activity   - Consult OT/PT to assist with strengthening/mobility   - Keep Call bell within reach  - Keep bed low and locked with side rails adjusted as appropriate  - Keep care items and personal belongings within reach  - Initiate and maintain comfort rounds  - Make Fall Risk Sign visible to staff  - Offer Toileting every 2 Hours, in advance of need  - Initiate/Maintain bed alarm  - Obtain necessary fall risk management equipment: yellow socks  - Apply yellow socks and bracelet for high fall risk patients  - Consider moving patient to room near nurses station  Outcome: Not Progressing     Problem: PAIN - ADULT  Goal: Verbalizes/displays adequate comfort level or baseline comfort level  Description: Interventions:  - Encourage patient to monitor pain and request assistance  - Assess pain using appropriate pain scale  - Administer analgesics based on type and severity of pain and evaluate response  - Implement non-pharmacological measures as appropriate and evaluate response  - Consider cultural and social influences on pain and pain management  - Notify physician/advanced practitioner if interventions unsuccessful or patient reports new pain  Outcome: Not Progressing     Problem: SAFETY ADULT  Goal: Patient will remain free of falls  Description: INTERVENTIONS:  - Educate patient/family on patient safety including physical limitations  - Instruct patient to call for assistance with activity   - Consult OT/PT to assist with strengthening/mobility   - Keep Call bell within reach  - Keep bed low and locked with side rails adjusted as appropriate  - Keep care items and personal belongings within reach  - Initiate and maintain comfort rounds  - Make Fall Risk Sign visible to staff  - Offer Toileting  every 2 Hours, in advance of need  - Initiate/Maintain bed alarm  - Obtain necessary fall risk management equipment: yellow socks  - Apply yellow socks and bracelet for high fall risk patients  - Consider moving patient to room near nurses station  Outcome: Not Progressing     Problem: NEUROSENSORY - ADULT  Goal: Achieves stable or improved neurological status  Description: INTERVENTIONS  - Monitor and report changes in neurological status  - Monitor vital signs such as temperature, blood pressure, glucose, and any other labs ordered       Outcome: Not Progressing     Problem: GENITOURINARY - ADULT  Goal: Maintains or returns to baseline urinary function  Description: INTERVENTIONS:  - Assess urinary function  - Encourage oral fluids to ensure adequate hydration if ordered  - Administer IV fluids as ordered to ensure adequate hydration  - Administer ordered medications as needed  - Offer frequent toileting  - Follow urinary retention protocol if ordered  Outcome: Progressing     Problem: METABOLIC, FLUID AND ELECTROLYTES - ADULT  Goal: Electrolytes maintained within normal limits  Description: INTERVENTIONS:  - Monitor labs and assess patient for signs and symptoms of electrolyte imbalances  - Administer electrolyte replacement as ordered  - Monitor response to electrolyte replacements, including repeat lab results as appropriate  - Instruct patient on fluid and nutrition as appropriate  Outcome: Progressing  Goal: Fluid balance maintained  Description: INTERVENTIONS:  - Monitor labs   - Monitor I/O and WT  - Instruct patient on fluid and nutrition as appropriate  - Assess for signs & symptoms of volume excess or deficit  Outcome: Progressing  Goal: Glucose maintained within target range  Description: INTERVENTIONS:  - Monitor Blood Glucose as ordered  - Assess for signs and symptoms of hyperglycemia and hypoglycemia  - Administer ordered medications to maintain glucose within target range  - Assess nutritional  intake and initiate nutrition service referral as needed  Outcome: Progressing     Problem: HEMATOLOGIC - ADULT  Goal: Maintains hematologic stability  Description: INTERVENTIONS  - Assess for signs and symptoms of bleeding or hemorrhage  - Monitor labs  - Administer supportive blood products/factors as ordered and appropriate  Outcome: Not Progressing     Problem: Nutrition/Hydration-ADULT  Goal: Nutrient/Hydration intake appropriate for improving, restoring or maintaining nutritional needs  Description: Monitor and assess patient's nutrition/hydration status for malnutrition. Collaborate with interdisciplinary team and initiate plan and interventions as ordered.  Monitor patient's weight and dietary intake as ordered or per policy. Utilize nutrition screening tool and intervene as necessary. Determine patient's food preferences and provide high-protein, high-caloric foods as appropriate.     INTERVENTIONS:  - Monitor oral intake, urinary output, labs, and treatment plans  - Assess nutrition and hydration status and recommend course of action  - Evaluate amount of meals eaten  - Assist patient with eating if necessary   - Allow adequate time for meals  - Recommend/ encourage appropriate diets, oral nutritional supplements, and vitamin/mineral supplements  - Order, calculate, and assess calorie counts as needed  - Recommend, monitor, and adjust tube feedings and TPN/PPN based on assessed needs  - Assess need for intravenous fluids  - Provide specific nutrition/hydration education as appropriate  - Include patient/family/caregiver in decisions related to nutrition  Outcome: Progressing

## 2024-01-27 LAB
ALBUMIN SERPL BCP-MCNC: 4 G/DL (ref 3.5–5)
ALP SERPL-CCNC: 70 U/L (ref 34–104)
ALT SERPL W P-5'-P-CCNC: 51 U/L (ref 7–52)
ANION GAP SERPL CALCULATED.3IONS-SCNC: 11 MMOL/L
ANISOCYTOSIS BLD QL SMEAR: PRESENT
AST SERPL W P-5'-P-CCNC: 123 U/L (ref 13–39)
BACTERIA UR QL AUTO: ABNORMAL /HPF
BASOPHILS # BLD MANUAL: 0 THOUSAND/UL (ref 0–0.1)
BASOPHILS NFR MAR MANUAL: 0 % (ref 0–1)
BILIRUB SERPL-MCNC: 28.52 MG/DL (ref 0.2–1)
BILIRUB UR QL STRIP: ABNORMAL
BUN SERPL-MCNC: 19 MG/DL (ref 5–25)
CA-I BLD-SCNC: 1.17 MMOL/L (ref 1.12–1.32)
CALCIUM SERPL-MCNC: 9 MG/DL (ref 8.4–10.2)
CHLORIDE SERPL-SCNC: 107 MMOL/L (ref 96–108)
CLARITY UR: ABNORMAL
CO2 SERPL-SCNC: 23 MMOL/L (ref 21–32)
COLOR UR: ABNORMAL
CREAT SERPL-MCNC: 1.27 MG/DL (ref 0.6–1.3)
DACRYOCYTES BLD QL SMEAR: PRESENT
EOSINOPHIL # BLD MANUAL: 0 THOUSAND/UL (ref 0–0.4)
EOSINOPHIL NFR BLD MANUAL: 0 % (ref 0–6)
ERYTHROCYTE [DISTWIDTH] IN BLOOD BY AUTOMATED COUNT: 21.8 % (ref 11.6–15.1)
GFR SERPL CREATININE-BSD FRML MDRD: 73 ML/MIN/1.73SQ M
GLUCOSE SERPL-MCNC: 106 MG/DL (ref 65–140)
GLUCOSE SERPL-MCNC: 129 MG/DL (ref 65–140)
GLUCOSE SERPL-MCNC: 142 MG/DL (ref 65–140)
GLUCOSE SERPL-MCNC: 148 MG/DL (ref 65–140)
GLUCOSE SERPL-MCNC: 168 MG/DL (ref 65–140)
GLUCOSE UR STRIP-MCNC: ABNORMAL MG/DL
HCT VFR BLD AUTO: 25.1 % (ref 36.5–49.3)
HCT VFR BLD AUTO: 26.2 % (ref 36.5–49.3)
HEMOCCULT STL QL: POSITIVE
HGB BLD-MCNC: 8.4 G/DL (ref 12–17)
HGB BLD-MCNC: 8.7 G/DL (ref 12–17)
HGB UR QL STRIP.AUTO: ABNORMAL
HYPERCHROMIA BLD QL SMEAR: PRESENT
INR PPP: 1.62 (ref 0.84–1.19)
KETONES UR STRIP-MCNC: NEGATIVE MG/DL
LACTATE SERPL-SCNC: 0.9 MMOL/L (ref 0.5–2)
LEUKOCYTE ESTERASE UR QL STRIP: NEGATIVE
LYMPHOCYTES # BLD AUTO: 1.09 THOUSAND/UL (ref 0.6–4.47)
LYMPHOCYTES # BLD AUTO: 13 % (ref 14–44)
MACROCYTES BLD QL AUTO: PRESENT
MAGNESIUM SERPL-MCNC: 2.1 MG/DL (ref 1.9–2.7)
MCH RBC QN AUTO: 34.7 PG (ref 26.8–34.3)
MCHC RBC AUTO-ENTMCNC: 33.5 G/DL (ref 31.4–37.4)
MCV RBC AUTO: 104 FL (ref 82–98)
METAMYELOCYTES NFR BLD MANUAL: 5 % (ref 0–1)
MONOCYTES # BLD AUTO: 0.59 THOUSAND/UL (ref 0–1.22)
MONOCYTES NFR BLD: 7 % (ref 4–12)
MYELOCYTES NFR BLD MANUAL: 1 % (ref 0–1)
NEUTROPHILS # BLD MANUAL: 6.21 THOUSAND/UL (ref 1.85–7.62)
NEUTS BAND NFR BLD MANUAL: 8 % (ref 0–8)
NEUTS SEG NFR BLD AUTO: 66 % (ref 43–75)
NITRITE UR QL STRIP: NEGATIVE
NON-SQ EPI CELLS URNS QL MICRO: ABNORMAL /HPF
PH UR STRIP.AUTO: 5.5 [PH]
PHOSPHATE SERPL-MCNC: 1.6 MG/DL (ref 2.7–4.5)
PLATELET # BLD AUTO: 130 THOUSANDS/UL (ref 149–390)
PLATELET BLD QL SMEAR: ABNORMAL
PMV BLD AUTO: 10.4 FL (ref 8.9–12.7)
POLYCHROMASIA BLD QL SMEAR: PRESENT
POTASSIUM SERPL-SCNC: 3.1 MMOL/L (ref 3.5–5.3)
PROT SERPL-MCNC: 5.8 G/DL (ref 6.4–8.4)
PROT UR STRIP-MCNC: ABNORMAL MG/DL
PROTHROMBIN TIME: 19.6 SECONDS (ref 11.6–14.5)
RBC # BLD AUTO: 2.42 MILLION/UL (ref 3.88–5.62)
RBC #/AREA URNS AUTO: ABNORMAL /HPF
RBC MORPH BLD: PRESENT
SODIUM SERPL-SCNC: 141 MMOL/L (ref 135–147)
SP GR UR STRIP.AUTO: 1.02 (ref 1–1.03)
STOMATOCYTES BLD QL SMEAR: PRESENT
TARGETS BLD QL SMEAR: PRESENT
UROBILINOGEN UR QL STRIP.AUTO: 0.2 E.U./DL
WBC # BLD AUTO: 8.39 THOUSAND/UL (ref 4.31–10.16)
WBC #/AREA URNS AUTO: ABNORMAL /HPF

## 2024-01-27 PROCEDURE — 85027 COMPLETE CBC AUTOMATED: CPT | Performed by: NURSE PRACTITIONER

## 2024-01-27 PROCEDURE — 99233 SBSQ HOSP IP/OBS HIGH 50: CPT | Performed by: ANESTHESIOLOGY

## 2024-01-27 PROCEDURE — 85610 PROTHROMBIN TIME: CPT | Performed by: NURSE PRACTITIONER

## 2024-01-27 PROCEDURE — 82330 ASSAY OF CALCIUM: CPT | Performed by: NURSE PRACTITIONER

## 2024-01-27 PROCEDURE — 99233 SBSQ HOSP IP/OBS HIGH 50: CPT | Performed by: INTERNAL MEDICINE

## 2024-01-27 PROCEDURE — 84100 ASSAY OF PHOSPHORUS: CPT | Performed by: NURSE PRACTITIONER

## 2024-01-27 PROCEDURE — 83605 ASSAY OF LACTIC ACID: CPT | Performed by: NURSE PRACTITIONER

## 2024-01-27 PROCEDURE — 85018 HEMOGLOBIN: CPT | Performed by: PHYSICIAN ASSISTANT

## 2024-01-27 PROCEDURE — 85007 BL SMEAR W/DIFF WBC COUNT: CPT | Performed by: NURSE PRACTITIONER

## 2024-01-27 PROCEDURE — 87040 BLOOD CULTURE FOR BACTERIA: CPT | Performed by: NURSE PRACTITIONER

## 2024-01-27 PROCEDURE — 83735 ASSAY OF MAGNESIUM: CPT | Performed by: NURSE PRACTITIONER

## 2024-01-27 PROCEDURE — C9113 INJ PANTOPRAZOLE SODIUM, VIA: HCPCS

## 2024-01-27 PROCEDURE — 85014 HEMATOCRIT: CPT | Performed by: PHYSICIAN ASSISTANT

## 2024-01-27 PROCEDURE — 80053 COMPREHEN METABOLIC PANEL: CPT | Performed by: NURSE PRACTITIONER

## 2024-01-27 PROCEDURE — 81001 URINALYSIS AUTO W/SCOPE: CPT | Performed by: NURSE PRACTITIONER

## 2024-01-27 PROCEDURE — 82948 REAGENT STRIP/BLOOD GLUCOSE: CPT

## 2024-01-27 RX ORDER — POTASSIUM CHLORIDE 14.9 MG/ML
20 INJECTION INTRAVENOUS
Status: COMPLETED | OUTPATIENT
Start: 2024-01-27 | End: 2024-01-27

## 2024-01-27 RX ORDER — TAMSULOSIN HYDROCHLORIDE 0.4 MG/1
0.4 CAPSULE ORAL
Status: DISCONTINUED | OUTPATIENT
Start: 2024-01-27 | End: 2024-01-31 | Stop reason: HOSPADM

## 2024-01-27 RX ORDER — INSULIN LISPRO 100 [IU]/ML
2-12 INJECTION, SOLUTION INTRAVENOUS; SUBCUTANEOUS
Status: DISCONTINUED | OUTPATIENT
Start: 2024-01-27 | End: 2024-01-27

## 2024-01-27 RX ORDER — MIRTAZAPINE 15 MG/1
15 TABLET, FILM COATED ORAL
Status: DISCONTINUED | OUTPATIENT
Start: 2024-01-27 | End: 2024-01-29

## 2024-01-27 RX ORDER — LORAZEPAM 1 MG/1
2 TABLET ORAL ONCE
Status: COMPLETED | OUTPATIENT
Start: 2024-01-27 | End: 2024-01-27

## 2024-01-27 RX ORDER — POTASSIUM CHLORIDE 20MEQ/15ML
40 LIQUID (ML) ORAL ONCE
Status: COMPLETED | OUTPATIENT
Start: 2024-01-27 | End: 2024-01-27

## 2024-01-27 RX ORDER — VANCOMYCIN HYDROCHLORIDE 125 MG/1
125 CAPSULE ORAL EVERY 12 HOURS SCHEDULED
Status: DISCONTINUED | OUTPATIENT
Start: 2024-01-27 | End: 2024-01-30

## 2024-01-27 RX ORDER — BENZONATATE 100 MG/1
100 CAPSULE ORAL 3 TIMES DAILY PRN
Status: DISCONTINUED | OUTPATIENT
Start: 2024-01-27 | End: 2024-01-31 | Stop reason: HOSPADM

## 2024-01-27 RX ORDER — VANCOMYCIN HYDROCHLORIDE 1 G/200ML
1000 INJECTION, SOLUTION INTRAVENOUS EVERY 12 HOURS
Status: DISCONTINUED | OUTPATIENT
Start: 2024-01-27 | End: 2024-01-30

## 2024-01-27 RX ORDER — MIDODRINE HYDROCHLORIDE 5 MG/1
5 TABLET ORAL 3 TIMES DAILY
Status: DISCONTINUED | OUTPATIENT
Start: 2024-01-27 | End: 2024-01-28

## 2024-01-27 RX ORDER — LACTULOSE 10 G/15ML
30 SOLUTION ORAL 2 TIMES DAILY
Status: DISCONTINUED | OUTPATIENT
Start: 2024-01-27 | End: 2024-01-28

## 2024-01-27 RX ORDER — HEPARIN SODIUM 5000 [USP'U]/ML
5000 INJECTION, SOLUTION INTRAVENOUS; SUBCUTANEOUS EVERY 8 HOURS SCHEDULED
Status: DISCONTINUED | OUTPATIENT
Start: 2024-01-27 | End: 2024-01-31 | Stop reason: HOSPADM

## 2024-01-27 RX ORDER — GABAPENTIN 400 MG/1
400 CAPSULE ORAL 3 TIMES DAILY
Status: DISCONTINUED | OUTPATIENT
Start: 2024-01-27 | End: 2024-01-31 | Stop reason: HOSPADM

## 2024-01-27 RX ORDER — LORAZEPAM 2 MG/ML
4 INJECTION INTRAMUSCULAR ONCE
Status: COMPLETED | OUTPATIENT
Start: 2024-01-27 | End: 2024-01-27

## 2024-01-27 RX ORDER — ALBUMIN (HUMAN) 12.5 G/50ML
25 SOLUTION INTRAVENOUS EVERY 12 HOURS
Status: COMPLETED | OUTPATIENT
Start: 2024-01-27 | End: 2024-01-28

## 2024-01-27 RX ORDER — CEFEPIME HYDROCHLORIDE 2 G/50ML
2000 INJECTION, SOLUTION INTRAVENOUS EVERY 12 HOURS
Status: DISCONTINUED | OUTPATIENT
Start: 2024-01-27 | End: 2024-01-31 | Stop reason: HOSPADM

## 2024-01-27 RX ORDER — INSULIN LISPRO 100 [IU]/ML
2-12 INJECTION, SOLUTION INTRAVENOUS; SUBCUTANEOUS
Status: DISCONTINUED | OUTPATIENT
Start: 2024-01-27 | End: 2024-01-29

## 2024-01-27 RX ADMIN — NICOTINE 14 MG: 14 PATCH, EXTENDED RELEASE TRANSDERMAL at 08:21

## 2024-01-27 RX ADMIN — INSULIN LISPRO 2 UNITS: 100 INJECTION, SOLUTION INTRAVENOUS; SUBCUTANEOUS at 12:01

## 2024-01-27 RX ADMIN — ALBUMIN (HUMAN) 25 G: 0.25 INJECTION, SOLUTION INTRAVENOUS at 16:31

## 2024-01-27 RX ADMIN — HEPARIN SODIUM 5000 UNITS: 5000 INJECTION INTRAVENOUS; SUBCUTANEOUS at 14:22

## 2024-01-27 RX ADMIN — LACTULOSE 30 G: 20 SOLUTION ORAL at 18:23

## 2024-01-27 RX ADMIN — POTASSIUM CHLORIDE 20 MEQ: 14.9 INJECTION, SOLUTION INTRAVENOUS at 14:22

## 2024-01-27 RX ADMIN — ALBUMIN (HUMAN) 25 G: 0.25 INJECTION, SOLUTION INTRAVENOUS at 04:14

## 2024-01-27 RX ADMIN — VANCOMYCIN HYDROCHLORIDE 1000 MG: 1 INJECTION, SOLUTION INTRAVENOUS at 18:28

## 2024-01-27 RX ADMIN — HEPARIN SODIUM 5000 UNITS: 5000 INJECTION INTRAVENOUS; SUBCUTANEOUS at 21:37

## 2024-01-27 RX ADMIN — INSULIN GLARGINE 30 UNITS: 100 INJECTION, SOLUTION SUBCUTANEOUS at 21:37

## 2024-01-27 RX ADMIN — POTASSIUM CHLORIDE 20 MEQ: 14.9 INJECTION, SOLUTION INTRAVENOUS at 12:05

## 2024-01-27 RX ADMIN — MIDODRINE HYDROCHLORIDE 15 MG: 5 TABLET ORAL at 08:10

## 2024-01-27 RX ADMIN — LORAZEPAM 2 MG: 1 TABLET ORAL at 20:45

## 2024-01-27 RX ADMIN — GABAPENTIN 400 MG: 400 CAPSULE ORAL at 16:30

## 2024-01-27 RX ADMIN — OCTREOTIDE ACETATE 100 MCG: 100 INJECTION, SOLUTION INTRAVENOUS; SUBCUTANEOUS at 05:15

## 2024-01-27 RX ADMIN — GABAPENTIN 400 MG: 400 CAPSULE ORAL at 20:56

## 2024-01-27 RX ADMIN — METOPROLOL TARTRATE 25 MG: 25 TABLET, FILM COATED ORAL at 20:57

## 2024-01-27 RX ADMIN — TAMSULOSIN HYDROCHLORIDE 0.4 MG: 0.4 CAPSULE ORAL at 20:59

## 2024-01-27 RX ADMIN — LORAZEPAM 4 MG: 2 INJECTION, SOLUTION INTRAMUSCULAR; INTRAVENOUS at 14:22

## 2024-01-27 RX ADMIN — LACTULOSE 30 G: 20 SOLUTION ORAL at 10:00

## 2024-01-27 RX ADMIN — SODIUM PHOSPHATE, MONOBASIC, MONOHYDRATE AND SODIUM PHOSPHATE, DIBASIC, ANHYDROUS 30 MMOL: 142; 276 INJECTION, SOLUTION INTRAVENOUS at 08:11

## 2024-01-27 RX ADMIN — PANTOPRAZOLE SODIUM 40 MG: 40 INJECTION, POWDER, FOR SOLUTION INTRAVENOUS at 08:11

## 2024-01-27 RX ADMIN — THIAMINE HYDROCHLORIDE: 100 INJECTION, SOLUTION INTRAMUSCULAR; INTRAVENOUS at 10:19

## 2024-01-27 RX ADMIN — BENZONATATE 100 MG: 100 CAPSULE ORAL at 12:01

## 2024-01-27 RX ADMIN — BENZONATATE 100 MG: 100 CAPSULE ORAL at 05:15

## 2024-01-27 RX ADMIN — GABAPENTIN 300 MG: 300 CAPSULE ORAL at 08:18

## 2024-01-27 RX ADMIN — METOPROLOL TARTRATE 50 MG: 50 TABLET, FILM COATED ORAL at 08:10

## 2024-01-27 RX ADMIN — CEFEPIME HYDROCHLORIDE 2000 MG: 2 INJECTION, SOLUTION INTRAVENOUS at 17:44

## 2024-01-27 RX ADMIN — VANCOMYCIN HYDROCHLORIDE 125 MG: 125 CAPSULE ORAL at 20:56

## 2024-01-27 RX ADMIN — POTASSIUM CHLORIDE 40 MEQ: 1.5 SOLUTION ORAL at 05:54

## 2024-01-27 NOTE — ASSESSMENT & PLAN NOTE
Lab Results   Component Value Date    HGBA1C 8.8 (H) 01/21/2024       Recent Labs     01/26/24  0716 01/26/24  1150 01/26/24  1616 01/26/24  2104   POCGLU 189* 156* 132 155*       Blood Sugar Average: Last 72 hrs:  (P) 190    Initially presented in DKA   Resolved DKA.   Continue Lantus and SSI   AccuChecks TID with meals

## 2024-01-27 NOTE — PLAN OF CARE
Problem: Potential for Falls  Goal: Patient will remain free of falls  Description: INTERVENTIONS:  - Educate patient/family on patient safety including physical limitations  - Instruct patient to call for assistance with activity   - Consult OT/PT to assist with strengthening/mobility   - Keep Call bell within reach  - Keep bed low and locked with side rails adjusted as appropriate  - Keep care items and personal belongings within reach  - Initiate and maintain comfort rounds  - Make Fall Risk Sign visible to staff  - Offer Toileting every 2 Hours, in advance of need  - Initiate/Maintain bed alarm  - Obtain necessary fall risk management equipment: yellow socks  - Apply yellow socks and bracelet for high fall risk patients  - Consider moving patient to room near nurses station  Outcome: Progressing     Problem: PAIN - ADULT  Goal: Verbalizes/displays adequate comfort level or baseline comfort level  Description: Interventions:  - Encourage patient to monitor pain and request assistance  - Assess pain using appropriate pain scale  - Administer analgesics based on type and severity of pain and evaluate response  - Implement non-pharmacological measures as appropriate and evaluate response  - Consider cultural and social influences on pain and pain management  - Notify physician/advanced practitioner if interventions unsuccessful or patient reports new pain  Outcome: Progressing     Problem: INFECTION - ADULT  Goal: Absence or prevention of progression during hospitalization  Description: INTERVENTIONS:  - Assess and monitor for signs and symptoms of infection  - Monitor lab/diagnostic results  - Monitor all insertion sites, i.e. indwelling lines, tubes  - Administer medications as ordered  - Instruct and encourage patient and family to use good hand hygiene technique  - Identify and instruct in appropriate isolation precautions for identified infection/condition  Outcome: Progressing     Problem: SAFETY  ADULT  Goal: Patient will remain free of falls  Description: INTERVENTIONS:  - Educate patient/family on patient safety including physical limitations  - Instruct patient to call for assistance with activity   - Consult OT/PT to assist with strengthening/mobility   - Keep Call bell within reach  - Keep bed low and locked with side rails adjusted as appropriate  - Keep care items and personal belongings within reach  - Initiate and maintain comfort rounds  - Make Fall Risk Sign visible to staff  - Offer Toileting every 2 Hours, in advance of need  - Initiate/Maintain bed alarm  - Obtain necessary fall risk management equipment: yellow socks  - Apply yellow socks and bracelet for high fall risk patients  - Consider moving patient to room near nurses station  Outcome: Progressing  Goal: Maintain or return to baseline ADL function  Description: INTERVENTIONS:  -  Assess patient's ability to carry out ADLs; assess patient's baseline for ADL function and identify physical deficits which impact ability to perform ADLs (bathing, care of mouth/teeth, toileting, grooming, dressing, etc.)  - Assess/evaluate cause of self-care deficits   - Assess range of motion  - Assess patient's mobility; develop plan if impaired  - Assess patient's need for assistive devices and provide as appropriate  - Encourage maximum independence but intervene and supervise when necessary  - Involve family in performance of ADLs  - Assess for home care needs following discharge   - Consider OT consult to assist with ADL evaluation and planning for discharge  - Provide patient education as appropriate  Outcome: Progressing  Goal: Maintains/Returns to pre admission functional level  Description: INTERVENTIONS:  - Perform AM-PAC 6 Click Basic Mobility/ Daily Activity assessment daily.  - Set and communicate daily mobility goal to care team and patient/family/caregiver.   - Collaborate with rehabilitation services on mobility goals if consulted  - Perform  Range of Motion 3 times a day.  - Reposition patient every 2 hours.  - Record patient progress and toleration of activity level   Outcome: Progressing     Problem: DISCHARGE PLANNING  Goal: Discharge to home or other facility with appropriate resources  Description: INTERVENTIONS:  - Identify barriers to discharge w/patient and caregiver  - Arrange for needed discharge resources and transportation as appropriate  - Identify discharge learning needs (meds, etc.)  - Refer to Case Management Department for coordinating discharge planning if the patient needs post-hospital services based on physician/advanced practitioner order or complex needs related to functional status, cognitive ability, or social support system  Outcome: Progressing     Problem: Knowledge Deficit  Goal: Patient/family/caregiver demonstrates understanding of disease process, treatment plan, medications, and discharge instructions  Description: Complete learning assessment and assess knowledge base.  Interventions:  - Provide teaching at level of understanding  - Provide teaching via preferred learning methods  Outcome: Progressing     Problem: NEUROSENSORY - ADULT  Goal: Achieves stable or improved neurological status  Description: INTERVENTIONS  - Monitor and report changes in neurological status  - Monitor vital signs such as temperature, blood pressure, glucose, and any other labs ordered       Outcome: Progressing     Problem: CARDIOVASCULAR - ADULT  Goal: Maintains optimal cardiac output and hemodynamic stability  Description: INTERVENTIONS:  - Monitor I/O, vital signs and rhythm  - Monitor for S/S and trends of decreased cardiac output  - Administer and titrate ordered vasoactive medications to optimize hemodynamic stability  - Assess quality of pulses, skin color and temperature  - Assess for signs of decreased coronary artery perfusion  - Instruct patient to report change in severity of symptoms  Outcome: Progressing  Goal: Absence of  cardiac dysrhythmias or at baseline rhythm  Description: INTERVENTIONS:  - Continuous cardiac monitoring, vital signs, obtain 12 lead EKG if ordered  - Administer antiarrhythmic and heart rate control medications as ordered  - Monitor electrolytes and administer replacement therapy as ordered  Outcome: Progressing     Problem: RESPIRATORY - ADULT  Goal: Achieves optimal ventilation and oxygenation  Description: INTERVENTIONS:  - Assess for changes in respiratory status  - Assess for changes in mentation and behavior  - Position to facilitate oxygenation and minimize respiratory effort  - Oxygen administered by appropriate delivery if ordered  - Initiate smoking cessation education as indicated  - Encourage broncho-pulmonary hygiene including cough, deep breathe, Incentive Spirometry  - Assess the need for suctioning and aspirate as needed  - Assess and instruct to report SOB or any respiratory difficulty  - Respiratory Therapy support as indicated  Outcome: Progressing     Problem: GASTROINTESTINAL - ADULT  Goal: Minimal or absence of nausea and/or vomiting  Description: INTERVENTIONS:  - Administer IV fluids if ordered to ensure adequate hydration  - Maintain NPO status until nausea and vomiting are resolved  - Nasogastric tube if ordered  - Administer ordered antiemetic medications as needed  - Provide nonpharmacologic comfort measures as appropriate  - Advance diet as tolerated, if ordered  - Consider nutrition services referral to assist patient with adequate nutrition and appropriate food choices  Outcome: Progressing  Goal: Maintains or returns to baseline bowel function  Description: INTERVENTIONS:  - Assess bowel function  - Encourage oral fluids to ensure adequate hydration  - Administer IV fluids if ordered to ensure adequate hydration  - Administer ordered medications as needed  - Encourage mobilization and activity  - Consider nutritional services referral to assist patient with adequate nutrition and  appropriate food choices  Outcome: Progressing  Goal: Maintains adequate nutritional intake  Description: INTERVENTIONS:  - Monitor percentage of each meal consumed  - Identify factors contributing to decreased intake, treat as appropriate  - Assist with meals as needed  - Monitor I&O, weight, and lab values if indicated  - Obtain nutrition services referral as needed  Outcome: Progressing  Goal: Oral mucous membranes remain intact  Description: INTERVENTIONS  - Assess oral mucosa and hygiene practices  - Implement preventative oral hygiene regimen  - Implement oral medicated treatments as ordered  - Initiate Nutrition services referral as needed  Outcome: Progressing     Problem: GENITOURINARY - ADULT  Goal: Maintains or returns to baseline urinary function  Description: INTERVENTIONS:  - Assess urinary function  - Encourage oral fluids to ensure adequate hydration if ordered  - Administer IV fluids as ordered to ensure adequate hydration  - Administer ordered medications as needed  - Offer frequent toileting  - Follow urinary retention protocol if ordered  Outcome: Progressing     Problem: METABOLIC, FLUID AND ELECTROLYTES - ADULT  Goal: Electrolytes maintained within normal limits  Description: INTERVENTIONS:  - Monitor labs and assess patient for signs and symptoms of electrolyte imbalances  - Administer electrolyte replacement as ordered  - Monitor response to electrolyte replacements, including repeat lab results as appropriate  - Instruct patient on fluid and nutrition as appropriate  Outcome: Progressing  Goal: Fluid balance maintained  Description: INTERVENTIONS:  - Monitor labs   - Monitor I/O and WT  - Instruct patient on fluid and nutrition as appropriate  - Assess for signs & symptoms of volume excess or deficit  Outcome: Progressing  Goal: Glucose maintained within target range  Description: INTERVENTIONS:  - Monitor Blood Glucose as ordered  - Assess for signs and symptoms of hyperglycemia and  hypoglycemia  - Administer ordered medications to maintain glucose within target range  - Assess nutritional intake and initiate nutrition service referral as needed  Outcome: Progressing     Problem: SKIN/TISSUE INTEGRITY - ADULT  Goal: Skin Integrity remains intact(Skin Breakdown Prevention)  Description: Assess:  -Perform Brad assessment  -Clean and moisturize skin  -Inspect skin when repositioning, toileting, and assisting with ADLS  -Assess under medical devices  -Assess extremities for adequate circulation and sensation     Bed Management:  -Have minimal linens on bed & keep smooth, unwrinkled  -Change linens as needed when moist or perspiring  -Avoid sitting or lying in one position for more than 2 hours while in bed  -Keep HOB at 30 degrees     Toileting:  -Offer bedside commode  -Assess for incontinence  -Use incontinent care products after each incontinent episode    Activity:  -Mobilize patient 3 times a day  -Encourage activity and walks on unit  -Encourage or provide ROM exercises   -Turn and reposition patient every 2 Hours  -Use appropriate equipment to lift or move patient in bed  -Instruct/ Assist with weight shifting every 4 hours  when out of bed in chair  -Consider limitation of chair time 6 hour intervals    Skin Care:  -Avoid use of baby powder, tape, friction and shearing, hot water or constrictive clothing  -Relieve pressure over bony prominences  -Do not massage red bony areas    Next Steps:  -Teach patient strategies to minimize risks   -Consider consults to  interdisciplinary teams  Outcome: Progressing     Problem: HEMATOLOGIC - ADULT  Goal: Maintains hematologic stability  Description: INTERVENTIONS  - Assess for signs and symptoms of bleeding or hemorrhage  - Monitor labs  - Administer supportive blood products/factors as ordered and appropriate  Outcome: Progressing     Problem: MUSCULOSKELETAL - ADULT  Goal: Maintain or return mobility to safest level of function  Description:  INTERVENTIONS:  - Assess patient's ability to carry out ADLs; assess patient's baseline for ADL function and identify physical deficits which impact ability to perform ADLs (bathing, care of mouth/teeth, toileting, grooming, dressing, etc.)  - Assess/evaluate cause of self-care deficits   - Assess range of motion  - Assess patient's mobility  - Assess patient's need for assistive devices and provide as appropriate  - Encourage maximum independence but intervene and supervise when necessary  - Involve family in performance of ADLs  - Assess for home care needs following discharge   - Consider OT consult to assist with ADL evaluation and planning for discharge  - Provide patient education as appropriate  Outcome: Progressing     Problem: Nutrition/Hydration-ADULT  Goal: Nutrient/Hydration intake appropriate for improving, restoring or maintaining nutritional needs  Description: Monitor and assess patient's nutrition/hydration status for malnutrition. Collaborate with interdisciplinary team and initiate plan and interventions as ordered.  Monitor patient's weight and dietary intake as ordered or per policy. Utilize nutrition screening tool and intervene as necessary. Determine patient's food preferences and provide high-protein, high-caloric foods as appropriate.     INTERVENTIONS:  - Monitor oral intake, urinary output, labs, and treatment plans  - Assess nutrition and hydration status and recommend course of action  - Evaluate amount of meals eaten  - Assist patient with eating if necessary   - Allow adequate time for meals  - Recommend/ encourage appropriate diets, oral nutritional supplements, and vitamin/mineral supplements  - Order, calculate, and assess calorie counts as needed  - Recommend, monitor, and adjust tube feedings and TPN/PPN based on assessed needs  - Assess need for intravenous fluids  - Provide specific nutrition/hydration education as appropriate  - Include patient/family/caregiver in decisions  related to nutrition  Outcome: Progressing     Problem: Prexisting or High Potential for Compromised Skin Integrity  Goal: Skin integrity is maintained or improved  Description: INTERVENTIONS:  - Identify patients at risk for skin breakdown  - Assess and monitor skin integrity  - Assess and monitor nutrition and hydration status  - Monitor labs   - Assess for incontinence   - Turn and reposition patient  - Assist with mobility/ambulation  - Relieve pressure over bony prominences  - Avoid friction and shearing  - Provide appropriate hygiene as needed including keeping skin clean and dry  - Evaluate need for skin moisturizer/barrier cream  - Collaborate with interdisciplinary team   - Patient/family teaching  - Consider wound care consult   Outcome: Progressing

## 2024-01-27 NOTE — ASSESSMENT & PLAN NOTE
Nursing reported bright red blood per rectum on 1/26.  Occult blood stool positive.    Plan  Hgb stable x 24 hours, monitor daily  Continue Protonix daily  Transfuse for Hgb less than 7 or s/s of bleeding

## 2024-01-27 NOTE — ASSESSMENT & PLAN NOTE
Concern for alcoholic hepatitis  Confused and hallucinating  Mixed with ETOH withdrawal concerns      Plan:  Reorient as able   Precedex gtt initiated 1/24 - d/c 1/25 morning.  Increased hallucinations and impulsive behavior evening of 1/25. Precedex restarted. D/c 1/26.  Continue benzos for CIWA   Continue lactulose - reduced to 30g TID  Delirium precautions

## 2024-01-27 NOTE — ASSESSMENT & PLAN NOTE
MELD: 28   AST> ALT   RUQ: hepatomegaly,hepatic steatosis  CTAP: fatty liver/varices/portal htn  Started predinose 1/23   LD, CK, Tylenol level are WNL  D Bili elevated at 9.8  Ammonia: 187 -> 85  GGT:797  AFP upper limit of NML    Plan:   Trend hepatic panel and INR  D/c steroids per GI 1/26  Lactulose decreased 1/27 titrate for 1-3 BM/Day  Serial abdominal exams  PRN zofran for n/v

## 2024-01-27 NOTE — ASSESSMENT & PLAN NOTE
Per pt drinks 2 24oz twisted tea/smirinoff per day (family notes pt drinks more than this)  Drinks since brother  3 years ago. Has stopped for a week or two without symptoms. No DTs.  [facility] protocol  Thiamine/folate    Plan:   Continue CIWA   Precedex gtt initiated  - d/c  morning.  Increased hallucinations and impulsive behavior evening of . Precedex restarted. D/c .

## 2024-01-27 NOTE — ASSESSMENT & PLAN NOTE
Lab Results   Component Value Date    HGBA1C 8.8 (H) 01/21/2024       Recent Labs     01/27/24  0550 01/27/24  1130 01/27/24  1618 01/27/24  2134   POCGLU 129 168* 106 148*       Blood Sugar Average: Last 72 hrs:  (P) [id number]    Initially presented in DKA   Resolved DKA.    Continue Lantus and SSI   AccuChecks TID with meals

## 2024-01-27 NOTE — ASSESSMENT & PLAN NOTE
Improved 1/27  In the setting of +RSV  After sedation with ativan and precedex, decreased mental status and escalation to midflow plus NRB  Precedex placed on hold and now awake, weaned to midflow only  Oxygen requirement likely related to mental status and oversedation  Hemoptysis    CTA PE study: No pulmonary embolus. Septal thickening and groundglass opacity due to pulmonary edema with trace effusions. Extensive lower lobe consolidation could also be due to edema but pneumonia/aspiration not excluded in the appropriate clinical setting. Redemonstration of enlarged lower right paraesophageal node, slightly increased from 5 days earlier. Hepatic steatosis.  O2 requirements improving with less sedation. Cont to monitor and wean able.

## 2024-01-27 NOTE — PROGRESS NOTES
Progress Note - Nephrology   Michael ARACELI Koch 33 y.o. male MRN: [id number]  Unit/Bed#: -01 Encounter: [id number]    A/P:  1.  Acute kidney injury   Continues to improve, with creatinine now down to 1.27 mg/dL.  Will begin to de-escalate medical intervention.  Plan today will be to discontinue octreotide, reduced midodrine to 5 mg 3 times daily, and reduce albumin to every 12 hour dosing.  He did optimize care and avoid potential nephrotoxins.  2.  Proteinuria   Potentially due to diabetic nephropathy, should have a serum electrophoresis in the outpatient setting to rule out a monoclonal gammopathy.  If the patient has type 2 diabetes, will benefit from an SGLT-2 inhibitor, will defer at this time and ensure that he is able to be stable in the outpatient setting prior to initiating this medication.  Patient is currently on several 10 mg daily in the outpatient setting.  3.  Volume depletion   Significantly improved, patient with slight excess volume at this time which is okay from the clinical standpoint.  Patient does not have symptoms of shortness of breath or other specific complaint.  Would not actively diurese at this time unless otherwise indicated from clinical standpoint.  4.  Hypophosphatemia   Agree with sodium phosphate 30 mmol infusion.  Follow phosphorus levels in the morning.  5.  Hyponatremia   Serum sodium level appropriate at 141 mmol/liter this morning.  Continue to monitor for now.  Volume status is currently appropriate.  6.  Hypokalemia   Patient given a one-time dose of 40 mill equivalents of oral potassium chloride this morning.  Will provide additional potassium later today.  Continue to closely monitor potassium as well as magnesium levels.  7.  Diabetes mellitus type 2   Continue glucose management according to our critical care colleagues.  As noted above, patient may have diabetic nephropathy, and could benefit from the use of SGLT-2 inhibitor in the outpatient setting once he is in  "his usual state of health.  8.  Acute alcoholic hepatitis   Patient remains severely jaundiced with elevated bilirubin, continue with conservative care management at this time.  9.  RSV positive    Case discussed with critical care colleagues, we are in agreement with the escalation of albumin, octreotide, and midodrine as noted above.  Continue with aggressive electrolyte supplementation.    Follow up reason for today's visit: Electrolyte management/acute kidney injury    Alcoholic hepatitis    Patient Active Problem List   Diagnosis    History of gout    History of hypertension    Type 2 diabetes mellitus with hyperglycemia, without long-term current use of insulin (HCC)    Tobacco abuse    ETOH abuse    Depression    GERD (gastroesophageal reflux disease)    BPH (benign prostatic hyperplasia)    RSV infection    Abnormal CT scan    Hepatic encephalopathy (HCC)    N&V (nausea and vomiting)    DUANE (acute kidney injury) (HCC)    Thrombocytopenia (HCC)    Lactic acidosis    Marijuana use    SIRS (systemic inflammatory response syndrome) (HCC)    Alcoholic hepatitis    Acute respiratory failure with hypoxia and hypercapnia (HCC)    GI bleed         Subjective:   Felt a little nauseated this morning, had no other acute complaints    Objective:     Vitals: Blood pressure 135/91, pulse 104, temperature 99.4 °F (37.4 °C), temperature source Oral, resp. rate (!) 33, height 5' 7\" (1.702 m), weight 94.3 kg (207 lb 14.3 oz), SpO2 93%.,Body mass index is 32.56 kg/m².    Weight (last 2 days)       Date/Time Weight    01/27/24 0548 94.3 (207.89)    01/26/24 0505 97.5 (214.95)    01/25/24 0434 96.3 (212.3)              Intake/Output Summary (Last 24 hours) at 1/27/2024 0858  Last data filed at 1/27/2024 0400  Gross per 24 hour   Intake 781 ml   Output 1275 ml   Net -494 ml     I/O last 3 completed shifts:  In: 1315.1 [P.O.:801; I.V.:85.4; IV Piggyback:428.7]  Out: 2500 [Urine:2250; Stool:250]    Urethral Catheter 16 Fr. (Active) " "  Amt returned on insertion(mL) 525 mL 01/24/24 0628   Reasons to continue Urinary Catheter  Accurate I&O assessment in critically ill patients (48 hr. max) 01/26/24 2000   Goal for Removal Voiding trial when ambulation improves 01/26/24 2000   Site Assessment Clean;Skin intact;Patent 01/27/24 0446   [facility] Done 01/26/24 2100   Collection Container Standard drainage bag 01/26/24 2000   Securement Method Securing device (Describe) 01/26/24 2000   Securing Device Change Date 01/31/24 01/26/24 0800   Output (mL) 90 mL 01/27/24 0400       Physical Exam: /91 (BP Location: Right arm)   Pulse 104   Temp 99.4 °F (37.4 °C) (Oral)   Resp (!) 33   Ht 5' 7\" (1.702 m)   Wt 94.3 kg (207 lb 14.3 oz)   SpO2 93%   BMI 32.56 kg/m²     General Appearance:    Alert, cooperative, no distress, appears stated age   Head:    Normocephalic, without obvious abnormality, atraumatic   Eyes:    Conjunctiva/corneas clear   Ears:    Normal external ears   Nose:   Nares normal, septum midline, mucosa normal, no drainage    or sinus tenderness   Throat:   Lips, mucosa, and tongue normal; teeth and gums normal   Neck:   Supple   Back:     Symmetric, no curvature, ROM normal, no CVA tenderness   Lungs:     reduced bilaterally   Chest wall:    No tenderness or deformity   Heart:    Regular rate and rhythm, S1 and S2 normal, no murmur, rub   or gallop   Abdomen:     Soft, non-tender, bowel sounds active   Extremities:   Extremities normal, atraumatic, no cyanosis, +1 bilateral lower extremity edema   Skin:   Skin texture, turgor normal, no rashes or lesions, positive for jaundice    Lymph nodes:   Cervical normal   Neurologic:   CNII-XII intact            Lab, Imaging and other studies: I have personally reviewed pertinent labs.  CBC:   Lab Results   Component Value Date    WBC 8.39 01/27/2024    HGB 8.4 (L) 01/27/2024    HCT 25.1 (L) 01/27/2024     (H) 01/27/2024     (L) 01/27/2024    RBC 2.42 (L) 01/27/2024    MCH 34.7 " "(H) 01/27/2024    MCHC 33.5 01/27/2024    RDW 21.8 (H) 01/27/2024    MPV 10.4 01/27/2024     CMP:   Lab Results   Component Value Date    K 3.1 (L) 01/27/2024     01/27/2024    CO2 23 01/27/2024    BUN 19 01/27/2024    CREATININE 1.27 01/27/2024    CALCIUM 9.0 01/27/2024     (H) 01/27/2024    ALT 51 01/27/2024    ALKPHOS 70 01/27/2024    EGFR 73 01/27/2024       .  Results from last 7 days   Lab Units 01/27/24  0413 01/26/24  0427 01/25/24 2015 01/25/24  1252 01/25/24  0528   POTASSIUM mmol/L 3.1* 3.7 3.7   < > 3.1*   CHLORIDE mmol/L 107 106 106   < > 101   CO2 mmol/L 23 23 21   < > 24   BUN mg/dL 19 21 21   < > 22   CREATININE mg/dL 1.27 1.47* 1.59*   < > 1.95*   CALCIUM mg/dL 9.0 8.7 9.2   < > 9.2   ALK PHOS U/L 70 106*  --   --  98   ALT U/L 51 53*  --   --  58*   AST U/L 123* 125*  --   --  139*    < > = values in this interval not displayed.         Phosphorus:   Lab Results   Component Value Date    PHOS 1.6 (L) 01/27/2024     Magnesium:   Lab Results   Component Value Date    MG 2.1 01/27/2024     Urinalysis: No results found for: \"COLORU\", \"CLARITYU\", \"SPECGRAV\", \"PHUR\", \"LEUKOCYTESUR\", \"NITRITE\", \"PROTEINUA\", \"GLUCOSEU\", \"KETONESU\", \"BILIRUBINUR\", \"BLOODU\"  Ionized Calcium: No results found for: \"CAION\"  Coagulation:   Lab Results   Component Value Date    INR 1.62 (H) 01/27/2024     Troponin: No results found for: \"TROPONINI\"  ABG: No results found for: \"PHART\", \"OCL8TUD\", \"PO2ART\", \"XSO8WCV\", \"X5GYUXAF\", \"BEART\", \"SOURCE\"  Radiology review:     IMAGING  Procedure: CTA chest pe study    Result Date: 1/26/2024  Narrative: CTA - CHEST WITH IV CONTRAST - PULMONARY ANGIOGRAM INDICATION:   hemoptysis, acute respiratory failure with hypoxia. Per my review of the medical record, RSV positive. History of smoking. Alcoholic hepatitis. Coughing up large clots. COMPARISON: CXR 1/26/2024 and 1/25/2024, chest CT and abdomen CT 1/21/2024, abdomen CT 10/30/2023. TECHNIQUE: CT angiogram timed for optimal " opacification of the pulmonary arteries.  Axial, sagittal, and coronal 2D reformats created from source data.  Coronal 3D MIP postprocessing on the acquisition scanner. Radiation dose length product (DLP):  437 mGy-cm .  Radiation dose exposure minimized using iterative reconstruction and automated exposure control. IV Contrast:  85 mL of iohexol (OMNIPAQUE) FINDINGS: PULMONARY ARTERIES:  No pulmonary embolus. LUNGS: Mild septal thickening with severe dense consolidation in both lower lobes and patchy consolidation and groundglass opacity in the remainder of the lungs. AIRWAYS: No significant filling defects. PLEURA: Trace effusions. HEART/GREAT VESSELS:  Normal for age. MEDIASTINUM AND JADE: Redemonstration of enlarged lower right paraesophageal node with increase in short axis from 1.1 cm to 1.6 cm (6/108). CHEST WALL AND LOWER NECK: Unremarkable. UPPER ABDOMEN: Hepatic steatosis. OSSEOUS STRUCTURES:  Normal for age. Benign bone island left humeral head.     Impression: No pulmonary embolus. Septal thickening and groundglass opacity due to pulmonary edema with trace effusions. Extensive lower lobe consolidation could also be due to edema but pneumonia/aspiration not excluded in the appropriate clinical setting. Redemonstration of enlarged lower right paraesophageal node, slightly increased from 5 days earlier. Hepatic steatosis. Workstation performed: [id number]     Procedure: XR chest portable ICU    Result Date: 1/26/2024  Narrative: CHEST INDICATION:  Hypoxia. COMPARISON: Chest radiograph January 25, 2024 and CT chest January 21, 2024 EXAM PERFORMED/VIEWS:  XR CHEST PORTABLE ICU  AP semierect FINDINGS:  Monitoring leads and clips project over the chest. Cardiomediastinal silhouette appears unremarkable. Low lung volumes.  Within limitations of this examination there is no focal airspace opacity to suggest pneumonia. Mild pulmonary vascular prominence may be accentuated by low lung volumes versus mild vascular  congestion. No pneumothorax or pleural effusion. Osseous structures appear within normal limits for patient age.     Impression: Low lung volumes. Mild pulmonary vascular prominence may be accentuated by low lung volumes versus mild vascular congestion. Correlate with clinical findings. Resident: GAGE STOCK I, the attending radiologist, have reviewed the images and agree with the final report above. Workstation performed: [id number]     Procedure: XR chest portable ICU    Result Date: 1/25/2024  Narrative: CHEST INDICATION:   hypoxia. COMPARISON: 1/23/2024 EXAM PERFORMED/VIEWS:  XR CHEST PORTABLE ICU FINDINGS: Interval removal of the enteric tube. Cardiomediastinal silhouette appears unremarkable. Small lung volumes, which causes crowding of bronchovascular markings. Within that limitation, no focal lung opacity. No pneumothorax or pleural effusion. Osseous structures appear within normal limits for patient age.     Impression: Small lung volumes without acute cardiopulmonary disease. Workstation performed: [id number]       Current Facility-Administered Medications   Medication Dose Route Frequency    albumin human (FLEXBUMIN) 25 % injection 25 g  25 g Intravenous Q6H    benzonatate (TESSALON PERLES) capsule 100 mg  100 mg Oral TID PRN    folic acid 1 mg, thiamine (VITAMIN B1) 100 mg in sodium chloride 0.9 % 100 mL IV piggyback   Intravenous Daily    gabapentin (NEURONTIN) capsule 300 mg  300 mg Oral TID    insulin glargine (LANTUS) subcutaneous injection 30 Units 0.3 mL  30 Units Subcutaneous HS    insulin lispro (HumaLOG) 100 units/mL subcutaneous injection 2-12 Units  2-12 Units Subcutaneous Q6H    lactulose (CHRONULAC) oral solution 30 g  30 g Oral BID    metoprolol tartrate (LOPRESSOR) tablet 50 mg  50 mg Oral Q12H MRUALI    midodrine (PROAMATINE) tablet 15 mg  15 mg Oral TID    nicotine (NICODERM CQ) 14 mg/24hr TD 24 hr patch 14 mg  14 mg Transdermal Daily    octreotide (SandoSTATIN) injection 100 mcg  100  mcg Intravenous Q8H MURALI    ondansetron (ZOFRAN) injection 4 mg  4 mg Intravenous Q4H PRN    pantoprazole (PROTONIX) injection 40 mg  40 mg Intravenous Q24H MURALI    QUEtiapine (SEROquel) tablet 25 mg  25 mg Oral HS    sodium phosphate 30 mmol in dextrose 5 % 250 mL Infusion  30 mmol Intravenous Once     Medications Discontinued During This Encounter   Medication Reason    insulin regular (HumuLIN R,NovoLIN R) 1 Units/mL in sodium chloride 0.9 % 100 mL infusion     insulin regular (HumuLIN R,NovoLIN R) 1 Units/mL in sodium chloride 0.9 % 100 mL infusion     thiamine (VITAMIN B1) 100 mg in sodium chloride 0.9 % 50 mL IVPB     folic acid 1 mg in sodium chloride 0.9 % 50 mL IVPB     multi-electrolyte (ISOLYTE-S PH 7.4 equivalent) IV solution     multi-electrolyte (ISOLYTE-S PH 7.4 equivalent) IV solution     insulin regular (HumuLIN R,NovoLIN R) 1 Units/mL in sodium chloride 0.9 % 100 mL infusion     dextrose 5 % in lactated Ringer's infusion     multi-electrolyte (PLASMALYTE-A/ISOLYTE-S PH 7.4) IV solution     hydrOXYzine HCL (ATARAX) tablet 25 mg     HYDROmorphone (DILAUDID) injection 0.5 mg     pantoprazole (PROTONIX) injection 40 mg     colchicine (COLCRYS) tablet 0.6 mg     insulin lispro (HumaLOG) 100 units/mL subcutaneous injection 1-6 Units     celecoxib (CeleBREX) capsule 100 mg     gabapentin (NEURONTIN) capsule 300 mg     traMADol (ULTRAM) tablet 50 mg     colchicine (COLCRYS) tablet 0.6 mg     acetaminophen (TYLENOL) tablet 650 mg     allopurinol (ZYLOPRIM) tablet 100 mg     lactulose (CHRONULAC) oral solution 20 g     FLUoxetine (PROzac) capsule 20 mg     insulin lispro (HumaLOG) 100 units/mL subcutaneous injection 8 Units     chlorhexidine (PERIDEX) 0.12 % oral rinse 15 mL     oxazepam (SERAX) capsule 10 mg     sodium chloride 0.9 % infusion     hydrOXYzine HCL (ATARAX) tablet 25 mg     FLUoxetine (PROzac) capsule 10 mg     metoprolol tartrate (LOPRESSOR) tablet 25 mg     pantoprazole (PROTONIX) EC tablet 40  mg     insulin lispro (HumaLOG) 100 units/mL subcutaneous injection 1-6 Units     sodium chloride (PF) 0.9 % injection 3 mL     midodrine (PROAMATINE) tablet 5 mg     predniSONE tablet 40 mg     insulin glargine (LANTUS) subcutaneous injection 24 Units 0.24 mL     lactulose (CHRONULAC) oral solution 20 g     dexmedeTOMIDine (Precedex) 400 mcg in sodium chloride 0.9% 100 mL     insulin lispro (HumaLOG) 100 units/mL subcutaneous injection 2-12 Units     lactulose (CHRONULAC) oral solution 20 g     potassium-sodium phosphateS (K-PHOS,PHOSPHA 250) -250 mg tablet 2 tablet     insulin glargine (LANTUS) subcutaneous injection 28 Units 0.28 mL     insulin lispro (HumaLOG) 100 units/mL subcutaneous injection 8 Units     potassium chloride oral solution 40 mEq     methylPREDNISolone sodium succinate (Solu-MEDROL) injection 40 mg     potassium-sodium phosphateS (K-PHOS,PHOSPHA 250) -250 mg tablet 2 tablet     heparin (porcine) subcutaneous injection 5,000 Units     insulin lispro (HumaLOG) 100 units/mL subcutaneous injection 1-6 Units     insulin lispro (HumaLOG) 100 units/mL subcutaneous injection 10 Units     insulin lispro (HumaLOG) 100 units/mL subcutaneous injection 2-12 Units     dexmedeTOMIDine (Precedex) 400 mcg in sodium chloride 0.9% 100 mL     lactulose (CHRONULAC) oral solution 30 g        Matt Simms,       This progress note was produced in part using a dictation device which may document imprecise wording from author's original intent.

## 2024-01-27 NOTE — ASSESSMENT & PLAN NOTE
Initially Prerenal in setting of HHNK/ACEI  Now with concerns for biliary nephrosis in setting of elevated t/ bili   Baseline 0.9-1  CT nephrolithiasis mild left hydro  UA +blood/glucose/protein  CK WNL  Renal U/S: Right intrarenal calculi;  Hepatomegaly with hepatic steatosis    Plan:  1/27 Octreotide discontinued, Midodrine decreased to 5mg TID, Albumin Q12 hours  Trend BMP  Monitor I/O  Avoid nephrotoxic agents   Nephrology following.

## 2024-01-27 NOTE — PROGRESS NOTES
[facility]  Progress Note  Name: Michael Koch I  MRN: [id number]  Unit/Bed#: -01 I Date of Admission: 2024   Date of Service: 2024 I Hospital Day: 6    Assessment/Plan   * Alcoholic hepatitis  Assessment & Plan  MELD: 28   AST> ALT   RUQ: hepatomegaly,hepatic steatosis  CTAP: fatty liver/varices/portal htn  Started predinose    LD, CK, Tylenol level are WNL  D Bili elevated at 9.8  Ammonia: 187 -> 85  GGT:797  AFP upper limit of NML    Plan:   Trend hepatic panel and INR  D/c steroids per GI   Continue lactulose   Serial abdominal exams  PRN zofran for n/v    GI bleed  Assessment & Plan  Nursing reported bright red blood per rectum on .  Occult blood stool positive.  SQ heparin d/c.   NPO after midnight.     Hepatic encephalopathy (HCC)  Assessment & Plan  Concern for alcoholic hepatitis  Confused and hallucinating  Mixed with ETOH withdrawal concerns      Plan:  Reorient as able   Precedex gtt initiated  - d/c  morning.  Increased hallucinations and impulsive behavior evening of . Precedex restarted. D/c .  Continue benzos for CIWA   Continue lactulose - reduced to 30g TID  Delirium precautions     ETOH abuse  Assessment & Plan  Per pt drinks 2 24oz twisted tea/smirinoff per day (family notes pt drinks more than this)  Drinks since brother  3 years ago. Has stopped for a week or two without symptoms. No DTs.  CIWA protocol  Thiamine/folate    Plan:   Continue CIWA   Precedex gtt initiated  - d/c  morning.  Increased hallucinations and impulsive behavior evening of . Precedex restarted. D/c .    Abnormal CT scan  Assessment & Plan  CTAP-1.6cm soft tissue nodule of distal esophagus  CTC-  Slightly enlarged lower right paraesophageal node, stable since an abd CT 10/30/2023, of uncertain etiology and significance    DUANE (acute kidney injury) (HCC)  Assessment & Plan  Initially Prerenal in setting of HHNK/ACEI  Now with  concerns for biliary nephrosis in setting of elevated t/ bili   Baseline 0.9-1  CT nephrolithiasis mild left hydro  UA +blood/glucose/protein  CK WNL  Renal U/S: Right intrarenal calculi;  Hepatomegaly with hepatic steatosis    Plan:  Continue Albumin, Octreotide, and Midodrine   Trend BMP  Monitor I/O  Avoid nephrotoxic agents   Nephrology following.     Thrombocytopenia (HCC)  Assessment & Plan  Likely 2/2 ETOH abuse and acute liver disease    Hemolysis smear NML, LD wnl   Haptoglobin: 87   Fibrinogen: 280  Plan:  Continue to trend  Cont DVT ppx since platelets >50K    RSV infection  Assessment & Plan  F/u CTC given esophageal nodule  Supportive care    Acute respiratory failure with hypoxia and hypercapnia (HCC)  Assessment & Plan  In the setting of +RSV  After sedation with ativan and precedex, decreased mental status and escalation to midflow plus NRB  Precedex placed on hold and now awake, weaned to midflow only  Oxygen requirement likely related to mental status and oversedation  VBG respiratory acidosis  Hemoptysis    CTA PE study: No pulmonary embolus. Septal thickening and groundglass opacity due to pulmonary edema with trace effusions. Extensive lower lobe consolidation could also be due to edema but pneumonia/aspiration not excluded in the appropriate clinical setting. Redemonstration of enlarged lower right paraesophageal node, slightly increased from 5 days earlier. Hepatic steatosis.  O2 requirements improving with less sedation. Cont to monitor and wean able.    Depression  Assessment & Plan  NPO hold home fluoxetine/remeron/prn atarax    Tobacco abuse  Assessment & Plan  Nicotine patch  Smoking cessation counseling     Type 2 diabetes mellitus with hyperglycemia, without long-term current use of insulin (HCC)  Assessment & Plan  Lab Results   Component Value Date    HGBA1C 8.8 (H) 01/21/2024       Recent Labs     01/26/24  0716 01/26/24  1150 01/26/24  1616 01/26/24  2104   POCGLU 189* 156* 132 155*        Blood Sugar Average: Last 72 hrs:  (P) 190    Initially presented in DKA   Resolved DKA.   Continue Lantus and SSI   AccuChecks TID with meals     History of gout  Assessment & Plan  Continue allopurinol/colchicine when able to PO              Disposition: Stepdown Level 1    ICU Core Measures     A: Assess, Prevent, and Manage Pain Has pain been assessed? Yes  Need for changes to pain regimen? No   B: Both SAT/SAT  N/A   C: Choice of Sedation RASS Goal: N/A patient not on sedation  Need for changes to sedation or analgesia regimen? NA   D: Delirium CAM-ICU: Negative   E: Early Mobility  Plan for early mobility? Yes   F: Family Engagement Plan for family engagement today? Yes       Review of Invasive Devices:    Ferguson Plan: Voiding trial after improvement in ambulation         Prophylaxis:  VTE Contraindicated secondary to: GI Bleed   Stress Ulcer  covered byomeprazole (PriLOSEC) 40 MG capsule [[id number]] (Long-Term Med), pantoprazole (PROTONIX) injection 40 mg [[id number]]         Significant 24hr Events     24hr events: Patient was able to be weaned off of the Precedex gtt on 1/26 at 0615.  CIWA continues as well continual observation.  Patient was noted to have a bloody BM on the evening of 1/26.  Hgb has been stable as well hemodynamics.  Guaiac positive. He was NPO after midnight.         Objective                            Vitals I/O      Most Recent Min/Max in 24hrs   Temp 98.2 °F (36.8 °C) Temp  Min: 98 °F (36.7 °C)  Max: 99 °F (37.2 °C)   Pulse 92 Pulse  Min: 79  Max: 101   Resp 22 Resp  Min: 20  Max: 42   /77 BP  Min: 105/72  Max: 136/89   O2 Sat 93 % SpO2  Min: 88 %  Max: 98 %      Intake/Output Summary (Last 24 hours) at 1/27/2024 0320  Last data filed at 1/27/2024 0000  Gross per 24 hour   Intake 806.84 ml   Output 1495 ml   Net -688.16 ml       Diet NPO    Invasive Monitoring   Arterial Line  Sara BP    No data recorded   MAP    No data recorded           Physical Exam   Physical  Exam  Eyes:      General: Scleral icterus present.         Right eye: No discharge.         Left eye: No discharge.      Extraocular Movements: Extraocular movements intact.      Conjunctiva/sclera: Conjunctivae normal.      Pupils: Pupils are equal, round, and reactive to light.   Skin:     General: Skin is warm and dry.      Coloration: Skin is jaundiced. Skin is not pale.      Findings: No rash.   HENT:      Head: Normocephalic and atraumatic.      Right Ear: Hearing normal.      Left Ear: Hearing normal.      Nose: No congestion or rhinorrhea.      Mouth/Throat:      Mouth: Mucous membranes are moist.   Neck:      Vascular: No JVD.   no midline tenderness Cardiovascular:      Rate and Rhythm: Regular rhythm. Tachycardia present.   Musculoskeletal:         General: No swelling, tenderness, deformity or signs of injury. Normal range of motion.      Right lower leg: No edema.      Left lower leg: No edema.   Abdominal: General: Bowel sounds are normal. There is no distension.      Palpations: Abdomen is rigid.      Tenderness: There is no abdominal tenderness. There is no guarding.   Constitutional:       General: He is not in acute distress.     Appearance: He is well-developed. He is ill-appearing.   Pulmonary:      Effort: Pulmonary effort is normal.      Breath sounds: Normal breath sounds.   Neurological:      General: No focal deficit present.      Mental Status: He is alert and oriented to person, place and time.      Sensory: Sensation is intact.      Motor: Strength full and intact in all extremities.   Genitourinary/Anorectal:  Ferguson present.          Diagnostic Studies        Imaging:  I have personally reviewed pertinent reports.       Medications:  Scheduled PRN   Albumin 25%, 25 g, Q6H  folic acid 1 mg, thiamine (VITAMIN B1) 100 mg in sodium chloride 0.9 % 100 mL IV piggyback, , Daily  gabapentin, 300 mg, TID  insulin glargine, 30 Units, HS  insulin lispro, 2-12 Units, Q6H  lactulose, 30 g,  TID  metoprolol tartrate, 50 mg, Q12H MURALI  midodrine, 15 mg, TID  nicotine, 14 mg, Daily  octreotide, 100 mcg, Q8H MURALI  pantoprazole, 40 mg, Q24H MURALI  QUEtiapine, 25 mg, HS      ondansetron, 4 mg, Q4H PRN       Continuous          Labs:    CBC    Recent Labs     01/25/24  0528 01/26/24  0427 01/26/24  1932 01/27/24  0027   WBC 6.55 7.54  --   --    HGB 9.5* 9.1* 8.5* 8.7*   HCT 27.2* 26.9* 24.8* 26.2*   PLT 91* 110*  --   --      BMP    Recent Labs     01/25/24 2015 01/26/24 0427   SODIUM 138 139   K 3.7 3.7    106   CO2 21 23   AGAP 11 10   BUN 21 21   CREATININE 1.59* 1.47*   CALCIUM 9.2 8.7       Coags    Recent Labs     01/25/24 0528 01/26/24 0427   INR 1.63* 1.56*        Additional Electrolytes  Recent Labs     01/25/24  0528 01/25/24  1252 01/25/24 2015 01/26/24 0427   MG 2.0  --   --  2.0   PHOS <1.0*   < > 2.7 6.5*   CAIONIZED  --   --   --  1.13    < > = values in this interval not displayed.          Blood Gas    No recent results  Recent Labs     01/26/24  0757   PHVEN 7.252*   EEH7YTZ 54.5*   PO2VEN 24.9*   VWP2DWG 23.5*   BEVEN -4.0   [id number] 30.1*    LFTs  Recent Labs     01/25/24  0528 01/26/24 0427   ALT 58* 53*   * 125*   ALKPHOS 98 106*   ALB 4.1 4.1   TBILI 27.17* 27.25*       Infectious  Recent Labs     01/26/24 0427   PROCALCITONI 0.54*     Glucose  Recent Labs     01/25/24  0528 01/25/24  1252 01/25/24 2015 01/26/24 0427   GLUC 191* 225* 200* 252*             Chau Bernard Jr, MORENA

## 2024-01-28 ENCOUNTER — APPOINTMENT (INPATIENT)
Dept: RADIOLOGY | Facility: HOSPITAL | Age: 34
DRG: 441 | End: 2024-01-28
Payer: COMMERCIAL

## 2024-01-28 PROBLEM — K92.2 GI BLEED: Status: RESOLVED | Noted: 2024-01-26 | Resolved: 2024-01-28

## 2024-01-28 LAB
ALBUMIN SERPL BCP-MCNC: 3.8 G/DL (ref 3.5–5)
ALP SERPL-CCNC: 84 U/L (ref 34–104)
ALT SERPL W P-5'-P-CCNC: 68 U/L (ref 7–52)
ANION GAP SERPL CALCULATED.3IONS-SCNC: 10 MMOL/L
AST SERPL W P-5'-P-CCNC: 173 U/L (ref 13–39)
BILIRUB DIRECT SERPL-MCNC: 21.54 MG/DL (ref 0–0.2)
BILIRUB SERPL-MCNC: 36.86 MG/DL (ref 0.2–1)
BUN SERPL-MCNC: 16 MG/DL (ref 5–25)
CALCIUM SERPL-MCNC: 9.2 MG/DL (ref 8.4–10.2)
CHLORIDE SERPL-SCNC: 108 MMOL/L (ref 96–108)
CO2 SERPL-SCNC: 23 MMOL/L (ref 21–32)
CREAT SERPL-MCNC: 1.33 MG/DL (ref 0.6–1.3)
ERYTHROCYTE [DISTWIDTH] IN BLOOD BY AUTOMATED COUNT: 21.8 % (ref 11.6–15.1)
GFR SERPL CREATININE-BSD FRML MDRD: 69 ML/MIN/1.73SQ M
GLUCOSE SERPL-MCNC: 145 MG/DL (ref 65–140)
GLUCOSE SERPL-MCNC: 168 MG/DL (ref 65–140)
GLUCOSE SERPL-MCNC: 202 MG/DL (ref 65–140)
GLUCOSE SERPL-MCNC: 254 MG/DL (ref 65–140)
GLUCOSE SERPL-MCNC: 298 MG/DL (ref 65–140)
HCT VFR BLD AUTO: 26.8 % (ref 36.5–49.3)
HGB BLD-MCNC: 8.8 G/DL (ref 12–17)
IMM GRANULOCYTES # BLD AUTO: >0.5 THOUSAND/UL (ref 0–0.2)
INR PPP: 1.54 (ref 0.84–1.19)
LIPASE SERPL-CCNC: 244 U/L (ref 11–82)
MAGNESIUM SERPL-MCNC: 1.9 MG/DL (ref 1.9–2.7)
MCH RBC QN AUTO: 34.4 PG (ref 26.8–34.3)
MCHC RBC AUTO-ENTMCNC: 32.8 G/DL (ref 31.4–37.4)
MCV RBC AUTO: 105 FL (ref 82–98)
NRBC BLD AUTO-RTO: 0 /100 WBCS
PHOSPHATE SERPL-MCNC: 1 MG/DL (ref 2.7–4.5)
PLATELET # BLD AUTO: 131 THOUSANDS/UL (ref 149–390)
PMV BLD AUTO: 10.1 FL (ref 8.9–12.7)
POTASSIUM SERPL-SCNC: 3 MMOL/L (ref 3.5–5.3)
PROCALCITONIN SERPL-MCNC: 1.12 NG/ML
PROT SERPL-MCNC: 4.8 G/DL (ref 6.4–8.4)
PROTHROMBIN TIME: 18.8 SECONDS (ref 11.6–14.5)
RBC # BLD AUTO: 2.56 MILLION/UL (ref 3.88–5.62)
SODIUM SERPL-SCNC: 141 MMOL/L (ref 135–147)
WBC # BLD AUTO: 9.31 THOUSAND/UL (ref 4.31–10.16)

## 2024-01-28 PROCEDURE — 84145 PROCALCITONIN (PCT): CPT | Performed by: NURSE PRACTITIONER

## 2024-01-28 PROCEDURE — 83690 ASSAY OF LIPASE: CPT | Performed by: NURSE PRACTITIONER

## 2024-01-28 PROCEDURE — 74018 RADEX ABDOMEN 1 VIEW: CPT

## 2024-01-28 PROCEDURE — 82948 REAGENT STRIP/BLOOD GLUCOSE: CPT

## 2024-01-28 PROCEDURE — 80076 HEPATIC FUNCTION PANEL: CPT | Performed by: NURSE PRACTITIONER

## 2024-01-28 PROCEDURE — 85027 COMPLETE CBC AUTOMATED: CPT | Performed by: NURSE PRACTITIONER

## 2024-01-28 PROCEDURE — 84100 ASSAY OF PHOSPHORUS: CPT | Performed by: NURSE PRACTITIONER

## 2024-01-28 PROCEDURE — 83735 ASSAY OF MAGNESIUM: CPT | Performed by: NURSE PRACTITIONER

## 2024-01-28 PROCEDURE — C9113 INJ PANTOPRAZOLE SODIUM, VIA: HCPCS

## 2024-01-28 PROCEDURE — 99233 SBSQ HOSP IP/OBS HIGH 50: CPT | Performed by: INTERNAL MEDICINE

## 2024-01-28 PROCEDURE — 85610 PROTHROMBIN TIME: CPT | Performed by: NURSE PRACTITIONER

## 2024-01-28 PROCEDURE — 99233 SBSQ HOSP IP/OBS HIGH 50: CPT | Performed by: ANESTHESIOLOGY

## 2024-01-28 PROCEDURE — 80048 BASIC METABOLIC PNL TOTAL CA: CPT | Performed by: NURSE PRACTITIONER

## 2024-01-28 RX ORDER — POTASSIUM CHLORIDE 20 MEQ/1
20 TABLET, EXTENDED RELEASE ORAL
Status: COMPLETED | OUTPATIENT
Start: 2024-01-28 | End: 2024-01-28

## 2024-01-28 RX ORDER — LORAZEPAM 1 MG/1
2 TABLET ORAL ONCE
Status: COMPLETED | OUTPATIENT
Start: 2024-01-28 | End: 2024-01-28

## 2024-01-28 RX ORDER — LORAZEPAM 2 MG/ML
2 INJECTION INTRAMUSCULAR ONCE
Status: COMPLETED | OUTPATIENT
Start: 2024-01-28 | End: 2024-01-28

## 2024-01-28 RX ORDER — POTASSIUM CHLORIDE 14.9 MG/ML
20 INJECTION INTRAVENOUS ONCE
Status: COMPLETED | OUTPATIENT
Start: 2024-01-28 | End: 2024-01-28

## 2024-01-28 RX ORDER — METHYLPREDNISOLONE SODIUM SUCCINATE 40 MG/ML
40 INJECTION, POWDER, LYOPHILIZED, FOR SOLUTION INTRAMUSCULAR; INTRAVENOUS DAILY
Status: DISCONTINUED | OUTPATIENT
Start: 2024-01-28 | End: 2024-01-30

## 2024-01-28 RX ORDER — LACTULOSE 10 G/15ML
30 SOLUTION ORAL DAILY
Status: DISCONTINUED | OUTPATIENT
Start: 2024-01-28 | End: 2024-01-29

## 2024-01-28 RX ORDER — POTASSIUM CHLORIDE 20MEQ/15ML
40 LIQUID (ML) ORAL ONCE
Status: COMPLETED | OUTPATIENT
Start: 2024-01-28 | End: 2024-01-28

## 2024-01-28 RX ORDER — LIDOCAINE 50 MG/G
1 PATCH TOPICAL DAILY
Status: DISCONTINUED | OUTPATIENT
Start: 2024-01-28 | End: 2024-01-31 | Stop reason: HOSPADM

## 2024-01-28 RX ORDER — FLUOXETINE HYDROCHLORIDE 20 MG/1
20 CAPSULE ORAL DAILY
Status: DISCONTINUED | OUTPATIENT
Start: 2024-01-28 | End: 2024-01-31 | Stop reason: HOSPADM

## 2024-01-28 RX ADMIN — LACTULOSE 30 G: 20 SOLUTION ORAL at 09:46

## 2024-01-28 RX ADMIN — ALBUMIN (HUMAN) 25 G: 0.25 INJECTION, SOLUTION INTRAVENOUS at 04:12

## 2024-01-28 RX ADMIN — VANCOMYCIN HYDROCHLORIDE 1000 MG: 1 INJECTION, SOLUTION INTRAVENOUS at 20:20

## 2024-01-28 RX ADMIN — SODIUM PHOSPHATE, MONOBASIC, MONOHYDRATE AND SODIUM PHOSPHATE, DIBASIC, ANHYDROUS 30 MMOL: 142; 276 INJECTION, SOLUTION INTRAVENOUS at 07:55

## 2024-01-28 RX ADMIN — METOPROLOL TARTRATE 25 MG: 25 TABLET, FILM COATED ORAL at 09:38

## 2024-01-28 RX ADMIN — INSULIN LISPRO 6 UNITS: 100 INJECTION, SOLUTION INTRAVENOUS; SUBCUTANEOUS at 11:47

## 2024-01-28 RX ADMIN — INSULIN LISPRO 4 UNITS: 100 INJECTION, SOLUTION INTRAVENOUS; SUBCUTANEOUS at 16:51

## 2024-01-28 RX ADMIN — POTASSIUM CHLORIDE 20 MEQ: 1500 TABLET, EXTENDED RELEASE ORAL at 15:13

## 2024-01-28 RX ADMIN — VANCOMYCIN HYDROCHLORIDE 125 MG: 125 CAPSULE ORAL at 09:38

## 2024-01-28 RX ADMIN — HEPARIN SODIUM 5000 UNITS: 5000 INJECTION INTRAVENOUS; SUBCUTANEOUS at 21:48

## 2024-01-28 RX ADMIN — INSULIN LISPRO 2 UNITS: 100 INJECTION, SOLUTION INTRAVENOUS; SUBCUTANEOUS at 07:33

## 2024-01-28 RX ADMIN — CEFEPIME HYDROCHLORIDE 2000 MG: 2 INJECTION, SOLUTION INTRAVENOUS at 04:12

## 2024-01-28 RX ADMIN — POTASSIUM CHLORIDE 20 MEQ: 14.9 INJECTION, SOLUTION INTRAVENOUS at 05:48

## 2024-01-28 RX ADMIN — METHYLPREDNISOLONE SODIUM SUCCINATE 40 MG: 40 INJECTION, POWDER, FOR SOLUTION INTRAMUSCULAR; INTRAVENOUS at 10:13

## 2024-01-28 RX ADMIN — NICOTINE 14 MG: 14 PATCH, EXTENDED RELEASE TRANSDERMAL at 10:00

## 2024-01-28 RX ADMIN — LORAZEPAM 2 MG: 2 INJECTION, SOLUTION INTRAMUSCULAR; INTRAVENOUS at 15:13

## 2024-01-28 RX ADMIN — BENZONATATE 100 MG: 100 CAPSULE ORAL at 00:22

## 2024-01-28 RX ADMIN — METOPROLOL TARTRATE 25 MG: 25 TABLET, FILM COATED ORAL at 21:48

## 2024-01-28 RX ADMIN — LIDOCAINE 5% 1 PATCH: 700 PATCH TOPICAL at 21:48

## 2024-01-28 RX ADMIN — HEPARIN SODIUM 5000 UNITS: 5000 INJECTION INTRAVENOUS; SUBCUTANEOUS at 05:48

## 2024-01-28 RX ADMIN — PANTOPRAZOLE SODIUM 40 MG: 40 INJECTION, POWDER, FOR SOLUTION INTRAVENOUS at 09:38

## 2024-01-28 RX ADMIN — GABAPENTIN 400 MG: 400 CAPSULE ORAL at 21:47

## 2024-01-28 RX ADMIN — INSULIN GLARGINE 30 UNITS: 100 INJECTION, SOLUTION SUBCUTANEOUS at 21:48

## 2024-01-28 RX ADMIN — POTASSIUM CHLORIDE 40 MEQ: 1.5 SOLUTION ORAL at 05:48

## 2024-01-28 RX ADMIN — CEFEPIME HYDROCHLORIDE 2000 MG: 2 INJECTION, SOLUTION INTRAVENOUS at 16:49

## 2024-01-28 RX ADMIN — POTASSIUM CHLORIDE 20 MEQ: 1500 TABLET, EXTENDED RELEASE ORAL at 12:59

## 2024-01-28 RX ADMIN — POTASSIUM CHLORIDE 20 MEQ: 1500 TABLET, EXTENDED RELEASE ORAL at 09:38

## 2024-01-28 RX ADMIN — ONDANSETRON 4 MG: 2 INJECTION INTRAMUSCULAR; INTRAVENOUS at 08:44

## 2024-01-28 RX ADMIN — FLUOXETINE 20 MG: 20 CAPSULE ORAL at 15:13

## 2024-01-28 RX ADMIN — TAMSULOSIN HYDROCHLORIDE 0.4 MG: 0.4 CAPSULE ORAL at 21:48

## 2024-01-28 RX ADMIN — VANCOMYCIN HYDROCHLORIDE 1000 MG: 1 INJECTION, SOLUTION INTRAVENOUS at 07:31

## 2024-01-28 RX ADMIN — LORAZEPAM 2 MG: 1 TABLET ORAL at 00:22

## 2024-01-28 RX ADMIN — HEPARIN SODIUM 5000 UNITS: 5000 INJECTION INTRAVENOUS; SUBCUTANEOUS at 13:00

## 2024-01-28 RX ADMIN — VANCOMYCIN HYDROCHLORIDE 125 MG: 125 CAPSULE ORAL at 21:47

## 2024-01-28 RX ADMIN — GABAPENTIN 400 MG: 400 CAPSULE ORAL at 15:13

## 2024-01-28 RX ADMIN — GABAPENTIN 400 MG: 400 CAPSULE ORAL at 09:38

## 2024-01-28 RX ADMIN — THIAMINE HYDROCHLORIDE: 100 INJECTION, SOLUTION INTRAMUSCULAR; INTRAVENOUS at 10:12

## 2024-01-28 NOTE — ASSESSMENT & PLAN NOTE
Improved  Concern for alcoholic hepatitis, mixed w ETOH withdrawal concerns    Confused and hallucinating    Plan:  CAM ICU daily  Check ammonia PRN  Lactulose titrate for 1-3 BM/Day  Sleep hygiene, continue home Remeron  Delirium precautions

## 2024-01-28 NOTE — PROGRESS NOTES
NEPHROLOGY HOSPITAL PROGRESS NOTE   Michael Koch 33 y.o. male MRN: [id number]  Unit/Bed#: -01 Encounter: [id number]  Reason for Consult: Acute kidney injury    ASSESSMENT and PLAN:    33-year-old male who initially presented on January 21 with a past medical history of alcohol abuse, drug abuse, hypertension, depression, nephrolithiasis, BPH, tobacco abuse, gout, GERD, who initially presents with back pain, nausea, vomiting for 1 week prior to admission.  Nephrology is consulted for acute kidney injury.    Initially was noted to have HHNK and required insulin drip.  There was also concern for worsening confusion and restlessness during the course of stay concerning for possible hepatic encephalopathy.  Was started on lactulose.  Is also being monitored for alcohol withdrawal.    Urology team was brought on board due to mild hydronephrosis of left side.    1-acute kidney injury-and may have underlying chronic kidney disease as has a left kidney that is atrophic on imaging    Etiology-likely in the setting of hypotension/contrast/ATN.  Consideration for bile cast nephropathy.  Consideration for HRS.  Consideration for prerenal azotemia in the setting of DKA.  To note, patient received contrast study on January 21 and also received contrast study on January 26    Pertinent studies-  - Baseline creatinine 1.1 mg/dL, rising to 2.1 mg/dL on 1/24.  - Urinalysis with glucose urea, 20-30 RBC, moderate bacteria from January 21  - CT scan shows 9 mm calculi in the left ureter and proximal hydroureter mild left hydronephrosis, 5 mm calculi in the left ureterovesicular junction with left kidney that is atrophic  - Renal ultrasound trace bilateral hydronephrosis which was a follow-up ultrasound January 22  - Urine sodium 44    Course of stay-  - Patient was on midodrine which was increased on 1/24 to 50 mg 3 times a day.  Was also given attempted diuretic.  - Patient was started on de-escalation of care from a renal  standpoint with regards to midodrine on 1/27 and was being tapered.  Octreotide held.  - 1/28-midodrine discontinued.  Albumin being held.  - 1/29-creatinine 1.2 mg/dL.  Hypokalemia and hypophosphatemia.  Magnesium is appropriate.  Bicarbonate appropriate.  Potassium and phosphate being repleted by primary team.    Plan  - I/os; avoid nephrotoxic agents  - Avoid hypotension  - Monitor renal function closely given recent contrast study on January 26  - Agree with potassium and phosphate repletion  - Further plans for liver dysfunction per primary team's are in progress.  Reviewed plans directly with primary team and we are in agreement from renal standpoint patient is stable, replating electrolytes.  Liver dysfunction plans are in progress and primary team is reaching out to GI team.    2-electrolytes-    - Hyponatremia-initially in setting of hyperglycemia, volume overload.  Improved overall.    - Hypokalemia-replete as necessary to maintain normokalemia.  Required repletion 1/28 and also 1/29    - Hypophosphatemia-replete as needed-repleted 1/29    3-acid/base-appropriate    4-alcoholic liver disease-with concern for alcoholic hepatitis    - GI team on board  - Significantly elevated bilirubin  - Thrombocytopenia  - Was started on steroids by primary team  - Ultrasound hepatomegaly with hepatic steatosis.  Normal gallbladder.  - Bilirubin continues to rise.  Per primary team.    5-hepatic encephalopathy-per primary team    6-RSV-per primary team    7-proteinuria-will need outpatient follow-up.  Possibly in setting of diabetic nephropathy.    8-tachycardia-per primary team.  Midodrine being tapered over the weekend.  But to note metoprolol was reduced recently.    9-fevers-Per primary team.  On broad-spectrum antibiotics    - Blood cultures-thus far no growth    10-soft tissue nodule adjacent to distal esophagus-    - Follow-up CT scan January 21 of the chest-slightly enlarged lower right paraesophageal node stable  "from October 2023 unclear etiology    11-respiratory status-septal thickening and groundglass opacities due to pulmonary edema and trace effusions and lower lobe extensive consolidation consideration for edema versus aspiration    12-anemia-stool occult positive    Portions of the record may have been created with voice recognition software. Occasional wrong word or \"sound a like\" substitutions may have occurred due to the inherent limitations of voice recognition software. Read the chart carefully and recognize, using context, where substitutions have occurred.If you have any questions, please contact the dictating provider.      SUBJECTIVE / 24H INTERVAL HISTORY:    Blood pressure is 130 systolic.  Afebrile overnight.  But did have fevers yesterday to 101.5 max yesterday morning.  On room air currently.  Denies complaints.    OBJECTIVE:  Current Weight: Weight - Scale: 93.2 kg (205 lb 7.5 oz)  Vitals:    01/28/24 0846 01/28/24 0926 01/28/24 1100 01/28/24 1300   BP: 152/90  134/84    BP Location:   Right arm    Pulse: (!) 125  (!) 127    Resp:   (!) 38    Temp:  98.9 °F (37.2 °C) (!) 100.6 °F (38.1 °C) 99.8 °F (37.7 °C)   TempSrc:  Oral Oral Oral   SpO2:   96%    Weight:       Height:           Intake/Output Summary (Last 24 hours) at 1/28/2024 1515  Last data filed at 1/28/2024 1224  Gross per 24 hour   Intake 1440 ml   Output 1625 ml   Net -185 ml     General: NAD  Skin: no rash but is jaundiced  Eyes: sclera icterus  ENT: Dry mucous membrane  Neck: supple  Chest: CTA b/l, no ronchii, no wheeze, no rubs, no rales but diminished intake bases  CVS: s1s2, no murmur, no gallop, no rub  Abdomen: soft, nontender, nl sounds  Extremities: Trace to 1+ edema LE b/l  : no young  Neuro: AAOX3  Psych: normal affect    Medications:    Current Facility-Administered Medications:     benzonatate (TESSALON PERLES) capsule 100 mg, 100 mg, Oral, TID PRN, Chau Bernard Jr., MORENA, 100 mg at 01/28/24 0022    cefepime (MAXIPIME) " IVPB (premix in dextrose) 2,000 mg 50 mL, 2,000 mg, Intravenous, Q12H, JENSEN Solano, Stopped at 01/28/24 0545    FLUoxetine (PROzac) capsule 20 mg, 20 mg, Oral, Daily, JENSEN Solano    folic acid 1 mg, thiamine (VITAMIN B1) 100 mg in sodium chloride 0.9 % 100 mL IV piggyback, , Intravenous, Daily, JENSEN Bruno, Stopped at 01/28/24 1101    gabapentin (NEURONTIN) capsule 400 mg, 400 mg, Oral, TID, Paz Macias MD, 400 mg at 01/28/24 0938    heparin (porcine) subcutaneous injection 5,000 Units, 5,000 Units, Subcutaneous, Q8H MURALI, JENSEN Solano, 5,000 Units at 01/28/24 1300    insulin glargine (LANTUS) subcutaneous injection 30 Units 0.3 mL, 30 Units, Subcutaneous, HS, Santosh Yeung [facility], DO, 30 Units at 01/27/24 2137    insulin lispro (HumaLOG) 100 units/mL subcutaneous injection 2-12 Units, 2-12 Units, Subcutaneous, TID With Meals, 6 Units at 01/28/24 1147 **AND** Fingerstick Glucose (POCT), , , TID ACDestinee CRNP    lactulose (CHRONULAC) oral solution 30 g, 30 g, Oral, Daily, JENSEN Solano, 30 g at 01/28/24 0946    lidocaine (LIDODERM) 5 % patch 1 patch, 1 patch, Topical, Daily, JENSEN Solano    LORazepam (ATIVAN) injection 2 mg, 2 mg, Intravenous, Once, JENSEN Solano    methylPREDNISolone sodium succinate (Solu-MEDROL) injection 40 mg, 40 mg, Intravenous, Daily, JENSEN Solano, 40 mg at 01/28/24 1013    metoprolol tartrate (LOPRESSOR) tablet 25 mg, 25 mg, Oral, Q12H MURALI, JENSEN Solano, 25 mg at 01/28/24 0938    mirtazapine (REMERON) tablet 15 mg, 15 mg, Oral, HS PRN, JENSEN Solano    nicotine (NICODERM CQ) 14 mg/24hr TD 24 hr patch 14 mg, 14 mg, Transdermal, Daily, JENSEN Bruno, 14 mg at 01/28/24 1000    ondansetron (ZOFRAN) injection 4 mg, 4 mg, Intravenous, Q4H PRN, JENSEN Bruno, 4 mg at 01/28/24 0844    pantoprazole (PROTONIX) injection 40 mg, 40 mg, Intravenous, Q24H MURALI, JENSEN Bruno, 40 mg  at 01/28/24 0938    potassium chloride (Klor-Con M20) CR tablet 20 mEq, 20 mEq, Oral, Q3H, Matt Simms, DO, 20 mEq at 01/28/24 1259    tamsulosin (FLOMAX) capsule 0.4 mg, 0.4 mg, Oral, HS, JENSEN Solano, 0.4 mg at 01/27/24 2059    vancomycin (VANCOCIN) capsule 125 mg, 125 mg, Oral, Q12H MURALI, JENSEN Solano, 125 mg at 01/28/24 0938    vancomycin (VANCOCIN) IVPB (premix in dextrose) 1,000 mg 200 mL, 1,000 mg, Intravenous, Q12H, JENSEN Solano, Stopped at 01/28/24 0830    Laboratory Results:  Results from last 7 days   Lab Units 01/28/24  0418 01/27/24  0413 01/27/24  0027 01/26/24  1932 01/26/24  0427 01/25/24  2015 01/25/24  1252 01/25/24  0528 01/25/24  0002 01/24/24  1522 01/24/24  0446 01/23/24  0530 01/23/24  0453 01/22/24  0808 01/22/24  0243   WBC Thousand/uL 9.31 8.39  --   --  7.54  --   --  6.55  --   --  5.92  --  6.34  --  5.72   HEMOGLOBIN g/dL 8.8* 8.4* 8.7* 8.5* 9.1*  --   --  9.5*  --   --  9.4*  --  10.8*  --  10.8*   HEMATOCRIT % 26.8* 25.1* 26.2* 24.8* 26.9*  --   --  27.2*  --   --  26.8*  --  29.4*  --  30.9*   PLATELETS Thousands/uL 131* 130*  --   --  110*  --   --  91*  --   --  56*  --  183  --  37*   POTASSIUM mmol/L 3.0* 3.1*  --   --  3.7 3.7 3.7 3.1* 2.8* 3.5 3.6 3.6  --    < > 3.3*   CHLORIDE mmol/L 108 107  --   --  106 106 104 101 102 102 100 97  --    < > 101   CO2 mmol/L 23 23  --   --  23 21 23 24 24 20* 16* 23  --    < > 21   BUN mg/dL 16 19  --   --  21 21 21 22 21 19 15 9  --    < > 5   CREATININE mg/dL 1.33* 1.27  --   --  1.47* 1.59* 1.78* 1.95* 1.90* 2.26* 2.17* 2.06*  --    < > 1.12   CALCIUM mg/dL 9.2 9.0  --   --  8.7 9.2 9.1 9.2 9.1 9.0 8.5 8.3*  --    < > 8.1*   MAGNESIUM mg/dL 1.9 2.1  --   --  2.0  --   --  2.0  --  2.3  --  2.3  --   --  3.0*   PHOSPHORUS mg/dL 1.0* 1.6*  --   --  6.5* 2.7 <1.0* <1.0*  --  2.1*  --  2.5*  --   --  1.9*    < > = values in this interval not displayed.

## 2024-01-28 NOTE — ASSESSMENT & PLAN NOTE
Per pt drinks 2 24oz twisted tea/smirinoff per day (family notes pt drinks more than this)  Drinks since brother  3 years ago. Has stopped for a week or two without symptoms. No DTs.  [facility] protocol  Thiamine/folate    Plan:   Continue CIWA   Precedex gtt initiated  - d/c  morning.  Increased hallucinations and impulsive behavior evening of . Precedex restarted. D/c .

## 2024-01-28 NOTE — PROGRESS NOTES
[facility]  [facility]  Name: Michael Koch I  MRN: [id number]  Unit/Bed#: -01 I Date of Admission: 2024   Date of Service: 2024 I Hospital Day: 7    Assessment/Plan   * Alcoholic hepatitis  Assessment & Plan  MELD: 28   AST> ALT   RUQ: hepatomegaly,hepatic steatosis  CTAP: fatty liver/varices/portal htn  Started predinose    LD, CK, Tylenol level are WNL  D Bili elevated at 9.8  Ammonia: 187 -> 85  GGT:797  AFP upper limit of NML    Plan:   Trend hepatic panel and INR  D/c steroids per GI   Lactulose decreased  titrate for 1-3 BM/Day  Serial abdominal exams  PRN zofran for n/v    GI bleed  Assessment & Plan  Nursing reported bright red blood per rectum on .  Occult blood stool positive.    Plan  Hgb stable x 24 hours, monitor daily  Continue Protonix daily  Transfuse for Hgb less than 7 or s/s of bleeding    Hepatic encephalopathy (HCC)  Assessment & Plan  Concern for alcoholic hepatitis  Confused and hallucinating  Mixed with ETOH withdrawal concerns      Plan:  Reorient as able   Precedex gtt initiated  - d/c  morning.  Increased hallucinations and impulsive behavior evening of . Precedex restarted. D/c .  Continue benzos for CIWA   Continue lactulose - reduced to 30g TID  Delirium precautions     ETOH abuse  Assessment & Plan  Per pt drinks 2 24oz twisted tea/smirinoff per day (family notes pt drinks more than this)  Drinks since brother  3 years ago. Has stopped for a week or two without symptoms. No DTs.  CIWA protocol  Thiamine/folate    Plan:   Continue CIWA   Precedex gtt initiated  - d/c  morning.  Increased hallucinations and impulsive behavior evening of . Precedex restarted. D/c .    Abnormal CT scan  Assessment & Plan  CTAP-1.6cm soft tissue nodule of distal esophagus  CTC-  Slightly enlarged lower right paraesophageal node, stable since an abd CT 10/30/2023, of uncertain etiology and  significance    DUANE (acute kidney injury) (HCC)  Assessment & Plan  Initially Prerenal in setting of HHNK/ACEI  Now with concerns for biliary nephrosis in setting of elevated t/ bili   Baseline 0.9-1  CT nephrolithiasis mild left hydro  UA +blood/glucose/protein  CK WNL  Renal U/S: Right intrarenal calculi;  Hepatomegaly with hepatic steatosis    Plan:  1/27 Octreotide discontinued, Midodrine decreased to 5mg TID, Albumin Q12 hours  Trend BMP  Monitor I/O  Avoid nephrotoxic agents   Nephrology following.     Thrombocytopenia (HCC)  Assessment & Plan  Likely 2/2 ETOH abuse and acute liver disease    Hemolysis smear NML, LD wnl   Haptoglobin: 87   Fibrinogen: 280  Plan:  Continue to trend  Cont DVT ppx since platelets >50K    RSV infection  Assessment & Plan  F/u CTC given esophageal nodule  Supportive care    Acute respiratory failure with hypoxia and hypercapnia (HCC)  Assessment & Plan  Improved 1/27  In the setting of +RSV  After sedation with ativan and precedex, decreased mental status and escalation to midflow plus NRB  Precedex placed on hold and now awake, weaned to midflow only  Oxygen requirement likely related to mental status and oversedation  Hemoptysis    CTA PE study: No pulmonary embolus. Septal thickening and groundglass opacity due to pulmonary edema with trace effusions. Extensive lower lobe consolidation could also be due to edema but pneumonia/aspiration not excluded in the appropriate clinical setting. Redemonstration of enlarged lower right paraesophageal node, slightly increased from 5 days earlier. Hepatic steatosis.  O2 requirements improving with less sedation. Cont to monitor and wean able.    Depression  Assessment & Plan  NPO hold home fluoxetine/remeron/prn atarax    Tobacco abuse  Assessment & Plan  Nicotine patch  Smoking cessation counseling     Type 2 diabetes mellitus with hyperglycemia, without long-term current use of insulin (HCC)  Assessment & Plan  Lab Results   Component  Value Date    HGBA1C 8.8 (H) 01/21/2024       Recent Labs     01/27/24  0550 01/27/24  1130 01/27/24  1618 01/27/24 2134   POCGLU 129 168* 106 148*       Blood Sugar Average: Last 72 hrs:  (P) [id number]    Initially presented in DKA   Resolved DKA.    Continue Lantus and SSI   AccuChecks TID with meals     History of gout  Assessment & Plan  Continue allopurinol/colchicine when able to PO              Disposition: Stepdown Level 1    ICU Core Measures     A: Assess, Prevent, and Manage Pain Has pain been assessed? Yes  Need for changes to pain regimen? No   B: Both SAT/SAT  N/A   C: Choice of Sedation RASS Goal: N/A patient not on sedation  Need for changes to sedation or analgesia regimen? NA   D: Delirium CAM-ICU: Negative   E: Early Mobility  Plan for early mobility? Yes   F: Family Engagement Plan for family engagement today? Yes       Antibiotic Review: Awaiting culture results.     Review of Invasive Devices:            Prophylaxis:  VTE VTE covered by:  heparin (porcine), Subcutaneous, 5,000 Units at 01/27/24 2137       Stress Ulcer  covered byomeprazole (PriLOSEC) 40 MG capsule [[id number]] (Long-Term Med), pantoprazole (PROTONIX) injection 40 mg [[id number]]         Significant 24hr Events     24hr events: Patient was noted to spike a fever on 1/27 and was noted to have hemoptysis, hypoxia and acute respiratory failure.  Septic workup initiated to include  and CTA PE study.  Lactic acid negative, stool C. Diff pending, blood cultures redrawn and CTA negative for PE with possible aspiration.  Patient empirically started on abx.  There were no acute events reported overnight.      Objective                            Vitals I/O      Most Recent Min/Max in 24hrs   Temp 99.2 °F (37.3 °C) Temp  Min: 98.1 °F (36.7 °C)  Max: 101.2 °F (38.4 °C)   Pulse (!) 112 Pulse  Min: 97  Max: 126   Resp 20 Resp  Min: 20  Max: 35   /78 BP  Min: 116/66  Max: 148/93   O2 Sat 96 % SpO2  Min: 91 %  Max: 97 %       Intake/Output Summary (Last 24 hours) at 1/28/2024 0410  Last data filed at 1/27/2024 1930  Gross per 24 hour   Intake 1280 ml   Output 1225 ml   Net 55 ml       Diet Prashant/CHO Controlled; Consistent Carbohydrate Diet Level 2 (5 carb servings/75 grams CHO/meal); Consistent Carbohydrate Diet Level 2 (5 carb servings/75 grams CHO/meal)    Invasive Monitoring           Physical Exam   Physical Exam  Eyes:      General: Scleral icterus present.         Right eye: No discharge.         Left eye: No discharge.      Extraocular Movements: Extraocular movements intact.      Pupils: Pupils are equal, round, and reactive to light.   Skin:     General: Skin is warm and dry.      Coloration: Skin is jaundiced. Skin is not pale.   HENT:      Head: Normocephalic and atraumatic.      Right Ear: Hearing normal.      Left Ear: Hearing normal.      Nose: No congestion or rhinorrhea.      Mouth/Throat:      Mouth: Mucous membranes are moist.   Neck:      Vascular: No JVD.   no midline tenderness Cardiovascular:      Rate and Rhythm: Regular rhythm. Tachycardia present.   Musculoskeletal:         General: No swelling, tenderness, deformity or signs of injury. Normal range of motion.      Right lower leg: No edema.      Left lower leg: No edema.   Abdominal: General: There is no distension.      Palpations: Abdomen is rigid.      Tenderness: There is no abdominal tenderness. There is no guarding.   Constitutional:       Appearance: He is well-developed. He is obese. He is ill-appearing. He is not diaphoretic.   Pulmonary:      Effort: Pulmonary effort is normal.      Breath sounds: Examination of the right-middle field reveals decreased breath sounds. Examination of the left-middle field reveals decreased breath sounds. Examination of the right-lower field reveals decreased breath sounds. Examination of the left-lower field reveals decreased breath sounds. Decreased breath sounds present.   Neurological:      General: No focal deficit  present.      Mental Status: He is alert and oriented to person, place and time.      Sensory: Sensation is intact.      Motor: Strength full and intact in all extremities.            Diagnostic Studies        Imaging:  I have personally reviewed pertinent reports.   and I have personally reviewed pertinent films in PACS     Medications:  Scheduled PRN   Albumin 25%, 25 g, Q12H  cefepime, 2,000 mg, Q12H  folic acid 1 mg, thiamine (VITAMIN B1) 100 mg in sodium chloride 0.9 % 100 mL IV piggyback, , Daily  gabapentin, 400 mg, TID  heparin (porcine), 5,000 Units, Q8H MURALI  insulin glargine, 30 Units, HS  insulin lispro, 2-12 Units, TID With Meals  lactulose, 30 g, BID  metoprolol tartrate, 25 mg, Q12H MURALI  midodrine, 5 mg, TID  nicotine, 14 mg, Daily  pantoprazole, 40 mg, Q24H MURALI  tamsulosin, 0.4 mg, HS  vancomycin, 125 mg, Q12H MURALI  vancomycin, 1,000 mg, Q12H      benzonatate, 100 mg, TID PRN  mirtazapine, 15 mg, HS PRN  ondansetron, 4 mg, Q4H PRN       Continuous          Labs:    CBC    Recent Labs     01/26/24 0427 01/26/24  1932 01/27/24  0027 01/27/24  0413   WBC 7.54  --   --  8.39   HGB 9.1*   < > 8.7* 8.4*   HCT 26.9*   < > 26.2* 25.1*   *  --   --  130*   BANDSPCT  --   --   --  8    < > = values in this interval not displayed.     BMP    Recent Labs     01/26/24 0427 01/27/24  0413   SODIUM 139 141   K 3.7 3.1*    107   CO2 23 23   AGAP 10 11   BUN 21 19   CREATININE 1.47* 1.27   CALCIUM 8.7 9.0       Coags    Recent Labs     01/26/24 0427 01/27/24  0413   INR 1.56* 1.62*        Additional Electrolytes  Recent Labs     01/26/24 0427 01/27/24  0413   MG 2.0 2.1   PHOS 6.5* 1.6*   CAIONIZED 1.13 1.17          Blood Gas    No recent results  Recent Labs     01/26/24  0757   PHVEN 7.252*   OAY0EUJ 54.5*   PO2VEN 24.9*   JHZ1ACY 23.5*   BEVEN -4.0   Y3OFWCN 30.1*    LFTs  Recent Labs     01/26/24  0427 01/27/24  0413   ALT 53* 51   * 123*   ALKPHOS 106* 70   ALB 4.1 4.0   TBILI 27.25*  28.52*       Infectious  Recent Labs     01/26/24  0427   PROCALCITONI 0.54*     Glucose  Recent Labs     01/26/24 0427 01/27/24  0413   GLUC 252* 142*             Chau Bernard Jr, MORENA

## 2024-01-28 NOTE — ASSESSMENT & PLAN NOTE
Likely 2/2 ETOH abuse and acute liver disease    Hemolysis smear NML, LD wnl   Haptoglobin: 87   Fibrinogen: 280    Plan:  Continue to trend  Cont DVT ppx unless plates <50K

## 2024-01-28 NOTE — ASSESSMENT & PLAN NOTE
Ox requirement likely related to mental status, oversedation, in setting of +RSV  After sedation w ativan, precedex, decreased mental status and escalation to midflow plus NRB  Precedex d/c'd, weaned to midflow, now 2L NC  Hemoptysis, now resolved    1/26 CTA PE: No pulmonary embolus. Septal thickening and groundglass opacity due to pulm edema w trace effusions. Extensive lower lobe consolidation could also be due to edema but pneumonia/aspiration not excluded in appropriate clinical setting. Redemonstration of enlarged lower right paraesophageal node, slightly increased from 5 days earlier. Hepatic steatosis.    Plan  Pulmonary hygiene  Wean FiO2 for SaO2 >92%  Consider repeat imaging if FiO2 requirements increase

## 2024-01-28 NOTE — ASSESSMENT & PLAN NOTE
Initially Prerenal in setting of HHNK/ACEI  Now w concerns for biliary nephrosis in setting of elevated t/ bili   Baseline 0.9-1  CT nephrolithiasis mild left hydro  UA +blood/glucose/protein  CK WNL  Renal U/S: Right intrarenal calculi;  Hepatomegaly w hepatic steatosis  1/27 Octreotide discontinued, 1/28 Midodrine and Albumin discontinued    Plan:  Urinary retention protocol  Continue Flomax  Trend BMP  Monitor I/O  Avoid nephrotoxic agents   Nephro on consult

## 2024-01-28 NOTE — PROGRESS NOTES
Michael Koch is a 33 y.o. male who is currently ordered Vancomycin IV with management by the Pharmacy Consult service.  Relevant clinical data and objective / subjective history reviewed.  Vancomycin Assessment:  Indication and Goal AUC/Trough: Other, prophylaxis, -600, trough >10  Clinical Status: stable  Micro:     Renal Function:  SCr: 1.33 mg/dL  CrCl: 85.9 mL/min  Renal replacement: Not on dialysis  Days of Therapy: 2  Current Dose: 1000mg iv q12h  Vancomycin Plan:  New Dosing: same  Estimated AUC: 485 mcg*hr/mL  Estimated Trough: 15.7 mcg/mL  Next Level: 01/29/24 0600  Renal Function Monitoring: Daily BMP and UOP  Pharmacy will continue to follow closely for s/sx of nephrotoxicity, infusion reactions and appropriateness of therapy.  BMP and CBC will be ordered per protocol. We will continue to follow the patient’s culture results and clinical progress daily.    Martin Haney, Pharmacist

## 2024-01-28 NOTE — ASSESSMENT & PLAN NOTE
MELD: 28   AST> ALT; D Bili elevated at 9.8, Ammonia: 187 -> 85, GGT:797  RUQ: hepatomegaly,hepatic steatosis  CTAP: fatty liver/varices/portal htn  LD, CK, Tylenol level are WNL  AFP upper limit of NML  Started predinose 1/23, d/c'd by GI 1/26 1/28 w increased Bili, and DF of 61, steroids restarted    Plan:   GI on consult  Trend hepatic panel and INR  Solumedrol 40 IV daily  Lactulose decreased, titrate for 1-3 BM/Day  Serial abdominal exams  PRN zofran for n/v

## 2024-01-28 NOTE — PROGRESS NOTES
Michael Koch is a 33 y.o. male who is currently ordered Vancomycin IV with management by the Pharmacy Consult service.  Relevant clinical data and objective / subjective history reviewed.  Vancomycin Assessment:  Indication and Goal AUC/Trough: Other, -600, trough >10  Clinical Status: stable  Micro:   pending  Renal Function:  SCr: 1.27 mg/dL  CrCl: 90.6 mL/min  Renal replacement: Not on dialysis  Days of Therapy: 1  Current Dose: 1000mg iv q12h  Vancomycin Plan:  New Dosing: same  Estimated AUC: 459 mcg*hr/mL  Estimated Trough: 14.7 mcg/mL  Next Level: 01/29/24 0600  Renal Function Monitoring: Daily BMP and UOP  Pharmacy will continue to follow closely for s/sx of nephrotoxicity, infusion reactions and appropriateness of therapy.  BMP and CBC will be ordered per protocol. We will continue to follow the patient’s culture results and clinical progress daily.    Martin Haney, Pharmacist

## 2024-01-28 NOTE — ASSESSMENT & PLAN NOTE
Fever and tachycardia may be related to alcoholic hepatitis vs infectious etiology  LA nl  UA negative  Blood cultures negX2   LE duplex to evaluate for VTE: negative    Plan  Cefepime and vanco D#3 (1/29)  PO Vanco for Cdiff prophylaxis protocol  F/u c. diff  Trend PCT

## 2024-01-28 NOTE — PROGRESS NOTES
Progress Note - Nephrology   Michael ARACELI Koch 33 y.o. male MRN: [id number]  Unit/Bed#: -01 Encounter: [id number]    A/P:  1.  Acute kidney injury              Creatinine stable at 1.3 mg/dL this morning.  Continue with current supportive care.  As noted yesterday, we initiated de-escalation of care, will continue with discontinuation of midodrine given patient's current blood pressures, albumin 25 g every 12 hours will  this morning and so the patient will no longer be on this regiment, and octreotide was discontinued yesterday, .  Continue to optimize care and avoid potential nephrotoxins.  Will continue to observe to see how he does off of aggressive treatment at this time.  2.  Proteinuria              As noted yesterday, potentially due to diabetic nephropathy, will need to be further assessed in the outpatient setting and be ruled out for monoclonal gammopathy with potential benefit of use with an SGLT-2 inhibitor with or without RAAS inhibition once he is in his usual state of health.  3.  Volume depletion              Resolved at this time, continue to monitor.  4.  Hypophosphatemia              Patient given 30 mmol of sodium phosphate yesterday, he is being given additional 30 mmol today.  Continue to supplement as needed.  Continue to encourage oral intake of proper nutrition.  5.  Hyponatremia              Resolved continue to monitor  6.  Hypokalemia              Potassium level reduced at 3 mmol/L after supplementation yesterday, patient received 40 mEq by mouth and 20 mill equivalents via the IV route this morning, will provide additional 20 mill equivalents x 3 doses over the course of the rest of the day for total of 120 mEq today, .  7.  Diabetes mellitus type 2              As noted above, potential diabetic nephropathy, consider additional directed care for optimization of medical intervention once he is in usual state of health.  8.  Tachycardia   Patient  continues to have hypokalemia, will optimize with continued supplementation.  Discussed with critical care colleagues, potential modification of medications.  As reminder yesterday, patient's metoprolol was reduced from 50 mg twice daily to 25 mg twice daily in an effort to help compensate for reduction in midodrine.  Patient's pressures improved, however it is unclear if the increased heart rate is now due to the reduction of the beta-blockade.  Will defer to their recommendations going forward.  9.  Acute alcoholic hepatitis              Bilirubin has increased slightly over the last 24 hours, continue supportive care and management at this time.  10.  RSV positive    Case discussed and reviewed with critical care colleagues.  Will continue to de-escalate care as noted above with removal of octreotide, albumin, and midodrine.  Will add supplemental potassium to the potassium already provided earlier this morning.  Our critical care colleagues will try to manage heart rate as best as possible given hemodynamic challenges at this time.    Follow up reason for today's visit: Acute kidney injury/proteinuria/electrolyte management    Alcoholic hepatitis    Patient Active Problem List   Diagnosis    History of gout    History of hypertension    Type 2 diabetes mellitus with hyperglycemia, without long-term current use of insulin (HCC)    Tobacco abuse    ETOH abuse    Depression    GERD (gastroesophageal reflux disease)    BPH (benign prostatic hyperplasia)    RSV infection    Abnormal CT scan    Hepatic encephalopathy (HCC)    N&V (nausea and vomiting)    DUANE (acute kidney injury) (HCC)    Thrombocytopenia (HCC)    Lactic acidosis    Marijuana use    SIRS (systemic inflammatory response syndrome) (HCC)    Alcoholic hepatitis    Acute respiratory failure with hypoxia and hypercapnia (HCC)    GI bleed         Subjective:   No acute issues at this time.  Patient continues to experience productive cough from time to  "time    Objective:     Vitals: Blood pressure 116/70, pulse (!) 117, temperature (!) 101.5 °F (38.6 °C), temperature source Oral, resp. rate (!) 40, height 5' 7\" (1.702 m), weight 93.2 kg (205 lb 7.5 oz), SpO2 96%.,Body mass index is 32.18 kg/m².    Weight (last 2 days)       Date/Time Weight    01/28/24 0500 93.2 (205.47)    01/27/24 0548 94.3 (207.89)    01/26/24 0505 97.5 (214.95)              Intake/Output Summary (Last 24 hours) at 1/28/2024 0846  Last data filed at 1/28/2024 0600  Gross per 24 hour   Intake 1430 ml   Output 1825 ml   Net -395 ml     I/O last 3 completed shifts:  In: 1771 [P.O.:821; IV Piggyback:950]  Out: 2275 [Urine:2275]         Physical Exam: /70   Pulse (!) 117   Temp (!) 101.5 °F (38.6 °C) (Oral)   Resp (!) 40   Ht 5' 7\" (1.702 m)   Wt 93.2 kg (205 lb 7.5 oz)   SpO2 96%   BMI 32.18 kg/m²     General Appearance:    Alert, cooperative, no distress, appears stated age   Head:    Normocephalic, without obvious abnormality, atraumatic   Eyes:    Conjunctiva/corneas clear   Ears:    Normal external ears   Nose:   Nares normal, septum midline, mucosa normal, no drainage    or sinus tenderness   Throat:   Lips, mucosa, and tongue normal; teeth and gums normal   Neck:   Supple   Back:     Symmetric, no curvature, ROM normal, no CVA tenderness   Lungs:     Clear to auscultation bilaterally, respirations unlabored   Chest wall:    No tenderness or deformity   Heart:    Regular rate and rhythm, S1 and S2 normal, no murmur, rub   or gallop   Abdomen:     Soft, non-tender, bowel sounds active   Extremities:   Extremities normal, atraumatic, no cyanosis, +2 bilateral lower extremity edema   Skin:   Skin texture, turgor normal, no rashes or lesions, positive jaundice   Lymph nodes:   Cervical normal   Neurologic:   CNII-XII intact            Lab, Imaging and other studies: I have personally reviewed pertinent labs.  CBC:   Lab Results   Component Value Date    WBC 9.31 01/28/2024    HGB 8.8 " "(L) 01/28/2024    HCT 26.8 (L) 01/28/2024     (H) 01/28/2024     (L) 01/28/2024    RBC 2.56 (L) 01/28/2024    MCH 34.4 (H) 01/28/2024    MCHC 32.8 01/28/2024    RDW 21.8 (H) 01/28/2024    MPV 10.1 01/28/2024    NRBC 0 01/28/2024     CMP:   Lab Results   Component Value Date    K 3.0 (L) 01/28/2024     01/28/2024    CO2 23 01/28/2024    BUN 16 01/28/2024    CREATININE 1.33 (H) 01/28/2024    CALCIUM 9.2 01/28/2024     (H) 01/28/2024    ALT 68 (H) 01/28/2024    ALKPHOS 84 01/28/2024    EGFR 69 01/28/2024       .  Results from last 7 days   Lab Units 01/28/24  0418 01/27/24  0413 01/26/24  0427   POTASSIUM mmol/L 3.0* 3.1* 3.7   CHLORIDE mmol/L 108 107 106   CO2 mmol/L 23 23 23   BUN mg/dL 16 19 21   CREATININE mg/dL 1.33* 1.27 1.47*   CALCIUM mg/dL 9.2 9.0 8.7   ALK PHOS U/L 84 70 106*   ALT U/L 68* 51 53*   AST U/L 173* 123* 125*         Phosphorus:   Lab Results   Component Value Date    PHOS 1.0 (L) 01/28/2024     Magnesium:   Lab Results   Component Value Date    MG 1.9 01/28/2024     Urinalysis:   Lab Results   Component Value Date    COLORU Orange 01/27/2024    CLARITYU Slightly Cloudy 01/27/2024    SPECGRAV 1.020 01/27/2024    PHUR 5.5 01/27/2024    LEUKOCYTESUR Negative 01/27/2024    NITRITE Negative 01/27/2024    GLUCOSEU 100 (1/10%) (A) 01/27/2024    KETONESU Negative 01/27/2024    BILIRUBINUR Large (A) 01/27/2024    BLOODU Large (A) 01/27/2024     Ionized Calcium: No results found for: \"CAION\"  Coagulation:   Lab Results   Component Value Date    INR 1.54 (H) 01/28/2024     Troponin: No results found for: \"TROPONINI\"  ABG: No results found for: \"PHART\", \"RIK4QLW\", \"PO2ART\", \"AWI7CIY\", \"B3EKGYDH\", \"BEART\", \"SOURCE\"  Radiology review:     IMAGING  Procedure: CTA chest pe study    Result Date: 1/26/2024  Narrative: CTA - CHEST WITH IV CONTRAST - PULMONARY ANGIOGRAM INDICATION:   hemoptysis, acute respiratory failure with hypoxia. Per my review of the medical record, RSV positive. " History of smoking. Alcoholic hepatitis. Coughing up large clots. COMPARISON: CXR 1/26/2024 and 1/25/2024, chest CT and abdomen CT 1/21/2024, abdomen CT 10/30/2023. TECHNIQUE: CT angiogram timed for optimal opacification of the pulmonary arteries.  Axial, sagittal, and coronal 2D reformats created from source data.  Coronal 3D MIP postprocessing on the acquisition scanner. Radiation dose length product (DLP):  437 mGy-cm .  Radiation dose exposure minimized using iterative reconstruction and automated exposure control. IV Contrast:  85 mL of iohexol (OMNIPAQUE) FINDINGS: PULMONARY ARTERIES:  No pulmonary embolus. LUNGS: Mild septal thickening with severe dense consolidation in both lower lobes and patchy consolidation and groundglass opacity in the remainder of the lungs. AIRWAYS: No significant filling defects. PLEURA: Trace effusions. HEART/GREAT VESSELS:  Normal for age. MEDIASTINUM AND JADE: Redemonstration of enlarged lower right paraesophageal node with increase in short axis from 1.1 cm to 1.6 cm (6/108). CHEST WALL AND LOWER NECK: Unremarkable. UPPER ABDOMEN: Hepatic steatosis. OSSEOUS STRUCTURES:  Normal for age. Benign bone island left humeral head.     Impression: No pulmonary embolus. Septal thickening and groundglass opacity due to pulmonary edema with trace effusions. Extensive lower lobe consolidation could also be due to edema but pneumonia/aspiration not excluded in the appropriate clinical setting. Redemonstration of enlarged lower right paraesophageal node, slightly increased from 5 days earlier. Hepatic steatosis. Workstation performed: [id number]     Procedure: XR chest portable ICU    Result Date: 1/26/2024  Narrative: CHEST INDICATION:  Hypoxia. COMPARISON: Chest radiograph January 25, 2024 and CT chest January 21, 2024 EXAM PERFORMED/VIEWS:  XR CHEST PORTABLE ICU  AP semierect FINDINGS:  Monitoring leads and clips project over the chest. Cardiomediastinal silhouette appears unremarkable. Low  lung volumes.  Within limitations of this examination there is no focal airspace opacity to suggest pneumonia. Mild pulmonary vascular prominence may be accentuated by low lung volumes versus mild vascular congestion. No pneumothorax or pleural effusion. Osseous structures appear within normal limits for patient age.     Impression: Low lung volumes. Mild pulmonary vascular prominence may be accentuated by low lung volumes versus mild vascular congestion. Correlate with clinical findings. Resident: GAGE STOCK I, the attending radiologist, have reviewed the images and agree with the final report above. Workstation performed: [id number]     Procedure: XR chest portable ICU    Result Date: 1/25/2024  Narrative: CHEST INDICATION:   hypoxia. COMPARISON: 1/23/2024 EXAM PERFORMED/VIEWS:  XR CHEST PORTABLE ICU FINDINGS: Interval removal of the enteric tube. Cardiomediastinal silhouette appears unremarkable. Small lung volumes, which causes crowding of bronchovascular markings. Within that limitation, no focal lung opacity. No pneumothorax or pleural effusion. Osseous structures appear within normal limits for patient age.     Impression: Small lung volumes without acute cardiopulmonary disease. Workstation performed: [id number]       Current Facility-Administered Medications   Medication Dose Route Frequency    benzonatate (TESSALON PERLES) capsule 100 mg  100 mg Oral TID PRN    cefepime (MAXIPIME) IVPB (premix in dextrose) 2,000 mg 50 mL  2,000 mg Intravenous Q12H    folic acid 1 mg, thiamine (VITAMIN B1) 100 mg in sodium chloride 0.9 % 100 mL IV piggyback   Intravenous Daily    gabapentin (NEURONTIN) capsule 400 mg  400 mg Oral TID    heparin (porcine) subcutaneous injection 5,000 Units  5,000 Units Subcutaneous Q8H MURALI    insulin glargine (LANTUS) subcutaneous injection 30 Units 0.3 mL  30 Units Subcutaneous HS    insulin lispro (HumaLOG) 100 units/mL subcutaneous injection 2-12 Units  2-12 Units Subcutaneous TID  With Meals    lactulose (CHRONULAC) oral solution 30 g  30 g Oral Daily    metoprolol tartrate (LOPRESSOR) tablet 25 mg  25 mg Oral Q12H MURALI    mirtazapine (REMERON) tablet 15 mg  15 mg Oral HS PRN    nicotine (NICODERM CQ) 14 mg/24hr TD 24 hr patch 14 mg  14 mg Transdermal Daily    ondansetron (ZOFRAN) injection 4 mg  4 mg Intravenous Q4H PRN    pantoprazole (PROTONIX) injection 40 mg  40 mg Intravenous Q24H MURALI    potassium chloride (Klor-Con M20) CR tablet 20 mEq  20 mEq Oral Q3H    sodium phosphate 30 mmol in dextrose 5 % 250 mL Infusion  30 mmol Intravenous Once    tamsulosin (FLOMAX) capsule 0.4 mg  0.4 mg Oral HS    vancomycin (VANCOCIN) capsule 125 mg  125 mg Oral Q12H MURALI    vancomycin (VANCOCIN) IVPB (premix in dextrose) 1,000 mg 200 mL  1,000 mg Intravenous Q12H     Medications Discontinued During This Encounter   Medication Reason    insulin regular (HumuLIN R,NovoLIN R) 1 Units/mL in sodium chloride 0.9 % 100 mL infusion     insulin regular (HumuLIN R,NovoLIN R) 1 Units/mL in sodium chloride 0.9 % 100 mL infusion     thiamine (VITAMIN B1) 100 mg in sodium chloride 0.9 % 50 mL IVPB     folic acid 1 mg in sodium chloride 0.9 % 50 mL IVPB     multi-electrolyte (ISOLYTE-S PH 7.4 equivalent) IV solution     multi-electrolyte (ISOLYTE-S PH 7.4 equivalent) IV solution     insulin regular (HumuLIN R,NovoLIN R) 1 Units/mL in sodium chloride 0.9 % 100 mL infusion     dextrose 5 % in lactated Ringer's infusion     multi-electrolyte (PLASMALYTE-A/ISOLYTE-S PH 7.4) IV solution     hydrOXYzine HCL (ATARAX) tablet 25 mg     HYDROmorphone (DILAUDID) injection 0.5 mg     pantoprazole (PROTONIX) injection 40 mg     colchicine (COLCRYS) tablet 0.6 mg     insulin lispro (HumaLOG) 100 units/mL subcutaneous injection 1-6 Units     celecoxib (CeleBREX) capsule 100 mg     gabapentin (NEURONTIN) capsule 300 mg     traMADol (ULTRAM) tablet 50 mg     colchicine (COLCRYS) tablet 0.6 mg     acetaminophen (TYLENOL) tablet 650 mg      allopurinol (ZYLOPRIM) tablet 100 mg     lactulose (CHRONULAC) oral solution 20 g     FLUoxetine (PROzac) capsule 20 mg     insulin lispro (HumaLOG) 100 units/mL subcutaneous injection 8 Units     chlorhexidine (PERIDEX) 0.12 % oral rinse 15 mL     oxazepam (SERAX) capsule 10 mg     sodium chloride 0.9 % infusion     hydrOXYzine HCL (ATARAX) tablet 25 mg     FLUoxetine (PROzac) capsule 10 mg     metoprolol tartrate (LOPRESSOR) tablet 25 mg     pantoprazole (PROTONIX) EC tablet 40 mg     insulin lispro (HumaLOG) 100 units/mL subcutaneous injection 1-6 Units     sodium chloride (PF) 0.9 % injection 3 mL     midodrine (PROAMATINE) tablet 5 mg     predniSONE tablet 40 mg     insulin glargine (LANTUS) subcutaneous injection 24 Units 0.24 mL     lactulose (CHRONULAC) oral solution 20 g     dexmedeTOMIDine (Precedex) 400 mcg in sodium chloride 0.9% 100 mL     insulin lispro (HumaLOG) 100 units/mL subcutaneous injection 2-12 Units     lactulose (CHRONULAC) oral solution 20 g     potassium-sodium phosphateS (K-PHOS,PHOSPHA 250) -250 mg tablet 2 tablet     insulin glargine (LANTUS) subcutaneous injection 28 Units 0.28 mL     insulin lispro (HumaLOG) 100 units/mL subcutaneous injection 8 Units     potassium chloride oral solution 40 mEq     methylPREDNISolone sodium succinate (Solu-MEDROL) injection 40 mg     potassium-sodium phosphateS (K-PHOS,PHOSPHA 250) -250 mg tablet 2 tablet     heparin (porcine) subcutaneous injection 5,000 Units     insulin lispro (HumaLOG) 100 units/mL subcutaneous injection 1-6 Units     insulin lispro (HumaLOG) 100 units/mL subcutaneous injection 10 Units     insulin lispro (HumaLOG) 100 units/mL subcutaneous injection 2-12 Units     dexmedeTOMIDine (Precedex) 400 mcg in sodium chloride 0.9% 100 mL     lactulose (CHRONULAC) oral solution 30 g     octreotide (SandoSTATIN) injection 100 mcg     albumin human (FLEXBUMIN) 25 % injection 25 g     midodrine (PROAMATINE) tablet 15 mg      metoprolol tartrate (LOPRESSOR) tablet 50 mg     QUEtiapine (SEROquel) tablet 25 mg     insulin lispro (HumaLOG) 100 units/mL subcutaneous injection 2-12 Units     insulin lispro (HumaLOG) 100 units/mL subcutaneous injection 2-12 Units     gabapentin (NEURONTIN) capsule 300 mg     lactulose (CHRONULAC) oral solution 30 g     midodrine (PROAMATINE) tablet 5 mg        Matt Simms, DO      This progress note was produced in part using a dictation device which may document imprecise wording from author's original intent.

## 2024-01-29 ENCOUNTER — APPOINTMENT (INPATIENT)
Dept: NON INVASIVE DIAGNOSTICS | Facility: HOSPITAL | Age: 34
DRG: 441 | End: 2024-01-29
Payer: COMMERCIAL

## 2024-01-29 LAB
ALBUMIN SERPL BCP-MCNC: 4.2 G/DL (ref 3.5–5)
ALP SERPL-CCNC: 100 U/L (ref 34–104)
ALT SERPL W P-5'-P-CCNC: 84 U/L (ref 7–52)
AMMONIA PLAS-SCNC: 68 UMOL/L (ref 18–72)
ANION GAP SERPL CALCULATED.3IONS-SCNC: 8 MMOL/L
AST SERPL W P-5'-P-CCNC: 127 U/L (ref 13–39)
BILIRUB DIRECT SERPL-MCNC: >30 MG/DL (ref 0–0.2)
BILIRUB SERPL-MCNC: 39.15 MG/DL (ref 0.2–1)
BUN SERPL-MCNC: 16 MG/DL (ref 5–25)
CALCIUM SERPL-MCNC: 8.9 MG/DL (ref 8.4–10.2)
CHLORIDE SERPL-SCNC: 106 MMOL/L (ref 96–108)
CO2 SERPL-SCNC: 23 MMOL/L (ref 21–32)
CREAT SERPL-MCNC: 1.26 MG/DL (ref 0.6–1.3)
ERYTHROCYTE [DISTWIDTH] IN BLOOD BY AUTOMATED COUNT: 21.2 % (ref 11.6–15.1)
GFR SERPL CREATININE-BSD FRML MDRD: 74 ML/MIN/1.73SQ M
GLUCOSE SERPL-MCNC: 252 MG/DL (ref 65–140)
GLUCOSE SERPL-MCNC: 265 MG/DL (ref 65–140)
GLUCOSE SERPL-MCNC: 332 MG/DL (ref 65–140)
GLUCOSE SERPL-MCNC: 390 MG/DL (ref 65–140)
HCT VFR BLD AUTO: 25.9 % (ref 36.5–49.3)
HGB BLD-MCNC: 8.4 G/DL (ref 12–17)
INR PPP: 1.66 (ref 0.84–1.19)
MAGNESIUM SERPL-MCNC: 2 MG/DL (ref 1.9–2.7)
MCH RBC QN AUTO: 34 PG (ref 26.8–34.3)
MCHC RBC AUTO-ENTMCNC: 32.4 G/DL (ref 31.4–37.4)
MCV RBC AUTO: 105 FL (ref 82–98)
PHOSPHATE SERPL-MCNC: 1.2 MG/DL (ref 2.7–4.5)
PLATELET # BLD AUTO: 123 THOUSANDS/UL (ref 149–390)
PMV BLD AUTO: 10.6 FL (ref 8.9–12.7)
POTASSIUM SERPL-SCNC: 3.2 MMOL/L (ref 3.5–5.3)
PROCALCITONIN SERPL-MCNC: 1.27 NG/ML
PROT SERPL-MCNC: 6.2 G/DL (ref 6.4–8.4)
PROTHROMBIN TIME: 19.9 SECONDS (ref 11.6–14.5)
RBC # BLD AUTO: 2.47 MILLION/UL (ref 3.88–5.62)
SODIUM SERPL-SCNC: 137 MMOL/L (ref 135–147)
VANCOMYCIN SERPL-MCNC: 15.6 UG/ML (ref 10–20)
WBC # BLD AUTO: 9.74 THOUSAND/UL (ref 4.31–10.16)

## 2024-01-29 PROCEDURE — 87493 C DIFF AMPLIFIED PROBE: CPT

## 2024-01-29 PROCEDURE — 80048 BASIC METABOLIC PNL TOTAL CA: CPT | Performed by: NURSE PRACTITIONER

## 2024-01-29 PROCEDURE — 99233 SBSQ HOSP IP/OBS HIGH 50: CPT | Performed by: STUDENT IN AN ORGANIZED HEALTH CARE EDUCATION/TRAINING PROGRAM

## 2024-01-29 PROCEDURE — 99232 SBSQ HOSP IP/OBS MODERATE 35: CPT | Performed by: INTERNAL MEDICINE

## 2024-01-29 PROCEDURE — 93970 EXTREMITY STUDY: CPT

## 2024-01-29 PROCEDURE — 82948 REAGENT STRIP/BLOOD GLUCOSE: CPT

## 2024-01-29 PROCEDURE — 97116 GAIT TRAINING THERAPY: CPT

## 2024-01-29 PROCEDURE — 93970 EXTREMITY STUDY: CPT | Performed by: SURGERY

## 2024-01-29 PROCEDURE — 83735 ASSAY OF MAGNESIUM: CPT | Performed by: NURSE PRACTITIONER

## 2024-01-29 PROCEDURE — 82140 ASSAY OF AMMONIA: CPT | Performed by: PHYSICIAN ASSISTANT

## 2024-01-29 PROCEDURE — 85610 PROTHROMBIN TIME: CPT | Performed by: NURSE PRACTITIONER

## 2024-01-29 PROCEDURE — 84145 PROCALCITONIN (PCT): CPT | Performed by: NURSE PRACTITIONER

## 2024-01-29 PROCEDURE — 97163 PT EVAL HIGH COMPLEX 45 MIN: CPT

## 2024-01-29 PROCEDURE — 80202 ASSAY OF VANCOMYCIN: CPT | Performed by: PHYSICIAN ASSISTANT

## 2024-01-29 PROCEDURE — 97167 OT EVAL HIGH COMPLEX 60 MIN: CPT

## 2024-01-29 PROCEDURE — 97535 SELF CARE MNGMENT TRAINING: CPT

## 2024-01-29 PROCEDURE — 85027 COMPLETE CBC AUTOMATED: CPT | Performed by: NURSE PRACTITIONER

## 2024-01-29 PROCEDURE — 84100 ASSAY OF PHOSPHORUS: CPT | Performed by: NURSE PRACTITIONER

## 2024-01-29 PROCEDURE — 80076 HEPATIC FUNCTION PANEL: CPT | Performed by: NURSE PRACTITIONER

## 2024-01-29 RX ORDER — INSULIN LISPRO 100 [IU]/ML
2-12 INJECTION, SOLUTION INTRAVENOUS; SUBCUTANEOUS
Status: DISCONTINUED | OUTPATIENT
Start: 2024-01-29 | End: 2024-01-30

## 2024-01-29 RX ORDER — INSULIN LISPRO 100 [IU]/ML
1-6 INJECTION, SOLUTION INTRAVENOUS; SUBCUTANEOUS
Status: DISCONTINUED | OUTPATIENT
Start: 2024-01-29 | End: 2024-01-30

## 2024-01-29 RX ORDER — LACTULOSE 10 G/15ML
20 SOLUTION ORAL DAILY
Status: DISCONTINUED | OUTPATIENT
Start: 2024-01-30 | End: 2024-01-30

## 2024-01-29 RX ORDER — PANTOPRAZOLE SODIUM 40 MG/1
40 TABLET, DELAYED RELEASE ORAL
Status: DISCONTINUED | OUTPATIENT
Start: 2024-01-29 | End: 2024-01-31 | Stop reason: HOSPADM

## 2024-01-29 RX ORDER — OXYCODONE HYDROCHLORIDE AND ACETAMINOPHEN 5; 325 MG/1; MG/1
1 TABLET ORAL EVERY 4 HOURS PRN
Status: DISCONTINUED | OUTPATIENT
Start: 2024-01-29 | End: 2024-01-29

## 2024-01-29 RX ORDER — MIRTAZAPINE 15 MG/1
15 TABLET, FILM COATED ORAL
Status: DISCONTINUED | OUTPATIENT
Start: 2024-01-29 | End: 2024-01-31 | Stop reason: HOSPADM

## 2024-01-29 RX ORDER — OXYCODONE HYDROCHLORIDE 5 MG/1
5 TABLET ORAL EVERY 4 HOURS PRN
Status: DISCONTINUED | OUTPATIENT
Start: 2024-01-29 | End: 2024-01-31 | Stop reason: HOSPADM

## 2024-01-29 RX ORDER — POTASSIUM CHLORIDE 14.9 MG/ML
20 INJECTION INTRAVENOUS ONCE
Status: COMPLETED | OUTPATIENT
Start: 2024-01-29 | End: 2024-01-29

## 2024-01-29 RX ORDER — POTASSIUM CHLORIDE 20 MEQ/1
40 TABLET, EXTENDED RELEASE ORAL ONCE
Status: COMPLETED | OUTPATIENT
Start: 2024-01-29 | End: 2024-01-29

## 2024-01-29 RX ORDER — INSULIN GLARGINE 100 [IU]/ML
10 INJECTION, SOLUTION SUBCUTANEOUS EVERY MORNING
Status: DISCONTINUED | OUTPATIENT
Start: 2024-01-29 | End: 2024-01-29

## 2024-01-29 RX ORDER — INSULIN LISPRO 100 [IU]/ML
12 INJECTION, SOLUTION INTRAVENOUS; SUBCUTANEOUS
Status: DISCONTINUED | OUTPATIENT
Start: 2024-01-29 | End: 2024-01-30

## 2024-01-29 RX ORDER — INSULIN GLARGINE 100 [IU]/ML
36 INJECTION, SOLUTION SUBCUTANEOUS
Status: DISCONTINUED | OUTPATIENT
Start: 2024-01-29 | End: 2024-01-31 | Stop reason: HOSPADM

## 2024-01-29 RX ORDER — INSULIN LISPRO 100 [IU]/ML
4-20 INJECTION, SOLUTION INTRAVENOUS; SUBCUTANEOUS
Status: DISCONTINUED | OUTPATIENT
Start: 2024-01-29 | End: 2024-01-29

## 2024-01-29 RX ADMIN — METOPROLOL TARTRATE 25 MG: 25 TABLET, FILM COATED ORAL at 20:00

## 2024-01-29 RX ADMIN — ONDANSETRON 4 MG: 2 INJECTION INTRAMUSCULAR; INTRAVENOUS at 08:52

## 2024-01-29 RX ADMIN — HEPARIN SODIUM 5000 UNITS: 5000 INJECTION INTRAVENOUS; SUBCUTANEOUS at 21:02

## 2024-01-29 RX ADMIN — TAMSULOSIN HYDROCHLORIDE 0.4 MG: 0.4 CAPSULE ORAL at 21:02

## 2024-01-29 RX ADMIN — INSULIN GLARGINE 10 UNITS: 100 INJECTION, SOLUTION SUBCUTANEOUS at 09:53

## 2024-01-29 RX ADMIN — BENZONATATE 100 MG: 100 CAPSULE ORAL at 19:52

## 2024-01-29 RX ADMIN — NICOTINE 14 MG: 14 PATCH, EXTENDED RELEASE TRANSDERMAL at 08:29

## 2024-01-29 RX ADMIN — MIRTAZAPINE 15 MG: 15 TABLET, FILM COATED ORAL at 01:51

## 2024-01-29 RX ADMIN — SODIUM PHOSPHATE, MONOBASIC, MONOHYDRATE AND SODIUM PHOSPHATE, DIBASIC, ANHYDROUS 30 MMOL: 142; 276 INJECTION, SOLUTION INTRAVENOUS at 08:30

## 2024-01-29 RX ADMIN — GABAPENTIN 400 MG: 400 CAPSULE ORAL at 15:06

## 2024-01-29 RX ADMIN — METHYLPREDNISOLONE SODIUM SUCCINATE 40 MG: 40 INJECTION, POWDER, FOR SOLUTION INTRAMUSCULAR; INTRAVENOUS at 08:29

## 2024-01-29 RX ADMIN — POTASSIUM CHLORIDE 40 MEQ: 1500 TABLET, EXTENDED RELEASE ORAL at 05:56

## 2024-01-29 RX ADMIN — POTASSIUM CHLORIDE 40 MEQ: 1500 TABLET, EXTENDED RELEASE ORAL at 09:54

## 2024-01-29 RX ADMIN — FLUOXETINE 20 MG: 20 CAPSULE ORAL at 08:27

## 2024-01-29 RX ADMIN — INSULIN LISPRO 16 UNITS: 100 INJECTION, SOLUTION INTRAVENOUS; SUBCUTANEOUS at 17:10

## 2024-01-29 RX ADMIN — INSULIN LISPRO 2 UNITS: 100 INJECTION, SOLUTION INTRAVENOUS; SUBCUTANEOUS at 21:02

## 2024-01-29 RX ADMIN — BENZONATATE 100 MG: 100 CAPSULE ORAL at 16:50

## 2024-01-29 RX ADMIN — OXYCODONE HYDROCHLORIDE 5 MG: 5 TABLET ORAL at 19:52

## 2024-01-29 RX ADMIN — INSULIN GLARGINE 36 UNITS: 100 INJECTION, SOLUTION SUBCUTANEOUS at 21:02

## 2024-01-29 RX ADMIN — VANCOMYCIN HYDROCHLORIDE 1000 MG: 1 INJECTION, SOLUTION INTRAVENOUS at 08:39

## 2024-01-29 RX ADMIN — INSULIN LISPRO 10 UNITS: 100 INJECTION, SOLUTION INTRAVENOUS; SUBCUTANEOUS at 13:09

## 2024-01-29 RX ADMIN — BENZONATATE 100 MG: 100 CAPSULE ORAL at 08:52

## 2024-01-29 RX ADMIN — POTASSIUM CHLORIDE 20 MEQ: 14.9 INJECTION, SOLUTION INTRAVENOUS at 05:59

## 2024-01-29 RX ADMIN — CEFEPIME HYDROCHLORIDE 2000 MG: 2 INJECTION, SOLUTION INTRAVENOUS at 17:10

## 2024-01-29 RX ADMIN — INSULIN LISPRO 4 UNITS: 100 INJECTION, SOLUTION INTRAVENOUS; SUBCUTANEOUS at 17:30

## 2024-01-29 RX ADMIN — INSULIN LISPRO 6 UNITS: 100 INJECTION, SOLUTION INTRAVENOUS; SUBCUTANEOUS at 08:29

## 2024-01-29 RX ADMIN — GABAPENTIN 400 MG: 400 CAPSULE ORAL at 08:27

## 2024-01-29 RX ADMIN — MIRTAZAPINE 15 MG: 15 TABLET, FILM COATED ORAL at 21:02

## 2024-01-29 RX ADMIN — GABAPENTIN 400 MG: 400 CAPSULE ORAL at 20:00

## 2024-01-29 RX ADMIN — HEPARIN SODIUM 5000 UNITS: 5000 INJECTION INTRAVENOUS; SUBCUTANEOUS at 06:07

## 2024-01-29 RX ADMIN — CEFEPIME HYDROCHLORIDE 2000 MG: 2 INJECTION, SOLUTION INTRAVENOUS at 06:06

## 2024-01-29 RX ADMIN — OXYCODONE HYDROCHLORIDE 5 MG: 5 TABLET ORAL at 10:16

## 2024-01-29 RX ADMIN — METOPROLOL TARTRATE 25 MG: 25 TABLET, FILM COATED ORAL at 08:27

## 2024-01-29 RX ADMIN — LIDOCAINE 5% 1 PATCH: 700 PATCH TOPICAL at 20:00

## 2024-01-29 RX ADMIN — VANCOMYCIN HYDROCHLORIDE 1000 MG: 1 INJECTION, SOLUTION INTRAVENOUS at 19:52

## 2024-01-29 RX ADMIN — PANTOPRAZOLE SODIUM 40 MG: 40 TABLET, DELAYED RELEASE ORAL at 08:27

## 2024-01-29 RX ADMIN — VANCOMYCIN HYDROCHLORIDE 125 MG: 125 CAPSULE ORAL at 20:00

## 2024-01-29 RX ADMIN — HEPARIN SODIUM 5000 UNITS: 5000 INJECTION INTRAVENOUS; SUBCUTANEOUS at 15:00

## 2024-01-29 RX ADMIN — LACTULOSE 30 G: 20 SOLUTION ORAL at 08:27

## 2024-01-29 RX ADMIN — VANCOMYCIN HYDROCHLORIDE 125 MG: 125 CAPSULE ORAL at 08:27

## 2024-01-29 RX ADMIN — THIAMINE HYDROCHLORIDE: 100 INJECTION, SOLUTION INTRAMUSCULAR; INTRAVENOUS at 10:17

## 2024-01-29 NOTE — PLAN OF CARE
Problem: PHYSICAL THERAPY ADULT  Goal: Performs mobility at highest level of function for planned discharge setting.  See evaluation for individualized goals.  Description: Treatment/Interventions: ADL retraining, Functional transfer training, LE strengthening/ROM, Elevations, Therapeutic exercise, Endurance training, Patient/family training, Equipment eval/education, Bed mobility, Gait training, Compensatory technique education, Spoke to nursing, Spoke to case management, OT  Equipment Recommended: Walker       See flowsheet documentation for full assessment, interventions and recommendations.  1/29/2024 1348 by Анна May, PT  Note: Prognosis: Good  Problem List: Decreased strength, Decreased endurance, Impaired balance, Decreased mobility, Impaired judgement, Decreased safety awareness, Obesity  Pt tolerated session fairly. He was able to ambulate and transfer with decreased asisstance with use of RW compared to no AD. He was able to ambulate increased distance with RW. He fatigues quickly and requires increased rest between activities. He is limited by decreased strength, balance and endurance. He will continue to benefit from PT services to maximize LOF.  Barriers to Discharge: Decreased caregiver support, Inaccessible home environment  Barriers to Discharge Comments: spouse works, patient currently requiring assistance with mobility, decreased activity tolerance  Rehab Resource Intensity Level, PT: III (Minimum Resource Intensity)    See flowsheet documentation for full assessment.

## 2024-01-29 NOTE — PHYSICAL THERAPY NOTE
"   PHYSICAL THERAPY EVALUATION  NAME:  Michael Koch  DATE: 01/29/24    AGE:   33 y.o.  Mrn:   [id number]  ADMIT DX:  Ureteral stone [N20.1]  Back pain [M54.9]  Jaundice [R17]  Hyperglycemia [R73.9]  Left ureteral stone [N20.1]    Past Medical History:   Diagnosis Date    Depression     Diabetes mellitus (HCC)     Gout     Hypertension     Hyponatremia 01/21/2024    Kidney stones      Length Of Stay: 8  Performed at least 2 patient identifiers during session: Name and Birthday  PHYSICAL THERAPY EVALUATION :    01/29/24 1111   PT Last Visit   PT Visit Date 01/29/24   Note Type   Note type Evaluation   Pain Assessment   Pain Assessment Tool 0-10   Pain Score No Pain   Restrictions/Precautions   Other Precautions Chair Alarm;Bed Alarm;Fall Risk;Telemetry;Multiple lines   Home Living   Type of Home House  (10 [address] HR)   Home Layout Two level;Bed/bath upstairs  (FF to 2nd floor)   Bathroom Shower/Tub Tub/shower unit   Bathroom Toilet Standard   Additional Comments Reports livign in a 2SH with family and CAMILO and FF to 2nd floor. Reports not using an AD PTA.   Prior Function   Level of [place] Independent with ADLs;Independent with functional mobility;Independent with IADLS   Lives With Family  (wife and 3 kids)   Receives Help From Family   IADLs Independent with driving;Independent with meal prep;Independent with medication management   Falls in the last 6 months 1 to 4   Vocational Full time employment  ([employer])   Comments Reports being independent with mobility, ADLs and IADLs.   General   Additional Pertinent History increased B LE edema. pt with significant jaundice.   Cognition   Orientation Level Oriented X4   Following Commands Follows one step commands without difficulty   Comments decreased insight to deficits, prognosis, course of disease   Subjective   Subjective \"I am going home.\"   RLE Assessment   RLE Assessment WFL  (3+/5)   LLE Assessment   LLE Assessment WFL  (3+/5 except " hip flexion 3-/5)   Coordination   Rapid Alternating Movements Intact   Light Touch   RLE Light Touch Grossly intact   LLE Light Touch Grossly intact   Bed Mobility   Additional Comments Pt sitting OOB in recliner chair upon arrival to room and at end of session.   Transfers   Sit to Stand   (steadying assistance)   Additional items Assist x 1;Increased time required;Verbal cues   Stand to Sit   (steadying assistance)   Additional items Assist x 1;Increased time required;Verbal cues   Stand pivot 4  Minimal assistance   Additional items Assist x 1;Increased time required;Verbal cues   Additional Comments no AD. sit<>stand with steadying assistance with wide DIANA. spt with miNAx1 for balance and wt shfiting wiht verbal cues for turing completely. slight LOB posteriorly with turning requiring manual cues to recover and pt stepping strategy   Ambulation/Elevation   Gait pattern Improper Weight shift;Decreased foot clearance;Short stride;Excessively slow;Knees flexed;Wide DIANA;Narrow DIANA  (varied DIANA, evidence of imbalance.)   Gait Assistance 4  Minimal assist   Additional items Assist x 1;Verbal cues   Assistive Device None   Distance ambulated 25'x1 without AD with minimal assistance wiht varied DIANA, inc knee flexion, decreased steplength and foot clearance, pt guarded reaching for external support.   Balance   Static Sitting Good   Dynamic Sitting Fair +   Static Standing Fair -   Dynamic Standing Poor +   Ambulatory Poor +   Activity Tolerance   Activity Tolerance Patient limited by fatigue   Medical Staff Made Aware Pinky HOPE   Nurse Made Aware Loretta CANTU/Rebecca   Assessment   Prognosis Good   Problem List Decreased strength;Decreased endurance;Impaired balance;Decreased mobility;Impaired judgement;Decreased safety awareness;Obesity   Barriers to Discharge Decreased caregiver support;Inaccessible home environment   Barriers to Discharge Comments spouse works, patient currently requiring assistance with mobility,  "decreased activity tolerance   Goals   Patient Goals \"Go home\"   STG Expiration Date 02/12/24   PT Treatment Day 1   Plan   Treatment/Interventions ADL retraining;Functional transfer training;LE strengthening/ROM;Elevations;Therapeutic exercise;Endurance training;Patient/family training;Equipment eval/education;Bed mobility;Gait training;Compensatory technique education;Spoke to nursing;Spoke to case management;OT   PT Frequency 3-5x/wk   Discharge Recommendation   Rehab Resource Intensity Level, PT III (Minimum Resource Intensity)   Equipment Recommended Walker   Walker Package Recommended Wheeled walker   Change/add to Walker Package? No   Additional Comments assistance from spouse prn.   AM-PAC Basic Mobility Inpatient   Turning in Flat Bed Without Bedrails 3   Lying on Back to Sitting on Edge of Flat Bed Without Bedrails 3   Moving Bed to Chair 3   Standing Up From Chair Using Arms 3   Walk in Room 3   Climb 3-5 Stairs With Railing 2   Basic Mobility Inpatient Raw Score 17   Basic Mobility Standardized Score 39.67   Highest Level Of Mobility   JH-HLM Goal 5: Stand one or more mins   JH-HLM Achieved 7: Walk 25 feet or more   Additional Treatment Session   Start Time 1124   End Time 1134   Treatment Assessment Pt tolerated session fairly. He was able to ambulate and transfer with decreased asisstance with use of RW compared to no AD. He was able to ambulate increased distance with RW. He fatigues quickly and requires increased rest between activities. He is limited by decreased strength, balance and endurance. He will continue to benefit from PT services to maximize LOF.   Equipment Use initiated use of RW. min cues for hand placement for safety with RW. sit<>stand with SBA with inc time. standing dynamic balance reaching anteirorly outside DIANA with UE support with SBA with min cues for upright posture. ambulated 61'x1 with RW with steadying to SBA with slow eva, varied DIANA, min cues to stay within DIANA of RW " and for proper use of RW. pt reporting fatigue after. educated to complete LE TE of ankle DF/PF, LAQ and hip flexion. pt completed 1x10 each with min cues for proper completion. discussed ambulaiton with nursing staff for progression of mobility to reutnr to home when appropriate. pt verbalized understanding.   End of Consult   Patient Position at End of Consult Bedside chair;Bed/Chair alarm activated;All needs within reach       Pt requires PT/OT co-eval due to medical complexity, safety concerns, fall risk, significant assistance with mobility and/or cognitive-behavioral impairments.    (Please find full objective findings from PT assessment regarding body systems outlined above).     Assessment: Pt is a 33 y.o. male seen for PT evaluation s/p admission to [facility] on 1/21/2024 with Alcoholic hepatitis.  Order placed for PT services.  Upon evaluation: Pt is presenting with impaired functional mobility due to decreased strength, decreased endurance, impaired balance, gait deviations, decreased safety awareness, impaired judgment, fall risk, and LE edema requiring  steadying to minimal assistance for transfers and minimal assistance for ambulation with out AD . Pt's clinical presentation is currently unpredictable given the functional mobility deficits above, especially weakness, edema of extremities, decreased endurance, impaired balance, gait deviations, decreased activity tolerance, decreased functional mobility tolerance, decreased safety awareness, and impaired judgement, coupled with fall risks as indicated by AM-PAC 6-Clicks: 17/24 as well as hx of falls, impaired balance, polypharmacy, impaired judgement, decreased safety awareness, and obesity and combined with medical complications of abnormal H&H, abnormal blood sugars, need for input for mobility technique/safety, and possible ileus on abdomen xray, alcoholic hepatitis, SIRS, hepatic encephalopathy, DUANE, acute respiratory failure  with hypoxia and hypercapnia, thrombocytopenia, RSV infection, transaminitis with elevated bilirubin, hyperammonemia, metabolic acidosis on admission .  Pt's PMHx and comorbidities that may affect physical performance and progress include: depression and DM, ETOH abuse, gout, HTN, anxiety, nephrolithiasis s/p lithotripsy, and stent removal . Personal factors affecting pt at time of IE include: inaccessible home environment, step(s) to enter environment, multi-level environment, limited home support, inability to perform IADLs, inability to perform ADLs, inability to navigate level surfaces without external assistance, inability to ambulate household distances, and recent fall(s)/fall history. Pt will benefit from continued skilled PT services to address deficits as defined above and to maximize level of functional mobility to facilitate return toward PLOF and improved QOL. From PT/mobility standpoint, recommendation at time of d/c would be Level III (Minimum Resource Intensity), with family and/or caregiver support, and with walker in order to reduce fall risk and maximize pt's functional independence and consistency with mobility. Recommend trial with walker next 1-2 sessions and ther ex next 1-2 sessions.       The patient's AM-PAC Basic Mobility Inpatient Short Form Raw Score is 17. A Raw score of greater than 16 suggests the patient may benefit from discharge to home. Please also refer to the recommendation of the Physical Therapist for safe discharge planning.       Goals: Pt will: Perform bed mobility tasks with modified Independent to reposition in bed and prepare for transfers. Pt will perform transfers with modified Independent to decrease burden of care, decrease risk for falls, and improve activity tolerance and prepare for ambulation. Pt will ambulate with LRAD for >/= 250' with  modified Independent  to decrease burden of care, decrease risk for falls, improve activity tolerance, and improve gait  quality and to access home environment. Pt will complete 1 step with LRAD and >/= 12 steps with unilateral handrail with modified Independent to decrease burden of care, return to home with CAMILO, return to multilevel home, decrease risk for falls, and improve activity tolerance. Pt will participate in objective balance assessment to determine baseline fall risk. Pt will participate in SSWS assessment to determine level of mobility. Pt will increase B LE strength >/= 1/2 MMT grade to facilitate functional mobility.      Анна May, PT,DPT

## 2024-01-29 NOTE — UTILIZATION REVIEW
Continued Stay Review for 1/28/24    Date: 1/29/24                           Current Patient Class: IP  Current Level of Care: MS    HPI:33 y.o. male initially admitted on 1/21/24 - DX:  Alcoholic hepatitis  / GI bleed  / Hepatic encephalopathy  / Abnormal CT scan / DUANE (acute kidney injury)  / Type 2 diabetes mellitus with hyperglycemia, without long-term current use of insulin     Assessment/Plan:   1/28/24: Tmax 101.5; tachy; tachypnea; Pulmonary insufficiency: 3L n/c. jaundiced; scleral icterus; abdomin is rigid; DUANE - Improving. Secondary to HRS vs Bile cast nephropathy. Cr 1.33, adequate UOP. Nephrology following ; Hyperbilirubinemia: Total bili 36; Poorly controlled DM-2: Hba1c 8.8; was noted to have hemoptysis, hypoxia and acute respiratory failure.  Septic workup initiated to include  and CTA PE study.  Lactic acid negative, stool C. Diff pending, blood cultures redrawn and CTA negative for PE with possible aspiration.  Patient empirically started on abx.     Plan: cont iv abx; rec'd albumin iv x1; cont folic acid (thiamine) IVPB; restart iv solumedrol; Accuchecks with ssic; titrate O2; cont lactulose; monitor labs; f/u LE dopplers    Vital Signs:   Date/Time Temp Pulse Resp BP MAP (mmHg) SpO2 Calculated FIO2 (%) - Nasal Cannula Nasal Cannula O2 Flow Rate (L/min) O2 Device Patient Position - Orthostatic VS   01/28/24 2300 98.4 °F (36.9 °C) 109 Abnormal  18 133/91 107 94 % 28 2 L/min Nasal cannula Lying   01/28/24 2148 -- 101 -- 124/79 -- -- -- -- -- --   01/28/24 1900 99.8 °F (37.7 °C) 104 20 120/75 92 93 % 28 2 L/min Nasal cannula Lying   01/28/24 1500 100 °F (37.8 °C) 109 Abnormal  32 Abnormal  136/84 105 96 % -- -- -- --   01/28/24 1300 99.8 °F (37.7 °C) -- -- -- -- -- -- -- -- --   01/28/24 1100 100.6 °F (38.1 °C) Abnormal  127 Abnormal  38 Abnormal  134/84 101 96 % 28 2 L/min Nasal cannula Lying   01/28/24 0926 98.9 °F (37.2 °C) -- -- -- -- -- -- -- -- --   01/28/24 0846 -- 125 Abnormal  -- 152/90 --  -- -- -- -- --   01/28/24 0800 101.5 °F (38.6 °C) Abnormal  117 Abnormal  40 Abnormal  116/70 86 96 % -- -- -- --   01/28/24 0700 -- 125 Abnormal  41 Abnormal  143/96 112 96 % -- -- -- --   01/28/24 0600 -- 118 Abnormal  20 134/86 104 96 % -- -- -- --   01/28/24 0400 98.2 °F (36.8 °C) 112 Abnormal  20 117/78 91 96 % 28 2 L/min Nasal cannula Lying   01/28/24 0200 -- 109 Abnormal  22 116/66 85 97 % -- -- -- --   01/28/24 0000 99.2 °F (37.3 °C) 116 Abnormal  23 Abnormal  131/85 102 93 % 28 2 L/min -- Lying       Pertinent Labs/Diagnostic Results:      Results from last 7 days   Lab Units 01/29/24 0347 01/28/24 0418 01/27/24 0413 01/27/24 0027 01/26/24 1932 01/26/24 0427 01/25/24 0528 01/24/24 0446 01/23/24  0453   WBC Thousand/uL 9.74 9.31 8.39  --   --    < > 6.55   < > 6.34   HEMOGLOBIN g/dL 8.4* 8.8* 8.4* 8.7* 8.5*   < > 9.5*   < > 10.8*   HEMATOCRIT % 25.9* 26.8* 25.1* 26.2* 24.8*   < > 27.2*   < > 29.4*   PLATELETS Thousands/uL 123* 131* 130*  --   --    < > 91*   < > 183   NEUTROS ABS Thousands/µL  --   --   --   --   --   --  4.82  --  4.92   BANDS PCT %  --   --  8  --   --   --   --   --   --     < > = values in this interval not displayed.        Results from last 7 days   Lab Units 01/29/24 0347 01/28/24 0418 01/27/24 0413 01/26/24  0427 01/25/24 2015 01/25/24  1252 01/25/24  0528   SODIUM mmol/L 137 141 141 139 138   < > 136   POTASSIUM mmol/L 3.2* 3.0* 3.1* 3.7 3.7   < > 3.1*   CHLORIDE mmol/L 106 108 107 106 106   < > 101   CO2 mmol/L 23 23 23 23 21   < > 24   ANION GAP mmol/L 8 10 11 10 11   < > 11   BUN mg/dL 16 16 19 21 21   < > 22   CREATININE mg/dL 1.26 1.33* 1.27 1.47* 1.59*   < > 1.95*   EGFR ml/min/1.73sq m 74 69 73 61 56   < > 43   CALCIUM mg/dL 8.9 9.2 9.0 8.7 9.2   < > 9.2   CALCIUM, IONIZED mmol/L  --   --  1.17 1.13  --   --   --    MAGNESIUM mg/dL 2.0 1.9 2.1 2.0  --   --  2.0   PHOSPHORUS mg/dL 1.2* 1.0* 1.6* 6.5* 2.7   < > <1.0*    < > = values in this interval not displayed.      Results from last 7 days   Lab Units 01/29/24 0353 01/29/24 0347 01/28/24 0418 01/27/24  0413 01/26/24  0427 01/25/24  0615 01/25/24  0528 01/24/24  1522 01/24/24  0446 01/23/24  1325   AST U/L  --  127* 173* 123* 125*  --  139*  --  173*  --    ALT U/L  --  84* 68* 51 53*  --  58*  --  62*  --    ALK PHOS U/L  --  100 84 70 106*  --  98  --  105*  --    TOTAL PROTEIN g/dL  --  6.2* 4.8* 5.8* 6.0*  --  6.2*  --  5.6*  --    ALBUMIN g/dL  --  4.2 3.8 4.0 4.1  --  4.1  --  3.1*  --    TOTAL BILIRUBIN mg/dL  --  39.15* 36.86* 28.52* 27.25*  --  27.17*  --  23.46*  --    BILIRUBIN DIRECT mg/dL  --  >30.00* 21.54*  --   --   --   --   --   --   --    AMMONIA umol/L 68  --   --   --   --  85*  --  103* 128* 148*     Results from last 7 days   Lab Units 01/28/24 2058 01/28/24 1622 01/28/24  1138 01/28/24  0711 01/27/24  2134 01/27/24  1618 01/27/24  1130 01/27/24  0550 01/26/24  2104 01/26/24  1616 01/26/24  1150 01/26/24  0716   POC GLUCOSE mg/dl 254* 202* 298* 168* 148* 106 168* 129 155* 132 156* 189*     Results from last 7 days   Lab Units 01/29/24 0347 01/28/24 0418 01/27/24 0413 01/26/24  0427 01/25/24 2015 01/25/24  1252 01/25/24  0528 01/25/24  0002 01/24/24  1522 01/24/24  0446 01/23/24  0530   GLUCOSE RANDOM mg/dL 252* 145* 142* 252* 200* 225* 191* 280* 224* 181* 159*     Results from last 7 days   Lab Units 01/24/24  1522 01/23/24  1325   OSMOLALITY, SERUM mmol/* 283        BETA-HYDROXYBUTYRATE   Date Value Ref Range Status   01/21/2024 0.2 <0.6 mmol/L Final         Results from last 7 days   Lab Units 01/26/24  0757 01/24/24  1001   PH TEVIN  7.252* 7.333   PCO2 TEVIN mm Hg 54.5* 38.2*   PO2 TEVIN mm Hg 24.9* 46.7*   HCO3 TEVIN mmol/L 23.5* 19.8*   BASE EXC TEVIN mmol/L -4.0 -5.5   O2 CONTENT TEVIN ml/dL 4.4 11.5   O2 HGB, VENOUS % 30.1* 75.0        Results from last 7 days   Lab Units 01/29/24  0347 01/28/24  0418 01/27/24  0413   PROTIME seconds 19.9* 18.8* 19.6*   INR  1.66* 1.54* 1.62*        Results  from last 7 days   Lab Units 01/29/24  0347 01/28/24  0418 01/26/24  0427   PROCALCITONIN ng/ml 1.27* 1.12* 0.54*     Results from last 7 days   Lab Units 01/27/24  1737 01/22/24  1122   LACTIC ACID mmol/L 0.9 3.4*        Results from last 7 days   Lab Units 01/26/24  1932   FERRITIN ng/mL 716*   IRON SATURATION % 23   IRON ug/dL 25*   TIBC ug/dL 107*        Results from last 7 days   Lab Units 01/24/24  0446   HEP B S AG  Non-reactive   HEP C AB  Non-reactive   HEP B C IGM  Non-reactive     Results from last 7 days   Lab Units 01/28/24  0418   LIPASE u/L 244*        Results from last 7 days   Lab Units 01/24/24  1522 01/24/24  0629 01/23/24  1325   OSMOLALITY, SERUM mmol/*  --  283   OSMO UR mmol/KG  --  576  --      Results from last 7 days   Lab Units 01/27/24  1734 01/24/24  0629   CLARITY UA  Slightly Cloudy  --    COLOR UA  [place]  --    SPEC GRAV UA  1.020  --    PH UA  5.5  --    GLUCOSE UA mg/dl 100 (1/10%)*  --    KETONES UA mg/dl Negative  --    BLOOD UA  Large*  --    PROTEIN UA mg/dl 30 (1+)*  --    NITRITE UA  Negative  --    BILIRUBIN UA  Large*  --    UROBILINOGEN UA E.U./dl 0.2  --    LEUKOCYTES UA  Negative  --    WBC UA /hpf 0-1*  --    RBC UA /hpf 30-50*  --    BACTERIA UA /hpf Occasional  --    EPITHELIAL CELLS WET PREP /hpf Occasional  --    SODIUM UR   --  44   CREATININE UR mg/dL  --  181.9   PROTEIN UR mg/dL  --  188  188   PROT/CREAT RATIO UR   --  1.03*        Results from last 7 days   Lab Units 01/24/24  0446   ACETAMINOPHEN LVL ug/mL <2*        Results from last 7 days   Lab Units 01/27/24  1731   BLOOD CULTURE  No Growth at 24 hrs.  No Growth at 24 hrs.        Results from last 7 days   Lab Units 01/29/24  0347   VANCOMYCIN RM ug/mL 15.6       Medications:   Scheduled Medications:  cefepime, 2,000 mg, Intravenous, Q12H  FLUoxetine, 20 mg, Oral, Daily  folic acid 1 mg, thiamine (VITAMIN B1) 100 mg in sodium chloride 0.9 % 100 mL IV piggyback, , Intravenous, Daily  gabapentin,  400 mg, Oral, TID  heparin (porcine), 5,000 Units, Subcutaneous, Q8H MURALI  insulin glargine, 30 Units, Subcutaneous, HS  insulin lispro, 2-12 Units, Subcutaneous, TID With Meals  lactulose, 30 g, Oral, Daily  lidocaine, 1 patch, Topical, Daily  methylPREDNISolone sodium succinate, 40 mg, Intravenous, Daily  metoprolol tartrate, 25 mg, Oral, Q12H MURALI  nicotine, 14 mg, Transdermal, Daily  pantoprazole, 40 mg, Oral, Early Morning  sodium phosphate, 30 mmol, Intravenous, Once  tamsulosin, 0.4 mg, Oral, HS  vancomycin, 125 mg, Oral, Q12H MURALI  vancomycin, 1,000 mg, Intravenous, Q12H    albumin human (FLEXBUMIN) 25 % injection 25 g  Dose: 25 g  Freq: Every 12 hours Route: IV  Last Dose: Stopped (01/28/24 0545)  Start: 01/27/24 1600 End: 01/28/24 0545       Continuous IV Infusions:     PRN Meds:  benzonatate, 100 mg, Oral, TID PRN  (1/28 rec'd x1)   mirtazapine, 15 mg, Oral, HS PRN  ondansetron, 4 mg, Intravenous, Q4H PRN   (1/28 rec'd x1)         Discharge Plan: D    Network Utilization Review Department  ATTENTION: Please call with any questions or concerns to [phone] and carefully listen to the prompts so that you are directed to the right person. All voicemails are confidential.   For Discharge needs, contact Care Management DC Support Team at [phone] opt. 2  Send all requests for admission clinical reviews, approved or denied determinations and any other requests to dedicated fax number below belonging to the [place] where the patient is receiving treatment. List of dedicated fax numbers for the Facilities:  FACILITY NAME UR FAX NUMBER   ADMISSION DENIALS (Administrative/Medical Necessity) [phone]   DISCHARGE SUPPORT TEAM (NETWORK) [phone]   PARENT CHILD HEALTH (Maternity/NICU/Pediatrics) [phone]   [facility] [phone]   [facility] [phone]   [facility] [phone]   [facility]  [phone]   [facility] [phone]   [facility] [phone]   [facility] [phone]   [facility] [phone]   [facility] [phone]   [facility] [phone]   [facility] [phone]   [facility] [phone]

## 2024-01-29 NOTE — PLAN OF CARE
Problem: OCCUPATIONAL THERAPY ADULT  Goal: Performs self-care activities at highest level of function for planned discharge setting.  See evaluation for individualized goals.  Description: Treatment Interventions: ADL retraining, Visual perceptual retraining, Functional transfer training, UE strengthening/ROM, Endurance training, Cognitive reorientation, Patient/family training, Equipment evaluation/education, Neuromuscular reeducation, Fine motor coordination activities, Compensatory technique education, UE splinting, Continued evaluation, Cardiac education, Energy conservation, Activityengagement          See flowsheet documentation for full assessment, interventions and recommendations.   Note: Limitation: Decreased ADL status, Decreased UE strength, Decreased Safe judgement during ADL, Decreased endurance, Decreased self-care trans, Decreased high-level ADLs  Prognosis: Good  Assessment: Pt is a 33 y.o. male, admitted to [facility] 1/21/2024 d/t experiencing back pain, nausea and vomiting. Dx: alcoholic hepatitis. Pt with PMHx impacting their performance during ADL tasks, including: DM2, ETOH abuse, tobacco abuse, marijuana use, gout, depression, anxiety, hypertension, GERD nephrolithiasis s/p lithotripsy and stent removal. Prior to admission to the hospital Pt was performing ADLs without physical assistance. IADLs without physical assistance. Functional transfers/ambulation without physical assistance. Cognitive status was PTA was intact. OT order placed to assess Pt's ADLs, cognitive status, and performance during functional tasks in order to maximize safety and independence while making most appropriate d/c recommendations. Pt's clinical presentation is currently unstable/unpredictable given new onset deficits that effect Pt's occupational performance and ability to safely return to OF including decrease activity tolerance, decrease standing balance, decrease performance during ADL tasks, decrease safety  awareness , decrease UB MS, decrease generalized strength, decrease activity engagement, and decrease performance during functional transfers combined with medical complications of abnormal H&H, abnormal CBC, abnormal blood sugars, abnormal potassium values, and need for input for mobility technique/safety. Personal factors affecting Pt at time of initial evaluation include: step(s) to enter environment, multi-level environment, inability to perform current job functions, inability to perform IADLs, new need for AD, limited insight into impairments, and recent fall(s)/fall history. Pt will benefit from continued skilled OT services to address deficits as defined above and to maximize level independence/participation during ADLs and functional tasks to facilitate return toward PLOF and improved quality of life. From an occupational therapy standpoint, recommendation at time of d/c would be Level III: Minimum Resource Intensity Therapy.     Rehab Resource Intensity Level, OT: III (Minimum Resource Intensity)

## 2024-01-29 NOTE — ASSESSMENT & PLAN NOTE
Lab Results   Component Value Date    HGBA1C 8.8 (H) 01/21/2024     Recent Labs     01/28/24  0711 01/28/24  1138 01/28/24  1622 01/28/24 2058   POCGLU 168* 298* 202* 254*     Blood Sugar Average: Last 72 hrs:  (P) [id number]    Initially presented in DKA, now resolved   Continue Lantus and SSI, adjust as necessary  AccuChecks TID with meals

## 2024-01-29 NOTE — PLAN OF CARE
Problem: Potential for Falls  Goal: Patient will remain free of falls  Description: INTERVENTIONS:  - Educate patient/family on patient safety including physical limitations  - Instruct patient to call for assistance with activity   - Consult OT/PT to assist with strengthening/mobility   - Keep Call bell within reach  - Keep bed low and locked with side rails adjusted as appropriate  - Keep care items and personal belongings within reach  - Initiate and maintain comfort rounds  - Make Fall Risk Sign visible to staff  - Offer Toileting every 2 Hours, in advance of need  - Initiate/Maintain bed alarm  - Obtain necessary fall risk management equipment: yellow socks  - Apply yellow socks and bracelet for high fall risk patients  - Consider moving patient to room near nurses station  Outcome: Progressing     Problem: PAIN - ADULT  Goal: Verbalizes/displays adequate comfort level or baseline comfort level  Description: Interventions:  - Encourage patient to monitor pain and request assistance  - Assess pain using appropriate pain scale  - Administer analgesics based on type and severity of pain and evaluate response  - Implement non-pharmacological measures as appropriate and evaluate response  - Consider cultural and social influences on pain and pain management  - Notify physician/advanced practitioner if interventions unsuccessful or patient reports new pain  Outcome: Progressing     Problem: INFECTION - ADULT  Goal: Absence or prevention of progression during hospitalization  Description: INTERVENTIONS:  - Assess and monitor for signs and symptoms of infection  - Monitor lab/diagnostic results  - Monitor all insertion sites, i.e. indwelling lines, tubes  - Administer medications as ordered  - Instruct and encourage patient and family to use good hand hygiene technique  - Identify and instruct in appropriate isolation precautions for identified infection/condition  Outcome: Progressing     Problem: SAFETY  ADULT  Goal: Patient will remain free of falls  Description: INTERVENTIONS:  - Educate patient/family on patient safety including physical limitations  - Instruct patient to call for assistance with activity   - Consult OT/PT to assist with strengthening/mobility   - Keep Call bell within reach  - Keep bed low and locked with side rails adjusted as appropriate  - Keep care items and personal belongings within reach  - Initiate and maintain comfort rounds  - Make Fall Risk Sign visible to staff  - Offer Toileting every 2 Hours, in advance of need  - Initiate/Maintain bed alarm  - Obtain necessary fall risk management equipment: yellow socks  - Apply yellow socks and bracelet for high fall risk patients  - Consider moving patient to room near nurses station  Outcome: Progressing  Goal: Maintain or return to baseline ADL function  Description: INTERVENTIONS:  -  Assess patient's ability to carry out ADLs; assess patient's baseline for ADL function and identify physical deficits which impact ability to perform ADLs (bathing, care of mouth/teeth, toileting, grooming, dressing, etc.)  - Assess/evaluate cause of self-care deficits   - Assess range of motion  - Assess patient's mobility; develop plan if impaired  - Assess patient's need for assistive devices and provide as appropriate  - Encourage maximum independence but intervene and supervise when necessary  - Involve family in performance of ADLs  - Assess for home care needs following discharge   - Consider OT consult to assist with ADL evaluation and planning for discharge  - Provide patient education as appropriate  Outcome: Progressing  Goal: Maintains/Returns to pre admission functional level  Description: INTERVENTIONS:  - Perform AM-PAC 6 Click Basic Mobility/ Daily Activity assessment daily.  - Set and communicate daily mobility goal to care team and patient/family/caregiver.   - Collaborate with rehabilitation services on mobility goals if consulted  - Perform  Range of Motion 3 times a day.  - Reposition patient every 2 hours.  - Record patient progress and toleration of activity level   Outcome: Progressing     Problem: DISCHARGE PLANNING  Goal: Discharge to home or other facility with appropriate resources  Description: INTERVENTIONS:  - Identify barriers to discharge w/patient and caregiver  - Arrange for needed discharge resources and transportation as appropriate  - Identify discharge learning needs (meds, etc.)  - Refer to Case Management Department for coordinating discharge planning if the patient needs post-hospital services based on physician/advanced practitioner order or complex needs related to functional status, cognitive ability, or social support system  Outcome: Progressing     Problem: Knowledge Deficit  Goal: Patient/family/caregiver demonstrates understanding of disease process, treatment plan, medications, and discharge instructions  Description: Complete learning assessment and assess knowledge base.  Interventions:  - Provide teaching at level of understanding  - Provide teaching via preferred learning methods  Outcome: Progressing     Problem: NEUROSENSORY - ADULT  Goal: Achieves stable or improved neurological status  Description: INTERVENTIONS  - Monitor and report changes in neurological status  - Monitor vital signs such as temperature, blood pressure, glucose, and any other labs ordered       Outcome: Progressing     Problem: CARDIOVASCULAR - ADULT  Goal: Maintains optimal cardiac output and hemodynamic stability  Description: INTERVENTIONS:  - Monitor I/O, vital signs and rhythm  - Monitor for S/S and trends of decreased cardiac output  - Administer and titrate ordered vasoactive medications to optimize hemodynamic stability  - Assess quality of pulses, skin color and temperature  - Assess for signs of decreased coronary artery perfusion  - Instruct patient to report change in severity of symptoms  Outcome: Progressing  Goal: Absence of  cardiac dysrhythmias or at baseline rhythm  Description: INTERVENTIONS:  - Continuous cardiac monitoring, vital signs, obtain 12 lead EKG if ordered  - Administer antiarrhythmic and heart rate control medications as ordered  - Monitor electrolytes and administer replacement therapy as ordered  Outcome: Progressing     Problem: RESPIRATORY - ADULT  Goal: Achieves optimal ventilation and oxygenation  Description: INTERVENTIONS:  - Assess for changes in respiratory status  - Assess for changes in mentation and behavior  - Position to facilitate oxygenation and minimize respiratory effort  - Oxygen administered by appropriate delivery if ordered  - Initiate smoking cessation education as indicated  - Encourage broncho-pulmonary hygiene including cough, deep breathe, Incentive Spirometry  - Assess the need for suctioning and aspirate as needed  - Assess and instruct to report SOB or any respiratory difficulty  - Respiratory Therapy support as indicated  Outcome: Progressing     Problem: GASTROINTESTINAL - ADULT  Goal: Minimal or absence of nausea and/or vomiting  Description: INTERVENTIONS:  - Administer IV fluids if ordered to ensure adequate hydration  - Maintain NPO status until nausea and vomiting are resolved  - Nasogastric tube if ordered  - Administer ordered antiemetic medications as needed  - Provide nonpharmacologic comfort measures as appropriate  - Advance diet as tolerated, if ordered  - Consider nutrition services referral to assist patient with adequate nutrition and appropriate food choices  Outcome: Progressing  Goal: Maintains or returns to baseline bowel function  Description: INTERVENTIONS:  - Assess bowel function  - Encourage oral fluids to ensure adequate hydration  - Administer IV fluids if ordered to ensure adequate hydration  - Administer ordered medications as needed  - Encourage mobilization and activity  - Consider nutritional services referral to assist patient with adequate nutrition and  appropriate food choices  Outcome: Progressing  Goal: Maintains adequate nutritional intake  Description: INTERVENTIONS:  - Monitor percentage of each meal consumed  - Identify factors contributing to decreased intake, treat as appropriate  - Assist with meals as needed  - Monitor I&O, weight, and lab values if indicated  - Obtain nutrition services referral as needed  Outcome: Progressing  Goal: Oral mucous membranes remain intact  Description: INTERVENTIONS  - Assess oral mucosa and hygiene practices  - Implement preventative oral hygiene regimen  - Implement oral medicated treatments as ordered  - Initiate Nutrition services referral as needed  Outcome: Progressing     Problem: GENITOURINARY - ADULT  Goal: Maintains or returns to baseline urinary function  Description: INTERVENTIONS:  - Assess urinary function  - Encourage oral fluids to ensure adequate hydration if ordered  - Administer IV fluids as ordered to ensure adequate hydration  - Administer ordered medications as needed  - Offer frequent toileting  - Follow urinary retention protocol if ordered  Outcome: Progressing     Problem: METABOLIC, FLUID AND ELECTROLYTES - ADULT  Goal: Electrolytes maintained within normal limits  Description: INTERVENTIONS:  - Monitor labs and assess patient for signs and symptoms of electrolyte imbalances  - Administer electrolyte replacement as ordered  - Monitor response to electrolyte replacements, including repeat lab results as appropriate  - Instruct patient on fluid and nutrition as appropriate  Outcome: Progressing  Goal: Fluid balance maintained  Description: INTERVENTIONS:  - Monitor labs   - Monitor I/O and WT  - Instruct patient on fluid and nutrition as appropriate  - Assess for signs & symptoms of volume excess or deficit  Outcome: Progressing  Goal: Glucose maintained within target range  Description: INTERVENTIONS:  - Monitor Blood Glucose as ordered  - Assess for signs and symptoms of hyperglycemia and  hypoglycemia  - Administer ordered medications to maintain glucose within target range  - Assess nutritional intake and initiate nutrition service referral as needed  Outcome: Progressing     Problem: SKIN/TISSUE INTEGRITY - ADULT  Goal: Skin Integrity remains intact(Skin Breakdown Prevention)  Description: Assess:  -Clean and moisturize skin every day and as needed  -Inspect skin when repositioning, toileting, and assisting with ADLS  -Assess extremities for adequate circulation and sensation     Bed Management:  -Have minimal linens on bed & keep smooth, unwrinkled  -Change linens as needed when moist or perspiring  -Avoid sitting or lying in one position for more than 2 hours while in bed  -Keep HOB at 30 degrees     Toileting:  -Offer bedside commode  -Assess for incontinence every 2  -Use incontinent care products after each incontinent episode such as moisture barrier    Activity:  -Mobilize patient 2 times a day  -Encourage activity and walks on unit  -Encourage or provide ROM exercises   -Turn and reposition patient every 2 Hours  -Use appropriate equipment to lift or move patient in bed  -Instruct/ Assist with weight shifting every 2 when out of bed in chair  -Consider limitation of chair time 2 hour intervals    Skin Care:  -Avoid use of baby powder, tape, friction and shearing, hot water or constrictive clothing  -Relieve pressure over bony prominences using repositioning  -Do not massage red bony areas    Next Steps:  -Teach patient strategies to minimize risks such as turn and reposition   -Consider consults to  interdisciplinary teams such as PT  Outcome: Progressing     Problem: HEMATOLOGIC - ADULT  Goal: Maintains hematologic stability  Description: INTERVENTIONS  - Assess for signs and symptoms of bleeding or hemorrhage  - Monitor labs  - Administer supportive blood products/factors as ordered and appropriate  Outcome: Progressing     Problem: MUSCULOSKELETAL - ADULT  Goal: Maintain or return  mobility to safest level of function  Description: INTERVENTIONS:  - Assess patient's ability to carry out ADLs; assess patient's baseline for ADL function and identify physical deficits which impact ability to perform ADLs (bathing, care of mouth/teeth, toileting, grooming, dressing, etc.)  - Assess/evaluate cause of self-care deficits   - Assess range of motion  - Assess patient's mobility  - Assess patient's need for assistive devices and provide as appropriate  - Encourage maximum independence but intervene and supervise when necessary  - Involve family in performance of ADLs  - Assess for home care needs following discharge   - Consider OT consult to assist with ADL evaluation and planning for discharge  - Provide patient education as appropriate  Outcome: Progressing     Problem: Nutrition/Hydration-ADULT  Goal: Nutrient/Hydration intake appropriate for improving, restoring or maintaining nutritional needs  Description: Monitor and assess patient's nutrition/hydration status for malnutrition. Collaborate with interdisciplinary team and initiate plan and interventions as ordered.  Monitor patient's weight and dietary intake as ordered or per policy. Utilize nutrition screening tool and intervene as necessary. Determine patient's food preferences and provide high-protein, high-caloric foods as appropriate.     INTERVENTIONS:  - Monitor oral intake, urinary output, labs, and treatment plans  - Assess nutrition and hydration status and recommend course of action  - Evaluate amount of meals eaten  - Assist patient with eating if necessary   - Allow adequate time for meals  - Recommend/ encourage appropriate diets, oral nutritional supplements, and vitamin/mineral supplements  - Order, calculate, and assess calorie counts as needed  - Recommend, monitor, and adjust tube feedings and TPN/PPN based on assessed needs  - Assess need for intravenous fluids  - Provide specific nutrition/hydration education as  appropriate  - Include patient/family/caregiver in decisions related to nutrition  Outcome: Progressing     Problem: Prexisting or High Potential for Compromised Skin Integrity  Goal: Skin integrity is maintained or improved  Description: INTERVENTIONS:  - Identify patients at risk for skin breakdown  - Assess and monitor skin integrity  - Assess and monitor nutrition and hydration status  - Monitor labs   - Assess for incontinence   - Turn and reposition patient  - Assist with mobility/ambulation  - Relieve pressure over bony prominences  - Avoid friction and shearing  - Provide appropriate hygiene as needed including keeping skin clean and dry  - Evaluate need for skin moisturizer/barrier cream  - Collaborate with interdisciplinary team   - Patient/family teaching  - Consider wound care consult   Outcome: Progressing

## 2024-01-29 NOTE — OCCUPATIONAL THERAPY NOTE
Occupational Therapy Evaluation     Patient Name: Michael Koch  Today's Date: 1/29/2024  Problem List  Principal Problem:    Alcoholic hepatitis  Active Problems:    History of gout    Type 2 diabetes mellitus with hyperglycemia, without long-term current use of insulin (HCC)    Tobacco abuse    ETOH abuse    Depression    RSV infection    Abnormal CT scan    Hepatic encephalopathy (HCC)    DUANE (acute kidney injury) (HCC)    Thrombocytopenia (HCC)    SIRS (systemic inflammatory response syndrome) (HCC)    Acute respiratory failure with hypoxia and hypercapnia (HCC)    Past Medical History  Past Medical History:   Diagnosis Date    Depression     Diabetes mellitus (HCC)     Gout     Hypertension     Hyponatremia 01/21/2024    Kidney stones      Past Surgical History  Past Surgical History:   Procedure Laterality Date    CYSTOSCOPY W/ URETERAL STENT PLACEMENT  09/14/2023    US GUIDED MASS BIOPSY (UNSPECIFIED)  02/19/2021 01/29/24 1110   Note Type   Note type Evaluation   Pain Assessment   Pain Assessment Tool 0-10   Pain Score No Pain   Restrictions/Precautions   Other Precautions Chair Alarm;Bed Alarm;Multiple lines;Telemetry;Fall Risk   Home Living   Type of Home House  ([address])   Home Layout Two level;Bed/bath upstairs   Bathroom Shower/Tub Tub/shower unit   Bathroom Toilet Standard   Additional Comments Pt reports living in a 2SH with family. No AD PTA.   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 LB ADLs @ Min A d/t decreased standing balance. Pt required assist to don socks while seated. See tx assessment for greater detail regarding ADL performance.   Bed Mobility   Additional Comments Pt seated OOB in chair at beginning and end of session. Bed mobility not assessed at this time.   Transfers   Sit to Stand   (Steadying assist)   Additional items Assist x 1;Increased time required;Verbal cues   Stand to Sit   (Steadying assist)   Additional items Assist x 1;Increased time required;Verbal cues   Stand pivot 4  Minimal assistance   Additional items Assist x 1;Increased time required;Verbal cues   Additional Comments STS from chair with no AD @ Steadying assist. Pt able to complete functional mobility to bathroom with no AD @ Min A HHA d/t decreased dynamic balance and generalized weakness. See tx assessment for greater detail regarding functional mobility.   Balance   Static Sitting Good   Dynamic Sitting Fair +   Static Standing Fair -   Dynamic Standing Poor +   Activity Tolerance   Activity Tolerance Patient limited by fatigue   Medical Staff Made Aware Spoke with PT Анна   Nurse Made Aware Spoke with RN Loretta/Rebecca HODGES Assessment   RUE Assessment WFL   LUE Assessment   LUE Assessment WFL   Hand Function   Gross Motor Coordination Functional   Fine Motor Coordination Functional   Cognition   Overall Cognitive Status WFL   Arousal/Participation Alert;Responsive;Cooperative   Attention Within functional limits   Orientation Level Oriented X4   Memory Within functional limits   Following Commands Follows one step commands without difficulty   Comments Limited insight to deficits and prognosis   Assessment   Limitation Decreased ADL status;Decreased UE strength;Decreased Safe judgement during ADL;Decreased  endurance;Decreased self-care trans;Decreased high-level ADLs   Prognosis Good   Assessment Pt is a 33 y.o. male, admitted to [facility] 1/21/2024 d/t experiencing back pain, nausea and vomiting. Dx: alcoholic hepatitis. Pt with PMHx impacting their performance during ADL tasks, including: DM2, ETOH abuse, tobacco abuse, marijuana use, gout, depression, anxiety, hypertension, GERD nephrolithiasis s/p lithotripsy and stent removal. Prior to admission to the hospital Pt was performing ADLs without physical assistance. IADLs without physical assistance. Functional transfers/ambulation without physical assistance. Cognitive status was PTA was intact. OT order placed to assess Pt's ADLs, cognitive status, and performance during functional tasks in order to maximize safety and independence while making most appropriate d/c recommendations. Pt's clinical presentation is currently unstable/unpredictable given new onset deficits that effect Pt's occupational performance and ability to safely return to PLOF including decrease activity tolerance, decrease standing balance, decrease performance during ADL tasks, decrease safety awareness , decrease UB MS, decrease generalized strength, decrease activity engagement, and decrease performance during functional transfers combined with medical complications of abnormal H&H, abnormal CBC, abnormal blood sugars, abnormal potassium values, and need for input for mobility technique/safety. Personal factors affecting Pt at time of initial evaluation include: step(s) to enter environment, multi-level environment, inability to perform current job functions, inability to perform IADLs, new need for AD, limited insight into impairments, and recent fall(s)/fall history. Pt will benefit from continued skilled OT services to address deficits as defined above and to maximize level independence/participation during ADLs and functional tasks to facilitate return toward PLOF and improved quality of life.  From an occupational therapy standpoint, recommendation at time of d/c would be Level III: Minimum Resource Intensity Therapy.   Plan   Treatment Interventions ADL retraining;Visual perceptual retraining;Functional transfer training;UE strengthening/ROM;Endurance training;Cognitive reorientation;Patient/family training;Equipment evaluation/education;Neuromuscular reeducation;Fine motor coordination activities;Compensatory technique education;UE splinting;Continued evaluation;Cardiac education;Energy conservation;Activityengagement   Goal Expiration Date 02/12/24   OT Treatment Day 1   OT Frequency 2-3x/wk   Discharge Recommendation   Rehab Resource Intensity Level, OT III (Minimum Resource Intensity)   Additional Comments  (S)  Spoke with pt regarding first floor set-up when returning home as pt is currently a significant fall/safety risk going up/down the stairs to bed/bath d/t generalized weakness from prolonged hospital stay. Recommend BSC for first floor to increase safety and independence with ADL tasks and mobility.   AM-PAC Daily Activity Inpatient   Lower Body Dressing 3   Bathing 3   Toileting 3   Upper Body Dressing 3   Grooming 3   Eating 4   Daily Activity Raw Score 19   Daily Activity Standardized Score (Calc for Raw Score >=11) 40.22   AM-PAC Applied Cognition Inpatient   Following a Speech/Presentation 2   Understanding Ordinary Conversation 3   Taking Medications 2   Remembering Where Things Are Placed or Put Away 3   Remembering List of 4-5 Errands 2   Taking Care of Complicated Tasks 2   Applied Cognition Raw Score 14   Applied Cognition Standardized Score 32.02   Additional Treatment Session   Start Time 1125   End Time 1133   Treatment Assessment Pt completed OT tx session #1 focused on ADL performance and functional mobility. Pt seated on toilet at beginning of session. STS from toilet with use of grab bars @ SBA. Toileting tasks @ Steadying assist with no AD. When pt turning from toilet towards  sink, one LOS requiring Min A to correct. Pt able to wash hands while standing at sink @ SBA. Given RW for increased safety with mobility. Pt able to complete functional mobility around room with RW @ SBA-Steadying assist. Pt seated OOB in chair at end of session with chair alarm intact, call bell within reach and all needs met.     The patient's raw score on the -PAC Daily Activity Inpatient Short Form is 19. A raw score of greater than or equal to 19 suggests the patient may benefit from discharge to home. Please refer to the recommendation of the Occupational Therapist for safe discharge planning.    Pt goals to be met by 2/12/2024    Pt will demonstrate ability to complete grooming/hygiene tasks @ Mod I after set-up.  Pt will demonstrate ability to complete supine<>sit @ Mod I in order to increase safety and independence during ADL tasks.  Pt will demonstrate ability to complete UB ADLs including washing/dressing @ Mod I in order to increase performance and participation during meaningful tasks  Pt will demonstrate ability to complete LB dressing @ Mod I in order to increase safety and independence during meaningful tasks.   Pt will demonstrate ability to zoran/doff socks/shoes while sitting EOB @ Lidia  in order to increase safety and independence during meaningful tasks.   Pt will demonstrate ability to complete toileting tasks including CM and pericare @ Mod I in order to increase safety and independence during meaningful tasks.  Pt will demonstrate ability to complete EOB, chair, toilet/commode transfers @ Mod I in order to increase performance and participation during functional tasks.  Pt will demonstrate ability to stand for 5-8 minutes while maintaining Fair + balance with use of LRAD for UB support PRN.  Pt will demonstrate ability to tolerate 30-35 minute OT session with no vc'ing for deep breathing or use of energy conservation techniques in order to increase activity tolerance during functional  tasks.   Pt will demonstrate Good carryover of use of energy conservation/compensatory strategies during ADLs and functional tasks in order to increase safety and reduce risk for falls.   Pt will demonstrate Good attention and participation in continued evaluation of functional ambulation house hold distances in order to assist with safe d/c planning.  Pt will attend to continued cognitive assessments 100% of the time in order to provide most appropriate d/c recommendations.   Pt will follow 100% simple 2-step commands and be A&O x4 consistently with environmental cues to increase participation in functional activities.   Pt will identify 3 areas of interest/hobbies and 1 intervention on how to incorporate into daily life in order to increase interaction with environment and peers as well as increase participation in meaningful tasks.   Pt will demonstrate 100% carryover of BUE HEP in order to increase BUE MS and increase performance during functional tasks upon d/c home.    Pinky Blount, OTR/L

## 2024-01-29 NOTE — ASSESSMENT & PLAN NOTE
Per pt drinks 2 24oz twisted tea/smirinoff per day (family notes pt drinks more than this)  Drinks since brother  3 years ago. Has stopped for a week or two without symptoms. No DTs.   [facility] protocol discontinued    Plan:   Continue Thiamine and Folate   Seen by [facility], follow up for note or recs

## 2024-01-29 NOTE — PROGRESS NOTES
[facility]  [facility]  Name: Michael Koch I  MRN: [id number]  Unit/Bed#: -01 I Date of Admission: 1/21/2024   Date of Service: 1/29/2024 I Hospital Day: 8    Assessment/Plan   * Alcoholic hepatitis  Assessment & Plan  MELD: 28   AST> ALT; D Bili elevated at 9.8, Ammonia: 187 -> 85, GGT:797  RUQ: hepatomegaly,hepatic steatosis  CTAP: fatty liver/varices/portal htn  LD, CK, Tylenol level are WNL  AFP upper limit of NML  Started predinose 1/23, d/c'd by GI 1/26 1/28 w increased Bili, and DF of 61, steroids restarted    Plan:   GI on consult  Trend hepatic panel and INR  Solumedrol 40 IV daily  Lactulose decreased, titrate for 1-3 BM/Day  Serial abdominal exams  PRN zofran for n/v    SIRS (systemic inflammatory response syndrome) (HCC)  Assessment & Plan  Fever and tachycardia may be related to alcoholic hepatitis vs infectious etiology  LA nl  UA negative  Blood cultures negX2   LE duplex to evaluate for VTE: negative    Plan  Cefepime and vanco D#3 (1/29)  PO Vanco for Cdiff prophylaxis protocol  F/u c. diff  Trend PCT    Hepatic encephalopathy (HCC)  Assessment & Plan  Improved  Concern for alcoholic hepatitis, mixed w ETOH withdrawal concerns    Confused and hallucinating    Plan:  CAM ICU daily  Check ammonia PRN  Lactulose titrate for 1-3 BM/Day  Sleep hygiene, continue home Remeron  Delirium precautions     DUANE (acute kidney injury) (HCC)  Assessment & Plan  Initially Prerenal in setting of HHNK/ACEI  Now w concerns for biliary nephrosis in setting of elevated t/ bili   Baseline 0.9-1  CT nephrolithiasis mild left hydro  UA +blood/glucose/protein  CK WNL  Renal U/S: Right intrarenal calculi;  Hepatomegaly w hepatic steatosis  1/27 Octreotide discontinued, 1/28 Midodrine and Albumin discontinued    Plan:  Urinary retention protocol  Continue Flomax  Trend BMP  Monitor I/O  Avoid nephrotoxic agents   Nephro on consult    Acute respiratory failure with hypoxia and  hypercapnia (HCC)  Assessment & Plan  Ox requirement likely related to mental status, oversedation, in setting of +RSV  After sedation w ativan, precedex, decreased mental status and escalation to midflow plus NRB  Precedex d/c'd, weaned to midflow, now 2L NC  Hemoptysis, now resolved     CTA PE: No pulmonary embolus. Septal thickening and groundglass opacity due to pulm edema w trace effusions. Extensive lower lobe consolidation could also be due to edema but pneumonia/aspiration not excluded in appropriate clinical setting. Redemonstration of enlarged lower right paraesophageal node, slightly increased from 5 days earlier. Hepatic steatosis.    Plan  Pulmonary hygiene  Wean FiO2 for SaO2 >92%  Consider repeat imaging if FiO2 requirements increase    Type 2 diabetes mellitus with hyperglycemia, without long-term current use of insulin (HCC)  Assessment & Plan  Lab Results   Component Value Date    HGBA1C 8.8 (H) 2024     Recent Labs     24  0711 24  1138 24  1622 24  2058   POCGLU 168* 298* 202* 254*     Blood Sugar Average: Last 72 hrs:  (P) 175.2328296391580406    Initially presented in DKA, now resolved   Continue Lantus and SSI, adjust as necessary  AccuChecks TID with meals     Thrombocytopenia (HCC)  Assessment & Plan  Likely 2/2 ETOH abuse and acute liver disease    Hemolysis smear NML, LD wnl   Haptoglobin: 87   Fibrinogen: 280    Plan:  Continue to trend  Cont DVT ppx unless plates <50K    Abnormal CT scan  Assessment & Plan  CTAP-1.6cm soft tissue nodule of distal esophagus  CTC-  Slightly enlarged lower right paraesophageal node, stable since an abd CT 10/30/2023, of uncertain etiology and significance    RSV infection  Assessment & Plan  Supportive care    Depression  Assessment & Plan  Continue home fluoxetine 20mg QD    ETOH abuse  Assessment & Plan  Per pt drinks 2 24oz twisted tea/smirinoff per day (family notes pt drinks more than this)  Drinks since brother  3  years ago. Has stopped for a week or two without symptoms. No DTs.  1/28 CIWA protocol discontinued    Plan:   Continue Thiamine and Folate   Cessation counseling recommended     Tobacco abuse  Assessment & Plan  Nicotine patch  Smoking cessation counseling     History of gout  Assessment & Plan  Continue allopurinol/colchicine when able to PO        Disposition: Stepdown Level 1    ICU Core Measures     A: Assess, Prevent, and Manage Pain Has pain been assessed? Yes  Need for changes to pain regimen? No   B: Both SAT/SAT  N/A   C: Choice of Sedation RASS Goal: 0 Alert and Calm or N/A patient not on sedation  Need for changes to sedation or analgesia regimen? NA   D: Delirium CAM-ICU: Negative   E: Early Mobility  Plan for early mobility? Yes   F: Family Engagement Plan for family engagement today? Yes       Antibiotic Review: Patient on appropriate coverage based on culture data.  and Awaiting culture results.     Review of Invasive Devices:            Prophylaxis:  VTE VTE covered by:  heparin (porcine), Subcutaneous, 5,000 Units at 01/29/24 0607       Stress Ulcer  covered byomeprazole (PriLOSEC) 40 MG capsule [[id number]] (Long-Term Med), pantoprazole (PROTONIX) EC tablet 40 mg [[id number]]         Significant 24hr Events     24hr events: No acute events OVN     Subjective   Review of Systems   Constitutional:  Negative for chills and fever.   HENT:  Negative for congestion, ear pain, postnasal drip, rhinorrhea and sore throat.    Eyes:  Negative for pain and visual disturbance.   Respiratory:  Positive for cough. Negative for chest tightness, shortness of breath and wheezing.    Cardiovascular:  Negative for chest pain and palpitations.   Gastrointestinal:  Positive for abdominal distention. Negative for abdominal pain, blood in stool, constipation, diarrhea, nausea and vomiting.   Genitourinary:  Negative for dysuria, flank pain, hematuria and urgency.   Musculoskeletal:  Positive for back pain. Negative for  arthralgias.   Skin:  Negative for color change and rash.   Neurological:  Negative for dizziness, seizures, syncope, light-headedness and headaches.   Psychiatric/Behavioral:  Negative for agitation, confusion, hallucinations and sleep disturbance. The patient is nervous/anxious. The patient is not hyperactive.    All other systems reviewed and are negative.     Objective                            Vitals I/O      Most Recent Min/Max in 24hrs   Temp 98.2 °F (36.8 °C) Temp  Min: 98.2 °F (36.8 °C)  Max: 100.6 °F (38.1 °C)   Pulse 93 Pulse  Min: 93  Max: 127   Resp 22 Resp  Min: 18  Max: 38   /82 BP  Min: 120/75  Max: 136/84   O2 Sat 95 % SpO2  Min: 93 %  Max: 97 %      Intake/Output Summary (Last 24 hours) at 2024 1049  Last data filed at 2024 1001  Gross per 24 hour   Intake 640 ml   Output 2225 ml   Net -1585 ml       Diet Prashant/CHO Controlled; Consistent Carbohydrate Diet Level 2 (5 carb servings/75 grams CHO/meal); Consistent Carbohydrate Diet Level 2 (5 carb servings/75 grams CHO/meal)    Invasive Monitoring           Physical Exam   Physical Exam  Vitals reviewed.   Eyes:      General: Vision grossly intact. Scleral icterus present.      Extraocular Movements: Extraocular movements intact.      Conjunctiva/sclera: Conjunctivae normal.   Skin:     General: Skin is warm and dry.      Coloration: Skin is jaundiced.   HENT:      Head: Normocephalic and atraumatic.      Nose: No congestion or rhinorrhea.      Mouth/Throat:      Mouth: Mucous membranes are moist.   Cardiovascular:      Rate and Rhythm: Normal rate and regular rhythm.      Heart sounds: Normal heart sounds.   Musculoskeletal:         General: Normal range of motion.      Right lower le+ Edema present.      Left lower le+ Edema present.      Comments: Lower extremity edema to mid calves bilaterally  Non tender, no discoloration   Abdominal: General: Bowel sounds are normal. There is distension.     Tenderness: There is no  abdominal tenderness. There is no guarding.      Comments: Somewhat tense abdomen, w/o tenderness to light or deep palpation    Constitutional:       General: He is not in acute distress.     Appearance: He is well-developed and well-nourished. He is ill-appearing.   Pulmonary:      Effort: Pulmonary effort is normal. No respiratory distress.      Breath sounds: Normal breath sounds. No wheezing or rhonchi.   Neurological:      General: No focal deficit present.      Mental Status: He is alert and oriented to person, place and time. Mental status is at baseline. He is calm. He is not agitated.            Diagnostic Studies      EKG: No new EKG  Imaging: Abd XR read pending I have personally reviewed pertinent reports.       Medications:  Scheduled PRN   cefepime, 2,000 mg, Q12H  FLUoxetine, 20 mg, Daily  folic acid 1 mg, thiamine (VITAMIN B1) 100 mg in sodium chloride 0.9 % 100 mL IV piggyback, , Daily  gabapentin, 400 mg, TID  heparin (porcine), 5,000 Units, Q8H MURALI  insulin glargine, 10 Units, QAM  insulin glargine, 30 Units, HS  insulin lispro, 2-12 Units, TID With Meals  [START ON 1/30/2024] lactulose, 20 g, Daily  lidocaine, 1 patch, Daily  methylPREDNISolone sodium succinate, 40 mg, Daily  metoprolol tartrate, 25 mg, Q12H MURALI  nicotine, 14 mg, Daily  pantoprazole, 40 mg, Early Morning  sodium phosphate, 30 mmol, Once  tamsulosin, 0.4 mg, HS  vancomycin, 125 mg, Q12H MURALI  vancomycin, 1,000 mg, Q12H      benzonatate, 100 mg, TID PRN  mirtazapine, 15 mg, HS PRN  ondansetron, 4 mg, Q4H PRN  oxyCODONE, 5 mg, Q4H PRN       Continuous          Labs:    CBC    Recent Labs     01/28/24  0418 01/29/24  0347   WBC 9.31 9.74   HGB 8.8* 8.4*   HCT 26.8* 25.9*   * 123*     BMP    Recent Labs     01/28/24  0418 01/29/24  0347   SODIUM 141 137   K 3.0* 3.2*    106   CO2 23 23   AGAP 10 8   BUN 16 16   CREATININE 1.33* 1.26   CALCIUM 9.2 8.9       Coags    Recent Labs     01/28/24  0418 01/29/24  0347   INR 1.54*  1.66*        Additional Electrolytes  Recent Labs     01/28/24 0418 01/29/24  0347   MG 1.9 2.0   PHOS 1.0* 1.2*          Blood Gas    No recent results  No recent results LFTs  Recent Labs     01/28/24 0418 01/29/24  0347   ALT 68* 84*   * 127*   ALKPHOS 84 100   ALB 3.8 4.2   TBILI 36.86* 39.15*       Infectious  Recent Labs     01/28/24 0418 01/29/24  0347   PROCALCITONI 1.12* 1.27*     Glucose  Recent Labs     01/28/24 0418 01/29/24  0347   GLUC 145* 252*             Toshia Hawkins MD

## 2024-01-29 NOTE — ASSESSMENT & PLAN NOTE
MELD: 28   AST> ALT; D Bili elevated at 9.8, Ammonia: 187 -> 85, GGT:797  RUQ: hepatomegaly,hepatic steatosis  CTAP: fatty liver/varices/portal htn  LD, CK, Tylenol level are WNL  AFP upper limit of NML  Started predinose 1/23, d/c'd by GI 1/26 1/28 w increased Bili, and DF of 61  Steroids 1/28-1/29 (dc'd per Dr Modi GI)  Lactulose d/c'd, patient having >3 BM daily    Plan:   GI on consult  MELD may be too low for transplant consideration despite high Maddrey's  Would like to know about prior attempts to quit, past treatments etc  Dr Sylwia Modi from [facility]  MRI to rule out gallbladder pathology  If bili continues to increase, if likely HRS, consult to [facility]   Follow up on [facility] Handoff (seen 1/29)  Trend hepatic panel and INR  Serial abdominal exams  PRN zofran for n/v

## 2024-01-29 NOTE — CASE MANAGEMENT
Case Management Discharge Planning Note    Patient name Michael Koch  Location /-01 MRN [id number]  : 1990 Date 2024       Current Admission Date: 2024  Current Admission Diagnosis:Alcoholic hepatitis   Patient Active Problem List    Diagnosis Date Noted    Acute respiratory failure with hypoxia and hypercapnia (HCC) 2024    Alcoholic hepatitis 2024    ETOH abuse 2024    Depression 2024    GERD (gastroesophageal reflux disease) 2024    BPH (benign prostatic hyperplasia) 2024    RSV infection 2024    Abnormal CT scan 2024    Hepatic encephalopathy (HCC) 2024    N&V (nausea and vomiting) 2024    DUANE (acute kidney injury) (HCC) 2024    Thrombocytopenia (HCC) 2024    Lactic acidosis 2024    Marijuana use 2024    SIRS (systemic inflammatory response syndrome) (HCC) 2024    History of gout 10/04/2023    History of hypertension 10/04/2023    Type 2 diabetes mellitus with hyperglycemia, without long-term current use of insulin (HCC) 10/04/2023    Tobacco abuse 10/04/2023      LOS (days): 8  Geometric Mean LOS (GMLOS) (days): 4.8  Days to GMLOS:-3     OBJECTIVE:  Risk of Unplanned Readmission Score: 27.45         Current admission status: Inpatient   Preferred Pharmacy:   [id number] - [place], [place] - [address]  [address] [place] [address]  Phone: [phone] Fax: [phone]    Primary Care Provider: Louie Odonnell DO    Primary Insurance: BLUE CROSS  Secondary Insurance:     Chart reviewed aware of medical management. Case was discussed in multidisciplinary discharge meeting in the ICU.  Clinical information supporting hospitalization:   - continue management of alcoholic hepatitis  - close follow up being done by renal  -remains on iv antibiotics, IV solumedrol, abdominal xray is pending  - GI consulted  - PT/OT is consulted    Contacted [facility]  to see patient today for an assessment.    Barriers to discharge:  - medical management  Discharge plan:  TBD- HHC vs STR vs IP ETOH treatment  CM will continue to follow plan of care.

## 2024-01-29 NOTE — PLAN OF CARE
Problem: PHYSICAL THERAPY ADULT  Goal: Performs mobility at highest level of function for planned discharge setting.  See evaluation for individualized goals.  Description: Treatment/Interventions: ADL retraining, Functional transfer training, LE strengthening/ROM, Elevations, Therapeutic exercise, Endurance training, Patient/family training, Equipment eval/education, Bed mobility, Gait training, Compensatory technique education, Spoke to nursing, Spoke to case management, OT  Equipment Recommended: Walker       See flowsheet documentation for full assessment, interventions and recommendations.  Note: Prognosis: Good  Problem List: Decreased strength, Decreased endurance, Impaired balance, Decreased mobility, Impaired judgement, Decreased safety awareness, Obesity  Assessment: Pt is a 33 y.o. male seen for PT evaluation s/p admission to [facility] on 1/21/2024 with Alcoholic hepatitis.  Order placed for PT services.  Upon evaluation: Pt is presenting with impaired functional mobility due to decreased strength, decreased endurance, impaired balance, gait deviations, decreased safety awareness, impaired judgment, fall risk, and LE edema requiring  steadying to minimal assistance for transfers and minimal assistance for ambulation with out AD . Pt's clinical presentation is currently unpredictable given the functional mobility deficits above, especially weakness, edema of extremities, decreased endurance, impaired balance, gait deviations, decreased activity tolerance, decreased functional mobility tolerance, decreased safety awareness, and impaired judgement, coupled with fall risks as indicated by AM-PAC 6-Clicks: 17/24 as well as hx of falls, impaired balance, polypharmacy, impaired judgement, decreased safety awareness, and obesity and combined with medical complications of abnormal H&H, abnormal blood sugars, need for input for mobility technique/safety, and possible ileus on abdomen xray,  alcoholic hepatitis, SIRS, hepatic encephalopathy, DUANE, acute respiratory failure with hypoxia and hypercapnia, thrombocytopenia, RSV infection, transaminitis with elevated bilirubin, hyperammonemia, metabolic acidosis on admission .  Pt's PMHx and comorbidities that may affect physical performance and progress include: depression and DM, ETOH abuse, gout, HTN, anxiety, nephrolithiasis s/p lithotripsy, and stent removal . Personal factors affecting pt at time of IE include: inaccessible home environment, step(s) to enter environment, multi-level environment, limited home support, inability to perform IADLs, inability to perform ADLs, inability to navigate level surfaces without external assistance, inability to ambulate household distances, and recent fall(s)/fall history. Pt will benefit from continued skilled PT services to address deficits as defined above and to maximize level of functional mobility to facilitate return toward PLOF and improved QOL. From PT/mobility standpoint, recommendation at time of d/c would be Level III (Minimum Resource Intensity), with family and/or caregiver support, and with walker in order to reduce fall risk and maximize pt's functional independence and consistency with mobility. Recommend trial with walker next 1-2 sessions and ther ex next 1-2 sessions.  Barriers to Discharge: Decreased caregiver support, Inaccessible home environment  Barriers to Discharge Comments: spouse works, patient currently requiring assistance with mobility, decreased activity tolerance  Rehab Resource Intensity Level, PT: III (Minimum Resource Intensity)    See flowsheet documentation for full assessment.

## 2024-01-29 NOTE — PROGRESS NOTES
Michael Koch is a 33 y.o. male who is currently ordered Vancomycin IV with management by the Pharmacy Consult service.  Relevant clinical data and objective / subjective history reviewed.  Vancomycin Assessment:  Indication and Goal AUC/Trough: Other, prophylaxis, -600, trough >10  Clinical Status: stable  Micro:     Renal Function:  SCr: 1.26 mg/dL  CrCl: 90.7 mL/min  Renal replacement: Not on dialysis  Days of Therapy: 3  Current Dose: 1000 mg IV q 12 hrs  Vancomycin Plan:  New Dosin mg IV q 12 hrs  Estimated AUC: 503 mcg*hr/mL  Estimated Trough: 16 mcg/mL  Next Level: 2/5 at 0600  Renal Function Monitoring: Daily BMP and UOP  Pharmacy will continue to follow closely for s/sx of nephrotoxicity, infusion reactions and appropriateness of therapy.  BMP and CBC will be ordered per protocol. We will continue to follow the patient’s culture results and clinical progress daily.    Simon Haney, Pharmacist

## 2024-01-30 ENCOUNTER — APPOINTMENT (INPATIENT)
Dept: MRI IMAGING | Facility: HOSPITAL | Age: 34
DRG: 441 | End: 2024-01-30
Payer: COMMERCIAL

## 2024-01-30 LAB
ALBUMIN SERPL BCP-MCNC: 3.5 G/DL (ref 3.5–5)
ALBUMIN SERPL BCP-MCNC: 3.6 G/DL (ref 3.5–5)
ALP SERPL-CCNC: 100 U/L (ref 34–104)
ALP SERPL-CCNC: 98 U/L (ref 34–104)
ALT SERPL W P-5'-P-CCNC: 108 U/L (ref 7–52)
ALT SERPL W P-5'-P-CCNC: 128 U/L (ref 7–52)
ANION GAP SERPL CALCULATED.3IONS-SCNC: 11 MMOL/L
ANION GAP SERPL CALCULATED.3IONS-SCNC: 11 MMOL/L
ANISOCYTOSIS BLD QL SMEAR: PRESENT
AST SERPL W P-5'-P-CCNC: 182 U/L (ref 13–39)
AST SERPL W P-5'-P-CCNC: 203 U/L (ref 13–39)
BACTERIA UR QL AUTO: ABNORMAL /HPF
BASOPHILS # BLD MANUAL: 0 THOUSAND/UL (ref 0–0.1)
BASOPHILS NFR MAR MANUAL: 0 % (ref 0–1)
BILIRUB SERPL-MCNC: 41.66 MG/DL (ref 0.2–1)
BILIRUB SERPL-MCNC: 41.89 MG/DL (ref 0.2–1)
BILIRUB UR QL STRIP: ABNORMAL
BUN SERPL-MCNC: 23 MG/DL (ref 5–25)
BUN SERPL-MCNC: 24 MG/DL (ref 5–25)
C DIFF TOX GENS STL QL NAA+PROBE: NEGATIVE
CALCIUM SERPL-MCNC: 8.8 MG/DL (ref 8.4–10.2)
CALCIUM SERPL-MCNC: 9.1 MG/DL (ref 8.4–10.2)
CHLORIDE SERPL-SCNC: 106 MMOL/L (ref 96–108)
CHLORIDE SERPL-SCNC: 106 MMOL/L (ref 96–108)
CLARITY UR: ABNORMAL
CO2 SERPL-SCNC: 18 MMOL/L (ref 21–32)
CO2 SERPL-SCNC: 19 MMOL/L (ref 21–32)
COARSE GRAN CASTS URNS QL MICRO: ABNORMAL /LPF
COLOR UR: ABNORMAL
CREAT SERPL-MCNC: 1.64 MG/DL (ref 0.6–1.3)
CREAT SERPL-MCNC: 2.02 MG/DL (ref 0.6–1.3)
CREAT UR-MCNC: 82.5 MG/DL
DACRYOCYTES BLD QL SMEAR: PRESENT
EOSINOPHIL # BLD MANUAL: 0 THOUSAND/UL (ref 0–0.4)
EOSINOPHIL NFR BLD MANUAL: 0 % (ref 0–6)
ERYTHROCYTE [DISTWIDTH] IN BLOOD BY AUTOMATED COUNT: 21.5 % (ref 11.6–15.1)
GFR SERPL CREATININE-BSD FRML MDRD: 42 ML/MIN/1.73SQ M
GFR SERPL CREATININE-BSD FRML MDRD: 54 ML/MIN/1.73SQ M
GLUCOSE SERPL-MCNC: 137 MG/DL (ref 65–140)
GLUCOSE SERPL-MCNC: 139 MG/DL (ref 65–140)
GLUCOSE SERPL-MCNC: 209 MG/DL (ref 65–140)
GLUCOSE SERPL-MCNC: 227 MG/DL (ref 65–140)
GLUCOSE SERPL-MCNC: 248 MG/DL (ref 65–140)
GLUCOSE SERPL-MCNC: 252 MG/DL (ref 65–140)
GLUCOSE SERPL-MCNC: 289 MG/DL (ref 65–140)
GLUCOSE UR STRIP-MCNC: ABNORMAL MG/DL
HCT VFR BLD AUTO: 26.9 % (ref 36.5–49.3)
HGB BLD-MCNC: 9 G/DL (ref 12–17)
HGB UR QL STRIP.AUTO: ABNORMAL
INR PPP: 1.57 (ref 0.84–1.19)
KETONES UR STRIP-MCNC: ABNORMAL MG/DL
LEUKOCYTE ESTERASE UR QL STRIP: NEGATIVE
LYMPHOCYTES # BLD AUTO: 0.9 THOUSAND/UL (ref 0.6–4.47)
LYMPHOCYTES # BLD AUTO: 8 % (ref 14–44)
MACROCYTES BLD QL AUTO: PRESENT
MAGNESIUM SERPL-MCNC: 1.8 MG/DL (ref 1.9–2.7)
MAGNESIUM SERPL-MCNC: 2 MG/DL (ref 1.9–2.7)
MCH RBC QN AUTO: 35.4 PG (ref 26.8–34.3)
MCHC RBC AUTO-ENTMCNC: 33.5 G/DL (ref 31.4–37.4)
MCV RBC AUTO: 106 FL (ref 82–98)
MONOCYTES # BLD AUTO: 0.45 THOUSAND/UL (ref 0–1.22)
MONOCYTES NFR BLD: 4 % (ref 4–12)
MYELOCYTES NFR BLD MANUAL: 2 % (ref 0–1)
NEUTROPHILS # BLD MANUAL: 9.62 THOUSAND/UL (ref 1.85–7.62)
NEUTS BAND NFR BLD MANUAL: 8 % (ref 0–8)
NEUTS SEG NFR BLD AUTO: 78 % (ref 43–75)
NITRITE UR QL STRIP: NEGATIVE
NON-SQ EPI CELLS URNS QL MICRO: ABNORMAL /HPF
OSMOLALITY UR: 394 MMOL/KG
PH UR STRIP.AUTO: 5.5 [PH]
PHOSPHATE SERPL-MCNC: 1 MG/DL (ref 2.7–4.5)
PHOSPHATE SERPL-MCNC: 1.2 MG/DL (ref 2.7–4.5)
PLATELET # BLD AUTO: 138 THOUSANDS/UL (ref 149–390)
PLATELET BLD QL SMEAR: ABNORMAL
PMV BLD AUTO: 10.3 FL (ref 8.9–12.7)
POLYCHROMASIA BLD QL SMEAR: PRESENT
POTASSIUM SERPL-SCNC: 3 MMOL/L (ref 3.5–5.3)
POTASSIUM SERPL-SCNC: 3.2 MMOL/L (ref 3.5–5.3)
PROCALCITONIN SERPL-MCNC: 1.19 NG/ML
PROT SERPL-MCNC: 5.5 G/DL (ref 6.4–8.4)
PROT SERPL-MCNC: 5.5 G/DL (ref 6.4–8.4)
PROT UR STRIP-MCNC: ABNORMAL MG/DL
PROTHROMBIN TIME: 19.1 SECONDS (ref 11.6–14.5)
RBC # BLD AUTO: 2.54 MILLION/UL (ref 3.88–5.62)
RBC #/AREA URNS AUTO: ABNORMAL /HPF
RBC MORPH BLD: PRESENT
SODIUM 24H UR-SCNC: 38 MOL/L
SODIUM SERPL-SCNC: 135 MMOL/L (ref 135–147)
SODIUM SERPL-SCNC: 136 MMOL/L (ref 135–147)
SP GR UR STRIP.AUTO: 1.02 (ref 1–1.03)
STOMATOCYTES BLD QL SMEAR: PRESENT
TARGETS BLD QL SMEAR: PRESENT
UROBILINOGEN UR QL STRIP.AUTO: 0.2 E.U./DL
WBC # BLD AUTO: 11.19 THOUSAND/UL (ref 4.31–10.16)
WBC #/AREA URNS AUTO: ABNORMAL /HPF

## 2024-01-30 PROCEDURE — 85027 COMPLETE CBC AUTOMATED: CPT

## 2024-01-30 PROCEDURE — 83935 ASSAY OF URINE OSMOLALITY: CPT

## 2024-01-30 PROCEDURE — 85007 BL SMEAR W/DIFF WBC COUNT: CPT

## 2024-01-30 PROCEDURE — 84145 PROCALCITONIN (PCT): CPT

## 2024-01-30 PROCEDURE — 85610 PROTHROMBIN TIME: CPT

## 2024-01-30 PROCEDURE — 84100 ASSAY OF PHOSPHORUS: CPT

## 2024-01-30 PROCEDURE — 99232 SBSQ HOSP IP/OBS MODERATE 35: CPT | Performed by: INTERNAL MEDICINE

## 2024-01-30 PROCEDURE — 83735 ASSAY OF MAGNESIUM: CPT

## 2024-01-30 PROCEDURE — 81001 URINALYSIS AUTO W/SCOPE: CPT

## 2024-01-30 PROCEDURE — 74181 MRI ABDOMEN W/O CONTRAST: CPT

## 2024-01-30 PROCEDURE — 83735 ASSAY OF MAGNESIUM: CPT | Performed by: PHYSICIAN ASSISTANT

## 2024-01-30 PROCEDURE — 82948 REAGENT STRIP/BLOOD GLUCOSE: CPT

## 2024-01-30 PROCEDURE — 99233 SBSQ HOSP IP/OBS HIGH 50: CPT | Performed by: STUDENT IN AN ORGANIZED HEALTH CARE EDUCATION/TRAINING PROGRAM

## 2024-01-30 PROCEDURE — 84100 ASSAY OF PHOSPHORUS: CPT | Performed by: PHYSICIAN ASSISTANT

## 2024-01-30 PROCEDURE — 84300 ASSAY OF URINE SODIUM: CPT

## 2024-01-30 PROCEDURE — 82570 ASSAY OF URINE CREATININE: CPT

## 2024-01-30 PROCEDURE — 80053 COMPREHEN METABOLIC PANEL: CPT

## 2024-01-30 RX ORDER — INSULIN LISPRO 100 [IU]/ML
2-12 INJECTION, SOLUTION INTRAVENOUS; SUBCUTANEOUS
Status: DISCONTINUED | OUTPATIENT
Start: 2024-01-30 | End: 2024-01-30

## 2024-01-30 RX ORDER — INSULIN LISPRO 100 [IU]/ML
2-12 INJECTION, SOLUTION INTRAVENOUS; SUBCUTANEOUS
Status: DISCONTINUED | OUTPATIENT
Start: 2024-01-30 | End: 2024-01-31 | Stop reason: HOSPADM

## 2024-01-30 RX ORDER — VANCOMYCIN HYDROCHLORIDE 750 MG/150ML
750 INJECTION, SOLUTION INTRAVENOUS EVERY 12 HOURS
Status: DISCONTINUED | OUTPATIENT
Start: 2024-01-30 | End: 2024-01-31

## 2024-01-30 RX ORDER — HYDROXYZINE HYDROCHLORIDE 25 MG/1
50 TABLET, FILM COATED ORAL EVERY 6 HOURS PRN
Status: DISCONTINUED | OUTPATIENT
Start: 2024-01-30 | End: 2024-01-31 | Stop reason: HOSPADM

## 2024-01-30 RX ORDER — INSULIN LISPRO 100 [IU]/ML
15 INJECTION, SOLUTION INTRAVENOUS; SUBCUTANEOUS
Status: DISCONTINUED | OUTPATIENT
Start: 2024-01-30 | End: 2024-01-31 | Stop reason: HOSPADM

## 2024-01-30 RX ORDER — POTASSIUM CHLORIDE 14.9 MG/ML
20 INJECTION INTRAVENOUS ONCE
Status: COMPLETED | OUTPATIENT
Start: 2024-01-30 | End: 2024-01-31

## 2024-01-30 RX ORDER — LORAZEPAM 0.5 MG/1
0.5 TABLET ORAL EVERY 8 HOURS PRN
Status: CANCELLED | OUTPATIENT
Start: 2024-01-30

## 2024-01-30 RX ORDER — MAGNESIUM SULFATE HEPTAHYDRATE 40 MG/ML
2 INJECTION, SOLUTION INTRAVENOUS ONCE
Status: COMPLETED | OUTPATIENT
Start: 2024-01-30 | End: 2024-01-31

## 2024-01-30 RX ORDER — GUAIFENESIN 100 MG/5ML
200 SOLUTION ORAL EVERY 4 HOURS PRN
Status: DISCONTINUED | OUTPATIENT
Start: 2024-01-30 | End: 2024-01-31 | Stop reason: HOSPADM

## 2024-01-30 RX ORDER — INSULIN LISPRO 100 [IU]/ML
4-20 INJECTION, SOLUTION INTRAVENOUS; SUBCUTANEOUS
Status: DISCONTINUED | OUTPATIENT
Start: 2024-01-30 | End: 2024-01-30

## 2024-01-30 RX ORDER — POTASSIUM CHLORIDE 14.9 MG/ML
20 INJECTION INTRAVENOUS ONCE
Qty: 100 ML | Refills: 0 | Status: COMPLETED | OUTPATIENT
Start: 2024-01-31 | End: 2024-01-31

## 2024-01-30 RX ORDER — POTASSIUM CHLORIDE 14.9 MG/ML
20 INJECTION INTRAVENOUS
Qty: 200 ML | Refills: 0 | Status: DISCONTINUED | OUTPATIENT
Start: 2024-01-30 | End: 2024-01-30

## 2024-01-30 RX ORDER — ALBUMIN, HUMAN INJ 5% 5 %
25 SOLUTION INTRAVENOUS ONCE
Status: COMPLETED | OUTPATIENT
Start: 2024-01-30 | End: 2024-01-30

## 2024-01-30 RX ORDER — POTASSIUM CHLORIDE 20 MEQ/1
40 TABLET, EXTENDED RELEASE ORAL ONCE
Status: COMPLETED | OUTPATIENT
Start: 2024-01-30 | End: 2024-01-30

## 2024-01-30 RX ADMIN — INSULIN LISPRO 4 UNITS: 100 INJECTION, SOLUTION INTRAVENOUS; SUBCUTANEOUS at 16:29

## 2024-01-30 RX ADMIN — CEFEPIME HYDROCHLORIDE 2000 MG: 2 INJECTION, SOLUTION INTRAVENOUS at 04:38

## 2024-01-30 RX ADMIN — PANTOPRAZOLE SODIUM 40 MG: 40 TABLET, DELAYED RELEASE ORAL at 05:24

## 2024-01-30 RX ADMIN — GUAIFENESIN 200 MG: 200 SOLUTION ORAL at 14:00

## 2024-01-30 RX ADMIN — HEPARIN SODIUM 5000 UNITS: 5000 INJECTION INTRAVENOUS; SUBCUTANEOUS at 05:24

## 2024-01-30 RX ADMIN — VANCOMYCIN HYDROCHLORIDE 750 MG: 750 INJECTION, SOLUTION INTRAVENOUS at 22:21

## 2024-01-30 RX ADMIN — OXYCODONE HYDROCHLORIDE 5 MG: 5 TABLET ORAL at 21:17

## 2024-01-30 RX ADMIN — INSULIN LISPRO 15 UNITS: 100 INJECTION, SOLUTION INTRAVENOUS; SUBCUTANEOUS at 12:39

## 2024-01-30 RX ADMIN — GABAPENTIN 400 MG: 400 CAPSULE ORAL at 21:02

## 2024-01-30 RX ADMIN — HEPARIN SODIUM 5000 UNITS: 5000 INJECTION INTRAVENOUS; SUBCUTANEOUS at 13:52

## 2024-01-30 RX ADMIN — SODIUM PHOSPHATE, MONOBASIC, MONOHYDRATE AND SODIUM PHOSPHATE, DIBASIC, ANHYDROUS 30 MMOL: 142; 276 INJECTION, SOLUTION INTRAVENOUS at 10:36

## 2024-01-30 RX ADMIN — BENZONATATE 100 MG: 100 CAPSULE ORAL at 09:18

## 2024-01-30 RX ADMIN — TAMSULOSIN HYDROCHLORIDE 0.4 MG: 0.4 CAPSULE ORAL at 21:02

## 2024-01-30 RX ADMIN — POTASSIUM CHLORIDE 20 MEQ: 14.9 INJECTION, SOLUTION INTRAVENOUS at 23:05

## 2024-01-30 RX ADMIN — ALBUMIN (HUMAN) 25 G: 12.5 INJECTION, SOLUTION INTRAVENOUS at 15:51

## 2024-01-30 RX ADMIN — LIDOCAINE 5% 1 PATCH: 700 PATCH TOPICAL at 21:01

## 2024-01-30 RX ADMIN — FLUOXETINE 20 MG: 20 CAPSULE ORAL at 09:15

## 2024-01-30 RX ADMIN — HEPARIN SODIUM 5000 UNITS: 5000 INJECTION INTRAVENOUS; SUBCUTANEOUS at 21:03

## 2024-01-30 RX ADMIN — ONDANSETRON 4 MG: 2 INJECTION INTRAMUSCULAR; INTRAVENOUS at 13:52

## 2024-01-30 RX ADMIN — CEFEPIME HYDROCHLORIDE 2000 MG: 2 INJECTION, SOLUTION INTRAVENOUS at 16:29

## 2024-01-30 RX ADMIN — HYDROXYZINE HYDROCHLORIDE 50 MG: 25 TABLET ORAL at 11:06

## 2024-01-30 RX ADMIN — SODIUM BICARBONATE 100 ML/HR: 84 INJECTION, SOLUTION INTRAVENOUS at 23:47

## 2024-01-30 RX ADMIN — THIAMINE HYDROCHLORIDE: 100 INJECTION, SOLUTION INTRAMUSCULAR; INTRAVENOUS at 12:39

## 2024-01-30 RX ADMIN — GABAPENTIN 400 MG: 400 CAPSULE ORAL at 09:15

## 2024-01-30 RX ADMIN — BENZONATATE 100 MG: 100 CAPSULE ORAL at 21:17

## 2024-01-30 RX ADMIN — METOPROLOL TARTRATE 25 MG: 25 TABLET, FILM COATED ORAL at 21:03

## 2024-01-30 RX ADMIN — NICOTINE 14 MG: 14 PATCH, EXTENDED RELEASE TRANSDERMAL at 10:35

## 2024-01-30 RX ADMIN — INSULIN LISPRO 15 UNITS: 100 INJECTION, SOLUTION INTRAVENOUS; SUBCUTANEOUS at 16:29

## 2024-01-30 RX ADMIN — HYDROXYZINE HYDROCHLORIDE 50 MG: 25 TABLET ORAL at 21:02

## 2024-01-30 RX ADMIN — VANCOMYCIN HYDROCHLORIDE 750 MG: 750 INJECTION, SOLUTION INTRAVENOUS at 10:36

## 2024-01-30 RX ADMIN — INSULIN GLARGINE 36 UNITS: 100 INJECTION, SOLUTION SUBCUTANEOUS at 21:03

## 2024-01-30 RX ADMIN — POTASSIUM CHLORIDE 40 MEQ: 1500 TABLET, EXTENDED RELEASE ORAL at 09:15

## 2024-01-30 RX ADMIN — METOPROLOL TARTRATE 25 MG: 25 TABLET, FILM COATED ORAL at 09:15

## 2024-01-30 RX ADMIN — INSULIN LISPRO 6 UNITS: 100 INJECTION, SOLUTION INTRAVENOUS; SUBCUTANEOUS at 07:37

## 2024-01-30 RX ADMIN — MAGNESIUM SULFATE HEPTAHYDRATE 2 G: 40 INJECTION, SOLUTION INTRAVENOUS at 23:06

## 2024-01-30 RX ADMIN — POTASSIUM PHOSPHATE, MONOBASIC AND POTASSIUM PHOSPHATE, DIBASIC 30 MMOL: 224; 236 INJECTION, SOLUTION, CONCENTRATE INTRAVENOUS at 23:02

## 2024-01-30 RX ADMIN — INSULIN LISPRO 15 UNITS: 100 INJECTION, SOLUTION INTRAVENOUS; SUBCUTANEOUS at 07:38

## 2024-01-30 RX ADMIN — INSULIN LISPRO 4 UNITS: 100 INJECTION, SOLUTION INTRAVENOUS; SUBCUTANEOUS at 12:38

## 2024-01-30 RX ADMIN — MIRTAZAPINE 15 MG: 15 TABLET, FILM COATED ORAL at 21:03

## 2024-01-30 RX ADMIN — GABAPENTIN 400 MG: 400 CAPSULE ORAL at 15:51

## 2024-01-30 NOTE — PLAN OF CARE
Problem: Potential for Falls  Goal: Patient will remain free of falls  Description: INTERVENTIONS:  - Educate patient/family on patient safety including physical limitations  - Instruct patient to call for assistance with activity   - Consult OT/PT to assist with strengthening/mobility   - Keep Call bell within reach  - Keep bed low and locked with side rails adjusted as appropriate  - Keep care items and personal belongings within reach  - Initiate and maintain comfort rounds  - Make Fall Risk Sign visible to staff  - Offer Toileting every 2 Hours, in advance of need  - Initiate/Maintain bed alarm  - Obtain necessary fall risk management equipment: yellow socks  - Apply yellow socks and bracelet for high fall risk patients  - Consider moving patient to room near nurses station  Outcome: Progressing     Problem: PAIN - ADULT  Goal: Verbalizes/displays adequate comfort level or baseline comfort level  Description: Interventions:  - Encourage patient to monitor pain and request assistance  - Assess pain using appropriate pain scale  - Administer analgesics based on type and severity of pain and evaluate response  - Implement non-pharmacological measures as appropriate and evaluate response  - Consider cultural and social influences on pain and pain management  - Notify physician/advanced practitioner if interventions unsuccessful or patient reports new pain  Outcome: Progressing     Problem: INFECTION - ADULT  Goal: Absence or prevention of progression during hospitalization  Description: INTERVENTIONS:  - Assess and monitor for signs and symptoms of infection  - Monitor lab/diagnostic results  - Monitor all insertion sites, i.e. indwelling lines, tubes  - Administer medications as ordered  - Instruct and encourage patient and family to use good hand hygiene technique  - Identify and instruct in appropriate isolation precautions for identified infection/condition  Outcome: Progressing     Problem: SAFETY  ADULT  Goal: Patient will remain free of falls  Description: INTERVENTIONS:  - Educate patient/family on patient safety including physical limitations  - Instruct patient to call for assistance with activity   - Consult OT/PT to assist with strengthening/mobility   - Keep Call bell within reach  - Keep bed low and locked with side rails adjusted as appropriate  - Keep care items and personal belongings within reach  - Initiate and maintain comfort rounds  - Make Fall Risk Sign visible to staff  - Offer Toileting every 2 Hours, in advance of need  - Initiate/Maintain bed alarm  - Obtain necessary fall risk management equipment: yellow socks  - Apply yellow socks and bracelet for high fall risk patients  - Consider moving patient to room near nurses station  Outcome: Progressing  Goal: Maintain or return to baseline ADL function  Description: INTERVENTIONS:  -  Assess patient's ability to carry out ADLs; assess patient's baseline for ADL function and identify physical deficits which impact ability to perform ADLs (bathing, care of mouth/teeth, toileting, grooming, dressing, etc.)  - Assess/evaluate cause of self-care deficits   - Assess range of motion  - Assess patient's mobility; develop plan if impaired  - Assess patient's need for assistive devices and provide as appropriate  - Encourage maximum independence but intervene and supervise when necessary  - Involve family in performance of ADLs  - Assess for home care needs following discharge   - Consider OT consult to assist with ADL evaluation and planning for discharge  - Provide patient education as appropriate  Outcome: Progressing  Goal: Maintains/Returns to pre admission functional level  Description: INTERVENTIONS:  - Perform AM-PAC 6 Click Basic Mobility/ Daily Activity assessment daily.  - Set and communicate daily mobility goal to care team and patient/family/caregiver.   - Collaborate with rehabilitation services on mobility goals if consulted  - Perform  Range of Motion 3 times a day.  - Reposition patient every 2 hours.  - Record patient progress and toleration of activity level   Outcome: Progressing     Problem: DISCHARGE PLANNING  Goal: Discharge to home or other facility with appropriate resources  Description: INTERVENTIONS:  - Identify barriers to discharge w/patient and caregiver  - Arrange for needed discharge resources and transportation as appropriate  - Identify discharge learning needs (meds, etc.)  - Refer to Case Management Department for coordinating discharge planning if the patient needs post-hospital services based on physician/advanced practitioner order or complex needs related to functional status, cognitive ability, or social support system  Outcome: Progressing     Problem: Knowledge Deficit  Goal: Patient/family/caregiver demonstrates understanding of disease process, treatment plan, medications, and discharge instructions  Description: Complete learning assessment and assess knowledge base.  Interventions:  - Provide teaching at level of understanding  - Provide teaching via preferred learning methods  Outcome: Progressing     Problem: NEUROSENSORY - ADULT  Goal: Achieves stable or improved neurological status  Description: INTERVENTIONS  - Monitor and report changes in neurological status  - Monitor vital signs such as temperature, blood pressure, glucose, and any other labs ordered       Outcome: Progressing     Problem: CARDIOVASCULAR - ADULT  Goal: Maintains optimal cardiac output and hemodynamic stability  Description: INTERVENTIONS:  - Monitor I/O, vital signs and rhythm  - Monitor for S/S and trends of decreased cardiac output  - Administer and titrate ordered vasoactive medications to optimize hemodynamic stability  - Assess quality of pulses, skin color and temperature  - Assess for signs of decreased coronary artery perfusion  - Instruct patient to report change in severity of symptoms  Outcome: Progressing  Goal: Absence of  cardiac dysrhythmias or at baseline rhythm  Description: INTERVENTIONS:  - Continuous cardiac monitoring, vital signs, obtain 12 lead EKG if ordered  - Administer antiarrhythmic and heart rate control medications as ordered  - Monitor electrolytes and administer replacement therapy as ordered  Outcome: Progressing     Problem: RESPIRATORY - ADULT  Goal: Achieves optimal ventilation and oxygenation  Description: INTERVENTIONS:  - Assess for changes in respiratory status  - Assess for changes in mentation and behavior  - Position to facilitate oxygenation and minimize respiratory effort  - Oxygen administered by appropriate delivery if ordered  - Initiate smoking cessation education as indicated  - Encourage broncho-pulmonary hygiene including cough, deep breathe, Incentive Spirometry  - Assess the need for suctioning and aspirate as needed  - Assess and instruct to report SOB or any respiratory difficulty  - Respiratory Therapy support as indicated  Outcome: Progressing     Problem: GASTROINTESTINAL - ADULT  Goal: Minimal or absence of nausea and/or vomiting  Description: INTERVENTIONS:  - Administer IV fluids if ordered to ensure adequate hydration  - Maintain NPO status until nausea and vomiting are resolved  - Nasogastric tube if ordered  - Administer ordered antiemetic medications as needed  - Provide nonpharmacologic comfort measures as appropriate  - Advance diet as tolerated, if ordered  - Consider nutrition services referral to assist patient with adequate nutrition and appropriate food choices  Outcome: Progressing  Goal: Maintains or returns to baseline bowel function  Description: INTERVENTIONS:  - Assess bowel function  - Encourage oral fluids to ensure adequate hydration  - Administer IV fluids if ordered to ensure adequate hydration  - Administer ordered medications as needed  - Encourage mobilization and activity  - Consider nutritional services referral to assist patient with adequate nutrition and  appropriate food choices  Outcome: Progressing  Goal: Maintains adequate nutritional intake  Description: INTERVENTIONS:  - Monitor percentage of each meal consumed  - Identify factors contributing to decreased intake, treat as appropriate  - Assist with meals as needed  - Monitor I&O, weight, and lab values if indicated  - Obtain nutrition services referral as needed  Outcome: Progressing  Goal: Oral mucous membranes remain intact  Description: INTERVENTIONS  - Assess oral mucosa and hygiene practices  - Implement preventative oral hygiene regimen  - Implement oral medicated treatments as ordered  - Initiate Nutrition services referral as needed  Outcome: Progressing     Problem: GENITOURINARY - ADULT  Goal: Maintains or returns to baseline urinary function  Description: INTERVENTIONS:  - Assess urinary function  - Encourage oral fluids to ensure adequate hydration if ordered  - Administer IV fluids as ordered to ensure adequate hydration  - Administer ordered medications as needed  - Offer frequent toileting  - Follow urinary retention protocol if ordered  Outcome: Progressing     Problem: METABOLIC, FLUID AND ELECTROLYTES - ADULT  Goal: Electrolytes maintained within normal limits  Description: INTERVENTIONS:  - Monitor labs and assess patient for signs and symptoms of electrolyte imbalances  - Administer electrolyte replacement as ordered  - Monitor response to electrolyte replacements, including repeat lab results as appropriate  - Instruct patient on fluid and nutrition as appropriate  Outcome: Progressing  Goal: Fluid balance maintained  Description: INTERVENTIONS:  - Monitor labs   - Monitor I/O and WT  - Instruct patient on fluid and nutrition as appropriate  - Assess for signs & symptoms of volume excess or deficit  Outcome: Progressing  Goal: Glucose maintained within target range  Description: INTERVENTIONS:  - Monitor Blood Glucose as ordered  - Assess for signs and symptoms of hyperglycemia and  hypoglycemia  - Administer ordered medications to maintain glucose within target range  - Assess nutritional intake and initiate nutrition service referral as needed  Outcome: Progressing     Problem: SKIN/TISSUE INTEGRITY - ADULT  Goal: Skin Integrity remains intact(Skin Breakdown Prevention)  Description: Assess:  -Perform Brad assessment every shift  -Clean and moisturize skin every 4 hours  -Inspect skin when repositioning, toileting, and assisting with ADLS  -Assess under medical devices such as nibp, nc every 4 hours  -Assess extremities for adequate circulation and sensation     Bed Management:  -Have minimal linens on bed & keep smooth, unwrinkled  -Change linens as needed when moist or perspiring  -Avoid sitting or lying in one position for more than 2 hours while in bed  -Keep HOB at 30 degrees     Toileting:  -Offer bedside commode  -Assess for incontinence every 2hours  -Use incontinent care products after each incontinent episode such as barrier cream    Activity:  -Mobilize patient 3 times a day  -Encourage activity and walks on unit  -Encourage or provide ROM exercises   -Turn and reposition patient every 2 Hours  -Use appropriate equipment to lift or move patient in bed  -Instruct/ Assist with weight shifting every 15 min  when out of bed in chair  -Consider limitation of chair time 4 hour intervals    Skin Care:  -Avoid use of baby powder, tape, friction and shearing, hot water or constrictive clothing  -Relieve pressure over bony prominences using wedges, pillows  -Do not massage red bony areas    Next Steps:  -Teach patient strategies to minimize risks such as repo   -Consider consults to  interdisciplinary teams such as wound, nutrition  Outcome: Progressing     Problem: HEMATOLOGIC - ADULT  Goal: Maintains hematologic stability  Description: INTERVENTIONS  - Assess for signs and symptoms of bleeding or hemorrhage  - Monitor labs  - Administer supportive blood products/factors as ordered and  appropriate  Outcome: Progressing     Problem: Nutrition/Hydration-ADULT  Goal: Nutrient/Hydration intake appropriate for improving, restoring or maintaining nutritional needs  Description: Monitor and assess patient's nutrition/hydration status for malnutrition. Collaborate with interdisciplinary team and initiate plan and interventions as ordered.  Monitor patient's weight and dietary intake as ordered or per policy. Utilize nutrition screening tool and intervene as necessary. Determine patient's food preferences and provide high-protein, high-caloric foods as appropriate.     INTERVENTIONS:  - Monitor oral intake, urinary output, labs, and treatment plans  - Assess nutrition and hydration status and recommend course of action  - Evaluate amount of meals eaten  - Assist patient with eating if necessary   - Allow adequate time for meals  - Recommend/ encourage appropriate diets, oral nutritional supplements, and vitamin/mineral supplements  - Order, calculate, and assess calorie counts as needed  - Recommend, monitor, and adjust tube feedings and TPN/PPN based on assessed needs  - Assess need for intravenous fluids  - Provide specific nutrition/hydration education as appropriate  - Include patient/family/caregiver in decisions related to nutrition  Outcome: Progressing     Problem: MUSCULOSKELETAL - ADULT  Goal: Maintain or return mobility to safest level of function  Description: INTERVENTIONS:  - Assess patient's ability to carry out ADLs; assess patient's baseline for ADL function and identify physical deficits which impact ability to perform ADLs (bathing, care of mouth/teeth, toileting, grooming, dressing, etc.)  - Assess/evaluate cause of self-care deficits   - Assess range of motion  - Assess patient's mobility  - Assess patient's need for assistive devices and provide as appropriate  - Encourage maximum independence but intervene and supervise when necessary  - Involve family in performance of ADLs  -  Assess for home care needs following discharge   - Consider OT consult to assist with ADL evaluation and planning for discharge  - Provide patient education as appropriate  Outcome: Progressing     Problem: Prexisting or High Potential for Compromised Skin Integrity  Goal: Skin integrity is maintained or improved  Description: INTERVENTIONS:  - Identify patients at risk for skin breakdown  - Assess and monitor skin integrity  - Assess and monitor nutrition and hydration status  - Monitor labs   - Assess for incontinence   - Turn and reposition patient  - Assist with mobility/ambulation  - Relieve pressure over bony prominences  - Avoid friction and shearing  - Provide appropriate hygiene as needed including keeping skin clean and dry  - Evaluate need for skin moisturizer/barrier cream  - Collaborate with interdisciplinary team   - Patient/family teaching  - Consider wound care consult   Outcome: Progressing

## 2024-01-30 NOTE — ASSESSMENT & PLAN NOTE
Fever, tachycardia, consider  may be related to alcoholic hepatitis vs infectious etiology  LA nl  UA negative  Blood cultures negX2   LE duplex to evaluate for VTE: negative  Neg C.diff,  PO Vanco d/c'd    Plan  Cefepime and vanco D#4 (1/30)  Trend PCT

## 2024-01-30 NOTE — ASSESSMENT & PLAN NOTE
Fever, tachycardia, consider  may be related to alcoholic hepatitis vs infectious etiology  LA nl  UA negative  Blood cultures negX2   LE duplex to evaluate for VTE: negative  Neg C.diff,  PO Vanco d/c'd    Plan  Cefepime and vanco D#4 (1/30), complete course  Trend PCT  If patient remains afebrile, WBC return to nml limits, consider discontinuing in favor of potentially restarting steroids for alch hep

## 2024-01-30 NOTE — PLAN OF CARE
Problem: PAIN - ADULT  Goal: Verbalizes/displays adequate comfort level or baseline comfort level  Description: Interventions:  - Encourage patient to monitor pain and request assistance  - Assess pain using appropriate pain scale  - Administer analgesics based on type and severity of pain and evaluate response  - Implement non-pharmacological measures as appropriate and evaluate response  - Consider cultural and social influences on pain and pain management  - Notify physician/advanced practitioner if interventions unsuccessful or patient reports new pain  Outcome: Progressing     Problem: SAFETY ADULT  Goal: Patient will remain free of falls  Description: INTERVENTIONS:  - Educate patient/family on patient safety including physical limitations  - Instruct patient to call for assistance with activity   - Consult OT/PT to assist with strengthening/mobility   - Keep Call bell within reach  - Keep bed low and locked with side rails adjusted as appropriate  - Keep care items and personal belongings within reach  - Initiate and maintain comfort rounds  - Make Fall Risk Sign visible to staff  - Offer Toileting every 2 Hours, in advance of need  - Initiate/Maintain bed alarm  - Obtain necessary fall risk management equipment: yellow socks  - Apply yellow socks and bracelet for high fall risk patients  - Consider moving patient to room near nurses station  Outcome: Progressing     Problem: CARDIOVASCULAR - ADULT  Goal: Maintains optimal cardiac output and hemodynamic stability  Description: INTERVENTIONS:  - Monitor I/O, vital signs and rhythm  - Monitor for S/S and trends of decreased cardiac output  - Administer and titrate ordered vasoactive medications to optimize hemodynamic stability  - Assess quality of pulses, skin color and temperature  - Assess for signs of decreased coronary artery perfusion  - Instruct patient to report change in severity of symptoms  Outcome: Progressing     Problem: RESPIRATORY -  ADULT  Goal: Achieves optimal ventilation and oxygenation  Description: INTERVENTIONS:  - Assess for changes in respiratory status  - Assess for changes in mentation and behavior  - Position to facilitate oxygenation and minimize respiratory effort  - Oxygen administered by appropriate delivery if ordered  - Initiate smoking cessation education as indicated  - Encourage broncho-pulmonary hygiene including cough, deep breathe, Incentive Spirometry  - Assess the need for suctioning and aspirate as needed  - Assess and instruct to report SOB or any respiratory difficulty  - Respiratory Therapy support as indicated  Outcome: Progressing

## 2024-01-30 NOTE — ASSESSMENT & PLAN NOTE
Improved  Concern for alcoholic hepatitis, mixed w ETOH withdrawal concerns    Confused and hallucinating  Lactulose d/c'd, patient AAOx3, denies hallucinations     Plan:  CAM ICU daily  Check ammonia PRN  Sleep hygiene, continue home Remeron  Delirium precautions

## 2024-01-30 NOTE — ASSESSMENT & PLAN NOTE
1/21 CTAP: 1.6cm soft tissue nodule of distal esophagus  1/21 CTC: Slightly enlarged lower right paraesophageal node, stable since an abd CT 10/30/2023, of uncertain etiology and significance  1/26 CTA: Redemonstration of enlarged lower right paraesophageal node, slightly increased from 5 days earlier

## 2024-01-30 NOTE — QUICK NOTE
Reached out to Dr Sylwia Modi, gastroenterologist at [facility], via [employer] on 1/29. Dr Modi reviewed the patient case, remarking that per her review, patient seemed high risk regarding candidacy for transplant. We then had a phone call to discuss current labs and management of the patients alcoholic hepatitis.     - Dr Modi noted that elevated bilirubin may remain persistently high for some time despite clinical improvement.   - She suggested that if levels are continuing to increase, we should obtain an MRI to rule out any gallbladder pathology or other common source of increased bilirubin that may have been missed or initially developing on prior imaging.   - Also discussed the evidence regarding use of steroids in alcoholic hepatitis, which she felt was not substantial enough to justify its use while the patient is receiving antibiotics and blood glucose remains difficult to control  - If patient shows signs of HRS, Dr Modi offered to provide contact information for [facility], for potential transfer for their specialized services.   - Finally, she offered that should the patient require outpatient follow up, she would be available to continue care     We continue to appreciate Dr Freitas input regarding this case.

## 2024-01-30 NOTE — ASSESSMENT & PLAN NOTE
Likely related to mental status, oversedation, in setting of +RSV  After sedation w ativan, precedex, decreased mental status and escalation to midflow plus NRB  Precedex d/c'd, weaned   Hemoptysis, now resolved  Now on RA    1/26 CTA PE: No pulmonary embolus. Septal thickening and groundglass opacity due to pulm edema w trace effusions. Extensive lower lobe consolidation could also be due to edema but pneumonia/aspiration not excluded in appropriate clinical setting. Redemonstration of enlarged lower right paraesophageal node, slightly increased from 5 days earlier. Hepatic steatosis.    Plan  Pulmonary hygiene

## 2024-01-30 NOTE — CASE MANAGEMENT
Case Management Discharge Planning Note    Patient name Michael Koch  Location /-01 MRN [id number]  : 1990 Date 2024       Current Admission Date: 2024  Current Admission Diagnosis:Alcoholic hepatitis   Patient Active Problem List    Diagnosis Date Noted    Acute respiratory failure with hypoxia and hypercapnia (HCC) 2024    Alcoholic hepatitis 2024    ETOH abuse 2024    Depression 2024    GERD (gastroesophageal reflux disease) 2024    BPH (benign prostatic hyperplasia) 2024    RSV infection 2024    Abnormal CT scan 2024    Hepatic encephalopathy (HCC) 2024    N&V (nausea and vomiting) 2024    DUANE (acute kidney injury) (HCC) 2024    Thrombocytopenia (HCC) 2024    Lactic acidosis 2024    Marijuana use 2024    SIRS (systemic inflammatory response syndrome) (HCC) 2024    History of gout 10/04/2023    History of hypertension 10/04/2023    Type 2 diabetes mellitus with hyperglycemia, without long-term current use of insulin (HCC) 10/04/2023    Tobacco abuse 10/04/2023      LOS (days): 9  Geometric Mean LOS (GMLOS) (days): 4.8  Days to GMLOS:-4.2     OBJECTIVE:  Risk of Unplanned Readmission Score: 27.33         Current admission status: Inpatient   Preferred Pharmacy:   [id number] - [place] - [address]  [address]  [place] [address]  Phone: [phone] Fax: [phone]    Primary Care Provider: Louie Odonnell DO    Primary Insurance: BLUE CROSS  Secondary Insurance:     DISCHARGE DETAILS:    [facility] Handoff was out yesterday to see Michael and he was receptive for assistance. They will be reaching out to him closer to [place].  Therapy recommendation is [facility], will make a referral for patient and follow up with choices.        4:05 Met with Michael, his sister Kaur and brother in law were present. Permission obtained from Michael to speak to him with  family present, he agreed. We discussed the Warm handoff process that was done yesterday. Pt was grateful for the referral and he informed Jose D he wants to be sober and would be agreeable for treatment in the future, however he would not be in favor of any treatment until after he is dc and has time with his children and wife.  Michael was informed that he will be able to stay connected with Jose D from [facility] Handoff as an OP and they will assist him.  Patient is in agreement for [facility], blanket referral was made.  At this time he does not feel he will need the walker and he does not want me to order it at this time.  CM will follow up with referrals.

## 2024-01-30 NOTE — ASSESSMENT & PLAN NOTE
MELD: 28   AST> ALT; D Bili elevated at 9.8, Ammonia: 187 -> 85, GGT:797  RUQ: hepatomegaly,hepatic steatosis  CTAP: fatty liver/varices/portal htn  LD, CK, Tylenol level are WNL  AFP upper limit of NML  Started predinose 1/23, d/c'd by GI 1/26 1/28 w increased Bili, and DF of 61  Steroids 1/28-1/29 (dc'd per Dr Modi GI)  Lactulose d/c'd, patient having >3 BM daily  GI on consult  MELD may be too low for transplant consideration despite high Maddrey's  Would like to know about prior attempts to quit, past treatments etc  Dr Sylwia Modi from [facility]  If bili continues to increase, if likely HRS, consult to [facility]   1/30 MRI: 1. Interstitial edematous pancreatitis w/o acute peripancreatic fluid collection. 2. Hepatosplenomegaly and severe steatosis. 3. No choledocholithiasis, cholelithiasis, biliary ductal dilation or acute cholecystitis.     Plan:  If patient remains afebrile, WBC return to nml limits, consider discontinuing in favor of potentially restarting steroids for alch hep  Trend hepatic panel and INR  Serial abdominal exams  PRN zofran for n/v  Hydroxyzine 50mg Q6H PRN added for anxiety, w potential benefit of managing urticaria if this develops (currently denies)

## 2024-01-30 NOTE — PROGRESS NOTES
[facility]  Progress Note  Name: Michael Koch I  MRN: [id number]  Unit/Bed#: -01 I Date of Admission: 1/21/2024   Date of Service: 1/30/2024 I Hospital Day: 9    Assessment/Plan   * Alcoholic hepatitis  Assessment & Plan  MELD: 28   AST> ALT; D Bili elevated at 9.8, Ammonia: 187 -> 85, GGT:797  RUQ: hepatomegaly,hepatic steatosis  CTAP: fatty liver/varices/portal htn  LD, CK, Tylenol level are WNL  AFP upper limit of NML  Started predinose 1/23, d/c'd by GI 1/26 1/28 w increased Bili, and DF of 61  Steroids 1/28-1/29 (dc'd per Dr Modi )  Lactulose d/c'd, patient having >3 BM daily    Plan:   GI on consult  MELD may be too low for transplant consideration despite high Maddrey's  Would like to know about prior attempts to quit, past treatments etc  Dr Sylwia Modi from [facility]  MRI to rule out gallbladder pathology  If bili continues to increase, if likely HRS, consult to [facility]   If patient remains afebrile, WBC returns to nml limits, consider discontinuing in favor of potentially restarting steroids for alch hep   Trend hepatic panel and INR  Serial abdominal exams  PRN zofran for n/v    SIRS (systemic inflammatory response syndrome) (HCC)  Assessment & Plan  Fever, tachycardia, consider  may be related to alcoholic hepatitis vs infectious etiology  LA nl  UA negative  Blood cultures negX2   LE duplex to evaluate for VTE: negative  Neg C.diff,  PO Vanco d/c'd    Plan  Cefepime and vanco D#4 (1/30), complete course  Trend PCT  If patient remains afebrile, WBC returns to nml limits, consider discontinuing in favor of potentially restarting steroids for alch hep     Hepatic encephalopathy (HCC)  Assessment & Plan  Improved  Concern for alcoholic hepatitis, mixed w ETOH withdrawal concerns    Confused and hallucinating  Lactulose d/c'd, patient AAOx3, denies hallucinations     Plan:  CAM ICU daily  Check ammonia PRN  Sleep hygiene, continue home Remeron  Delirium  precautions     DUANE (acute kidney injury) (HCC)  Assessment & Plan  Initially Prerenal in setting of HHNK/ACEI  Now w concerns for biliary nephrosis in setting of elevated t/ bili   Baseline 0.9-1  CT nephrolithiasis mild left hydro  UA +blood/glucose/protein  CK WNL  Renal U/S: Right intrarenal calculi;  Hepatomegaly w hepatic steatosis  1/27 Octreotide discontinued, 1/28 Midodrine and Albumin discontinued    Plan:  Urinary retention protocol  Continue Flomax  Trend BMP  Monitor I/O  Avoid nephrotoxic agents   Nephro on consult    Acute respiratory failure with hypoxia and hypercapnia (HCC)  Assessment & Plan  Likely related to mental status, oversedation, in setting of +RSV  After sedation w ativan, precedex, decreased mental status and escalation to midflow plus NRB  Precedex d/c'd, weaned   Hemoptysis, now resolved  Now on RA    1/26 CTA PE: No pulmonary embolus. Septal thickening and groundglass opacity due to pulm edema w trace effusions. Extensive lower lobe consolidation could also be due to edema but pneumonia/aspiration not excluded in appropriate clinical setting. Redemonstration of enlarged lower right paraesophageal node, slightly increased from 5 days earlier. Hepatic steatosis.    Plan  Pulmonary hygiene    Type 2 diabetes mellitus with hyperglycemia, without long-term current use of insulin (HCC)  Assessment & Plan  Lab Results   Component Value Date    HGBA1C 8.8 (H) 01/21/2024     Recent Labs     01/29/24  0821 01/29/24  1305 01/29/24  1706 01/30/24  0721   POCGLU 265* 390* 332* 252*     Blood Sugar Average: Last 72 hrs:  (P) 226    Initially presented in DKA, now resolved   Continue Lantus and SSI, adjust as necessary  AccuChecks TID with meals and before bed  Endo on consult    Thrombocytopenia (HCC)  Assessment & Plan  Likely 2/2 ETOH abuse and acute liver disease    Hemolysis smear NML, LD wnl   Haptoglobin: 87   Fibrinogen: 280    Plan:  Continue to trend  Cont DVT ppx unless plates  <50K    Abnormal CT scan  Assessment & Plan   CTAP: 1.6cm soft tissue nodule of distal esophagus   CTC: Slightly enlarged lower right paraesophageal node, stable since an abd CT 10/30/2023, of uncertain etiology and significance   CTA: Redemonstration of enlarged lower right paraesophageal node, slightly increased from 5 days earlier     RSV infection  Assessment & Plan  Supportive care    Depression  Assessment & Plan  Continue home fluoxetine 20mg QD  Hydroxyzine 50mg Q6H PRN added for anxiety, with potential benefit of managing urticaria if this develops    ETOH abuse  Assessment & Plan  Per pt drinks 2 24oz twisted tea/smirinoff per day (family notes pt drinks more than this)  Drinks since brother  3 years ago. Has stopped for a week or two without symptoms. No DTs.   CIWA protocol discontinued    Plan:   Continue Thiamine and Folate   Seen by Warm Handoff, follow up for note or recs       Disposition: Stepdown Level 1    ICU Core Measures     A: Assess, Prevent, and Manage Pain Has pain been assessed? Yes  Need for changes to pain regimen? NA   B: Both SAT/SAT  N/A   C: Choice of Sedation RASS Goal: 0 Alert and Calm or N/A patient not on sedation  Need for changes to sedation or analgesia regimen? NA   D: Delirium CAM-ICU: Negative   E: Early Mobility  Plan for early mobility? Yes   F: Family Engagement Plan for family engagement today? Yes       Antibiotic Review: Patient on appropriate coverage based on culture data.     Review of Invasive Devices:            Prophylaxis:  VTE VTE covered by:  heparin (porcine), Subcutaneous, 5,000 Units at 24 0524       Stress Ulcer  covered byomeprazole (PriLOSEC) 40 MG capsule [[id number]] (Long-Term Med), pantoprazole (PROTONIX) EC tablet 40 mg [[id number]]         Significant 24hr Events     24hr events: No acute events OVN     Subjective   Review of Systems   Constitutional:  Positive for fatigue. Negative for activity change, chills and fever.    HENT:  Negative for congestion, ear pain, postnasal drip, rhinorrhea and sore throat.    Eyes:  Negative for pain and visual disturbance.   Respiratory:  Positive for cough. Negative for chest tightness, shortness of breath and wheezing.    Cardiovascular:  Negative for chest pain and palpitations.   Gastrointestinal:  Positive for abdominal distention. Negative for abdominal pain, blood in stool, constipation, diarrhea, nausea and vomiting.   Genitourinary:  Negative for dysuria, flank pain, frequency, hematuria and urgency.   Musculoskeletal:  Positive for back pain. Negative for arthralgias.   Skin:  Negative for color change and rash.   Neurological:  Negative for dizziness, tremors, seizures, syncope, light-headedness and headaches.   Psychiatric/Behavioral:  Negative for agitation, confusion, hallucinations and sleep disturbance. The patient is nervous/anxious.    All other systems reviewed and are negative.     Objective                            Vitals I/O      Most Recent Min/Max in 24hrs   Temp 98.2 °F (36.8 °C) Temp  Min: 97.5 °F (36.4 °C)  Max: 98.4 °F (36.9 °C)   Pulse 104 Pulse  Min: 89  Max: 112   Resp 21 Resp  Min: 19  Max: 32   /79 BP  Min: 102/66  Max: 148/87   O2 Sat 92 % SpO2  Min: 92 %  Max: 96 %      Intake/Output Summary (Last 24 hours) at 1/30/2024 1018  Last data filed at 1/30/2024 0438  Gross per 24 hour   Intake 1620 ml   Output 250 ml   Net 1370 ml       Diet Prashant/CHO Controlled; Consistent Carbohydrate Diet Level 2 (5 carb servings/75 grams CHO/meal); Consistent Carbohydrate Diet Level 2 (5 carb servings/75 grams CHO/meal)    Invasive Monitoring           Physical Exam   Physical Exam  Vitals reviewed.   Eyes:      General: Vision grossly intact. Scleral icterus present.      Extraocular Movements: Extraocular movements intact.      Conjunctiva/sclera: Conjunctivae normal.   Skin:     General: Skin is warm and dry.      Coloration: Skin is jaundiced.      Findings: No rash.    HENT:      Head: Normocephalic and atraumatic.      Nose: No congestion or rhinorrhea.      Mouth/Throat:      Mouth: Mucous membranes are moist.   Cardiovascular:      Rate and Rhythm: Normal rate and regular rhythm.      Heart sounds: Normal heart sounds.   Musculoskeletal:         General: Normal range of motion.   Abdominal: General: Bowel sounds are normal. There is distension.     Tenderness: There is no abdominal tenderness. There is no guarding.   Constitutional:       General: He is not in acute distress.     Appearance: He is well-developed and well-nourished. He is ill-appearing.   Pulmonary:      Effort: Pulmonary effort is normal. No respiratory distress.      Breath sounds: Normal breath sounds. No wheezing.   Neurological:      General: No focal deficit present.      Mental Status: He is alert and oriented to person, place and time. Mental status is at baseline. He is calm. He is not agitated.            Diagnostic Studies      EKG: No new EKG  Imaging: Abd XR: possible ileus I have personally reviewed pertinent reports.       Medications:  Scheduled PRN   cefepime, 2,000 mg, Q12H  FLUoxetine, 20 mg, Daily  folic acid 1 mg, thiamine (VITAMIN B1) 100 mg in sodium chloride 0.9 % 100 mL IV piggyback, , Daily  gabapentin, 400 mg, TID  heparin (porcine), 5,000 Units, Q8H MURALI  insulin glargine, 36 Units, HS  insulin lispro, 15 Units, TID With Meals  insulin lispro, 2-12 Units, TID AC  lidocaine, 1 patch, Daily  metoprolol tartrate, 25 mg, Q12H MURALI  mirtazapine, 15 mg, HS  nicotine, 14 mg, Daily  pantoprazole, 40 mg, Early Morning  sodium phosphate, 30 mmol, Once  tamsulosin, 0.4 mg, HS  vancomycin, 750 mg, Q12H      benzonatate, 100 mg, TID PRN  ondansetron, 4 mg, Q4H PRN  oxyCODONE, 5 mg, Q4H PRN       Continuous          Labs:    CBC    Recent Labs     01/29/24  0347 01/30/24  0437   WBC 9.74 11.19*   HGB 8.4* 9.0*   HCT 25.9* 26.9*   * 138*   BANDSPCT  --  8     BMP    Recent Labs      01/29/24 0347 01/30/24  0437   SODIUM 137 136   K 3.2* 3.2*    106   CO2 23 19*   AGAP 8 11   BUN 16 23   CREATININE 1.26 1.64*   CALCIUM 8.9 9.1       Coags    Recent Labs     01/29/24 0347 01/30/24  0437   INR 1.66* 1.57*        Additional Electrolytes  Recent Labs     01/29/24 0347 01/30/24  0437   MG 2.0 2.0   PHOS 1.2* 1.2*          Blood Gas    No recent results  No recent results LFTs  Recent Labs     01/29/24 0347 01/30/24  0437   ALT 84* 108*   * 182*   ALKPHOS 100 100   ALB 4.2 3.5   TBILI 39.15* 41.66*       Infectious  Recent Labs     01/29/24 0347 01/30/24  0437   PROCALCITONI 1.27* 1.19*     Glucose  Recent Labs     01/29/24 0347 01/30/24  0437   GLUC 252* 289*             Toshia Hawkins MD

## 2024-01-30 NOTE — QUICK NOTE
Was discussing case with [facility] for potential transfer to [facility], as the patient was no longer requiring ICU level of care. Noted new DUANE patient developed today, prompting review with Nephrology. Conversation regarding next steps of care, and whether liver function was now affecting kidney function. It was agreed that while next steps in evaluation and management are within our capabilities at our facility, when considering long term plans, it may be a good time to discuss case with Hepatology at [facility], as also mentioned by Dr Sylwia Modi yesterday. Dr Modi provided contact information to reach out to Hepatologist on call.    Spoke with Dr Dunaway, reviewed recent labs, treatment plan and patient social support. At this time, he is agrees that the patient may benefit from further specialized management at his facility, and is agreeable to accepting transfer of the patient to his service at [facility].     This option for transfer was discussed with patient (Sister and brother in law present, okay to include in discussion per patient). Patient hesitant about transfer at time of conversation, noting concerns about being far from his family if in [place]. We discussed prognosis and management options if continuing care with us vs transferring for further care with Hepatology at [facility]. Patient expressed understanding, but would like to further discuss with his wife, who is expected to visit this afternoon around 6pm.         Of note, this has not been discussed with his sister Cathryn, who has been our regular contact point via telephone for updates thus far. Will first discuss w wife when at bedside before relaying other updates to other parties.

## 2024-01-30 NOTE — ASSESSMENT & PLAN NOTE
Continue home fluoxetine 20mg QD  Hydroxyzine 50mg Q6H PRN added for anxiety, with potential benefit of managing urticaria if this develops

## 2024-01-30 NOTE — PROGRESS NOTES
NEPHROLOGY HOSPITAL PROGRESS NOTE   Michael Koch 33 y.o. male MRN: [id number]  Unit/Bed#: -01 Encounter: [id number]  Reason for Consult: Acute kidney injury    ASSESSMENT and PLAN:    33-year-old male who initially presented on January 21 with a past medical history of alcohol abuse, drug abuse, hypertension, depression, nephrolithiasis, BPH, tobacco abuse, gout, GERD, who initially presents with back pain, nausea, vomiting for 1 week prior to admission.  Nephrology is consulted for acute kidney injury.     Initially was noted to have HHNK and required insulin drip.  There was also concern for worsening confusion and restlessness during the course of stay concerning for possible hepatic encephalopathy.  Was started on lactulose.  Is also being monitored for alcohol withdrawal.     Urology team was brought on board due to mild hydronephrosis of left side.     1-acute kidney injury-and may have underlying chronic kidney disease as has a left kidney that is atrophic on imaging     Etiology-likely in the setting of hypotension/contrast/ATN.  Consideration for bile cast nephropathy.  Consideration for HRS.  Consideration for prerenal azotemia in the setting of DKA.  To note, patient received contrast study on January 21 and also received contrast study on January 26     Pertinent studies-  - Baseline creatinine 1.1 mg/dL, rising to 2.1 mg/dL on 1/24.  - Urinalysis with glucose urea, 20-30 RBC, moderate bacteria from January 21  - CT scan shows 9 mm calculi in the left ureter and proximal hydroureter mild left hydronephrosis, 5 mm calculi in the left ureterovesicular junction with left kidney that is atrophic  - Renal ultrasound trace bilateral hydronephrosis which was a follow-up ultrasound January 22  - Urine sodium 44     Course of stay-  - Patient was on midodrine which was increased on 1/24 to 50 mg 3 times a day.  Was also given attempted diuretic.  - Patient was started on de-escalation of care from a  renal standpoint with regards to midodrine on 1/27 and was being tapered.  Octreotide held.  - 1/28-midodrine discontinued.  Albumin being held.  - 1/29-creatinine 1.2 mg/dL.  Hypokalemia and hypophosphatemia.  Magnesium is appropriate.  Bicarbonate appropriate.  Potassium and phosphate being repleted by primary team.  - 1/30-creatinine rising to 1.6 mg/dL.  Bicarbonate slightly lower at 19.  Acute kidney injury now may be in the setting of large volume output yesterday with 10 times stool and 5 times urine.  We will give albumin and reassess urine sodium.  Also to note patient received contrast study on 1/26 which could be contributing.     Plan  - I/os; avoid nephrotoxic agents  - Avoid hypotension  - Give albumin 5% 500 cc for 25 g  - Check urine sodium this evening  - Recheck urinalysis  - Avoid hypotension  - Agree with potassium and phosphorus repletion  - Recheck electrolytes in a.m.  - Avoid hypotension to avoid risk of increasing hepatic encephalopathy Ris  - Secondary rise in creatinine today may be multifactorial in setting of volume depletion but also consideration of contrast injury and also bile cast nephropathy as the bilirubin levels continue to rise  - Reviewed case with primary team resident and we are in agreement with plan above including albumin and rest of renal plan.  Low threshold to consider transfer to tertiary center.  Primary team is already discussing with GI team     2-electrolytes-     - Hyponatremia-initially in setting of hyperglycemia, volume overload.  Improved overall.     - Hypokalemia-replete as necessary to maintain normokalemia.  Required repletion 1/28 and also 1/29 and again on 1/30     - Hypophosphatemia-replete as needed-repleted 1/29 and again on 1/30     3-acid/base-appropriate     4-alcoholic liver disease-with concern for alcoholic hepatitis     - GI team on board  - Significantly elevated bilirubin  - Thrombocytopenia  - Was started on steroids by primary team  -  "Ultrasound hepatomegaly with hepatic steatosis.  Normal gallbladder.  - Bilirubin continues to rise.  Per primary team.     5-hepatic encephalopathy-per primary team     6-RSV-per primary team     7-proteinuria-will need outpatient follow-up.  Possibly in setting of diabetic nephropathy.     8-tachycardia-per primary team.  Midodrine being tapered over the weekend.  But to note metoprolol was reduced recently.     9-fevers-Per primary team.  On broad-spectrum antibiotics     - Blood cultures-thus far no growth     10-soft tissue nodule adjacent to distal esophagus-     - Follow-up CT scan January 21 of the chest-slightly enlarged lower right paraesophageal node stable from October 2023 unclear etiology     11-respiratory status-septal thickening and groundglass opacities due to pulmonary edema and trace effusions and lower lobe extensive consolidation consideration for edema versus aspiration     12-anemia-stool occult positive     Portions of the record may have been created with voice recognition software. Occasional wrong word or \"sound a like\" substitutions may have occurred due to the inherent limitations of voice recognition software. Read the chart carefully and recognize, using context, where substitutions have occurred.If you have any questions, please contact the dictating provider.       SUBJECTIVE / 24H INTERVAL HISTORY:  Patient denies complaints.  Family at bedside.  No shortness of breath.    OBJECTIVE:  Current Weight: Weight - Scale: 94 kg (207 lb 3.7 oz)  Vitals:    01/30/24 0719 01/30/24 1114 01/30/24 1521 01/30/24 1550   BP: 124/79 141/85  125/58   BP Location: Right arm Right arm     Pulse: 104 (!) 107 100 99   Resp: 21 (!) 26 20    Temp: 98.2 °F (36.8 °C) 98.4 °F (36.9 °C) 98.1 °F (36.7 °C)    TempSrc: Temporal Temporal Oral    SpO2: 92% 96% 96% 97%   Weight:       Height:           Intake/Output Summary (Last 24 hours) at 1/30/2024 1646  Last data filed at 1/30/2024 1625  Gross per 24 hour "   Intake 3230 ml   Output --   Net 3230 ml     General: NAD  Skin: no rash but patient is jaundiced with the exception of diminished intake bases and fine lateral rails  Eyes: icteric sclera  ENT: moist mucous membrane  Neck: supple  Chest: CTA b/l, no ronchii, no wheeze, no rubs,   CVS: s1s2, no murmur, no gallop, no rub  Abdomen: soft, nontender, nl sounds  Extremities: 2+ unchanged edema LE b/l  : no young  Neuro: AAOX3  Psych: normal affect    Medications:    Current Facility-Administered Medications:     benzonatate (TESSALON PERLES) capsule 100 mg, 100 mg, Oral, TID PRN, Chau Bernard Jr., PA-C, 100 mg at 01/30/24 0918    cefepime (MAXIPIME) IVPB (premix in dextrose) 2,000 mg 50 mL, 2,000 mg, Intravenous, Q12H, JENSEN Solano, Last Rate: 100 mL/hr at 01/30/24 1629, 2,000 mg at 01/30/24 1629    FLUoxetine (PROzac) capsule 20 mg, 20 mg, Oral, Daily, JENSEN Solano, 20 mg at 01/30/24 0915    folic acid 1 mg, thiamine (VITAMIN B1) 100 mg in sodium chloride 0.9 % 100 mL IV piggyback, , Intravenous, Daily, JENSEN Burno, Last Rate: 0 mL/hr at 01/29/24 1047, New Bag at 01/30/24 1239    gabapentin (NEURONTIN) capsule 400 mg, 400 mg, Oral, TID, Paz Macias MD, 400 mg at 01/30/24 1551    guaiFENesin (ROBITUSSIN) oral liquid 200 mg, 200 mg, Oral, Q4H PRN, Toshia Hawkins MD, 200 mg at 01/30/24 1400    heparin (porcine) subcutaneous injection 5,000 Units, 5,000 Units, Subcutaneous, Q8H MURALI, JENSEN Solano, 5,000 Units at 01/30/24 1352    hydrOXYzine HCL (ATARAX) tablet 50 mg, 50 mg, Oral, Q6H PRN, Toshia Hawkins MD, 50 mg at 01/30/24 1106    insulin glargine (LANTUS) subcutaneous injection 36 Units 0.36 mL, 36 Units, Subcutaneous, HS, Santosh Hawkins DO, 36 Units at 01/29/24 2102    insulin lispro (HumaLOG) 100 units/mL subcutaneous injection 15 Units, 15 Units, Subcutaneous, TID With Meals, Caron Benavidez PA-C, 15 Units at 01/30/24 1629    insulin lispro  (HumaLOG) 100 units/mL subcutaneous injection 2-12 Units, 2-12 Units, Subcutaneous, TID AC, 4 Units at 01/30/24 1629 **AND** [CANCELED] Fingerstick Glucose (POCT), , , TID AC, Caron Benavidez PA-C    lidocaine (LIDODERM) 5 % patch 1 patch, 1 patch, Topical, Daily, JENSEN Solano, 1 patch at 01/29/24 2000    metoprolol tartrate (LOPRESSOR) tablet 25 mg, 25 mg, Oral, Q12H MURALI, JENSEN Solano, 25 mg at 01/30/24 0915    mirtazapine (REMERON) tablet 15 mg, 15 mg, Oral, HS, Toshia Hawkins MD, 15 mg at 01/29/24 2102    nicotine (NICODERM CQ) 14 mg/24hr TD 24 hr patch 14 mg, 14 mg, Transdermal, Daily, JENSEN Bruno, 14 mg at 01/30/24 1035    ondansetron (ZOFRAN) injection 4 mg, 4 mg, Intravenous, Q4H PRN, JENSEN Bruno, 4 mg at 01/30/24 1352    oxyCODONE (ROXICODONE) IR tablet 5 mg, 5 mg, Oral, Q4H PRN, Toshia Hawkins MD, 5 mg at 01/29/24 1952    pantoprazole (PROTONIX) EC tablet 40 mg, 40 mg, Oral, Early Morning, Toshia Hawkins MD, 40 mg at 01/30/24 0524    tamsulosin (FLOMAX) capsule 0.4 mg, 0.4 mg, Oral, HS, JENSEN Solano, 0.4 mg at 01/29/24 2102    vancomycin (VANCOCIN) IVPB (premix in dextrose) 750 mg 150 mL, 750 mg, Intravenous, Q12H, Nish Willingham MD, Stopped at 01/30/24 1201    Laboratory Results:  Results from last 7 days   Lab Units 01/30/24  0437 01/29/24  0347 01/28/24  0418 01/27/24  0413 01/27/24  0027 01/26/24 1932 01/26/24  0427 01/25/24 2015 01/25/24  1252 01/25/24  0528 01/25/24  0002 01/24/24  1522 01/24/24  0446 01/24/24 0446   WBC Thousand/uL 11.19* 9.74 9.31 8.39  --   --  7.54  --   --  6.55  --   --   --  5.92   HEMOGLOBIN g/dL 9.0* 8.4* 8.8* 8.4* 8.7* 8.5* 9.1*  --   --  9.5*  --   --   --  9.4*   HEMATOCRIT % 26.9* 25.9* 26.8* 25.1* 26.2* 24.8* 26.9*  --   --  27.2*  --   --   --  26.8*   PLATELETS Thousands/uL 138* 123* 131* 130*  --   --  110*  --   --  91*  --   --   --  56*   POTASSIUM mmol/L 3.2* 3.2* 3.0* 3.1*  --    --  3.7 3.7 3.7 3.1*   < > 3.5  --  3.6   CHLORIDE mmol/L 106 106 108 107  --   --  106 106 104 101   < > 102  --  100   CO2 mmol/L 19* 23 23 23  --   --  23 21 23 24   < > 20*  --  16*   BUN mg/dL 23 16 16 19  --   --  21 21 21 22   < > 19  --  15   CREATININE mg/dL 1.64* 1.26 1.33* 1.27  --   --  1.47* 1.59* 1.78* 1.95*   < > 2.26*  --  2.17*   CALCIUM mg/dL 9.1 8.9 9.2 9.0  --   --  8.7 9.2 9.1 9.2   < > 9.0  --  8.5   MAGNESIUM mg/dL 2.0 2.0 1.9 2.1  --   --  2.0  --   --  2.0  --  2.3  --   --    PHOSPHORUS mg/dL 1.2* 1.2* 1.0* 1.6*  --   --  6.5* 2.7 <1.0* <1.0*  --  2.1*   < >  --     < > = values in this interval not displayed.

## 2024-01-30 NOTE — ASSESSMENT & PLAN NOTE
Likely related to mental status, oversedation, in setting of +RSV  After sedation w ativan, precedex, decreased mental status and escalation to midflow plus NRB  Precedex d/c'd, weaned   Hemoptysis, now resolved  Now on RA    1/26 CTA PE: No pulmonary embolus. Septal thickening and groundglass opacity due to pulm edema w trace effusions. Extensive lower lobe consolidation could also be due to edema but pneumonia/aspiration not excluded in appropriate clinical setting. Redemonstration of enlarged lower right paraesophageal node, slightly increased from 5 days earlier. Hepatic steatosis.    Plan  Pulmonary hygiene  Persistent cough, Robitussin for acute relief, consider Mucinex if cough is productive  Unclear etiology, pulm edema vs PNA   Continue antibiotic regimen

## 2024-01-30 NOTE — ASSESSMENT & PLAN NOTE
Initially Prerenal in setting of HHNK/ACEI  Now w concerns for biliary nephrosis in setting of elevated t/ bili   Baseline 0.9-1  CT nephrolithiasis mild left hydro  UA +blood/glucose/protein  CK WNL  Renal U/S: Right intrarenal calculi;  Hepatomegaly w hepatic steatosis  1/27 Octreotide discontinued, 1/28 Midodrine and Albumin discontinued    Plan:  Urinary retention protocol  Continue Flomax  Trend BMP  Monitor I/O  Avoid nephrotoxic agents   Nephro on consult  Regarding DUANE, concern of developing HRS, advising Albumin 5% 25g IV X1  Follow up labs including Urine NA for further eval  Consider reaching out the [facility] if concerned for HRS or lack of kidney improvement 2/2 alch hep

## 2024-01-30 NOTE — PROGRESS NOTES
Michael Koch is a 33 y.o. male who is currently ordered Vancomycin IV with management by the Pharmacy Consult service.  Relevant clinical data and objective / subjective history reviewed.  Vancomycin Assessment:  Indication and Goal AUC/Trough: Other, prophylaxis, -600, trough >10  Clinical Status: worsening  Micro:     Renal Function:  SCr: 1.64 mg/dL  CrCl: 70 mL/min  Renal replacement: Not on dialysis  Days of Therapy: 4  Current Dose: 1000 mg IV q 12 hrs  Vancomycin Plan:  New Dosin mg IV q 12 hrs  Estimated AUC: 468 mcg*hr/mL  Estimated Trough: 15.8 mcg/mL  Next Level: 2/5 at 0600  Renal Function Monitoring: Daily BMP and UOP  Pharmacy will continue to follow closely for s/sx of nephrotoxicity, infusion reactions and appropriateness of therapy.  BMP and CBC will be ordered per protocol. We will continue to follow the patient’s culture results and clinical progress daily.    Simon Haney, Pharmacist

## 2024-01-30 NOTE — ASSESSMENT & PLAN NOTE
Lab Results   Component Value Date    HGBA1C 8.8 (H) 01/21/2024     Recent Labs     01/29/24  0821 01/29/24  1305 01/29/24  1706 01/30/24  0721   POCGLU 265* 390* 332* 252*     Blood Sugar Average: Last 72 hrs:  (P) 226    Initially presented in DKA, now resolved   Continue Lantus and SSI, adjust as necessary  AccuChecks TID with meals and before bed  Endo on consult

## 2024-01-31 ENCOUNTER — APPOINTMENT (INPATIENT)
Dept: RADIOLOGY | Facility: HOSPITAL | Age: 34
DRG: 441 | End: 2024-01-31
Payer: COMMERCIAL

## 2024-01-31 VITALS
RESPIRATION RATE: 29 BRPM | HEIGHT: 67 IN | WEIGHT: 217.81 LBS | HEART RATE: 106 BPM | DIASTOLIC BLOOD PRESSURE: 77 MMHG | BODY MASS INDEX: 34.19 KG/M2 | OXYGEN SATURATION: 93 % | TEMPERATURE: 98.1 F | SYSTOLIC BLOOD PRESSURE: 127 MMHG

## 2024-01-31 LAB
ALBUMIN SERPL BCP-MCNC: 3.6 G/DL (ref 3.5–5)
ALP SERPL-CCNC: 102 U/L (ref 34–104)
ALT SERPL W P-5'-P-CCNC: 133 U/L (ref 7–52)
ANION GAP SERPL CALCULATED.3IONS-SCNC: 13 MMOL/L
AST SERPL W P-5'-P-CCNC: 207 U/L (ref 13–39)
BILIRUB DIRECT SERPL-MCNC: 21.26 MG/DL (ref 0–0.2)
BILIRUB SERPL-MCNC: 43.63 MG/DL (ref 0.2–1)
BUN SERPL-MCNC: 24 MG/DL (ref 5–25)
CALCIUM SERPL-MCNC: 8.6 MG/DL (ref 8.4–10.2)
CHLORIDE SERPL-SCNC: 105 MMOL/L (ref 96–108)
CO2 SERPL-SCNC: 18 MMOL/L (ref 21–32)
CREAT SERPL-MCNC: 2.32 MG/DL (ref 0.6–1.3)
ERYTHROCYTE [DISTWIDTH] IN BLOOD BY AUTOMATED COUNT: 21.7 % (ref 11.6–15.1)
GFR SERPL CREATININE-BSD FRML MDRD: 35 ML/MIN/1.73SQ M
GLUCOSE SERPL-MCNC: 132 MG/DL (ref 65–140)
GLUCOSE SERPL-MCNC: 147 MG/DL (ref 65–140)
HCT VFR BLD AUTO: 27.2 % (ref 36.5–49.3)
HGB BLD-MCNC: 9 G/DL (ref 12–17)
INR PPP: 1.45 (ref 0.84–1.19)
MAGNESIUM SERPL-MCNC: 2.5 MG/DL (ref 1.9–2.7)
MCH RBC QN AUTO: 34.4 PG (ref 26.8–34.3)
MCHC RBC AUTO-ENTMCNC: 33.1 G/DL (ref 31.4–37.4)
MCV RBC AUTO: 104 FL (ref 82–98)
PHOSPHATE SERPL-MCNC: 2.6 MG/DL (ref 2.7–4.5)
PLATELET # BLD AUTO: 160 THOUSANDS/UL (ref 149–390)
PMV BLD AUTO: 10.4 FL (ref 8.9–12.7)
POTASSIUM SERPL-SCNC: 3.7 MMOL/L (ref 3.5–5.3)
PROCALCITONIN SERPL-MCNC: 2.46 NG/ML
PROT SERPL-MCNC: 5.6 G/DL (ref 6.4–8.4)
PROTHROMBIN TIME: 18 SECONDS (ref 11.6–14.5)
RBC # BLD AUTO: 2.62 MILLION/UL (ref 3.88–5.62)
SODIUM SERPL-SCNC: 136 MMOL/L (ref 135–147)
WBC # BLD AUTO: 16.56 THOUSAND/UL (ref 4.31–10.16)

## 2024-01-31 PROCEDURE — 80076 HEPATIC FUNCTION PANEL: CPT | Performed by: PHYSICIAN ASSISTANT

## 2024-01-31 PROCEDURE — NC001 PR NO CHARGE: Performed by: PHYSICIAN ASSISTANT

## 2024-01-31 PROCEDURE — 82948 REAGENT STRIP/BLOOD GLUCOSE: CPT

## 2024-01-31 PROCEDURE — 99238 HOSP IP/OBS DSCHRG MGMT 30/<: CPT | Performed by: STUDENT IN AN ORGANIZED HEALTH CARE EDUCATION/TRAINING PROGRAM

## 2024-01-31 PROCEDURE — 85610 PROTHROMBIN TIME: CPT | Performed by: PHYSICIAN ASSISTANT

## 2024-01-31 PROCEDURE — 83735 ASSAY OF MAGNESIUM: CPT | Performed by: PHYSICIAN ASSISTANT

## 2024-01-31 PROCEDURE — 85027 COMPLETE CBC AUTOMATED: CPT | Performed by: PHYSICIAN ASSISTANT

## 2024-01-31 PROCEDURE — 80048 BASIC METABOLIC PNL TOTAL CA: CPT | Performed by: PHYSICIAN ASSISTANT

## 2024-01-31 PROCEDURE — 84100 ASSAY OF PHOSPHORUS: CPT | Performed by: PHYSICIAN ASSISTANT

## 2024-01-31 PROCEDURE — 84145 PROCALCITONIN (PCT): CPT | Performed by: PHYSICIAN ASSISTANT

## 2024-01-31 PROCEDURE — 71045 X-RAY EXAM CHEST 1 VIEW: CPT

## 2024-01-31 RX ORDER — INSULIN LISPRO 100 [IU]/ML
2-12 INJECTION, SOLUTION INTRAVENOUS; SUBCUTANEOUS
Status: CANCELLED | OUTPATIENT
Start: 2024-01-31

## 2024-01-31 RX ORDER — ONDANSETRON 2 MG/ML
4 INJECTION INTRAMUSCULAR; INTRAVENOUS EVERY 4 HOURS PRN
Status: CANCELLED | OUTPATIENT
Start: 2024-01-31

## 2024-01-31 RX ORDER — PANTOPRAZOLE SODIUM 40 MG/1
40 TABLET, DELAYED RELEASE ORAL
Status: CANCELLED | OUTPATIENT
Start: 2024-02-01

## 2024-01-31 RX ORDER — VANCOMYCIN HYDROCHLORIDE 1 G/200ML
10 INJECTION, SOLUTION INTRAVENOUS ONCE AS NEEDED
Status: CANCELLED | OUTPATIENT
Start: 2024-02-01

## 2024-01-31 RX ORDER — BENZONATATE 100 MG/1
100 CAPSULE ORAL 3 TIMES DAILY PRN
Status: CANCELLED | OUTPATIENT
Start: 2024-01-31

## 2024-01-31 RX ORDER — LIDOCAINE 50 MG/G
1 PATCH TOPICAL DAILY
Status: CANCELLED | OUTPATIENT
Start: 2024-01-31

## 2024-01-31 RX ORDER — INSULIN LISPRO 100 [IU]/ML
15 INJECTION, SOLUTION INTRAVENOUS; SUBCUTANEOUS
Status: CANCELLED | OUTPATIENT
Start: 2024-01-31

## 2024-01-31 RX ORDER — VANCOMYCIN HYDROCHLORIDE 1 G/200ML
10 INJECTION, SOLUTION INTRAVENOUS ONCE
Status: DISCONTINUED | OUTPATIENT
Start: 2024-01-31 | End: 2024-01-31 | Stop reason: HOSPADM

## 2024-01-31 RX ORDER — MIRTAZAPINE 15 MG/1
15 TABLET, FILM COATED ORAL
Status: CANCELLED | OUTPATIENT
Start: 2024-01-31

## 2024-01-31 RX ORDER — TAMSULOSIN HYDROCHLORIDE 0.4 MG/1
0.4 CAPSULE ORAL
Status: CANCELLED | OUTPATIENT
Start: 2024-01-31

## 2024-01-31 RX ORDER — HEPARIN SODIUM 5000 [USP'U]/ML
5000 INJECTION, SOLUTION INTRAVENOUS; SUBCUTANEOUS EVERY 8 HOURS SCHEDULED
Status: CANCELLED | OUTPATIENT
Start: 2024-01-31

## 2024-01-31 RX ORDER — VANCOMYCIN HYDROCHLORIDE 1 G/200ML
10 INJECTION, SOLUTION INTRAVENOUS ONCE
Status: CANCELLED | OUTPATIENT
Start: 2024-01-31

## 2024-01-31 RX ORDER — HYDROXYZINE HYDROCHLORIDE 25 MG/1
50 TABLET, FILM COATED ORAL EVERY 6 HOURS PRN
Status: CANCELLED | OUTPATIENT
Start: 2024-01-31

## 2024-01-31 RX ORDER — NICOTINE 21 MG/24HR
14 PATCH, TRANSDERMAL 24 HOURS TRANSDERMAL DAILY
Status: CANCELLED | OUTPATIENT
Start: 2024-01-31

## 2024-01-31 RX ORDER — GABAPENTIN 400 MG/1
400 CAPSULE ORAL 3 TIMES DAILY
Status: CANCELLED | OUTPATIENT
Start: 2024-01-31

## 2024-01-31 RX ORDER — VANCOMYCIN HYDROCHLORIDE 1 G/200ML
10 INJECTION, SOLUTION INTRAVENOUS ONCE AS NEEDED
Status: DISCONTINUED | OUTPATIENT
Start: 2024-02-01 | End: 2024-01-31 | Stop reason: HOSPADM

## 2024-01-31 RX ORDER — OXYCODONE HYDROCHLORIDE 5 MG/1
5 TABLET ORAL EVERY 4 HOURS PRN
Status: CANCELLED | OUTPATIENT
Start: 2024-01-31

## 2024-01-31 RX ORDER — GUAIFENESIN 100 MG/5ML
200 SOLUTION ORAL EVERY 4 HOURS PRN
Status: CANCELLED | OUTPATIENT
Start: 2024-01-31

## 2024-01-31 RX ORDER — POTASSIUM CHLORIDE 14.9 MG/ML
20 INJECTION INTRAVENOUS ONCE
Status: COMPLETED | OUTPATIENT
Start: 2024-01-31 | End: 2024-01-31

## 2024-01-31 RX ORDER — FLUOXETINE HYDROCHLORIDE 20 MG/1
20 CAPSULE ORAL DAILY
Status: CANCELLED | OUTPATIENT
Start: 2024-01-31

## 2024-01-31 RX ORDER — CEFEPIME HYDROCHLORIDE 2 G/50ML
2000 INJECTION, SOLUTION INTRAVENOUS EVERY 12 HOURS
Status: CANCELLED | OUTPATIENT
Start: 2024-01-31

## 2024-01-31 RX ORDER — INSULIN GLARGINE 100 [IU]/ML
36 INJECTION, SOLUTION SUBCUTANEOUS
Status: CANCELLED | OUTPATIENT
Start: 2024-01-31

## 2024-01-31 RX ADMIN — GUAIFENESIN 200 MG: 200 SOLUTION ORAL at 09:17

## 2024-01-31 RX ADMIN — POTASSIUM CHLORIDE 20 MEQ: 14.9 INJECTION, SOLUTION INTRAVENOUS at 05:41

## 2024-01-31 RX ADMIN — GUAIFENESIN 200 MG: 200 SOLUTION ORAL at 03:59

## 2024-01-31 RX ADMIN — HEPARIN SODIUM 5000 UNITS: 5000 INJECTION INTRAVENOUS; SUBCUTANEOUS at 05:06

## 2024-01-31 RX ADMIN — POTASSIUM CHLORIDE 20 MEQ: 14.9 INJECTION, SOLUTION INTRAVENOUS at 01:13

## 2024-01-31 RX ADMIN — OXYCODONE HYDROCHLORIDE 5 MG: 5 TABLET ORAL at 09:22

## 2024-01-31 RX ADMIN — BENZONATATE 100 MG: 100 CAPSULE ORAL at 08:32

## 2024-01-31 RX ADMIN — PANTOPRAZOLE SODIUM 40 MG: 40 TABLET, DELAYED RELEASE ORAL at 05:06

## 2024-01-31 RX ADMIN — INSULIN LISPRO 15 UNITS: 100 INJECTION, SOLUTION INTRAVENOUS; SUBCUTANEOUS at 08:30

## 2024-01-31 RX ADMIN — HYDROXYZINE HYDROCHLORIDE 50 MG: 25 TABLET ORAL at 08:32

## 2024-01-31 RX ADMIN — METOPROLOL TARTRATE 25 MG: 25 TABLET, FILM COATED ORAL at 08:33

## 2024-01-31 RX ADMIN — FLUOXETINE 20 MG: 20 CAPSULE ORAL at 08:32

## 2024-01-31 RX ADMIN — CEFEPIME HYDROCHLORIDE 2000 MG: 2 INJECTION, SOLUTION INTRAVENOUS at 05:06

## 2024-01-31 RX ADMIN — NICOTINE 14 MG: 14 PATCH, EXTENDED RELEASE TRANSDERMAL at 08:33

## 2024-01-31 RX ADMIN — ONDANSETRON 4 MG: 2 INJECTION INTRAMUSCULAR; INTRAVENOUS at 09:17

## 2024-01-31 RX ADMIN — GABAPENTIN 400 MG: 400 CAPSULE ORAL at 08:33

## 2024-01-31 RX ADMIN — POTASSIUM PHOSPHATE, MONOBASIC AND POTASSIUM PHOSPHATE, DIBASIC 30 MMOL: 224; 236 INJECTION, SOLUTION, CONCENTRATE INTRAVENOUS at 05:49

## 2024-01-31 NOTE — NURSING NOTE
Pt picked up by transport team with all belongings, cell phone, , tissue box, and wedding ring. Pt's wife to take home all other belongings. Attempted to give report to [facility]. RN provided call back number to [facility] ICU. Per [facility] will call back.

## 2024-01-31 NOTE — CASE MANAGEMENT
Case Management Discharge Planning Note    Patient name Michael Kcoh  Location /-01 MRN [id number]  : 1990 Date 2024       Current Admission Date: 2024  Current Admission Diagnosis:Alcoholic hepatitis   Patient Active Problem List    Diagnosis Date Noted    Acute respiratory failure with hypoxia and hypercapnia (HCC) 2024    Alcoholic hepatitis 2024    ETOH abuse 2024    Depression 2024    GERD (gastroesophageal reflux disease) 2024    BPH (benign prostatic hyperplasia) 2024    RSV infection 2024    Abnormal CT scan 2024    Hepatic encephalopathy (HCC) 2024    N&V (nausea and vomiting) 2024    DUANE (acute kidney injury) (HCC) 2024    Thrombocytopenia (HCC) 2024    Lactic acidosis 2024    Marijuana use 2024    SIRS (systemic inflammatory response syndrome) (HCC) 2024    History of gout 10/04/2023    History of hypertension 10/04/2023    Type 2 diabetes mellitus with hyperglycemia, without long-term current use of insulin (HCC) 10/04/2023    Tobacco abuse 10/04/2023      LOS (days): 10  Geometric Mean LOS (GMLOS) (days): 4.8  Days to GMLOS:-5     OBJECTIVE:  Risk of Unplanned Readmission Score: 29.67         Current admission status: Inpatient   Preferred Pharmacy:   [id number] - [place], [place] - [address]  [address]  [place] [address]  Phone: [phone] Fax: [phone]    Primary Care Provider: Louie Odonnell DO    Primary Insurance: BLUE CROSS  Secondary Insurance:     DISCHARGE DETAILS:    Aware of transfer to [facility] today.  Transport to be here within the hour.  Patient is on:Continuous Infusions:sodium bicarbonate 75 mEq in sodium chloride 0.45 % 1,000 mL infusion, 100 mL/hr, Last Rate: 100 mL/hr (24 234     Medical Necessity for transportation was completed. Copy available for transport team and a copy was placed in bin to be  scanned into the chart.     Did update [facility] representative.                                                                                                           Hyponatremia

## 2024-01-31 NOTE — PLAN OF CARE
After speaking with wife, pt agreed to transfer to [facility].  Pt thought he was being evaluated for liver transplant.  RN explained because of his ETOH use, he is not being considered for liver transplant but hepatologist attempting to try other modalities to increase his life span.  Pt's Phos, Mag, and K all replaced this shift.  Pt's creat up to 2.02, with CO2 18.  Per nephrologist, pt started on NaHCO3 infusion at 100ml/hr.  Awaiting for bed and transport to [facility].  Pt remains A & O x 4.  Pt has had no Bms this shift.  Pt with dry hacking cough treated with tessalon pearls and robitussin.  PCXR done this am and result pending.  Will continue to monitor and support as needed.    Problem: Potential for Falls  Goal: Patient will remain free of falls  Description: INTERVENTIONS:  - Educate patient/family on patient safety including physical limitations  - Instruct patient to call for assistance with activity   - Consult OT/PT to assist with strengthening/mobility   - Keep Call bell within reach  - Keep bed low and locked with side rails adjusted as appropriate  - Keep care items and personal belongings within reach  - Initiate and maintain comfort rounds  - Make Fall Risk Sign visible to staff  - Offer Toileting every 2 Hours, in advance of need  - Initiate/Maintain bed alarm  - Obtain necessary fall risk management equipment: yellow socks  - Apply yellow socks and bracelet for high fall risk patients  - Consider moving patient to room near nurses station  Outcome: Progressing     Problem: PAIN - ADULT  Goal: Verbalizes/displays adequate comfort level or baseline comfort level  Description: Interventions:  - Encourage patient to monitor pain and request assistance  - Assess pain using appropriate pain scale  - Administer analgesics based on type and severity of pain and evaluate response  - Implement non-pharmacological measures as appropriate and evaluate response  - Consider cultural and social influences on  pain and pain management  - Notify physician/advanced practitioner if interventions unsuccessful or patient reports new pain  Outcome: Progressing     Problem: INFECTION - ADULT  Goal: Absence or prevention of progression during hospitalization  Description: INTERVENTIONS:  - Assess and monitor for signs and symptoms of infection  - Monitor lab/diagnostic results  - Monitor all insertion sites, i.e. indwelling lines, tubes  - Administer medications as ordered  - Instruct and encourage patient and family to use good hand hygiene technique  - Identify and instruct in appropriate isolation precautions for identified infection/condition  Outcome: Progressing     Problem: SAFETY ADULT  Goal: Patient will remain free of falls  Description: INTERVENTIONS:  - Educate patient/family on patient safety including physical limitations  - Instruct patient to call for assistance with activity   - Consult OT/PT to assist with strengthening/mobility   - Keep Call bell within reach  - Keep bed low and locked with side rails adjusted as appropriate  - Keep care items and personal belongings within reach  - Initiate and maintain comfort rounds  - Make Fall Risk Sign visible to staff  - Offer Toileting every 2 Hours, in advance of need  - Initiate/Maintain bed alarm  - Obtain necessary fall risk management equipment: yellow socks  - Apply yellow socks and bracelet for high fall risk patients  - Consider moving patient to room near nurses station  Outcome: Progressing  Goal: Maintain or return to baseline ADL function  Description: INTERVENTIONS:  -  Assess patient's ability to carry out ADLs; assess patient's baseline for ADL function and identify physical deficits which impact ability to perform ADLs (bathing, care of mouth/teeth, toileting, grooming, dressing, etc.)  - Assess/evaluate cause of self-care deficits   - Assess range of motion  - Assess patient's mobility; develop plan if impaired  - Assess patient's need for assistive  devices and provide as appropriate  - Encourage maximum independence but intervene and supervise when necessary  - Involve family in performance of ADLs  - Assess for home care needs following discharge   - Consider OT consult to assist with ADL evaluation and planning for discharge  - Provide patient education as appropriate  Outcome: Progressing  Goal: Maintains/Returns to pre admission functional level  Description: INTERVENTIONS:  - Perform AM-PAC 6 Click Basic Mobility/ Daily Activity assessment daily.  - Set and communicate daily mobility goal to care team and patient/family/caregiver.   - Collaborate with rehabilitation services on mobility goals if consulted  - Perform Range of Motion 3 times a day.  - Reposition patient every 2 hours.  - Record patient progress and toleration of activity level   Outcome: Progressing     Problem: DISCHARGE PLANNING  Goal: Discharge to home or other facility with appropriate resources  Description: INTERVENTIONS:  - Identify barriers to discharge w/patient and caregiver  - Arrange for needed discharge resources and transportation as appropriate  - Identify discharge learning needs (meds, etc.)  - Refer to Case Management Department for coordinating discharge planning if the patient needs post-hospital services based on physician/advanced practitioner order or complex needs related to functional status, cognitive ability, or social support system  Outcome: Progressing     Problem: Knowledge Deficit  Goal: Patient/family/caregiver demonstrates understanding of disease process, treatment plan, medications, and discharge instructions  Description: Complete learning assessment and assess knowledge base.  Interventions:  - Provide teaching at level of understanding  - Provide teaching via preferred learning methods  Outcome: Progressing     Problem: NEUROSENSORY - ADULT  Goal: Achieves stable or improved neurological status  Description: INTERVENTIONS  - Monitor and report changes  in neurological status  - Monitor vital signs such as temperature, blood pressure, glucose, and any other labs ordered       Outcome: Progressing     Problem: CARDIOVASCULAR - ADULT  Goal: Maintains optimal cardiac output and hemodynamic stability  Description: INTERVENTIONS:  - Monitor I/O, vital signs and rhythm  - Monitor for S/S and trends of decreased cardiac output  - Administer and titrate ordered vasoactive medications to optimize hemodynamic stability  - Assess quality of pulses, skin color and temperature  - Assess for signs of decreased coronary artery perfusion  - Instruct patient to report change in severity of symptoms  Outcome: Progressing  Goal: Absence of cardiac dysrhythmias or at baseline rhythm  Description: INTERVENTIONS:  - Continuous cardiac monitoring, vital signs, obtain 12 lead EKG if ordered  - Administer antiarrhythmic and heart rate control medications as ordered  - Monitor electrolytes and administer replacement therapy as ordered  Outcome: Progressing     Problem: RESPIRATORY - ADULT  Goal: Achieves optimal ventilation and oxygenation  Description: INTERVENTIONS:  - Assess for changes in respiratory status  - Assess for changes in mentation and behavior  - Position to facilitate oxygenation and minimize respiratory effort  - Oxygen administered by appropriate delivery if ordered  - Initiate smoking cessation education as indicated  - Encourage broncho-pulmonary hygiene including cough, deep breathe, Incentive Spirometry  - Assess the need for suctioning and aspirate as needed  - Assess and instruct to report SOB or any respiratory difficulty  - Respiratory Therapy support as indicated  Outcome: Progressing     Problem: GASTROINTESTINAL - ADULT  Goal: Maintains or returns to baseline bowel function  Description: INTERVENTIONS:  - Assess bowel function  - Encourage oral fluids to ensure adequate hydration  - Administer IV fluids if ordered to ensure adequate hydration  - Administer  ordered medications as needed  - Encourage mobilization and activity  - Consider nutritional services referral to assist patient with adequate nutrition and appropriate food choices  Outcome: Progressing     Problem: METABOLIC, FLUID AND ELECTROLYTES - ADULT  Goal: Glucose maintained within target range  Description: INTERVENTIONS:  - Monitor Blood Glucose as ordered  - Assess for signs and symptoms of hyperglycemia and hypoglycemia  - Administer ordered medications to maintain glucose within target range  - Assess nutritional intake and initiate nutrition service referral as needed  Outcome: Progressing     Problem: SKIN/TISSUE INTEGRITY - ADULT  Goal: Skin Integrity remains intact(Skin Breakdown Prevention)  Description: Assess:    -Assess extremities for adequate circulation and sensation     Bed Management:  -Have minimal linens on bed & keep smooth, unwrinkled  -Change linens as needed when moist or perspiring      Skin Care:  -Avoid use of baby powder, tape, friction and shearing, hot water or constrictive clothing    Outcome: Progressing     Problem: HEMATOLOGIC - ADULT  Goal: Maintains hematologic stability  Description: INTERVENTIONS  - Assess for signs and symptoms of bleeding or hemorrhage  - Monitor labs  - Administer supportive blood products/factors as ordered and appropriate  Outcome: Progressing     Problem: MUSCULOSKELETAL - ADULT  Goal: Maintain or return mobility to safest level of function  Description: INTERVENTIONS:  - Assess patient's ability to carry out ADLs; assess patient's baseline for ADL function and identify physical deficits which impact ability to perform ADLs (bathing, care of mouth/teeth, toileting, grooming, dressing, etc.)  - Assess/evaluate cause of self-care deficits   - Assess range of motion  - Assess patient's mobility  - Assess patient's need for assistive devices and provide as appropriate  - Encourage maximum independence but intervene and supervise when necessary  -  Involve family in performance of ADLs  - Assess for home care needs following discharge   - Consider OT consult to assist with ADL evaluation and planning for discharge  - Provide patient education as appropriate  Outcome: Progressing     Problem: Nutrition/Hydration-ADULT  Goal: Nutrient/Hydration intake appropriate for improving, restoring or maintaining nutritional needs  Description: Monitor and assess patient's nutrition/hydration status for malnutrition. Collaborate with interdisciplinary team and initiate plan and interventions as ordered.  Monitor patient's weight and dietary intake as ordered or per policy. Utilize nutrition screening tool and intervene as necessary. Determine patient's food preferences and provide high-protein, high-caloric foods as appropriate.     INTERVENTIONS:  - Monitor oral intake, urinary output, labs, and treatment plans  - Assess nutrition and hydration status and recommend course of action  - Evaluate amount of meals eaten  - Assist patient with eating if necessary   - Allow adequate time for meals  - Recommend/ encourage appropriate diets, oral nutritional supplements, and vitamin/mineral supplements  - Order, calculate, and assess calorie counts as needed  - Recommend, monitor, and adjust tube feedings and TPN/PPN based on assessed needs  - Assess need for intravenous fluids  - Provide specific nutrition/hydration education as appropriate  - Include patient/family/caregiver in decisions related to nutrition  Outcome: Progressing     Problem: Prexisting or High Potential for Compromised Skin Integrity  Goal: Skin integrity is maintained or improved  Description: INTERVENTIONS:  - Identify patients at risk for skin breakdown  - Assess and monitor skin integrity  - Assess and monitor nutrition and hydration status  - Monitor labs   - Assess for incontinence   - Turn and reposition patient  - Assist with mobility/ambulation  - Relieve pressure over bony prominences  - Avoid  friction and shearing  - Provide appropriate hygiene as needed including keeping skin clean and dry  - Evaluate need for skin moisturizer/barrier cream  - Collaborate with interdisciplinary team   - Patient/family teaching  - Consider wound care consult   Outcome: Progressing     Problem: GENITOURINARY - ADULT  Goal: Maintains or returns to baseline urinary function  Description: INTERVENTIONS:  - Assess urinary function  - Encourage oral fluids to ensure adequate hydration if ordered  - Administer IV fluids as ordered to ensure adequate hydration  - Administer ordered medications as needed  - Offer frequent toileting  - Follow urinary retention protocol if ordered  Outcome: Not Progressing     Problem: METABOLIC, FLUID AND ELECTROLYTES - ADULT  Goal: Electrolytes maintained within normal limits  Description: INTERVENTIONS:  - Monitor labs and assess patient for signs and symptoms of electrolyte imbalances  - Administer electrolyte replacement as ordered  - Monitor response to electrolyte replacements, including repeat lab results as appropriate  - Instruct patient on fluid and nutrition as appropriate  Outcome: Not Progressing  Goal: Fluid balance maintained  Description: INTERVENTIONS:  - Monitor labs   - Monitor I/O and WT  - Instruct patient on fluid and nutrition as appropriate  - Assess for signs & symptoms of volume excess or deficit  Outcome: Not Progressing

## 2024-01-31 NOTE — ASSESSMENT & PLAN NOTE
Initially Prerenal in setting of HHNK/ACEI  Now w concerns for biliary nephrosis in setting of elevated t/ bili   Suspected HRS  Baseline 0.9-1  CT nephrolithiasis mild left hydro  UA +blood/glucose/protein  CK WNL  Renal U/S: Right intrarenal calculi;  Hepatomegaly w hepatic steatosis  1/27 Octreotide discontinued, 1/28 Midodrine and Albumin discontinued    Plan:  Urinary retention protocol  Continue Flomax  Trend BMP  Monitor I/O  Avoid nephrotoxic agents   Nephro on consult  Regarding DUANE, concern of developing HRS, advising Albumin 5% 25g IV X1  Follow up labs including Urine NA for further eval  Consider reaching out the [facility] if concerned for HRS or lack of kidney improvement 2/2 alch hep

## 2024-01-31 NOTE — PROGRESS NOTES
Michael Koch is a 33 y.o. male who is currently ordered Vancomycin IV with management by the Pharmacy Consult service.  Relevant clinical data and objective / subjective history reviewed.  Vancomycin Assessment:  Indication and Goal AUC/Trough: Other, infection prophylaxis, -600, trough >10  Clinical Status: worsening  Micro:     Renal Function:  SCr: 2.32 mg/dL  CrCl: 50.7 mL/min  Renal replacement: Not on dialysis  Days of Therapy: 5  Current Dose: 1000 mg IV once today  Vancomycin Plan:  New Dosin mg IV once prn random level < or = 15 to start   Change to pulse dosing   Next Level: 2/ at 0600  Renal Function Monitoring: Daily BMP and UOP  Pharmacy will continue to follow closely for s/sx of nephrotoxicity, infusion reactions and appropriateness of therapy.  BMP and CBC will be ordered per protocol. We will continue to follow the patient’s culture results and clinical progress daily.    Simon Haney, Pharmacist

## 2024-01-31 NOTE — ASSESSMENT & PLAN NOTE
Lab Results   Component Value Date    HGBA1C 8.8 (H) 01/21/2024     Recent Labs     01/30/24  0721 01/30/24  1113 01/30/24  1628 01/30/24  2101   POCGLU 252* 227* 248* 139     Blood Sugar Average: Last 72 hrs:  (P) 220.9375    Initially presented in DKA, now resolved   Continue Lantus and SSI, adjust as necessary  AccuChecks TID with meals and before bed  Endo on consult

## 2024-01-31 NOTE — PROGRESS NOTES
[facility]  Progress Note  Name: Michael Koch I  MRN: [id number]  Unit/Bed#: -01 I Date of Admission: 1/21/2024   Date of Service: 1/31/2024 I Hospital Day: 10    Assessment/Plan   * Alcoholic hepatitis  Assessment & Plan  MELD: 28   AST> ALT; D Bili elevated at 9.8, Ammonia: 187 -> 85, GGT:797  RUQ: hepatomegaly,hepatic steatosis  CTAP: fatty liver/varices/portal htn  LD, CK, Tylenol level are WNL  AFP upper limit of NML  Started predinose 1/23, d/c'd by GI 1/26 1/28 w increased Bili, and DF of 61  Steroids 1/28-1/29 (dc'd per Dr Modi )  Lactulose d/c'd, patient having >3 BM daily  GI on consult  MELD may be too low for transplant consideration despite high Maddrey's  Would like to know about prior attempts to quit, past treatments etc  Dr Sylwia Modi from [facility]  If bili continues to increase, if likely HRS, consult to [facility]   1/30 MRI: 1. Interstitial edematous pancreatitis w/o acute peripancreatic fluid collection. 2. Hepatosplenomegaly and severe steatosis. 3. No choledocholithiasis, cholelithiasis, biliary ductal dilation or acute cholecystitis.     Plan:  If patient remains afebrile, WBC return to nml limits, consider discontinuing in favor of potentially restarting steroids for alch hep  Trend hepatic panel and INR  Serial abdominal exams  PRN zofran for n/v  Hydroxyzine 50mg Q6H PRN added for anxiety, w potential benefit of managing urticaria if this develops (currently denies)    SIRS (systemic inflammatory response syndrome) (HCC)  Assessment & Plan  Fever, tachycardia, consider  may be related to alcoholic hepatitis vs infectious etiology  LA nl  UA negative  Blood cultures negX2   LE duplex to evaluate for VTE: negative  Neg C.diff,  PO Vanco d/c'd    Plan  Cefepime and vanco D#4 (1/30), complete course  Trend PCT  If patient remains afebrile, WBC return to nml limits, consider discontinuing in favor of potentially restarting steroids for alch  hep    Hepatic encephalopathy (HCC)  Assessment & Plan  Improved  Concern for alcoholic hepatitis, mixed w ETOH withdrawal concerns    Confused and hallucinating  Lactulose d/c'd, patient AAOx3, denies hallucinations     Plan:  CAM ICU daily  Check ammonia PRN  Sleep hygiene, continue home Remeron  Delirium precautions     DUANE (acute kidney injury) (HCC)  Assessment & Plan  Initially Prerenal in setting of HHNK/ACEI  Now w concerns for biliary nephrosis in setting of elevated t/ bili   Baseline 0.9-1  CT nephrolithiasis mild left hydro  UA +blood/glucose/protein  CK WNL  Renal U/S: Right intrarenal calculi;  Hepatomegaly w hepatic steatosis  1/27 Octreotide discontinued, 1/28 Midodrine and Albumin discontinued    Plan:  Urinary retention protocol  Continue Flomax  Trend BMP  Monitor I/O  Avoid nephrotoxic agents   Nephro on consult  Regarding DUANE, concern of developing HRS, advising Albumin 5% 25g IV X1  Follow up labs including Urine NA for further eval  Consider reaching out the [facility] if concerned for HRS or lack of kidney improvement 2/2 alch hep    Acute respiratory failure with hypoxia and hypercapnia (HCC)  Assessment & Plan  Likely related to mental status, oversedation, in setting of +RSV  After sedation w ativan, precedex, decreased mental status and escalation to midflow plus NRB  Precedex d/c'd, weaned   Hemoptysis, now resolved  Now on RA    1/26 CTA PE: No pulmonary embolus. Septal thickening and groundglass opacity due to pulm edema w trace effusions. Extensive lower lobe consolidation could also be due to edema but pneumonia/aspiration not excluded in appropriate clinical setting. Redemonstration of enlarged lower right paraesophageal node, slightly increased from 5 days earlier. Hepatic steatosis.    Plan  Pulmonary hygiene  Persistent cough, Robitussin for acute relief, consider Mucinex if cough is productive  Unclear etiology, pulm edema vs PNA   Continue antibiotic regimen     Type 2 diabetes  mellitus with hyperglycemia, without long-term current use of insulin (HCC)  Assessment & Plan  Lab Results   Component Value Date    HGBA1C 8.8 (H) 2024     Recent Labs     24  0721 24  1113 24  1628 24  2101   POCGLU 252* 227* 248* 139     Blood Sugar Average: Last 72 hrs:  (P) 220.9375    Initially presented in DKA, now resolved   Continue Lantus and SSI, adjust as necessary  AccuChecks TID with meals and before bed  Endo on consult    Thrombocytopenia (HCC)  Assessment & Plan  Likely 2/2 ETOH abuse and acute liver disease    Hemolysis smear NML, LD wnl   Haptoglobin: 87   Fibrinogen: 280    Plan:  Continue to trend  Cont DVT ppx unless plates <50K    Abnormal CT scan  Assessment & Plan   CTAP: 1.6cm soft tissue nodule of distal esophagus   CTC: Slightly enlarged lower right paraesophageal node, stable since an abd CT 10/30/2023, of uncertain etiology and significance   CTA: Redemonstration of enlarged lower right paraesophageal node, slightly increased from 5 days earlier     RSV infection  Assessment & Plan  Supportive care    Depression  Assessment & Plan  Continue home fluoxetine 20mg QD  Hydroxyzine 50mg Q6H PRN added for anxiety, with potential benefit of managing urticaria if this develops    ETOH abuse  Assessment & Plan  Per pt drinks 2 24oz twisted tea/smirinoff per day (family notes pt drinks more than this)  Drinks since brother  3 years ago. Has stopped for a week or two without symptoms. No DTs.   CIWA protocol discontinued    Plan:   Continue Thiamine and Folate   Seen by [facility], follow up for note or recs    Tobacco abuse  Assessment & Plan  Nicotine patch  Smoking cessation counseling     History of gout  Assessment & Plan  Continue allopurinol/colchicine when able to PO              Disposition: Stepdown Level 1    ICU Core Measures     A: Assess, Prevent, and Manage Pain Has pain been assessed? Yes  Need for changes to pain regimen? NA    B: Both SAT/SAT  N/A   C: Choice of Sedation RASS Goal: 0 Alert and Calm or N/A patient not on sedation  Need for changes to sedation or analgesia regimen? NA   D: Delirium CAM-ICU: Negative   E: Early Mobility  Plan for early mobility? Yes   F: Family Engagement Plan for family engagement today? Yes       Antibiotic Review: Awaiting culture results.     Review of Invasive Devices:            Prophylaxis:  VTE VTE covered by:  heparin (porcine), Subcutaneous, 5,000 Units at 01/31/24 0506       Stress Ulcer  covered byomeprazole (PriLOSEC) 40 MG capsule [[id number]] (Long-Term Med), pantoprazole (PROTONIX) EC tablet 40 mg [[id number]]         Significant 24hr Events     24hr events:   Patient agreed to go to [facility] for hepatology, called transfer center initiated transfer  No beds for now     Renal function is worse, creatinine is to 2.0 and bicarb at 18   Calcium and phosphate repleted  Started on a bicarb drip 75 mEq of half-normal saline at 100 mL per hour      Subjective   Review of Systems   Constitutional:  Positive for appetite change, chills and fatigue.   HENT: Negative.     Respiratory:  Negative for cough, shortness of breath and wheezing.    Cardiovascular:  Negative for chest pain, palpitations and leg swelling.   Gastrointestinal:  Positive for abdominal distention and diarrhea. Negative for nausea and vomiting.   Endocrine: Negative.    Genitourinary:  Positive for difficulty urinating and urgency.   Allergic/Immunologic: Negative.    Neurological: Negative.    Hematological: Negative.    Psychiatric/Behavioral: Negative.     All other systems reviewed and are negative.     Objective            Pt agreed to be [facility] to [facility]                    Vitals I/O      Most Recent Min/Max in 24hrs   Temp 98.7 °F (37.1 °C) Temp  Min: 98.1 °F (36.7 °C)  Max: 98.8 °F (37.1 °C)   Pulse (!) 121 Pulse  Min: 98  Max: 121   Resp (!) 28 Resp  Min: 20  Max: 29   /77 BP  Min: 102/66  Max:  141/85   O2 Sat 97 % SpO2  Min: 91 %  Max: 97 %      Intake/Output Summary (Last 24 hours) at 1/31/2024 0521  Last data filed at 1/31/2024 0400  Gross per 24 hour   Intake 4541.98 ml   Output 620 ml   Net 3921.98 ml       Diet Prashant/CHO Controlled; Consistent Carbohydrate Diet Level 2 (5 carb servings/75 grams CHO/meal); Consistent Carbohydrate Diet Level 2 (5 carb servings/75 grams CHO/meal)    Invasive Monitoring           Physical Exam   Physical Exam  Vitals and nursing note reviewed.   Eyes:      General: Scleral icterus present.      Extraocular Movements: Extraocular movements intact.      Pupils: Pupils are equal, round, and reactive to light.   Skin:     General: Skin is cool, dry and not mottled extremities.      Capillary Refill: Capillary refill takes less than 2 seconds.      Coloration: Skin is jaundiced. Skin is not pale.   HENT:      Head: Normocephalic and atraumatic.   Cardiovascular:      Rate and Rhythm: Normal rate and regular rhythm.      Pulses: Normal pulses.      Heart sounds: Normal heart sounds.   Musculoskeletal:         General: Normal range of motion.      Cervical back: Full passive range of motion without pain, normal range of motion and neck supple.   Abdominal: General: Bowel sounds are decreased. There is distension.     Palpations: Abdomen is soft. There is shifting dullness.      Tenderness: There is abdominal tenderness.   Constitutional:       General: He is awake.      Appearance: He is well-developed and well-nourished.   Pulmonary:      Effort: Pulmonary effort is normal.      Breath sounds: Normal breath sounds. No wheezing or rhonchi.   Psychiatric:         Behavior: Behavior is cooperative.   Neurological:      General: No focal deficit present.      Mental Status: He is alert, easily aroused and oriented to person, place and time.      Sensory: Sensation is intact.      Motor: gross motor function is at baseline for patient. Strength full and intact in all extremities.             Diagnostic Studies      EKG: ST  Imaging:  I have personally reviewed pertinent reports.       Medications:  Scheduled PRN   cefepime, 2,000 mg, Q12H  FLUoxetine, 20 mg, Daily  folic acid 1 mg, thiamine (VITAMIN B1) 100 mg in sodium chloride 0.9 % 100 mL IV piggyback, , Daily  gabapentin, 400 mg, TID  heparin (porcine), 5,000 Units, Q8H MURALI  insulin glargine, 36 Units, HS  insulin lispro, 15 Units, TID With Meals  insulin lispro, 2-12 Units, TID AC  lidocaine, 1 patch, Daily  metoprolol tartrate, 25 mg, Q12H MURALI  mirtazapine, 15 mg, HS  nicotine, 14 mg, Daily  pantoprazole, 40 mg, Early Morning  potassium phosphate, 30 mmol, Once  tamsulosin, 0.4 mg, HS  vancomycin, 750 mg, Q12H      benzonatate, 100 mg, TID PRN  guaiFENesin, 200 mg, Q4H PRN  hydrOXYzine HCL, 50 mg, Q6H PRN  ondansetron, 4 mg, Q4H PRN  oxyCODONE, 5 mg, Q4H PRN       Continuous    sodium bicarbonate 75 mEq in sodium chloride 0.45 % 1,000 mL infusion, 100 mL/hr, Last Rate: 100 mL/hr (01/30/24 3587)         Labs:    CBC    Recent Labs     01/30/24 0437 01/31/24  0406   WBC 11.19* 16.56*   HGB 9.0* 9.0*   HCT 26.9* 27.2*   * 160   BANDSPCT 8  --      BMP    Recent Labs     01/30/24  2100 01/31/24  0406   SODIUM 135 136   K 3.0* 3.7    105   CO2 18* 18*   AGAP 11 13   BUN 24 24   CREATININE 2.02* 2.32*   CALCIUM 8.8 8.6       Coags    Recent Labs     01/30/24  0437 01/31/24  0406   INR 1.57* 1.45*        Additional Electrolytes  Recent Labs     01/30/24 2100 01/31/24  0406   MG 1.8* 2.5   PHOS 1.0* 2.6*          Blood Gas    No recent results  No recent results LFTs  Recent Labs     01/30/24  2100 01/31/24  0406   * 133*   * 207*   ALKPHOS 98 102   ALB 3.6 3.6   TBILI 41.89* 43.63*       Infectious  Recent Labs     01/30/24  0437 01/31/24  0406   PROCALCITONI 1.19* 2.46*     Glucose  Recent Labs     01/30/24  0437 01/30/24  2100 01/31/24  0406   GLUC 289* 137 132               Non-Critical Care Time Statement: I  have spent a total time of 45 minutes in caring for this patient including Diagnostic results, Instructions for management, Patient and family education, Counseling / Coordination of care, Documenting in the medical record, Reviewing / ordering tests, medicine, procedures  , Obtaining or reviewing history  , and Communicating with other healthcare professionals .     Caron Benavidez PA-C

## 2024-01-31 NOTE — TRANSPORTATION MEDICAL NECESSITY
"Section I - General Information    Name of Patient: Michael Koch                 : 1990    Medicare #: [id number]  Transport Date: 24 (PCS is valid for round trips on this date and for all repetitive trips in the 60-day range as noted below.)  Origin: [facility] INTENSIVE CARE UNIT                                                         Destination: [facility]  Is the pt's stay covered under Medicare Part A (PPS/DRG)   []     Closest appropriate facility? If no, why is transport to more distant facility required? Yes  If hospice pt, is this transport related to pt's terminal illness? No       Section II - Medical Necessity Questionnaire  Ambulance transportation is medically necessary only if other means of transport are contraindicated or would be potentially harmful to the patient. To meet this requirement, the patient must either be \"bed confined\" or suffer from a condition such that transport by means other than ambulance is contraindicated by the patient's condition. The following questions must be answered by the medical professional signing below for this form to be valid:    1)  Describe the MEDICAL CONDITION (physical and/or mental) of this patient AT THE TIME OF AMBULANCE TRANSPORT that requires the patient to be transported in an ambulance and why transport by other means is contraindicated by the patient's condition:     2) Is the patient \"bed confined\" as defined below?     No  To be \"be confined\" the patient must satisfy all three of the following conditions: (1) unable to get up from bed without Assistance; AND (2) unable to ambulate; AND (3) unable to sit in a chair or wheelchair.    3) Can this patient safely be transported by car or wheelchair van (i.e., seated during transport without a medical attendant or monitoring)?   No    4) In addition to completing questions 1-3 above, please check any of the following conditions that apply*:   *Note: supporting documentation " for any boxes checked must be maintained in the patient's medical records.    IV meds/fluids required   Medical attendant required   Requires oxygen-unable to self administer  Unable to tolerate seated position for time needed to transport   Cardiac monitoring required en route     If hosp-hosp transfer, describe services needed at 2nd facility not available at 1st facility? Evaluation at transplant center      Section III - Signature of Physician or Healthcare Professional  I certify that the above information is true and correct based on my evaluation of this patient, and represent that the patient requires transport by ambulance and that other forms of transport are contraindicated. I understand that this information will be used by the Centers for Medicare and Medicaid Services (CMS) to support the determination of medical necessity for ambulance services, and I represent that I have personal knowledge of the patient's condition at time of transport.    []  If this box is checked, I also certify that the patient is physically or mentally incapable of signing the ambulance service's claim and that the institution with which I am affiliated has furnished care, services, or assistance to the patient.    My signature below is made on behalf of the patient pursuant to 42 CFR §424.36(b)(4). In accordance with 42 CFR §424.37, the specific reason(s) that the patient is physically or mentally incapable of signing the claim form is as follows: .      Signature of Physician* or Healthcare Professional______________________________________________________________  Signature Date 01/31/24 (For scheduled repetitive transports, this form is not valid for transports performed more than 60 days after this date)    Printed Name & Credentials of Physician or Healthcare Professional (MD, DO, RN, etc.)______Anaid Knowles RN__________________________  *Form must be signed by patient's attending physician for scheduled, repetitive  transports. For non-repetitive, unscheduled ambulance transports, if unable to obtain the signature of the attending physician, any of the following may sign (choose appropriate option below)  [] Physician Assistant []  Clinical Nurse Specialist [x]  Registered Nurse  []  Nurse Practitioner  [] Discharge Planner

## 2024-01-31 NOTE — INCIDENTAL FINDINGS
The following findings require follow up:  Radiographic finding   Findin.6 cm soft tissue nodule identified adjacent to the visualized distal esophagus.    Follow up required: A dedicated nonemergent CT scan of the chest is recommended for further evaluation purposes.   Follow up should be done within 3-6 month(s)  Please notify the following clinician to assist with the follow up:   Dr. Odonnell

## 2024-01-31 NOTE — DISCHARGE SUMMARY
[facility]  Discharge- Michael Koch 1990, 33 y.o. male MRN: [id number]  Unit/Bed#: -01 Encounter: [id number]  Primary Care Provider: Louie Odonnell DO   Date and time admitted to hospital: 1/21/2024 10:57 AM    * Alcoholic hepatitis  Assessment & Plan  MELD: 28   AST> ALT; D Bili elevated at 9.8, Ammonia: 187 -> 85, GGT:[id number]  RUQ: hepatomegaly,hepatic steatosis  CTAP: fatty liver/varices/portal htn  LD, CK, Tylenol level are WNL  AFP upper limit of NML  Started predinose 1/23, d/c'd by GI 1/26 1/28 w increased Bili, and DF of 61  Steroids 1/28-1/29 (dc'd per Dr Modi )  Lactulose d/c'd, patient having >3 BM daily  GI on consult  MELD may be too low for transplant consideration despite high Maddrey's  Would like to know about prior attempts to quit, past treatments etc  Dr Sylwia Modi from [facility]  If bili continues to increase, if likely HRS, consult to [facility]   1/30 MRI: 1. Interstitial edematous pancreatitis w/o acute peripancreatic fluid collection. 2. Hepatosplenomegaly and severe steatosis. 3. No choledocholithiasis, cholelithiasis, biliary ductal dilation or acute cholecystitis.     Plan:  If patient remains afebrile, WBC return to nml limits, consider discontinuing in favor of potentially restarting steroids for alch hep  Trend hepatic panel and INR  Serial abdominal exams  PRN zofran for n/v  Hydroxyzine 50mg Q6H PRN added for anxiety, w potential benefit of managing urticaria if this develops (currently denies)    SIRS (systemic inflammatory response syndrome) (HCC)  Assessment & Plan  Fever, tachycardia, consider  may be related to alcoholic hepatitis vs infectious etiology  LA nl  UA negative  Blood cultures negX2   LE duplex to evaluate for VTE: negative  Neg C.diff,  PO Vanco d/c'd    Plan  Cefepime and vanco D#4 (1/30), complete course  Trend PCT  If patient remains afebrile, WBC return to nml limits, consider discontinuing in favor of  potentially restarting steroids for alch hep    Hepatic encephalopathy (HCC)  Assessment & Plan  Improving  Concern for alcoholic hepatitis, mixed w ETOH withdrawal concerns    Confused and hallucinating  Lactulose d/c'd, patient AAOx3, denies hallucinations     Plan:  CAM ICU daily  Check ammonia PRN  Sleep hygiene, continue home Remeron  Delirium precautions     DUANE (acute kidney injury) (HCC)  Assessment & Plan  Initially Prerenal in setting of HHNK/ACEI  Now w concerns for biliary nephrosis in setting of elevated t/ bili   Suspected HRS  Baseline 0.9-1  CT nephrolithiasis mild left hydro  UA +blood/glucose/protein  CK WNL  Renal U/S: Right intrarenal calculi;  Hepatomegaly w hepatic steatosis  1/27 Octreotide discontinued, 1/28 Midodrine and Albumin discontinued    Plan:  Urinary retention protocol  Continue Flomax  Trend BMP  Monitor I/O  Avoid nephrotoxic agents   Nephro on consult  Regarding DUANE, concern of developing HRS, advising Albumin 5% 25g IV X1  Follow up labs including Urine NA for further eval  Consider reaching out the [facility] if concerned for HRS or lack of kidney improvement 2/2 alch hep    Acute respiratory failure with hypoxia and hypercapnia (HCC)  Assessment & Plan  Likely related to mental status, oversedation, in setting of +RSV  After sedation w ativan, precedex, decreased mental status and escalation to midflow plus NRB  Precedex d/c'd, weaned   Hemoptysis, now resolved  Now on RA    1/26 CTA PE: No pulmonary embolus. Septal thickening and groundglass opacity due to pulm edema w trace effusions. Extensive lower lobe consolidation could also be due to edema but pneumonia/aspiration not excluded in appropriate clinical setting. Redemonstration of enlarged lower right paraesophageal node, slightly increased from 5 days earlier. Hepatic steatosis.    Plan  Pulmonary hygiene  Persistent cough, Robitussin for acute relief, consider Mucinex if cough is productive  Unclear etiology, pulm edema  vs PNA   Continue antibiotic regimen     Type 2 diabetes mellitus with hyperglycemia, without long-term current use of insulin (HCC)  Assessment & Plan  Lab Results   Component Value Date    HGBA1C 8.8 (H) 2024     Recent Labs     24  0721 24  1113 24  1628 24  2101   POCGLU 252* 227* 248* 139   Blood Sugar Average: Last 72 hrs:  (P) [id number]    Initially presented in DKA, now resolved   Continue Lantus and SSI, adjust as necessary  AccuChecks TID with meals and before bed  Endo on consult    Thrombocytopenia (HCC)  Assessment & Plan  Likely 2/2 ETOH abuse and acute liver disease    Hemolysis smear NML, LD wnl   Haptoglobin: 87   Fibrinogen: 280    Plan:  Continue to trend  Cont DVT ppx unless plates <50K    Abnormal CT scan  Assessment & Plan   CTAP: 1.6cm soft tissue nodule of distal esophagus   CTC: Slightly enlarged lower right paraesophageal node, stable since an abd CT 10/30/2023, of uncertain etiology and significance   CTA: Redemonstration of enlarged lower right paraesophageal node, slightly increased from 5 days earlier     RSV infection  Assessment & Plan  Supportive care    Depression  Assessment & Plan  Continue home fluoxetine 20mg QD  Hydroxyzine 50mg Q6H PRN added for anxiety, with potential benefit of managing urticaria if this develops    ETOH abuse  Assessment & Plan  Per pt drinks 2 24oz twisted tea/smirinoff per day (family notes pt drinks more than this)  Drinks since brother  3 years ago. Has stopped for a week or two without symptoms. No DTs.   CIWA protocol discontinued    Plan:   Continue Thiamine and Folate   Seen by Warm Handoff, follow up for note or recs    Tobacco abuse  Assessment & Plan  Nicotine patch  Smoking cessation counseling     History of gout  Assessment & Plan  Continue allopurinol/colchicine when able to PO               Medical Problems       Resolved Problems  Date Reviewed: 2024            Resolved     Hyperammonemia (HCC) 1/22/2024     Resolved by  JENSEN Bruno    Metabolic acidosis, increased anion gap 1/22/2024     Resolved by  JENSEN Bruno    Diabetic ketoacidosis without coma associated with type 2 diabetes mellitus (HCC) 1/23/2024     Resolved by  JENSEN Bruno    Hyponatremia 1/25/2024     Resolved by  Chau Bernard Jr., PA-C    GI bleed 1/28/2024     Resolved by  JENSEN Solano          Admission Date:   Admission Orders (From admission, onward)       Ordered        01/21/24 1359  INPATIENT ADMISSION  Once                            Admitting Diagnosis: Ureteral stone [N20.1]  Back pain [M54.9]  Jaundice [R17]  Hyperglycemia [R73.9]  Left ureteral stone [N20.1]    HPI: According to H&P by Kindra STYLES:  33 y.o. with PMH of DM 2, EtOH abuse, tobacco abuse, marijuana use, gout, depression anxiety, hypertension, GERD, nephrolithiasis status post lithotripsy and stent removal who presents with back pain/nausea and vomiting x 1 week.  Per patient he had a video visit last week and was given Zofran.  No improvement and had an in person visit with no additional workup.  Review of systems is positive for chills/weakness/decreased appetite/nausea/mucus production/low back pain.  Denies any fevers/chest pain/diarrhea/abdominal pain.     On arrival to the ER patient was noted to be tachycardic.  Blood pressure was stable.  CT of the abdomen and pelvis was done revealing fatty liver, collateral/varices adjacent to the spleen suggesting portal hypertension.  1.6 cm soft tissue tissue nodule at the distal esophagus.  9 mm calculus in the mid left ureter.  5 mm calculus at the left UVJ with mild left hydro-.  2 mm calculus in the right renal pelvis.  Labs revealed platelets of 72/bicarb of 13/anion gap of 23/creatinine of 1.38/lactic acid of 14.5/AST of 190/ALT of 81/alk phos of 135/T. bili of 13.82/ammonia of 85/lipase of 123.  Upper quadrant ultrasound was  done revealing hepatomegaly and hepatic steatosis.  Insulin drip was initiated.     Critical care was asked to evaluate the patient secondary to the lactic acidosis/metabolic acidosis.    Procedures Performed:   Orders Placed This Encounter   Procedures    Critical Care    ED ECG Documentation Only       Summary of Hospital Course: Pt admitted to critical care. Consults placed to Nephrology, Urology, Gastroenterology, Endocrinology.    Over the course of hospitalization, labs worsening with concerns for alcoholic hepatitis, elevated Tbili, HRS/DUANE/ATN. Alcohol withdrawal symptoms treated with CIWA protocol ativan, precedex infusion, and thiamine/folate. Hepatic encephalopathy and withdrawal symptoms improved. Tbili and creatinine continued to worsen. Case discussed with [facility] and pt was accepted for transfer for Hepatology.     Significant Findings, Care, Treatment and Services Provided:   1/21-CT AP-fatty liver.  Collateral/varices suggesting portal hypertension.  A 1.6 cm soft tissue nodule of the distal esophagus.  9 mm calculus at the left ureter.  5 mm calculus at the left UVJ.  Mild left hydro.  Bilateral nephrolithiasis.  2 mm calculus at the right renal pelvis.  1/21 RUQ U/S-hepatomegaly with hepatic steatosis  1/21 RSV +  1/21 BC-negative  1/21 CT C-Slightly enlarged lower right paraesophageal node, corresponding with the abdomen CT from earlier today, stable since an abdomen CT from 10/30/2023  1/22: U/S kidney, bladder: Trace bilateral hydronephrosis. No perinephric collection. Bilateral renal calculi.  1/26 CTA PE study: No pulmonary embolus. Septal thickening and groundglass opacity due to pulmonary edema with trace effusions. Extensive lower lobe consolidation could also be due to edema but pneumonia/aspiration not excluded in the appropriate clinical setting. Redemonstration of enlarged lower right paraesophageal node, slightly increased from 5 days earlier. Hepatic steatosis.  1/26 Heme pos  stool  1/28 Abd XR: Mild gaseous distention of small and large bowel may be seen w ileus pattern. No discernible free air on this supine study.  Upright or left lateral decubitus imaging is more sensitive to detect subtle free air in the appropriate setting  1/29 LE Duplex: Neg bilaterally  1/29 BC-negative  1/19 Cdiff-negative  1/30 MRI: Interstitial edematous pancreatitis without an acute peripancreatic fluid collection. Hepatosplenomegaly and severe steatosis. No choledocholithiasis, cholelithiasis, biliary ductal dilation or acute cholecystitis.     Overnight, worsening renal function; creatinine 2.32 and bicarb 18 despite bicarb gtt.    Complications:     Condition at Discharge: stable         Discharge instructions/Information to patient and family:   See after visit summary for information provided to patient and family.      Provisions for Follow-Up Care:  See after visit summary for information related to follow-up care and any pertinent home health orders.      PCP: Louie Odonnell DO    Disposition:  Transfer to [facility] for hepatology    Planned Readmission: Yes discharge/admit to [facility]    Discharge Statement   I spent 32 minutes discharging the patient. This time was spent on the day of discharge. I had direct contact with the patient on the day of discharge. Additional documentation is required if more than 30 minutes were spent on discharge.     Discharge Medications:  See after visit summary for reconciled discharge medications provided to patient and family.

## 2024-01-31 NOTE — ASSESSMENT & PLAN NOTE
Per pt drinks 2 24oz twisted tea/smirinoff per day (family notes pt drinks more than this)  Drinks since brother  3 years ago. Has stopped for a week or two without symptoms. No DTs.   [facility] protocol discontinued    Plan:   Continue Thiamine and Folate   Seen by Warm Handoff, follow up for note or recs

## 2024-01-31 NOTE — NURSING NOTE
Pt ready for d/c to [facility]. Belongings packed and sent home with wife who is present at bedside. Pt took eyeglasses, cell phone and  with self on discharge. Pt care transferred to transport team PT discharged

## 2024-01-31 NOTE — ASSESSMENT & PLAN NOTE
Per pt drinks 2 24oz twisted tea/smirinoff per day (family notes pt drinks more than this)  Drinks since brother  3 years ago. Has stopped for a week or two without symptoms. No DTs.   [facility] protocol discontinued    Plan:   Continue Thiamine and Folate   Seen by Warm Handoff, follow up for note or recs

## 2024-01-31 NOTE — PLAN OF CARE
Problem: Potential for Falls  Goal: Patient will remain free of falls  Description: INTERVENTIONS:  - Educate patient/family on patient safety including physical limitations  - Instruct patient to call for assistance with activity   - Consult OT/PT to assist with strengthening/mobility   - Keep Call bell within reach  - Keep bed low and locked with side rails adjusted as appropriate  - Keep care items and personal belongings within reach  - Initiate and maintain comfort rounds  - Make Fall Risk Sign visible to staff  - Offer Toileting every 2 Hours, in advance of need  - Initiate/Maintain bed alarm  - Obtain necessary fall risk management equipment: yellow socks  - Apply yellow socks and bracelet for high fall risk patients  - Consider moving patient to room near nurses station  Outcome: Adequate for Discharge     Problem: PAIN - ADULT  Goal: Verbalizes/displays adequate comfort level or baseline comfort level  Description: Interventions:  - Encourage patient to monitor pain and request assistance  - Assess pain using appropriate pain scale  - Administer analgesics based on type and severity of pain and evaluate response  - Implement non-pharmacological measures as appropriate and evaluate response  - Consider cultural and social influences on pain and pain management  - Notify physician/advanced practitioner if interventions unsuccessful or patient reports new pain  Outcome: Adequate for Discharge     Problem: INFECTION - ADULT  Goal: Absence or prevention of progression during hospitalization  Description: INTERVENTIONS:  - Assess and monitor for signs and symptoms of infection  - Monitor lab/diagnostic results  - Monitor all insertion sites, i.e. indwelling lines, tubes  - Administer medications as ordered  - Instruct and encourage patient and family to use good hand hygiene technique  - Identify and instruct in appropriate isolation precautions for identified infection/condition  Outcome: Adequate for  Discharge     Problem: SAFETY ADULT  Goal: Patient will remain free of falls  Description: INTERVENTIONS:  - Educate patient/family on patient safety including physical limitations  - Instruct patient to call for assistance with activity   - Consult OT/PT to assist with strengthening/mobility   - Keep Call bell within reach  - Keep bed low and locked with side rails adjusted as appropriate  - Keep care items and personal belongings within reach  - Initiate and maintain comfort rounds  - Make Fall Risk Sign visible to staff  - Offer Toileting every 2 Hours, in advance of need  - Initiate/Maintain bed alarm  - Obtain necessary fall risk management equipment: yellow socks  - Apply yellow socks and bracelet for high fall risk patients  - Consider moving patient to room near nurses station  Outcome: Adequate for Discharge  Goal: Maintain or return to baseline ADL function  Description: INTERVENTIONS:  -  Assess patient's ability to carry out ADLs; assess patient's baseline for ADL function and identify physical deficits which impact ability to perform ADLs (bathing, care of mouth/teeth, toileting, grooming, dressing, etc.)  - Assess/evaluate cause of self-care deficits   - Assess range of motion  - Assess patient's mobility; develop plan if impaired  - Assess patient's need for assistive devices and provide as appropriate  - Encourage maximum independence but intervene and supervise when necessary  - Involve family in performance of ADLs  - Assess for home care needs following discharge   - Consider OT consult to assist with ADL evaluation and planning for discharge  - Provide patient education as appropriate  Outcome: Adequate for Discharge  Goal: Maintains/Returns to pre admission functional level  Description: INTERVENTIONS:  - Perform AM-PAC 6 Click Basic Mobility/ Daily Activity assessment daily.  - Set and communicate daily mobility goal to care team and patient/family/caregiver.   - Collaborate with rehabilitation  services on mobility goals if consulted  - Perform Range of Motion 3 times a day.  - Reposition patient every 2 hours.  - Record patient progress and toleration of activity level   Outcome: Adequate for Discharge     Problem: DISCHARGE PLANNING  Goal: Discharge to home or other facility with appropriate resources  Description: INTERVENTIONS:  - Identify barriers to discharge w/patient and caregiver  - Arrange for needed discharge resources and transportation as appropriate  - Identify discharge learning needs (meds, etc.)  - Refer to Case Management Department for coordinating discharge planning if the patient needs post-hospital services based on physician/advanced practitioner order or complex needs related to functional status, cognitive ability, or social support system  Outcome: Adequate for Discharge     Problem: Knowledge Deficit  Goal: Patient/family/caregiver demonstrates understanding of disease process, treatment plan, medications, and discharge instructions  Description: Complete learning assessment and assess knowledge base.  Interventions:  - Provide teaching at level of understanding  - Provide teaching via preferred learning methods  Outcome: Adequate for Discharge     Problem: NEUROSENSORY - ADULT  Goal: Achieves stable or improved neurological status  Description: INTERVENTIONS  - Monitor and report changes in neurological status  - Monitor vital signs such as temperature, blood pressure, glucose, and any other labs ordered       Outcome: Adequate for Discharge     Problem: CARDIOVASCULAR - ADULT  Goal: Maintains optimal cardiac output and hemodynamic stability  Description: INTERVENTIONS:  - Monitor I/O, vital signs and rhythm  - Monitor for S/S and trends of decreased cardiac output  - Administer and titrate ordered vasoactive medications to optimize hemodynamic stability  - Assess quality of pulses, skin color and temperature  - Assess for signs of decreased coronary artery perfusion  - Instruct  patient to report change in severity of symptoms  Outcome: Adequate for Discharge  Goal: Absence of cardiac dysrhythmias or at baseline rhythm  Description: INTERVENTIONS:  - Continuous cardiac monitoring, vital signs, obtain 12 lead EKG if ordered  - Administer antiarrhythmic and heart rate control medications as ordered  - Monitor electrolytes and administer replacement therapy as ordered  Outcome: Adequate for Discharge     Problem: GASTROINTESTINAL - ADULT  Goal: Maintains or returns to baseline bowel function  Description: INTERVENTIONS:  - Assess bowel function  - Encourage oral fluids to ensure adequate hydration  - Administer IV fluids if ordered to ensure adequate hydration  - Administer ordered medications as needed  - Encourage mobilization and activity  - Consider nutritional services referral to assist patient with adequate nutrition and appropriate food choices  Outcome: Adequate for Discharge     Problem: METABOLIC, FLUID AND ELECTROLYTES - ADULT  Goal: Electrolytes maintained within normal limits  Description: INTERVENTIONS:  - Monitor labs and assess patient for signs and symptoms of electrolyte imbalances  - Administer electrolyte replacement as ordered  - Monitor response to electrolyte replacements, including repeat lab results as appropriate  - Instruct patient on fluid and nutrition as appropriate  Outcome: Adequate for Discharge  Goal: Fluid balance maintained  Description: INTERVENTIONS:  - Monitor labs   - Monitor I/O and WT  - Instruct patient on fluid and nutrition as appropriate  - Assess for signs & symptoms of volume excess or deficit  Outcome: Adequate for Discharge  Goal: Glucose maintained within target range  Description: INTERVENTIONS:  - Monitor Blood Glucose as ordered  - Assess for signs and symptoms of hyperglycemia and hypoglycemia  - Administer ordered medications to maintain glucose within target range  - Assess nutritional intake and initiate nutrition service referral as  needed  Outcome: Adequate for Discharge     Problem: HEMATOLOGIC - ADULT  Goal: Maintains hematologic stability  Description: INTERVENTIONS  - Assess for signs and symptoms of bleeding or hemorrhage  - Monitor labs  - Administer supportive blood products/factors as ordered and appropriate  Outcome: Adequate for Discharge     Problem: MUSCULOSKELETAL - ADULT  Goal: Maintain or return mobility to safest level of function  Description: INTERVENTIONS:  - Assess patient's ability to carry out ADLs; assess patient's baseline for ADL function and identify physical deficits which impact ability to perform ADLs (bathing, care of mouth/teeth, toileting, grooming, dressing, etc.)  - Assess/evaluate cause of self-care deficits   - Assess range of motion  - Assess patient's mobility  - Assess patient's need for assistive devices and provide as appropriate  - Encourage maximum independence but intervene and supervise when necessary  - Involve family in performance of ADLs  - Assess for home care needs following discharge   - Consider OT consult to assist with ADL evaluation and planning for discharge  - Provide patient education as appropriate  Outcome: Adequate for Discharge     Problem: Nutrition/Hydration-ADULT  Goal: Nutrient/Hydration intake appropriate for improving, restoring or maintaining nutritional needs  Description: Monitor and assess patient's nutrition/hydration status for malnutrition. Collaborate with interdisciplinary team and initiate plan and interventions as ordered.  Monitor patient's weight and dietary intake as ordered or per policy. Utilize nutrition screening tool and intervene as necessary. Determine patient's food preferences and provide high-protein, high-caloric foods as appropriate.     INTERVENTIONS:  - Monitor oral intake, urinary output, labs, and treatment plans  - Assess nutrition and hydration status and recommend course of action  - Evaluate amount of meals eaten  - Assist patient with eating  if necessary   - Allow adequate time for meals  - Recommend/ encourage appropriate diets, oral nutritional supplements, and vitamin/mineral supplements  - Order, calculate, and assess calorie counts as needed  - Recommend, monitor, and adjust tube feedings and TPN/PPN based on assessed needs  - Assess need for intravenous fluids  - Provide specific nutrition/hydration education as appropriate  - Include patient/family/caregiver in decisions related to nutrition  Outcome: Adequate for Discharge

## 2024-01-31 NOTE — ASSESSMENT & PLAN NOTE
Improving  Concern for alcoholic hepatitis, mixed w ETOH withdrawal concerns    Confused and hallucinating  Lactulose d/c'd, patient AAOx3, denies hallucinations     Plan:  CAM ICU daily  Check ammonia PRN  Sleep hygiene, continue home Remeron  Delirium precautions

## 2024-01-31 NOTE — PHYSICAL THERAPY NOTE
Physical Therapy Cancellation Note     01/31/24 0841   PT Last Visit   PT Visit Date 01/31/24   Note Type   Note Type Cancelled Session   Cancel Reasons Other  (Pt chart reviewed. Spoke with RN, pt being transferred to different hospital. Will defer treatment session.)     Onur Madrigal  10:55 AM  01/31/24

## 2024-01-31 NOTE — ASSESSMENT & PLAN NOTE
MELD: 28   AST> ALT; D Bili elevated at 9.8, Ammonia: 187 -> 85, GGT:797  RUQ: hepatomegaly,hepatic steatosis  CTAP: fatty liver/varices/portal htn  LD, CK, Tylenol level are WNL  AFP upper limit of NML  Started predinose 1/23, d/c'd by GI 1/26 1/28 w increased Bili, and DF of 61  Steroids 1/28-1/29 (dc'd per Dr Modi GI)  Lactulose d/c'd, patient having >3 BM daily  GI on consult  MELD may be too low for transplant consideration despite high Maddrey's  Would like to know about prior attempts to quit, past treatments etc  Dr Sylwia Modi from [facility]  If bili continues to increase, if likely HRS, consult to [facility]   1/30 MRI: 1. Interstitial edematous pancreatitis w/o acute peripancreatic fluid collection. 2. Hepatosplenomegaly and severe steatosis. 3. No choledocholithiasis, cholelithiasis, biliary ductal dilation or acute cholecystitis.     Plan:  If patient remains afebrile, WBC return to nml limits, consider discontinuing in favor of potentially restarting steroids for alch hep  Trend hepatic panel and INR  Serial abdominal exams  PRN zofran for n/v  Hydroxyzine 50mg Q6H PRN added for anxiety, w potential benefit of managing urticaria if this develops (currently denies)

## 2024-01-31 NOTE — ASSESSMENT & PLAN NOTE
Lab Results   Component Value Date    HGBA1C 8.8 (H) 01/21/2024     Recent Labs     01/30/24  0721 01/30/24  1113 01/30/24  1628 01/30/24  2101   POCGLU 252* 227* 248* 139   Blood Sugar Average: Last 72 hrs:  (P) [id number]    Initially presented in DKA, now resolved   Continue Lantus and SSI, adjust as necessary  AccuChecks TID with meals and before bed  Endo on consult

## 2024-01-31 NOTE — EMTALA/ACUTE CARE TRANSFER
[facility] INTENSIVE CARE UNIT  [address] [address]  Dept: [phone]      ACUTE CARE TRANSFER CONSENT    NAME Michael Koch                                         1990                              MRN [id number]    I have been informed of my rights regarding examination, treatment, and transfer   by Dr. Blanye Wills DO    Benefits:  evaluation by Hepatology    Risks:  deterioration, injury, death      Consent for Transfer:  I acknowledge that my medical condition has been evaluated and explained to me by the treating physician or other qualified medical person and/or my attending physician, who has recommended that I be transferred to the service of    at  . The above potential benefits of such transfer, the potential risks associated with such transfer, and the probable risks of not being transferred have been explained to me, and I fully understand them.  The doctor has explained that, in my case, the benefits of transfer outweigh the risks.  I agree to be transferred.    I authorize the performance of emergency medical procedures and treatments upon me in both transit and upon arrival at the receiving facility.  Additionally, I authorize the release of any and all medical records to the receiving facility and request they be transported with me, if possible.  I understand that the safest mode of transportation during a medical emergency is an ambulance and that the Hospital advocates the use of this mode of transport. Risks of traveling to the receiving facility by car, including absence of medical control, life sustaining equipment, such as oxygen, and medical personnel has been explained to me and I fully understand them.    (CARTER CORRECT BOX BELOW)  [  ]  I consent to the stated transfer and to be transported by ambulance/helicopter.  [  ]  I consent to the stated transfer, but refuse transportation by ambulance and accept full responsibility for my  transportation by car.  I understand the risks of non-ambulance transfers and I exonerate the Hospital and its staff from any deterioration in my condition that results from this refusal.    X___________________________________________    DATE  24  TIME________  Signature of patient or legally responsible individual signing on patient behalf           RELATIONSHIP TO PATIENT_________________________          Provider Certification    NAME Michael Koch                                         1990                              MRN [id number]    A medical screening exam was performed on the above named patient.  Based on the examination:    Condition Necessitating Transfer alcoholic hepatitis, DUANE with consideration for HRS    Patient Condition:  critical    Reason for Transfer:  Hepatology evaluation    Transfer Requirements: Facility     Space available and qualified personnel available for treatment as acknowledged by    Agreed to accept transfer and to provide appropriate medical treatment as acknowledged by          Appropriate medical records of the examination and treatment of the patient are provided at the time of transfer   STAFF INITIAL WHEN COMPLETED _______  Transfer will be performed by qualified personnel from    and appropriate transfer equipment as required, including the use of necessary and appropriate life support measures.    Provider Certification: I have examined the patient and explained the following risks and benefits of being transferred/refusing transfer to the patient/family:         Based on these reasonable risks and benefits to the patient and/or the unborn child(kinza), and based upon the information available at the time of the patient’s examination, I certify that the medical benefits reasonably to be expected from the provision of appropriate medical treatments at another medical facility outweigh the increasing risks, if any, to the individual’s medical condition, and  in the case of labor to the unborn child, from effecting the transfer.    X____________________________________________ DATE 01/31/24        TIME_______      ORIGINAL - SEND TO MEDICAL RECORDS   COPY - SEND WITH PATIENT DURING TRANSFER

## 2024-02-01 LAB
BACTERIA BLD CULT: NORMAL
BACTERIA BLD CULT: NORMAL

## 2024-02-01 NOTE — UTILIZATION REVIEW
NOTIFICATION OF ADMISSION DISCHARGE   This is a Notification of Discharge from [facility]. Please be advised that this patient has been discharge from our facility. Below you will find the admission and discharge date and time including the patient’s disposition.   UTILIZATION REVIEW CONTACT:  Malaika Cao  Utilization   Network Utilization Review Department  Phone: [phone] x carefully listen to the prompts. All voicemails are confidential.  Email: [email or web address]     ADMISSION INFORMATION  PRESENTATION DATE: 1/21/2024 10:57 AM  OBERVATION ADMISSION DATE:   INPATIENT ADMISSION DATE: 1/21/24  1:59 PM   DISCHARGE DATE: 1/31/2024 10:00 AM   DISPOSITION:Non [facility] Acute Care/Short Term Hosp    Network Utilization Review Department  ATTENTION: Please call with any questions or concerns to [phone] and carefully listen to the prompts so that you are directed to the right person. All voicemails are confidential.   For Discharge needs, contact Care Management DC Support Team at [phone] opt. [phone]  Send all requests for admission clinical reviews, approved or denied determinations and any other requests to dedicated fax number below belonging to the campus where the patient is receiving treatment. List of dedicated fax numbers for the Facilities:  FACILITY NAME UR FAX NUMBER   ADMISSION DENIALS (Administrative/Medical Necessity) [phone]   DISCHARGE SUPPORT TEAM ([facility]) [phone]   PARENT CHILD HEALTH (Maternity/NICU/Pediatrics) [phone]   [facility] [phone]   General acute hospital [phone]   [facility] [phone]   [facility] [phone]   [facility] [phone]   [facility] [phone]   [facility] [phone]   [facility]  [facility] [phone]   [facility] [phone]   [facility] [phone]   [facility] [phone]   [facility] [phone]

## 2024-02-28 PROBLEM — K76.7 HEPATORENAL SYNDROME (HCC): Status: ACTIVE | Noted: 2024-02-28

## 2024-02-28 PROBLEM — E46 MALNUTRITION (HCC): Status: ACTIVE | Noted: 2024-02-28

## 2024-02-28 PROBLEM — I95.1 ORTHOSTATIC HYPOTENSION: Status: ACTIVE | Noted: 2024-02-28

## 2024-02-28 PROBLEM — N19 RENAL FAILURE: Status: ACTIVE | Noted: 2024-02-28

## 2024-02-28 NOTE — CASE MANAGEMENT
Case Management Discharge Planning Note    Patient name Michael Koch  Location /-01 MRN [id number]  : 1990 Date 2024       Current Admission Date: 2024  Current Admission Diagnosis:Alcoholic hepatitis   Patient Active Problem List    Diagnosis Date Noted    Renal failure 2024    Malnutrition (HCC) 2024    Orthostatic hypotension 2024    Acute respiratory failure with hypoxia and hypercapnia (HCC) 2024    Alcoholic hepatitis 2024    ETOH abuse 2024    Depression 2024    GERD (gastroesophageal reflux disease) 2024    BPH (benign prostatic hyperplasia) 2024    RSV infection 2024    Abnormal CT scan 2024    Hepatic encephalopathy (HCC) 2024    N&V (nausea and vomiting) 2024    DUANE (acute kidney injury) (HCC) 2024    Thrombocytopenia (HCC) 2024    Lactic acidosis 2024    Marijuana use 2024    SIRS (systemic inflammatory response syndrome) (HCC) 2024    History of gout 10/04/2023    History of hypertension 10/04/2023    Type 2 diabetes mellitus with hyperglycemia, without long-term current use of insulin (HCC) 10/04/2023    Tobacco abuse 10/04/2023      LOS (days): 1  Geometric Mean LOS (GMLOS) (days):   Days to GMLOS:     OBJECTIVE:  Risk of Unplanned Readmission Score: 44.13         Current admission status: Inpatient   Preferred Pharmacy:   [facility] - [place], [place] - [address]  [address]  [place] [address]  Phone: [phone] Fax: [phone]    Primary Care Provider: Louie Odonnell DO    Primary Insurance: BLUE CROSS  Secondary Insurance:     DISCHARGE DETAILS:     CM contacted spouse, Jo, to discuss patients success with therapy today but need to still do steps.  Jo thankful.    CM inquired if she would prefer [facility] assess patient to obtain local D& A therapy or patient stay with therapy  referral from [facility] via [facility] .  Spouse preferred for patient to remain with [facility] indicating counseling is virtual and patient can do during dialysis, depending on scheduled sessions.     CM inquired which dialysis facility would be more convenient for patient upon discharge and spouse indicated [facility] location due to numerous individuals she is hopeful to arrange for patients transport: patient mother and her paramour and ED.   CM to follow for required information to make HD referral for outpatient chair time.

## 2024-02-28 NOTE — ASSESSMENT & PLAN NOTE
Developed DUANE on CKD with underlying liver failure   ureteral stent placed at [facility], lithotripsy  Developed oliguria, treated with CRRT then transition to HD  Appreciate nephrology consultation  Last HD 2/26

## 2024-02-28 NOTE — NURSING NOTE
Patient reports to speech therapist during eval left facial numbness from left eye to left outer lip. Dr. Jean notified. Patient reports numbness started Saturday 2/24/24 at [facility] with which he told healthcare staff and they performed CT of the Brain. No facial droop, eye ptosis seen, numbness has been unchanged since it's on set on Saturday 2/24/24. Monitoring of patient continues.

## 2024-02-28 NOTE — CASE MANAGEMENT
Case Management Assessment & Discharge Planning Note    Patient name Michael Koch  Location /-01 MRN [id number]  : 1990 Date 2024       Current Admission Date: 2024  Current Admission Diagnosis:Alcoholic hepatitis   Patient Active Problem List    Diagnosis Date Noted    Renal failure 2024    Malnutrition (HCC) 2024    Orthostatic hypotension 2024    Acute respiratory failure with hypoxia and hypercapnia (HCC) 2024    Alcoholic hepatitis 2024    ETOH abuse 2024    Depression 2024    GERD (gastroesophageal reflux disease) 2024    BPH (benign prostatic hyperplasia) 2024    RSV infection 2024    Abnormal CT scan 2024    Hepatic encephalopathy (HCC) 2024    N&V (nausea and vomiting) 2024    DUANE (acute kidney injury) (HCC) 2024    Thrombocytopenia (HCC) 2024    Lactic acidosis 2024    Marijuana use 2024    SIRS (systemic inflammatory response syndrome) (HCC) 2024    History of gout 10/04/2023    History of hypertension 10/04/2023    Type 2 diabetes mellitus with hyperglycemia, without long-term current use of insulin (HCC) 10/04/2023    Tobacco abuse 10/04/2023      LOS (days): 1  Geometric Mean LOS (GMLOS) (days):   Days to GMLOS:     OBJECTIVE:  PATIENT READMITTED TO HOSPITAL  Risk of Unplanned Readmission Score: 43.2         Current admission status: Inpatient  Referral Reason:  (Post Acute Home Needs (VNA/DME/Infusion))    Preferred Pharmacy:   [facility] - [place] - [address]  [address]  [place] [address]  Phone: [phone] Fax: [phone]    Primary Care Provider: Louie Odonnell DO    Primary Insurance: BLUE CROSS  Secondary Insurance:     ASSESSMENT:  Active Health Care Proxies    There are no active Health Care Proxies on file.       Advance Directives  Does patient have a Health Care POA?: No  Was patient offered  paperwork?: Yes (spouse and patient declined)  Does patient currently have a Health Care decision maker?: Yes, please see Health Care Proxy section  Does patient have Advance Directives?: No  Was patient offered paperwork?: Yes (spouse and patient declined)  Primary Contact: Jo Koch, spouse         Readmission Root Cause  30 Day Readmission: No    Patient Information  Admitted from:: Other (comment) ([facility])  Mental Status: Alert  During Assessment patient was accompanied by: Other-Comment (primary information provided by spouse, Jo)  Assessment information provided by:: Spouse  Primary Caregiver: Self  Support Systems: Family members, Spouse/significant other  County of Residence: [place]  What [facility] do you live in?: [place]  Home entry access options. Select all that apply.: Stairs  Number of steps to enter home.:  (15 steps total, 2 sets of steps)  Do the steps have railings?: Yes (railing on one side of each set of steps)  Type of Current Residence: [address] [place] home  Upon entering residence, is there a bedroom on the main floor (no further steps)?: No  A bedroom is located on the following floor levels of residence (select all that apply):: 2nd Floor  Upon entering residence, is there a bathroom on the main floor (no further steps)?: No  Indicate which floors of current residence have a bathroom (select all the apply):: 2nd Floor  Number of steps to 2nd floor from main floor: One Flight  Living Arrangements: Lives w/ Spouse/significant other  Is patient a ?: No    Activities of Daily Living Prior to Admission  Functional Status: Independent  Completes ADLs independently?: Yes  Ambulates independently?: Yes  Does patient use assisted devices?: No  Does patient currently own DME?: No  Does patient have a history of Outpatient Therapy (PT/OT)?: No  Does the patient have a history of Short-Term Rehab?: No  Does patient have a history of HHC?: No  Does patient currently have HHC?: No          Patient Information Continued  Income Source: Employed  Does patient have prescription coverage?: Yes  Does patient receive dialysis treatments?: Yes  Does patient have a history of substance abuse?: Yes  Historical substance use preference: Alcohol/ETOH  History of Withdrawal Symptoms: Other withdrawal symptoms (specify in comment), Delirium tremors  Is patient currently in treatment for substance abuse?: Yes (pursuing [facility] D&A zoom therapy at request of [facility])  Does patient have a history of Mental Health Diagnosis?: No         Means of Transportation  Means of Transport to Appts:: Family transport      Social Determinants of Health (SDOH)      Flowsheet Row Most Recent Value   Housing Stability    In the last 12 months, was there a time when you were not able to pay the mortgage or rent on time? N   In the last 12 months, how many places have you lived? 1   In the last 12 months, was there a time when you did not have a steady place to sleep or slept in a shelter (including now)? N   Transportation Needs    In the past 12 months, has lack of transportation kept you from medical appointments or from getting medications? no   In the past 12 months, has lack of transportation kept you from meetings, work, or from getting things needed for daily living? No   Food Insecurity    Within the past 12 months, you worried that your food would run out before you got the money to buy more. Never true   Within the past 12 months, the food you bought just didn't last and you didn't have money to get more. Never true   Utilities    In the past 12 months has the electric, gas, oil, or water company threatened to shut off services in your home? No            DISCHARGE DETAILS:    Discharge planning discussed with:: spouse, Jo, and patient  Freedom of Choice: Yes     CM contacted family/caregiver?: Yes             Contacts  Patient Contacts: micheal Castro  Relationship to Patient::  Family  Contact Method: In Person  Reason/Outcome: Continuity of Care, Discharge Planning                        Treatment Team Recommendation: Home with Home Health Care  Discharge Destination Plan:: Home with Home Health Care  Transport at Discharge : Family              0845 CM met with spouse, baseline information was obtained. CM discussed the role of CM in helping the patient develop a discharge plan and assist the patient in carry out their plan.    Patient lives with spouse in 2 story home, bedroom and bathroom on second floor and total 15 steps to enter.  Spouse waiting for therapy to assess for discharge determination.      Spouse informed CM patient was provided several virtual D& A counseling providers by [facility] for patient to engage in therapy before being reconsidered for transplant. Spouse reported she has started enrollment with [employer] for patient and he will engage in virtual counseling sessions.    0945 CM met with patient to discuss future discharge planning home and need for patient to complete steps with therapy to assess safety. Patient agreeable and desires to discharge home when medically cleared. CM asked patient who can provide transport to and from dialysis upon discharge and patient indicated he does not know that answer.    CM to follow for nephro consult and HD at [facility] so CM can make outpatient HD referral after discussing with spouse transport to and from.

## 2024-02-28 NOTE — PROGRESS NOTES
Pastoral Care Progress Note    2024  Patient: Michael Koch : 1990  Admission Date & Time: 2024  MRN: [id number] CSN: [id number]      CH initiated support visit to pt in setting of critical care. Pt with providers, no family bedside.  CH remains available.   24 1200   Clinical Encounter Type   Visited With Patient not available   Crisis Visit Critical Care

## 2024-02-28 NOTE — PLAN OF CARE
Pre-tx:  UF 1L as tolerated per discussion with Nephrologist Reyes, tx plan reviewed.  4K bath per protocol, last serum K 3.6.  BP stable to start HD.  Midodrine given by primary RN at start of HD.  Pt with recent transfer from [facility] to [facility].  CHG dsg dry, intact with old drainage dated 2/21/23 from [facility].  Dsg removed and area cleaned per protocol.  New dsg applied, no drainage present at this time, exit site WNL.  Will continue to monitor.     Post-Dialysis RN Treatment Note    Blood Pressure:  Pre 106/58 mm/Hg  Post 98/53 mmHg   EDW  TBD kg    Weight:  Pre 87 kg   Post 86.1 kg   Mode of weight measurement: Bed Scale   Volume Removed 900  ml    Treatment duration 3.5 hrs    NS given   No   Treatment shortened? No   Medications given during Rx Midodrine   Estimated Kt/V     Access type: Temporary HD catheter   Access Issues: No    Report called to primary nurse   Yes           Problem: METABOLIC, FLUID AND ELECTROLYTES - ADULT  Goal: Electrolytes maintained within normal limits  Description: INTERVENTIONS:  - Monitor labs and assess patient for signs and symptoms of electrolyte imbalances  - Administer electrolyte replacement as ordered  - Monitor response to electrolyte replacements, including repeat lab results as appropriate  - Instruct patient on fluid and nutrition as appropriate  Outcome: Progressing  Goal: Fluid balance maintained  Description: INTERVENTIONS:  - Monitor labs   - Monitor I/O and WT  - Instruct patient on fluid and nutrition as appropriate  - Assess for signs & symptoms of volume excess or deficit  Outcome: Progressing

## 2024-02-28 NOTE — ASSESSMENT & PLAN NOTE
Currently on Solu-Cortef 25 mg daily -continue to wean  Alcoholic cirrhosis  Transferred from [facility] in [place] after liver transplant evaluation, he was found not to be candidate  Continue lactulose  No encephalopathy on admission  Chronic anemia hemoglobin 8.6  Thrombocytopenia platelets 78  PT/OT/case management for discharge planning so patient may prepare for transplant reevaluation in the future

## 2024-02-28 NOTE — PLAN OF CARE
Problem: PHYSICAL THERAPY ADULT  Goal: Performs mobility at highest level of function for planned discharge setting.  See evaluation for individualized goals.  Description: Treatment/Interventions: Functional transfer training, LE strengthening/ROM, Elevations, Therapeutic exercise, Endurance training, Patient/family training, Equipment eval/education, Gait training, Compensatory technique education, Spoke to nursing, OT, Spoke to case management  Equipment Recommended: Cane       See flowsheet documentation for full assessment, interventions and recommendations.  Note: Prognosis: Fair  Problem List: Decreased strength, Decreased endurance, Impaired balance, Decreased mobility, Obesity, Pain, Impaired judgement  Assessment: Pt is a 33 y.o. male seen for PT evaluation s/p admission to [facility] on 2/27/2024 with Alcoholic hepatitis.  Order placed for PT services.  Upon evaluation: Pt is presenting with impaired functional mobility due to pain, decreased strength, decreased endurance, impaired balance, gait deviations, impaired judgment, fall risk, and LE edema requiring  SPV to stand-by assistance for transfers and ambulation with no AD . Pt's clinical presentation is currently evolving given the functional mobility deficits above, especially decreased activity tolerance, coupled with fall risks as indicated by AM-PAC 6-Clicks: 20/24 as well as obesity and combined with medical complications of abnormal renal lab values, abnormal H&H, abnormal WBCs, abnormal CBC, and need for input for mobility technique/safety.  Pt's PMHx and comorbidities that may affect physical performance and progress include: depression, DM, HTN, and renal failure, malnutrition . Personal factors affecting pt at time of IE include: questionable non-compliance, depression, past experience, and inability to perform IADLs. Pt will benefit from continued skilled PT services to address deficits as defined above and to maximize  level of functional mobility to facilitate return toward PLOF and improved QOL. From PT/mobility standpoint, recommendation at time of d/c would be Level III (Minimum Resource Intensity) and with cane pending progress in order to reduce fall risk and maximize pt's functional independence and consistency with mobility in order to facilitate return to PLOF.  Recommend trial with cane next 1-2 sessions, ther ex next 1-2 sessions, and stair navigation .  Barriers to Discharge: Other (Comment)  Barriers to Discharge Comments: CAMILO, steps to 2nd floor, dialysis  Rehab Resource Intensity Level, PT: III (Minimum Resource Intensity)    See flowsheet documentation for full assessment.

## 2024-02-28 NOTE — OCCUPATIONAL THERAPY NOTE
Occupational Therapy Evaluation     Patient Name: Michael Koch  Today's Date: 2/28/2024  Problem List  Principal Problem:    Alcoholic hepatitis  Active Problems:    Type 2 diabetes mellitus with hyperglycemia, without long-term current use of insulin (HCC)    Tobacco abuse    ETOH abuse    Depression    GERD (gastroesophageal reflux disease)    Renal failure    Malnutrition (HCC)    Orthostatic hypotension    Past Medical History  Past Medical History:   Diagnosis Date    Depression     Diabetes mellitus (HCC)     Gout     Hypertension     Hyponatremia 01/21/2024    Kidney stones      Past Surgical History  Past Surgical History:   Procedure Laterality Date    CYSTOSCOPY W/ URETERAL STENT PLACEMENT  09/14/2023    US GUIDED MASS BIOPSY (UNSPECIFIED)  02/19/2021 02/28/24 0900   Note Type   Note type Evaluation   Pain Assessment   Pain Assessment Tool 0-10   Pain Score 6   Pain Location/Orientation Orientation: Lower;Location: Back   Restrictions/Precautions   Weight Bearing Precautions Per Order No   Other Precautions Chair Alarm;Bed Alarm;Fall Risk   Home Living   Type of Home House  ([address])   Home Layout Two level;Bed/bath upstairs  (FF to bed/bath)   Bathroom Shower/Tub Tub/shower unit   Bathroom Toilet Standard   Bathroom Accessibility Accessible   Additional Comments Pt reports living in a 2SH with his family. No AD PTA.   Prior Function   Level of [place] Independent with ADLs;Independent with functional mobility;Independent with IADLS   Lives With Family   Receives Help From Family   IADLs Independent with driving;Independent with medication management;Independent with meal prep   Falls in the last 6 months 1 to 4   Vocational Full time employment  ([employer])   General   Additional General Comments Pt with recent admission to [facility] from 1/21-1/31 with Dx: alcoholic hepatitis. Pt transferred to [facility] and then [facility] to assess for transplant  jeremias. Pt was transferred back to [facility] to be closer to home.   ADL   UB Dressing Assistance 5  Supervision/Setup   UB Dressing Deficit Setup;Verbal cueing;Increased time to complete   LB Dressing Assistance 4  Minimal Assistance   LB Dressing Deficit Setup;Requires assistive device for steadying;Verbal cueing;Increased time to complete;Supervision/safety   Additional Comments UB ADLs @ S/set-up while seated at EOB. LB ADLs @ Min A. Pt required assist to don socks while seated at EOB. Simulated clothing management while standing @ SBA.   Bed Mobility   Supine to Sit 6  Modified independent   Additional items HOB elevated;Bedrails   Sit to Supine 6  Modified independent   Additional items HOB elevated;Bedrails   Additional Comments Pt supine in bed at beginning of session. Supine to sit @ Mod I. At end of session, pt requesting to return bed for dialysis. Sit to supine @ Mod I. Pt supine in bed at end of session with bed alarm intact, call bell within reach and all needs met.   Transfers   Sit to Stand 5  Supervision   Additional items Increased time required;Verbal cues   Stand to Sit 5  Supervision   Additional items Increased time required;Verbal cues   Stand pivot   (SBA)   Additional items Increased time required;Verbal cues   Additional Comments Multiple STS from EOB with no AD @ S. Pt able to complete functional mobility around room with no AD @ SBA-Steadying assist.   Balance   Static Sitting Good   Dynamic Sitting Fair +   Static Standing Fair   Dynamic Standing Fair -   Activity Tolerance   Activity Tolerance Patient tolerated treatment well;Patient limited by fatigue   Medical Staff Made Aware Spoke with PT Quintin   Nurse Made Aware Spoke with ALONDRA HODGES Assessment   RUE Assessment WFL   LUE Assessment   LUE Assessment WFL   Hand Function   Gross Motor Coordination Functional   Fine Motor Coordination Functional   Cognition   Overall Cognitive Status WFL   Arousal/Participation  Alert;Responsive;Cooperative   Attention Within functional limits   Orientation Level Oriented X4   Memory Within functional limits   Following Commands Follows all commands and directions without difficulty   Assessment   Limitation Decreased ADL status;Decreased Safe judgement during ADL;Decreased UE strength;Decreased endurance;Decreased high-level ADLs;Decreased self-care trans   Prognosis Good   Assessment Pt is a 33 y.o. male, admitted to [facility] 2/27/2024 for transfer from [facility] [place] [facility]. Pt initially admitted to [facility] from 1/21-1/31, transferred to [facility] and then eventually to [facility] [place] [facility] to assess for transplant candicacy. Pt with PMHx impacting their performance during ADL tasks, including: depression, diabetes mellitus, gout, hypertension and hyponatremia, kidney stones. Prior to admission to the hospital Pt was performing ADLs without physical assistance. IADLs without physical assistance. Functional transfers/ambulation without physical assistance. Cognitive status was PTA was Intact. OT order placed to assess Pt's ADLs, cognitive status, and performance during functional tasks in order to maximize safety and independence while making most appropriate d/c recommendations. Pt's clinical presentation is currently unstable/unpredictable given new onset deficits that effect Pt's occupational performance and ability to safely return to OF including decrease activity tolerance, decrease standing balance, decrease performance during ADL tasks, decrease UB MS, decrease generalized strength, decrease activity engagement, and decrease performance during functional transfers combined with medical complications of abnormal renal lab values, abnormal H&H, abnormal CBC, and need for input for mobility technique/safety. Personal factors affecting Pt at time of initial evaluation include: step(s) to enter environment, new need for AD, and ETOH use. Pt will benefit from continued skilled OT  services to address deficits as defined above and to maximize level independence/participation during ADLs and functional tasks to facilitate return toward PLOF and improved quality of life. From an occupational therapy standpoint, recommendation at time of d/c would be Level III: Minimum Resource Intensity Therapy.   Plan   Treatment Interventions ADL retraining;Visual perceptual retraining;UE strengthening/ROM;Functional transfer training;Endurance training;Cognitive reorientation;Patient/family training;Equipment evaluation/education;Neuromuscular reeducation;Fine motor coordination activities;Compensatory technique education;UE splinting;Continued evaluation;Energy conservation;Activityengagement;Cardiac education   Goal Expiration Date 03/13/24   OT Frequency 2-3x/wk   Discharge Recommendation   Rehab Resource Intensity Level, OT III (Minimum Resource Intensity)   Additional Comments  Recommend BSC upon return to home to avoid excess use of stairs as pt is fall/safety risk d/t generalized weakness   AM-[facility] Daily Activity Inpatient   Lower Body Dressing 3   Bathing 3   Toileting 3   Upper Body Dressing 3   Grooming 3   Eating 4   Daily Activity Raw Score 19   Daily Activity Standardized Score (Calc for Raw Score >=11) 40.22   -PAC Applied Cognition Inpatient   Following a Speech/Presentation 4   Understanding Ordinary Conversation 4   Taking Medications 4   Remembering Where Things Are Placed or Put Away 4   Remembering List of 4-5 Errands 4   Taking Care of Complicated Tasks 4   Applied Cognition Raw Score 24   Applied Cognition Standardized Score 62.21     The patient's raw score on the AM-PAC Daily Activity Inpatient Short Form is 19. A raw score of greater than or equal to 19 suggests the patient may benefit from discharge to home. Please refer to the recommendation of the Occupational Therapist for safe discharge planning.    Pt goals to be met by 3/13/2024    Pt will demonstrate ability to complete  grooming/hygiene tasks @ I after set-up.  Pt will demonstrate ability to complete supine<>sit @ I in order to increase safety and independence during ADL tasks.  Pt will demonstrate ability to complete UB ADLs including washing/dressing @ I in order to increase performance and participation during meaningful tasks  Pt will demonstrate ability to complete LB dressing @ I in order to increase safety and independence during meaningful tasks.   Pt will demonstrate ability to zoran/doff socks/shoes while sitting EOB @ I in order to increase safety and independence during meaningful tasks.   Pt will demonstrate ability to complete toileting tasks including CM and pericare @ I in order to increase safety and independence during meaningful tasks.  Pt will demonstrate ability to complete EOB, chair, toilet/commode transfers @ I in order to increase performance and participation during functional tasks.  Pt will demonstrate ability to stand for 8-10 minutes while maintaining Good balance without the use of AD during ADL/IADL tasks.  Pt will demonstrate ability to tolerate 30-35 minute OT session with no vc'ing for deep breathing or use of energy conservation techniques in order to increase activity tolerance during functional tasks.   Pt will demonstrate Good carryover of use of energy conservation/compensatory strategies during ADLs and functional tasks in order to increase safety and reduce risk for falls.   Pt will demonstrate Good attention and participation in continued evaluation of functional ambulation house hold distances in order to assist with safe d/c planning.  Pt will attend to continued cognitive assessments 100% of the time in order to provide most appropriate d/c recommendations.   Pt will follow 100% simple 2-step commands and be A&O x4 consistently with environmental cues to increase participation in functional activities.   Pt will identify 3 areas of interest/hobbies and 1 intervention on how to  incorporate into daily life in order to increase interaction with environment and peers as well as increase participation in meaningful tasks.   Pt will demonstrate 100% carryover of BUE HEP in order to increase BUE MS and increase performance during functional tasks upon d/c home.    Pinky Blount, OTR/L

## 2024-02-28 NOTE — SPEECH THERAPY NOTE
"Speech-Language Pathology Bedside Swallow Evaluation    Patient Name: Michael Koch  Today's Date: 2/28/2024     Problem List  Principal Problem:    Alcoholic hepatitis  Active Problems:    Type 2 diabetes mellitus with hyperglycemia, without long-term current use of insulin (HCC)    Tobacco abuse    ETOH abuse    Depression    GERD (gastroesophageal reflux disease)    Renal failure    Malnutrition (HCC)    Orthostatic hypotension    Past Medical History  Past Medical History:   Diagnosis Date    Depression     Diabetes mellitus (HCC)     Gout     Hypertension     Hyponatremia 01/21/2024    Kidney stones      Past Surgical History  Past Surgical History:   Procedure Laterality Date    CYSTOSCOPY W/ URETERAL STENT PLACEMENT  09/14/2023    US GUIDED MASS BIOPSY (UNSPECIFIED)  02/19/2021       Summary:  Pt presented with s/s suggestive of mild oral and suspected mild-moderate pharyngeal dysphagia.  Symptoms or concerns included mild, decreased bolus formation, and decreased PO tolerance 2' oral pain as well as  mild-moderate, suspected decreased hyolaryngeal elevation upon palpation, multiple swallows, effortful swallow, and painful swallowing.  Patient receiving dialysis treatment at my arrival, coming back at a later time for evaluation.  Patient endorsing pain that he is unable to quantify on a scale of 1-10 \"because of this tube.\"  Pt gesturing to nasogastric tube, tube was not placed here at [facility].  Oral sores present, pt reporting left facial numbness that does not influence ROM, strength or coordination of oral structures during OME.  Of note, when looking at PPW and uvula, pt's tongue does demonstrate increased tone on the right side only.  CT results below as pt with same reports of L sided facial numbness at [facility].    Risk/s for Aspiration:  Mild    Recommended Diet: mechanically altered/level 2 diet, thin liquids, and diet recs are based on endurance, pain levels and " inability to assess without tube in place at this time.    Recommended Form of Meds: whole with liquid and whole with puree   Aspiration precautions and swallowing strategies: upright posture, only feed when fully alert, slow rate of feeding, small bites/sips, quiet environment (tv off, limit talking, door closed, etc.), and alternating bites and sips  Other Recommendations: Continue frequent oral care, diet recommendations to be mindful of oral sores/pain    Current Medical Status:  Pt is a 33 y.o. male transferred from [facility] in [place].  PMH HTN, DM2, nephrolithiasis, gout, GERD, depression, tobacco use, and alcohol use disorder who presented to [facility] on 1/21/2024 with worsening back pain, nausea and vomiting. Initially managed for HHS, he was additionally found to have significant hyperbilirubinemia (13.8, 9.84) with transaminitis (, ALT 81, ). Reported use of ETOH prior to hospitalization included 2-3 24 oz Twisted Tea beverages with 2-3 shots of whiskey per day for approximately 4 years. Due to concern for alcoholic hepatitis, he received steroids which were later held due to concern for underlying sepsis. Cultures remained negative on empiric cefepime/vancomycin. Additionally, he was managed for ureteral calculi requiring lithotripsy and he developed significant renal injury, presumed secondary to HRS, contrast nephropathy vs cast nephropathy. He was started on RRT. Due to worsening renal and liver function, he was transferred to [facility] for further management on 1/31/2024. He was evaluated for transplant candidacy; however, transfer was required to [facility] due to insurance reasons.  He was admitted to their ICU and CRRT was resumed. He initially required norepinephrine drip for blood pressure support, which was later discontinued. There were recorded multiple episodes of encephalopathy and agitation that required patient's placement on precedex. Liver  "transplant was consulted and transplant evaluation was performed and patient was found not to be a candidate.  Family then requested transfer back to [facility] to be closer to home while preparing for liver transplant reevaluation.  Hemodynamically stable and in good spirits upon arrival.     Current Precautions:  Fall  Allergies:  No known food allergies  Past medical history:  Please see H&P for details    Special Studies:  CT head wo contrast completed on 2/27/24 at [facility]:  \"No acute intracranial hemorrhage or infarct. Brain parenchyma is unremarkable. Ventricles are within normal limits. Posterior fossa structures are unremarkable. Calvarium is unremarkable. Mucosal thickening in the maxillary sinuses. Minimal fluid in the mastoid air cells.\"    Social/Education/Vocational Hx:  Pt lives with family and wife + 3 children.    Swallow Information   Current Risks for Dysphagia & Aspiration:  Keofeed placed at [facility] for supplemental nutrition, pt reports tube is \"painful\" when he swallows.  Current Symptoms/Concerns: coughing with po and decreased overall intake 2' pain  Current Diet: regular diet and thin liquids   Baseline Diet: regular diet and thin liquids    Baseline Assessment   Behavior/Cognition: alert and irritability 2' oral pain, inability to eat despite being hungry  Speech/Language Status: able to participate in conversation and able to follow commands  Patient Positioning: upright in bed  Pain Status/Interventions/Response to Interventions:  Pt does not quantify pain, reports pain on tongue and back of throat due to sores and Keofeed tube respectively.     Swallow Mechanism Exam  Facial: symmetrical  Labial: WFL  Lingual:  Appears functional, symmetrical  Velum: symmetrical  Mandible: adequate ROM  Dentition: adequate  Vocal quality:hoarse   Volitional Cough: strong/productive   Respiratory Status: on RA  Tracheostomy: n/a    Consistencies Assessed and Performance   Consistencies Administered: " thin liquids, puree, mechanical soft solids, and soft solids    Oral Stage: mild  Mastication was adequate with the materials administered today.  Bolus formation and transfer were prolonged and incomplete due to pain.  Minimal overt 2' oral pain that inhibits appropriate mastication and bolus function.     Pharyngeal Stage: mild-moderate  Swallowing initiation appeared mildly delayed.  Laryngeal rise was palpated and judged to be within functional limits.    Esophageal Concerns: none reported    Strategies and Efficacy:  small bites, small sips, slow rate    Summary and Recommendations (see above)    Results Reviewed with: patient, RN, and MD   Treatment Recommended: Yes   Frequency of treatment: 1-2x to determine highest possible PO diet level tolerated by patient    Dysphagia LTG  -Patient will demonstrate safe and effective oral intake (without overt s/s significant oral/pharyngeal dysphagia including s/s penetration or aspiration) for the highest appropriate diet level.     Short Term Goals:  -Pt will tolerate Dysphagia 2/mechanical soft diet and thin liquid with no significant s/s oral or pharyngeal dysphagia across 1-3 diagnostic session/s.  -Patient will tolerate trials of upgraded food and/or liquid texture with no significant s/s of oral or pharyngeal dysphagia including aspiration across 1-3 diagnostic sessions   -Patient will demonstrate adequate mastication to safely consume the least restrictive diet with no verbal, visual or tactile cues needed.  -Patient will utilize trained compensatory strategies (eg.  controlled bite/sip size, controlled rate, ”prep set”, neck flexion, multiple swallows, alternation of food with liquid,head turn etc.) with 80% accuracy to eliminate overt s/s penetration/aspiration with the least restrictive food/liquid consistencies  -Patient will demonstrate independent use of recommended safe swallowing strategies during a clinician assessed meal across 1-3 diagnostic  sessions.    Speech Therapy Prognosis   Prognosis:  Guarded at this time  Prognosis Considerations: medical status and participation/adherence to recommendations

## 2024-02-28 NOTE — ASSESSMENT & PLAN NOTE
History alcohol abuse, last drink prior to admission in January  Will need outpatient alcohol counseling prior to transplant reevaluation  Continue daily thiamine

## 2024-02-28 NOTE — PLAN OF CARE
Problem: OCCUPATIONAL THERAPY ADULT  Goal: Performs self-care activities at highest level of function for planned discharge setting.  See evaluation for individualized goals.  Description: Treatment Interventions: ADL retraining, Visual perceptual retraining, UE strengthening/ROM, Functional transfer training, Endurance training, Cognitive reorientation, Patient/family training, Equipment evaluation/education, Neuromuscular reeducation, Fine motor coordination activities, Compensatory technique education, UE splinting, Continued evaluation, Energy conservation, Activityengagement, Cardiac education          See flowsheet documentation for full assessment, interventions and recommendations.   Note: Limitation: Decreased ADL status, Decreased Safe judgement during ADL, Decreased UE strength, Decreased endurance, Decreased high-level ADLs, Decreased self-care trans  Prognosis: Good  Assessment: Pt is a 33 y.o. male, admitted to [facility] 2/27/2024 for transfer from [facility]. Pt initially admitted to [facility] from 1/21-1/31, transferred to [facility] and then eventually to [facility] to assess for transplant candicacy. Pt with PMHx impacting their performance during ADL tasks, including: depression, diabetes mellitus, gout, hypertension and hyponatremia, kidney stones. Prior to admission to the hospital Pt was performing ADLs without physical assistance. IADLs without physical assistance. Functional transfers/ambulation without physical assistance. Cognitive status was PTA was Intact. OT order placed to assess Pt's ADLs, cognitive status, and performance during functional tasks in order to maximize safety and independence while making most appropriate d/c recommendations. Pt's clinical presentation is currently unstable/unpredictable given new onset deficits that effect Pt's occupational performance and ability to safely return to OF including decrease activity tolerance, decrease standing  balance, decrease performance during ADL tasks, decrease UB MS, decrease generalized strength, decrease activity engagement, and decrease performance during functional transfers combined with medical complications of abnormal renal lab values, abnormal H&H, abnormal CBC, and need for input for mobility technique/safety. Personal factors affecting Pt at time of initial evaluation include: step(s) to enter environment, new need for AD, and ETOH use. Pt will benefit from continued skilled OT services to address deficits as defined above and to maximize level independence/participation during ADLs and functional tasks to facilitate return toward PLOF and improved quality of life. From an occupational therapy standpoint, recommendation at time of d/c would be Level III: Minimum Resource Intensity Therapy.     Rehab Resource Intensity Level, OT: III (Minimum Resource Intensity)

## 2024-02-28 NOTE — PLAN OF CARE
"  Problem: PHYSICAL THERAPY ADULT  Goal: Performs mobility at highest level of function for planned discharge setting.  See evaluation for individualized goals.  Description: Treatment/Interventions: Functional transfer training, LE strengthening/ROM, Elevations, Therapeutic exercise, Endurance training, Patient/family training, Equipment eval/education, Gait training, Compensatory technique education, Spoke to nursing, OT, Spoke to case management  Equipment Recommended: Cane       See flowsheet documentation for full assessment, interventions and recommendations.  2/28/2024 1204 by Quintin Schmitz PT  Note: Prognosis: Fair  Problem List: Decreased strength, Decreased endurance, Impaired balance, Decreased mobility, Obesity, Pain, Impaired judgement  Pt tolerated tx session fairly well.  Interventions consisting of transfer and gait training without use of an AD.  Pt ambulates further distance (185 ft) SPV - SBA - steadying assist.  Pt reports being \"slightly\" unsteady -> PT recommends use of a cane for D/C.  SpO2 remains >95% throughout and HR at 107 bpm.  Pt continues to decline trial of stairs at this time.  Barriers to Discharge: Other (Comment)  Barriers to Discharge Comments: CAMILO, steps to 2nd floor, dialysis  Rehab Resource Intensity Level, PT: III (Minimum Resource Intensity)    See flowsheet documentation for full assessment.        "

## 2024-02-28 NOTE — H&P
[facility]  H&P  Name: Michael Koch 33 y.o. male I MRN: [id number]  Unit/Bed#: -01 I Date of Admission: 2/27/2024   Date of Service: 2/28/2024 I Hospital Day: 1      Assessment/Plan   * Alcoholic hepatitis  Assessment & Plan  Currently on Solu-Cortef 25 mg daily -continue to wean  Alcoholic cirrhosis  Transferred from [facility] in [place] after liver transplant evaluation, he was found not to be candidate  Continue lactulose  No encephalopathy on admission  Chronic anemia hemoglobin 8.6  Thrombocytopenia platelets 78  PT/OT/case management for discharge planning so patient may prepare for transplant reevaluation in the future    Renal failure  Assessment & Plan  Developed DUANE on CKD with underlying liver failure   ureteral stent placed at [facility], lithotripsy  Developed oliguria, treated with CRRT then transition to HD  Appreciate nephrology consultation  Last HD 2/26    Orthostatic hypotension  Assessment & Plan  Secondary to liver disease and vasodilation  Continue midodrine    Malnutrition (HCC)  Assessment & Plan  Postpyloric tube in place and has been receiving tube feeding to supplement oral intake at outside hospital  Also eating as tolerated of oral diet  Patient complaining of irritated throat and requesting tube be removed  Plan to evaluate oral intake prior to removal of tube    GERD (gastroesophageal reflux disease)  Assessment & Plan  Continue PPI    Depression  Assessment & Plan  Continue Zyprexa at at bedtime    ETOH abuse  Assessment & Plan  History alcohol abuse, last drink prior to admission in January  Will need outpatient alcohol counseling prior to transplant reevaluation  Continue daily thiamine    Tobacco abuse  Assessment & Plan  Nicotine patch  Cessation counseling    Type 2 diabetes mellitus with hyperglycemia, without long-term current use of insulin (HCC)  Assessment & Plan  Lab Results   Component Value Date    HGBA1C 8.8 (H) 01/21/2024       No  "results for input(s): \"POCGLU\" in the last 72 hours.    Blood Sugar Average: Last 72 hrs:    Continue Lantus 20 units twice daily  Sliding-scale insulin with Accu-Cheks  Hypoglycemia protocol  Carbohydrate controlled diet         VTE Pharmacologic Prophylaxis:   High Risk (Score >/= 5) - Pharmacological DVT Prophylaxis Contraindicated. Sequential Compression Devices Ordered.  Thrombocytopenia  Code Status: Level 1 - Full Code   Discussion with family: Updated  (wife) at bedside.    Anticipated Length of Stay: Patient will be admitted on an inpatient basis with an anticipated length of stay of greater than 2 midnights secondary to cirrhosis, ESRD.    Total Time Spent on Date of Encounter in care of patient: 60 mins. This time was spent on one or more of the following: performing physical exam; counseling and coordination of care; obtaining or reviewing history; documenting in the medical record; reviewing/ordering tests, medications or procedures; communicating with other healthcare professionals and discussing with patient's family/caregivers.    Chief Complaint: Liver and kidney failure    History of Present Illness:  Michael Koch is a 33 y.o. male transferred from [facility] in [place].  PMH HTN, DM2, nephrolithiasis, gout, GERD, depression, tobacco use, and alcohol use disorder who presented to [facility] on 1/21/2024 with worsening back pain, nausea and vomiting. Initially managed for HHS, he was additionally found to have significant hyperbilirubinemia (13.8, 9.84) with transaminitis (, ALT 81, ). Reported use of ETOH prior to hospitalization included 2-3 24 oz Twisted Tea beverages with 2-3 shots of whiskey per day for approximately 4 years. Due to concern for alcoholic hepatitis, he received steroids which were later held due to concern for underlying sepsis. Cultures remained negative on empiric cefepime/vancomycin. Additionally, he was managed for ureteral calculi requiring " lithotripsy and he developed significant renal injury, presumed secondary to HRS, contrast nephropathy vs cast nephropathy. He was started on RRT. Due to worsening renal and liver function, he was transferred to [facility] for further management on 1/31/2024. He was evaluated for transplant candidacy; however, transfer was required to [facility] due to insurance reasons.  He was admitted to their ICU and CRRT was resumed. He initially required norepinephrine drip for blood pressure support, which was later discontinued. There were recorded multiple episodes of encephalopathy and agitation that required patient's placement on precedex. Liver transplant was consulted and transplant evaluation was performed and patient was found not to be a candidate.  Family then requested transfer back to [facility] to be closer to home while preparing for liver transplant reevaluation.  Hemodynamically stable and in good spirits upon arrival.    review of Systems:  Review of Systems   Constitutional:  Positive for activity change and appetite change. Negative for chills and fever.   HENT:  Positive for sore throat. Negative for ear pain.    Eyes:  Negative for pain and visual disturbance.   Respiratory:  Negative for cough and shortness of breath.    Cardiovascular:  Negative for chest pain and palpitations.   Gastrointestinal:  Negative for abdominal pain and vomiting.   Genitourinary:  Negative for dysuria and hematuria.   Musculoskeletal:  Negative for arthralgias and back pain.   Skin:  Negative for color change and rash.   Neurological:  Positive for weakness. Negative for seizures and syncope.   All other systems reviewed and are negative.      Past Medical and Surgical History:   Past Medical History:   Diagnosis Date    Depression     Diabetes mellitus (HCC)     Gout     Hypertension     Hyponatremia 01/21/2024    Kidney stones        Past Surgical History:   Procedure Laterality Date    CYSTOSCOPY W/  URETERAL STENT PLACEMENT  09/14/2023    US GUIDED MASS BIOPSY (UNSPECIFIED)  02/19/2021       Meds/Allergies:  Prior to Admission medications    Medication Sig Start Date End Date Taking? Authorizing Provider   ciprofloxacin (CIPRO) 250 mg tablet Take 250 mg by mouth every 24 hours   Yes Historical Provider, MD   guaiFENesin (ROBITUSSIN) 100 mg/5 mL syrup Take 200 mg by mouth 3 (three) times a day as needed for cough or congestion   Yes Historical Provider, MD   hydrocortisone (Solu-CORTEF) 100 mg SOLR Inject 25 mg into a catheter in a vein in the morning   Yes Historical Provider, MD   insulin glargine (LANTUS) 100 units/mL subcutaneous injection Inject 20 Units under the skin every 12 (twelve) hours   Yes Historical Provider, MD   lactulose 20 g/30 mL 20 g by Per J Tube route 2 (two) times a day   Yes Historical Provider, MD   lansoprazole (PREVACID) 3 mg/ml SUSP oral suspension 10 mL by Per J Tube route daily in the early morning   Yes Historical Provider, MD   Melatonin 12 MG TABS Take 12 mg by mouth daily at bedtime   Yes Historical Provider, MD   midodrine (PROAMATINE) 5 mg tablet Take 5 mg by mouth 3 (three) times a day before meals   Yes Historical Provider, MD   OLANZapine (ZyPREXA) 15 mg tablet Take 15 mg by mouth daily at bedtime   Yes Historical Provider, MD   thiamine 100 MG tablet Take 100 mg by mouth daily   Yes Historical Provider, MD   magnesium Oxide (MAG-OX) 400 mg TABS Take 1 tablet (400 mg total) by mouth daily 11/2/23 12/2/23  Constnace Menendez MD   nicotine (NICODERM CQ) 21 mg/24 hr TD 24 hr patch Place 1 patch on the skin over 24 hours daily 10/5/23   Constance Menendez MD   tamsulosin (FLOMAX) 0.4 mg Take 0.4 mg by mouth daily at bedtime 9/15/23 10/15/23  Historical Provider, MD   acetaminophen (TYLENOL) 325 mg tablet Take 2 tablets (650 mg total) by mouth every 8 (eight) hours 10/5/23 2/28/24  Constance Menendez MD   allopurinol (ZYLOPRIM) 100 mg tablet Take 100 mg by mouth daily 6/23/23 2/28/24  Historical  "Provider, MD   celecoxib (CeleBREX) 100 mg capsule Take 100 mg by mouth daily 9/16/23 2/28/24  Historical Provider, MD   colchicine (COLCRYS) 0.6 mg tablet Take 0.6 mg by mouth if needed for muscle/joint pain 9/17/23 2/28/24  Historical Provider, MD   Fluoxetine HCl, PMDD, 20 MG TABS Take 20 mg by mouth daily  2/28/24  Historical Provider, MD   hydrOXYzine HCL (ATARAX) 25 mg tablet Take 25 mg by mouth if needed for anxiety 8/8/23 2/28/24  Historical Provider, MD   lisinopril (ZESTRIL) 10 mg tablet Take 10 mg by mouth daily  2/28/24  Historical Provider, MD   mirtazapine (REMERON) 15 mg tablet Take 15 mg by mouth daily at bedtime as needed 8/31/23 2/28/24  Historical Provider, MD   omeprazole (PriLOSEC) 40 MG capsule Take 40 mg by mouth daily 6/23/23 2/28/24  Historical Provider, MD   ondansetron (ZOFRAN) 4 mg tablet Take 1 tablet by mouth every 6 (six) hours as needed for nausea 1/17/24 2/28/24  Historical Provider, MD   semaglutide, 0.25 or 0.5 mg/dose, (Ozempic, 0.25 or 0.5 MG/DOSE,) 2 mg/1.5 mL injection pen Inject 0.5 mg under the skin once a week 9/29/23 2/28/24  Historical Provider, MD   tolterodine (DETROL LA) 4 mg 24 hr capsule Take 4 mg by mouth daily 10/16/23 2/28/24  Historical Provider, MD      .     Allergies: No Known Allergies    Social History:  Marital Status: /Civil Union   Patient Pre-hospital Living Situation: Home  Patient Pre-hospital Level of Mobility: unable to be assessed at time of evaluation  Patient Pre-hospital Diet Restrictions: None  Substance Use History:   Social History     Substance and Sexual Activity   Alcohol Use Yes    Comment: \"couple 24 oz cans per day\"     Social History     Tobacco Use   Smoking Status Every Day    Current packs/day: 0.25    Types: Cigarettes   Smokeless Tobacco Never     Social History     Substance and Sexual Activity   Drug Use Never       Family History:  No family history on file.    Physical Exam:     Vitals:        Physical Exam  Vitals and " nursing note reviewed.   Constitutional:       General: He is not in acute distress.     Appearance: He is well-developed. He is obese. He is ill-appearing.   HENT:      Head: Normocephalic and atraumatic.   Eyes:      General: Scleral icterus present.      Conjunctiva/sclera: Conjunctivae normal.   Cardiovascular:      Rate and Rhythm: Normal rate and regular rhythm.      Heart sounds: No murmur heard.  Pulmonary:      Effort: Pulmonary effort is normal. No respiratory distress.      Breath sounds: Normal breath sounds.   Abdominal:      General: There is distension.      Palpations: Abdomen is soft.      Tenderness: There is no abdominal tenderness.   Musculoskeletal:         General: No swelling.      Cervical back: Neck supple.   Skin:     General: Skin is warm and dry.      Capillary Refill: Capillary refill takes less than 2 seconds.      Coloration: Skin is jaundiced.   Neurological:      General: No focal deficit present.      Mental Status: He is alert and oriented to person, place, and time.   Psychiatric:         Mood and Affect: Mood normal.         Behavior: Behavior normal.          Additional Data:     Lab Results:                            Lines/Drains:  Invasive Devices       Peripheral Intravenous Line  Duration             Long-Dwell Peripheral IV (Midline) 01/24/24 Left Cephalic Vein 34 days    Peripheral IV 01/31/24 Distal;Right;Upper;Ventral (anterior) Antecubital 27 days    Peripheral IV 01/31/24 Left;Ventral (anterior) Forearm 27 days                  ** Please Note: This note has been constructed using a voice recognition system. **

## 2024-02-28 NOTE — UTILIZATION REVIEW
Initial Clinical Review    Admission: Date/Time/Statement:   Admission Orders (From admission, onward)       Ordered        02/27/24 2326  Inpatient Admission  Once                          Orders Placed This Encounter   Procedures    Inpatient Admission     Standing Status:   Standing     Number of Occurrences:   1     Order Specific Question:   Level of Care     Answer:   Med Surg [16]     Order Specific Question:   Estimated length of stay     Answer:   More than 2 Midnights     Order Specific Question:   Certification     Answer:   I certify that inpatient services are medically necessary for this patient for a duration of greater than two midnights. See H&P and MD Progress Notes for additional information about the patient's course of treatment.       Initial Presentation: 33 y.o. male transferred from [facility] in [place] to [facility]  Initially present to [facility] on 1/21/2024 with worsening back pain, nausea and vomiting. Initially managed for HHS, he was additionally found to have significant hyperbilirubinemia (13.8, 9.84) with transaminitis (, ALT 81, ). Reported use of ETOH prior to hospitalization included 2-3 24 oz Twisted Tea beverages with 2-3 shots of whiskey per day for approximately 4 years. Due to concern for alcoholic hepatitis, he received steroids which were later held due to concern for underlying sepsis. Cultures remained negative on empiric cefepime/vancomycin. Additionally, he was managed for ureteral calculi requiring lithotripsy and he developed significant renal injury, presumed secondary to HRS, contrast nephropathy vs cast nephropathy. He was started on RRT. Due to worsening renal and liver function, he was transferred to [facility] for further management on 1/31/2024. He was evaluated for transplant candidacy; however, transfer was required to [facility] due to insurance reasons.  He was admitted to their ICU and CRRT was resumed. He initially  required norepinephrine drip for blood pressure support, which was later discontinued. There were recorded multiple episodes of encephalopathy and agitation that required patient's placement on precedex. Liver transplant was consulted and transplant evaluation was performed and patient was found not to be a candidate.  Family then requested transfer back to [facility] to be closer to home while preparing for liver transplant reevaluation.  Hemodynamically stable and in good spirits upon arrival.   PMHX HTN, DM2, nephrolithiasis, gout, GERD, depression, tobacco use, and alcohol use disorder   Admitted to [facility] with DX: Alcoholic hepatitis   on exam: tachypnea; hypotensive; Scleral icterus present. Abdominal distension. Skin is jaundiced; Last HD 2/26 ; Postpyloric tube in place and has been receiving tube feeding to supplement oral intake at outside hospital   PLAN: cont iv solucortef; monitor labs; cont lactulose po; PT/OT/case management for discharge planning so patient may prepare for transplant reevaluation in the future; Accuchecks with [facility]; nephrology consulted         Date: 2/28/24      Day 2    Plan: cont iv solucortef; monitor labs; cont lactulose po; f/u PT/OT/case management recommendations for discharge planning so patient may prepare for transplant reevaluation in the future (HD facility and chair time); Accuchecks with [facility]      NEPHROLOGY CONSULT   Patient was admitted to [facility] in January due to liver failure then transferred to [facility] and [place] for evaluation of transplant unfortunately patient is not a candidate for liver transplant and was transferred back to [facility] to be close to his family.  Hemodialysis was started at [facility] .  Nephrology is consulted for management of DUANE   anuric KDIGO DUANE stage 3 severe: Etiology: Likely initially HRS transition to ATN, now dialysis dependent. Current creatinine:6.23 mg/dL with no evidence of kidney recovery. Patient is anuric. Last dialysis 2/26. Plan to  continue HD MWF as an inpatient  Vascular access: Patient currently has a temporary HD line.  There is no evidence of kidney recovery and he will require a PermCath.  Patient wants to discuss with family first  Volume status/hypertension: Volume: Euvolemic. Blood pressure: Tendency to hypotension, /62, goal less than 140/90  EtOH cirrhosis: No candidate for transplant at this time  Plan: on HD this AM; monitor BP's; midodrine on dialysis; Will require albumin on dialysis if worsening hypotension         ED Triage Vitals   Temperature Pulse Respirations Blood Pressure SpO2   02/28/24 0000 02/28/24 0000 02/28/24 0000 02/28/24 0345 02/28/24 0000   98.1 °F (36.7 °C) 94 (!) 24 102/64 100 %      Temp Source Heart Rate Source Patient Position - Orthostatic VS BP Location FiO2 (%)   02/28/24 0000 02/28/24 0345 02/28/24 0855 02/28/24 0855 --   Oral Monitor Lying Right arm       Pain Score       02/28/24 0000       No Pain          Wt Readings from Last 1 Encounters:   01/31/24 98.8 kg (217 lb 13 oz)     Additional Vital Signs:   Date/Time Temp Pulse Resp BP MAP (mmHg) SpO2 O2 Device Patient Position - Orthostatic VS   02/28/24 1445 -- 92 -- 94/55 -- 99 % None (Room air) Lying   02/28/24 1430 97.8 °F (36.6 °C) 95 18 98/53 70 99 % -- --   02/28/24 1400 -- 89 18 101/55 68 100 % -- --   02/28/24 1330 -- 91 18 85/59 Abnormal  67 100 % -- --   02/28/24 1300 -- 92 18 98/57 71 100 % -- --   02/28/24 1230 -- 95 18 97/59 71 100 % -- --   02/28/24 1200 -- 94 18 98/62 74 100 % -- --   02/28/24 1130 -- 93 18 103/64 -- 100 % -- --   02/28/24 1115 97.7 °F (36.5 °C) 95 18 108/62 78 100 % -- --   02/28/24 1100 -- 94 18 105/53 71 100 % -- --   02/28/24 1046 -- 94 18 104/57 74 98 % None (Room air) Lying   02/28/24 1040 97.7 °F (36.5 °C) 92 18 106/58 77 98 % -- --   02/28/24 0945 -- -- -- -- -- 100 % None (Room air) --   02/28/24 0855 -- 94 18 106/58 -- 100 % None (Room air) Lying   02/28/24 0345 -- 92 16 102/64 77 100 % -- --   02/28/24  0000 98.1 °F (36.7 °C) 94 24 Abnormal  -- -- 100 %         EKG: None obtained    Pertinent Labs/Diagnostic Test Results:   XR chest portable   Final Result by Samir Be MD (02/28 1416)      No acute cardiopulmonary disease.      Central line appears satisfactory in position. Feeding tube extends at least to the stomach. Several artifacts are present. The finding projecting in the mediastinal region is of uncertain etiology. This potentially could be within the patient. Correlate    clinically      Workstation performed: [id number]              Results from last 7 days   Lab Units 02/28/24  0557   WBC Thousand/uL 17.98*   HEMOGLOBIN g/dL 8.8*   HEMATOCRIT % 24.1*   PLATELETS Thousands/uL 92*        Results from last 7 days   Lab Units 02/28/24  0557   SODIUM mmol/L 139   POTASSIUM mmol/L 3.6   CHLORIDE mmol/L 103   CO2 mmol/L 21   ANION GAP mmol/L 15   BUN mg/dL 98*   CREATININE mg/dL 6.23*   EGFR ml/min/1.73sq m 10   CALCIUM mg/dL 7.3*   MAGNESIUM mg/dL 2.2   PHOSPHORUS mg/dL 4.7*     Results from last 7 days   Lab Units 02/28/24  0557   AST U/L 91*   ALT U/L 161*   ALK PHOS U/L 184*   TOTAL PROTEIN g/dL 3.3*   ALBUMIN g/dL 2.4*   TOTAL BILIRUBIN mg/dL 42.18*     Results from last 7 days   Lab Units 02/28/24  1049 02/28/24  0243   POC GLUCOSE mg/dl 234* 143*     Results from last 7 days   Lab Units 02/28/24  0557   GLUCOSE RANDOM mg/dL 90        BETA-HYDROXYBUTYRATE   Date Value Ref Range Status   01/21/2024 0.2 <0.6 mmol/L Final            Results from last 7 days   Lab Units 02/28/24  0557   PROTIME seconds 21.2*   INR  1.80*     Present on Admission:   Type 2 diabetes mellitus with hyperglycemia, without long-term current use of insulin (HCC)   Tobacco abuse   ETOH abuse   GERD (gastroesophageal reflux disease)   Alcoholic hepatitis   Depression      Admitting Diagnosis: Liver failure (HCC) [K72.90]    Age/Sex: 33 y.o. male    Admission Orders: SCDs; Consistent Carbohydrate Diet. Blood glucose  checks ACHS.      Scheduled Medications:  ciprofloxacin, 250 mg, Oral, Q24H  hydrocortisone, 25 mg, Intravenous, Daily  insulin glargine, 20 Units, Subcutaneous, Q12H MURALI  insulin lispro, 1-6 Units, Subcutaneous, TID AC  insulin lispro, 1-6 Units, Subcutaneous, HS  lactulose, 20 g, Per J Tube, BID  melatonin, 9 mg, Oral, HS  midodrine, 5 mg, Oral, TID AC  nicotine, 1 patch, Transdermal, Daily  OLANZapine, 15 mg, Oral, HS  pantoprazole, 40 mg, Oral, Early Morning  thiamine, 100 mg, Oral, Daily      Continuous IV Infusions: None       PRN Meds:  dextromethorphan-guaiFENesin, 10 mL, Oral, Q4H PRN  (2/28 rec'd x1 so far today)   ondansetron, 4 mg, Intravenous, Q8H PRN        IP CONSULT TO NEPHROLOGY  IP CONSULT TO CASE MANAGEMENT  IP CONSULT TO GASTROENTEROLOGY    Network Utilization Review Department  ATTENTION: Please call with any questions or concerns to [phone] and carefully listen to the prompts so that you are directed to the right person. All voicemails are confidential.   For Discharge needs, contact Care Management DC Support Team at [phone] opt. [phone]  Send all requests for admission clinical reviews, approved or denied determinations and any other requests to dedicated fax number below belonging to the campus where the patient is receiving treatment. List of dedicated fax numbers for the Facilities:  FACILITY NAME UR FAX NUMBER   ADMISSION DENIALS (Administrative/Medical Necessity) [phone]   DISCHARGE SUPPORT TEAM (NETWORK) [phone]   PARENT CHILD HEALTH (Maternity/NICU/Pediatrics) [phone]   [facility] [phone]   [facility] [phone]   [facility] [phone]   [facility] [phone]   Critical access hospital [phone]   [facility] [phone]   [facility] [phone]   [facility]  [facility] [phone]   [facility] [phone]   [facility] [phone]   [facility] [phone]   [facility] [phone]

## 2024-02-28 NOTE — ASSESSMENT & PLAN NOTE
Postpyloric tube in place and has been receiving tube feeding to supplement oral intake at outside hospital  Also eating as tolerated of oral diet  Patient complaining of irritated throat and requesting tube be removed  Plan to evaluate oral intake prior to removal of tube

## 2024-02-28 NOTE — PLAN OF CARE
Problem: NEUROSENSORY - ADULT  Goal: Achieves stable or improved neurological status  Description: INTERVENTIONS  - Monitor and report changes in neurological status  - Monitor vital signs such as temperature, blood pressure, glucose, and any other labs ordered   - Initiate measures to prevent increased intracranial pressure  - Monitor for seizure activity and implement precautions if appropriate      Outcome: Progressing  Goal: Achieves maximal functionality and self care  Description: INTERVENTIONS  - Monitor swallowing and airway patency with patient fatigue and changes in neurological status  - Encourage and assist patient to increase activity and self care.   - Encourage visually impaired, hearing impaired and aphasic patients to use assistive/communication devices  Outcome: Progressing     Problem: CARDIOVASCULAR - ADULT  Goal: Maintains optimal cardiac output and hemodynamic stability  Description: INTERVENTIONS:  - Monitor I/O, vital signs and rhythm  - Monitor for S/S and trends of decreased cardiac output  - Administer and titrate ordered vasoactive medications to optimize hemodynamic stability  - Assess quality of pulses, skin color and temperature  - Assess for signs of decreased coronary artery perfusion  - Instruct patient to report change in severity of symptoms  Outcome: Progressing  Goal: Absence of cardiac dysrhythmias or at baseline rhythm  Description: INTERVENTIONS:  - Continuous cardiac monitoring, vital signs, obtain 12 lead EKG if ordered  - Administer antiarrhythmic and heart rate control medications as ordered  - Monitor electrolytes and administer replacement therapy as ordered  Outcome: Progressing     Problem: RESPIRATORY - ADULT  Goal: Achieves optimal ventilation and oxygenation  Description: INTERVENTIONS:  - Assess for changes in respiratory status  - Assess for changes in mentation and behavior  - Position to facilitate oxygenation and minimize respiratory effort  - Oxygen  administered by appropriate delivery if ordered  - Initiate smoking cessation education as indicated  - Encourage broncho-pulmonary hygiene including cough, deep breathe, Incentive Spirometry  - Assess the need for suctioning and aspirate as needed  - Assess and instruct to report SOB or any respiratory difficulty  - Respiratory Therapy support as indicated  Outcome: Progressing     Problem: GASTROINTESTINAL - ADULT  Goal: Minimal or absence of nausea and/or vomiting  Description: INTERVENTIONS:  - Administer IV fluids if ordered to ensure adequate hydration  - Maintain NPO status until nausea and vomiting are resolved  - Nasogastric tube if ordered  - Administer ordered antiemetic medications as needed  - Provide nonpharmacologic comfort measures as appropriate  - Advance diet as tolerated, if ordered  - Consider nutrition services referral to assist patient with adequate nutrition and appropriate food choices  Outcome: Progressing  Goal: Maintains or returns to baseline bowel function  Description: INTERVENTIONS:  - Assess bowel function  - Encourage oral fluids to ensure adequate hydration  - Administer IV fluids if ordered to ensure adequate hydration  - Administer ordered medications as needed  - Encourage mobilization and activity  - Consider nutritional services referral to assist patient with adequate nutrition and appropriate food choices  Outcome: Progressing  Goal: Maintains adequate nutritional intake  Description: INTERVENTIONS:  - Monitor percentage of each meal consumed  - Identify factors contributing to decreased intake, treat as appropriate  - Assist with meals as needed  - Monitor I&O, weight, and lab values if indicated  - Obtain nutrition services referral as needed  Outcome: Progressing  Goal: Oral mucous membranes remain intact  Description: INTERVENTIONS  - Assess oral mucosa and hygiene practices  - Implement preventative oral hygiene regimen  - Implement oral medicated treatments as  ordered  - Initiate Nutrition services referral as needed  Outcome: Progressing     Problem: GENITOURINARY - ADULT  Goal: Maintains or returns to baseline urinary function  Description: INTERVENTIONS:  - Assess urinary function  - Encourage oral fluids to ensure adequate hydration if ordered  - Administer IV fluids as ordered to ensure adequate hydration  - Administer ordered medications as needed  - Offer frequent toileting  - Follow urinary retention protocol if ordered  Outcome: Progressing  Goal: Absence of urinary retention  Description: INTERVENTIONS:  - Assess patient’s ability to void and empty bladder  - Monitor I/O  - Bladder scan as needed  - Discuss with physician/AP medications to alleviate retention as needed  - Discuss catheterization for long term situations as appropriate  Outcome: Progressing     Problem: METABOLIC, FLUID AND ELECTROLYTES - ADULT  Goal: Electrolytes maintained within normal limits  Description: INTERVENTIONS:  - Monitor labs and assess patient for signs and symptoms of electrolyte imbalances  - Administer electrolyte replacement as ordered  - Monitor response to electrolyte replacements, including repeat lab results as appropriate  - Instruct patient on fluid and nutrition as appropriate  Outcome: Progressing  Goal: Fluid balance maintained  Description: INTERVENTIONS:  - Monitor labs   - Monitor I/O and WT  - Instruct patient on fluid and nutrition as appropriate  - Assess for signs & symptoms of volume excess or deficit  Outcome: Progressing  Goal: Glucose maintained within target range  Description: INTERVENTIONS:  - Monitor Blood Glucose as ordered  - Assess for signs and symptoms of hyperglycemia and hypoglycemia  - Administer ordered medications to maintain glucose within target range  - Assess nutritional intake and initiate nutrition service referral as needed  Outcome: Progressing     Problem: SKIN/TISSUE INTEGRITY - ADULT  Goal: Skin Integrity remains intact(Skin  Breakdown Prevention)  Description: Assess:  -Perform Brad assessment every shift  -Clean and moisturize skin every shift  -Inspect skin when repositioning, toileting, and assisting with ADLS  -Assess under medical devices such as  every   -Assess extremities for adequate circulation and sensation     Bed Management:  -Have minimal linens on bed & keep smooth, unwrinkled  -Change linens as needed when moist or perspiring  -Avoid sitting or lying in one position for more than 2 hours while in bed  -Keep HOB at 45degrees     Toileting:  -Offer bedside commode  -Assess for incontinence every shift  -Use incontinent care products after each incontinent episode such as shift    Activity:  -Mobilize patient 3 times a day  -Encourage activity and walks on unit  -Encourage or provide ROM exercises   -Turn and reposition patient every 2 Hours  -Use appropriate equipment to lift or move patient in bed  -Instruct/ Assist with weight shifting every  when out of bed in chair  -Consider limitation of chair time  hour intervals    Skin Care:  -Avoid use of baby powder, tape, friction and shearing, hot water or constrictive clothing  -Relieve pressure over bony prominences using   -Do not massage red bony areas    Next Steps:  -Teach patient strategies to minimize risks such as    -Consider consults to  interdisciplinary teams such as   Outcome: Progressing  Goal: Pressure injury heals and does not worsen  Description: Interventions:  - Implement low air loss mattress or specialty surface (Criteria met)  - Apply silicone foam dressing  - Instruct/assist with weight shifting every  minutes when in chair   - Limit chair time to  hour intervals  - Use special pressure reducing interventions such as  when in chair   - Apply fecal or urinary incontinence containment device   - Perform passive or active ROM every   - Turn and reposition patient & offload bony prominences every  hours   - Utilize friction reducing device or surface for  transfers   - Consider consults to  interdisciplinary teams such as   - Use incontinent care products after each incontinent episode such as   - Consider nutrition services referral as needed  Outcome: Progressing     Problem: HEMATOLOGIC - ADULT  Goal: Maintains hematologic stability  Description: INTERVENTIONS  - Assess for signs and symptoms of bleeding or hemorrhage  - Monitor labs  - Administer supportive blood products/factors as ordered and appropriate  Outcome: Progressing     Problem: MUSCULOSKELETAL - ADULT  Goal: Maintain or return mobility to safest level of function  Description: INTERVENTIONS:  - Assess patient's ability to carry out ADLs; assess patient's baseline for ADL function and identify physical deficits which impact ability to perform ADLs (bathing, care of mouth/teeth, toileting, grooming, dressing, etc.)  - Assess/evaluate cause of self-care deficits   - Assess range of motion  - Assess patient's mobility  - Assess patient's need for assistive devices and provide as appropriate  - Encourage maximum independence but intervene and supervise when necessary  - Involve family in performance of ADLs  - Assess for home care needs following discharge   - Consider OT consult to assist with ADL evaluation and planning for discharge  - Provide patient education as appropriate  Outcome: Progressing  Goal: Maintain proper alignment of affected body part  Description: INTERVENTIONS:  - Support, maintain and protect limb and body alignment  - Provide patient/ family with appropriate education  Outcome: Progressing

## 2024-02-28 NOTE — UTILIZATION REVIEW
NOTIFICATION OF INPATIENT ADMISSION   AUTHORIZATION REQUEST   SERVICING FACILITY:   [place] [address]  Tax ID: [id number]  NPI: [id number] ATTENDING PROVIDER:  Attending Name and NPI#: Tiburcio Jean Md [[id number]]  Address: [address] [place] [address]  Phone: [phone]   ADMISSION INFORMATION:  Place of Service: Inpatient [facility]  Place of Service Code: 21  Inpatient Admission Date/Time: 2/27/24 10:29 PM  Discharge Date/Time: No discharge date for patient encounter.  Admitting Diagnosis Code/Description:  Liver failure (HCC) [K72.90]     UTILIZATION REVIEW CONTACT:  Malaika Cao, Utilization   Network Utilization Review Department  Phone: [phone]  Fax [phone]  Email: [email or web address]  Contact for approvals/pending authorizations, clinical reviews, and discharge.     PHYSICIAN ADVISORY SERVICES:  Medical Necessity Denial & [id number]-mc-[id number] Review  Phone: [phone]  Fax: [phone]  Email: [email or web address]     DISCHARGE SUPPORT TEAM:  For Patients Discharge Needs & Updates  Phone: [phone] opt. 2 Fax: [phone]  Email: [email or web address]

## 2024-02-28 NOTE — ASSESSMENT & PLAN NOTE
"Lab Results   Component Value Date    HGBA1C 8.8 (H) 01/21/2024       No results for input(s): \"POCGLU\" in the last 72 hours.    Blood Sugar Average: Last 72 hrs:    Continue Lantus 20 units twice daily  Sliding-scale insulin with Accu-Cheks  Hypoglycemia protocol  Carbohydrate controlled diet  "

## 2024-02-28 NOTE — PHYSICAL THERAPY NOTE
" PHYSICAL THERAPY EVALUATION      NAME:  Michael Koch  DATE: 02/28/24    AGE:   33 y.o.  Mrn:   [id number]  ADMIT DX:  Liver failure (HCC) [K72.90]    Past Medical History:   Diagnosis Date    Depression     Diabetes mellitus (HCC)     Gout     Hypertension     Hyponatremia 01/21/2024    Kidney stones      Length Of Stay: 1  Performed at least 2 patient identifiers during session: Name and Birthday    PHYSICAL THERAPY EVALUATION:       02/28/24 0859   PT Last Visit   PT Visit Date 02/28/24   Note Type   Note type Evaluation   Pain Assessment   Pain Assessment Tool 0-10   Pain Score 6   Pain Location/Orientation Orientation: Lower;Location: Back   Restrictions/Precautions   Weight Bearing Precautions Per Order No   Other Precautions Chair Alarm;Bed Alarm;Fall Risk;Pain   Home Living   Type of Home House   Home Layout Two level;Bed/bath upstairs  (15 CAMILO L rail; FF steps to 2nd flr)   Bathroom Shower/Tub Tub/shower unit   Bathroom Toilet Standard   Bathroom Accessibility Accessible   Home Equipment Other (Comment)  (no DME)   Prior Function   Level of [place] Independent with ADLs;Independent with functional mobility;Independent with IADLS   Lives With Family   Receives Help From Family   IADLs Independent with driving;Independent with meal prep;Independent with medication management   Falls in the last 6 months 1 to 4   Vocational Full time employment  ([employer])   General   Additional Pertinent History significant jaundice noted   Family/Caregiver Present No   Cognition   Arousal/Participation Alert   Orientation Level Oriented X4   Subjective   Subjective \"I don't want to do steps today\"   RLE Assessment   RLE Assessment WFL   LLE Assessment   LLE Assessment WFL   Light Touch   RLE Light Touch Grossly intact   LLE Light Touch Grossly intact   Bed Mobility   Supine to Sit 6  Modified independent   Transfers   Sit to Stand 5  Supervision   Stand to Sit 5  Supervision   Stand pivot   (SBA) "   Additional items Increased time required;Verbal cues   Additional Comments No AD   Ambulation/Elevation   Gait pattern Improper Weight shift;Inconsistent eva;Redundant gait at times   Gait Assistance   (SBA - steadying assist)   Additional items Assist x 1;Verbal cues   Assistive Device None   Distance 50 ft   Stair Management Assistance Not tested   Additional items   (pt declines trial of stairs; even declines trial of curb step in room)   Balance   Static Sitting Good   Dynamic Sitting Fair +   Static Standing Fair   Dynamic Standing Fair -   Ambulatory Fair -   Activity Tolerance   Activity Tolerance Patient limited by fatigue   Medical Staff Made Aware OT Pinky   Nurse Made Aware Yes   Assessment   Prognosis Fair   Problem List Decreased strength;Decreased endurance;Impaired balance;Decreased mobility;Obesity;Pain;Impaired judgement   Barriers to Discharge Other (Comment)   Barriers to Discharge Comments CAMILO, steps to 2nd floor, dialysis   Goals   STG Expiration Date 03/13/24   PT Treatment Day 1   Plan   Treatment/Interventions Functional transfer training;LE strengthening/ROM;Elevations;Therapeutic exercise;Endurance training;Patient/family training;Equipment eval/education;Gait training;Compensatory technique education;Spoke to nursing;OT;Spoke to case management   PT Frequency 3-5x/wk   Discharge Recommendation   Rehab Resource Intensity Level, PT III (Minimum Resource Intensity)   Equipment Recommended Cane   AM-PAC Basic Mobility Inpatient   Turning in Flat Bed Without Bedrails 4   Lying on Back to Sitting on Edge of Flat Bed Without Bedrails 4   Moving Bed to Chair 3   Standing Up From Chair Using Arms 3   Walk in Room 3   Climb 3-5 Stairs With Railing 3   Basic Mobility Inpatient Raw Score 20   Basic Mobility Standardized Score 43.99   Highest Level Of Mobility   JH-HLM Goal 6: Walk 10 steps or more   JH-HLM Achieved 7: Walk 25 feet or more   Additional Treatment Session   Start Time 0913   End  "Time 0922   Treatment Assessment Pt tolerated tx session fairly well.  Interventions consisting of transfer and gait training without use of an AD.  Pt ambulates further distance (185 ft) SPV - SBA - steadying assist.  Pt reports being \"slightly\" unsteady -> PT recommends use of a cane for D/C.  SpO2 remains >95% throughout and HR at 107 bpm.  Pt continues to decline trial of stairs at this time.   End of Consult   Patient Position at End of Consult Supine;Bed/Chair alarm activated;All needs within reach   End of Consult Comments pt declines remaining OOB in recliner chair     (Please find full objective findings from PT assessment regarding body systems outlined above).     Assessment: Pt is a 33 y.o. male seen for PT evaluation s/p admission to [facility] on 2/27/2024 with Alcoholic hepatitis.  Order placed for PT services.  Upon evaluation: Pt is presenting with impaired functional mobility due to pain, decreased strength, decreased endurance, impaired balance, gait deviations, impaired judgment, fall risk, and LE edema requiring  SPV to stand-by assistance for transfers and ambulation with no AD . Pt's clinical presentation is currently evolving given the functional mobility deficits above, especially decreased activity tolerance, coupled with fall risks as indicated by AM-PAC 6-Clicks: 20/24 as well as obesity and combined with medical complications of abnormal renal lab values, abnormal H&H, abnormal WBCs, abnormal CBC, and need for input for mobility technique/safety.  Pt's PMHx and comorbidities that may affect physical performance and progress include: depression, DM, HTN, and renal failure, malnutrition . Personal factors affecting pt at time of IE include: questionable non-compliance, depression, past experience, and inability to perform IADLs. Pt will benefit from continued skilled PT services to address deficits as defined above and to maximize level of functional mobility to " facilitate return toward PLOF and improved QOL. From PT/mobility standpoint, recommendation at time of d/c would be Level III (Minimum Resource Intensity) and with cane pending progress in order to reduce fall risk and maximize pt's functional independence and consistency with mobility in order to facilitate return to PLOF.  Recommend trial with cane next 1-2 sessions, ther ex next 1-2 sessions, and stair navigation .     The patient's AM-PAC Basic Mobility Inpatient Short Form Raw Score is 20. A Raw score of greater than 16 suggests the patient may benefit from discharge to home. Please also refer to the recommendation of the Physical Therapist for safe discharge planning.       Goals: Pt will: Pt will perform transfers with modified I to increase Indep in home environment and prepare for ambulation. Pt will ambulate with SPC for >/= 250 ft with  modified I  to decrease risk for falls, improve activity tolerance, improve gait quality, and promote proper use of assistive device and to access home environment. Pt will complete >/= 15 steps with with unilateral handrail with modified I to return to home with CAMILO and return to [facility] home. Pt will participate in objective balance assessment to determine baseline fall risk.        Quintin Schmitz, PT,DPT

## 2024-02-28 NOTE — CONSULTS
Consultation - Nephrology   Michael ARACELI Koch 33 y.o. male MRN: [id number]  Unit/Bed#: -01 Encounter: [id number]    ASSESSMENT AND PLAN:   33 y.o.  man with PMH alcohol hepatitis, liver cirrhosis, hypertension, diabetes, nephrolithiasis, gout, GERD, depression, tobacco use.  Patient was admitted to [facility] in January due to liver failure then transferred to [facility] and [place] for evaluation of transplant unfortunately patient is not a candidate for liver transplant and was transferred back to [facility] to be close to his family.  Hemodialysis was started at [facility] .  Nephrology is consulted for management of DUANE    PLAN    #anuric KDIGO DUANE stage 3 severe  Etiology: Likely initially HRS transition to ATN, now dialysis dependent  Current creatinine:6.23 mg/dL with no evidence of kidney recovery  Patient is anuric  Last dialysis 2/26  Plan to continue HD MWF as an inpatient  Fluid Status   Treatment:  On HD this morning  Maintain MAP:  Over 65 mmHg if possible/avoid hypoperfusion:  Hold parameters on blood pressure medications  Avoid nephrotoxic agents such as NSAIDs, and IV contrast if possible. Avoid opioids   Adjust medications to GFR    # Vascular access  Patient currently has a temporary HD line  There is no evidence of kidney recovery and he will require a PermCath  Patient wants to discuss with family first      #Acid-base Disorder  serum HCO3 21mmol/L  At goal    #Volume status/hypertension:  Volume: Euvolemic  Blood pressure: Tendency to hypotension, /62, goal less than 140/90  Recommend:  Midodrine on dialysis  Will require albumin on dialysis if worsening hypotension      #Anemia:  Current hemoglobin: 8.8 mg/dL  Treatment:  Transfuse for hemoglobin less than 7.0 per primary service      # EtOH cirrhosis  No candidate for transplant at this time  Management as per primary team    HEMODIALYSIS PROCEDURE NOTE  The patient was seen and examined on hemodialysis. The patient is tolerating the  "procedure well.  Time: 3.5 hours  Sodium: 135 Blood flow: 350   Dialyzer: F160 Potassium: 4 Dialysate flow: 600   Access: temp HD line Bicarbonate: 35 Ultrafiltration goal: 1L   Medications on HD: none     The highlighted and/or bolded points in my assessment, plan, and disposition were discussed with the primary team and they agree with those points and the plan.  Previous records were personally reviewed by me to obtain a baseline creatinine.   The images (CXR) were personally reviewed by me in PACS      HISTORY OF PRESENT ILLNESS:  Requesting Physician: Tiburcio Jean MD  Reason for Consult: DUANE, dialysis dependent    Michael Koch is a 33 y.o.  male alcohol hepatitis, liver cirrhosis, hypertension, diabetes, nephrolithiasis, gout, GERD, depression, tobacco use.  Patient was admitted to [facility] in January due to liver failure then transferred to [facility] and [place] for evaluation of transplant unfortunately patient is not a candidate for liver transplant and was transferred back to [facility] to be close to his family.  Hemodialysis was started at [facility] . A renal consultation is requested today for assistance in the management of DUANE.    PAST MEDICAL HISTORY:  Past Medical History:   Diagnosis Date    Depression     Diabetes mellitus (HCC)     Gout     Hypertension     Hyponatremia 01/21/2024    Kidney stones        PAST SURGICAL HISTORY:  Past Surgical History:   Procedure Laterality Date    CYSTOSCOPY W/ URETERAL STENT PLACEMENT  09/14/2023    US GUIDED MASS BIOPSY (UNSPECIFIED)  02/19/2021       ALLERGIES:  No Known Allergies    SOCIAL HISTORY:  Social History     Substance and Sexual Activity   Alcohol Use Yes    Comment: \"couple 24 oz cans per day\"     Social History     Substance and Sexual Activity   Drug Use Never     Social History     Tobacco Use   Smoking Status Every Day    Current packs/day: 0.25    Types: Cigarettes   Smokeless Tobacco Never       FAMILY HISTORY:  No family history on " file.    MEDICATIONS:    Current Facility-Administered Medications:     ciprofloxacin (CIPRO) tablet 250 mg, 250 mg, Oral, Q24H, Yuly S Edenilson, CRNP, 250 mg at 02/28/24 0954    dextromethorphan-guaiFENesin (ROBITUSSIN DM) oral syrup 10 mL, 10 mL, Oral, Q4H PRN, Yuly S Edenilson, CRNP, 10 mL at 02/28/24 0621    hydrocortisone (Solu-CORTEF) injection 25 mg, 25 mg, Intravenous, Daily, Yuly S Edenilson, CRNP, 25 mg at 02/28/24 0945    insulin glargine (LANTUS) subcutaneous injection 20 Units 0.2 mL, 20 Units, Subcutaneous, Q12H MURALI, Yuly S Edenilson, CRNP, 20 Units at 02/28/24 0951    insulin lispro (HumALOG/ADMELOG) 100 units/mL subcutaneous injection 1-6 Units, 1-6 Units, Subcutaneous, HS, Yuly S Edenilson, CRNP    insulin lispro (HumALOG/ADMELOG) 100 units/mL subcutaneous injection 1-6 Units, 1-6 Units, Subcutaneous, TID AC **AND** Fingerstick Glucose (POCT), , , 4x Daily AC and at bedtime, Constance Menendez MD    lactulose (CHRONULAC) oral solution 20 g, 20 g, Per J Tube, BID, Yuly S Edenilson, CRNP    melatonin tablet 9 mg, 9 mg, Oral, HS, Yuly S Edenilson, CRNP    midodrine (PROAMATINE) tablet 5 mg, 5 mg, Oral, TID AC, Yuly S Edenilson, CRNP, 5 mg at 02/28/24 1047    nicotine (NICODERM CQ) 14 mg/24hr TD 24 hr patch 1 patch, 1 patch, Transdermal, Daily, Yuly S Edenilson, CRNP, 1 patch at 02/28/24 0950    OLANZapine (ZyPREXA) tablet 15 mg, 15 mg, Oral, HS, Yuly S Edenilson, CRNP, 15 mg at 02/28/24 0245    ondansetron (ZOFRAN) injection 4 mg, 4 mg, Intravenous, Q8H PRN, Yuly S Edenilson, CRNP    pantoprazole (PROTONIX) EC tablet 40 mg, 40 mg, Oral, Early Morning, Yuly S Edenilson, CRNP, 40 mg at 02/28/24 0605    thiamine tablet 100 mg, 100 mg, Oral, Daily, Yuly S Edenilson, CRNP, 100 mg at 02/28/24 0944    REVIEW OF SYSTEMS:  Complete 10 point review of systems were obtained and discussed in length with the patient. Complete review of systems were negative / unremarkable except mentioned in the HPI section.     Review of Systems - Ophthalmic ROS:  negative  ENT ROS: negative  Hematological and Lymphatic ROS: positive for - fatigue  Endocrine ROS: negative  Respiratory ROS: no cough, shortness of breath, or wheezing  Cardiovascular ROS: no chest pain or dyspnea on exertion  Gastrointestinal ROS: no abdominal pain, change in bowel habits, or black or bloody stools  Genito-Urinary ROS: no dysuria, trouble voiding, or hematuria  Musculoskeletal ROS: negative  Neurological ROS: no TIA or stroke symptoms  Dermatological ROS: negative     PHYSICAL EXAM:  Current Weight:    First Weight:    Vitals:    02/28/24 1115   BP: 108/62   Pulse: 95   Resp: 18   Temp: 97.7 °F (36.5 °C)   SpO2: 100%     No intake or output data in the 24 hours ending 02/28/24 1141  Wt Readings from Last 3 Encounters:   01/31/24 98.8 kg (217 lb 13 oz)   01/29/24 94.4 kg (208 lb 2.2 oz)   10/30/23 87 kg (191 lb 12.8 oz)     Temp Readings from Last 3 Encounters:   02/28/24 97.7 °F (36.5 °C)   01/31/24 98.1 °F (36.7 °C) (Temporal)   11/02/23 97.7 °F (36.5 °C)     BP Readings from Last 3 Encounters:   02/28/24 108/62   01/31/24 127/77   11/02/23 120/83     Pulse Readings from Last 3 Encounters:   02/28/24 95   01/31/24 (!) 106   11/02/23 (!) 106        General:  no acute distress at this time  Skin:  jaunidce  Eyes:  No scleral icterus and noninjected  ENT:  mucous membranes moist  Neck:  no carotid bruits  Chest:  Clear to auscultation percussion, good respiratory effort, no use of accessory respiratory muscles  CVS:  Regular rate and rhythm without rub   Abdomen:  soft and nontender   Extremities: no significant lower extremity edema  Neuro:  No gross focality  Psych:  Alert , cooperative   Vascular access: Temp right HD line      Invasive Devices:      Lab Results:   Results from last 7 days   Lab Units 02/28/24  0557   WBC Thousand/uL 17.98*   HEMOGLOBIN g/dL 8.8*   HEMATOCRIT % 24.1*   PLATELETS Thousands/uL 92*   POTASSIUM mmol/L 3.6   CHLORIDE mmol/L 103   CO2 mmol/L 21   BUN mg/dL 98*  "  CREATININE mg/dL 6.23*   CALCIUM mg/dL 7.3*   MAGNESIUM mg/dL 2.2   PHOSPHORUS mg/dL 4.7*       Other Studies:   XR chest portable    (Results Pending)       Portions of the record may have been created with voice recognition software. Occasional wrong word or \"sound a like\" substitutions may have occurred due to the inherent limitations of voice recognition software. Read the chart carefully and recognize, using context, where substitutions have occurred.    "

## 2024-02-29 NOTE — CONSULTS
Consultation - [facility]  Michael Koch 33 y.o. male MRN: [id number]  Unit/Bed#: -01 Encounter: [id number]      ASSESSMENT & PLAN    Liver failure  Alcoholic hepatitis  Alcoholic cirrhosis of liver with ascites and encephalopathy  Hepatorenal syndrome  Severe hyperbilirubinemia  Elevated LFTs   MELD-Na 40  Serologic liver workup unremarkable   On IV solu-cortef 25mg daily  Ascites - SPB prophylaxis w/ ciprofloxacin, abd US 2/15 with trace ascites  Hepatic encephalopathy - continue lactulose  Variceal screening - will need eventual outpatient EGD   HCC screening - abd US 2/15 no masses  HRS - on dialysis   Patient will need close outpatient follow up with hepatology     Reason for Consult / Principal Problem:     HPI: Michael Koch is a 33 y.o. year old male with a PMHx type 2 DM, gout, HTN, hx nephrolithiasis, depression/anxiety, ETOH abuse, tobacco abuse, marijuana use who is admitted with alcoholic hepatitis.     Patient initially presented to [facility] on 1/21/24 with back pain and nausea and was diagnosed with alcoholic hepatitis. He was started on steroids 1/23. His bilirubin continued to rise and the decision was made to transfer him to [facility] for liver transplant evaluation and hepatology consult. He was eventually transferred to the [facility] in [place], [place] on 2/14/24 due to insurance reasons. He underwent transplant evaluation where it was determined he was not a candidate for transplant at this time due to alcohol intake and lack of social support system. Once his family was aware of this decision, they requested he be transferred back to [facility].       Past Medical History:   Diagnosis Date    Depression     Diabetes mellitus (HCC)     Gout     Hypertension     Hyponatremia 01/21/2024    Kidney stones      Past Surgical History:   Procedure Laterality Date    CYSTOSCOPY W/ URETERAL STENT PLACEMENT  09/14/2023    US GUIDED MASS BIOPSY (UNSPECIFIED)  02/19/2021  "    Social History   Social History     Substance and Sexual Activity   Alcohol Use Yes    Comment: \"couple 24 oz cans per day\"     Social History     Substance and Sexual Activity   Drug Use Never     Social History     Tobacco Use   Smoking Status Every Day    Current packs/day: 0.25    Types: Cigarettes   Smokeless Tobacco Never       No family history on file.    Medications Prior to Admission   Medication    ciprofloxacin (CIPRO) 250 mg tablet    guaiFENesin (ROBITUSSIN) 100 mg/5 mL syrup    hydrocortisone (Solu-CORTEF) 100 mg SOLR    insulin glargine (LANTUS) 100 units/mL subcutaneous injection    lactulose 20 g/30 mL    lansoprazole (PREVACID) 3 mg/ml SUSP oral suspension    Melatonin 12 MG TABS    midodrine (PROAMATINE) 5 mg tablet    OLANZapine (ZyPREXA) 15 mg tablet    thiamine 100 MG tablet    magnesium Oxide (MAG-OX) 400 mg TABS    nicotine (NICODERM CQ) 21 mg/24 hr TD 24 hr patch    tamsulosin (FLOMAX) 0.4 mg     Current Facility-Administered Medications   Medication Dose Route Frequency    ciprofloxacin (CIPRO) tablet 250 mg  250 mg Oral Q24H    dextromethorphan-guaiFENesin (ROBITUSSIN DM) oral syrup 10 mL  10 mL Oral Q4H PRN    diphenhydramine, lidocaine, Al/Mg hydroxide, simethicone (Magic Mouthwash) oral solution 10 mL  10 mL Swish & Swallow Q6H PRN    hydrocortisone (Solu-CORTEF) injection 25 mg  25 mg Intravenous Daily    insulin glargine (LANTUS) subcutaneous injection 20 Units 0.2 mL  20 Units Subcutaneous Q12H MURALI    insulin lispro (HumALOG/ADMELOG) 100 units/mL subcutaneous injection 1-6 Units  1-6 Units Subcutaneous TID AC    insulin lispro (HumALOG/ADMELOG) 100 units/mL subcutaneous injection 1-6 Units  1-6 Units Subcutaneous HS    lactulose (CHRONULAC) oral solution 20 g  20 g Oral BID    melatonin tablet 9 mg  9 mg Oral HS    midodrine (PROAMATINE) tablet 5 mg  5 mg Oral TID AC    nicotine (NICODERM CQ) 14 mg/24hr TD 24 hr patch 1 patch  1 patch Transdermal Daily    OLANZapine (ZyPREXA) " tablet 15 mg  15 mg Oral HS    ondansetron (ZOFRAN) injection 4 mg  4 mg Intravenous Q8H PRN    pantoprazole (PROTONIX) EC tablet 40 mg  40 mg Oral Early Morning    thiamine tablet 100 mg  100 mg Oral Daily     No Known Allergies    Physical Exam  Constitutional:       General: He is not in acute distress.     Appearance: He is ill-appearing.   HENT:      Head: Normocephalic and atraumatic.   Eyes:      General: Scleral icterus present.   Pulmonary:      Effort: Pulmonary effort is normal. No respiratory distress.   Abdominal:      General: Abdomen is protuberant. There is no distension.      Tenderness: There is no abdominal tenderness. There is no guarding.   Skin:     General: Skin is warm and dry.      Coloration: Skin is jaundiced.   Neurological:      Mental Status: He is alert and oriented to person, place, and time.       Most Recent Vital Signs:  Vitals:    02/28/24 2315 02/29/24 0300 02/29/24 0400 02/29/24 0700   BP: 98/63 99/55 99/55 103/57   BP Location:  Right arm  Right arm   Pulse: 91 94 93 93   Resp:  18  19   Temp:  97.7 °F (36.5 °C)  98.1 °F (36.7 °C)   TempSrc:  Axillary  Oral   SpO2: 98% 97% 99% 99%       Intake/Output Summary (Last 24 hours) at 2/29/2024 0843  Last data filed at 2/29/2024 0300  Gross per 24 hour   Intake 500 ml   Output 1453 ml   Net -953 ml       LABS/IMAGING  Lab Results: I have reviewed all relevant lab results during this hospitalization.    Imaging Studies:  I have reviewed all the relevant images during this hospitalizations    Counseling / Coordination of Care  Total time spent today 45 minutes. Greater than 50% of total time was spent with the patient and / or family counseling and / or coordination of care.    Kayleigh Stahl PA-C

## 2024-02-29 NOTE — ASSESSMENT & PLAN NOTE
Postpyloric tube in place and has been receiving tube feeding to supplement oral intake at outside hospital  Also eating as tolerated of oral diet  Patient complaining of irritated throat and requesting tube be removed  Patient evaluated by speech therapist, able to swallow, at this time we will remove the postpyloric tube.

## 2024-02-29 NOTE — ASSESSMENT & PLAN NOTE
Lab Results   Component Value Date    HGBA1C 8.8 (H) 01/21/2024       Recent Labs     02/28/24  2119 02/29/24  0729 02/29/24  0800 02/29/24  1046   POCGLU 124 65 104 157*         Blood Sugar Average: Last 72 hrs:  (P) 136.875  Continue Lantus 20 units twice daily  Sliding-scale insulin with Accu-Cheks  Hypoglycemia protocol  Carbohydrate controlled diet

## 2024-02-29 NOTE — ASSESSMENT & PLAN NOTE
History alcohol abuse, last drink prior to admission in January  Will need outpatient alcohol counseling prior to transplant reevaluation-remember, patient has referral from  for virtual therapy which supposed to initiated soon.  Continue daily thiamine

## 2024-02-29 NOTE — BRIEF OP NOTE (RAD/CATH)
INTERVENTIONAL RADIOLOGY PROCEDURE NOTE    Date: 2/29/2024    Procedure: Permacath placement  Procedure Summary       Date:  Room / Location:     Anesthesia Start:  Anesthesia Stop:     Procedure:  Diagnosis:     Scheduled Providers:  Responsible Provider:     Anesthesia Type: Not recorded ASA Status: Not recorded            Preoperative diagnosis:   1. ESRD (end stage renal disease) on dialysis (HCC)    2. Alcoholic hepatitis, unspecified whether ascites present         Postoperative diagnosis: Same.    Surgeon: Efra Barakat MD     Assistant: None. No qualified resident was available.    Blood loss: Minimal    Specimens: None     Findings: Existing right neck temporary dialysis catheter exchanged under Fluoro for a 19 cm tip to cuff Permacath.  OK to use.    Complications: None immediate.    Anesthesia: local and IV Fentanyl

## 2024-02-29 NOTE — CASE MANAGEMENT
Case Management Discharge Planning Note    Patient name Michael Koch  Location /-01 MRN [id number]  : 1990 Date 2024       Current Admission Date: 2024  Current Admission Diagnosis:Alcoholic hepatitis   Patient Active Problem List    Diagnosis Date Noted    Renal failure 2024    Malnutrition (HCC) 2024    Orthostatic hypotension 2024    Acute respiratory failure with hypoxia and hypercapnia (HCC) 2024    Alcoholic hepatitis 2024    ETOH abuse 2024    Depression 2024    GERD (gastroesophageal reflux disease) 2024    BPH (benign prostatic hyperplasia) 2024    RSV infection 2024    Abnormal CT scan 2024    Hepatic encephalopathy (HCC) 2024    N&V (nausea and vomiting) 2024    DUANE (acute kidney injury) (HCC) 2024    Thrombocytopenia (HCC) 2024    Lactic acidosis 2024    Marijuana use 2024    SIRS (systemic inflammatory response syndrome) (HCC) 2024    History of gout 10/04/2023    History of hypertension 10/04/2023    Type 2 diabetes mellitus with hyperglycemia, without long-term current use of insulin (HCC) 10/04/2023    Tobacco abuse 10/04/2023      LOS (days): 2  Geometric Mean LOS (GMLOS) (days):   Days to GMLOS:     OBJECTIVE:  Risk of Unplanned Readmission Score: 44.8         Current admission status: Inpatient   Preferred Pharmacy:   [id number] - [place], [place] - [address]  [address]  [place] [address]  Phone: [phone] Fax: [phone]    Primary Care Provider: Louie Odonnell DO    Primary Insurance: BLUE CROSS  Secondary Insurance:     DISCHARGE DETAILS:     Call received from Cathryn Gonzalez (Sister) [phone] (M)   -  20 minute conversation about dc planning.  Aware HD will need to be set up for dc.  Possible HHC.  All questions and concerns were addressed.  CM will continue to work on a safe dc plan.

## 2024-02-29 NOTE — PROGRESS NOTES
NEPHROLOGY PROGRESS NOTE   Michael Koch 33 y.o. male MRN: [id number]  Unit/Bed#: -01 Encounter: [id number]  Reason for Consult: DUANE     ASSESSMENT AND PLAN:  33 y.o.  man with PMH alcohol hepatitis, liver cirrhosis, hypertension, diabetes, nephrolithiasis, gout, GERD, depression, tobacco use.  Patient was admitted to [facility] in January due to liver failure then transferred to [facility] and [place] for evaluation of transplant unfortunately patient is not a candidate for liver transplant and was transferred back to [facility] to be close to his family.  Hemodialysis was started at [facility] .  Nephrology is consulted for management of DUANE     PLAN     #anuric KDIGO DUANE stage 3 severe  Etiology: Likely initially HRS transition to ATN, now HD dependent   Current creatinine:4.98 after HD   Dialysis yesterday, well-tolerated   No evidence of kidney recovery at this time   Treatment:  Plan to continue HD MWF   Maintain MAP:  Over 65 mmHg if possible/avoid hypoperfusion:  Hold parameters on blood pressure medications  Avoid nephrotoxic agents such as NSAIDs, and IV contrast if possible. Avoid opioids   Adjust medications to GFR     # Vascular access  Patient currently has a temporary HD line  Patient agreed to proceed with PermCath   Educated and hide avoid infections with the PermCath.  Patient aware that he should not get in the pool or go to water crews     #Acid-base Disorder  serum HCO3 24mmol/L  At goal     #Volume status/hypertension:  Volume: Euvolemic  Blood pressure: Tendency to hypotension, /57  Recommend:  Midodrine 3 times daily        #Anemia:  Current hemoglobin: 8.6 mg/dL  Treatment:  Transfuse for hemoglobin less than 7.0 per primary service       # EtOH cirrhosis  No candidate for transplant at this time  Management as per primary team    The highlighted and/or bolded points in my assessment, plan, and disposition were discussed with the primary team and they agree with those points and the  plan.  Previous records were personally reviewed by me to obtain a baseline creatinine.   The images (CXR) were personally reviewed by me in PACS      SUBJECTIVE:  Patient seen and examined at bedside. No chest pain, shortness of breath, nausea, vomiting.  Patient had dialysis yesterday well-tolerated        OBJECTIVE:  Current Weight:    Vitals:    02/29/24 0700   BP: 103/57   Pulse: 93   Resp: 19   Temp: 98.1 °F (36.7 °C)   SpO2: 99%       Intake/Output Summary (Last 24 hours) at 2/29/2024 1131  Last data filed at 2/29/2024 0801  Gross per 24 hour   Intake 540 ml   Output 1453 ml   Net -913 ml     Wt Readings from Last 3 Encounters:   01/31/24 98.8 kg (217 lb 13 oz)   01/29/24 94.4 kg (208 lb 2.2 oz)   10/30/23 87 kg (191 lb 12.8 oz)     Temp Readings from Last 3 Encounters:   02/29/24 98.1 °F (36.7 °C) (Oral)   01/31/24 98.1 °F (36.7 °C) (Temporal)   11/02/23 97.7 °F (36.5 °C)     BP Readings from Last 3 Encounters:   02/29/24 103/57   01/31/24 127/77   11/02/23 120/83     Pulse Readings from Last 3 Encounters:   02/29/24 93   01/31/24 (!) 106   11/02/23 (!) 106        General:  no acute distress at this time  Skin:  jaundice  Eyes:  scleral icterus   ENT:  mucous membranes moist  Neck:  no carotid bruits  Chest:  Clear to auscultation percussion, good respiratory effort, no use of accessory respiratory muscles  CVS:  Regular rate and rhythm without rub   Abdomen:  soft and nontender   Extremities: no significant lower extremity edema  Neuro:  No gross focality  Psych:  Alert , cooperative   Dialysis access: Temporary HD line      Medications:    Current Facility-Administered Medications:     ciprofloxacin (CIPRO) tablet 250 mg, 250 mg, Oral, Q24H, Yuly S Edenilson, CRNP, 250 mg at 02/29/24 0812    dextromethorphan-guaiFENesin (ROBITUSSIN DM) oral syrup 10 mL, 10 mL, Oral, Q4H PRN, Yuly S Edenilson, CRNP, 10 mL at 02/28/24 5158    diphenhydramine, lidocaine, Al/Mg hydroxide, simethicone (Magic Mouthwash) oral solution  10 mL, 10 mL, Swish & Swallow, Q6H PRN, Tiburcio Jean MD, 10 mL at 02/29/24 0811    hydrocortisone (Solu-CORTEF) injection 25 mg, 25 mg, Intravenous, Daily, Yuly S Edenilson, CRNP, 25 mg at 02/29/24 0811    insulin glargine (LANTUS) subcutaneous injection 20 Units 0.2 mL, 20 Units, Subcutaneous, Q12H MURALI, Tiburcio Jean MD, 20 Units at 02/28/24 2200    insulin lispro (HumALOG/ADMELOG) 100 units/mL subcutaneous injection 1-6 Units, 1-6 Units, Subcutaneous, TID AC, 3 Units at 02/28/24 1150 **AND** Fingerstick Glucose (POCT), , , TID AC, Tiburcio Jean MD    insulin lispro (HumALOG/ADMELOG) 100 units/mL subcutaneous injection 1-6 Units, 1-6 Units, Subcutaneous, HS, Tiburcio Jean MD    lactulose (CHRONULAC) oral solution 20 g, 20 g, Oral, BID, Tiburcio Jean MD    melatonin tablet 9 mg, 9 mg, Oral, HS, Yuly S Edenilson, CRNP, 9 mg at 02/28/24 2211    midodrine (PROAMATINE) tablet 5 mg, 5 mg, Oral, TID AC, Yuly S Edenilson, CRNP, 5 mg at 02/29/24 0618    nicotine (NICODERM CQ) 14 mg/24hr TD 24 hr patch 1 patch, 1 patch, Transdermal, Daily, Yuly S Edenilson, CRNP, 1 patch at 02/29/24 0818    OLANZapine (ZyPREXA) tablet 15 mg, 15 mg, Oral, HS, Yuly S Edenilson, CRNP, 15 mg at 02/28/24 2211    ondansetron (ZOFRAN) injection 4 mg, 4 mg, Intravenous, Q8H PRN, Yuly S Edenilson, CRNP    pantoprazole (PROTONIX) EC tablet 40 mg, 40 mg, Oral, Early Morning, Yuly S Edenilson, CRNP, 40 mg at 02/29/24 0618    thiamine tablet 100 mg, 100 mg, Oral, Daily, Yuly S Edenilson, CRNP, 100 mg at 02/29/24 0811    Laboratory Results:  Results from last 7 days   Lab Units 02/29/24  0454 02/28/24  0557   WBC Thousand/uL 15.12* 17.98*   HEMOGLOBIN g/dL 8.6* 8.8*   HEMATOCRIT % 23.2* 24.1*   PLATELETS Thousands/uL 75* 92*   SODIUM mmol/L 136 139   POTASSIUM mmol/L 3.4* 3.6   CHLORIDE mmol/L 98 103   CO2 mmol/L 24 21   BUN mg/dL 64* 98*   CREATININE mg/dL 4.98* 6.23*   CALCIUM mg/dL 6.9* 7.3*   MAGNESIUM mg/dL  --  2.2   PHOSPHORUS mg/dL  --  4.7*  "      MRI brain wo contrast   Final Result by E. Alec Schoenberger, MD (02/28 1638)      No acute intracranial pathology.         Workstation performed: [id number]         XR chest portable ICU   Final Result by Samir Be MD (02/28 1657)      Again, no visible PICC line within the field-of-view. Right-sided central venous catheter entering from the neck has the tip in the SVC            Workstation performed: [id number]         XR humerus left   Final Result by Samir Be MD (02/28 1646)      No PICC line present. Please correlate for actual placement of a line in the patient other than the IV catheter at the antecubital fossa      Workstation performed: [id number]         XR chest portable   Final Result by Samir Be MD (02/28 1539)   Addendum (preliminary) 1 of 1 by Samir Be MD (02/28 1539)   ADDENDUM:      By report, the patient had a PICC line placed although there is no visible    PICC line within the field-of-view. It may be within the patient's arm.    Correlate clinically      Final      No acute cardiopulmonary disease.      Central line appears satisfactory in position. Feeding tube extends at least to the stomach. Several artifacts are present. The finding projecting in the mediastinal region is of uncertain etiology. This potentially could be within the patient. Correlate    clinically      Workstation performed: [id number]         IR tunneled dialysis catheter placement    (Results Pending)       Portions of the record may have been created with voice recognition software. Occasional wrong word or \"sound a like\" substitutions may have occurred due to the inherent limitations of voice recognition software. Read the chart carefully and recognize, using context, where substitutions have occurred.    "

## 2024-02-29 NOTE — UTILIZATION REVIEW
NOTIFICATION OF INPATIENT ADMISSION   AUTHORIZATION REQUEST   SERVICING FACILITY:   [place] [address]  Tax ID: [id number]  NPI: [id number] ATTENDING PROVIDER:  Attending Name and NPI#: Tiburcio Jean Md [[id number]]  Address: [address] [place] [address]  Phone: [phone]   ADMISSION INFORMATION:  Place of Service: Inpatient [facility]  Place of Service Code: 21  Inpatient Admission Date/Time: 2/27/24 10:29 PM  Discharge Date/Time: No discharge date for patient encounter.  Admitting Diagnosis Code/Description:  Liver failure (HCC) [K72.90]     UTILIZATION REVIEW CONTACT:  Malaika Cao, Utilization   Network Utilization Review Department  Phone: [phone]  Fax [phone]  Email: [email or web address]  Contact for approvals/pending authorizations, clinical reviews, and discharge.     PHYSICIAN ADVISORY SERVICES:  Medical Necessity Denial & Mfty-et-Qjpu Review  Phone: [phone]  Fax: [phone]  Email: [email or web address]     DISCHARGE SUPPORT TEAM:  For Patients Discharge Needs & Updates  Phone: [phone] opt. 2 Fax: [phone]  Email: [email or web address]

## 2024-02-29 NOTE — PLAN OF CARE
Problem: NEUROSENSORY - ADULT  Goal: Achieves stable or improved neurological status  Description: INTERVENTIONS  - Monitor and report changes in neurological status  - Monitor vital signs such as temperature, blood pressure, glucose, and any other labs ordered   - Initiate measures to prevent increased intracranial pressure  - Monitor for seizure activity and implement precautions if appropriate      Outcome: Progressing  Goal: Achieves maximal functionality and self care  Description: INTERVENTIONS  - Monitor swallowing and airway patency with patient fatigue and changes in neurological status  - Encourage and assist patient to increase activity and self care.   - Encourage visually impaired, hearing impaired and aphasic patients to use assistive/communication devices  Outcome: Progressing     Problem: CARDIOVASCULAR - ADULT  Goal: Maintains optimal cardiac output and hemodynamic stability  Description: INTERVENTIONS:  - Monitor I/O, vital signs and rhythm  - Monitor for S/S and trends of decreased cardiac output  - Administer and titrate ordered vasoactive medications to optimize hemodynamic stability  - Assess quality of pulses, skin color and temperature  - Assess for signs of decreased coronary artery perfusion  - Instruct patient to report change in severity of symptoms  Outcome: Progressing  Goal: Absence of cardiac dysrhythmias or at baseline rhythm  Description: INTERVENTIONS:  - Continuous cardiac monitoring, vital signs, obtain 12 lead EKG if ordered  - Administer antiarrhythmic and heart rate control medications as ordered  - Monitor electrolytes and administer replacement therapy as ordered  Outcome: Progressing     Problem: RESPIRATORY - ADULT  Goal: Achieves optimal ventilation and oxygenation  Description: INTERVENTIONS:  - Assess for changes in respiratory status  - Assess for changes in mentation and behavior  - Position to facilitate oxygenation and minimize respiratory effort  - Oxygen  administered by appropriate delivery if ordered  - Initiate smoking cessation education as indicated  - Encourage broncho-pulmonary hygiene including cough, deep breathe, Incentive Spirometry  - Assess the need for suctioning and aspirate as needed  - Assess and instruct to report SOB or any respiratory difficulty  - Respiratory Therapy support as indicated  Outcome: Progressing

## 2024-02-29 NOTE — ASSESSMENT & PLAN NOTE
Currently on Solu-Cortef 25 mg daily -continue to wean  Alcoholic cirrhosis  Transferred from [facility] in [place] after liver transplant evaluation, he was found not to be candidate  Continue lactulose  No encephalopathy on admission  Chronic anemia hemoglobin 8.6  Thrombocytopenia platelets 78  PT/OT/case management for discharge planning so patient may prepare for transplant reevaluation in the future  Patient has extensive workup done, at this time, not much to offer, we will discuss with gastroenterology for any more recommendation.

## 2024-02-29 NOTE — ASSESSMENT & PLAN NOTE
Lab Results   Component Value Date    HGBA1C 8.8 (H) 01/21/2024       Recent Labs     02/28/24  0243 02/28/24  1049 02/28/24  1633 02/28/24  1646   POCGLU 143* 234* 128 140       Blood Sugar Average: Last 72 hrs:  (P) 161.25  Continue Lantus 20 units twice daily  Sliding-scale insulin with Accu-Cheks  Hypoglycemia protocol  Carbohydrate controlled diet

## 2024-02-29 NOTE — PHYSICAL THERAPY NOTE
" PHYSICAL THERAPY TREATMENT NOTE      NAME:  Michael Koch  DATE: 02/29/24    Length Of Stay: 2  Performed at least 2 patient identifiers during session: Name and Birthday    TREATMENT FLOW SHEET:       02/29/24 1112   PT Last Visit   PT Visit Date 02/29/24   Note Type   Note Type Treatment   Pain Assessment   Pain Assessment Tool 0-10   Pain Score No Pain   Restrictions/Precautions   Weight Bearing Precautions Per Order No   Other Precautions Chair Alarm;Bed Alarm;Fall Risk  (orthostatic)   General   Chart Reviewed Yes   Response to Previous Treatment Patient reporting fatigue but able to participate.   Family/Caregiver Present Yes  (spouse and son present at start of tx session)   Cognition   Overall Cognitive Status WFL   Arousal/Participation Alert   Orientation Level Oriented X4   Following Commands Follows one step commands without difficulty   Bed Mobility   Supine to Sit 6  Modified independent   Sit to Supine 6  Modified independent   Transfers   Sit to Stand 5  Supervision   Stand to Sit 5  Supervision   Stand pivot   (SBA - steadying assist)   Additional items Increased time required;Verbal cues   Toilet transfer   (SBA)   Additional items Standard toilet  (pt stating that he is unable to perform hygiene - requesting that PT complete hygiene for him following bowel movement)   Car transfer   (SBA - steadying assist)   Additional items   (\"simulated car transfer\")   Additional Comments SPC vs. no AD   Ambulation/Elevation   Gait pattern Improper Weight shift;Inconsistent eva;Redundant gait at times   Gait Assistance   (SBA - steadying assist)   Additional items Assist x 1;Verbal cues   Assistive Device SPC   Distance 105 ft; 120 ft   Stair Management Assistance   (SBA)   Additional items Assist x 1;Verbal cues   Stair Management Technique Two rails;Step to pattern   Number of Stairs 2  (# of steps limited by bowel incontinence)   Balance   Static Sitting Good   Dynamic Sitting Fair +   Static " "Standing Fair   Dynamic Standing Fair -   Ambulatory Fair -   Endurance Deficit   Endurance Deficit Yes   Endurance Deficit Description lightheaded 2/10 scale   Activity Tolerance   Activity Tolerance Patient limited by fatigue;Other (Comment)  (lightheadedness)   Medical Staff Made Aware RISA Nava   Nurse Made Aware ALONDRA Lancaster   Exercises   Heel Cord Stretch Sitting;PROM;Bilateral  (30 sec)   Assessment   Prognosis Fair   Problem List Decreased strength;Decreased endurance;Impaired balance;Decreased mobility;Obesity;Pain;Impaired judgement   Assessment Pt tolerates tx session fair.  Interventions consisting of bed mobility, transfer training, toileting, gait training, & stair navigation.  Pt limited by some lightheadedness with + orthostatics (standing BP: 81/41) along with standing balance deficits and bowel incontinence.  Pt also reporting that he is unable to complete perianal hygiene on his own.  Skilled PT services still indicated in the acute care setting with continued focus on stair navigation to master CAMILO his home.   Barriers to Discharge Other (Comment)   Barriers to Discharge Comments CAMILO, steps to 2nd floor, dialysis, orthostatic hypotension   Goals   Patient Goals \"I do want to go home\"   STG Expiration Date 03/13/24   PT Treatment Day 2   Plan   Progress Slow progress, decreased activity tolerance   PT Frequency 3-5x/wk   Discharge Recommendation   Rehab Resource Intensity Level, PT III (Minimum Resource Intensity)   Equipment Recommended Cane   Additional Comments 1. First floor set up.  2.  Have SPV - assist on stairs.  3. Bed side commode   AM-PAC Basic Mobility Inpatient   Turning in Flat Bed Without Bedrails 4   Lying on Back to Sitting on Edge of Flat Bed Without Bedrails 4   Moving Bed to Chair 3   Standing Up From Chair Using Arms 3   Walk in Room 3   Climb 3-5 Stairs With Railing 3   Basic Mobility Inpatient Raw Score 20   Basic Mobility Standardized Score 43.99   Highest Level Of Mobility "   JH-HLM Goal 6: Walk 10 steps or more   JH-HLM Achieved 7: Walk 25 feet or more   Education   Education Provided Mobility training;Assistive device   Patient Demonstrates verbal understanding   End of Consult   Patient Position at End of Consult Supine;Bed/Chair alarm activated;All needs within reach  (RN present in room)       The patient's AM-PAC Basic Mobility Inpatient Short Form Raw Score is 20. A Raw score of greater than 16 suggests the patient may benefit from discharge to home. Please also refer to the recommendation of the Physical Therapist for safe discharge planning.         Quintin Schmitz, PT,DPT

## 2024-02-29 NOTE — ASSESSMENT & PLAN NOTE
Developed DUANE on CKD with underlying liver failure   ureteral stent placed at [facility], lithotripsy  Developed oliguria, treated with CRRT then transition to HD  Appreciate nephrology consultation  Last HD 2/28-patient has temporal HD catheter.

## 2024-02-29 NOTE — DISCHARGE INSTRUCTIONS
[facility] Interventional Radiology        [address] [place] [address].        [place] [address]             Phone:  [phone]                  IR Scheduling: [phone]           Perma-cath Placement   WHAT YOU NEED TO KNOW:   A perma-cath is a catheter placed through a vein into or near your right atrium. Your right atrium is the right upper chamber of your heart. A perma-cath is used for dialysis in an emergency or until a long-term device is ready to use. After your procedure, you will have some pain and swelling on your chest and neck. You may have some bruises on your chest and neck. You may also have 2 dressings, one on your chest and one on your neck.   DISCHARGE INSTRUCTIONS:   Call 911 for any of the following:   You feel lightheaded, short of breath, and have chest pain.    Your catheter comes out   Contact Interventional Radiology at [phone] if:  Blood soaks through your bandage.   You have new swelling in your arm, neck, face, or chest on your right side.  Your catheter gets wet.    Your bruises or pain get worse.   You have a fever or chills.  Persistent nausea or vomiting.   Your incision is red, swollen, or draining pus.   You have questions or concerns about your condition or care.  Self-care:       Resume your normal diet.  Keep your dressings dry. Do not take a shower or swim. You may take a tub bath, but do not get your dressings wet. Water in your wound can cause bacteria to grow and cause an infection. If your dressing gets wet, dry it off and cover it with dry sterile gauze. Call your healthcare provider. Do not use soaps or ointments.  Do not change your dressings. Your healthcare provider or dialysis nurse will change your dressings. Your dressings should stay in place until your healthcare provider removes them. The dressing on your chest will stay as long as you have the catheter in place. The dressing prevents infection.    Do not remove the red and blue caps from the  end of your catheter. The caps prevent air from getting into your catheter.  Follow up with your healthcare provider as directed: Write down your questions so you remember to ask them during your visits.

## 2024-02-29 NOTE — ASSESSMENT & PLAN NOTE
Developed DUANE on CKD with underlying liver failure   ureteral stent placed at [facility], lithotripsy  Developed oliguria, treated with CRRT then transition to HD  Appreciate nephrology consultation  Patient temporary HD catheter replaced with PermCath by IR on 2/29/2024.

## 2024-02-29 NOTE — CONSULTS
e-Consult (IPC)  - Interventional Radiology  Michael Koch 33 y.o. male MRN: [id number]  Unit/Bed#: -01 Encounter: [id number]          Interventional Radiology has been consulted to evaluate Michael Koch    We were consulted by Nephrology concerning this patient with liver failure and DUANE.    Inpatient Consult to IR  Consult performed by: Efra Barakat MD  Consult ordered by: Tiburcio Jean MD        02/29/24    Assessment/Recommendation:   33 yr old male with liver failure and anuria with ongoing hemodialysis via temporary catheter. Request for Permacath placement.    This will be scheduled today.    >5 min, >50% time spent reviewing/analyzing record, written report will be generated in the EMR.     Thank you for allowing Interventional Radiology to participate in the care of Michael Koch. Please don't hesitate to call or TigerText us with any questions.     Efra Barakat MD

## 2024-02-29 NOTE — ASSESSMENT & PLAN NOTE
Currently on Solu-Cortef 25 mg daily -continue to wean  Alcoholic cirrhosis  Transferred from [facility] in [place] after liver transplant evaluation, he was found not to be candidate  Continue lactulose  No encephalopathy on admission  Chronic anemia hemoglobin 8.6  Thrombocytopenia platelets 78  PT/OT/case management for discharge planning so patient may prepare for transplant reevaluation in the future  Patient has extensive workup done, at this time, not much to offer, we will discuss with gastroenterology for any more recommendation.

## 2024-02-29 NOTE — PROGRESS NOTES
[facility]  Progress Note  Name: Michael Koch I  MRN: [id number]  Unit/Bed#: -01 I Date of Admission: 2/27/2024   Date of Service: 2/29/2024 I Hospital Day: 2    Assessment/Plan   * Alcoholic hepatitis  Assessment & Plan  Currently on Solu-Cortef 25 mg daily -continue to wean  Alcoholic cirrhosis  Transferred from [facility] in [place] after liver transplant evaluation, he was found not to be candidate  Continue lactulose  No encephalopathy on admission  Chronic anemia hemoglobin 8.6  Thrombocytopenia platelets 78  PT/OT/case management for discharge planning so patient may prepare for transplant reevaluation in the future  Patient has extensive workup done, at this time, not much to offer, we will discuss with gastroenterology for any more recommendation.    Orthostatic hypotension  Assessment & Plan  Secondary to liver disease and vasodilation  Continue midodrine    Malnutrition (HCC)  Assessment & Plan  Postpyloric tube in place and has been receiving tube feeding to supplement oral intake at outside hospital  Also eating as tolerated of oral diet  Patient complaining of irritated throat and requesting tube be removed  Patient evaluated by speech therapist, able to swallow, at this time we will remove the postpyloric tube.    Renal failure  Assessment & Plan  Developed DUANE on CKD with underlying liver failure   ureteral stent placed at [facility], lithotripsy  Developed oliguria, treated with CRRT then transition to HD  Appreciate nephrology consultation  Patient temporary HD catheter replaced with PermCath by IR on 2/29/2024.    GERD (gastroesophageal reflux disease)  Assessment & Plan  Continue PPI    Depression  Assessment & Plan  Continue Zyprexa at at bedtime    ETOH abuse  Assessment & Plan  History alcohol abuse, last drink prior to admission in January  Will need outpatient alcohol counseling prior to transplant reevaluation-remember, patient has referral from [facility] for virtual therapy which supposed to initiated soon.  Continue daily thiamine    Tobacco abuse  Assessment & Plan  Nicotine patch  Cessation counseling    Type 2 diabetes mellitus with hyperglycemia, without long-term current use of insulin ([facility])  Assessment & Plan  Lab Results   Component Value Date    HGBA1C 8.8 (H) 2024       Recent Labs     24  2119 24  0729 24  0800 24  1046   POCGLU 124 65 104 157*         Blood Sugar Average: Last 72 hrs:  (P) 136.875  Continue Lantus 20 units twice daily  Sliding-scale insulin with Accu-Cheks  Hypoglycemia protocol  Carbohydrate controlled diet             VTE Pharmacologic Prophylaxis:   Low Risk (Score 0-2) - Encourage Ambulation.    Mobility:   Basic Mobility Inpatient Raw Score: 20  JH-HLM Goal: 6: Walk 10 steps or more  JH-HLM Achieved: 7: Walk 25 feet or more  HLM Goal achieved. Continue to encourage appropriate mobility.    Patient Centered Rounds: I performed bedside rounds with nursing staff today.   Discussions with Specialists or Other Care Team Provider: IR, nephrology    Education and Discussions with Family / Patient: Updated  (wife) via phone.    Current Length of Stay: 2 day(s)  Current Patient Status: Inpatient   Certification Statement: The patient will continue to require additional inpatient hospital stay due to monitor above conditions  Discharge Plan: Anticipate discharge in 48-72 hrs to home with home services.    Code Status: Level 1 - Full Code    Subjective:   Inability to exam.  Patient lying on the bed.  Denies any significant complaint.  Having bowel movement.    Objective:     Vitals:   Temp (24hrs), Av.1 °F (36.7 °C), Min:97.7 °F (36.5 °C), Max:98.4 °F (36.9 °C)    Temp:  [97.7 °F (36.5 °C)-98.4 °F (36.9 °C)] 98.1 °F (36.7 °C)  HR:  [82-94] 90  Resp:  [14-31] 25  BP: ()/(41-69) 93/56  SpO2:  [97 %-99 %] 98 %  There is no height or weight on file to calculate BMI.     Input  and Output Summary (last 24 hours):     Intake/Output Summary (Last 24 hours) at 2/29/2024 1600  Last data filed at 2/29/2024 1201  Gross per 24 hour   Intake 480 ml   Output 100 ml   Net 380 ml       Physical Exam:   Physical Exam  Vitals and nursing note reviewed.   Constitutional:       Appearance: Normal appearance. He is ill-appearing. He is not diaphoretic.   Eyes:      General: Scleral icterus present.      Pupils: Pupils are equal, round, and reactive to light.   Cardiovascular:      Rate and Rhythm: Normal rate.      Heart sounds:      No friction rub. No gallop.   Pulmonary:      Effort: Pulmonary effort is normal. No respiratory distress.      Breath sounds: No stridor. No wheezing or rhonchi.   Chest:      Comments: Permcath in place  Abdominal:      General: Abdomen is flat. There is distension.      Palpations: Abdomen is soft.      Tenderness: There is no guarding or rebound.      Hernia: No hernia is present.   Musculoskeletal:      Right lower leg: No edema.   Skin:     Coloration: Skin is jaundiced.   Neurological:      Mental Status: He is alert.         Additional Data:     Labs:  Results from last 7 days   Lab Units 02/29/24  0454   WBC Thousand/uL 15.12*   HEMOGLOBIN g/dL 8.6*   HEMATOCRIT % 23.2*   PLATELETS Thousands/uL 75*   NEUTROS PCT % 87*   LYMPHS PCT % 5*   MONOS PCT % 6   EOS PCT % 1     Results from last 7 days   Lab Units 02/29/24  0454   SODIUM mmol/L 136   POTASSIUM mmol/L 3.4*   CHLORIDE mmol/L 98   CO2 mmol/L 24   BUN mg/dL 64*   CREATININE mg/dL 4.98*   ANION GAP mmol/L 14   CALCIUM mg/dL 6.9*   ALBUMIN g/dL 2.5*   TOTAL BILIRUBIN mg/dL 47.73*   ALK PHOS U/L 213*   ALT U/L 145*   AST U/L 69*   GLUCOSE RANDOM mg/dL 69     Results from last 7 days   Lab Units 02/29/24  0454   INR  1.70*     Results from last 7 days   Lab Units 02/29/24  1046 02/29/24  0800 02/29/24  0729 02/28/24  2119 02/28/24  1646 02/28/24  1633 02/28/24  1049 02/28/24  0243   POC GLUCOSE mg/dl 157* 104 65 124  140 128 234* 143*               Lines/Drains:  Invasive Devices       Peripheral Intravenous Line  Duration             Peripheral IV 02/29/24 Dorsal (posterior);Left Forearm <1 day              Hemodialysis Catheter  Duration             HD Permanent Double Catheter <1 day                          Imaging: No pertinent imaging reviewed.    Recent Cultures (last 7 days):         Last 24 Hours Medication List:   Current Facility-Administered Medications   Medication Dose Route Frequency Provider Last Rate    ciprofloxacin  250 mg Oral Q24H Yuly S Edenilson, CRNP      dextromethorphan-guaiFENesin  10 mL Oral Q4H PRN Yuly S Edenilson, CRNP      diphenhydramine, lidocaine, Al/Mg hydroxide, simethicone  10 mL Swish & Swallow Q6H PRN Tiburcio Jean MD      hydrocortisone  25 mg Intravenous Daily Yuly S Edenilson, CRNP      insulin glargine  20 Units Subcutaneous Q12H MURALI Tiburcio Jean MD      insulin lispro  1-6 Units Subcutaneous TID AC Tiburcio Jean MD      insulin lispro  1-6 Units Subcutaneous HS Tiburcio Jean MD      lactulose  20 g Oral BID Tiburcio Jean MD      melatonin  9 mg Oral HS Yuly S Edenilson, CRNP      midodrine  5 mg Oral TID AC Yuly S Edenilson, CRNP      nicotine  1 patch Transdermal Daily Yuly S Edenilson, CRNP      OLANZapine  15 mg Oral HS Yuly S Edenilson, CRNP      ondansetron  4 mg Intravenous Q8H PRN Yuly S Edenilson, CRNP      pantoprazole  40 mg Oral Early Morning Yuly S Edenilson, CRNP      thiamine  100 mg Oral Daily Yuly S Edenilson, CRNP          Today, Patient Was Seen By: Tiburcio Jean MD    **Please Note: This note may have been constructed using a voice recognition system.**

## 2024-02-29 NOTE — UTILIZATION REVIEW
Continued Stay Review    Date: 2/29                          Current Patient Class: IP   Current Level of Care: ICU     HPI:33 y.o. male initially admitted on 2/27 for alcoholic hepatitis     Assessment/Plan:     2/29 IR Consult   liver failure and anuria with ongoing hemodialysis via temporary catheter. Request for Permacath placement.   plan for today .    Vital Signs:   Date/Time Temp Pulse Resp BP MAP (mmHg) SpO2 O2 Device Patient Position - Orthostatic VS   02/29/24 0700 98.1 °F (36.7 °C) 93 19 103/57 77 99 % None (Room air) Lying   02/29/24 0400 -- 93 -- 99/55 71 99 % -- --   02/29/24 0300 97.7 °F (36.5 °C) 94 18 99/55 71 97 % None (Room air) Lying       Pertinent Labs/Diagnostic Results:       Results from last 7 days   Lab Units 02/29/24  0454 02/28/24  0557   WBC Thousand/uL 15.12* 17.98*   HEMOGLOBIN g/dL 8.6* 8.8*   HEMATOCRIT % 23.2* 24.1*   PLATELETS Thousands/uL 75* 92*   NEUTROS ABS Thousands/µL 13.13*  --          Results from last 7 days   Lab Units 02/29/24  0454 02/28/24  0557   SODIUM mmol/L 136 139   POTASSIUM mmol/L 3.4* 3.6   CHLORIDE mmol/L 98 103   CO2 mmol/L 24 21   ANION GAP mmol/L 14 15   BUN mg/dL 64* 98*   CREATININE mg/dL 4.98* 6.23*   EGFR ml/min/1.73sq m 14 10   CALCIUM mg/dL 6.9* 7.3*   MAGNESIUM mg/dL  --  2.2   PHOSPHORUS mg/dL  --  4.7*     Results from last 7 days   Lab Units 02/29/24  0454 02/28/24  0557   AST U/L 69* 91*   ALT U/L 145* 161*   ALK PHOS U/L 213* 184*   TOTAL PROTEIN g/dL 3.4* 3.3*   ALBUMIN g/dL 2.5* 2.4*   TOTAL BILIRUBIN mg/dL 47.73* 42.18*     Results from last 7 days   Lab Units 02/29/24  1046 02/29/24  0800 02/29/24  0729 02/28/24  2119 02/28/24  1646 02/28/24  1633 02/28/24  1049 02/28/24  0243   POC GLUCOSE mg/dl 157* 104 65 124 140 128 234* 143*     Results from last 7 days   Lab Units 02/29/24  0454 02/28/24  0557   GLUCOSE RANDOM mg/dL 69 90         BETA-HYDROXYBUTYRATE   Date Value Ref Range Status   01/21/2024 0.2 <0.6 mmol/L Final          Results  from last 7 days   Lab Units 02/29/24  0454 02/28/24  0557   PROTIME seconds 20.3* 21.2*   INR  1.70* 1.80*         Results from last 7 days   Lab Units 02/28/24  1453   HEP B S AG  Non-reactive   HEP C AB  Non-reactive   HEP B C IGM  Non-reactive   HEP B C TOTAL AB  Non-reactive       Medications:   Scheduled Medications:  ciprofloxacin, 250 mg, Oral, Q24H  hydrocortisone, 25 mg, Intravenous, Daily  insulin glargine, 20 Units, Subcutaneous, Q12H MURALI  insulin lispro, 1-6 Units, Subcutaneous, TID AC  insulin lispro, 1-6 Units, Subcutaneous, HS  lactulose, 20 g, Oral, BID  melatonin, 9 mg, Oral, HS  midodrine, 5 mg, Oral, TID AC  nicotine, 1 patch, Transdermal, Daily  OLANZapine, 15 mg, Oral, HS  pantoprazole, 40 mg, Oral, Early Morning  thiamine, 100 mg, Oral, Daily      Continuous IV Infusions:     PRN Meds:  dextromethorphan-guaiFENesin, 10 mL, Oral, Q4H PRN  diphenhydramine, lidocaine, Al/Mg hydroxide, simethicone, 10 mL, Swish & Swallow, Q6H PRN  ondansetron, 4 mg, Intravenous, Q8H PRN        Discharge Plan: D     Network Utilization Review Department  ATTENTION: Please call with any questions or concerns to [phone] and carefully listen to the prompts so that you are directed to the right person. All voicemails are confidential.   For Discharge needs, contact Care Management DC Support Team at [phone] opt. 2  Send all requests for admission clinical reviews, approved or denied determinations and any other requests to dedicated fax number below belonging to the campus where the patient is receiving treatment. List of dedicated fax numbers for the Facilities:  FACILITY NAME UR FAX NUMBER   ADMISSION DENIALS (Administrative/Medical Necessity) [phone]   DISCHARGE SUPPORT TEAM (NETWORK) [phone]   PARENT CHILD HEALTH (Maternity/NICU/Pediatrics) [phone]   [facility] [phone]   [facility] [phone]   [facility] [phone]   [facility] [phone]   [facility] [phone]   [facility] [phone]   [facility] [phone]   [facility] [phone]   [facility] [phone]   [facility] [phone]   [facility] [phone]   [facility] [phone]

## 2024-02-29 NOTE — CASE MANAGEMENT
Case Management Progress Note    Patient name Michael Koch  Location /-01 MRN [id number]  : 1990 Date 2024       LOS (days): 2  Geometric Mean LOS (GMLOS) (days):   Days to GMLOS:        OBJECTIVE:        Current admission status: Inpatient  Preferred Pharmacy:   [id number] - [place], [place] - [address] [facility]  [address] [facility]  [place] [address]  Phone: [phone] Fax: [phone]    Primary Care Provider: Louie Odonnell DO    Primary Insurance: BLUE CROSS  Secondary Insurance:     PROGRESS NOTE:    Patient needing oupatient HD arranged at [facility].   Referral packet completed and uploaded to [employer].  Referral sent.  [facility] referrals sent, possible need for RN follow up after dc.   CM call to spouse Jo at [phone].  No answer at [phone].     Call back from Jo.    She is working on arranging transportation to and from .  Jo has concerns about patient's ability to safely navigate stairs to enter home and within home. She reports patient has no DME at this time.      Pending therapy recs, CM to follow up on dispo and DME recommendations.      Update 9786  Call to [facility], Phone: [phone], press 2 for follow up. On line greater than 15 minutes.  Will call back.      Next Steps:  Follow up on request for HD chair time. [facility] [phone] local, [phone], option 2 main.   Follow up on HH referrals.  Follow up therapy recs for any DME needs.

## 2024-02-29 NOTE — PROGRESS NOTES
[facility]  Progress Note  Name: Michael Koch I  MRN: [id number]  Unit/Bed#: -01 I Date of Admission: 2/27/2024   Date of Service: 2/28/2024 I Hospital Day: 1    Assessment/Plan   * Alcoholic hepatitis  Assessment & Plan  Currently on Solu-Cortef 25 mg daily -continue to wean  Alcoholic cirrhosis  Transferred from [facility] in [place] after liver transplant evaluation, he was found not to be candidate  Continue lactulose  No encephalopathy on admission  Chronic anemia hemoglobin 8.6  Thrombocytopenia platelets 78  PT/OT/case management for discharge planning so patient may prepare for transplant reevaluation in the future  Patient has extensive workup done, at this time, not much to offer, we will discuss with gastroenterology for any more recommendation.    Orthostatic hypotension  Assessment & Plan  Secondary to liver disease and vasodilation  Continue midodrine    Malnutrition (HCC)  Assessment & Plan  Postpyloric tube in place and has been receiving tube feeding to supplement oral intake at outside hospital  Also eating as tolerated of oral diet  Patient complaining of irritated throat and requesting tube be removed  Patient evaluated by speech therapist, able to swallow, at this time we will remove the postpyloric tube.    Renal failure  Assessment & Plan  Developed DUANE on CKD with underlying liver failure   ureteral stent placed at [facility], lithotripsy  Developed oliguria, treated with CRRT then transition to HD  Appreciate nephrology consultation  Last HD 2/28-patient has temporal HD catheter.    GERD (gastroesophageal reflux disease)  Assessment & Plan  Continue PPI    Depression  Assessment & Plan  Continue Zyprexa at at bedtime    ETOH abuse  Assessment & Plan  History alcohol abuse, last drink prior to admission in January  Will need outpatient alcohol counseling prior to transplant reevaluation  Continue daily thiamine    Tobacco abuse  Assessment &  Plan  Nicotine patch  Cessation counseling    Type 2 diabetes mellitus with hyperglycemia, without long-term current use of insulin ([facility])  Assessment & Plan  Lab Results   Component Value Date    HGBA1C 8.8 (H) 2024       Recent Labs     24  0243 24  1049 24  1633 24  1646   POCGLU 143* 234* 128 140       Blood Sugar Average: Last 72 hrs:  (P) 161.25  Continue Lantus 20 units twice daily  Sliding-scale insulin with Accu-Cheks  Hypoglycemia protocol  Carbohydrate controlled diet           VTE Pharmacologic Prophylaxis:   Low Risk (Score 0-2) - Encourage Ambulation.    Mobility:   Basic Mobility Inpatient Raw Score: 20  JH-HLM Goal: 6: Walk 10 steps or more  JH-HLM Achieved: 6: Walk 10 steps or more  HLM Goal achieved. Continue to encourage appropriate mobility.    Patient Centered Rounds: I performed bedside rounds with nursing staff today.   Discussions with Specialists or Other Care Team Provider: Nephrology    Education and Discussions with Family / Patient: Updated  (wife) via phone.      Current Length of Stay: 1 day(s)  Current Patient Status: Inpatient   Certification Statement: The patient will continue to require additional inpatient hospital stay due to monitor above condition  Discharge Plan: Anticipate discharge in 48 hrs to due to mind    Code Status: Level 1 - Full Code    Subjective:   Seen and evaluated during the event.  Patient lying in the bed, alert and oriented answering questions, patient looks yellow.    Objective:     Vitals:   Temp (24hrs), Av.9 °F (36.6 °C), Min:97.7 °F (36.5 °C), Max:98.4 °F (36.9 °C)    Temp:  [97.7 °F (36.5 °C)-98.4 °F (36.9 °C)] 98.4 °F (36.9 °C)  HR:  [89-95] 94  Resp:  [16-24] 18  BP: ()/(52-69) 129/69  SpO2:  [98 %-100 %] 99 %  There is no height or weight on file to calculate BMI.     Input and Output Summary (last 24 hours):     Intake/Output Summary (Last 24 hours) at 2024 1943  Last data filed at 2024  1430  Gross per 24 hour   Intake 500 ml   Output 1353 ml   Net -853 ml       Physical Exam:   Physical Exam  Vitals and nursing note reviewed.   Constitutional:       Appearance: Normal appearance. He is not ill-appearing or diaphoretic.   HENT:      Mouth/Throat:      Comments: Few ulcers noted on upper lips  Eyes:      General: No scleral icterus.        Left eye: No discharge.      Conjunctiva/sclera: Conjunctivae normal.      Pupils: Pupils are equal, round, and reactive to light.   Cardiovascular:      Rate and Rhythm: Normal rate.      Heart sounds:      No friction rub. No gallop.   Pulmonary:      Effort: No respiratory distress.      Breath sounds: No stridor. No wheezing or rales.   Chest:       Abdominal:      General: Abdomen is flat. Bowel sounds are normal. There is no distension.      Palpations: There is no mass.      Tenderness: There is no abdominal tenderness.      Hernia: No hernia is present.   Musculoskeletal:      Right lower leg: No edema.      Left lower leg: No edema.   Skin:     General: Skin is warm.   Neurological:      Mental Status: He is alert and oriented to person, place, and time.      Cranial Nerves: No cranial nerve deficit.      Sensory: No sensory deficit.      Motor: No weakness.      Coordination: Coordination normal.         Additional Data:     Labs:  Results from last 7 days   Lab Units 02/28/24  0557   WBC Thousand/uL 17.98*   HEMOGLOBIN g/dL 8.8*   HEMATOCRIT % 24.1*   PLATELETS Thousands/uL 92*     Results from last 7 days   Lab Units 02/28/24  0557   SODIUM mmol/L 139   POTASSIUM mmol/L 3.6   CHLORIDE mmol/L 103   CO2 mmol/L 21   BUN mg/dL 98*   CREATININE mg/dL 6.23*   ANION GAP mmol/L 15   CALCIUM mg/dL 7.3*   ALBUMIN g/dL 2.4*   TOTAL BILIRUBIN mg/dL 42.18*   ALK PHOS U/L 184*   ALT U/L 161*   AST U/L 91*   GLUCOSE RANDOM mg/dL 90     Results from last 7 days   Lab Units 02/28/24  0557   INR  1.80*     Results from last 7 days   Lab Units 02/28/24  1646  02/28/24  1633 02/28/24  1049 02/28/24  0243   POC GLUCOSE mg/dl 140 128 234* 143*               Lines/Drains:  Invasive Devices       Hemodialysis Catheter  Duration             HD Temporary Double Catheter -- days    Hemodialysis Catheter Internal jugular 28 days                          Imaging: No pertinent imaging reviewed.    Recent Cultures (last 7 days):         Last 24 Hours Medication List:   Current Facility-Administered Medications   Medication Dose Route Frequency Provider Last Rate    ciprofloxacin  250 mg Oral Q24H Yuly S Edenilson, CRNP      dextromethorphan-guaiFENesin  10 mL Oral Q4H PRN Yuly S Edenilson, CRNP      diphenhydramine, lidocaine, Al/Mg hydroxide, simethicone  10 mL Swish & Swallow Q6H PRN Tiburcio Jean MD      hydrocortisone  25 mg Intravenous Daily Yuly S Edenilson, CRNP      insulin glargine  20 Units Subcutaneous Q12H MURALI Tiburcio Jean MD      insulin lispro  1-6 Units Subcutaneous TID AC Tiburcio Jean MD      insulin lispro  1-6 Units Subcutaneous HS Tiburcio Jean MD      lactulose  20 g Per J Tube BID Yuly S Edenilson, CRNP      melatonin  9 mg Oral HS Yuly S Edenilson, CRNP      midodrine  5 mg Oral TID AC Yuly S Edenilson, CRNP      nicotine  1 patch Transdermal Daily Yuly S Edenilson, CRNP      OLANZapine  15 mg Oral HS Yuly S Edenilson, CRNP      ondansetron  4 mg Intravenous Q8H PRN Yuly S Edenilson, CRNP      pantoprazole  40 mg Oral Early Morning Yuly S Edenilson, CRNP      thiamine  100 mg Oral Daily Yuly S Edenilson, CRNP          Today, Patient Was Seen By: Tiburcio Jean MD    **Please Note: This note may have been constructed using a voice recognition system.**

## 2024-02-29 NOTE — SPEECH THERAPY NOTE
Speech Language/Pathology  F/U completed for dysphagia. Dany now out, pt reported he is having no difficulty swallowing. Currently on regular texture diet, reported he ate cereal, fruit, etc for breakfast without difficulty. SLP will s/o    Destinee Leone MS CCC-SLP  2/29/2024

## 2024-02-29 NOTE — PLAN OF CARE
Problem: PHYSICAL THERAPY ADULT  Goal: Performs mobility at highest level of function for planned discharge setting.  See evaluation for individualized goals.  Description: Treatment/Interventions: Functional transfer training, LE strengthening/ROM, Elevations, Therapeutic exercise, Endurance training, Patient/family training, Equipment eval/education, Gait training, Compensatory technique education, Spoke to nursing, OT, Spoke to case management  Equipment Recommended: Vigneshe       See flowsheet documentation for full assessment, interventions and recommendations.  Outcome: Progressing  Note: Prognosis: Fair  Problem List: Decreased strength, Decreased endurance, Impaired balance, Decreased mobility, Obesity, Pain, Impaired judgement  Assessment: Pt tolerates tx session fair.  Interventions consisting of bed mobility, transfer training, toileting, gait training, & stair navigation.  Pt limited by some lightheadedness with + orthostatics (standing BP: 81/41) along with standing balance deficits and bowel incontinence.  Pt also reporting that he is unable to complete perianal hygiene on his own.  Skilled PT services still indicated in the acute care setting with continued focus on stair navigation to master CAMILO his home.  Barriers to Discharge: Other (Comment)  Barriers to Discharge Comments: CAMILO, steps to 2nd floor, dialysis, orthostatic hypotension  Rehab Resource Intensity Level, PT: III (Minimum Resource Intensity)    See flowsheet documentation for full assessment.

## 2024-03-01 NOTE — CONSULTS
TeleConsultation - Palliative Care   Michael Koch 33 y.o. male MRN: [id number]  Unit/Bed#: -01 Encounter: [id number]    REQUIRED DOCUMENTATION:     1. This service was provided via Telemedicine.  2. Provider located at [facility].  3. TeleMed provider: Chau Whitehead PA-C.  4. Identify all parties in room with patient during tele consult:  N/a  5.Patient was then informed that this was a Telemedicine visit and that the exam was being conducted confidentially over secure lines. My office door was closed. No one else was in the room.  Patient acknowledged consent and understanding of privacy and security of the Telemedicine visit, and gave us permission to have the assistant stay in the room in order to assist with the history and to conduct the exam.  I informed the patient that I have reviewed their record in Epic and presented the opportunity for them to ask any questions regarding the visit today.  The patient or their Representative/Agent agreed to participate.     Assessment  Present on Admission:   Type 2 diabetes mellitus with hyperglycemia, without long-term current use of insulin (HCC)   Tobacco abuse   ETOH abuse   GERD (gastroesophageal reflux disease)   Alcoholic hepatitis   Depression     Active issues addressed today:  Liver failure  Alcoholic hepatitis  Alcoholic cirrhosis of liver with ascites and encephalopathy  Depression/anxiety  Renal failure on HD  Palliative care consult  Goals of care counseling    Plan  Recommendations:  Symptom management  Defer to primary treatment team    2.   Goals  Level 1 code status. Full code. Disease focused care. No limits placed.   Wants to pursue liver transplant.  Is okay with rehab/SNF placement if necessary but with preference to be home  Wife has home concerns, feels the house is not optimal for patient's needs and feels they need additional home services in the morning for when she is at work.  They would be interested in an in-home palliative  agency  Reviewed hospice/comfort care - patient not considering  Contact information provided to family for further questions and support     Decisional apparatus:  Patient is competent on my exam today.  If competence is lost, patient's substitute decision maker would default to spouse by [place].    Advance Directive and Living Will:    no  Power of :  no  POLST:  no    3.  Social support  Patient is supported by spouse, sister  Supportive listening provided  Normalized experience of patient/family  Provided anxiety containment  Provided anticipatory guidance  Investigated spiritual needs - spiritual care following.           Goals are clear and symptoms well managed. PSC team will sign off today. Please reach out if hospital course changes and/or further GOC discussions needed.     We appreciate the invitation to be involved in this patient's care.  We will not be able to follow in person, but please do not hesitate to reach our on-call provider for acute symptom control concerns.  We can continue to support the primary team remotely via phone and [employer].  After hours and on weekends, please use our clinic answering service at [phone].      Chau Whitehead PA-C  Palliative and Supportive Care  Clinic/Answering Service: [phone]  You can find me on [employer]!     Identification:  Inpatient consult to Palliative Care  Consult performed by: Chau Whitehead PA-C  Consult ordered by: Tiburcio Jean MD        Physician Requesting Consult:Tiburcio Jean MD  Reason for Consult / Principal Problem: GOC counseling secondary to liver failure   Date of admission:2/27/2024  Length of stay:3  Hx and PE limited by: virtual visit    History of Present Illness   Michael Koch is a 33 y.o. year old male who presents with hx of liver failure, alcoholic liver cirrhosis with ascites and encephalopathy, renal failure on RRT, anxiety and depression. Transferred to [facility] from [facility] who had  evaluated and determined he was not a candidate for liver transplant. Family requested transfer back near home and was sent to [facility] who admitted to ICU unit for ongoing management. PSC team consulted for GOC per family request. Patient noted to have wavering competency during his hospital stay.     Reviewed palliative care services with patient. Discussed goals. Patient is interested in pursuing all interventions to prolong his life. He would like to get liver transplant. He wants to stay alive to be with his children. Being able to function and communicate with his family is important.  He has a preference to return home if possible but is not against long term SNF placement if needed. He is not considering hospice cares. Does not have living will or AD paperwork. Reports symptoms well managed af this time. He has no questions or concerns today.    Discussed PSC services on the phone with spouse who had requested consult but was unable to be present at time of visit due to work responsibilities. Palliative consult requested because of concerns for future planning and wanted to see what we offered. She fears the challenges of managing the patient at home and would need to get more home health services if he were to be discharged home. She also expresses concerns for their house layout and the stairs that would be required to get him in/out of the home. Reviewed the option to explore agencies that provide in-home palliative care for additional home supports. Provided anxiety containment by emphasizing that the patient will not be discharged home without an adequate and safe discharge plan. Provided contact information for further questions or concerns.       Review of Systems   Constitutional:  Negative for activity change, appetite change, fatigue and unexpected weight change.   HENT:  Negative for mouth sores, trouble swallowing and voice change.    Eyes: Negative.    Respiratory:  Negative for shortness of breath.   "  Cardiovascular:  Negative for chest pain.   Gastrointestinal:  Negative for abdominal pain, constipation, diarrhea, nausea and vomiting.   Genitourinary: Negative.    Musculoskeletal:  Negative for arthralgias, back pain, gait problem and myalgias.   Skin:  Negative for wound.   Neurological:  Negative for dizziness, weakness, light-headedness and headaches.   Hematological: Negative.    Psychiatric/Behavioral:  Negative for confusion and sleep disturbance. The patient is not nervous/anxious.        Historical Information   Past Medical History:   Diagnosis Date    Depression     Diabetes mellitus (HCC)     Gout     Hypertension     Hyponatremia 01/21/2024    Kidney stones      Past Surgical History:   Procedure Laterality Date    CYSTOSCOPY W/ URETERAL STENT PLACEMENT  09/14/2023    IR TUNNELED DIALYSIS CATHETER PLACEMENT  2/29/2024    US GUIDED MASS BIOPSY (UNSPECIFIED)  02/19/2021     E-Cigarette/Vaping    E-Cigarette Use Never User      E-Cigarette/Vaping Substances    Nicotine No     THC No     CBD No     Flavoring No     Other No     Unknown No      Social History     Socioeconomic History    Marital status: /Civil Union     Spouse name: Not on file    Number of children: Not on file    Years of education: Not on file    Highest education level: Not on file   Occupational History    Not on file   Tobacco Use    Smoking status: Every Day     Current packs/day: 0.25     Types: Cigarettes    Smokeless tobacco: Never   Vaping Use    Vaping status: Never Used   Substance and Sexual Activity    Alcohol use: Yes     Comment: \"couple 24 oz cans per day\"    Drug use: Never    Sexual activity: Not on file   Other Topics Concern    Not on file   Social History Narrative    Not on file     Social Determinants of Health     Financial Resource Strain: Medium Risk (9/14/2023)    Received from [facility]    Overall Financial Resource Strain (CARDIA)     Difficulty of Paying Living Expenses: Somewhat " hard   Food Insecurity: No Food Insecurity (2/28/2024)    Hunger Vital Sign     Worried About Running Out of Food in the Last Year: Never true     Ran Out of Food in the Last Year: Never true   Transportation Needs: No Transportation Needs (2/28/2024)    PRAPARE - Transportation     Lack of Transportation (Medical): No     Lack of Transportation (Non-Medical): No   Physical Activity: Not on file   Stress: Not on file   Social Connections: Not on file   Intimate Partner Violence: Not At Risk (2/14/2024)    Received from [facility]    Humiliation, Afraid, Rape, and Kick questionnaire     Fear of Current or Ex-Partner: No     Emotionally Abused: No     Physically Abused: No     Sexually Abused: No   Housing Stability: Low Risk  (2/28/2024)    Housing Stability Vital Sign     Unable to Pay for Housing in the Last Year: No     Number of Places Lived in the Last Year: 1     Unstable Housing in the Last Year: No     No family history on file.    Meds/Allergies   all current active meds have been reviewed and current meds:  Current Facility-Administered Medications   Medication Dose Route Frequency    ciprofloxacin (CIPRO) tablet 250 mg  250 mg Oral Q24H    dextromethorphan-guaiFENesin (ROBITUSSIN DM) oral syrup 10 mL  10 mL Oral Q4H PRN    diphenhydramine, lidocaine, Al/Mg hydroxide, simethicone (Magic Mouthwash) oral solution 10 mL  10 mL Swish & Swallow Q6H PRN    hydrocortisone (Solu-CORTEF) injection 25 mg  25 mg Intravenous Daily    insulin glargine (LANTUS) subcutaneous injection 20 Units 0.2 mL  20 Units Subcutaneous Q12H MURALI    insulin lispro (HumALOG/ADMELOG) 100 units/mL subcutaneous injection 1-6 Units  1-6 Units Subcutaneous TID AC    insulin lispro (HumALOG/ADMELOG) 100 units/mL subcutaneous injection 1-6 Units  1-6 Units Subcutaneous HS    lactulose (CHRONULAC) oral solution 20 g  20 g Oral BID    melatonin tablet 9 mg  9 mg Oral HS    midodrine (PROAMATINE) tablet 10 mg  10 mg Oral TID AC    nicotine  (NICODERM CQ) 14 mg/24hr TD 24 hr patch 1 patch  1 patch Transdermal Daily    OLANZapine (ZyPREXA) tablet 15 mg  15 mg Oral HS    ondansetron (ZOFRAN) injection 4 mg  4 mg Intravenous Q8H PRN    pantoprazole (PROTONIX) EC tablet 40 mg  40 mg Oral Early Morning    thiamine tablet 100 mg  100 mg Oral Daily       No Known Allergies    Objective     Physical Exam  Nursing note reviewed.   Constitutional:       General: He is not in acute distress.  HENT:      Head: Normocephalic and atraumatic.      Right Ear: External ear normal.      Left Ear: External ear normal.      Nose: Nose normal.      Mouth/Throat:      Pharynx: Oropharynx is clear.   Eyes:      Extraocular Movements: Extraocular movements intact.   Cardiovascular:      Rate and Rhythm: Normal rate.   Pulmonary:      Effort: Pulmonary effort is normal.   Abdominal:      General: Abdomen is flat.   Musculoskeletal:         General: No deformity.   Skin:     Coloration: Skin is jaundiced.      Findings: No rash.   Neurological:      Mental Status: He is alert and oriented to person, place, and time.   Psychiatric:         Mood and Affect: Mood normal.         Behavior: Behavior normal.         Lab Results: I have personally reviewed pertinent labs., CBC:   Lab Results   Component Value Date    WBC 17.56 (H) 03/01/2024    HGB 8.9 (L) 03/01/2024    HCT 24.6 (L) 03/01/2024    MCV 87 03/01/2024    PLT 73 (L) 03/01/2024    RBC 2.83 (L) 03/01/2024    MCH 31.4 03/01/2024    MCHC 36.2 03/01/2024    RDW 18.0 (H) 03/01/2024    NRBC 0 03/01/2024   , CMP:   Lab Results   Component Value Date    SODIUM 135 03/01/2024    K 3.4 (L) 03/01/2024    CL 97 03/01/2024    CO2 20 (L) 03/01/2024    BUN 84 (H) 03/01/2024    CREATININE 6.24 (H) 03/01/2024    CALCIUM 6.6 (L) 03/01/2024    AST 67 (H) 03/01/2024     (H) 03/01/2024    ALKPHOS 234 (H) 03/01/2024    EGFR 10 03/01/2024     Imaging Studies: I have personally reviewed pertinent reports.    IR tunneled dialysis catheter  placement    Result Date: 2/29/2024  Narrative: Tunneled dialysis catheter placement Clinical History: Renal failure. Hepatic failure. Contrast: 0 Fluoro time: 1.6 Minutes Number of Images: 1 Radiation dose: 82.46 mGy Concious sedation time: N/A Technique: The patient was brought to the interventional radiology suite and identified verbally and by wrist band. The patient was placed supine on the table and the existing temporary catheter was prepped and draped in the usual sterile fashion. All elements of maximal sterile barrier technique were followed (cap, mask, sterile gown, sterile gloves, large sterile sheet, hand hygiene, and 2% chlorhexidine for cutaneous antisepsis). Lidocaine was administered to the skin of the insertion site , and the catheter removed over a Amplatz wire and a peel-away sheath was placed.. A 15.5 Fr 19 cm tip to cuff Titan HD catheter was then advanced over from the right upper chest to the existing access site and through the peel-away sheath catheter tip was then positioned in the right atrium under fluoroscopic control. The external portion of the catheter was sutured to the skin with 2-0 Prolene. A sterile dressing was applied . The neck was closed with Vicryl sutures and glue applied     Impression: Impression: Exchange of temporary catheter for a 19 cm tip to cuff PermCath. Workstation performed: [id number]     XR chest portable ICU    Result Date: 2/28/2024  Narrative: XR CHEST PORTABLE ICU INDICATION: PICC placement. COMPARISON: Earlier the same day and also 1/31/2024 FINDINGS: There is a right-sided double lumen central venous catheter which appears to enter at the jugular vein and has the tip projecting near the caval atrial junction. Again, I do not see a PICC line within the chest or visualized portion of upper extremities. I do not see any central venous access devices entering from below. Low lung volumes without infiltrates or significant atelectasis. No pneumothorax or  pleural effusion. Normal cardiomediastinal silhouette. Bones are unremarkable for age. Normal upper abdomen.     Impression: Again, no visible PICC line within the field-of-view. Right-sided central venous catheter entering from the neck has the tip in the SVC Workstation performed: [id number]     XR humerus left    Result Date: 2/28/2024  Narrative: XR HUMERUS LEFT INDICATION: PICC line evaluation. COMPARISON: None FINDINGS: No acute fracture or dislocation. No significant degenerative changes. No lytic or blastic osseous lesion. There is intravenous catheter projecting near the antecubital fossa. There is no PICC line visible within the left upper extremity or included portion of chest.     Impression: No PICC line present. Please correlate for actual placement of a line in the patient other than the IV catheter at the antecubital fossa Workstation performed: [id number]     MRI brain wo contrast    Result Date: 2/28/2024  Narrative: MRI BRAIN WITHOUT CONTRAST INDICATION: Patient is complaining of numbness of left face. Recently had CT head-which was negative.. COMPARISON:   None. TECHNIQUE:  Multiplanar, multisequence imaging of the brain was performed. IMAGE QUALITY:  Diagnostic. FINDINGS: BRAIN PARENCHYMA:  There is no discrete mass, mass effect or midline shift. There is no intracranial hemorrhage.  There is no evidence of acute infarction and diffusion imaging is unremarkable.  There are no white matter changes in the cerebral hemispheres. VENTRICLES:  Normal for the patient's age. SELLA AND PITUITARY GLAND:  Normal. ORBITS:  Normal. PARANASAL SINUSES: Right maxillary sinus retention cyst and mild scattered mucosal thickening. Mild opacification of the mastoid air cells. VASCULATURE:  Evaluation of the major intracranial vasculature demonstrates appropriate flow voids. CALVARIUM AND SKULL BASE:  Normal. EXTRACRANIAL SOFT TISSUES:  Normal.     Impression: No acute intracranial pathology. Workstation performed:  [id number]     XR chest portable    Addendum Date: 2/28/2024 Addendum:   ADDENDUM: By report, the patient had a PICC line placed although there is no visible PICC line within the field-of-view. It may be within the patient's arm. Correlate clinically    Result Date: 2/28/2024  Narrative: XR CHEST PORTABLE INDICATION: CVC placement. COMPARISON: 1/31/2024 FINDINGS: A right sided jugular central venous catheter is present with the tip projecting over the lower portion of the superior vena cava. A feeding tube is present extending out of the field-of-view into the stomach. A round radiopaque object projects  over the mediastinum. Other artifacts project in the left clavicular region. Clear lungs with suboptimal inspiration. No pneumothorax or pleural effusion. Normal cardiomediastinal silhouette. Bones are unremarkable for age. Normal upper abdomen.     Impression: No acute cardiopulmonary disease. Central line appears satisfactory in position. Feeding tube extends at least to the stomach. Several artifacts are present. The finding projecting in the mediastinal region is of uncertain etiology. This potentially could be within the patient. Correlate  clinically Workstation performed: [id number]     CT head wo contrast    Result Date: 2/27/2024  Narrative: EXAM:  CT HEAD WITHOUT IV CONTRAST COMPARISON:  No Comparison CLINICAL INDICATION: Reported numbness of left lower face. No neuro deficits. No COMPARISON: None none FINDINGS: No acute intracranial hemorrhage or infarct. Brain parenchyma is unremarkable. Ventricles are within normal limits. Posterior fossa structures are unremarkable. Calvarium is unremarkable. Mucosal thickening in the maxillary sinuses. Minimal fluid in the mastoid air cells.    Impression: No acute intracranial abnormality.    MRI abdomen w wo contrast    Result Date: 2/21/2024  Narrative: EXAM: MR ABDOMEN WITHOUT AND WITH IV CONTRAST COMPARISON: Outside MR abdomen 1/30/2024 and CT abdomen/pelvis  1/21/2024 INDICATION: Liver disease, chronic, tumor screening Liver transplant evaluation. Scan needed prior to selection committee meeting on 2/21/24. TECHNIQUE: Multiplanar, multisequence MRI of the abdomen with and without intravenous gadolinium. Glucagon administered. FINDINGS: Suboptimal examination secondary to motion and contrast bolus timing. Mildly cirrhotic hepatic morphology with enlargement of the caudate and left hepatic lobes and notching of the right hepatic lobe.. No significant hepatic steatosis. No evidence of hepatic iron overload. No focus of non-rim arterial hyperenhancement/nonperipheral washout. Patent portal and hepatic veins. Conventional hepatic arterial anatomy. Gallbladder with cholelithiasis and diffuse wall edema. No definite choledocholithiasis on this nondedicated study. Splenomegaly (15.3 cm CC). Trace ascites. T1 hyperintense focus adjacent to the right inferior liver (24/48); correlate for recent biopsy Lymph nodes in the abdomen are normal in size. Body wall edema. --- Included lung bases demonstrate small left and trace right pleural effusions with adjacent atelectasis/consolidations. Adrenal glands are normal. Pancreas with adjacent hemorrhagic/proteinaceous fluid structures measuring up to 2.5 cm (7/21 and 24/49). Mildly increased pancreatic T2 signal and diminished T1 signal. Pancreas enhances homogenously, though the distalmost tail is poorly visualized. Asymmetrically atrophic left kidney. Left urothelial thickening/enhancement with presumed ureteral stent. No hydronephrosis. Included bowel is normal in caliber. Portal colopathy. Presumed nasogastric tube. Abdominal aorta is nonaneurysmal. No suspicious osseous lesion.     Impression: 1.  Mildly cirrhotic hepatic morphology. No suspicious hepatic lesion. Splenomegaly. Trace abdominal ascites. 2.  Sequela of pancreatitis with hemorrhagic/proteinaceous peripancreatic fluid structures. 3.  Cholelithiasis with nonspecific  gallbladder wall edema; consider repeat right upper quadrant ultrasound to assess for acute cholecystitis if clinically warranted. 4.  Asymmetrically atrophic left kidney.    CT chest wo contrast    Result Date: 2/19/2024  Narrative: EXAM: CT CHEST WITHOUT IV CONTRAST HISTORY: Smoking history. Preliver transplant evaluation TECHNIQUE: CT chest was obtained without intravenous contrast utilizing HRCT technique. Sagittal reformats were performed. COMPARISON: Chest CT from 1/26/2024. CT abdomen pelvis from 2/1/2024 FINDINGS: Medical Devices: Right IJ central catheter terminates near the azygos arch. Left IJ central catheter terminates at the superior cavoatrial junction. Enteric tube courses into the jejunum. Lungs and Airways: Improving multi lobar peribronchial consolidation with some residual patchy groundglass opacities. Bronchial ground glass opacities in the superior segments of both lower lobes. Pleural Space: Small-to-moderate left and small right pleural effusions with adjacent atelectasis. Mediastinum and Irma: Stable right subcarinal lymph node at 14 mm short axis. No new or increasing mediastinal or hilar lymphadenopathy. Esophagus is decompressed. Heart and Pericardium: Low attenuation of the blood pool in relation to the myocardium can be seen with anemia. The cardiac chambers are normal in size.  No calcified plaque in the coronary arteries. No pericardial effusion. Aorta: Normal caliber thoracic aorta. No calcified atherosclerotic plaque. Pulmonary Artery: The main pulmonary artery is normal in size.     Osseous Structures and Chest Wall: No aggressive osseous lesions. No axillary adenopathy. Upper Abdomen: Hepatomegaly with diffuse hepatic steatosis. Splenomegaly. Cholelithiasis.    Impression: 1. No intrathoracic mass . 2. Overall improving multifocal pneumonia from January with residual and waxing waning groundglass opacities. 3. New small to moderate left and small right pleural effusions with  adjacent atelectasis. 4. Stable likely reactive mediastinal and hilar lymph nodes.    US Kidneys Bilateral with Bladder    Result Date: 2/17/2024  Narrative: EXAM: US KIDNEYS BILATERAL WITH BLADDER COMPARISON:  Abdominal ultrasound dated 2/15/2024. CLINICAL INDICATION: Liver and kidney transplant clearance evaluation. FINDINGS: Right Kidney:  Normal echogenicity. Multiple nonobstructing renal calculi, the largest measuring 10 mm in the lower pole. Right Kidney length:  13.4 cm Right Collecting System:  No hydronephrosis. Left Kidney:  Increased cortical echogenicity with cortical thinning. Multiple nonobstructing renal calculi, the largest of which measures 10 mm in the lower pole. Left Kidney length:  10.7 cm Left Collecting System:  Left ureteral stent seen terminating in the urinary bladder. No hydronephrosis. Bladder:  Partially distended with mild bladder wall thickening. Other: Trace left abdominal fluid.    Impression: Atrophic, echogenic left kidney suggesting unilateral medical renal disease. Nonobstructing bilateral renal calculi. No hydronephrosis. Partially imaged left ureteral stent in the urinary bladder. Mild bladder wall thickening, which may be accentuated by incomplete distention or reactive to the stent, though can be correlated with urinalysis to exclude cystitis.    XR abdomen 1 view kub    Result Date: 2/16/2024  Narrative: EXAM:  DX ABDOMEN 1 VIEW COMPARISON:  Renal ultrasound 2/15/2024, abdomen/pelvis 2/1/2024 HISTORY: Nephrolithiasis.    Impression: Left ureteral stent in expected position. 5 mm calcification adjacent to the distal aspect of the ureteral stent may correspond to the left ureteral calculus demonstrated on CT 2/1/2024. Bilateral renal calculi demonstrated on the prior CT are not definitely visualized. Nonobstructive bowel gas pattern. Tip of feeding tube is in the proximal jejunum. Nonspecific curvilinear density projecting over the lower pelvis/pubic symphysis. No acute  osseous abnormality. Partially visualized central lines terminating in the SVC. Retrocardiac/left basilar atelectasis.    DX Chest 1 View    Result Date: 2/15/2024  Narrative: EXAM:  DX CHEST 1 VIEW COMPARISON:  Chest radiographs from 2/14/2024 and back to 1/31/2024. Chest CT from 1/26/2024    Impression: Lines and devices: -Placement of left IJ central catheter with tip terminating at the superior cavoatrial junction. -Stable right IJ catheter with tip terminating in the upper SVC. Similar mid and lower lung zone predominant peribronchial airspace opacities. No measurable pneumothorax. Cardiomediastinal silhouette and great vessels are unchanged. Stable regional skeleton.    US Abdomen Limited plus Abdomen Doppler    Result Date: 2/15/2024  Narrative: EXAM:  US ABDOMEN LIMITED PLUS ABDOMEN DOPPLER TECHNIQUE: Real time gray scale, color Doppler and spectral Doppler imaging was performed. COMPARISON:  Outside CT abdomen pelvis dated 2/1/2024 CLINICAL INDICATION: Liver disease, transaminitis, hyperbilirubinemia FINDINGS: Liver:  Diffuse increased echogenicity. Liver contour remains smooth. No focal liver lesion identified on the images provided. Liver Doppler: Intrahepatic IVC:  Patent Splenic Vein:  Patent with antegrade flow Hepatic Veins:  Patent with antegrade flow MPV:  Patent with antegrade flow MPV velocity:  61 cm/s RPV:  Patent with antegrade flow LPV:  Patent with antegrade flow MHA:  Patent; Peak Systolic Velocity: 194 cm/s, elevated Spleen:  Enlarged Spleen length:  13.9 cm Gallbladder:  Contracted with diffuse wall thickening. Cholelithiasis. Unable to assess for sonographic Kunz sign given patient condition. Intrahepatic Ducts:  Normal Common Bile Duct:  6 mm Common Hepatic Duct:  4 mm Pancreas:  Not well-visualized its entirety given poor acoustic windows and shadowing bowel gas. Ovoid heterogeneous hypoechoic area measured within the pancreatic body at 1.7 x 2.3 x 1.5 cm. Right Kidney:  Normal size  and echogenicity. Nephrolithiasis. Right Kidney length:  13.5 cm Right Collecting System:  No Hydronephrosis Left Kidney:  Areas of cortical volume loss and scarring. Nonobstructing stones  lower pole as seen on recent CT. Left Kidney length:  10.0 cm Left Collecting System:  No Hydronephrosis  Aorta:  Normal caliber proximally, distal aspect not well seen. Intrahepatic IVC:  Patent Ascites:  Trace Pleural Effusions:  Rt, Lt    Impression: Increased liver echogenicity as can be seen with hepatocellular disease such as hepatic steatosis. No focal liver lesion identified on the images provided. Patent hepatic vasculature with elevated peak systolic velocity within the main hepatic artery, nonspecific. Antegrade flow within the portal veins. Mild splenomegaly. Trace ascites with small pleural effusions. Cholelithiasis. Gallbladder contracted, likely accounting for wall thickening. No biliary ductal dilatation. Bilateral nephrolithiasis without hydronephrosis. Pancreas not well assessed or visualized in its entirety given poor acoustic windows and shadowing bowel gas. Ovoid heterogeneous hypoechoic masslike area within the pancreas measuring up to 2.3 cm. Recommend correlation with follow-up pancreatic protocol MR.    Echo Transthoracic (TTE)    Result Date: 2/15/2024  Narrative: For the complete report, see the Order-Level Documents. Hemodynamics Heart Rate: 81 BPM Blood Pressure: 92 / 62 mmHg ECG: Sinus rhythm Final Impressions 1. Incomplete TTE as patient refused to continue the exam after parasternal imaging. 2. Normal left ventricular chamber size, no regional wall motion abnormalities, calculated 2-D linear ejection fraction 64%. 3. Normal right ventricular chamber size, normal systolic function, unable to detect peak tricuspid regurgitation velocity for pulmonary artery systolic pressure calculation. 4. No hemodynamically significant valvular heart disease based on limited imaging. 5. Agitated saline injection  resulted in immediate and delayed right to left shunting consistent with patent foramen ovale and intrapulmonary shunt. Degree of shunting is trivial. 6. No  pericardial effusion. 7. There are no previous [facility] echocardiograms available for comparison.    Impression: Echocardiographic images interpreted at [facility]. ECHOCARDIOGRAMColorflow and spectral Doppler were performed to assess valvular heart disease. LEFT VENTRICLE:Normal left ventricular chamber size. Abnormal left ventricular geometry with  concentric remodeling (increased wall thickness to cavity ratio). Calculated 2-D linear left ventricular ejection fraction 64%. No regional wall motion abnormalities. RIGHT VENTRICLE:Normal right ventricular chamber size. Normal right ventricular systolic function. Unable to detect peak tricuspid regurgitation velocity for pulmonary artery systolic pressure calculation. CARDIAC VALVES:Trileaflet aortic valve. Normal aortic valve. No aortic valve regurgitation. Normal mitral valve. Trivial mitral valve regurgitation. Normal pulmonary valve. Trivial pulmonary valve regurgitation. Normal tricuspid valve. Trivial tricuspid valve regurgitation. OTHER ECHO FINDINGS:Normal sinus of Valsalva diameter of 33 mm. Normal ascending aorta diameter. No intracardiac mass or thrombus, but the left atrial appendage cannot be visualized adequately with transthoracic echo to exclude thrombus in this location. No  pericardial effusion. There are no previous [facility] echocardiograms available for comparison. SHUNT STUDYAgitated saline injection(s) performed. Agitated saline injection resulted in immediate and delayed right to left shunting consistent with patent foramen ovale and intrapulmonary shunt. Degree of right to left shunting following saline contrast injection appeared trivial. For the complete report, see the Order-Level Documents.    DX Chest 1 View    Result Date: 2/15/2024  Narrative: EXAM:  DX CHEST 1 VIEW  "COMPARISON:  Chest radiograph 1/31/2024, 1/26/2024    Impression: Right IJ introducer with tip in the lower superior vena cava. Low lung volumes. Increased interstitial markings and peribronchial cuffing bilaterally consistent with edema versus bronchitis/pneumonitis. Left lower lobe atelectasis/consolidation. No pleural effusion. No pneumothorax. Persistent enlargement of the cardiomediastinal silhouette. Indeterminate pulmonary vasculature. No acute osseous abnormality.     EKG, Pathology, and Other Studies: I have personally reviewed pertinent reports.        Counseling / Coordination of Care  Total floor / unit time spent today 60 minutes. Greater than 50% of total time was spent with the patient and / or family counseling and / or coordination of care. A description of the counseling / coordination of care: provided medical updates, determined goals of care, discussed palliative care and symptom management, discussed comfort care and hospice care, discussed code status, provided anticipatory guidance, determined competency and POA/HCA, determined social/family support, provided psychosocial support. Reviewed with SLIM.      Portions of this document may have been created using dictation software and as such some \"sound alike\" terms may have been generated by the system. Do not hesitate to contact me with any questions or clarifications.   "

## 2024-03-01 NOTE — PHYSICAL THERAPY NOTE
" PHYSICAL THERAPY TREATMENT NOTE      NAME:  Michael Koch  DATE: 03/01/24    Length Of Stay: 3  Performed at least 2 patient identifiers during session: Name and Birthday    TREATMENT FLOW SHEET:      03/01/24 0850   PT Last Visit   PT Visit Date 03/01/24   Note Type   Note Type Treatment   Pain Assessment   Pain Assessment Tool 0-10   Pain Score No Pain   Restrictions/Precautions   Weight Bearing Precautions Per Order No   Other Precautions Chair Alarm;Bed Alarm;Fall Risk  (orthostatic)   General   Chart Reviewed Yes   Response to Previous Treatment Patient reporting fatigue but able to participate.   Family/Caregiver Present No   Cognition   Overall Cognitive Status WFL   Arousal/Participation Alert   Orientation Level Oriented X4   Following Commands Follows one step commands without difficulty   Subjective   Subjective \"I had an awful night\"   Transfers   Sit to Stand   (SBA)   Additional items Increased time required;Verbal cues   Stand to Sit   (SBA)   Additional items Verbal cues   Stand pivot   (steadying assist)   Additional items Increased time required;Verbal cues   Toilet transfer 4  Minimal assistance   Additional items Assist x 1;Increased time required;Standard toilet;Verbal cues  (reports significant lightheadedness; states he is unable to complete bowel hygiene again today)   Additional Comments no AD   Ambulation/Elevation   Gait Assistance Not tested   Additional items   (orthostatic - sympatomatic)   Stair Management Assistance Not tested   Ambulation/Elevation Additional Comments complains of 8/10 feeling of lightheadedness   Balance   Static Sitting Good   Dynamic Sitting Fair +   Static Standing Fair   Dynamic Standing Poor +   Endurance Deficit   Endurance Deficit Yes   Endurance Deficit Description lightheaded   Activity Tolerance   Activity Tolerance Patient limited by fatigue;Other (Comment)  (lightheadedness)   Medical Staff Made Aware RISA Nava   Nurse Made Aware ALONDRA Ahuja   Assessment "   Prognosis Fair   Problem List Decreased strength;Decreased endurance;Impaired balance;Decreased mobility;Obesity;Pain;Impaired judgement   Assessment Pt tolerates tx session poorly today.  Pt significantly limited by symptomatic orthostatic hypotension (see vitals).  Pt unable to ambulate or complete stair navigation today.  PT notifies CM.  Pt reports concerns about his CAMILO the home.  Resource intensity level changed to level II.   Barriers to Discharge Other (Comment)   Barriers to Discharge Comments CAMILO, steps to 2nd flr, dialysis & CAMILO, bowel incontinence, symptomatic orthostatic hypotension, alone during day   Goals   STG Expiration Date 03/13/24   PT Treatment Day 3   Plan   Progress No functional improvements   PT Frequency 3-5x/wk   Discharge Recommendation   Rehab Resource Intensity Level, PT II (Moderate Resource Intensity)   Additional Comments changed today due to increased rehabiliation needs at this time   [facility] Basic Mobility Inpatient   Turning in Flat Bed Without Bedrails 3   Lying on Back to Sitting on Edge of Flat Bed Without Bedrails 3   Moving Bed to Chair 3   Standing Up From Chair Using Arms 3   Walk in Room 1   Climb 3-5 Stairs With Railing 1   Basic Mobility Inpatient Raw Score 14   Basic Mobility Standardized Score 35.55   Highest Level Of Mobility   -HLM Goal 4: Move to chair/commode   -HLM Achieved 4: Move to chair/commode   Education   Education Provided Mobility training   Patient Reinforcement needed   End of Consult   Patient Position at End of Consult Bedside chair;Bed/Chair alarm activated;All needs within reach        03/01/24 0902 03/01/24 0906   Vitals   Pulse 89 91   Blood Pressure (!) 92/46 (!) 81/40   MAP (mmHg) (!) 61 (!) 54   Patient Position - Orthostatic VS Sitting Standing - Orthostatic VS       The patient's [facility] Basic Mobility Inpatient Short Form Raw Score is 14. A Raw score of less than or equal to 16 suggests the patient may benefit from discharge to  post-acute rehabilitation services. Please also refer to the recommendation of the Physical Therapist for safe discharge planning.         Quintin Schmitz, PT,DPT

## 2024-03-01 NOTE — ASSESSMENT & PLAN NOTE
Currently on Solu-Cortef 25 mg daily -continue to wean  Alcoholic cirrhosis  Transferred from [facility] in [place] after liver transplant evaluation, he was found not to be candidate  Continue lactulose  No encephalopathy on admission-but patient mentation varies  PT/OT/case management for discharge planning so patient may prepare for transplant reevaluation in the future  Patient has extensive workup done, at this time, not much to offer, w  After chart review, and discussion is done with patient's family at the bedside, since patient is on Solu-Cortef since patient got transferred to [facility].  Again per family, steroid dose was on and off basis.  For safe side, we will try to wean off his steroid.  Reaching out to gastroenterology for recommendation  Discussion has done with patient's family regarding treatment plan, options.  Family prefers to discuss with palliative care.  Consultation placed.

## 2024-03-01 NOTE — PLAN OF CARE
Target UF Goal  1.5  L as tolerated. Patient dialyzing for 3 hours on 4 K bath for serum K of  3.6  per protocol. Treatment plan reviewed with Nephrology.       Post-Dialysis RN Treatment Note    Blood Pressure:  Pre 105/65 mm/Hg  Post 119/85 mmHg   EDW  TBD kg    Weight:  Pre 90.0 kg   Post 89.0 kg   Mode of weight measurement: Bed Scale   Volume Removed  1000 ml    Treatment duration  2:50   NS given  No    Treatment shortened? Yes, describe: 40 mins per patient request; late start due to room change and patient wanted to eat dinner   Medications given during Rx None Reported   Estimated Kt/V  None Reported   Access type: Permacath/TDC   Access Issues: No    Report called to primary nurse   Yes, Lynn RN at bedside        Problem: METABOLIC, FLUID AND ELECTROLYTES - ADULT  Goal: Electrolytes maintained within normal limits  Description: INTERVENTIONS:  - Monitor labs and assess patient for signs and symptoms of electrolyte imbalances  - Administer electrolyte replacement as ordered  - Monitor response to electrolyte replacements, including repeat lab results as appropriate  - Instruct patient on fluid and nutrition as appropriate  Outcome: Progressing  Goal: Fluid balance maintained  Description: INTERVENTIONS:  - Monitor labs   - Monitor I/O and WT  - Instruct patient on fluid and nutrition as appropriate  - Assess for signs & symptoms of volume excess or deficit  Outcome: Progressing

## 2024-03-01 NOTE — CASE MANAGEMENT
Case Management Discharge Planning Note    Patient name Michael Koch  Location /-01 MRN [id number]  : 1990 Date 3/1/2024       Current Admission Date: 2024  Current Admission Diagnosis:Alcoholic hepatitis   Patient Active Problem List    Diagnosis Date Noted    Renal failure 2024    Malnutrition (HCC) 2024    Orthostatic hypotension 2024    Acute respiratory failure with hypoxia and hypercapnia (HCC) 2024    Alcoholic hepatitis 2024    ETOH abuse 2024    Depression 2024    GERD (gastroesophageal reflux disease) 2024    BPH (benign prostatic hyperplasia) 2024    RSV infection 2024    Abnormal CT scan 2024    Hepatic encephalopathy (HCC) 2024    N&V (nausea and vomiting) 2024    DUANE (acute kidney injury) (HCC) 2024    Thrombocytopenia (HCC) 2024    Lactic acidosis 2024    Marijuana use 2024    SIRS (systemic inflammatory response syndrome) (HCC) 2024    History of gout 10/04/2023    History of hypertension 10/04/2023    Type 2 diabetes mellitus with hyperglycemia, without long-term current use of insulin (HCC) 10/04/2023    Tobacco abuse 10/04/2023      LOS (days): 3  Geometric Mean LOS (GMLOS) (days):   Days to GMLOS:     OBJECTIVE:  Risk of Unplanned Readmission Score: 45.37         Current admission status: Inpatient   Preferred Pharmacy:   [id number] - [place], [place] - [address]  [address]  [place] [address]  Phone: [phone] Fax: [phone]    Primary Care Provider: Louie Odonnell DO    Primary Insurance: BLUE CROSS  Secondary Insurance:     DISCHARGE DETAILS:     1145 CM met with patient and sister, Cathryn, at bedside; along with MD.  Sister discussed her concerns for patients prognosis.    1155 CM returned telephone call to Jasmyne Alfaro Clinical Coordinator.  Available chair times for patient: M,W, F 2:45PM (2:15  drop off), or T, Th, Sat 12:00PM (11:30 AM drop off).    CM met with sister, Cathryn, who contacted spouse, Jo, and CM reviewed available chair times. Jo chose M,W,F chair time thinking patients mother can drop off and spouse and can .  CM will make [facility] aware of patients choice when Angleina back in office later today.     1400 CM secured M,W, F chair time for patient at 2:45 PM. Patients SOC 3/6/24, arrival time 1:30 PM.  CM to notify [facility] if any change in SOC date.

## 2024-03-01 NOTE — ASSESSMENT & PLAN NOTE
History alcohol abuse, last drink prior to admission in January  Will need outpatient alcohol counseling prior to transplant reevaluation-remember, patient has referral from [facility] for virtual therapy which supposed to initiated soon.  Continue daily thiamine

## 2024-03-01 NOTE — PROGRESS NOTES
[facility]  Progress Note  Name: Michael Koch I  MRN: [id number]  Unit/Bed#: -01 I Date of Admission: 2/27/2024   Date of Service: 3/1/2024 I Hospital Day: 3    Assessment/Plan   * Alcoholic hepatitis  Assessment & Plan  Currently on Solu-Cortef 25 mg daily -continue to wean  Alcoholic cirrhosis  Transferred from [facility] in [place] after liver transplant evaluation, he was found not to be candidate  Continue lactulose  No encephalopathy on admission-but patient mentation varies  PT/OT/case management for discharge planning so patient may prepare for transplant reevaluation in the future  Patient has extensive workup done, at this time, not much to offer, w  After chart review, and discussion is done with patient's family at the bedside, since patient is on Solu-Cortef since patient got transferred to [facility].  Again per family, steroid dose was on and off basis.  For safe side, we will try to wean off his steroid.  Reaching out to gastroenterology for recommendation  Discussion has done with patient's family regarding treatment plan, options.  Family prefers to discuss with palliative care.  Consultation placed.    Orthostatic hypotension  Assessment & Plan  Secondary to liver disease and vasodilation  Continue midodrine-dose increased to 10 mg 3 times daily per nephrology since patient is still having orthostatic hypotension    Malnutrition (HCC)  Assessment & Plan  Postpyloric tube in place and has been receiving tube feeding to supplement oral intake at outside hospital  Also eating as tolerated of oral diet  Patient complaining of irritated throat and requesting tube be removed  Patient evaluated by speech therapist, able to swallow, at this time we will remove the postpyloric tube.    Renal failure  Assessment & Plan  Developed DUANE on CKD with underlying liver failure   ureteral stent placed at [facility], lithotripsy  Developed oliguria, treated with CRRT then  transition to HD  Appreciate nephrology consultation  Patient temporary HD catheter replaced with PermCath by IR on 2/29/2024.  Patient also has alcohol induced hepatitis.    GERD (gastroesophageal reflux disease)  Assessment & Plan  Continue PPI    Depression  Assessment & Plan  Continue Zyprexa at at bedtime    ETOH abuse  Assessment & Plan  History alcohol abuse, last drink prior to admission in January  Will need outpatient alcohol counseling prior to transplant reevaluation-remember, patient has referral from [facility] for virtual therapy which supposed to initiated soon.  Continue daily thiamine    Tobacco abuse  Assessment & Plan  Nicotine patch  Cessation counseling    Type 2 diabetes mellitus with hyperglycemia, without long-term current use of insulin (HCC)  Assessment & Plan  Lab Results   Component Value Date    HGBA1C 8.8 (H) 01/21/2024       Recent Labs     02/29/24  1535 02/29/24  2146 03/01/24  0707 03/01/24  1052   POCGLU 173* 135 136 174*         Blood Sugar Average: Last 72 hrs:  (P) 142.75  Continue Lantus 20 units twice daily  Sliding-scale insulin with Accu-Cheks  Hypoglycemia protocol  Carbohydrate controlled diet               VTE Pharmacologic Prophylaxis:    Consider to start subcu heparin    Mobility:   Basic Mobility Inpatient Raw Score: 14  JH-HLM Goal: 4: Move to chair/commode  JH-HLM Achieved: 4: Move to chair/commode  HLM Goal achieved. Continue to encourage appropriate mobility.    Patient Centered Rounds: I performed bedside rounds with nursing staff today.   Discussions with Specialists or Other Care Team Provider: Nephrology, gastroenterology    Education and Discussions with Family / Patient: Updated  (wife and sister) at bedside.      Current Length of Stay: 3 day(s)  Current Patient Status: Inpatient   Certification Statement: The patient will continue to require additional inpatient hospital stay due to monitor above conditions  Discharge Plan:  Anticipate discharge in 48-72 hrs to to be determined    Code Status: Level 1 - Full Code    Subjective:   Seen and evaluated during a time.  Patient sitting up position on the chair.  Per physical therapy, patient had episode of hypotension during change of position.  And unable to participate.  During the morning on, patient is alert oriented, but more sleepy.  Arousable    Objective:     Vitals:   Temp (24hrs), Av.7 °F (36.5 °C), Min:97.5 °F (36.4 °C), Max:98.1 °F (36.7 °C)    Temp:  [97.5 °F (36.4 °C)-98.1 °F (36.7 °C)] 97.5 °F (36.4 °C)  HR:  [86-91] 86  Resp:  [18-25] 18  BP: ()/(40-70) 99/55  SpO2:  [97 %-100 %] 98 %  There is no height or weight on file to calculate BMI.     Input and Output Summary (last 24 hours):   No intake or output data in the 24 hours ending 24 1435    Physical Exam:   Physical Exam  Vitals and nursing note reviewed.   Constitutional:       Appearance: He is ill-appearing.   Eyes:      General: Scleral icterus present.      Pupils: Pupils are equal, round, and reactive to light.   Cardiovascular:      Rate and Rhythm: Normal rate.      Heart sounds: No murmur heard.     No friction rub. No gallop.   Pulmonary:      Effort: Pulmonary effort is normal. No respiratory distress.      Breath sounds: No stridor. No wheezing or rhonchi.   Chest:      Comments: PermCath in place  Abdominal:      General: There is distension.      Palpations: There is no mass.      Tenderness: There is no abdominal tenderness. There is no guarding.      Hernia: No hernia is present.   Musculoskeletal:      Right lower leg: No edema.      Left lower leg: No edema.   Skin:     Coloration: Skin is jaundiced.   Neurological:      Mental Status: He is alert and oriented to person, place, and time.      Cranial Nerves: No cranial nerve deficit.      Sensory: No sensory deficit.      Motor: No weakness.      Coordination: Coordination normal.      Comments: Fine tremor          Additional Data:      Labs:  Results from last 7 days   Lab Units 03/01/24  0633   WBC Thousand/uL 17.56*   HEMOGLOBIN g/dL 8.9*   HEMATOCRIT % 24.6*   PLATELETS Thousands/uL 73*   NEUTROS PCT % 89*   LYMPHS PCT % 4*   MONOS PCT % 5   EOS PCT % 1     Results from last 7 days   Lab Units 03/01/24  0633   SODIUM mmol/L 135   POTASSIUM mmol/L 3.4*   CHLORIDE mmol/L 97   CO2 mmol/L 20*   BUN mg/dL 84*   CREATININE mg/dL 6.24*   ANION GAP mmol/L 18   CALCIUM mg/dL 6.6*   ALBUMIN g/dL 2.7*   TOTAL BILIRUBIN mg/dL 51.34*   ALK PHOS U/L 234*   ALT U/L 137*   AST U/L 67*   GLUCOSE RANDOM mg/dL 115     Results from last 7 days   Lab Units 02/29/24  0454   INR  1.70*     Results from last 7 days   Lab Units 03/01/24  1052 03/01/24  0707 02/29/24  2146 02/29/24  1535 02/29/24  1046 02/29/24  0800 02/29/24  0729 02/28/24  2119 02/28/24  1646 02/28/24  1633 02/28/24  1049 02/28/24  0243   POC GLUCOSE mg/dl 174* 136 135 173* 157* 104 65 124 140 128 234* 143*               Lines/Drains:  Invasive Devices       Peripheral Intravenous Line  Duration             Peripheral IV 03/01/24 Distal;Right;Upper;Ventral (anterior) Arm <1 day              Hemodialysis Catheter  Duration             HD Permanent Double Catheter 1 day                          Imaging: No pertinent imaging reviewed.    Recent Cultures (last 7 days):         Last 24 Hours Medication List:   Current Facility-Administered Medications   Medication Dose Route Frequency Provider Last Rate    ciprofloxacin  250 mg Oral Q24H Yuly S Edenilson, CRNP      dextromethorphan-guaiFENesin  10 mL Oral Q4H PRN Yuly S Edenilson, CRNP      diphenhydramine, lidocaine, Al/Mg hydroxide, simethicone  10 mL Swish & Swallow Q6H PRN Yuly S Edenilson, CRNP      hydrocortisone  25 mg Intravenous Daily Yuly S Edenilson, CRNP      insulin glargine  20 Units Subcutaneous Q12H MURALI Yuly S Edenilson, CRNP      insulin lispro  1-6 Units Subcutaneous TID AC Yuly S Edenilson, CRNP      insulin lispro  1-6 Units Subcutaneous HS Yuly SIMONS  Edenilson, CRNP      lactulose  20 g Oral BID Yuly S Edenilson, CRNP      melatonin  9 mg Oral HS Yuly S Edenilson, CRNP      midodrine  10 mg Oral TID AC Joselyn Reyes Bahamonde, MD      nicotine  1 patch Transdermal Daily Yuly S Edenilson, CRNP      OLANZapine  15 mg Oral HS Yuly S Edenilson, CRNP      ondansetron  4 mg Intravenous Q8H PRN Yuly S Edenilson, CRNP      pantoprazole  40 mg Oral Early Morning Yuly S Edenilson, CRNP      thiamine  100 mg Oral Daily Yuly S Edenilson, CRNP          Today, Patient Was Seen By: Tiburcio Jean MD    **Please Note: This note may have been constructed using a voice recognition system.**

## 2024-03-01 NOTE — PROGRESS NOTES
NEPHROLOGY PROGRESS NOTE   Michael Koch 33 y.o. male MRN: [id number]  Unit/Bed#: -01 Encounter: [id number]  Reason for Consult: DUANE    ASSESSMENT AND PLAN:  33 y.o.  man with PMH alcohol hepatitis, liver cirrhosis, hypertension, diabetes, nephrolithiasis, gout, GERD, depression, tobacco use.  Patient was admitted to [facility] in January due to liver failure then transferred to [facility] and [place] for evaluation of transplant unfortunately patient is not a candidate for liver transplant and was transferred back to [facility] to be close to his family.  Hemodialysis was started at [facility] .  Nephrology is consulted for management of DUANE     PLAN     #anuric KDIGO DUANE stage 3 severe, HD dependent  Etiology: Likely initially HRS transition to ATN, now HD dependent MWF  Current creatinine:4.98 after HD   No evidence of kidney recovery  Treatment:  Plan to continue HD MWF as an inpatient  Maintain MAP:  Over 65 mmHg if possible/avoid hypoperfusion:  Hold parameters on blood pressure medications  Avoid nephrotoxic agents such as NSAIDs, and IV contrast if possible. Avoid opioids   Adjust medications to GFR  Patient will require outpatient dialysis unit before discharge     # Vascular access  PermCath placed on 2/29     #Acid-base Disorder  serum HCO3 20mmol/L  Metabolic acidosis, will be addressed on dialysis     #Volume status/hypertension:  Volume: Hypervolemic with third space  Blood pressure: Hypotensive, BP 97/52,   Recommend:  Increase midodrine 10 mg 3 times daily  Will attempt fluid removal on dialysis        #Anemia:  Current hemoglobin: 8.9mg/dL  Treatment:  Transfuse for hemoglobin less than 7.0 per primary service       # EtOH cirrhosis  No candidate for transplant at this time  Management as per primary team    The highlighted and/or bolded points in my assessment, plan, and disposition were discussed with the primary team and they agree with those points and the plan.  Previous records were  personally reviewed by me to obtain a baseline creatinine.   The images (CXR) were personally reviewed by me in PACS      SUBJECTIVE:  Patient seen and examined at bedside. No chest pain, shortness of breath.  Patient had PermCath placed yesterday without complications    OBJECTIVE:  Current Weight:    Vitals:    03/01/24 1026   BP: 97/52   Pulse: 88   Resp:    Temp:    SpO2: 99%       Intake/Output Summary (Last 24 hours) at 3/1/2024 1117  Last data filed at 2/29/2024 1201  Gross per 24 hour   Intake 240 ml   Output --   Net 240 ml     Wt Readings from Last 3 Encounters:   01/31/24 98.8 kg (217 lb 13 oz)   01/29/24 94.4 kg (208 lb 2.2 oz)   10/30/23 87 kg (191 lb 12.8 oz)     Temp Readings from Last 3 Encounters:   03/01/24 97.5 °F (36.4 °C)   01/31/24 98.1 °F (36.7 °C) (Temporal)   11/02/23 97.7 °F (36.5 °C)     BP Readings from Last 3 Encounters:   03/01/24 97/52   01/31/24 127/77   11/02/23 120/83     Pulse Readings from Last 3 Encounters:   03/01/24 88   01/31/24 (!) 106   11/02/23 (!) 106        General:  no acute distress at this time  Skin:  jandice  Eyes:  No scleral icterus and noninjected  ENT:  mucous membranes moist  Neck:  no carotid bruits  Chest:  Clear to auscultation percussion, good respiratory effort, no use of accessory respiratory muscles  CVS:  Regular rate and rhythm without rub   Abdomen:  ascites  Extremities: lower extremity edema  Neuro:  No gross focality  Psych:  Alert , cooperative   Dialysis access: Right IJ PermCath      Medications:    Current Facility-Administered Medications:     ciprofloxacin (CIPRO) tablet 250 mg, 250 mg, Oral, Q24H, Yuly S Edenilson, CRNP, 250 mg at 03/01/24 0846    dextromethorphan-guaiFENesin (ROBITUSSIN DM) oral syrup 10 mL, 10 mL, Oral, Q4H PRN, Yuly S Edenilson, CRNP, 10 mL at 02/29/24 2143    diphenhydramine, lidocaine, Al/Mg hydroxide, simethicone (Magic Mouthwash) oral solution 10 mL, 10 mL, Swish & Swallow, Q6H PRN, Yuly Dasilvax, CRNP, 10 mL at 03/01/24  0938    hydrocortisone (Solu-CORTEF) injection 25 mg, 25 mg, Intravenous, Daily, Yuly S Edenilson, CRNP, 25 mg at 03/01/24 0845    insulin glargine (LANTUS) subcutaneous injection 20 Units 0.2 mL, 20 Units, Subcutaneous, Q12H MURALI, Yuly S Edenilson, CRNP, 20 Units at 03/01/24 0845    insulin lispro (HumALOG/ADMELOG) 100 units/mL subcutaneous injection 1-6 Units, 1-6 Units, Subcutaneous, TID AC, 1 Units at 02/29/24 1742 **AND** Fingerstick Glucose (POCT), , , TID AC, Yuly S Edenilson, CRNP    insulin lispro (HumALOG/ADMELOG) 100 units/mL subcutaneous injection 1-6 Units, 1-6 Units, Subcutaneous, HS, Yuly S Edenilson, CRNP    lactulose (CHRONULAC) oral solution 20 g, 20 g, Oral, BID, Yuly S Edenilson, CRNP, 20 g at 03/01/24 0845    melatonin tablet 9 mg, 9 mg, Oral, HS, Yuly S Edenilson, CRNP, 9 mg at 02/29/24 2143    midodrine (PROAMATINE) tablet 10 mg, 10 mg, Oral, TID AC, Joselyn Reyes Bahamonde, MD    nicotine (NICODERM CQ) 14 mg/24hr TD 24 hr patch 1 patch, 1 patch, Transdermal, Daily, Yuly S Edenilson, CRNP, 1 patch at 03/01/24 0846    OLANZapine (ZyPREXA) tablet 15 mg, 15 mg, Oral, HS, Yuly S Edenilson, CRNP, 15 mg at 02/29/24 2246    ondansetron (ZOFRAN) injection 4 mg, 4 mg, Intravenous, Q8H PRN, Yuly S Edenilson, CRNP, 4 mg at 02/29/24 1210    pantoprazole (PROTONIX) EC tablet 40 mg, 40 mg, Oral, Early Morning, Yuly S Edenilson, CRNP, 40 mg at 03/01/24 0613    thiamine tablet 100 mg, 100 mg, Oral, Daily, Yuly S Edenilson, CRNP, 100 mg at 03/01/24 0846    Laboratory Results:  Results from last 7 days   Lab Units 03/01/24  0633 02/29/24  0454 02/28/24  0557   WBC Thousand/uL 17.56* 15.12* 17.98*   HEMOGLOBIN g/dL 8.9* 8.6* 8.8*   HEMATOCRIT % 24.6* 23.2* 24.1*   PLATELETS Thousands/uL 73* 75* 92*   SODIUM mmol/L 135 136 139   POTASSIUM mmol/L 3.4* 3.4* 3.6   CHLORIDE mmol/L 97 98 103   CO2 mmol/L 20* 24 21   BUN mg/dL 84* 64* 98*   CREATININE mg/dL 6.24* 4.98* 6.23*   CALCIUM mg/dL 6.6* 6.9* 7.3*   MAGNESIUM mg/dL  --   --  2.2   PHOSPHORUS  "mg/dL  --   --  4.7*       IR tunneled dialysis catheter placement   Final Result by Efra Barakat MD (02/29 1359)   Impression: Exchange of temporary catheter for a 19 cm tip to cuff PermCath.               Workstation performed: [id number]         MRI brain wo contrast   Final Result by E. Alec Schoenberger, MD (02/28 1638)      No acute intracranial pathology.         Workstation performed: [id number]         XR chest portable ICU   Final Result by Samir Be MD (02/28 1657)      Again, no visible PICC line within the field-of-view. Right-sided central venous catheter entering from the neck has the tip in the SVC            Workstation performed: [id number]         XR humerus left   Final Result by Samir Be MD (02/28 1646)      No PICC line present. Please correlate for actual placement of a line in the patient other than the IV catheter at the antecubital fossa      Workstation performed: [id number]         XR chest portable   Final Result by Samir Be MD (02/28 1539)   Addendum (preliminary) 1 of 1 by Samir Be MD (02/28 1539)   ADDENDUM:      By report, the patient had a PICC line placed although there is no visible    PICC line within the field-of-view. It may be within the patient's arm.    Correlate clinically      Final      No acute cardiopulmonary disease.      Central line appears satisfactory in position. Feeding tube extends at least to the stomach. Several artifacts are present. The finding projecting in the mediastinal region is of uncertain etiology. This potentially could be within the patient. Correlate    clinically      Workstation performed: [id number]             Portions of the record may have been created with voice recognition software. Occasional wrong word or \"sound a like\" substitutions may have occurred due to the inherent limitations of voice recognition software. Read the chart carefully and recognize, using context, " where substitutions have occurred.

## 2024-03-01 NOTE — ASSESSMENT & PLAN NOTE
Lab Results   Component Value Date    HGBA1C 8.8 (H) 01/21/2024       Recent Labs     02/29/24  1535 02/29/24  2146 03/01/24  0707 03/01/24  1052   POCGLU 173* 135 136 174*         Blood Sugar Average: Last 72 hrs:  (P) 142.75  Continue Lantus 20 units twice daily  Sliding-scale insulin with Accu-Cheks  Hypoglycemia protocol  Carbohydrate controlled diet

## 2024-03-01 NOTE — ASSESSMENT & PLAN NOTE
Developed DUANE on CKD with underlying liver failure   ureteral stent placed at [facility], lithotripsy  Developed oliguria, treated with CRRT then transition to HD  Appreciate nephrology consultation  Patient temporary HD catheter replaced with PermCath by IR on 2/29/2024.  Patient also has alcohol induced hepatitis.

## 2024-03-01 NOTE — PLAN OF CARE
Problem: PHYSICAL THERAPY ADULT  Goal: Performs mobility at highest level of function for planned discharge setting.  See evaluation for individualized goals.  Description: Treatment/Interventions: Functional transfer training, LE strengthening/ROM, Elevations, Therapeutic exercise, Endurance training, Patient/family training, Equipment eval/education, Gait training, Compensatory technique education, Spoke to nursing, OT, Spoke to case management  Equipment Recommended: Cane       See flowsheet documentation for full assessment, interventions and recommendations.  Note: Prognosis: Fair  Problem List: Decreased strength, Decreased endurance, Impaired balance, Decreased mobility, Obesity, Pain, Impaired judgement  Assessment: Pt tolerates tx session poorly today.  Pt significantly limited by symptomatic orthostatic hypotension (see vitals).  Pt unable to ambulate or complete stair navigation today.  PT notifies CM.  Pt reports concerns about his CAMILO the home.  Resource intensity level changed to level II.  Barriers to Discharge: Other (Comment)  Barriers to Discharge Comments: CAMILO, steps to 2nd flr, dialysis & CAMILO, bowel incontinence, symptomatic orthostatic hypotension, alone during day  Rehab Resource Intensity Level, PT: II (Moderate Resource Intensity)    See flowsheet documentation for full assessment.

## 2024-03-01 NOTE — PLAN OF CARE
Problem: NEUROSENSORY - ADULT  Goal: Achieves stable or improved neurological status  Description: INTERVENTIONS  - Monitor and report changes in neurological status  - Monitor vital signs such as temperature, blood pressure, glucose, and any other labs ordered   - Initiate measures to prevent increased intracranial pressure  - Monitor for seizure activity and implement precautions if appropriate      Outcome: Progressing  Goal: Achieves maximal functionality and self care  Description: INTERVENTIONS  - Monitor swallowing and airway patency with patient fatigue and changes in neurological status  - Encourage and assist patient to increase activity and self care.   - Encourage visually impaired, hearing impaired and aphasic patients to use assistive/communication devices  Outcome: Progressing     Problem: CARDIOVASCULAR - ADULT  Goal: Maintains optimal cardiac output and hemodynamic stability  Description: INTERVENTIONS:  - Monitor I/O, vital signs and rhythm  - Monitor for S/S and trends of decreased cardiac output  - Administer and titrate ordered vasoactive medications to optimize hemodynamic stability  - Assess quality of pulses, skin color and temperature  - Assess for signs of decreased coronary artery perfusion  - Instruct patient to report change in severity of symptoms  Outcome: Progressing  Goal: Absence of cardiac dysrhythmias or at baseline rhythm  Description: INTERVENTIONS:  - Continuous cardiac monitoring, vital signs, obtain 12 lead EKG if ordered  - Administer antiarrhythmic and heart rate control medications as ordered  - Monitor electrolytes and administer replacement therapy as ordered  Outcome: Progressing     Problem: RESPIRATORY - ADULT  Goal: Achieves optimal ventilation and oxygenation  Description: INTERVENTIONS:  - Assess for changes in respiratory status  - Assess for changes in mentation and behavior  - Position to facilitate oxygenation and minimize respiratory effort  - Oxygen  administered by appropriate delivery if ordered  - Initiate smoking cessation education as indicated  - Encourage broncho-pulmonary hygiene including cough, deep breathe, Incentive Spirometry  - Assess the need for suctioning and aspirate as needed  - Assess and instruct to report SOB or any respiratory difficulty  - Respiratory Therapy support as indicated  Outcome: Progressing     Problem: GASTROINTESTINAL - ADULT  Goal: Minimal or absence of nausea and/or vomiting  Description: INTERVENTIONS:  - Administer IV fluids if ordered to ensure adequate hydration  - Maintain NPO status until nausea and vomiting are resolved  - Nasogastric tube if ordered  - Administer ordered antiemetic medications as needed  - Provide nonpharmacologic comfort measures as appropriate  - Advance diet as tolerated, if ordered  - Consider nutrition services referral to assist patient with adequate nutrition and appropriate food choices  Outcome: Progressing  Goal: Maintains or returns to baseline bowel function  Description: INTERVENTIONS:  - Assess bowel function  - Encourage oral fluids to ensure adequate hydration  - Administer IV fluids if ordered to ensure adequate hydration  - Administer ordered medications as needed  - Encourage mobilization and activity  - Consider nutritional services referral to assist patient with adequate nutrition and appropriate food choices  Outcome: Progressing  Goal: Maintains adequate nutritional intake  Description: INTERVENTIONS:  - Monitor percentage of each meal consumed  - Identify factors contributing to decreased intake, treat as appropriate  - Assist with meals as needed  - Monitor I&O, weight, and lab values if indicated  - Obtain nutrition services referral as needed  Outcome: Progressing  Goal: Oral mucous membranes remain intact  Description: INTERVENTIONS  - Assess oral mucosa and hygiene practices  - Implement preventative oral hygiene regimen  - Implement oral medicated treatments as  ordered  - Initiate Nutrition services referral as needed  Outcome: Progressing     Problem: GENITOURINARY - ADULT  Goal: Maintains or returns to baseline urinary function  Description: INTERVENTIONS:  - Assess urinary function  - Encourage oral fluids to ensure adequate hydration if ordered  - Administer IV fluids as ordered to ensure adequate hydration  - Administer ordered medications as needed  - Offer frequent toileting  - Follow urinary retention protocol if ordered  Outcome: Progressing  Goal: Absence of urinary retention  Description: INTERVENTIONS:  - Assess patient’s ability to void and empty bladder  - Monitor I/O  - Bladder scan as needed  - Discuss with physician/AP medications to alleviate retention as needed  - Discuss catheterization for long term situations as appropriate  Outcome: Progressing     Problem: METABOLIC, FLUID AND ELECTROLYTES - ADULT  Goal: Electrolytes maintained within normal limits  Description: INTERVENTIONS:  - Monitor labs and assess patient for signs and symptoms of electrolyte imbalances  - Administer electrolyte replacement as ordered  - Monitor response to electrolyte replacements, including repeat lab results as appropriate  - Instruct patient on fluid and nutrition as appropriate  Outcome: Progressing  Goal: Fluid balance maintained  Description: INTERVENTIONS:  - Monitor labs   - Monitor I/O and WT  - Instruct patient on fluid and nutrition as appropriate  - Assess for signs & symptoms of volume excess or deficit  Outcome: Progressing  Goal: Glucose maintained within target range  Description: INTERVENTIONS:  - Monitor Blood Glucose as ordered  - Assess for signs and symptoms of hyperglycemia and hypoglycemia  - Administer ordered medications to maintain glucose within target range  - Assess nutritional intake and initiate nutrition service referral as needed  Outcome: Progressing     Problem: SKIN/TISSUE INTEGRITY - ADULT  Goal: Skin Integrity remains intact(Skin  Breakdown Prevention)  Description: Assess:  -Perform Brad assessment every 8 hrs  -Clean and moisturize skin every 8 hrs  -Inspect skin when repositioning, toileting, and assisting with ADLS  -Assess extremities for adequate circulation and sensation     Bed Management:  -Have minimal linens on bed & keep smooth, unwrinkled  -Change linens as needed when moist or perspiring  -Avoid sitting or lying in one position for more than 2  hours while in bed  -Keep HOB at 30 degrees     Toileting:  -Offer bedside commode  -Assess for incontinence every 2 hrs  -Use incontinent care products after each incontinent episode such as     Activity:  -Mobilize patient 3 times a day  -Encourage activity and walks on unit  -Encourage or provide ROM exercises   -Turn and reposition patient every 2 Hours  -Use appropriate equipment to lift or move patient in bed  -Instruct/ Assist with weight shifting every 2 hr when out of bed in chair  -Consider limitation of chair time 2 hour intervals    Skin Care:  -Avoid use of baby powder, tape, friction and shearing, hot water or constrictive clothing  -Do not massage red bony areas    Goal: Pressure injury heals and does not worsen  Description: Interventions:  - Implement low air loss mattress or specialty surface (Criteria met)  - Apply silicone foam dressing  - Instruct/assist with weight shifting every 60 minutes when in chair   - Limit chair time to 2 hour intervals  - Use special pressure reducing interventions such as cushion when in chair   - Apply fecal or urinary incontinence containment device   - Perform passive or active ROM every 8 hrs  - Turn and reposition patient & offload bony prominences every 2 hours   - Utilize friction reducing device or surface for transfers   - Consider nutrition services referral as needed  Outcome: Progressing     Problem: HEMATOLOGIC - ADULT  Goal: Maintains hematologic stability  Description: INTERVENTIONS  - Assess for signs and symptoms of  bleeding or hemorrhage  - Monitor labs  - Administer supportive blood products/factors as ordered and appropriate  Outcome: Progressing     Problem: MUSCULOSKELETAL - ADULT  Goal: Maintain or return mobility to safest level of function  Description: INTERVENTIONS:  - Assess patient's ability to carry out ADLs; assess patient's baseline for ADL function and identify physical deficits which impact ability to perform ADLs (bathing, care of mouth/teeth, toileting, grooming, dressing, etc.)  - Assess/evaluate cause of self-care deficits   - Assess range of motion  - Assess patient's mobility  - Assess patient's need for assistive devices and provide as appropriate  - Encourage maximum independence but intervene and supervise when necessary  - Involve family in performance of ADLs  - Assess for home care needs following discharge   - Consider OT consult to assist with ADL evaluation and planning for discharge  - Provide patient education as appropriate  Outcome: Progressing  Goal: Maintain proper alignment of affected body part  Description: INTERVENTIONS:  - Support, maintain and protect limb and body alignment  - Provide patient/ family with appropriate education  Outcome: Progressing     Problem: Prexisting or High Potential for Compromised Skin Integrity  Goal: Skin integrity is maintained or improved  Description: INTERVENTIONS:  - Identify patients at risk for skin breakdown  - Assess and monitor skin integrity  - Assess and monitor nutrition and hydration status  - Monitor labs   - Assess for incontinence   - Turn and reposition patient  - Assist with mobility/ambulation  - Relieve pressure over bony prominences  - Avoid friction and shearing  - Provide appropriate hygiene as needed including keeping skin clean and dry  - Evaluate need for skin moisturizer/barrier cream  - Collaborate with interdisciplinary team   - Patient/family teaching  - Consider wound care consult   Outcome: Progressing     Problem:  Nutrition/Hydration-ADULT  Goal: Nutrient/Hydration intake appropriate for improving, restoring or maintaining nutritional needs  Description: Monitor and assess patient's nutrition/hydration status for malnutrition. Collaborate with interdisciplinary team and initiate plan and interventions as ordered.  Monitor patient's weight and dietary intake as ordered or per policy. Utilize nutrition screening tool and intervene as necessary. Determine patient's food preferences and provide high-protein, high-caloric foods as appropriate.     INTERVENTIONS:  - Monitor oral intake, urinary output, labs, and treatment plans  - Assess nutrition and hydration status and recommend course of action  - Evaluate amount of meals eaten  - Assist patient with eating if necessary   - Allow adequate time for meals  - Recommend/ encourage appropriate diets, oral nutritional supplements, and vitamin/mineral supplements  - Order, calculate, and assess calorie counts as needed  - Recommend, monitor, and adjust tube feedings and TPN/PPN based on assessed needs  - Assess need for intravenous fluids  - Provide specific nutrition/hydration education as appropriate  - Include patient/family/caregiver in decisions related to nutrition  Outcome: Progressing

## 2024-03-01 NOTE — ASSESSMENT & PLAN NOTE
Secondary to liver disease and vasodilation  Continue midodrine-dose increased to 10 mg 3 times daily per nephrology since patient is still having orthostatic hypotension

## 2024-03-02 NOTE — PROGRESS NOTES
[facility]  Progress Note  Name: Michael Koch I  MRN: [id number]  Unit/Bed#: -01 I Date of Admission: 2/27/2024   Date of Service: 3/2/2024 I Hospital Day: 4    Assessment/Plan   * Alcoholic hepatitis  Assessment & Plan  Currently on Solu-Cortef 25 mg daily -continue to wean  Alcoholic cirrhosis  Transferred from [facility] in [place] after liver transplant evaluation, he was found not to be candidate  Continue lactulose  No encephalopathy on admission-but patient mentation varies  PT/OT/case management for discharge planning so patient may prepare for transplant reevaluation in the future  Patient has extensive workup done, at this time, not much to offer, w  After chart review, and discussion is done with patient's family at the bedside, since patient is on Solu-Cortef since patient got transferred to [facility].  Again per family, steroid dose was on and off basis.  For safe side, we will try to wean off his steroid.  Reaching out to gastroenterology for recommendation  Discussion has done with patient's family regarding treatment plan, options.  Family prefers to discuss with palliative care.  Consultation placed.    Orthostatic hypotension  Assessment & Plan  Secondary to liver disease and vasodilation  Continue midodrine-dose increased to 10 mg 3 times daily per nephrology since patient is still having orthostatic hypotension    Malnutrition (HCC)  Assessment & Plan  Postpyloric tube in place and has been receiving tube feeding to supplement oral intake at outside hospital  Also eating as tolerated of oral diet  Patient complaining of irritated throat and requesting tube be removed  Patient evaluated by speech therapist, able to swallow, at this time we will remove the postpyloric tube.    Renal failure  Assessment & Plan  Developed DUANE on CKD with underlying liver failure   ureteral stent placed at [facility], lithotripsy  Developed oliguria, treated with CRRT then  transition to HD  Appreciate nephrology consultation  Patient temporary HD catheter replaced with PermCath by IR on 2/29/2024.  Patient also has alcohol induced hepatitis.    GERD (gastroesophageal reflux disease)  Assessment & Plan  Continue PPI    Depression  Assessment & Plan  Continue Zyprexa at at bedtime    ETOH abuse  Assessment & Plan  History alcohol abuse, last drink prior to admission in January  Will need outpatient alcohol counseling prior to transplant reevaluation-remember, patient has referral from [facility] for virtual therapy which supposed to initiated soon.  Continue daily thiamine    Tobacco abuse  Assessment & Plan  Nicotine patch  Cessation counseling    Type 2 diabetes mellitus with hyperglycemia, without long-term current use of insulin (HCC)  Assessment & Plan  Lab Results   Component Value Date    HGBA1C 8.8 (H) 01/21/2024       Recent Labs     03/01/24  1612 03/01/24  2114 03/02/24  0744 03/02/24  0747   POCGLU 198* 161* 47* 62*         Blood Sugar Average: Last 72 hrs:  (P) 136.3125  Continue Lantus 20 units twice daily  Sliding-scale insulin with Accu-Cheks  Hypoglycemia protocol  Carbohydrate controlled diet             VTE Pharmacologic Prophylaxis:   Low Risk (Score 0-2) - Encourage Ambulation.    Mobility:   Basic Mobility Inpatient Raw Score: 18  JH-HLM Goal: 6: Walk 10 steps or more  JH-HLM Achieved: 6: Walk 10 steps or more  HLM Goal achieved. Continue to encourage appropriate mobility.    Patient Centered Rounds: I performed bedside rounds with nursing staff today.   Discussions with Specialists or Other Care Team Provider: Nephrology    Education and Discussions with Family / Patient: Updated  (wife) via phone.      Current Length of Stay: 4 day(s)  Current Patient Status: Inpatient   Certification Statement: The patient will continue to require additional inpatient hospital stay due to monitor above conditions  Discharge Plan: Anticipate discharge in  48-72 hrs to home with home services.    Code Status: Level 1 - Full Code    Subjective:   Seen and evaluated during the run.  Resting comfortably.  Denies any significant complaint.    Objective:     Vitals:   Temp (24hrs), Av.6 °F (36.4 °C), Min:97.2 °F (36.2 °C), Max:97.9 °F (36.6 °C)    Temp:  [97.2 °F (36.2 °C)-97.9 °F (36.6 °C)] 97.9 °F (36.6 °C)  HR:  [] 91  Resp:  [16-20] 20  BP: ()/(52-85) 101/52  SpO2:  [94 %-99 %] 94 %  Body mass index is 34.11 kg/m².     Input and Output Summary (last 24 hours):     Intake/Output Summary (Last 24 hours) at 3/2/2024 1323  Last data filed at 3/2/2024 0900  Gross per 24 hour   Intake 730 ml   Output 1402 ml   Net -672 ml       Physical Exam:   Physical Exam  Vitals and nursing note reviewed.   Constitutional:       Appearance: He is ill-appearing.   Eyes:      General: Scleral icterus present.      Extraocular Movements: Extraocular movements intact.      Pupils: Pupils are equal, round, and reactive to light.   Cardiovascular:      Rate and Rhythm: Normal rate.      Heart sounds: No murmur heard.     No friction rub. No gallop.   Pulmonary:      Effort: Pulmonary effort is normal. No respiratory distress.      Breath sounds: No stridor. No wheezing or rhonchi.   Abdominal:      General: Bowel sounds are normal. There is distension.      Palpations: Abdomen is soft. There is no mass.      Tenderness: There is no abdominal tenderness. There is no guarding.      Hernia: No hernia is present.   Musculoskeletal:      Right lower leg: Edema present.      Left lower leg: Edema present.   Skin:     Coloration: Skin is jaundiced.   Neurological:      Mental Status: He is alert and oriented to person, place, and time.      Cranial Nerves: No cranial nerve deficit.      Sensory: No sensory deficit.      Motor: No weakness.      Coordination: Coordination normal.          Additional Data:     Labs:  Results from last 7 days   Lab Units 24  0501   WBC Thousand/uL  20.54*   HEMOGLOBIN g/dL 8.6*   HEMATOCRIT % 24.0*   PLATELETS Thousands/uL 55*   NEUTROS PCT % 89*   LYMPHS PCT % 4*   MONOS PCT % 5   EOS PCT % 1     Results from last 7 days   Lab Units 03/02/24  0921   SODIUM mmol/L 135   POTASSIUM mmol/L 2.9*   CHLORIDE mmol/L 98   CO2 mmol/L 23   BUN mg/dL 60*   CREATININE mg/dL 5.50*   ANION GAP mmol/L 14   CALCIUM mg/dL 6.5*   ALBUMIN g/dL 2.7*   TOTAL BILIRUBIN mg/dL 49.39*   ALK PHOS U/L 262*   ALT U/L 142*   AST U/L 108*   GLUCOSE RANDOM mg/dL 66     Results from last 7 days   Lab Units 03/02/24  0501   INR  1.77*     Results from last 7 days   Lab Units 03/02/24  1140 03/02/24  0747 03/02/24  0744 03/01/24  2114 03/01/24  1612 03/01/24  1052 03/01/24  0707 02/29/24  2146 02/29/24  1535 02/29/24  1046 02/29/24  0800 02/29/24  0729   POC GLUCOSE mg/dl 110 62* 47* 161* 198* 174* 136 135 173* 157* 104 65               Lines/Drains:  Invasive Devices       Peripheral Intravenous Line  Duration             Peripheral IV 03/01/24 Distal;Right;Upper;Ventral (anterior) Arm 1 day              Hemodialysis Catheter  Duration             HD Permanent Double Catheter 2 days                          Imaging: No pertinent imaging reviewed.    Recent Cultures (last 7 days):         Last 24 Hours Medication List:   Current Facility-Administered Medications   Medication Dose Route Frequency Provider Last Rate    ciprofloxacin  250 mg Oral Q24H Yuly S Edenilson, CRNP      dextromethorphan-guaiFENesin  10 mL Oral Q4H PRN Yuly S Edenilson, CRNP      diphenhydramine, lidocaine, Al/Mg hydroxide, simethicone  10 mL Swish & Swallow Q6H PRN Yuly S Edenilson, CRNP      hydrocortisone  25 mg Intravenous Daily Yuly S Edenilson, CRNP      insulin glargine  20 Units Subcutaneous Q12H MURALI Yuly S Edenilson, CRNP      insulin lispro  1-6 Units Subcutaneous TID AC Yuly S Edenilson, CRNP      insulin lispro  1-6 Units Subcutaneous HS Yuly S Edenilson, CRNP      lactulose  20 g Oral BID Yuly SIMONS Edenilson, CRNP      melatonin  9 mg  Oral HS Yuly S Edenilson, CRNP      midodrine  10 mg Oral TID AC Joselyn Reyes Bahamonde, MD      nicotine  1 patch Transdermal Daily Yuly S Edenilson, CRNP      OLANZapine  15 mg Oral HS Yuly S Edenilson, CRNP      ondansetron  4 mg Intravenous Q8H PRN Yuly S Edenilson, CRNP      pantoprazole  40 mg Oral Early Morning Yuly S Edenilson, CRNP      potassium chloride  40 mEq Oral Once Tiburcio Jean MD      thiamine  100 mg Oral Daily Yuly S Edenilson, CRNP          Today, Patient Was Seen By: Tiburcio Jean MD    **Please Note: This note may have been constructed using a voice recognition system.**

## 2024-03-02 NOTE — ASSESSMENT & PLAN NOTE
Lab Results   Component Value Date    HGBA1C 8.8 (H) 01/21/2024       Recent Labs     03/01/24  1612 03/01/24  2114 03/02/24  0744 03/02/24  0747   POCGLU 198* 161* 47* 62*         Blood Sugar Average: Last 72 hrs:  (P) 136.3125  Continue Lantus 20 units twice daily  Sliding-scale insulin with Accu-Cheks  Hypoglycemia protocol  Carbohydrate controlled diet

## 2024-03-02 NOTE — PROGRESS NOTES
"Progress Note - Nephrology   Michael Koch 33 y.o. male MRN: [id number]  Unit/Bed#: -01 Encounter: [id number]    A/P:  1.  Acute kidney injury, hemodialysis dependent, anuric  Suspected etiology due to HRS/ATN.  Patient is now dialysis dependent Monday Wednesday Friday.    Recommend:  Patient's last hemodialysis 3/1.  1 L removed.  Treatment abbreviated 40 minutes due to patient request.  Post weight 89 kg.  EDW to be determined.    No emergent need for dialysis today.  Continue Monday Wednesday Friday schedule.  PermCath placed on 2/29.  Case management following, Monday Wednesday Friday chair time secured for 2:45 PM at [facility].  No emergent need for HD today    2.  Acid/base disorder  Continue to follow chemistry.  Address on dialysis days.    3.  Volume//hypertension  Hypervolemic with third spacing due to liver disease.  Blood pressure: soft  BP trends, but stable.     Continue midodrine 10 mg 3 times daily.  Fluid removal as tolerated with hemodialysis.    4.  Anemia of renal disease/liver disease  Continue to follow hemoglobin trends.  Transfuse for hemoglobin less than 7.    5.  Alcoholic cirrhosis   patient is not a candidate for transplant at this time.  Continue midodrine 10 mg 3 times daily.  Remainder of management per primary team.        Follow up reason for today's visit:     Alcoholic hepatitis      Subjective:     Patient seen and examined today. Reports cough, otherwise asymptomatic, no cp,sob,n,v,d.   A complete 10 point review of systems was performed and is otherwise negative.       Objective:     Vitals: Blood pressure 101/52, pulse 91, temperature 97.9 °F (36.6 °C), resp. rate 20, height 5' 7\" (1.702 m), weight 98.8 kg (217 lb 13 oz), SpO2 94%.,Body mass index is 34.11 kg/m².    Weight (last 2 days)       Date/Time Weight    03/01/24 1924 98.8 (217.81)              Intake/Output Summary (Last 24 hours) at 3/2/2024 1022  Last data filed at 3/2/2024 0900  Gross per 24 " "hour   Intake 730 ml   Output 1402 ml   Net -672 ml     I/O last 3 completed shifts:  In: 430 [I.V.:430]  Out: 1402 [Other:1402]    HD Permanent Double Catheter (Active)   Reasons to continue HD Cath Treatment Therapy 03/01/24 1806   Goal for Removal N/A- chronic HD catheter 03/01/24 1806   Line Necessity Reviewed Yes, reviewed with provider 03/01/24 1806   Site Assessment WDL;Clean;Dry;Intact 03/01/24 1806   Proximal Lumen Status Normal saline locked;Passive disinfecting cap applied 03/01/24 1806   Medial Lumen Status Cap changed 03/01/24 0845   Distal Lumen Status Normal saline locked;Passive disinfecting cap applied 03/01/24 1806   Dressing Type Chlorhexidine dressing 03/01/24 1806   Dressing Status Old drainage 03/01/24 1806   Dressing Change Due 03/07/24 03/01/24 1806       Physical Exam: /52   Pulse 91   Temp 97.9 °F (36.6 °C)   Resp 20   Ht 5' 7\" (1.702 m)   Wt 98.8 kg (217 lb 13 oz)   SpO2 94%   BMI 34.11 kg/m²     General Appearance:    Alert, cooperative, no distress, appears stated age   Head:    Normocephalic, without obvious abnormality, atraumatic   Eyes:    Conjunctiva/corneas clear   Ears:    Normal external ears   Nose:   Nares normal, septum midline, mucosa normal, no drainage    or sinus tenderness   Throat:   Lips, mucosa, and tongue normal; teeth and gums normal   Neck:   No JVD        Lungs:     Decrease BS, no w/r/r. Non labored respirations    Chest wall:    No tenderness or deformity   Heart:    Regular, no rub    Abdomen:     Soft, distended    Extremities:  Moderate BL LE edema    Skin:  Jaundiced        Neurologic:   Alert, no focal deficit             Lab, Imaging and other studies: I have personally reviewed pertinent labs.  CBC:   Lab Results   Component Value Date    WBC 20.54 (H) 03/02/2024    HGB 8.6 (L) 03/02/2024    HCT 24.0 (L) 03/02/2024    MCV 86 03/02/2024    PLT 55 (L) 03/02/2024    RBC 2.78 (L) 03/02/2024    MCH 30.9 03/02/2024    MCHC 35.8 03/02/2024    RDW " "18.4 (H) 03/02/2024    NRBC 0 03/02/2024     CMP: No results found for: \"NA\", \"K\", \"CL\", \"CO2\", \"ANIONGAP\", \"BUN\", \"CREATININE\", \"GLUCOSE\", \"CALCIUM\", \"AST\", \"ALT\", \"ALKPHOS\", \"PROT\", \"BILITOT\", \"EGFR\"    .  Results from last 7 days   Lab Units 03/01/24  0633 02/29/24  0454 02/28/24  0557   POTASSIUM mmol/L 3.4* 3.4* 3.6   CHLORIDE mmol/L 97 98 103   CO2 mmol/L 20* 24 21   BUN mg/dL 84* 64* 98*   CREATININE mg/dL 6.24* 4.98* 6.23*   CALCIUM mg/dL 6.6* 6.9* 7.3*   ALK PHOS U/L 234* 213* 184*   ALT U/L 137* 145* 161*   AST U/L 67* 69* 91*         Phosphorus: No results found for: \"PHOS\"  Magnesium: No results found for: \"MG\"  Urinalysis: No results found for: \"COLORU\", \"CLARITYU\", \"SPECGRAV\", \"PHUR\", \"LEUKOCYTESUR\", \"NITRITE\", \"PROTEINUA\", \"GLUCOSEU\", \"KETONESU\", \"BILIRUBINUR\", \"BLOODU\"  Ionized Calcium: No results found for: \"CAION\"  Coagulation:   Lab Results   Component Value Date    INR 1.77 (H) 03/02/2024     Troponin: No results found for: \"TROPONINI\"  ABG: No results found for: \"PHART\", \"KFX9QCV\", \"PO2ART\", \"VMZ2TYM\", \"O2XZOZRS\", \"BEART\", \"SOURCE\"  Radiology review:     IMAGING  Procedure: IR tunneled dialysis catheter placement    Result Date: 2/29/2024  Narrative: Tunneled dialysis catheter placement Clinical History: Renal failure. Hepatic failure. Contrast: 0 Fluoro time: 1.6 Minutes Number of Images: 1 Radiation dose: 82.46 mGy Concious sedation time: N/A Technique: The patient was brought to the interventional radiology suite and identified verbally and by wrist band. The patient was placed supine on the table and the existing temporary catheter was prepped and draped in the usual sterile fashion. All elements of maximal sterile barrier technique were followed (cap, mask, sterile gown, sterile gloves, large sterile sheet, hand hygiene, and 2% chlorhexidine for cutaneous antisepsis). Lidocaine was administered to the skin of the insertion site , and the catheter removed over a Amplatz wire and a peel-away " sheath was placed.. A 15.5 Fr 19 cm tip to cuff Titan HD catheter was then advanced over from the right upper chest to the existing access site and through the peel-away sheath catheter tip was then positioned in the right atrium under fluoroscopic control. The external portion of the catheter was sutured to the skin with 2-0 Prolene. A sterile dressing was applied . The neck was closed with Vicryl sutures and glue applied     Impression: Impression: Exchange of temporary catheter for a 19 cm tip to cuff PermCath. Workstation performed: [id number]     Procedure: XR chest portable ICU    Result Date: 2/28/2024  Narrative: XR CHEST PORTABLE ICU INDICATION: PICC placement. COMPARISON: Earlier the same day and also 1/31/2024 FINDINGS: There is a right-sided double lumen central venous catheter which appears to enter at the jugular vein and has the tip projecting near the caval atrial junction. Again, I do not see a PICC line within the chest or visualized portion of upper extremities. I do not see any central venous access devices entering from below. Low lung volumes without infiltrates or significant atelectasis. No pneumothorax or pleural effusion. Normal cardiomediastinal silhouette. Bones are unremarkable for age. Normal upper abdomen.     Impression: Again, no visible PICC line within the field-of-view. Right-sided central venous catheter entering from the neck has the tip in the SVC Workstation performed: [id number]     Procedure: XR humerus left    Result Date: 2/28/2024  Narrative: XR HUMERUS LEFT INDICATION: PICC line evaluation. COMPARISON: None FINDINGS: No acute fracture or dislocation. No significant degenerative changes. No lytic or blastic osseous lesion. There is intravenous catheter projecting near the antecubital fossa. There is no PICC line visible within the left upper extremity or included portion of chest.     Impression: No PICC line present. Please correlate for actual placement of a line in the  patient other than the IV catheter at the antecubital fossa Workstation performed: [id number]     Procedure: MRI brain wo contrast    Result Date: 2/28/2024  Narrative: MRI BRAIN WITHOUT CONTRAST INDICATION: Patient is complaining of numbness of left face. Recently had CT head-which was negative.. COMPARISON:   None. TECHNIQUE:  Multiplanar, multisequence imaging of the brain was performed. IMAGE QUALITY:  Diagnostic. FINDINGS: BRAIN PARENCHYMA:  There is no discrete mass, mass effect or midline shift. There is no intracranial hemorrhage.  There is no evidence of acute infarction and diffusion imaging is unremarkable.  There are no white matter changes in the cerebral hemispheres. VENTRICLES:  Normal for the patient's age. SELLA AND PITUITARY GLAND:  Normal. ORBITS:  Normal. PARANASAL SINUSES: Right maxillary sinus retention cyst and mild scattered mucosal thickening. Mild opacification of the mastoid air cells. VASCULATURE:  Evaluation of the major intracranial vasculature demonstrates appropriate flow voids. CALVARIUM AND SKULL BASE:  Normal. EXTRACRANIAL SOFT TISSUES:  Normal.     Impression: No acute intracranial pathology. Workstation performed: [id number]       Current Facility-Administered Medications   Medication Dose Route Frequency    ciprofloxacin (CIPRO) tablet 250 mg  250 mg Oral Q24H    dextromethorphan-guaiFENesin (ROBITUSSIN DM) oral syrup 10 mL  10 mL Oral Q4H PRN    diphenhydramine, lidocaine, Al/Mg hydroxide, simethicone (Magic Mouthwash) oral solution 10 mL  10 mL Swish & Swallow Q6H PRN    hydrocortisone (Solu-CORTEF) injection 25 mg  25 mg Intravenous Daily    insulin glargine (LANTUS) subcutaneous injection 20 Units 0.2 mL  20 Units Subcutaneous Q12H MURALI    insulin lispro (HumALOG/ADMELOG) 100 units/mL subcutaneous injection 1-6 Units  1-6 Units Subcutaneous TID AC    insulin lispro (HumALOG/ADMELOG) 100 units/mL subcutaneous injection 1-6 Units  1-6 Units Subcutaneous HS    lactulose  (CHRONULAC) oral solution 20 g  20 g Oral BID    melatonin tablet 9 mg  9 mg Oral HS    midodrine (PROAMATINE) tablet 10 mg  10 mg Oral TID AC    nicotine (NICODERM CQ) 14 mg/24hr TD 24 hr patch 1 patch  1 patch Transdermal Daily    OLANZapine (ZyPREXA) tablet 15 mg  15 mg Oral HS    ondansetron (ZOFRAN) injection 4 mg  4 mg Intravenous Q8H PRN    pantoprazole (PROTONIX) EC tablet 40 mg  40 mg Oral Early Morning    thiamine tablet 100 mg  100 mg Oral Daily     Medications Discontinued During This Encounter   Medication Reason    acetaminophen (TYLENOL) 325 mg tablet     allopurinol (ZYLOPRIM) 100 mg tablet     celecoxib (CeleBREX) 100 mg capsule     colchicine (COLCRYS) 0.6 mg tablet     Fluoxetine HCl, PMDD, 20 MG TABS     hydrOXYzine HCL (ATARAX) 25 mg tablet     lisinopril (ZESTRIL) 10 mg tablet     mirtazapine (REMERON) 15 mg tablet     omeprazole (PriLOSEC) 40 MG capsule     ondansetron (ZOFRAN) 4 mg tablet     semaglutide, 0.25 or 0.5 mg/dose, (Ozempic, 0.25 or 0.5 MG/DOSE,) 2 mg/1.5 mL injection pen     tolterodine (DETROL LA) 4 mg 24 hr capsule     enoxaparin (LOVENOX) subcutaneous injection 40 mg     insulin lispro (HumALOG/ADMELOG) 100 units/mL subcutaneous injection 1-6 Units     insulin lispro (HumALOG/ADMELOG) 100 units/mL subcutaneous injection 1-6 Units     insulin glargine (LANTUS) subcutaneous injection 20 Units 0.2 mL     insulin lispro (HumALOG/ADMELOG) 100 units/mL subcutaneous injection 1-6 Units     lactulose (CHRONULAC) oral solution 20 g     midodrine (PROAMATINE) tablet 5 mg        Silvia Katz, DO      This progress note was produced in part using a dictation device which may document imprecise wording from author's original intent.

## 2024-03-02 NOTE — ASSESSMENT & PLAN NOTE
History alcohol abuse, last drink prior to admission in January  Will need outpatient alcohol counseling prior to transplant reevaluation-remember, patient has referral from [facility] for virtual therapy which supposed to initiated soon.  Continue daily thiamine

## 2024-03-02 NOTE — PLAN OF CARE
Problem: NEUROSENSORY - ADULT  Goal: Achieves stable or improved neurological status  Description: INTERVENTIONS  - Monitor and report changes in neurological status  - Monitor vital signs such as temperature, blood pressure, glucose, and any other labs ordered   - Initiate measures to prevent increased intracranial pressure  - Monitor for seizure activity and implement precautions if appropriate      Outcome: Progressing  Goal: Achieves maximal functionality and self care  Description: INTERVENTIONS  - Monitor swallowing and airway patency with patient fatigue and changes in neurological status  - Encourage and assist patient to increase activity and self care.   - Encourage visually impaired, hearing impaired and aphasic patients to use assistive/communication devices  Outcome: Progressing     Problem: GASTROINTESTINAL - ADULT  Goal: Minimal or absence of nausea and/or vomiting  Description: INTERVENTIONS:  - Administer IV fluids if ordered to ensure adequate hydration  - Maintain NPO status until nausea and vomiting are resolved  - Nasogastric tube if ordered  - Administer ordered antiemetic medications as needed  - Provide nonpharmacologic comfort measures as appropriate  - Advance diet as tolerated, if ordered  - Consider nutrition services referral to assist patient with adequate nutrition and appropriate food choices  Outcome: Progressing  Goal: Maintains or returns to baseline bowel function  Description: INTERVENTIONS:  - Assess bowel function  - Encourage oral fluids to ensure adequate hydration  - Administer IV fluids if ordered to ensure adequate hydration  - Administer ordered medications as needed  - Encourage mobilization and activity  - Consider nutritional services referral to assist patient with adequate nutrition and appropriate food choices  Outcome: Progressing  Goal: Maintains adequate nutritional intake  Description: INTERVENTIONS:  - Monitor percentage of each meal consumed  - Identify  factors contributing to decreased intake, treat as appropriate  - Assist with meals as needed  - Monitor I&O, weight, and lab values if indicated  - Obtain nutrition services referral as needed  Outcome: Progressing  Goal: Oral mucous membranes remain intact  Description: INTERVENTIONS  - Assess oral mucosa and hygiene practices  - Implement preventative oral hygiene regimen  - Implement oral medicated treatments as ordered  - Initiate Nutrition services referral as needed  Outcome: Progressing     Problem: METABOLIC, FLUID AND ELECTROLYTES - ADULT  Goal: Electrolytes maintained within normal limits  Description: INTERVENTIONS:  - Monitor labs and assess patient for signs and symptoms of electrolyte imbalances  - Administer electrolyte replacement as ordered  - Monitor response to electrolyte replacements, including repeat lab results as appropriate  - Instruct patient on fluid and nutrition as appropriate  Outcome: Progressing  Goal: Fluid balance maintained  Description: INTERVENTIONS:  - Monitor labs   - Monitor I/O and WT  - Instruct patient on fluid and nutrition as appropriate  - Assess for signs & symptoms of volume excess or deficit  Outcome: Progressing  Goal: Glucose maintained within target range  Description: INTERVENTIONS:  - Monitor Blood Glucose as ordered  - Assess for signs and symptoms of hyperglycemia and hypoglycemia  - Administer ordered medications to maintain glucose within target range  - Assess nutritional intake and initiate nutrition service referral as needed  Outcome: Progressing     Problem: HEMATOLOGIC - ADULT  Goal: Maintains hematologic stability  Description: INTERVENTIONS  - Assess for signs and symptoms of bleeding or hemorrhage  - Monitor labs  - Administer supportive blood products/factors as ordered and appropriate  Outcome: Progressing     Problem: MUSCULOSKELETAL - ADULT  Goal: Maintain or return mobility to safest level of function  Description: INTERVENTIONS:  - Assess  patient's ability to carry out ADLs; assess patient's baseline for ADL function and identify physical deficits which impact ability to perform ADLs (bathing, care of mouth/teeth, toileting, grooming, dressing, etc.)  - Assess/evaluate cause of self-care deficits   - Assess range of motion  - Assess patient's mobility  - Assess patient's need for assistive devices and provide as appropriate  - Encourage maximum independence but intervene and supervise when necessary  - Involve family in performance of ADLs  - Assess for home care needs following discharge   - Consider OT consult to assist with ADL evaluation and planning for discharge  - Provide patient education as appropriate  Outcome: Progressing  Goal: Maintain proper alignment of affected body part  Description: INTERVENTIONS:  - Support, maintain and protect limb and body alignment  - Provide patient/ family with appropriate education  Outcome: Progressing     Problem: Prexisting or High Potential for Compromised Skin Integrity  Goal: Skin integrity is maintained or improved  Description: INTERVENTIONS:  - Identify patients at risk for skin breakdown  - Assess and monitor skin integrity  - Assess and monitor nutrition and hydration status  - Monitor labs   - Assess for incontinence   - Turn and reposition patient  - Assist with mobility/ambulation  - Relieve pressure over bony prominences  - Avoid friction and shearing  - Provide appropriate hygiene as needed including keeping skin clean and dry  - Evaluate need for skin moisturizer/barrier cream  - Collaborate with interdisciplinary team   - Patient/family teaching  - Consider wound care consult   Outcome: Progressing     Problem: Nutrition/Hydration-ADULT  Goal: Nutrient/Hydration intake appropriate for improving, restoring or maintaining nutritional needs  Description: Monitor and assess patient's nutrition/hydration status for malnutrition. Collaborate with interdisciplinary team and initiate plan and  interventions as ordered.  Monitor patient's weight and dietary intake as ordered or per policy. Utilize nutrition screening tool and intervene as necessary. Determine patient's food preferences and provide high-protein, high-caloric foods as appropriate.     INTERVENTIONS:  - Monitor oral intake, urinary output, labs, and treatment plans  - Assess nutrition and hydration status and recommend course of action  - Evaluate amount of meals eaten  - Assist patient with eating if necessary   - Allow adequate time for meals  - Recommend/ encourage appropriate diets, oral nutritional supplements, and vitamin/mineral supplements  - Order, calculate, and assess calorie counts as needed  - Recommend, monitor, and adjust tube feedings and TPN/PPN based on assessed needs  - Assess need for intravenous fluids  - Provide specific nutrition/hydration education as appropriate  - Include patient/family/caregiver in decisions related to nutrition  Outcome: Progressing

## 2024-03-02 NOTE — ASSESSMENT & PLAN NOTE
Currently on Solu-Cortef 25 mg daily -continue to wean  Alcoholic cirrhosis  Transferred from [facility] in [place] after liver transplant evaluation, he was found not to be candidate  Continue lactulose  No encephalopathy on admission-but patient mentation varies  PT/OT/case management for discharge planning so patient may prepare for transplant reevaluation in the future  Patient has extensive workup done, at this time, not much to offer, w  After chart review, and discussion is done with patient's family at the bedside, since patient is on Solu-Cortef since patient got transferred to [facility].  Again per family, steroid dose was on and off basis.  For safe side, we will try to wean off his steroid.  Reaching out to gastroenterology for recommendation  Discussion has done with patient's family regarding treatment plan, options.  Family prefers to discuss with palliative care.  Consultation placed.

## 2024-03-02 NOTE — ASSESSMENT & PLAN NOTE
Developed DUANE on CKD with underlying liver failure   ureteral stent placed at [facility], lithotripsy  Developed oliguria, treated with CRRT then transition to HD  Appreciate nephrology consultation  Patient temporary HD catheter replaced with PermCath by IR on 2/29/2024.  Patient also has alcohol induced hepatitis.

## 2024-03-03 NOTE — ASSESSMENT & PLAN NOTE
Currently on Solu-Cortef 25 mg daily -continue to wean  Alcoholic cirrhosis  Transferred from [facility] in [place] after liver transplant evaluation, he was found not to be candidate  Continue lactulose  No encephalopathy on admission-but patient mentation varies  PT/OT/case management for discharge planning so patient may prepare for transplant reevaluation in the future  Patient has extensive workup done, at this time, not much to offer, w  After chart review, and discussion is done with patient's family at the bedside, since patient is on Solu-Cortef since patient got transferred to [facility].  Again per family, steroid dose was on and off basis.  For safe side, we will try to wean off his steroid.  Reaching out to gastroenterology for recommendation  Discussion has done with patient's family regarding treatment plan, options.  Family prefers to discuss with palliative care.  Consultation placed.

## 2024-03-03 NOTE — ASSESSMENT & PLAN NOTE
Developed DUANE on CKD with underlying liver failure   ureteral stent placed at [facility], lithotripsy  Developed oliguria, treated with CRRT then transition to HD  Appreciate nephrology consultation  Patient temporary HD catheter replaced with PermCath by IR on 2/29/2024.  Patient also has alcohol induced hepatitis.

## 2024-03-03 NOTE — PROGRESS NOTES
[facility]  Progress Note  Name: Michael Koch I  MRN: [id number]  Unit/Bed#: -01 I Date of Admission: 2/27/2024   Date of Service: 3/3/2024 I Hospital Day: 5    Assessment/Plan   * Alcoholic hepatitis  Assessment & Plan  Currently on Solu-Cortef 25 mg daily -continue to wean  Alcoholic cirrhosis  Transferred from [facility] in [place] after liver transplant evaluation, he was found not to be candidate  Continue lactulose  No encephalopathy on admission-but patient mentation varies  PT/OT/case management for discharge planning so patient may prepare for transplant reevaluation in the future  Patient has extensive workup done, at this time, not much to offer, w  After chart review, and discussion is done with patient's family at the bedside, since patient is on Solu-Cortef since patient got transferred to [facility].  Again per family, steroid dose was on and off basis.  For safe side, we will try to wean off his steroid.  Reaching out to gastroenterology for recommendation  Discussion has done with patient's family regarding treatment plan, options.  Family prefers to discuss with palliative care.  Consultation placed.    Orthostatic hypotension  Assessment & Plan  Secondary to liver disease and vasodilation  Continue midodrine-dose increased to 10 mg 3 times daily per nephrology since patient is still having orthostatic hypotension    Malnutrition (HCC)  Assessment & Plan  Postpyloric tube in place and has been receiving tube feeding to supplement oral intake at outside hospital  Also eating as tolerated of oral diet  Patient complaining of irritated throat and requesting tube be removed  Patient evaluated by speech therapist, able to swallow, at this time we will remove the postpyloric tube.    Renal failure  Assessment & Plan  Developed DUANE on CKD with underlying liver failure   ureteral stent placed at [facility], lithotripsy  Developed oliguria, treated with CRRT then  transition to HD  Appreciate nephrology consultation  Patient temporary HD catheter replaced with PermCath by IR on 2/29/2024.  Patient also has alcohol induced hepatitis.    GERD (gastroesophageal reflux disease)  Assessment & Plan  Continue PPI    Depression  Assessment & Plan  Continue Zyprexa at at bedtime    ETOH abuse  Assessment & Plan  History alcohol abuse, last drink prior to admission in January  Will need outpatient alcohol counseling prior to transplant reevaluation-remember, patient has referral from [facility] for virtual therapy which supposed to initiated soon.  Continue daily thiamine    Tobacco abuse  Assessment & Plan  Nicotine patch  Cessation counseling    Type 2 diabetes mellitus with hyperglycemia, without long-term current use of insulin (HCC)  Assessment & Plan  Lab Results   Component Value Date    HGBA1C 8.8 (H) 01/21/2024       Recent Labs     03/02/24  1140 03/02/24  1602 03/02/24  2044 03/03/24  0746   POCGLU 110 178* 157* 104         Blood Sugar Average: Last 72 hrs:  (P) 130.3037910972363213  Continue Lantus 20 units twice daily  Sliding-scale insulin with Accu-Cheks  Hypoglycemia protocol  Carbohydrate controlled diet             VTE Pharmacologic Prophylaxis:    consider heparin    Mobility:   Basic Mobility Inpatient Raw Score: 18  JH-HLM Goal: 6: Walk 10 steps or more  JH-HLM Achieved: 6: Walk 10 steps or more  HLM Goal achieved. Continue to encourage appropriate mobility.    Patient Centered Rounds: I performed bedside rounds with nursing staff today.   Discussions with Specialists or Other Care Team Provider: None    Education and Discussions with Family / Patient:   .       Current Length of Stay: 5 day(s)  Current Patient Status: Inpatient   Certification Statement: The patient will continue to require additional inpatient hospital stay due to monitor above conditions  Discharge Plan: Anticipate discharge in 48 hrs to to be determined    Code Status: Level 1 - Full  Code    Subjective:   Seen and evaluated during rounds.  Resting comfortably.  Denies any significant complaint.    Objective:     Vitals:   Temp (24hrs), Av.8 °F (36.6 °C), Min:97.5 °F (36.4 °C), Max:97.9 °F (36.6 °C)    Temp:  [97.5 °F (36.4 °C)-97.9 °F (36.6 °C)] 97.9 °F (36.6 °C)  HR:  [90-96] 92  Resp:  [16-18] 18  BP: (111-115)/(57-59) 111/57  SpO2:  [96 %-99 %] 97 %  Body mass index is 34.11 kg/m².     Input and Output Summary (last 24 hours):   No intake or output data in the 24 hours ending 24    Physical Exam:   Physical Exam  Vitals and nursing note reviewed.   Constitutional:       Appearance: He is ill-appearing. He is not diaphoretic.   Eyes:      General: Scleral icterus present.      Extraocular Movements: Extraocular movements intact.      Pupils: Pupils are equal, round, and reactive to light.   Cardiovascular:      Rate and Rhythm: Normal rate.      Heart sounds: No murmur heard.     No friction rub. No gallop.   Pulmonary:      Effort: Pulmonary effort is normal. No respiratory distress.      Breath sounds: No stridor. No wheezing or rhonchi.   Abdominal:      General: There is distension.      Tenderness: There is no abdominal tenderness. There is no guarding or rebound.   Musculoskeletal:      Right lower leg: Edema present.      Left lower leg: Edema present.   Skin:     Coloration: Skin is jaundiced.   Neurological:      Mental Status: He is alert and oriented to person, place, and time.      Cranial Nerves: No cranial nerve deficit.      Sensory: No sensory deficit.      Motor: No weakness.      Coordination: Coordination normal.          Additional Data:     Labs:  Results from last 7 days   Lab Units 24  0451   WBC Thousand/uL 18.59*   HEMOGLOBIN g/dL 8.1*   HEMATOCRIT % 23.0*   PLATELETS Thousands/uL 56*   NEUTROS PCT % 89*   LYMPHS PCT % 4*   MONOS PCT % 5   EOS PCT % 1     Results from last 7 days   Lab Units 24  0451   SODIUM mmol/L 133*   POTASSIUM mmol/L  2.8*   CHLORIDE mmol/L 97   CO2 mmol/L 17*   BUN mg/dL 73*   CREATININE mg/dL 6.64*   ANION GAP mmol/L 19   CALCIUM mg/dL 6.3*   ALBUMIN g/dL 2.4*   TOTAL BILIRUBIN mg/dL 48.38*   ALK PHOS U/L 282*   ALT U/L 123*   AST U/L 69*   GLUCOSE RANDOM mg/dL 120     Results from last 7 days   Lab Units 03/02/24  0501   INR  1.77*     Results from last 7 days   Lab Units 03/03/24  0746 03/02/24  2044 03/02/24  1602 03/02/24  1140 03/02/24  0747 03/02/24  0744 03/01/24  2114 03/01/24  1612 03/01/24  1052 03/01/24  0707 02/29/24  2146 02/29/24  1535   POC GLUCOSE mg/dl 104 157* 178* 110 62* 47* 161* 198* 174* 136 135 173*               Lines/Drains:  Invasive Devices       Hemodialysis Catheter  Duration             HD Permanent Double Catheter 2 days                          Imaging: No pertinent imaging reviewed.    Recent Cultures (last 7 days):         Last 24 Hours Medication List:   Current Facility-Administered Medications   Medication Dose Route Frequency Provider Last Rate    ciprofloxacin  250 mg Oral Q24H Yuly S Edenilson, CRNP      dextromethorphan-guaiFENesin  10 mL Oral Q4H PRN Yuly S Edenilson, CRNP      diphenhydramine, lidocaine, Al/Mg hydroxide, simethicone  10 mL Swish & Swallow Q6H PRN Yuly S Edenilson, CRNP      hydrocortisone  25 mg Intravenous Daily Yuly S Edenilson, CRNP      hydrOXYzine HCL  25 mg Oral Q6H PRN Yuly S Edenilson, CRNP      insulin glargine  20 Units Subcutaneous Q12H MURALI Yuly S Edenilson, CRNP      insulin lispro  1-6 Units Subcutaneous TID AC Yuly S Edenilson, CRNP      insulin lispro  1-6 Units Subcutaneous HS Yuly S Edenilson, CRNP      lactulose  20 g Oral BID Yuly S Edenilson, CRNP      melatonin  9 mg Oral HS Yuly S Edenilson, CRNP      midodrine  10 mg Oral TID AC Joselyn Reyes Bahamonde, MD      nicotine  1 patch Transdermal Daily Yuly S Edenilson, CRNP      OLANZapine  15 mg Oral HS Yuly S Edenilson, CRNP      ondansetron  4 mg Intravenous Q8H PRN Yuly S Edenilson, CRNP      pantoprazole  40 mg Oral Early Morning  JENSEN Moreland      thiamine  100 mg Oral Daily JENSEN Moreland          Today, Patient Was Seen By: Tiburcio Jean MD    **Please Note: This note may have been constructed using a voice recognition system.**

## 2024-03-03 NOTE — ASSESSMENT & PLAN NOTE
Lab Results   Component Value Date    HGBA1C 8.8 (H) 01/21/2024       Recent Labs     03/02/24  1140 03/02/24  1602 03/02/24 2044 03/03/24  0746   POCGLU 110 178* 157* 104         Blood Sugar Average: Last 72 hrs:  (P) 130.2962286762058426  Continue Lantus 20 units twice daily  Sliding-scale insulin with Accu-Cheks  Hypoglycemia protocol  Carbohydrate controlled diet

## 2024-03-03 NOTE — ASSESSMENT & PLAN NOTE
History alcohol abuse, last drink prior to admission in January  Will need outpatient alcohol counseling prior to transplant reevaluation-remember, patient has referral from [facility] for virtual therapy which supposed to initiated soon.  Continue daily thiamine

## 2024-03-03 NOTE — UTILIZATION REVIEW
Continued Stay Review for 3/2/24     Date: 3/3/24                          Current Patient Class: IP  Current Level of Care: MS    HPI:33 y.o. male initially admitted on 2/27/24 - DX:  Alcoholic hepatitis / Type 2 diabetes mellitus with hyperglycemia, without long-term current use of insulin / Renal failure     Assessment/Plan:   3/2/24: Patient is now dialysis dependent Monday Wednesday Friday. Patient's last hemodialysis 3/1. 1 L removed. Scleral icterus present. Abdominal distension; B/L LE edema; skin is jaundiced; WBC 20.54; H/H 8.6 / 24.0; K. 2.9; BUN 60; Cr 5.50; Albumin 2.7; T Bili 49.39; Alk phos 262; ;   I/O Net -672;   Plan: cont iv solucortef; monitor labs; palliative care consult placed    Vital Signs:   Date/Time Temp Pulse Resp BP MAP (mmHg) SpO2 O2 Device Patient Position - Orthostatic VS   03/02/24 22:52:15 97.5 °F (36.4 °C) 96 18 115/59 78 96 % -- --   03/02/24 14:56:28 97.9 °F (36.6 °C) 90 16 115/59 78 98 % -- --   03/02/24 0900 -- -- -- -- -- -- None (Room air) --   03/02/24 07:46:01 97.9 °F (36.6 °C) 91 -- 101/52 68 94 % -- --   03/02/24 04:16:27 -- 100 -- 102/52 69 99 % -- Sitting       Pertinent Labs/Diagnostic Results:       Results from last 7 days   Lab Units 03/03/24  0451 03/02/24  0501 03/01/24  0633 02/29/24  0454 02/28/24  0557 02/28/24  0557   WBC Thousand/uL 18.59* 20.54* 17.56* 15.12*  --  17.98*   HEMOGLOBIN g/dL 8.1* 8.6* 8.9* 8.6*  --  8.8*   HEMATOCRIT % 23.0* 24.0* 24.6* 23.2*  --  24.1*   PLATELETS Thousands/uL 56* 55* 73* 75*  --  92*   NEUTROS ABS Thousands/µL 16.62* 18.45* 15.53* 13.13*   < >  --     < > = values in this interval not displayed.         Results from last 7 days   Lab Units 03/03/24  1439 03/03/24  0451 03/02/24  0921 03/01/24  0633 02/29/24  0454 02/28/24  0557   SODIUM mmol/L 134* 133* 135 135 136 139   POTASSIUM mmol/L 3.0* 2.8* 2.9* 3.4* 3.4* 3.6   CHLORIDE mmol/L 98 97 98 97 98 103   CO2 mmol/L 16* 17* 23 20* 24 21   ANION GAP mmol/L 20 19  14 18 14 15   BUN mg/dL 78* 73* 60* 84* 64* 98*   CREATININE mg/dL 7.14* 6.64* 5.50* 6.24* 4.98* 6.23*   EGFR ml/min/1.73sq m 9 9 12 10 14 10   CALCIUM mg/dL 6.4* 6.3* 6.5* 6.6* 6.9* 7.3*   MAGNESIUM mg/dL 2.5  --   --   --   --  2.2   PHOSPHORUS mg/dL 8.2*  --   --   --   --  4.7*     Results from last 7 days   Lab Units 03/03/24  0451 03/02/24  0921 03/01/24  0633 02/29/24  0454 02/28/24  0557   AST U/L 69* 108* 67* 69* 91*   ALT U/L 123* 142* 137* 145* 161*   ALK PHOS U/L 282* 262* 234* 213* 184*   TOTAL PROTEIN g/dL 3.4* 3.8* 3.7* 3.4* 3.3*   ALBUMIN g/dL 2.4* 2.7* 2.7* 2.5* 2.4*   TOTAL BILIRUBIN mg/dL 48.38* 49.39* 51.34* 47.73* 42.18*     Results from last 7 days   Lab Units 03/03/24  1613 03/03/24  1102 03/03/24  0746 03/02/24  2044 03/02/24  1602 03/02/24  1140 03/02/24  0747 03/02/24  0744 03/01/24  2114 03/01/24  1612 03/01/24  1052 03/01/24  0707   POC GLUCOSE mg/dl 162* 164* 104 157* 178* 110 62* 47* 161* 198* 174* 136     Results from last 7 days   Lab Units 03/03/24  1439 03/03/24  0451 03/02/24  0921 03/01/24  0633 02/29/24  0454 02/28/24  0557   GLUCOSE RANDOM mg/dL 147* 120 66 115 69 90       BETA-HYDROXYBUTYRATE   Date Value Ref Range Status   01/21/2024 0.2 <0.6 mmol/L Final        Results from last 7 days   Lab Units 03/02/24  0501 02/29/24  0454 02/28/24  0557   PROTIME seconds 20.9* 20.3* 21.2*   INR  1.77* 1.70* 1.80*       Results from last 7 days   Lab Units 02/28/24  1453   HEP B S AG  Non-reactive   HEP C AB  Non-reactive   HEP B C IGM  Non-reactive   HEP B C TOTAL AB  Non-reactive     Medications:   Scheduled Medications:  calcium acetate, 667 mg, Oral, TID With Meals  ciprofloxacin, 250 mg, Oral, Q24H  hydrocortisone, 25 mg, Intravenous, Daily  insulin glargine, 20 Units, Subcutaneous, Q12H MURALI  insulin lispro, 1-6 Units, Subcutaneous, TID AC  insulin lispro, 1-6 Units, Subcutaneous, HS  lactulose, 20 g, Oral, BID  melatonin, 9 mg, Oral, HS  midodrine, 10 mg, Oral, TID AC  nicotine, 1  patch, Transdermal, Daily  OLANZapine, 15 mg, Oral, HS  pantoprazole, 40 mg, Oral, Early Morning  thiamine, 100 mg, Oral, Daily      Continuous IV Infusions: None       PRN Meds:  dextromethorphan-guaiFENesin, 10 mL, Oral, Q4H PRN  diphenhydramine, lidocaine, Al/Mg hydroxide, simethicone, 10 mL, Swish & Swallow, Q6H PRN  (3/2 rec'd x3)   hydrOXYzine HCL, 25 mg, Oral, Q6H PRN  ondansetron, 4 mg, Intravenous, Q8H PRN        Discharge Plan: TBD; Case management following, Monday Wednesday Friday chair time secured for 2:45 PM at [facility].     Network Utilization Review Department  ATTENTION: Please call with any questions or concerns to [phone] and carefully listen to the prompts so that you are directed to the right person. All voicemails are confidential.   For Discharge needs, contact Care Management DC Support Team at [phone] opt. [phone]  Send all requests for admission clinical reviews, approved or denied determinations and any other requests to dedicated fax number below belonging to the campus where the patient is receiving treatment. List of dedicated fax numbers for the Facilities:  FACILITY NAME UR FAX NUMBER   ADMISSION DENIALS (Administrative/Medical Necessity) [phone]   DISCHARGE SUPPORT TEAM (NETWORK) [phone]   PARENT CHILD HEALTH (Maternity/NICU/Pediatrics) [phone]   [facility] [phone]   [facility] [phone]   [facility] [phone]   [facility] [phone]   [facility] [phone]   [facility] [phone]   [facility] [phone]   [facility] [phone]   [facility] [phone]   [facility] [phone]   [facility] [phone]   [facility]  [facility] [phone]

## 2024-03-03 NOTE — PROGRESS NOTES
NEPHROLOGY PROGRESS NOTE   Michael oKch 33 y.o. male MRN: [id number]  Unit/Bed#: -01 Encounter: [id number]    Assessment/Plan:    34 yo man with alcoholic liver cirrhosis and severe hyperbilirubinemia, HTN, T2DM, GERD, history of alcoholic hepatitis started on hemodialysis at [facility] where it was determined he is not a transplant candidate and patient returned to [facility] for care.  Nephrology following along for hemodialysis dependent acute kidney injury, electrolyte derangements.    1.  Hemodialysis dependent acute kidney injury POA  Etiology likely ATN/HRS.  Started dialysis at other facility.  Most recent hemodialysis session was 3/1 for post weight 89 kg and 1 L ultrafiltered.  Next hemodialysis session will be tomorrow 3/4.  Right chest wall PermCath intact without issue.  Will be dialyzed against a high bicarb bath-order updated.  Volume removal goal/dry weight to be determined.  Patient has Monday, Wednesday, Friday chair time at [facility]  No signs of renal recovery at present  Continue midodrine 10 mg 3 times daily  Add low phosphorus diet with fluid restriction  2.  Elevated anion gap metabolic acidosis  Renal failure versus lactic acidosis especially in the setting of liver failure.  Will dialyze against high bicarb bath.  Check lactic acid   3.  Mineral bone disorder, hypocalcemia, hyperphosphatemia  Moderate to severe hypocalcemia with hyperphosphatemia however asymptomatic from hypercalcemia standpoint.  Start PhosLo 1 tab 3 times daily with meals and calcium supplement.  Check PTH.  Can use IV calcium if becomes symptomatic.  Check ionized calcium in a.m.  4.  Hypokalemia  Give additional 40 mill equivalents oral potassium today.  Do not restrict potassium in diet at this point  5.  Hyponatremia  Add fluid restriction to diet  6.  Chronic hypotension  Continue standing midodrine with hold parameters  7.  Anemia of renal and liver disease  Transfuse for hemoglobin less than  "7.0 g/dL.  Keep type and screen active.  Unclear if he would be a candidate for BALWINDER  8.  Alcoholic cirrhosis  unfortunately not a candidate for transplant.  Management per GI    ROS  Complains of feeling itchy.  Oral intake is poor.  Frequent stools.  A complete 10 point review of systems have been performed and are otherwise negative.       Historical Information   Past Medical History:   Diagnosis Date    Depression     Diabetes mellitus (HCC)     Gout     Hypertension     Hyponatremia 01/21/2024    Kidney stones      Past Surgical History:   Procedure Laterality Date    CYSTOSCOPY W/ URETERAL STENT PLACEMENT  09/14/2023    IR TUNNELED DIALYSIS CATHETER PLACEMENT  2/29/2024    US GUIDED MASS BIOPSY (UNSPECIFIED)  02/19/2021     Social History   Social History     Substance and Sexual Activity   Alcohol Use Yes    Comment: \"couple 24 oz cans per day\"     Social History     Substance and Sexual Activity   Drug Use Never     Social History     Tobacco Use   Smoking Status Every Day    Current packs/day: 0.25    Types: Cigarettes   Smokeless Tobacco Never       Family History:   No family history on file.    Medications:  Pertinent medications were reviewed  Current Facility-Administered Medications   Medication Dose Route Frequency Provider Last Rate    calcium acetate  667 mg Oral TID With Meals JENSEN Fontenot      [START ON 3/4/2024] calcium carbonate  1 tablet Oral Daily With Breakfast JENSEN Fontenot      ciprofloxacin  250 mg Oral Q24H Yuly S Edenilson, JENSEN      dextromethorphan-guaiFENesin  10 mL Oral Q4H PRN Yuly S Edenilson, JENSEN      diphenhydramine, lidocaine, Al/Mg hydroxide, simethicone  10 mL Swish & Swallow Q6H PRN Yuly S Edenilson, CRNP      hydrocortisone  25 mg Intravenous Daily Yuly S Edenilson, JENSEN      hydrOXYzine HCL  25 mg Oral Q6H PRN Yuly S Edenilson, CRNP      insulin glargine  20 Units Subcutaneous Q12H [facility] Yuly S Edenilson, EJNP      insulin lispro  1-6 Units Subcutaneous TID AC Yuly S EdenilsonJENSEN   " "   insulin lispro  1-6 Units Subcutaneous HS Yuly S Edenilson, CRNP      lactulose  20 g Oral BID Yuly S Edenilson, CRNP      melatonin  9 mg Oral HS Yuly S Edenilson, CRNP      midodrine  10 mg Oral TID AC Niecy Ibarra, CRNP      nicotine  1 patch Transdermal Daily Yuly S Edenilson, CRNP      OLANZapine  15 mg Oral HS Yuly S Edenilson, CRNP      ondansetron  4 mg Intravenous Q8H PRN Yuly S Edenilson, CRNP      pantoprazole  40 mg Oral Early Morning Yuly S Edenilson, CRNP      potassium chloride  40 mEq Oral Once Niecy Ibarra, CRNP      thiamine  100 mg Oral Daily Yuly S Edenilson, CRNP           No Known Allergies      Vitals:   /55   Pulse 91   Temp 97.5 °F (36.4 °C)   Resp 17   Ht 5' 7\" (1.702 m)   Wt 98.8 kg (217 lb 13 oz)   SpO2 98%   BMI 34.11 kg/m²   Body mass index is 34.11 kg/m².  SpO2: 98 %,   SpO2 Activity: At Rest,   O2 Device: None (Room air)      Intake/Output Summary (Last 24 hours) at 3/3/2024 1547  Last data filed at 3/3/2024 1256  Gross per 24 hour   Intake 420 ml   Output --   Net 420 ml     Invasive Devices       Peripheral Intravenous Line  Duration             Peripheral IV 03/02/24 Proximal;Right;Ventral (anterior) Forearm 1 day              Hemodialysis Catheter  Duration             HD Permanent Double Catheter 3 days                    Physical Exam  General: conscious, cooperative, in no acute distress  Eyes: conjunctivae pink, icteric sclerae  ENT: lips and mucous membranes moist  Neck: supple, no JVD, no masses  Chest: clear breath sounds bilaterally, no crackles, ronchus or wheezings right chest wall PermCath intact  CVS: S1 & S2, normal rate, regular rhythm  Abdomen: soft, non-tender, non-distended, normoactive bowel sounds obese  Extremities: Moderate severely jaundiced edema of both legs  Skin: no rash  Neuro: awake, alert, oriented      Diagnostic Data:  Lab: I have personally reviewed pertinent lab results.,   CBC:  Results from last 7 days   Lab Units 03/03/24  0451   WBC Thousand/uL 18.59* " "  HEMOGLOBIN g/dL 8.1*   HEMATOCRIT % 23.0*   PLATELETS Thousands/uL 56*      CMP:   Lab Results   Component Value Date    SODIUM 134 (L) 03/03/2024    K 3.0 (L) 03/03/2024    CL 98 03/03/2024    CO2 16 (L) 03/03/2024    BUN 78 (H) 03/03/2024    CREATININE 7.14 (H) 03/03/2024    CALCIUM 6.4 (L) 03/03/2024    AST 69 (H) 03/03/2024     (H) 03/03/2024    ALKPHOS 282 (H) 03/03/2024    EGFR 9 03/03/2024   ,   PT/INR: No results found for: \"PT\", \"INR\",   Magnesium: No components found for: \"MAG\",  Phosphorous:   Lab Results   Component Value Date    PHOS 8.2 (H) 03/03/2024       Microbiology:  @LABNT,(urinecx:7)@        JENSEN Fontenot    Portions of the record may have been created with voice recognition software. Occasional wrong word or \"sound a like\" substitutions may have occurred due to the inherent limitations of voice recognition software. Read the chart carefully and recognize, using context, where substitutions have occurred.  "

## 2024-03-04 NOTE — PLAN OF CARE
Problem: GASTROINTESTINAL - ADULT  Goal: Oral mucous membranes remain intact  Description: INTERVENTIONS  - Assess oral mucosa and hygiene practices  - Implement preventative oral hygiene regimen  - Implement oral medicated treatments as ordered magic mouth wash  - Initiate Nutrition services referral as needed  3/4/2024 0007 by Zamzam Horn RN  Outcome: Progressing  3/4/2024 0007 by Zamzam Horn RN  Outcome: Progressing

## 2024-03-04 NOTE — ASSESSMENT & PLAN NOTE
Lab Results   Component Value Date    HGBA1C 8.8 (H) 01/21/2024       Recent Labs     03/03/24  1613 03/03/24  2045 03/04/24  0710 03/04/24  1104   POCGLU 162* 139 103 86         Blood Sugar Average: Last 72 hrs:  (P) 132.8375877580799229  Continue Lantus 20 units twice daily  Sliding-scale insulin with Accu-Cheks  Hypoglycemia protocol  Carbohydrate controlled diet

## 2024-03-04 NOTE — PROGRESS NOTES
Patient:    MRN:  [id number]    Apryl Request ID:  [id number]    Level of care reserved:  Dialysis    Partner Reserved:  [place], [place] [id number] [phone]    Clinical needs requested:    Geography searched:  20 miles around [id number]    Start of Service:    Pickup/appointment time:    Request sent:  1:38pm EST on 2/29/2024 by Amanda Aguilar    Partner reserved:  10:04am EST on 3/4/2024 by Alona Harley    Choice list shared:

## 2024-03-04 NOTE — ASSESSMENT & PLAN NOTE
Developed DUANE on CKD with underlying liver failure   ureteral stent placed at [facility], lithotripsy  Developed oliguria, treated with CRRT then transition to HD  Appreciate nephrology consultation  Patient temporary HD catheter replaced with PermCath by IR on 2/29/2024.  Patient also has alcohol induced hepatitis.  Status post dialysis on 3/4/2024

## 2024-03-04 NOTE — ASSESSMENT & PLAN NOTE
Secondary to liver disease and vasodilation  Continue midodrine-dose increased to 10 mg 3 times daily per nephrology since patient is still having orthostatic hypotension  Patient's symptoms get worse post dialysis.  Will give extra dose of midodrine 2.5 mg one-time

## 2024-03-04 NOTE — PROCEDURES
NEPHROLOGY DIALYSIS PROCEDURE NOTE      Patient seen and examined on hemodialysis, tolerating procedure well. All documentation, labs, medications were reviewed by myself, and the treatment plan was reviewed with nurse and patient.     Seen on Dialysis at : 10:45 AM  Dialysis Access: Right IJ tunneled PermCath  Vitals: 71/49  Dialysis time: 3.5 hours  Dialyzer: F160  Sodium bath: 135  Potassium bath: 4 K+  Bicarbonate bath: 40  Calcium bath: 2.5  Ultrafiltration: run even  Blood flow rate: 350 cc/min  Dialysis flow rate: 800 cc/min  Dialysis temperature: 36C  Medications given on HD: 10 mg 3 times daily to be given now    Summary:    33 y.o.  man with PMH alcohol hepatitis, liver cirrhosis, hypertension, diabetes, nephrolithiasis, gout, GERD, depression, tobacco use.  Patient was admitted to [facility] in January due to liver failure then transferred to [facility] and [place] for evaluation of transplant unfortunately patient is not a candidate for liver transplant and was transferred back to [facility] to be close to his family.  Hemodialysis was started at [facility] .  Nephrology is consulted for management of DUANE     Assessment/Plan:    Acute renal failure/acute tubular necrosis currently dialysis dependent  -- Present on admission, severe DUANE stage III  -- Currently dialysis dependent  -- Access : right IJ permacath  -- Hemodialysis initiated on February 25, 2024, completing third treatment  -- Renal failure suspected to be in the setting of acute tubular necrosis, though could not rule out a component of hepatorenal syndrome  -- Plan to maintain dialysis schedule of Monday Wednesday Friday  -- Patient seen on dialysis today.  -- Plan for next dialysis on Wednesday    Alcoholic cirrhosis/liver failure  -- Not a candidate for hepatic transplant  -- Overall prognosis will be poor in the setting of liver failure with renal failure    Blood pressure/volume status  -- Extravascular volume overload/anasarca and third  spacing  -- Hypotensive currently on midodrine 10 mg 3 times daily  -- Ultrafiltration limited due to hypotension  -- Hypotension secondary to cirrhosis    Hypokalemia  -- Currently dialyzing on a 4K bath  -- No need to potassium restricted diet  -- If still hypokalemic tomorrow consider adding potassium chloride.  Would like to avoid hypokalemia in the setting of cirrhosis to prevent worsening hepatic encephalopathy  -- Check a magnesium level tomorrow    Hyponatremia  -- With concurrent hypokalemia  -- Monitor on dialysis    High anion gap metabolic acidosis  -- Dialyzing on a 40 bicarbonate bath    Review of Systems: The entire 12 point ROS has been reviewed.    Physical Exam:    General: Chronically ill, scleral icterus  Skin: Positive jaundice  CVS: S1-S2 appreciated regular rate and rhythm  Lungs: Good air entry  Abdomen: Distended, nontender  Access: No exudate from the exit site  Extremities: Generalized anasarca  Neuro: No asterixis          Current Facility-Administered Medications:     calcium acetate (PHOSLO) capsule 667 mg, 667 mg, Oral, TID With Meals, Niecy OsoriovedJENSEN ba, 667 mg at 03/03/24 1616    calcium carbonate (OYSTER SHELL,OSCAL) 500 mg tablet 1 tablet, 1 tablet, Oral, Daily With Breakfast, JENSEN Fontenot    ciprofloxacin (CIPRO) tablet 250 mg, 250 mg, Oral, Q24H, Yuly S Edenilson, CRNP, 250 mg at 03/03/24 0827    dextromethorphan-guaiFENesin (ROBITUSSIN DM) oral syrup 10 mL, 10 mL, Oral, Q4H PRN, Yuly S Edenilson, CRNP, 10 mL at 03/03/24 2200    diphenhydramine, lidocaine, Al/Mg hydroxide, simethicone (Magic Mouthwash) oral solution 10 mL, 10 mL, Swish & Swallow, Q6H PRN, Yuly S Edenilson, CRNP, 10 mL at 03/04/24 0603    hydrocortisone (Solu-CORTEF) injection 25 mg, 25 mg, Intravenous, Daily, Yuly S Edenilson, CRNP, 25 mg at 03/03/24 0828    hydrOXYzine HCL (ATARAX) tablet 25 mg, 25 mg, Oral, Q6H PRN, Yuly S Edenilson, CRNP, 25 mg at 03/03/24 2046    insulin glargine (LANTUS) subcutaneous injection 20 Units  0.2 mL, 20 Units, Subcutaneous, Q12H MURALI, Yuly S Edenilson, CRNP, 20 Units at 03/03/24 2043    insulin lispro (HumALOG/ADMELOG) 100 units/mL subcutaneous injection 1-6 Units, 1-6 Units, Subcutaneous, TID AC, 1 Units at 03/03/24 1616 **AND** Fingerstick Glucose (POCT), , , TID AC, Yuly S Edenilson, CRNP    insulin lispro (HumALOG/ADMELOG) 100 units/mL subcutaneous injection 1-6 Units, 1-6 Units, Subcutaneous, HS, Yuly S Edenilson, CRNP, 1 Units at 03/02/24 2044    lactulose (CHRONULAC) oral solution 20 g, 20 g, Oral, BID, Yuly S Edenilson, CRNP, 20 g at 03/03/24 1708    melatonin tablet 9 mg, 9 mg, Oral, HS, Yuly S Edenilson, CRNP, 9 mg at 03/03/24 2200    midodrine (PROAMATINE) tablet 10 mg, 10 mg, Oral, TID AC, Niecy Ibarra, CRNP, 10 mg at 03/04/24 1053    nicotine (NICODERM CQ) 14 mg/24hr TD 24 hr patch 1 patch, 1 patch, Transdermal, Daily, Yuly S Edenilson, CRNP, 1 patch at 03/03/24 0829    OLANZapine (ZyPREXA) tablet 15 mg, 15 mg, Oral, HS, Yuly S Edenilson, CRNP, 15 mg at 03/03/24 2200    ondansetron (ZOFRAN) injection 4 mg, 4 mg, Intravenous, Q8H PRN, Yuly S Edenilson, CRNP, 4 mg at 02/29/24 1210    pantoprazole (PROTONIX) EC tablet 40 mg, 40 mg, Oral, Early Morning, Yuly S Edenilson, CRNP, 40 mg at 03/04/24 0553    thiamine tablet 100 mg, 100 mg, Oral, Daily, Yuly S Edenilson, CRNP, 100 mg at 03/03/24 0828

## 2024-03-04 NOTE — PROGRESS NOTES
[facility]  Progress Note  Name: Michael Koch I  MRN: [id number]  Unit/Bed#: -01 I Date of Admission: 2/27/2024   Date of Service: 3/4/2024 I Hospital Day: 6    Assessment/Plan   * Alcoholic hepatitis  Assessment & Plan  Currently on Solu-Cortef 25 mg daily -continue to wean  Alcoholic cirrhosis  Transferred from [facility] in [place] after liver transplant evaluation, he was found not to be candidate  Continue lactulose  No encephalopathy on admission-but patient mentation varies  PT/OT/case management for discharge planning so patient may prepare for transplant reevaluation in the future  Patient has extensive workup done, at this time, not much to offer, w  After chart review, and discussion is done with patient's family at the bedside, since patient is on Solu-Cortef since patient got transferred to [facility].  Again per family, steroid dose was on and off basis.  For safe side, we will try to wean off his steroid on discharge.  Reaching out to gastroenterology for recommendation  Discussion has done with patient's family regarding treatment plan, options.  Family prefers to discuss with palliative care.  Consultation placed.    Orthostatic hypotension  Assessment & Plan  Secondary to liver disease and vasodilation  Continue midodrine-dose increased to 10 mg 3 times daily per nephrology since patient is still having orthostatic hypotension  Patient's symptoms get worse post dialysis.  Will give extra dose of midodrine 2.5 mg one-time    Malnutrition (HCC)  Assessment & Plan  Postpyloric tube in place and has been receiving tube feeding to supplement oral intake at outside hospital  Also eating as tolerated of oral diet  Patient complaining of irritated throat and requesting tube be removed  Patient evaluated by speech therapist, able to swallow, at this time we will remove the postpyloric tube.    Renal failure  Assessment & Plan  Developed DUANE on CKD with underlying liver  failure   ureteral stent placed at [facility], lithotripsy  Developed oliguria, treated with CRRT then transition to HD  Appreciate nephrology consultation  Patient temporary HD catheter replaced with PermCath by IR on 2/29/2024.  Patient also has alcohol induced hepatitis.  Status post dialysis on 3/4/2024    GERD (gastroesophageal reflux disease)  Assessment & Plan  Continue PPI    Depression  Assessment & Plan  Continue Zyprexa at at bedtime    ETOH abuse  Assessment & Plan  History alcohol abuse, last drink prior to admission in January  Will need outpatient alcohol counseling prior to transplant reevaluation-remember, patient has referral from [facility] for virtual therapy which supposed to initiated soon.  Continue daily thiamine    Tobacco abuse  Assessment & Plan  Nicotine patch  Cessation counseling    Type 2 diabetes mellitus with hyperglycemia, without long-term current use of insulin (HCC)  Assessment & Plan  Lab Results   Component Value Date    HGBA1C 8.8 (H) 01/21/2024       Recent Labs     03/03/24  1613 03/03/24  2045 03/04/24  0710 03/04/24  1104   POCGLU 162* 139 103 86         Blood Sugar Average: Last 72 hrs:  (P) [id number]  Continue Lantus 20 units twice daily  Sliding-scale insulin with Accu-Cheks  Hypoglycemia protocol  Carbohydrate controlled diet             VTE Pharmacologic Prophylaxis:    Thrombocytopenia    Mobility:   Basic Mobility Inpatient Raw Score: 18  JH-HLM Goal: 6: Walk 10 steps or more  JH-HLM Achieved: 6: Walk 10 steps or more  HLM Goal achieved. Continue to encourage appropriate mobility.    Patient Centered Rounds: I performed bedside rounds with nursing staff today.   Discussions with Specialists or Other Care Team Provider: Nephrology    Education and Discussions with Family / Patient: Updated  (wife) via phone.-left voice message for spouse      Current Length of Stay: 6 day(s)  Current Patient Status: Inpatient   Certification Statement:  The patient will continue to require additional inpatient hospital stay due to monitor above conditions  Discharge Plan: Anticipate discharge in 24-48 hrs to to be determined    Code Status: Level 1 - Full Code    Subjective:   Seen and evaluated during the run.  Initially patient was found receiving dialysis.  Later on, patient off of dialysis, feels fatigue and tired.  Per nurse, patient was about to have syncopal episode due to hypotension.    Objective:     Vitals:   Temp (24hrs), Av.6 °F (36.4 °C), Min:97.3 °F (36.3 °C), Max:97.8 °F (36.6 °C)    Temp:  [97.3 °F (36.3 °C)-97.8 °F (36.6 °C)] 97.8 °F (36.6 °C)  HR:  [] 98  Resp:  [14-18] 18  BP: ()/(47-74) 94/74  SpO2:  [98 %-99 %] 99 %  Body mass index is 29.95 kg/m².     Input and Output Summary (last 24 hours):     Intake/Output Summary (Last 24 hours) at 3/4/2024 1347  Last data filed at 3/4/2024 1217  Gross per 24 hour   Intake 910 ml   Output 801 ml   Net 109 ml       Physical Exam:   Physical Exam  Vitals and nursing note reviewed.   Constitutional:       Appearance: He is ill-appearing. He is not diaphoretic.   Eyes:      General: Scleral icterus present.      Extraocular Movements: Extraocular movements intact.      Pupils: Pupils are equal, round, and reactive to light.   Cardiovascular:      Rate and Rhythm: Normal rate.      Heart sounds: No murmur heard.     No friction rub. No gallop.   Pulmonary:      Effort: No respiratory distress.      Breath sounds: No stridor. No wheezing or rhonchi.   Abdominal:      General: Abdomen is flat. Bowel sounds are normal. There is distension.      Tenderness: There is no abdominal tenderness. There is no guarding or rebound.      Hernia: No hernia is present.   Musculoskeletal:      Right lower leg: Edema present.      Left lower leg: Edema present.   Skin:     Coloration: Skin is jaundiced.   Neurological:      Mental Status: He is alert and oriented to person, place, and time.      Cranial Nerves:  No cranial nerve deficit.      Sensory: No sensory deficit.      Motor: No weakness.      Coordination: Coordination normal.         Additional Data:     Labs:  Results from last 7 days   Lab Units 03/04/24  0842   WBC Thousand/uL 19.43*   HEMOGLOBIN g/dL 8.3*   HEMATOCRIT % 22.9*   PLATELETS Thousands/uL 55*   NEUTROS PCT % 88*   LYMPHS PCT % 5*   MONOS PCT % 4   EOS PCT % 1     Results from last 7 days   Lab Units 03/04/24  0842   SODIUM mmol/L 134*   POTASSIUM mmol/L 2.9*   CHLORIDE mmol/L 99   CO2 mmol/L 15*   BUN mg/dL 87*   CREATININE mg/dL 8.12*   ANION GAP mmol/L 20   CALCIUM mg/dL 6.3*   ALBUMIN g/dL 2.4*   TOTAL BILIRUBIN mg/dL 48.69*   ALK PHOS U/L 284*   ALT U/L 107*   AST U/L 62*   GLUCOSE RANDOM mg/dL 116     Results from last 7 days   Lab Units 03/04/24  0842   INR  2.13*     Results from last 7 days   Lab Units 03/04/24  1104 03/04/24  0710 03/03/24  2045 03/03/24  1613 03/03/24  1102 03/03/24  0746 03/02/24  2044 03/02/24  1602 03/02/24  1140 03/02/24  0747 03/02/24  0744 03/01/24  2114   POC GLUCOSE mg/dl 86 103 139 162* 164* 104 157* 178* 110 62* 47* 161*         Results from last 7 days   Lab Units 03/04/24  0842   LACTIC ACID mmol/L 0.8       Lines/Drains:  Invasive Devices       Peripheral Intravenous Line  Duration             Peripheral IV 03/02/24 Proximal;Right;Ventral (anterior) Forearm 2 days              Hemodialysis Catheter  Duration             HD Permanent Double Catheter 4 days                          Imaging: No pertinent imaging reviewed.    Recent Cultures (last 7 days):         Last 24 Hours Medication List:   Current Facility-Administered Medications   Medication Dose Route Frequency Provider Last Rate    calcium acetate  667 mg Oral TID With Meals Niecy Ibarra, CRNP      calcium carbonate  1 tablet Oral Daily With Breakfast Niecy Ibarra, CRNP      ciprofloxacin  250 mg Oral Q24H Yuly S Edenilson, CRNP      dextromethorphan-guaiFENesin  10 mL Oral Q4H PRN Yuly S Edenilson, CRNP       diphenhydramine, lidocaine, Al/Mg hydroxide, simethicone  10 mL Swish & Swallow Q6H PRN Yuly S Edenilson, CRNP      hydrocortisone  25 mg Intravenous Daily Yuly S Edenilson, CRNP      hydrOXYzine HCL  25 mg Oral Q6H PRN Yuly S Edenilson, CRNP      insulin glargine  20 Units Subcutaneous Q12H MURALI Yuly S Edenilson, CRNP      insulin lispro  1-6 Units Subcutaneous TID AC Yuly S Edenilson, CRNP      insulin lispro  1-6 Units Subcutaneous HS Yuly S Edenilson, CRNP      lactulose  20 g Oral BID Yuly S Edenilson, CRNP      melatonin  9 mg Oral HS Yuly S Edenilson, CRNP      midodrine  10 mg Oral TID AC Niecy Ibarra, CRNP      nicotine  1 patch Transdermal Daily Yuly S Edenilson, CRNP      OLANZapine  15 mg Oral HS Yuly S Edenilson, CRNP      ondansetron  4 mg Intravenous Q8H PRN Yuly S Edenilson, CRNP      pantoprazole  40 mg Oral Early Morning Yuly S Edenilson, CRNP      thiamine  100 mg Oral Daily Yuly S Edenilson, CRNP          Today, Patient Was Seen By: Tiburcio Jean MD    **Please Note: This note may have been constructed using a voice recognition system.**

## 2024-03-04 NOTE — ASSESSMENT & PLAN NOTE
Currently on Solu-Cortef 25 mg daily -continue to wean  Alcoholic cirrhosis  Transferred from [facility] in [place] after liver transplant evaluation, he was found not to be candidate  Continue lactulose  No encephalopathy on admission-but patient mentation varies  PT/OT/case management for discharge planning so patient may prepare for transplant reevaluation in the future  Patient has extensive workup done, at this time, not much to offer, w  After chart review, and discussion is done with patient's family at the bedside, since patient is on Solu-Cortef since patient got transferred to [facility].  Again per family, steroid dose was on and off basis.  For safe side, we will try to wean off his steroid on discharge.  Reaching out to gastroenterology for recommendation  Discussion has done with patient's family regarding treatment plan, options.  Family prefers to discuss with palliative care.  Consultation placed.

## 2024-03-04 NOTE — ASSESSMENT & PLAN NOTE
History alcohol abuse, last drink prior to admission in January  Will need outpatient alcohol counseling prior to transplant reevaluation-remember, patient has referral from [facility] for virtual therapy which supposed to initiated soon.  Continue daily thiamine   S/p kyphoplasty. Has chronic pain. Continue with scheduled Tylenol. As needed muscle relaxant and aqua K-pad. PT evaluation will be obtained.

## 2024-03-05 NOTE — ASSESSMENT & PLAN NOTE
Currently on Solu-Cortef 25 mg daily -continue to wean  Alcoholic cirrhosis  Transferred from [facility] in [place] after liver transplant evaluation, he was found not to be candidate  Continue lactulose  No encephalopathy on admission-but patient mentation varies  PT/OT/case management for discharge planning so patient may prepare for transplant reevaluation in the future  Patient has extensive workup done, at this time, not much to offer, w  After chart review, and discussion is done with patient's family at the bedside, since patient is on Solu-Cortef since patient got transferred to [facility].  Again per family, steroid dose was on and off basis.  For safe side, we will try to wean off his steroid on discharge.  Reaching out to gastroenterology for recommendation  Discussion has done with patient's family regarding treatment plan, options.  Family prefers to discuss with palliative care.  Consultation placed.  Stop steroids and observe  Asked neuropsych for competency evaluation.  Will see if patient qualifies to go to skilled nursing facility as he does not appear to be stable at this time to return home.

## 2024-03-05 NOTE — PROGRESS NOTES
NEPHROLOGY PROGRESS NOTE   Michael Koch 33 y.o. male MRN: [id number]  Unit/Bed#: -01 Encounter: [id number]  Reason for Consult: ARF      SUMMARY:    33 y.o.  man with PMH alcohol hepatitis, liver cirrhosis, hypertension, diabetes, nephrolithiasis, gout, GERD, depression, tobacco use.  Patient was admitted to [facility] in January due to liver failure then transferred to [facility] and [place] for evaluation of transplant unfortunately patient is not a candidate for liver transplant and was transferred back to [facility] to be close to his family.  Hemodialysis was started at [facility] .  Nephrology is consulted for management of DUANE      Assessment/Plan:     Acute renal failure/acute tubular necrosis currently dialysis dependent  -- Present on admission, severe DUANE stage III  -- Currently dialysis dependent  -- Access : right IJ permacath  -- Hemodialysis initiated on February 25, 2024, completed third dialysis treatment yesterday, March 4  -- Renal failure suspected to be in the setting of acute tubular necrosis, though could not rule out a component of hepatorenal syndrome  -- Plan to maintain dialysis schedule of Monday Wednesday Friday  -- Plan for next dialysis tomorrow Wednesday     Alcoholic cirrhosis/liver failure  -- Not a candidate for hepatic transplant  -- Overall prognosis will be poor in the setting of liver failure with renal failure  --Palliative care on board     Blood pressure/volume status  -- Extravascular volume overload/anasarca and third spacing  -- Hypotensive currently on midodrine 10 mg 3 times daily  -- Ultrafiltration limited due to hypotension  -- Hypotension secondary to cirrhosis  --Blood pressure currently stable continue current management     Hypokalemia  --Dialyzed on a 4K bath  -- No need to potassium restricted diet  -- Would like to avoid hypokalemia in the setting of cirrhosis to prevent worsening hepatic encephalopathy  -- Magnesium level is normal  --Start potassium  chloride 40 mEq     Hyponatremia--resolved     High anion gap metabolic acidosis--improving  -- Dialyzing on a 40 bicarbonate bath      SUBJECTIVE / INTERVAL HISTORY:    Underwent dialysis yesterday.  No complaints today    OBJECTIVE:  Current Weight: Weight - Scale: 84.6 kg (186 lb 9.6 oz)  Vitals:    03/05/24 0518 03/05/24 0600 03/05/24 0619 03/05/24 0740   BP:   97/53 98/57   BP Location:       Pulse:    94   Resp:       Temp:    97.7 °F (36.5 °C)   TempSrc:       SpO2:    100%   Weight: 84.6 kg (186 lb 9.6 oz) 84.6 kg (186 lb 9.6 oz)     Height:           Intake/Output Summary (Last 24 hours) at 3/5/2024 0941  Last data filed at 3/5/2024 0900  Gross per 24 hour   Intake 650 ml   Output 801 ml   Net -151 ml       Review of Systems:    Constitutional: Negative for chills and fever.   HENT: Negative for ear pain and sore throat.    Eyes: Negative for pain and visual disturbance.   Respiratory: Negative for cough and shortness of breath.    Cardiovascular: Negative for chest pain and palpitations.   Gastrointestinal: Negative for abdominal pain and vomiting.   Genitourinary: Negative for dysuria and hematuria.   Musculoskeletal: Negative for arthralgias and back pain.   Skin: Negative for color change and rash.   Neurological: Negative for seizures and syncope.   12 point ROS has been reviewed.    Physical Exam  Vitals and nursing note reviewed.   Constitutional:       General: He is not in acute distress.     Appearance: He is well-developed. He is ill-appearing.   HENT:      Head: Normocephalic and atraumatic.   Eyes:      General: Scleral icterus present.      Conjunctiva/sclera: Conjunctivae normal.      Pupils: Pupils are equal, round, and reactive to light.   Cardiovascular:      Rate and Rhythm: Normal rate and regular rhythm.      Heart sounds: S1 normal and S2 normal. No murmur heard.     No friction rub. No gallop.   Pulmonary:      Effort: Pulmonary effort is normal. No respiratory distress.      Breath  sounds: Normal breath sounds. No wheezing or rales.   Abdominal:      General: Bowel sounds are normal.      Palpations: Abdomen is soft.      Tenderness: There is no abdominal tenderness. There is no rebound.   Musculoskeletal:         General: Normal range of motion.      Cervical back: Normal range of motion and neck supple.   Skin:     General: Skin is dry.      Coloration: Skin is jaundiced.      Findings: No rash.   Neurological:      Mental Status: He is alert and oriented to person, place, and time.   Psychiatric:         Behavior: Behavior normal.         Medications:    Current Facility-Administered Medications:     calcium acetate (PHOSLO) capsule 667 mg, 667 mg, Oral, TID With Meals, Niecy Ibarra, CRNP, 667 mg at 03/05/24 0809    calcium carbonate (OYSTER SHELL,OSCAL) 500 mg tablet 1 tablet, 1 tablet, Oral, Daily With Breakfast, Niecy Ibarra, CRNP, 1 tablet at 03/05/24 0810    ciprofloxacin (CIPRO) tablet 250 mg, 250 mg, Oral, Q24H, Yuly S Edenilson, CRNP, 250 mg at 03/05/24 0809    dextromethorphan-guaiFENesin (ROBITUSSIN DM) oral syrup 10 mL, 10 mL, Oral, Q4H PRN, Yuly S Edenilson, CRNP, 10 mL at 03/03/24 2200    diphenhydramine, lidocaine, Al/Mg hydroxide, simethicone (Magic Mouthwash) oral solution 10 mL, 10 mL, Swish & Swallow, Q6H PRN, Yuly S Edenilson, CRNP, 10 mL at 03/05/24 0810    hydrocortisone (Solu-CORTEF) injection 25 mg, 25 mg, Intravenous, Daily, Yuly S Edenilson, CRNP, 25 mg at 03/05/24 0809    hydrOXYzine HCL (ATARAX) tablet 25 mg, 25 mg, Oral, Q6H PRN, Yuly S Edenilson, CRNP, 25 mg at 03/03/24 2046    insulin glargine (LANTUS) subcutaneous injection 20 Units 0.2 mL, 20 Units, Subcutaneous, Q12H MURALI, Yuly S Edenilson, CRNP, 20 Units at 03/04/24 2130    insulin lispro (HumALOG/ADMELOG) 100 units/mL subcutaneous injection 1-6 Units, 1-6 Units, Subcutaneous, TID AC, 1 Units at 03/03/24 1616 **AND** Fingerstick Glucose (POCT), , , TID CATALINA, JENSEN Moreland    insulin lispro (HumALOG/ADMELOG) 100 units/mL  subcutaneous injection 1-6 Units, 1-6 Units, Subcutaneous, HS, Yuly S Edenilson, CRNP, 1 Units at 03/02/24 2044    lactulose (CHRONULAC) oral solution 20 g, 20 g, Oral, BID, Yuly S Edenilson, CRNP, 20 g at 03/05/24 0809    melatonin tablet 9 mg, 9 mg, Oral, HS, Yuly S Edenilson, CRNP, 9 mg at 03/04/24 2129    midodrine (PROAMATINE) tablet 10 mg, 10 mg, Oral, TID AC, Niecy Ibarra, CRNP, 10 mg at 03/05/24 0619    nicotine (NICODERM CQ) 14 mg/24hr TD 24 hr patch 1 patch, 1 patch, Transdermal, Daily, Yuly S Edenilson, CRNP, 1 patch at 03/05/24 0809    OLANZapine (ZyPREXA) tablet 15 mg, 15 mg, Oral, HS, Yuly S Edenilson, CRNP, 15 mg at 03/04/24 2131    ondansetron (ZOFRAN) injection 4 mg, 4 mg, Intravenous, Q8H PRN, Yuly S Edenilson, CRNP, 4 mg at 02/29/24 1210    pantoprazole (PROTONIX) EC tablet 40 mg, 40 mg, Oral, Early Morning, Yuly S Edenilson, CRNP, 40 mg at 03/05/24 0619    thiamine tablet 100 mg, 100 mg, Oral, Daily, Yuly S Edenilson, CRNP, 100 mg at 03/05/24 0809    Laboratory Results:  Results from last 7 days   Lab Units 03/05/24  0527 03/04/24  0842 03/03/24  1439 03/03/24  0451 03/02/24  0921 03/02/24  0501 03/01/24  0633 02/29/24  0454 02/28/24  0557   WBC Thousand/uL 19.04* 19.43*  --  18.59*  --  20.54* 17.56* 15.12* 17.98*   HEMOGLOBIN g/dL 8.8* 8.3*  --  8.1*  --  8.6* 8.9* 8.6* 8.8*   HEMATOCRIT % 23.8* 22.9*  --  23.0*  --  24.0* 24.6* 23.2* 24.1*   PLATELETS Thousands/uL 51* 55*  --  56*  --  55* 73* 75* 92*   POTASSIUM mmol/L 2.7* 2.9* 3.0* 2.8* 2.9*  --  3.4* 3.4* 3.6   CHLORIDE mmol/L 96 99 98 97 98  --  97 98 103   CO2 mmol/L 24 15* 16* 17* 23  --  20* 24 21   BUN mg/dL 56* 87* 78* 73* 60*  --  84* 64* 98*   CREATININE mg/dL 5.73* 8.12* 7.14* 6.64* 5.50*  --  6.24* 4.98* 6.23*   CALCIUM mg/dL 6.3* 6.3* 6.4* 6.3* 6.5*  --  6.6* 6.9* 7.3*   MAGNESIUM mg/dL 2.3 2.5 2.5  --   --   --   --   --  2.2   PHOSPHORUS mg/dL  --  9.4* 8.2*  --   --   --   --   --  4.7*       PLEASE NOTE:  This encounter was completed utilizing the  M- Modal/Fluency Direct Speech Voice Recognition Software. Grammatical errors, random word insertions, pronoun errors and incomplete sentences are occasional consequences of the system due to software limitations, ambient noise and hardware issues.These may be missed by proof reading prior to affixing electronic signature. Any questions or concerns about the content, text or information contained within the body of this dictation should be directly addressed to the physician for clarification. Please do not hesitate to call me directly if you have any any questions or concerns.

## 2024-03-05 NOTE — ASSESSMENT & PLAN NOTE
Lab Results   Component Value Date    HGBA1C 8.8 (H) 01/21/2024       Recent Labs     03/04/24 2002 03/04/24  2101 03/05/24  0738 03/05/24  1111   POCGLU 128 147* 90 138         Blood Sugar Average: Last 72 hrs:  (P) 119.3125  Continue Lantus 20 units twice daily  Sliding-scale insulin with Accu-Cheks  Hypoglycemia protocol  Carbohydrate controlled diet

## 2024-03-05 NOTE — PHYSICAL THERAPY NOTE
" PHYSICAL THERAPY TREATMENT NOTE      NAME:  Michael Koch  DATE: 03/05/24    Length Of Stay: 7  Performed at least 2 patient identifiers during session: Name and Birthday    TREATMENT FLOW SHEET:       03/05/24 1425   PT Last Visit   PT Visit Date 03/05/24   Note Type   Note Type Treatment   Pain Assessment   Pain Assessment Tool 0-10   Pain Score No Pain   Restrictions/Precautions   Weight Bearing Precautions Per Order No   Other Precautions Chair Alarm;Bed Alarm;Fall Risk  (orthostatic)   General   Chart Reviewed Yes   Response to Previous Treatment Patient reporting fatigue but able to participate.   Family/Caregiver Present No   Cognition   Overall Cognitive Status WFL   Arousal/Participation Alert   Orientation Level Oriented X4   Memory Within functional limits   Following Commands Follows one step commands without difficulty   Subjective   Subjective \"I passed out yesterday\"   Bed Mobility   Additional Comments Pt supine in recliner chair on arrival to room.   Transfers   Sit to Stand   (SBA)   Additional items Assist x 1;Verbal cues   Stand to Sit   (SBA)   Additional items Assist x 1;Verbal cues   Stand pivot   (steadying assist)   Additional items Assist x 1;Verbal cues   Toilet transfer   (SBA)   Additional items Standard toilet  (grab bar)   Additional Comments Using RW   Ambulation/Elevation   Gait pattern Improper Weight shift;Decreased foot clearance;Inconsistent eva   Gait Assistance   (steadying assist)   Additional items Assist x 1;Verbal cues   Assistive Device Rolling walker   Distance 20 ft  (distance limited due to feeling lightheaded and pt requesting sit)   Stair Management Assistance   (steadying assist)   Additional items Assist x 1;Verbal cues   Stair Management Technique Two rails;Alternating pattern   Number of Stairs 5   Ambulation/Elevation Additional Comments Pt also ambulates 14 ft x 1 without AD min A x 1.   Balance   Static Sitting Good   Dynamic Sitting Fair +   Static " Standing Fair   Dynamic Standing Poor +   Ambulatory Poor +   Endurance Deficit   Endurance Deficit Yes   Endurance Deficit Description lightheaded   Activity Tolerance   Activity Tolerance Patient limited by fatigue;Other (Comment)  (symptomatic orthostatic hypotension)   Medical Staff Made Aware PCA   Assessment   Prognosis Guarded   Problem List Decreased strength;Decreased endurance;Impaired balance;Decreased mobility;Impaired judgement;Obesity   Assessment Pt tolerates tx session fair.  Pt with continued symptomatic orthostatic hypotension (see vitals section for details).  PT donns ordered knee high CARLOS stockings.  Completing functional mobility as tolerated and within reason based off of pt's symptoms.  Pt reporting that he really wants to D/C home, however PT expresses concerns in regards to orthostasis -> especially post dialysis.  Pt also continues with loose bowels, noting 2 episodes of loose bowel movements during session today.   Barriers to Discharge Other (Comment)   Barriers to Discharge Comments CAMILO, dialysis, fatigue, orthostatic hypotension with symptoms, alone during the day   Goals   STG Expiration Date 03/13/24   PT Treatment Day 4   Plan   Progress Slow progress, decreased activity tolerance   PT Frequency 3-5x/wk   Discharge Recommendation   Rehab Resource Intensity Level, PT II (Moderate Resource Intensity)  (if pt chooses home D/C he will require the DME listed below)   Equipment Recommended Wheelchair;Walker   Wheelchair Package Recommended Standard   Walker Package Recommended Wheeled walker   AM-PAC Basic Mobility Inpatient   Turning in Flat Bed Without Bedrails 3   Lying on Back to Sitting on Edge of Flat Bed Without Bedrails 3   Moving Bed to Chair 3   Standing Up From Chair Using Arms 3   Walk in Room 3   Climb 3-5 Stairs With Railing 3   Basic Mobility Inpatient Raw Score 18   Basic Mobility Standardized Score 41.05   Highest Level Of Mobility   -[facility] Goal 6: Walk 10 steps or more    -[facility] Achieved 6: Walk 10 steps or more   Education   Education Provided Mobility training;Assistive device   Patient Reinforcement needed   End of Consult   Patient Position at End of Consult Bedside chair;Bed/Chair alarm activated;All needs within reach         03/05/24 1429 03/05/24 1430 03/05/24 1433   Vitals   Pulse 92 94 96   Blood Pressure 97/57 93/54 (!) 85/51   MAP (mmHg) 70 67 (!) 62   Patient Position - Orthostatic VS Lying - Orthostatic VS Sitting - Orthostatic VS Standing - Orthostatic VS      03/05/24 1438 03/05/24 1459   Vitals   Pulse (!) 48 95   Blood Pressure (!) 83/48 110/59   MAP (mmHg) (!) 60 76   Patient Position - Orthostatic VS Standing - Orthostatic VS Sitting - Orthostatic VS  (post activity wearing knee high TEDs)       The patient's AM-PAC Basic Mobility Inpatient Short Form Raw Score is 18. A Raw score of greater than 16 suggests the patient may benefit from discharge to home. Please also refer to the recommendation of the Physical Therapist for safe discharge planning.         Quintin Schmitz, PT,DPT

## 2024-03-05 NOTE — PROGRESS NOTES
[facility]  Progress Note  Name: Michael Koch I  MRN: [id number]  Unit/Bed#: -Ashley I Date of Admission: 2/27/2024   Date of Service: 3/5/2024 I Hospital Day: 7    Assessment/Plan   * Alcoholic hepatitis  Assessment & Plan  Currently on Solu-Cortef 25 mg daily -continue to wean  Alcoholic cirrhosis  Transferred from [facility] in [place] after liver transplant evaluation, he was found not to be candidate  Continue lactulose  No encephalopathy on admission-but patient mentation varies  PT/OT/case management for discharge planning so patient may prepare for transplant reevaluation in the future  Patient has extensive workup done, at this time, not much to offer, w  After chart review, and discussion is done with patient's family at the bedside, since patient is on Solu-Cortef since patient got transferred to [facility].  Again per family, steroid dose was on and off basis.  For safe side, we will try to wean off his steroid on discharge.  Reaching out to gastroenterology for recommendation  Discussion has done with patient's family regarding treatment plan, options.  Family prefers to discuss with palliative care.  Consultation placed.  Stop steroids and observe  Asked neuropsych for competency evaluation.  Will see if patient qualifies to go to skilled nursing facility as he does not appear to be stable at this time to return home.    Orthostatic hypotension  Assessment & Plan  Secondary to liver disease and vasodilation  Continue midodrine-dose increased to 15 mg 3 times daily since patient is still having orthostatic hypotension which is limiting his mobility.  Also added CARLOS stockings.  Patient's symptoms get worse post dialysis.    Renal failure  Assessment & Plan  Developed DUANE on CKD with underlying liver failure   ureteral stent placed at [facility], lithotripsy  Developed oliguria, treated with CRRT then transition to HD  Appreciate nephrology consultation  Patient temporary HD  catheter replaced with PermCath by IR on 2/29/2024.  Patient also has alcohol induced hepatitis.  Status post dialysis on 3/4/2024    GERD (gastroesophageal reflux disease)  Assessment & Plan  Continue PPI    Depression  Assessment & Plan  Continue Zyprexa at at bedtime    ETOH abuse  Assessment & Plan  History alcohol abuse, last drink prior to admission in January  Will need outpatient alcohol counseling prior to transplant reevaluation-remember, patient has referral from [facility] for virtual therapy which supposed to initiated soon.  Continue daily thiamine    Type 2 diabetes mellitus with hyperglycemia, without long-term current use of insulin (HCC)  Assessment & Plan  Lab Results   Component Value Date    HGBA1C 8.8 (H) 01/21/2024       Recent Labs     03/04/24 2002 03/04/24  2101 03/05/24  0738 03/05/24  1111   POCGLU 128 147* 90 138         Blood Sugar Average: Last 72 hrs:  (P) 119.3125  Continue Lantus 20 units twice daily  Sliding-scale insulin with Accu-Cheks  Hypoglycemia protocol  Carbohydrate controlled diet               VTE Pharmacologic Prophylaxis:   Moderate Risk (Score 3-4) - Pharmacological DVT Prophylaxis Contraindicated. Sequential Compression Devices Ordered.    Mobility:   Basic Mobility Inpatient Raw Score: 18  JH-HLM Goal: 6: Walk 10 steps or more  JH-HLM Achieved: 7: Walk 25 feet or more  HLM Goal achieved. Continue to encourage appropriate mobility.    Patient Centered Rounds: I performed bedside rounds with nursing staff today.   Discussions with Specialists or Other Care Team Provider: none    Education and Discussions with Family / Patient: none    Total Time Spent on Date of Encounter in care of patient: 35 mins. This time was spent on one or more of the following: performing physical exam; counseling and coordination of care; obtaining or reviewing history; documenting in the medical record; reviewing/ordering tests, medications or procedures; communicating with other  healthcare professionals and discussing with patient's family/caregivers.    Current Length of Stay: 7 day(s)  Current Patient Status: Inpatient   Certification Statement: The patient will continue to require additional inpatient hospital stay due to alcoholic hepAtitis  Discharge Plan: Anticipate discharge in 48-72 hrs to rehab facility.    Code Status: Level 1 - Full Code    Subjective:   Patient having issues with orthostatic hypotension.  Difficulty ambulating secondary to that and also issues with incontinence.  Patient would like to go home however it does not seem like it is safe for him to go home due to his current medical condition    Objective:     Vitals:   Temp (24hrs), Av.8 °F (36.6 °C), Min:97.7 °F (36.5 °C), Max:97.9 °F (36.6 °C)    Temp:  [97.7 °F (36.5 °C)-97.9 °F (36.6 °C)] 97.7 °F (36.5 °C)  HR:  [93-96] 94  Resp:  [16] 16  BP: (95-98)/(52-57) 98/57  SpO2:  [96 %-100 %] 100 %  Body mass index is 29.23 kg/m².     Input and Output Summary (last 24 hours):     Intake/Output Summary (Last 24 hours) at 3/5/2024 1404  Last data filed at 3/5/2024 0900  Gross per 24 hour   Intake 300 ml   Output --   Net 300 ml       Physical Exam:   Physical Exam  Vitals and nursing note reviewed.   Constitutional:       Appearance: Normal appearance.   HENT:      Head: Normocephalic and atraumatic.      Right Ear: External ear normal.      Left Ear: External ear normal.      Nose: Nose normal.      Mouth/Throat:      Pharynx: Oropharynx is clear.   Eyes:      Pupils: Pupils are equal, round, and reactive to light.   Cardiovascular:      Rate and Rhythm: Normal rate and regular rhythm.      Heart sounds: Normal heart sounds.   Pulmonary:      Effort: Pulmonary effort is normal.      Breath sounds: Normal breath sounds.   Abdominal:      General: Bowel sounds are normal.      Palpations: Abdomen is soft.      Tenderness: There is no abdominal tenderness.   Musculoskeletal:         General: Normal range of motion.       Cervical back: Normal range of motion and neck supple.   Skin:     General: Skin is warm and dry.      Capillary Refill: Capillary refill takes less than 2 seconds.      Coloration: Skin is jaundiced.   Neurological:      General: No focal deficit present.      Mental Status: He is alert and oriented to person, place, and time.   Psychiatric:         Mood and Affect: Mood normal.            Additional Data:     Labs:  Results from last 7 days   Lab Units 03/05/24  0527   WBC Thousand/uL 19.04*   HEMOGLOBIN g/dL 8.8*   HEMATOCRIT % 23.8*   PLATELETS Thousands/uL 51*   NEUTROS PCT % 88*   LYMPHS PCT % 5*   MONOS PCT % 5   EOS PCT % 1     Results from last 7 days   Lab Units 03/05/24  0527   SODIUM mmol/L 137   POTASSIUM mmol/L 2.7*   CHLORIDE mmol/L 96   CO2 mmol/L 24   BUN mg/dL 56*   CREATININE mg/dL 5.73*   ANION GAP mmol/L 17   CALCIUM mg/dL 6.3*   ALBUMIN g/dL 2.5*   TOTAL BILIRUBIN mg/dL 49.17*   ALK PHOS U/L 288*   ALT U/L 106*   AST U/L 83*   GLUCOSE RANDOM mg/dL 98     Results from last 7 days   Lab Units 03/05/24  0527   INR  2.09*     Results from last 7 days   Lab Units 03/05/24  1111 03/05/24  0738 03/04/24  2101 03/04/24  2002 03/04/24  1625 03/04/24  1104 03/04/24  0710 03/03/24  2045 03/03/24  1613 03/03/24  1102 03/03/24  0746 03/02/24  2044   POC GLUCOSE mg/dl 138 90 147* 128 94 86 103 139 162* 164* 104 157*         Results from last 7 days   Lab Units 03/04/24  0842   LACTIC ACID mmol/L 0.8       Lines/Drains:  Invasive Devices       Peripheral Intravenous Line  Duration             Peripheral IV 03/02/24 Proximal;Right;Ventral (anterior) Forearm 3 days              Hemodialysis Catheter  Duration             HD Permanent Double Catheter 5 days                          Imaging: Reviewed radiology reports from this admission including: chest xray    Recent Cultures (last 7 days):         Last 24 Hours Medication List:   Current Facility-Administered Medications   Medication Dose Route Frequency  Provider Last Rate    calcium acetate  667 mg Oral TID With Meals JENSEN Fontenot      calcium carbonate  1 tablet Oral Daily With Breakfast JENSEN Fontenot      ciprofloxacin  250 mg Oral Q24H Yuly S Edenilson, CRNP      dextromethorphan-guaiFENesin  10 mL Oral Q4H PRN Yuly S Edenilson, CRNP      diphenhydramine, lidocaine, Al/Mg hydroxide, simethicone  10 mL Swish & Swallow Q6H PRN Yuly S Edenilson, CRNP      hydrOXYzine HCL  25 mg Oral Q6H PRN Yuly S Edenilson, CRNP      insulin glargine  20 Units Subcutaneous Q12H MURALI Yuly S Edenilson, CRNP      insulin lispro  1-6 Units Subcutaneous TID AC Yuly S Edenilson, CRNP      insulin lispro  1-6 Units Subcutaneous HS Yuly S Edenilson, CRNP      lactulose  20 g Oral BID Yuly S Edenilson, CRNP      melatonin  9 mg Oral HS Yuly S Edenilson, CRNP      midodrine  15 mg Oral TID AC Constance Menendez MD      nicotine  1 patch Transdermal Daily Yuly S Edenilson, CRNP      OLANZapine  15 mg Oral HS Yuly S Edenilson, CRNP      ondansetron  4 mg Intravenous Q8H PRN Yuly S Edenilson, CRNP      pantoprazole  40 mg Oral Early Morning Yuly S Edenilson, CRNP      potassium chloride  40 mEq Oral BID Roland Kulkarni MD      thiamine  100 mg Oral Daily Yuly S Edenilson, CRNP          Today, Patient Was Seen By: Constance Menendez MD    **Please Note: This note may have been constructed using a voice recognition system.**

## 2024-03-05 NOTE — ASSESSMENT & PLAN NOTE
Secondary to liver disease and vasodilation  Continue midodrine-dose increased to 15 mg 3 times daily since patient is still having orthostatic hypotension which is limiting his mobility.  Also added CARLOS stockings.  Patient's symptoms get worse post dialysis.

## 2024-03-05 NOTE — PLAN OF CARE
Problem: PHYSICAL THERAPY ADULT  Goal: Performs mobility at highest level of function for planned discharge setting.  See evaluation for individualized goals.  Description: Treatment/Interventions: Functional transfer training, LE strengthening/ROM, Elevations, Therapeutic exercise, Endurance training, Patient/family training, Equipment eval/education, Gait training, Compensatory technique education, Spoke to nursing, OT, Spoke to case management         See flowsheet documentation for full assessment, interventions and recommendations.  Outcome: Not Progressing  Note: Prognosis: Guarded  Problem List: Decreased strength, Decreased endurance, Impaired balance, Decreased mobility, Impaired judgement, Obesity  Assessment: Pt tolerates tx session fair.  Pt with continued symptomatic orthostatic hypotension (see vitals section for details).  PT donns ordered knee high CARLOS stockings.  Completing functional mobility as tolerated and within reason based off of pt's symptoms.  Pt reporting that he really wants to D/C home, however PT expresses concerns in regards to orthostasis -> especially post dialysis.  Pt also continues with loose bowels, noting 2 episodes of loose bowel movements during session today.  Barriers to Discharge: Other (Comment)  Barriers to Discharge Comments: CAMILO, dialysis, fatigue, orthostatic hypotension with symptoms, alone during the day  Rehab Resource Intensity Level, PT: II (Moderate Resource Intensity) (if pt chooses home D/C he will require the DME listed below)    See flowsheet documentation for full assessment.

## 2024-03-05 NOTE — ASSESSMENT & PLAN NOTE
Developed DUANE on CKD with underlying liver failure   ureteral stent placed at [facility], lithotripsy  Developed oliguria, treated with CRRT then transition to HD  Appreciate nephrology consultation  Patient temporary HD catheter replaced with PermCath by IR on 2/29/2024.  Patient also has alcohol induced hepatitis.  Status post dialysis on 3/4/2024

## 2024-03-05 NOTE — ASSESSMENT & PLAN NOTE
History alcohol abuse, last drink prior to admission in January  Will need outpatient alcohol counseling prior to transplant reevaluation-remember, patient has referral from [facility] for virtual therapy which supposed to initiated soon.  Continue daily thiamine

## 2024-03-05 NOTE — PLAN OF CARE
Problem: NEUROSENSORY - ADULT  Goal: Achieves stable or improved neurological status  Description: INTERVENTIONS  - Monitor and report changes in neurological status  - Monitor vital signs such as temperature, blood pressure, glucose, and any other labs ordered   - Initiate measures to prevent increased intracranial pressure  - Monitor for seizure activity and implement precautions if appropriate      Outcome: Progressing  Goal: Achieves maximal functionality and self care  Description: INTERVENTIONS  - Monitor swallowing and airway patency with patient fatigue and changes in neurological status  - Encourage and assist patient to increase activity and self care.   - Encourage visually impaired, hearing impaired and aphasic patients to use assistive/communication devices  Outcome: Progressing     Problem: GASTROINTESTINAL - ADULT  Goal: Minimal or absence of nausea and/or vomiting  Description: INTERVENTIONS:  - Administer IV fluids if ordered to ensure adequate hydration  - Maintain NPO status until nausea and vomiting are resolved  - Nasogastric tube if ordered  - Administer ordered antiemetic medications as needed  - Provide nonpharmacologic comfort measures as appropriate  - Advance diet as tolerated, if ordered  - Consider nutrition services referral to assist patient with adequate nutrition and appropriate food choices  Outcome: Progressing  Goal: Maintains or returns to baseline bowel function  Description: INTERVENTIONS:  - Assess bowel function  - Encourage oral fluids to ensure adequate hydration  - Administer IV fluids if ordered to ensure adequate hydration  - Administer ordered medications as needed  - Encourage mobilization and activity  - Consider nutritional services referral to assist patient with adequate nutrition and appropriate food choices  Outcome: Progressing  Goal: Maintains adequate nutritional intake  Description: INTERVENTIONS:  - Monitor percentage of each meal consumed  - Identify  factors contributing to decreased intake, treat as appropriate  - Assist with meals as needed  - Monitor I&O, weight, and lab values if indicated  - Obtain nutrition services referral as needed  Outcome: Progressing  Goal: Oral mucous membranes remain intact  Description: INTERVENTIONS  - Assess oral mucosa and hygiene practices  - Implement preventative oral hygiene regimen  - Implement oral medicated treatments as ordered magic mouth wash  - Initiate Nutrition services referral as needed  Outcome: Progressing     Problem: METABOLIC, FLUID AND ELECTROLYTES - ADULT  Goal: Electrolytes maintained within normal limits  Description: INTERVENTIONS:  - Monitor labs and assess patient for signs and symptoms of electrolyte imbalances  - Administer electrolyte replacement as ordered  - Monitor response to electrolyte replacements, including repeat lab results as appropriate  - Instruct patient on fluid and nutrition as appropriate  Outcome: Progressing  Goal: Fluid balance maintained  Description: INTERVENTIONS:  - Monitor labs   - Monitor I/O and WT  - Instruct patient on fluid and nutrition as appropriate  - Assess for signs & symptoms of volume excess or deficit  Outcome: Progressing  Goal: Glucose maintained within target range  Description: INTERVENTIONS:  - Monitor Blood Glucose as ordered  - Assess for signs and symptoms of hyperglycemia and hypoglycemia  - Administer ordered medications to maintain glucose within target range  - Assess nutritional intake and initiate nutrition service referral as needed  Outcome: Progressing     Problem: HEMATOLOGIC - ADULT  Goal: Maintains hematologic stability  Description: INTERVENTIONS  - Assess for signs and symptoms of bleeding or hemorrhage  - Monitor labs  - Administer supportive blood products/factors as ordered and appropriate  Outcome: Progressing     Problem: HEMATOLOGIC - ADULT  Goal: Maintains hematologic stability  Description: INTERVENTIONS  - Assess for signs and  symptoms of bleeding or hemorrhage  - Monitor labs  - Administer supportive blood products/factors as ordered and appropriate  Outcome: Progressing     Problem: MUSCULOSKELETAL - ADULT  Goal: Maintain or return mobility to safest level of function  Description: INTERVENTIONS:  - Assess patient's ability to carry out ADLs; assess patient's baseline for ADL function and identify physical deficits which impact ability to perform ADLs (bathing, care of mouth/teeth, toileting, grooming, dressing, etc.)  - Assess/evaluate cause of self-care deficits   - Assess range of motion  - Assess patient's mobility  - Assess patient's need for assistive devices and provide as appropriate  - Encourage maximum independence but intervene and supervise when necessary  - Involve family in performance of ADLs  - Assess for home care needs following discharge   - Consider OT consult to assist with ADL evaluation and planning for discharge  - Provide patient education as appropriate  Outcome: Progressing  Goal: Maintain proper alignment of affected body part  Description: INTERVENTIONS:  - Support, maintain and protect limb and body alignment  - Provide patient/ family with appropriate education  Outcome: Progressing     Problem: Prexisting or High Potential for Compromised Skin Integrity  Goal: Skin integrity is maintained or improved  Description: INTERVENTIONS:  - Identify patients at risk for skin breakdown  - Assess and monitor skin integrity  - Assess and monitor nutrition and hydration status  - Monitor labs   - Assess for incontinence   - Turn and reposition patient  - Assist with mobility/ambulation  - Relieve pressure over bony prominences  - Avoid friction and shearing  - Provide appropriate hygiene as needed including keeping skin clean and dry  - Evaluate need for skin moisturizer/barrier cream  - Collaborate with interdisciplinary team   - Patient/family teaching  - Consider wound care consult   Outcome: Progressing      Problem: Nutrition/Hydration-ADULT  Goal: Nutrient/Hydration intake appropriate for improving, restoring or maintaining nutritional needs  Description: Monitor and assess patient's nutrition/hydration status for malnutrition. Collaborate with interdisciplinary team and initiate plan and interventions as ordered.  Monitor patient's weight and dietary intake as ordered or per policy. Utilize nutrition screening tool and intervene as necessary. Determine patient's food preferences and provide high-protein, high-caloric foods as appropriate.     INTERVENTIONS:  - Monitor oral intake, urinary output, labs, and treatment plans  - Assess nutrition and hydration status and recommend course of action  - Evaluate amount of meals eaten  - Assist patient with eating if necessary   - Allow adequate time for meals  - Recommend/ encourage appropriate diets, oral nutritional supplements, and vitamin/mineral supplements  - Order, calculate, and assess calorie counts as needed  - Recommend, monitor, and adjust tube feedings and TPN/PPN based on assessed needs  - Assess need for intravenous fluids  - Provide specific nutrition/hydration education as appropriate  - Include patient/family/caregiver in decisions related to nutrition  Outcome: Progressing

## 2024-03-05 NOTE — PLAN OF CARE
Problem: NEUROSENSORY - ADULT  Goal: Achieves stable or improved neurological status  Description: INTERVENTIONS  - Monitor and report changes in neurological status  - Monitor vital signs such as temperature, blood pressure, glucose, and any other labs ordered   - Initiate measures to prevent increased intracranial pressure  - Monitor for seizure activity and implement precautions if appropriate      Outcome: Progressing  Goal: Achieves maximal functionality and self care  Description: INTERVENTIONS  - Monitor swallowing and airway patency with patient fatigue and changes in neurological status  - Encourage and assist patient to increase activity and self care.   - Encourage visually impaired, hearing impaired and aphasic patients to use assistive/communication devices  Outcome: Progressing     Problem: GASTROINTESTINAL - ADULT  Goal: Minimal or absence of nausea and/or vomiting  Description: INTERVENTIONS:  - Administer IV fluids if ordered to ensure adequate hydration  - Maintain NPO status until nausea and vomiting are resolved  - Nasogastric tube if ordered  - Administer ordered antiemetic medications as needed  - Provide nonpharmacologic comfort measures as appropriate  - Advance diet as tolerated, if ordered  - Consider nutrition services referral to assist patient with adequate nutrition and appropriate food choices  Outcome: Progressing  Goal: Maintains or returns to baseline bowel function  Description: INTERVENTIONS:  - Assess bowel function  - Encourage oral fluids to ensure adequate hydration  - Administer IV fluids if ordered to ensure adequate hydration  - Administer ordered medications as needed  - Encourage mobilization and activity  - Consider nutritional services referral to assist patient with adequate nutrition and appropriate food choices  Outcome: Progressing  Goal: Maintains adequate nutritional intake  Description: INTERVENTIONS:  - Monitor percentage of each meal consumed  - Identify  factors contributing to decreased intake, treat as appropriate  - Assist with meals as needed  - Monitor I&O, weight, and lab values if indicated  - Obtain nutrition services referral as needed  Outcome: Progressing  Goal: Oral mucous membranes remain intact  Description: INTERVENTIONS  - Assess oral mucosa and hygiene practices  - Implement preventative oral hygiene regimen  - Implement oral medicated treatments as ordered magic mouth wash  - Initiate Nutrition services referral as needed  Outcome: Progressing     Problem: METABOLIC, FLUID AND ELECTROLYTES - ADULT  Goal: Electrolytes maintained within normal limits  Description: INTERVENTIONS:  - Monitor labs and assess patient for signs and symptoms of electrolyte imbalances  - Administer electrolyte replacement as ordered  - Monitor response to electrolyte replacements, including repeat lab results as appropriate  - Instruct patient on fluid and nutrition as appropriate  Outcome: Progressing  Goal: Fluid balance maintained  Description: INTERVENTIONS:  - Monitor labs   - Monitor I/O and WT  - Instruct patient on fluid and nutrition as appropriate  - Assess for signs & symptoms of volume excess or deficit  Outcome: Progressing  Goal: Glucose maintained within target range  Description: INTERVENTIONS:  - Monitor Blood Glucose as ordered  - Assess for signs and symptoms of hyperglycemia and hypoglycemia  - Administer ordered medications to maintain glucose within target range  - Assess nutritional intake and initiate nutrition service referral as needed  Outcome: Progressing     Problem: HEMATOLOGIC - ADULT  Goal: Maintains hematologic stability  Description: INTERVENTIONS  - Assess for signs and symptoms of bleeding or hemorrhage  - Monitor labs  - Administer supportive blood products/factors as ordered and appropriate  Outcome: Progressing     Problem: MUSCULOSKELETAL - ADULT  Goal: Maintain or return mobility to safest level of function  Description:  INTERVENTIONS:  - Assess patient's ability to carry out ADLs; assess patient's baseline for ADL function and identify physical deficits which impact ability to perform ADLs (bathing, care of mouth/teeth, toileting, grooming, dressing, etc.)  - Assess/evaluate cause of self-care deficits   - Assess range of motion  - Assess patient's mobility  - Assess patient's need for assistive devices and provide as appropriate  - Encourage maximum independence but intervene and supervise when necessary  - Involve family in performance of ADLs  - Assess for home care needs following discharge   - Consider OT consult to assist with ADL evaluation and planning for discharge  - Provide patient education as appropriate  Outcome: Progressing  Goal: Maintain proper alignment of affected body part  Description: INTERVENTIONS:  - Support, maintain and protect limb and body alignment  - Provide patient/ family with appropriate education  Outcome: Progressing     Problem: Prexisting or High Potential for Compromised Skin Integrity  Goal: Skin integrity is maintained or improved  Description: INTERVENTIONS:  - Identify patients at risk for skin breakdown  - Assess and monitor skin integrity  - Assess and monitor nutrition and hydration status  - Monitor labs   - Assess for incontinence   - Turn and reposition patient  - Assist with mobility/ambulation  - Relieve pressure over bony prominences  - Avoid friction and shearing  - Provide appropriate hygiene as needed including keeping skin clean and dry  - Evaluate need for skin moisturizer/barrier cream  - Collaborate with interdisciplinary team   - Patient/family teaching  - Consider wound care consult   Outcome: Progressing     Problem: Nutrition/Hydration-ADULT  Goal: Nutrient/Hydration intake appropriate for improving, restoring or maintaining nutritional needs  Description: Monitor and assess patient's nutrition/hydration status for malnutrition. Collaborate with interdisciplinary team  and initiate plan and interventions as ordered.  Monitor patient's weight and dietary intake as ordered or per policy. Utilize nutrition screening tool and intervene as necessary. Determine patient's food preferences and provide high-protein, high-caloric foods as appropriate.     INTERVENTIONS:  - Monitor oral intake, urinary output, labs, and treatment plans  - Assess nutrition and hydration status and recommend course of action  - Evaluate amount of meals eaten  - Assist patient with eating if necessary   - Allow adequate time for meals  - Recommend/ encourage appropriate diets, oral nutritional supplements, and vitamin/mineral supplements  - Order, calculate, and assess calorie counts as needed  - Recommend, monitor, and adjust tube feedings and TPN/PPN based on assessed needs  - Assess need for intravenous fluids  - Provide specific nutrition/hydration education as appropriate  - Include patient/family/caregiver in decisions related to nutrition  Outcome: Progressing

## 2024-03-06 NOTE — ASSESSMENT & PLAN NOTE
History alcohol abuse, last drink prior to admission in January  Will need outpatient alcohol counseling prior to transplant reevaluation-remember, patient has referral from [facility] for virtual therapy which supposed to initiated soon.

## 2024-03-06 NOTE — ASSESSMENT & PLAN NOTE
Currently on Solu-Cortef 25 mg daily -stop  Alcoholic cirrhosis  Transferred from [facility] in [place] after liver transplant evaluation, he was found not to be candidate  Continue lactulose  No encephalopathy on admission-but patient mentation varies.check ammonia level  PT/OT/case management for discharge planning so patient may prepare for transplant reevaluation in the future  Patient has extensive workup done, at this time, not much to offer  Stop steroids and observe  Asked neuropsych for competency evaluation.  Will need snf for rehab

## 2024-03-06 NOTE — UTILIZATION REVIEW
Continued Stay Review    Date: 3/6/24                           Current Patient Class: IP  Current Level of Care: MS    HPI:33 y.o. male initially admitted on 2/27/24 - DX:  Alcoholic hepatitis / Orthostatic hypotension / Type 2 diabetes mellitus with hyperglycemia, without long-term current use of insulin     Assessment/Plan:   3/6/24: Noted to have generalized weakness and orthostatic hypotension; Hypotensive; skin is jaundiced; labs: (3/5) WBC 19.04; Heme 8.8; (3/6) - K 2.9; Cr worse 7.27 (3/5 was 5.73)  Plan: Accuchecks with ssic; f/u ammonia level; CM following - will need SNF for rehab - working on placement; monitor labs    Vital Signs:   Date/Time Temp Pulse Resp BP MAP (mmHg) SpO2 O2 Device Patient Position - Orthostatic VS   03/06/24 1500 -- 97 16 100/62 75 100 % -- Lying   03/06/24 1450 -- 98 17 101/62 75 100 % None (Room air) Lying   03/06/24 1440 97.7 °F (36.5 °C) 95 17 102/62 75 99 % None (Room air) Lying   03/06/24 14:01:09 -- 101 -- 96/58 71 99 % -- --   03/06/24 07:35:41 97.9 °F (36.6 °C) 98 -- 92/48 Abnormal  63 Abnormal  100 % -- --   03/06/24 0728 -- -- -- -- -- -- None (Room air) --       Pertinent Labs/Diagnostic Results:     Results from last 7 days   Lab Units 03/05/24  0527 03/04/24  0842 03/03/24  0451 03/02/24  0501 03/01/24  0633   WBC Thousand/uL 19.04* 19.43* 18.59* 20.54* 17.56*   HEMOGLOBIN g/dL 8.8* 8.3* 8.1* 8.6* 8.9*   HEMATOCRIT % 23.8* 22.9* 23.0* 24.0* 24.6*   PLATELETS Thousands/uL 51* 55* 56* 55* 73*   NEUTROS ABS Thousands/µL 16.84* 17.15* 16.62* 18.45* 15.53*       Results from last 7 days   Lab Units 03/06/24  0548 03/05/24  0527 03/04/24  0842 03/03/24  1439 03/03/24  0451   SODIUM mmol/L 135 137 134* 134* 133*   POTASSIUM mmol/L 2.9* 2.7* 2.9* 3.0* 2.8*   CHLORIDE mmol/L 97 96 99 98 97   CO2 mmol/L 20* 24 15* 16* 17*   ANION GAP mmol/L 18 17 20 20 19   BUN mg/dL 70* 56* 87* 78* 73*   CREATININE mg/dL 7.27* 5.73* 8.12* 7.14* 6.64*   EGFR ml/min/1.73sq m 8 11 7 9 9    CALCIUM mg/dL 6.5* 6.3* 6.3* 6.4* 6.3*   CALCIUM, IONIZED mmol/L  --   --  0.87*  --   --    MAGNESIUM mg/dL  --  2.3 2.5 2.5  --    PHOSPHORUS mg/dL  --   --  9.4* 8.2*  --      Results from last 7 days   Lab Units 03/05/24  0527 03/04/24  0842 03/03/24  0451 03/02/24  0921 03/01/24  0633   AST U/L 83* 62* 69* 108* 67*   ALT U/L 106* 107* 123* 142* 137*   ALK PHOS U/L 288* 284* 282* 262* 234*   TOTAL PROTEIN g/dL 3.4* 3.3* 3.4* 3.8* 3.7*   ALBUMIN g/dL 2.5* 2.4* 2.4* 2.7* 2.7*   TOTAL BILIRUBIN mg/dL 49.17* 48.69* 48.38* 49.39* 51.34*     Results from last 7 days   Lab Units 03/06/24  1129 03/06/24  0732 03/05/24  2115 03/05/24  1624 03/05/24  1111 03/05/24  0738 03/04/24  2101 03/04/24  2002 03/04/24  1625 03/04/24  1104 03/04/24  0710 03/03/24  2045   POC GLUCOSE mg/dl 221* 109 125 206* 138 90 147* 128 94 86 103 139     Results from last 7 days   Lab Units 03/06/24  0548 03/05/24  0527 03/04/24  0842 03/03/24  1439 03/03/24  0451 03/02/24  0921 03/01/24  0633 02/29/24  0454   GLUCOSE RANDOM mg/dL 122 98 116 147* 120 66 115 69       BETA-HYDROXYBUTYRATE   Date Value Ref Range Status   01/21/2024 0.2 <0.6 mmol/L Final        Results from last 7 days   Lab Units 03/05/24  0527 03/04/24  0842 03/02/24  0501   PROTIME seconds 23.8* 24.2* 20.9*   INR  2.09* 2.13* 1.77*       Results from last 7 days   Lab Units 03/04/24  0842   LACTIC ACID mmol/L 0.8       Medications:   Scheduled Medications:  calcium acetate, 667 mg, Oral, TID With Meals  calcium carbonate, 1 tablet, Oral, Daily With Breakfast  ciprofloxacin, 250 mg, Oral, Q24H  insulin glargine, 10 Units, Subcutaneous, HS  insulin lispro, 1-6 Units, Subcutaneous, TID AC  insulin lispro, 1-6 Units, Subcutaneous, HS  lactulose, 20 g, Oral, BID  melatonin, 6 mg, Oral, HS  midodrine, 15 mg, Oral, TID AC  nicotine, 1 patch, Transdermal, Daily  OLANZapine, 10 mg, Oral, HS  pantoprazole, 40 mg, Oral, Early Morning  potassium chloride, 40 mEq, Oral, BID      Continuous IV  Infusions: None       PRN Meds:  diphenhydramine, lidocaine, Al/Mg hydroxide, simethicone, 10 mL, Swish & Swallow, Q6H PRN  hydrOXYzine HCL, 25 mg, Oral, Q6H PRN  ondansetron, 4 mg, Intravenous, Q8H PRN        Discharge Plan: TBD    Network Utilization Review Department  ATTENTION: Please call with any questions or concerns to [phone] and carefully listen to the prompts so that you are directed to the right person. All voicemails are confidential.   For Discharge needs, contact Care Management DC Support Team at [phone] opt. 2  Send all requests for admission clinical reviews, approved or denied determinations and any other requests to dedicated fax number below belonging to the campus where the patient is receiving treatment. List of dedicated fax numbers for the Facilities:  FACILITY NAME UR FAX NUMBER   ADMISSION DENIALS (Administrative/Medical Necessity) [phone]   DISCHARGE SUPPORT TEAM (NETWORK) [phone]   PARENT CHILD HEALTH (Maternity/NICU/Pediatrics) [phone]   [facility] [phone]   [facility] [phone]   [facility] [phone]   [facility] [phone]   [facility] [phone]   [facility] [phone]   [facility] [phone]   [facility] [phone]   [facility] [phone]   [facility] [phone]   [facility] [phone]   [facility] [phone]

## 2024-03-06 NOTE — CASE MANAGEMENT
Case Management Discharge Planning Note    Patient name Michael Koch  Location /-01 MRN [id number]  : 1990 Date 3/6/2024       Current Admission Date: 2024  Current Admission Diagnosis:Alcoholic hepatitis   Patient Active Problem List    Diagnosis Date Noted    Renal failure 2024    Malnutrition (HCC) 2024    Orthostatic hypotension 2024    Acute respiratory failure with hypoxia and hypercapnia (HCC) 2024    Alcoholic hepatitis 2024    ETOH abuse 2024    Depression 2024    GERD (gastroesophageal reflux disease) 2024    BPH (benign prostatic hyperplasia) 2024    RSV infection 2024    Abnormal CT scan 2024    Hepatic encephalopathy (HCC) 2024    N&V (nausea and vomiting) 2024    DUANE (acute kidney injury) (HCC) 2024    Thrombocytopenia (HCC) 2024    Lactic acidosis 2024    Marijuana use 2024    SIRS (systemic inflammatory response syndrome) (HCC) 2024    History of gout 10/04/2023    History of hypertension 10/04/2023    Type 2 diabetes mellitus with hyperglycemia, without long-term current use of insulin (HCC) 10/04/2023    Tobacco abuse 10/04/2023      LOS (days): 8  Geometric Mean LOS (GMLOS) (days): 4.8  Days to GMLOS:-2.9     OBJECTIVE:  Risk of Unplanned Readmission Score: 47.86         Current admission status: Inpatient   Preferred Pharmacy:   [id number] - [place] - [address]  [address]  [place] [address]  Phone: [phone] Fax: [phone]    Primary Care Provider: Louie Odonnell DO    Primary Insurance: BLUE CROSS  Secondary Insurance:     DISCHARGE DETAILS:     CM met with patient and discussed potential for STR upon discharge. Patient agreeable to STR for short time.  Patients mother came into patients room when CM leaving and CM informed mother of patients agreement.     CM submitted [facility] referral for STR.  CM to  follow for acceptance.

## 2024-03-06 NOTE — PROGRESS NOTES
[facility]  Progress Note  Name: Michael Koch I  MRN: [id number]  Unit/Bed#: -01 I Date of Admission: 2/27/2024   Date of Service: 3/6/2024 I Hospital Day: 8    Assessment/Plan   * Alcoholic hepatitis  Assessment & Plan  Currently on Solu-Cortef 25 mg daily -stop  Alcoholic cirrhosis  Transferred from [facility] in [place] after liver transplant evaluation, he was found not to be candidate  Continue lactulose  No encephalopathy on admission-but patient mentation varies.check ammonia level  PT/OT/case management for discharge planning so patient may prepare for transplant reevaluation in the future  Patient has extensive workup done, at this time, not much to offer  Stop steroids and observe  Asked neuropsych for competency evaluation.  Will need snf for rehab    Orthostatic hypotension  Assessment & Plan  Secondary to liver disease and vasodilation  Continue midodrine-dose increased to 15 mg 3 times daily since patient is still having orthostatic hypotension which is limiting his mobility.  Also added CARLOS stockings.  Patient's symptoms get worse post dialysis.    GERD (gastroesophageal reflux disease)  Assessment & Plan  Continue PPI    Depression  Assessment & Plan  Continue Zyprexa at bedtime decreased dose to 10 mg daily and also decrease melatonin to 6 mg daily  cortisol level is currently normal will recheck again in 1 week as now off steroids.  Also check ammonia level    ETOH abuse  Assessment & Plan  History alcohol abuse, last drink prior to admission in January  Will need outpatient alcohol counseling prior to transplant reevaluation-remember, patient has referral from [facility] for virtual therapy which supposed to initiated soon.      Type 2 diabetes mellitus with hyperglycemia, without long-term current use of insulin (HCC)  Assessment & Plan  Lab Results   Component Value Date    HGBA1C 8.8 (H) 01/21/2024       Recent Labs     03/05/24  1624  24  2115 24  0732 24  1129   POCGLU 206* 125 109 221*         Blood Sugar Average: Last 72 hrs:  (P) 134.4  Continue Lantus 10 units daily  Sliding-scale insulin with Accu-Cheks  Hypoglycemia protocol  Carbohydrate controlled diet               VTE Pharmacologic Prophylaxis:   Moderate Risk (Score 3-4) - Pharmacological DVT Prophylaxis Contraindicated. Sequential Compression Devices Ordered.    Mobility:   Basic Mobility Inpatient Raw Score: 18  JH-HLM Goal: 6: Walk 10 steps or more  JH-HLM Achieved: 2: Bed activities/Dependent transfer  HLM Goal achieved. Continue to encourage appropriate mobility.    Patient Centered Rounds: I performed bedside rounds with nursing staff today.   Discussions with Specialists or Other Care Team Provider: none    Education and Discussions with Family / Patient: Updated  (wife) via phone.    Total Time Spent on Date of Encounter in care of patient: 35 mins. This time was spent on one or more of the following: performing physical exam; counseling and coordination of care; obtaining or reviewing history; documenting in the medical record; reviewing/ordering tests, medications or procedures; communicating with other healthcare professionals and discussing with patient's family/caregivers.    Current Length of Stay: 8 day(s)  Current Patient Status: Inpatient   Certification Statement: The patient will continue to require additional inpatient hospital stay due to liver failure  Discharge Plan: Anticipate discharge in 24-48 hrs to rehab facility.    Code Status: Level 1 - Full Code    Subjective:   Noted to have generalized weakness and orthostatic hypotension     Objective:     Vitals:   Temp (24hrs), Av.8 °F (36.6 °C), Min:97.5 °F (36.4 °C), Max:97.9 °F (36.6 °C)    Temp:  [97.5 °F (36.4 °C)-97.9 °F (36.6 °C)] 97.9 °F (36.6 °C)  HR:  [] 101  BP: ()/(48-62) 96/58  SpO2:  [89 %-100 %] 99 %  Body mass index is 29.42 kg/m².     Input and Output  Summary (last 24 hours):     Intake/Output Summary (Last 24 hours) at 3/6/2024 1405  Last data filed at 3/6/2024 1256  Gross per 24 hour   Intake 720 ml   Output --   Net 720 ml       Physical Exam:   Physical Exam  Vitals and nursing note reviewed.   Constitutional:       Appearance: He is ill-appearing.   HENT:      Head: Normocephalic and atraumatic.      Right Ear: External ear normal.      Left Ear: External ear normal.      Nose: Nose normal.      Mouth/Throat:      Pharynx: Oropharynx is clear.   Eyes:      Pupils: Pupils are equal, round, and reactive to light.   Cardiovascular:      Rate and Rhythm: Normal rate and regular rhythm.      Heart sounds: Normal heart sounds.   Pulmonary:      Effort: Pulmonary effort is normal.      Breath sounds: Normal breath sounds.   Abdominal:      General: Bowel sounds are normal.      Palpations: Abdomen is soft.      Tenderness: There is no abdominal tenderness.   Musculoskeletal:         General: Normal range of motion.      Cervical back: Normal range of motion and neck supple.   Skin:     General: Skin is warm and dry.      Capillary Refill: Capillary refill takes less than 2 seconds.      Coloration: Skin is jaundiced.   Neurological:      General: No focal deficit present.      Mental Status: He is alert and oriented to person, place, and time.   Psychiatric:         Mood and Affect: Mood normal.            Additional Data:     Labs:  Results from last 7 days   Lab Units 03/05/24  0527   WBC Thousand/uL 19.04*   HEMOGLOBIN g/dL 8.8*   HEMATOCRIT % 23.8*   PLATELETS Thousands/uL 51*   NEUTROS PCT % 88*   LYMPHS PCT % 5*   MONOS PCT % 5   EOS PCT % 1     Results from last 7 days   Lab Units 03/06/24  0548 03/05/24  0527   SODIUM mmol/L 135 137   POTASSIUM mmol/L 2.9* 2.7*   CHLORIDE mmol/L 97 96   CO2 mmol/L 20* 24   BUN mg/dL 70* 56*   CREATININE mg/dL 7.27* 5.73*   ANION GAP mmol/L 18 17   CALCIUM mg/dL 6.5* 6.3*   ALBUMIN g/dL  --  2.5*   TOTAL BILIRUBIN mg/dL  --   49.17*   ALK PHOS U/L  --  288*   ALT U/L  --  106*   AST U/L  --  83*   GLUCOSE RANDOM mg/dL 122 98     Results from last 7 days   Lab Units 03/05/24  0527   INR  2.09*     Results from last 7 days   Lab Units 03/06/24  1129 03/06/24  0732 03/05/24  2115 03/05/24  1624 03/05/24  1111 03/05/24  0738 03/04/24  2101 03/04/24  2002 03/04/24  1625 03/04/24  1104 03/04/24  0710 03/03/24  2045   POC GLUCOSE mg/dl 221* 109 125 206* 138 90 147* 128 94 86 103 139         Results from last 7 days   Lab Units 03/04/24  0842   LACTIC ACID mmol/L 0.8       Lines/Drains:  Invasive Devices       Peripheral Intravenous Line  Duration             Peripheral IV 03/06/24 Left Antecubital <1 day              Hemodialysis Catheter  Duration             HD Permanent Double Catheter 6 days                          Imaging: Reviewed radiology reports from this admission including: MRI brain    Recent Cultures (last 7 days):         Last 24 Hours Medication List:   Current Facility-Administered Medications   Medication Dose Route Frequency Provider Last Rate    calcium acetate  667 mg Oral TID With Meals JENSEN Fontenot      calcium carbonate  1 tablet Oral Daily With Breakfast JENSEN Fontenot      ciprofloxacin  250 mg Oral Q24H Yuly S Edenilson, CRNP      diphenhydramine, lidocaine, Al/Mg hydroxide, simethicone  10 mL Swish & Swallow Q6H PRN Yuly S Edenilson, CRNP      hydrOXYzine HCL  25 mg Oral Q6H PRN Yuly S Edenilson, CRNP      insulin glargine  10 Units Subcutaneous HS Constance Menendez MD      insulin lispro  1-6 Units Subcutaneous TID AC Yuly S Edenilson, CRNP      insulin lispro  1-6 Units Subcutaneous HS Yuly S Edenilson, CRNP      lactulose  20 g Oral BID Yuly S Edenilson, CRNP      melatonin  6 mg Oral HS Constance Menendez MD      midodrine  15 mg Oral TID AC Constance Menendez MD      nicotine  1 patch Transdermal Daily Yuly S Edenilson, CRNP      OLANZapine  10 mg Oral HS Constance Menendez MD      ondansetron  4 mg Intravenous Q8H PRN Yuly S Edenilson, CRNP       pantoprazole  40 mg Oral Early Morning Yuly S Edenilson, CRNP      potassium chloride  40 mEq Oral BID Roland Kulkarni MD          Today, Patient Was Seen By: Constance Menendez MD    **Please Note: This note may have been constructed using a voice recognition system.**

## 2024-03-06 NOTE — ASSESSMENT & PLAN NOTE
Continue Zyprexa at bedtime decreased dose to 10 mg daily and also decrease melatonin to 6 mg daily  cortisol level is currently normal will recheck again in 1 week as now off steroids.  Also check ammonia level

## 2024-03-06 NOTE — HEMODIALYSIS
Post-Dialysis RN Treatment Note    Blood Pressure:  Pre 102/62 mm/Hg  Post 96/77 mmHg   EDW  TBD kg    Weight:  Pre 85.2 kg (Standing scale)  Post 83.9 kg (bedscale)   Volume Removed  915 ml    Treatment duration 165 minutes    NS given  Yes, for hypotension    Treatment shortened? Yes, describe: BP low and would not respond to UF off, NS bolus and Albumin bolus    Medications given during Rx Zofran 4 mg IVP and Albumin for BP support    Estimated Kt/V  0.92   Access type: Permacath/TDC   Access Issues: No    Verbal Report to primary nurse   Yes Lynn Bruce RN

## 2024-03-06 NOTE — ASSESSMENT & PLAN NOTE
Lab Results   Component Value Date    HGBA1C 8.8 (H) 01/21/2024       Recent Labs     03/05/24  1624 03/05/24  2115 03/06/24  0732 03/06/24  1129   POCGLU 206* 125 109 221*         Blood Sugar Average: Last 72 hrs:  (P) 134.4  Continue Lantus 10 units daily  Sliding-scale insulin with Accu-Cheks  Hypoglycemia protocol  Carbohydrate controlled diet

## 2024-03-06 NOTE — CASE MANAGEMENT
Case Management Discharge Planning Note    Patient name Michael Koch  Location /-01 MRN [id number]  : 1990 Date 3/6/2024       Current Admission Date: 2024  Current Admission Diagnosis:Alcoholic hepatitis   Patient Active Problem List    Diagnosis Date Noted    Renal failure 2024    Malnutrition (HCC) 2024    Orthostatic hypotension 2024    Acute respiratory failure with hypoxia and hypercapnia (HCC) 2024    Alcoholic hepatitis 2024    ETOH abuse 2024    Depression 2024    GERD (gastroesophageal reflux disease) 2024    BPH (benign prostatic hyperplasia) 2024    RSV infection 2024    Abnormal CT scan 2024    Hepatic encephalopathy (HCC) 2024    N&V (nausea and vomiting) 2024    DUANE (acute kidney injury) (HCC) 2024    Thrombocytopenia (HCC) 2024    Lactic acidosis 2024    Marijuana use 2024    SIRS (systemic inflammatory response syndrome) (HCC) 2024    History of gout 10/04/2023    History of hypertension 10/04/2023    Type 2 diabetes mellitus with hyperglycemia, without long-term current use of insulin (HCC) 10/04/2023    Tobacco abuse 10/04/2023      LOS (days): 8  Geometric Mean LOS (GMLOS) (days): 4.8  Days to GMLOS:-2.6     OBJECTIVE:  Risk of Unplanned Readmission Score: 47.71         Current admission status: Inpatient   Preferred Pharmacy:   [id number] - [place] - [address]  [address]  [place] [address]  Phone: [phone] Fax: [phone]    Primary Care Provider: Louie Odonnell DO    Primary Insurance: BLUE CROSS  Secondary Insurance:     DISCHARGE DETAILS:     CM contacted [facility] to let them know patient will not be starting outpatient HD today. Patient can start on Friday if medically stable.     CM to follow.

## 2024-03-06 NOTE — PLAN OF CARE
Target UF Goal 2 L as tolerated. Patient dialyzing for  3.5  on 4 K bath for serum K of  2.9  per protocol. Treatment plan reviewed with Nephrology.     Problem: METABOLIC, FLUID AND ELECTROLYTES - ADULT  Goal: Electrolytes maintained within normal limits  Description: INTERVENTIONS:  - Monitor labs and assess patient for signs and symptoms of electrolyte imbalances  - Administer electrolyte replacement as ordered  - Monitor response to electrolyte replacements, including repeat lab results as appropriate  - Instruct patient on fluid and nutrition as appropriate  Outcome: Progressing  Goal: Fluid balance maintained  Description: INTERVENTIONS:  - Monitor labs   - Monitor I/O and WT  - Instruct patient on fluid and nutrition as appropriate  - Assess for signs & symptoms of volume excess or deficit  Outcome: Progressing

## 2024-03-06 NOTE — PROGRESS NOTES
Pastoral Care Progress Note    3/6/2024  Patient: Michael Koch : 1990  Admission Date & Time: 2024  MRN: [id number] CSN: [id number]      CH initiated support visit to pt. Pt received CH in his bed, no family present.  CH shared her availability and encouraged pt to ask for her if she could be of service. Pt did not identify and unmanageable emotional or spiritual needs at this time.             Chaplaincy Interventions Utilized:   Empowerment: Encouraged assertiveness     24 1400   Clinical Encounter Type   Visited With Patient   Routine Visit Introduction

## 2024-03-06 NOTE — PHYSICAL THERAPY NOTE
"PT PROGRESS NOTE    Name: Michael Koch  AGE: 33 y.o.  MRN: [id number]  LENGTH OF STAY: 8       03/06/24 1401   Vitals   Pulse 101   Blood Pressure 96/58   MAP (mmHg) 71   Oxygen Therapy   SpO2 99 %          03/06/24 1408   PT Last Visit   PT Visit Date 03/06/24   Note Type   Note Type Treatment   Pain Assessment   Pain Assessment Tool 0-10   Pain Score No Pain   Restrictions/Precautions   Weight Bearing Precautions Per Order No   Other Precautions Chair Alarm;Fall Risk  (Vitals 2/2 orthostatics)   General   Chart Reviewed Yes   Response to Previous Treatment Patient with no complaints from previous session.   Family/Caregiver Present Yes  (mother and mother's boyfriend)   Cognition   Overall Cognitive Status WFL   Orientation Level Oriented X4   Following Commands Follows one step commands without difficulty   Subjective   Subjective \"I don't have to go to the bathroom\"   Bed Mobility   Sit to Supine 6  Modified independent   Additional items Increased time required   Additional Comments Pt greeted OOB in bedside chair with hemodialysis team and family in room. Pt amenable to brief PT session while HD equipment was being set up.   Transfers   Sit to Stand   (stand by assistance)   Additional items Assist x 1;Verbal cues   Stand to Sit   (stand by assistance)   Additional items Verbal cues;Assist x 1   Additional Comments pt declined to use RW   Ambulation/Elevation   Gait pattern Improper Weight shift;Decreased foot clearance;Inconsistent eva   Gait Assistance   (stand by assistance)   Additional items Assist x 1;Verbal cues   Assistive Device None   Distance 4 ft x1 from chair to bed   Ambulation/Elevation Additional Comments unsteady, step through patterning without gross LOB. Declined use of RW for short distance amb, declining need to go to the bathroom prior to HD session.   Balance   Static Sitting Good   Dynamic Sitting Fair +   Static Standing Fair   Dynamic Standing Poor +   Ambulatory Poor + "   Endurance Deficit   Endurance Deficit Yes   Endurance Deficit Description fatigue   Activity Tolerance   Activity Tolerance Patient limited by fatigue;Other (Comment)  (Hemodialysis treatment, soft BP with 96/58 seated at EOB post amb without sxs. HR 90's-low 100's)   Medical Staff Made Aware HD technicians   Nurse Made Aware Yes   Exercises   Knee AROM Long Arc Quad 20 reps;Sitting   Ankle Pumps 20 reps;Sitting   Marching Sitting;20 reps   Bridging Supine;10 reps   Balance training  EOB x 8 minutes while completing TherEx without UE support, able to deviate from center of gravity and recover without UE support. Retropulsive with LAQ. Supervision for dynamic sitting balance.   Assessment   Prognosis Guarded   Problem List Decreased strength;Decreased endurance;Impaired balance;Decreased mobility;Impaired judgement;Obesity   Assessment Pt seen per PT POC. Intervention with focus on  therapeutic activity , therapeutic exercise , however PT session limited 2/2 initiation of HD treatment. Pt with good tolerance to session with limitations of fatigue. Pt greeted OOB in bedside chair with hemodialysis team and family in room. Pt amenable to brief PT session while HD equipment was being set up. Soft BP noted at EOB following short distance amb without AD from bed to chair, however pt denied sxs. Seated/supine therapeutic exercise performed. Progress towards functional goals limited this session 2/2 treatment session limitations, however demonstrated improved activity tolerance without the presence of symptoms. Currently requiring mod I-stand by assistance to complete limited functional mobility with skilled cues for technique/safety.  Denies reports of dizziness or SOB t/o session. Edu provided on fall precautions and reinforced w/ good understanding. Please refer to flowsheet for objective data above. Pt continues to demonstrate deficits relative to PLOF and would benefit from continued skilled PT services per POC to  "improve indep and safety with mobility. PT d/c recommendations: Anticipate level II (mod) rehab intensity resources at d/c when medically appropriate. Plan for therapeutic exercise, gait training, stair negotiation next 1-2 sessions with BP monitoring   Barriers to Discharge Other (Comment)   Barriers to Discharge Comments (+) CAMILO, dialysis, h/o symptomatic hypotension, hypotension, time alone during the day   Goals   Patient Goals \"Lay down\"   PT Treatment Day 5   Plan   Treatment/Interventions Functional transfer training;LE strengthening/ROM;Elevations;Therapeutic exercise;Endurance training;Patient/family training;Equipment eval/education;Gait training;Compensatory technique education;Spoke to nursing;OT;Spoke to case management   Progress   (slow progress)   PT Frequency 3-5x/wk   Discharge Recommendation   Rehab Resource Intensity Level, PT II (Moderate Resource Intensity)  (if pt chooses d/c to home he will require the DME listed below)   Equipment Recommended Walker;Wheelchair   Wheelchair Package Recommended Standard   Walker Package Recommended Wheeled walker   Additional Comments The patient's AM-PAC Basic Mobility Inpatient Short Form Raw Score is 18. A Raw score of greater than 16 suggests the patient may benefit from discharge to home. Please also refer to the recommendation of the Physical Therapist for safe discharge planning.   AM-PAC Basic Mobility Inpatient   Turning in Flat Bed Without Bedrails 3   Lying on Back to Sitting on Edge of Flat Bed Without Bedrails 3   Moving Bed to Chair 3   Standing Up From Chair Using Arms 3   Walk in Room 3   Climb 3-5 Stairs With Railing 3   Basic Mobility Inpatient Raw Score 18   Basic Mobility Standardized Score 41.05   Highest Level Of Mobility   JH-HLM Goal 6: Walk 10 steps or more   JH-HLM Achieved 5: Stand (1 or more minutes)   Education   Education Provided Mobility training;Assistive device  (Edu on benefits of AD for stability.)   Patient Reinforcement " needed   End of Consult   Patient Position at End of Consult Supine;All needs within reach;Other (comment)  (Left in care of hemodialysis team. Pt denying sxs except fatigue. Family in room.)     Onur Madrigal  03/06/24

## 2024-03-06 NOTE — PROGRESS NOTES
NEPHROLOGY PROGRESS NOTE   Michael Koch 33 y.o. male MRN: [id number]  Unit/Bed#: -01 Encounter: [id number]  Reason for Consult: ARF      SUMMARY:    33 y.o.  man with PMH alcohol hepatitis, liver cirrhosis, hypertension, diabetes, nephrolithiasis, gout, GERD, depression, tobacco use.  Patient was admitted to [facility] in January due to liver failure then transferred to [facility] and [place] for evaluation of transplant unfortunately patient is not a candidate for liver transplant and was transferred back to [facility] to be close to his family.  Hemodialysis was started at [facility] .  Nephrology is consulted for management of DUANE      Assessment/Plan:     Acute renal failure/acute tubular necrosis currently dialysis dependent  -- Present on admission, severe DUANE stage III  -- Currently dialysis dependent  -- Access : right IJ permacath  -- Hemodialysis initiated on February 25, 2024, completed 3 treatments of dialysis without any significant issues  -- Renal failure suspected to be in the setting of acute tubular necrosis, though could not rule out a component of hepatorenal syndrome  -- Plan for dialysis today this afternoon.  --If outpatient HD placement established no strong objections to discharge from a renal standpoint     Alcoholic cirrhosis/liver failure  -- Not a candidate for hepatic transplant  -- Overall prognosis will be poor in the setting of liver failure with renal failure  --Palliative care on board, continued outpatient palliative care follow-ups     Blood pressure/volume status  -- Extravascular volume overload/anasarca and third spacing  -- Hypotensive currently on midodrine 10 mg 3 times daily  -- Ultrafiltration limited due to hypotension  -- Hypotension secondary to cirrhosis  -- Blood pressure is stable on the lower side, continue current management     Hypokalemia  -- No need to potassium restricted diet  -- Would like to avoid hypokalemia in the setting of cirrhosis to prevent  worsening hepatic encephalopathy  -- Magnesium level is normal  -- Started potassium chloride 40 mEq today  --Will dialyze on a 4K bath  --Calcium remains low can increase potassium chloride to 40 mEq twice daily     Hyponatremia--resolved     High anion gap metabolic acidosis--improving      SUBJECTIVE / INTERVAL HISTORY:    No overnight events.    OBJECTIVE:  Current Weight: Weight - Scale: 85.2 kg (187 lb 13.3 oz)  Vitals:    03/05/24 1543 03/05/24 2207 03/06/24 0539 03/06/24 0735   BP: 105/58 104/62  (!) 92/48   Pulse: 91 89  98   Resp:       Temp: 97.9 °F (36.6 °C) 97.5 °F (36.4 °C)  97.9 °F (36.6 °C)   TempSrc:       SpO2: 100% 99%  100%   Weight:   85.2 kg (187 lb 13.3 oz)    Height:           Intake/Output Summary (Last 24 hours) at 3/6/2024 1210  Last data filed at 3/6/2024 1100  Gross per 24 hour   Intake 480 ml   Output --   Net 480 ml       Review of Systems:    Constitutional: Negative for chills and fever.   HENT: Negative for ear pain and sore throat.    Eyes: Negative for pain and visual disturbance.   Respiratory: Negative for cough and shortness of breath.    Cardiovascular: Negative for chest pain and palpitations.   Gastrointestinal: Negative for abdominal pain and vomiting.   Genitourinary: Negative for dysuria and hematuria.   Musculoskeletal: Negative for arthralgias and back pain.   Skin: Negative for color change and rash.   Neurological: Negative for seizures and syncope.   12 point ROS has been reviewed.    Physical Exam  Vitals and nursing note reviewed.   Constitutional:       General: He is not in acute distress.     Appearance: He is well-developed. He is ill-appearing.   HENT:      Head: Normocephalic and atraumatic.   Eyes:      General: Scleral icterus present.      Conjunctiva/sclera: Conjunctivae normal.      Pupils: Pupils are equal, round, and reactive to light.   Cardiovascular:      Rate and Rhythm: Normal rate and regular rhythm.      Heart sounds: S1 normal and S2 normal. No  murmur heard.     No friction rub. No gallop.   Pulmonary:      Effort: Pulmonary effort is normal. No respiratory distress.      Breath sounds: Normal breath sounds. No wheezing or rales.   Abdominal:      General: Bowel sounds are normal.      Palpations: Abdomen is soft.      Tenderness: There is no abdominal tenderness. There is no rebound.   Musculoskeletal:         General: Normal range of motion.      Cervical back: Normal range of motion and neck supple.   Skin:     General: Skin is dry.      Coloration: Skin is jaundiced and pale.      Findings: No rash.   Neurological:      Mental Status: He is alert and oriented to person, place, and time.   Psychiatric:         Behavior: Behavior normal.         Medications:    Current Facility-Administered Medications:     calcium acetate (PHOSLO) capsule 667 mg, 667 mg, Oral, TID With Meals, Niecy OsoriovedJENSEN ba, 667 mg at 03/06/24 1155    calcium carbonate (OYSTER SHELL,OSCAL) 500 mg tablet 1 tablet, 1 tablet, Oral, Daily With Breakfast, JENSEN Fontenot, 1 tablet at 03/06/24 0831    ciprofloxacin (CIPRO) tablet 250 mg, 250 mg, Oral, Q24H, Yuly S Edenilson, CRNP, 250 mg at 03/06/24 0831    dextromethorphan-guaiFENesin (ROBITUSSIN DM) oral syrup 10 mL, 10 mL, Oral, Q4H PRN, Yuly S Edenilson, CRNP, 10 mL at 03/03/24 2200    diphenhydramine, lidocaine, Al/Mg hydroxide, simethicone (Magic Mouthwash) oral solution 10 mL, 10 mL, Swish & Swallow, Q6H PRN, Yuly S Edenilson, CRNP, 10 mL at 03/06/24 0831    hydrOXYzine HCL (ATARAX) tablet 25 mg, 25 mg, Oral, Q6H PRN, Yuly S Edenilson, CRNP, 25 mg at 03/03/24 2046    insulin glargine (LANTUS) subcutaneous injection 10 Units 0.1 mL, 10 Units, Subcutaneous, Q12H MURALIConstance MD, 10 Units at 03/05/24 2100    insulin lispro (HumALOG/ADMELOG) 100 units/mL subcutaneous injection 1-6 Units, 1-6 Units, Subcutaneous, TID AC, 2 Units at 03/06/24 1155 **AND** Fingerstick Glucose (POCT), , , TID AC, Yuly Pyle, JENSEN    insulin lispro  (HumALOG/ADMELOG) 100 units/mL subcutaneous injection 1-6 Units, 1-6 Units, Subcutaneous, HS, Yuly S Edenilson, CRNP, 1 Units at 03/02/24 2044    lactulose (CHRONULAC) oral solution 20 g, 20 g, Oral, BID, Yuly S Edenilson, CRNP, 20 g at 03/06/24 0831    melatonin tablet 9 mg, 9 mg, Oral, HS, Yuly S Edenilson, CRNP, 9 mg at 03/05/24 2100    midodrine (PROAMATINE) tablet 15 mg, 15 mg, Oral, TID AC, Constance Menendez MD, 15 mg at 03/06/24 1155    nicotine (NICODERM CQ) 14 mg/24hr TD 24 hr patch 1 patch, 1 patch, Transdermal, Daily, Yuly S Edenilson, CRNP, 1 patch at 03/06/24 0831    OLANZapine (ZyPREXA) tablet 15 mg, 15 mg, Oral, HS, Yuly S Edenilson, CRNP, 15 mg at 03/05/24 2101    ondansetron (ZOFRAN) injection 4 mg, 4 mg, Intravenous, Q8H PRN, Yuly S Edenilson, CRNP, 4 mg at 02/29/24 1210    pantoprazole (PROTONIX) EC tablet 40 mg, 40 mg, Oral, Early Morning, Yuly S Edenilson, CRNP, 40 mg at 03/06/24 0511    potassium chloride (Klor-Con M20) CR tablet 40 mEq, 40 mEq, Oral, BID, Roland Kulkarni MD, 40 mEq at 03/06/24 0831    thiamine tablet 100 mg, 100 mg, Oral, Daily, Yuly S Edenilson, CRNP, 100 mg at 03/06/24 0831    Laboratory Results:  Results from last 7 days   Lab Units 03/06/24  0548 03/05/24  0527 03/04/24  0842 03/03/24  1439 03/03/24  0451 03/02/24  0921 03/02/24  0501 03/01/24  0633 02/29/24  0454   WBC Thousand/uL  --  19.04* 19.43*  --  18.59*  --  20.54* 17.56* 15.12*   HEMOGLOBIN g/dL  --  8.8* 8.3*  --  8.1*  --  8.6* 8.9* 8.6*   HEMATOCRIT %  --  23.8* 22.9*  --  23.0*  --  24.0* 24.6* 23.2*   PLATELETS Thousands/uL  --  51* 55*  --  56*  --  55* 73* 75*   POTASSIUM mmol/L 2.9* 2.7* 2.9* 3.0* 2.8* 2.9*  --  3.4* 3.4*   CHLORIDE mmol/L 97 96 99 98 97 98  --  97 98   CO2 mmol/L 20* 24 15* 16* 17* 23  --  20* 24   BUN mg/dL 70* 56* 87* 78* 73* 60*  --  84* 64*   CREATININE mg/dL 7.27* 5.73* 8.12* 7.14* 6.64* 5.50*  --  6.24* 4.98*   CALCIUM mg/dL 6.5* 6.3* 6.3* 6.4* 6.3* 6.5*  --  6.6* 6.9*   MAGNESIUM mg/dL  --  2.3 2.5 2.5  --   --    --   --   --    PHOSPHORUS mg/dL  --   --  9.4* 8.2*  --   --   --   --   --        PLEASE NOTE:  This encounter was completed utilizing the M- Modal/Fluency Direct Speech Voice Recognition Software. Grammatical errors, random word insertions, pronoun errors and incomplete sentences are occasional consequences of the system due to software limitations, ambient noise and hardware issues.These may be missed by proof reading prior to affixing electronic signature. Any questions or concerns about the content, text or information contained within the body of this dictation should be directly addressed to the physician for clarification. Please do not hesitate to call me directly if you have any any questions or concerns.

## 2024-03-06 NOTE — PLAN OF CARE
Problem: PHYSICAL THERAPY ADULT  Goal: Performs mobility at highest level of function for planned discharge setting.  See evaluation for individualized goals.  Description: Treatment/Interventions: Functional transfer training, LE strengthening/ROM, Elevations, Therapeutic exercise, Endurance training, Patient/family training, Equipment eval/education, Gait training, Compensatory technique education, Spoke to nursing, OT, Spoke to case management  Equipment Recommended: Vigneshe       See flowsheet documentation for full assessment, interventions and recommendations.  Note: Prognosis: Guarded  Problem List: Decreased strength, Decreased endurance, Impaired balance, Decreased mobility, Impaired judgement, Obesity  Assessment: Pt seen per PT POC. Intervention with focus on  therapeutic activity , therapeutic exercise , however PT session limited 2/2 initiation of HD treatment. Pt with good tolerance to session with limitations of fatigue. Pt greeted OOB in bedside chair with hemodialysis team and family in room. Pt amenable to brief PT session while HD equipment was being set up. Soft BP noted at EOB following short distance amb without AD from bed to chair, however pt denied sxs. Seated/supine therapeutic exercise performed. Progress towards functional goals limited this session 2/2 treatment session limitations, however demonstrated improved activity tolerance without the presence of symptoms. Currently requiring mod I-stand by assistance to complete limited functional mobility with skilled cues for technique/safety.  Denies reports of dizziness or SOB t/o session. Edu provided on fall precautions and reinforced w/ good understanding. Please refer to flowsheet for objective data above. Pt continues to demonstrate deficits relative to PLOF and would benefit from continued skilled PT services per POC to improve indep and safety with mobility. PT d/c recommendations: Anticipate level II (mod) rehab intensity resources at  d/c when medically appropriate. Plan for therapeutic exercise, gait training, stair negotiation next 1-2 sessions with BP monitoring  Barriers to Discharge: Other (Comment)  Barriers to Discharge Comments: (+) CAMILO, dialysis, h/o symptomatic hypotension, hypotension, time alone during the day  Rehab Resource Intensity Level, PT: II (Moderate Resource Intensity) (if pt chooses d/c to home he will require the DME listed below)    See flowsheet documentation for full assessment.

## 2024-03-07 NOTE — PHYSICAL THERAPY NOTE
"   PHYSICAL THERAPY TREATMENT NOTE  NAME:  Michael Koch  DATE: 03/07/24    Length Of Stay: 9  Performed at least 2 patient identifiers during session: Name and Birthday    TREATMENT FLOW SHEET:    03/07/24 0930   PT Last Visit   PT Visit Date 03/07/24   Note Type   Note Type Treatment for insurance authorization   Pain Assessment   Pain Assessment Tool FLACC   Pain Score 8   Pain Location/Orientation Orientation: Lower;Location: Back   Pain Rating: FLACC (Rest) - Face 0   Pain Rating: FLACC (Rest) - Legs 0   Pain Rating: FLACC (Rest) - Activity 0   Pain Rating: FLACC (Rest) - Cry 0   Pain Rating: FLACC (Rest) - Consolability 0   Score: FLACC (Rest) 0   Pain Rating: FLACC (Activity) - Face 1   Pain Rating: FLACC (Activity) - Legs 0   Pain Rating: FLACC (Activity) - Activity 0   Pain Rating: FLACC (Activity) - Cry 1   Pain Rating: FLACC (Activity) - Consolability 0   Score: FLACC (Activity) 2   Restrictions/Precautions   Other Precautions Chair Alarm;Bed Alarm;Fall Risk;Pain   General   Chart Reviewed Yes   Response to Previous Treatment Patient with no complaints from previous session.   Family/Caregiver Present No   Cognition   Following Commands Follows one step commands without difficulty   Subjective   Subjective \"A nursing home?\"   Bed Mobility   Rolling R 5  Supervision   Additional items Verbal cues;Increased time required   Rolling L 5  Supervision   Additional items Increased time required;Verbal cues   Supine to Sit 5  Supervision   Additional items Increased time required;Verbal cues   Sit to Supine 5  Supervision   Additional items Impulsive;Verbal cues   Additional Comments HOB flat without bedrail. verbal cues for task completion to sit on EOB. required supervision for safety. upon sitting EOB, pt wiht onset of dizziness. encouraged to sit EOB as able to obtain BP reading. pt responsive throughout. returned to supine impulsively wiht supervision. completed rolling right and left multiple times with " "supervision. further mobility limited by orthostatic BP   Transfers   Sit to Stand Unable to assess   Additional Comments unable to assess due to orthostatic BP. safety concerns with BP and c/o dizziness.   Balance   Static Sitting Good   Dynamic Sitting Fair +   Endurance Deficit   Endurance Deficit Yes   Endurance Deficit Description fatigue, orthostatic BP   Activity Tolerance   Activity Tolerance Patient limited by fatigue;Treatment limited secondary to medical complications (Comment)  (orthostatic BP)   Medical Staff Made Aware OT, RISA Kenney Heidi   Assessment   Prognosis Fair   Problem List Decreased strength;Decreased endurance;Impaired balance;Decreased mobility;Impaired judgement;Decreased safety awareness;Obesity;Pain   Barriers to Discharge Decreased caregiver support;Inaccessible home environment   Barriers to Discharge Comments pt has support from spouse however, alone during the day, has CAMILO home and significant orthostatic hypotension. increased risk for falls and readmission.   Goals   Patient Goals \"Go home\"   PT Treatment Day 6   Plan   Treatment/Interventions ADL retraining;Functional transfer training;LE strengthening/ROM;Elevations;Therapeutic exercise;Endurance training;Patient/family training;Equipment eval/education;Bed mobility;Gait training;Compensatory technique education;Spoke to nursing;Spoke to case management;OT   Progress Slow progress, decreased activity tolerance   PT Frequency 3-5x/wk   Discharge Recommendation   Rehab Resource Intensity Level, PT II (Moderate Resource Intensity)  (should patient defer rehab and return to home would require walker and wheelchair.)   AM-PAC Basic Mobility Inpatient   Turning in Flat Bed Without Bedrails 3   Lying on Back to Sitting on Edge of Flat Bed Without Bedrails 3   Moving Bed to Chair 2   Standing Up From Chair Using Arms 2   Walk in Room 2   Climb 3-5 Stairs With Railing 1   Basic Mobility Inpatient Raw Score 13   Basic Mobility " Standardized Score 33.99   Highest Level Of Mobility   -HLM Goal 4: Move to chair/commode   -HLM Achieved 3: Sit at edge of bed   Education   Education Provided Mobility training   Patient Reinforcement needed   End of Consult   Patient Position at End of Consult Supine;Bed/Chair alarm activated;All needs within reach       The patient's AM-PAC Basic Mobility Inpatient Short Form Raw Score is 13. A Raw score of less than or equal to 16 suggests the patient may benefit from discharge to post-acute rehabilitation services. Please also refer to the recommendation of the Physical Therapist for safe discharge planning.     Pt tolerated session poorly limited significantly by orthostatic hypotension. He was able to compete bed mobility with supervision, but was unable to stand due to orthostatic hypotension. He requires encouragement for participation. He is limited by decreased strength, balance endurance. He will continue to benefit from PT services to maximize LOF.     Анна May, PT,DPT

## 2024-03-07 NOTE — ASSESSMENT & PLAN NOTE
Currently on Solu-Cortef 25 mg daily -stopped 3/5  Alcoholic cirrhosis  Transferred from [facility] in [place] after liver transplant evaluation, he was found not to be candidate  Continue lactulose but decrease dose to 20 g once daily as ammonia level is normal and patient is having a lot of loose stools  No encephalopathy currently  PT/OT/case management for discharge planning so patient may prepare for transplant reevaluation in the future  Patient has extensive workup done, at this time, not much to offer  Patient does have capacity to make informed medical decisions.  Plan for discharge to SNF for rehab

## 2024-03-07 NOTE — CASE MANAGEMENT
Case Management Discharge Planning Note    Patient name Michael Koch  Location /-01 MRN [id number]  : 1990 Date 3/7/2024       Current Admission Date: 2024  Current Admission Diagnosis:Alcoholic hepatitis   Patient Active Problem List    Diagnosis Date Noted    Renal failure 2024    Malnutrition (HCC) 2024    Orthostatic hypotension 2024    Acute respiratory failure with hypoxia and hypercapnia (HCC) 2024    Alcoholic hepatitis 2024    ETOH abuse 2024    Depression 2024    GERD (gastroesophageal reflux disease) 2024    BPH (benign prostatic hyperplasia) 2024    RSV infection 2024    Abnormal CT scan 2024    Hepatic encephalopathy (HCC) 2024    N&V (nausea and vomiting) 2024    DUANE (acute kidney injury) (HCC) 2024    Thrombocytopenia (HCC) 2024    Lactic acidosis 2024    Marijuana use 2024    SIRS (systemic inflammatory response syndrome) (HCC) 2024    History of gout 10/04/2023    History of hypertension 10/04/2023    Type 2 diabetes mellitus with hyperglycemia, without long-term current use of insulin (HCC) 10/04/2023    Tobacco abuse 10/04/2023      LOS (days): 9  Geometric Mean LOS (GMLOS) (days): 4.8  Days to GMLOS:-3.8     OBJECTIVE:  Risk of Unplanned Readmission Score: 45.84         Current admission status: Inpatient   Preferred Pharmacy:   [id number] - [place], [place] - [address]  [address]  [place] [address]  Phone: [phone] Fax: [phone]    Primary Care Provider: Louie Odonnell DO    Primary Insurance: BLUE CROSS  Secondary Insurance:     DISCHARGE DETAILS:     0900 CM contacted spouse to let her know rehab referrals made for patient. Not sure patient will meet acute rehab criteria so CM exploring [facility] facilities.  CM made spouse aware [facility] and [facility] may be able to accept but family will have to  provide HD transport.  Spouse prefers [facility] due to location for need of HD transport.    CM met with patient to let him know above.  Patient contacted his mother and CM discussed with mother possibility of mother providing transport to HD and spouse providing transport from HD for patient. Mother agreeable and requested [facility].    1400 CM met with mother at patients bedside and let her know [facility] unable to accept, mother requested [facility] be explored for STR due to distance for HD transport rather than [facility].    CM messaged [facility], in [place], to explore referral acceptance/denial for patient.

## 2024-03-07 NOTE — OCCUPATIONAL THERAPY NOTE
Occupational Therapy Progress Note     Patient Name: Michael Koch  Today's Date: 3/7/2024  Problem List  Principal Problem:    Alcoholic hepatitis  Active Problems:    Type 2 diabetes mellitus with hyperglycemia, without long-term current use of insulin (HCC)    Tobacco abuse    ETOH abuse    Depression    GERD (gastroesophageal reflux disease)    Renal failure    Malnutrition (HCC)    Orthostatic hypotension       03/07/24 0929   Note Type   Note Type Treatment   Pain Assessment   Pain Assessment Tool 0-10   Pain Score 8   Pain Location/Orientation Orientation: Lower;Location: Back   Restrictions/Precautions   Other Precautions Bed Alarm;Fall Risk;Pain;Chair Alarm   ADL   UB Dressing Assistance 4  Minimal Assistance   UB Dressing Deficit Thread RUE;Thread LUE;Pull over head;Pull around back;Pull down in back   UB Dressing Comments Pt requiring assistance to doff gown in supine and to don new gown.   LB Dressing Assistance 3  Moderate Assistance   LB Dressing Deficit Don/doff R sock;Don/doff L sock   LB Dressing Comments Pt able to don the R sock while seated EOB, however becoming lightheaded and dizzy and requiring assistance to don the L sock.   Toileting Assistance  3  Moderate Assistance   Toileting Deficit Perineal hygiene   Toileting Comments Pt is able to participate in pericare after having a bowel movement but requiring assistance for thoroughness. Pt also requiring assistance to doff soiled briefs and don new briefs.   Bed Mobility   Rolling R 5  Supervision   Additional items Verbal cues   Rolling L 5  Supervision   Additional items Verbal cues   Supine to Sit 5  Supervision   Additional items Increased time required;Verbal cues   Sit to Supine 5  Supervision   Additional items Increased time required;Verbal cues   Additional Comments Pt received supine in bed upon start of session. Upon supine > sit, pt reporting lightheadedness and dizziness, BP taken throughout (see below) and deemed unsafe at  this time to perform any functional transfers/mobility. Pt participated in various rolls to the L and to the R while in bed at supervision level as he reported needing to have a bowel movement however unsafe to transfer to commode due to BP (pt used bedpan).   Transfers   Sit to Stand Unable to assess   Functional Mobility   Functional Mobility   (Unable to assess)   Cognition   Overall Cognitive Status WFL   Arousal/Participation Alert;Cooperative   Attention Within functional limits   Orientation Level Oriented X4   Memory Within functional limits   Following Commands Follows multistep commands with increased time or repetition   Activity Tolerance   Activity Tolerance Patient limited by fatigue;Patient limited by pain;Other (Comment)  (Pt limited by orthostasis)   Medical Staff Made Aware PTАнна   Assessment   Assessment Pt completed OT tx session #1 focused on ADLs and functional mobility. Pt alert and agreeable to participate. PT/OT co-treat completed d/t significant mobility deficits and safety concerns. Unable to assess functional transfers or mobility this date due to orthostatic hypotension (pt symptomatic). Pt able to participate in ADLs as noted above however he continues to require assistance. He is limited by fatigue, pain, and orthostatic hypotension. He will benefit from continued skilled OT services to regain full independence in ADLs and functional mobility. At end of session, pt supine in bed with bed alarm on, call bell in reach, and all needs met.   Plan   Treatment Interventions ADL retraining;Functional transfer training;Endurance training;Patient/family training;Equipment evaluation/education;Compensatory technique education;Continued evaluation;Energy conservation;Activityengagement   Goal Expiration Date 03/13/24   OT Frequency 2-3x/wk   Discharge Recommendation   Rehab Resource Intensity Level, OT II (Moderate Resource Intensity)   AM-PAC Daily Activity Inpatient   Lower Body Dressing 2    Bathing 2   Toileting 2   Upper Body Dressing 3   Grooming 3   Eating 4   Daily Activity Raw Score 16   Daily Activity Standardized Score (Calc for Raw Score >=11) 35.96   -PAC Applied Cognition Inpatient   Following a Speech/Presentation 3   Understanding Ordinary Conversation 4   Taking Medications 3   Remembering Where Things Are Placed or Put Away 3   Remembering List of 4-5 Errands 3   Taking Care of Complicated Tasks 3   Applied Cognition Raw Score 19   Applied Cognition Standardized Score 39.77          03/07/24 0930 03/07/24 0938 03/07/24 0941   Vitals   Blood Pressure 104/65 (!) 73/45 (!) 74/44   Patient Position - Orthostatic VS Lying Sitting Sitting      03/07/24 0944   Vitals   Blood Pressure (!) 95/45   Patient Position - Orthostatic VS Lying         The patient's raw score on the AM-PAC Daily Activity Inpatient Short Form is 16. A raw score of less than 19 suggests the patient may benefit from discharge to post-acute rehabilitation services. Please refer to the recommendation of the Occupational Therapist for safe discharge planning.      Pt goals to be met by 3/13/2024:     Pt will demonstrate ability to complete grooming/hygiene tasks @ I after set-up.  Pt will demonstrate ability to complete supine<>sit @ I in order to increase safety and independence during ADL tasks.  Pt will demonstrate ability to complete UB ADLs including washing/dressing @ I in order to increase performance and participation during meaningful tasks  Pt will demonstrate ability to complete LB dressing @ I in order to increase safety and independence during meaningful tasks.   Pt will demonstrate ability to zoran/doff socks/shoes while sitting EOB @ I in order to increase safety and independence during meaningful tasks.   Pt will demonstrate ability to complete toileting tasks including CM and pericare @ I in order to increase safety and independence during meaningful tasks.  Pt will demonstrate ability to complete EOB,  chair, toilet/commode transfers @ I in order to increase performance and participation during functional tasks.  Pt will demonstrate ability to stand for 8-10 minutes while maintaining Good balance without the use of AD during ADL/IADL tasks.  Pt will demonstrate ability to tolerate 30-35 minute OT session with no vc'ing for deep breathing or use of energy conservation techniques in order to increase activity tolerance during functional tasks.   Pt will demonstrate Good carryover of use of energy conservation/compensatory strategies during ADLs and functional tasks in order to increase safety and reduce risk for falls.   Pt will demonstrate Good attention and participation in continued evaluation of functional ambulation house hold distances in order to assist with safe d/c planning.  Pt will attend to continued cognitive assessments 100% of the time in order to provide most appropriate d/c recommendations.   Pt will follow 100% simple 2-step commands and be A&O x4 consistently with environmental cues to increase participation in functional activities.   Pt will identify 3 areas of interest/hobbies and 1 intervention on how to incorporate into daily life in order to increase interaction with environment and peers as well as increase participation in meaningful tasks.   Pt will demonstrate 100% carryover of BUE HEP in order to increase BUE MS and increase performance during functional tasks upon d/c home.         Ted Betts, MS, OTR/L

## 2024-03-07 NOTE — ASSESSMENT & PLAN NOTE
Secondary to liver disease and anomic dysfunction.  Continue midodrine-patient is still having orthostatic hypotension which is limiting his mobility. cont CARLOS stockings.  inCrease midodrine to 17.5 mg 3 times daily added Florinef and decreased lactulose to help decrease diarrhea's.  Increase oral intake.  Monitor orthostasis and further increase midodrine if required  Patient's symptoms get worse post dialysis.

## 2024-03-07 NOTE — PLAN OF CARE
Problem: OCCUPATIONAL THERAPY ADULT  Goal: Performs self-care activities at highest level of function for planned discharge setting.  See evaluation for individualized goals.  Description: Treatment Interventions: ADL retraining, Visual perceptual retraining, UE strengthening/ROM, Functional transfer training, Endurance training, Cognitive reorientation, Patient/family training, Equipment evaluation/education, Neuromuscular reeducation, Fine motor coordination activities, Compensatory technique education, UE splinting, Continued evaluation, Energy conservation, Activityengagement, Cardiac education          See flowsheet documentation for full assessment, interventions and recommendations.   Outcome: Not Progressing  Note: Limitation: Decreased ADL status, Decreased Safe judgement during ADL, Decreased UE strength, Decreased endurance, Decreased high-level ADLs, Decreased self-care trans  Prognosis: Good  Assessment: Pt completed OT tx session #1 focused on ADLs and functional mobility. Pt alert and agreeable to participate. PT/OT co-treat completed d/t significant mobility deficits and safety concerns. Unable to assess functional transfers or mobility this date due to orthostatic hypotension (pt symptomatic). Pt able to participate in ADLs as noted above however he continues to require assistance. He is limited by fatigue, pain, and orthostatic hypotension. He will benefit from continued skilled OT services to regain full independence in ADLs and functional mobility. At end of session, pt supine in bed with bed alarm on, call bell in reach, and all needs met.     Rehab Resource Intensity Level, OT: II (Moderate Resource Intensity)

## 2024-03-07 NOTE — ASSESSMENT & PLAN NOTE
Lab Results   Component Value Date    HGBA1C 8.8 (H) 01/21/2024       Recent Labs     03/06/24  1725 03/06/24 2054 03/07/24  0813 03/07/24  1056   POCGLU 113 113 285* 126         Blood Sugar Average: Last 72 hrs:  (P) [id number]  Sliding-scale insulin with Accu-Cheks  Hypoglycemia protocol  Carbohydrate controlled diet   Yes

## 2024-03-07 NOTE — PLAN OF CARE
Problem: PHYSICAL THERAPY ADULT  Goal: Performs mobility at highest level of function for planned discharge setting.  See evaluation for individualized goals.  Description: Treatment/Interventions: Functional transfer training, LE strengthening/ROM, Elevations, Therapeutic exercise, Endurance training, Patient/family training, Equipment eval/education, Gait training, Compensatory technique education, Spoke to nursing, OT, Spoke to case management  Equipment Recommended: Vigneshe       See flowsheet documentation for full assessment, interventions and recommendations.  Note: Prognosis: Fair  Problem List: Decreased strength, Decreased endurance, Impaired balance, Decreased mobility, Impaired judgement, Decreased safety awareness, Obesity, Pain  Assessment: Pt tolerated session poorly limited significantly by orthostatic hypotension. He was able to compete bed mobility with supervision, but was unable to stand due to orthostatic hypotension. He requires encouragement for participation. He is limited by decreased strength, balance endurance. He will continue to benefit from PT services to maximize LOF.  Barriers to Discharge: Decreased caregiver support, Inaccessible home environment  Barriers to Discharge Comments: pt has support from spouse however, alone during the day, has CAMILO home and significant orthostatic hypotension. increased risk for falls and readmission.  Rehab Resource Intensity Level, PT: II (Moderate Resource Intensity) (should patient defer rehab and return to home would require walker and wheelchair.)    See flowsheet documentation for full assessment.

## 2024-03-07 NOTE — PROGRESS NOTES
[facility]  Progress Note  Name: Michael Koch I  MRN: [id number]  Unit/Bed#: -01 I Date of Admission: 2/27/2024   Date of Service: 3/7/2024 I Hospital Day: 9    Assessment/Plan   * Alcoholic hepatitis  Assessment & Plan  Currently on Solu-Cortef 25 mg daily -stopped 3/5  Alcoholic cirrhosis  Transferred from [facility] in [place] after liver transplant evaluation, he was found not to be candidate  Continue lactulose but decrease dose to 20 g once daily as ammonia level is normal and patient is having a lot of loose stools  No encephalopathy currently  PT/OT/case management for discharge planning so patient may prepare for transplant reevaluation in the future  Patient has extensive workup done, at this time, not much to offer  Patient does have capacity to make informed medical decisions.  Plan for discharge to SNF for rehab    Orthostatic hypotension  Assessment & Plan  Secondary to liver disease and anomic dysfunction.  Continue midodrine-patient is still having orthostatic hypotension which is limiting his mobility. cont CARLOS stockings.  inCrease midodrine to 17.5 mg 3 times daily added Florinef and decreased lactulose to help decrease diarrhea's.  Increase oral intake.  Monitor orthostasis and further increase midodrine if required  Patient's symptoms get worse post dialysis.    Malnutrition (HCC)  Assessment & Plan  Postpyloric tube in place and has been receiving tube feeding to supplement oral intake at outside hospital  Also eating as tolerated of oral diet  Patient complaining of irritated throat and requesting tube be removed  Patient evaluated by speech therapist, able to swallow, at this time we will remove the postpyloric tube.    Renal failure  Assessment & Plan  Developed DUANE on CKD with underlying liver failure   ureteral stent placed at [facility], lithotripsy  Developed oliguria, treated with CRRT then transition to HD  Appreciate nephrology consultation  Patient  temporary HD catheter replaced with PermCath by IR on 2/29/2024.  Patient also has alcohol induced hepatitis.  Next hd tomorrow    GERD (gastroesophageal reflux disease)  Assessment & Plan  Continue PPI    Depression  Assessment & Plan  Continue Zyprexa at bedtime decreased dose to 10 mg daily and also decrease melatonin to 6 mg daily  cortisol level is currently normal will recheck again in 1 week as now off steroids.normal ammonia level    ETOH abuse  Assessment & Plan  History alcohol abuse, last drink prior to admission in January  Will need outpatient alcohol counseling prior to transplant reevaluation-remember, patient has referral from [facility] for virtual therapy which supposed to initiated soon.      Tobacco abuse  Assessment & Plan  Nicotine patch  Cessation counseling    Type 2 diabetes mellitus with hyperglycemia, without long-term current use of insulin (HCC)  Assessment & Plan  Lab Results   Component Value Date    HGBA1C 8.8 (H) 01/21/2024       Recent Labs     03/06/24  1725 03/06/24  2054 03/07/24  0813 03/07/24  1056   POCGLU 113 113 285* 126         Blood Sugar Average: Last 72 hrs:  (P) 138.6791887475978995  Sliding-scale insulin with Accu-Cheks  Hypoglycemia protocol  Carbohydrate controlled diet               VTE Pharmacologic Prophylaxis:   Moderate Risk (Score 3-4) - Pharmacological DVT Prophylaxis Contraindicated. Sequential Compression Devices Ordered.    Mobility:   Basic Mobility Inpatient Raw Score: 13  JH-HLM Goal: 4: Move to chair/commode  JH-HLM Achieved: 3: Sit at edge of bed  HLM Goal achieved. Continue to encourage appropriate mobility.    Patient Centered Rounds: I performed bedside rounds with nursing staff today.   Discussions with Specialists or Other Care Team Provider: none    Education and Discussions with Family / Patient: none    Total Time Spent on Date of Encounter in care of patient: 35 mins. This time was spent on one or more of the following: performing  physical exam; counseling and coordination of care; obtaining or reviewing history; documenting in the medical record; reviewing/ordering tests, medications or procedures; communicating with other healthcare professionals and discussing with patient's family/caregivers.    Current Length of Stay: 9 day(s)  Current Patient Status: Inpatient   Certification Statement: The patient will continue to require additional inpatient hospital stay due to hypokalemia  Discharge Plan: Anticipate discharge in 24-48 hrs to rehab facility.    Code Status: Level 1 - Full Code    Subjective:   Patient denies any chest pain or shortness of breath had 4 loose stools today.  Yesterday his dialysis had to be cut short secondary to hypotension.     Objective:     Vitals:   Temp (24hrs), Av.9 °F (36.6 °C), Min:97.7 °F (36.5 °C), Max:98.1 °F (36.7 °C)    Temp:  [97.7 °F (36.5 °C)-98.1 °F (36.7 °C)] 97.9 °F (36.6 °C)  HR:  [] 99  Resp:  [16-18] 18  BP: ()/(40-77) 93/51  SpO2:  [97 %-100 %] 98 %  Body mass index is 29.32 kg/m².     Input and Output Summary (last 24 hours):     Intake/Output Summary (Last 24 hours) at 3/7/2024 1509  Last data filed at 3/7/2024 0301  Gross per 24 hour   Intake 480 ml   Output 1115 ml   Net -635 ml       Physical Exam:   Physical Exam  Vitals and nursing note reviewed.   Constitutional:       Appearance: He is ill-appearing.   HENT:      Head: Normocephalic and atraumatic.      Right Ear: External ear normal.      Left Ear: External ear normal.      Nose: Nose normal.      Mouth/Throat:      Pharynx: Oropharynx is clear.   Eyes:      General: Scleral icterus present.      Pupils: Pupils are equal, round, and reactive to light.   Cardiovascular:      Rate and Rhythm: Normal rate and regular rhythm.      Heart sounds: Normal heart sounds.   Pulmonary:      Effort: Pulmonary effort is normal.      Breath sounds: Normal breath sounds.   Abdominal:      General: Bowel sounds are normal.       Palpations: Abdomen is soft.      Tenderness: There is no abdominal tenderness.   Musculoskeletal:         General: Normal range of motion.      Cervical back: Normal range of motion and neck supple.   Skin:     General: Skin is warm and dry.      Capillary Refill: Capillary refill takes less than 2 seconds.      Coloration: Skin is jaundiced.   Neurological:      General: No focal deficit present.      Mental Status: He is alert and oriented to person, place, and time.   Psychiatric:         Mood and Affect: Mood normal.            Additional Data:     Labs:  Results from last 7 days   Lab Units 03/05/24  0527   WBC Thousand/uL 19.04*   HEMOGLOBIN g/dL 8.8*   HEMATOCRIT % 23.8*   PLATELETS Thousands/uL 51*   NEUTROS PCT % 88*   LYMPHS PCT % 5*   MONOS PCT % 5   EOS PCT % 1     Results from last 7 days   Lab Units 03/07/24  0554 03/06/24  0548 03/05/24  0527   SODIUM mmol/L 136   < > 137   POTASSIUM mmol/L 3.1*   < > 2.7*   CHLORIDE mmol/L 99   < > 96   CO2 mmol/L 24   < > 24   BUN mg/dL 55*   < > 56*   CREATININE mg/dL 6.42*   < > 5.73*   ANION GAP mmol/L 13   < > 17   CALCIUM mg/dL 6.5*   < > 6.3*   ALBUMIN g/dL  --   --  2.5*   TOTAL BILIRUBIN mg/dL  --   --  49.17*   ALK PHOS U/L  --   --  288*   ALT U/L  --   --  106*   AST U/L  --   --  83*   GLUCOSE RANDOM mg/dL 148*   < > 98    < > = values in this interval not displayed.     Results from last 7 days   Lab Units 03/05/24  0527   INR  2.09*     Results from last 7 days   Lab Units 03/07/24  1056 03/07/24  0813 03/06/24  2054 03/06/24  1725 03/06/24  1129 03/06/24  0732 03/05/24  2115 03/05/24  1624 03/05/24  1111 03/05/24  0738 03/04/24  2101 03/04/24 2002   POC GLUCOSE mg/dl 126 285* 113 113 221* 109 125 206* 138 90 147* 128         Results from last 7 days   Lab Units 03/04/24  0842   LACTIC ACID mmol/L 0.8       Lines/Drains:  Invasive Devices       Peripheral Intravenous Line  Duration             Peripheral IV 03/07/24 Right Antecubital <1 day               Hemodialysis Catheter  Duration             HD Permanent Double Catheter 7 days                          Imaging: Reviewed radiology reports from this admission including: MRI brain    Recent Cultures (last 7 days):         Last 24 Hours Medication List:   Current Facility-Administered Medications   Medication Dose Route Frequency Provider Last Rate    calcium acetate  667 mg Oral TID With Meals JENSEN Fontenot      calcium carbonate  1 tablet Oral Daily With Breakfast Niecy Ibarra, CRNP      ciprofloxacin  250 mg Oral Q24H Yuly S Edneilson, CRNP      diphenhydramine, lidocaine, Al/Mg hydroxide, simethicone  10 mL Swish & Swallow Q6H PRN Yuly S Edenilson, CRNP      fludrocortisone  0.1 mg Oral Daily Constance Menendez MD      hydrOXYzine HCL  25 mg Oral Q6H PRN Yuly S Edenilson, CRNP      insulin lispro  1-6 Units Subcutaneous TID AC Yuly S Edenilson, CRNP      insulin lispro  1-6 Units Subcutaneous HS Yuly S Edenilson, CRNP      [START ON 3/8/2024] lactulose  20 g Oral Daily Constance Menendez MD      melatonin  6 mg Oral HS Constance Menendez MD      midodrine  17.5 mg Oral TID AC Constance Menendez MD      nicotine  1 patch Transdermal Daily Yuly S Edenilson, CRNP      OLANZapine  10 mg Oral HS Constance Menendez MD      ondansetron  4 mg Intravenous Q8H PRN Yuly S Edenilson, CRNP      pantoprazole  40 mg Oral Early Morning Yuly S Edenilson, CRNP      potassium chloride  40 mEq Oral BID Roland Kulkarni MD      potassium chloride  40 mEq Oral Once Constance Menendez MD          Today, Patient Was Seen By: Constance Menendez MD    **Please Note: This note may have been constructed using a voice recognition system.**

## 2024-03-07 NOTE — ASSESSMENT & PLAN NOTE
History alcohol abuse, last drink prior to admission in January  Will need outpatient alcohol counseling prior to transplant reevaluation-remember, patient has referral from [facility] for virtual therapy which supposed to initiated soon.

## 2024-03-07 NOTE — ASSESSMENT & PLAN NOTE
Continue Zyprexa at bedtime decreased dose to 10 mg daily and also decrease melatonin to 6 mg daily  cortisol level is currently normal will recheck again in 1 week as now off steroids.normal ammonia level

## 2024-03-07 NOTE — CONSULTS
"Consultation - Neuropsychology/Psychology Department  Michael Koch 33 y.o. male MRN: [id number]  Unit/Bed#: -01 Encounter: [id number]        Reason for Consultation:  Michael Koch is a 33 y.o. year old male who was referred for a Neuropsychological Exam to assess cognitive functioning and comment on capacity to make informed medical decisions      History of Present Illness Alcoholic hepatitis    Physician Requesting Consult: Constance Menendez MD    PROBLEM LIST:  Patient Active Problem List   Diagnosis    History of gout    History of hypertension    Type 2 diabetes mellitus with hyperglycemia, without long-term current use of insulin (HCC)    Tobacco abuse    ETOH abuse    Depression    GERD (gastroesophageal reflux disease)    BPH (benign prostatic hyperplasia)    RSV infection    Abnormal CT scan    Hepatic encephalopathy (HCC)    N&V (nausea and vomiting)    DUANE (acute kidney injury) (HCC)    Thrombocytopenia (HCC)    Lactic acidosis    Marijuana use    SIRS (systemic inflammatory response syndrome) (HCC)    Alcoholic hepatitis    Acute respiratory failure with hypoxia and hypercapnia (HCC)    Renal failure    Malnutrition (HCC)    Orthostatic hypotension         Historical Information   Past Medical History:   Diagnosis Date    Depression     Diabetes mellitus (HCC)     Gout     Hypertension     Hyponatremia 01/21/2024    Kidney stones      Past Surgical History:   Procedure Laterality Date    CYSTOSCOPY W/ URETERAL STENT PLACEMENT  09/14/2023    HEMODIALYSIS ADULT  3/4/2024    IR TUNNELED DIALYSIS CATHETER PLACEMENT  2/29/2024    US GUIDED MASS BIOPSY (UNSPECIFIED)  02/19/2021     Social History   Social History     Substance and Sexual Activity   Alcohol Use Yes    Comment: \"couple 24 oz cans per day\"     Social History     Substance and Sexual Activity   Drug Use Never     Social History     Tobacco Use   Smoking Status Every Day    Current packs/day: 0.25    Types: Cigarettes   Smokeless Tobacco " Never     Family History: No family history on file.    Meds/Allergies   current meds:   Current Facility-Administered Medications   Medication Dose Route Frequency    calcium acetate (PHOSLO) capsule 667 mg  667 mg Oral TID With Meals    calcium carbonate (OYSTER SHELL,OSCAL) 500 mg tablet 1 tablet  1 tablet Oral Daily With Breakfast    ciprofloxacin (CIPRO) tablet 250 mg  250 mg Oral Q24H    diphenhydramine, lidocaine, Al/Mg hydroxide, simethicone (Magic Mouthwash) oral solution 10 mL  10 mL Swish & Swallow Q6H PRN    hydrOXYzine HCL (ATARAX) tablet 25 mg  25 mg Oral Q6H PRN    insulin glargine (LANTUS) subcutaneous injection 10 Units 0.1 mL  10 Units Subcutaneous HS    insulin lispro (HumALOG/ADMELOG) 100 units/mL subcutaneous injection 1-6 Units  1-6 Units Subcutaneous TID AC    insulin lispro (HumALOG/ADMELOG) 100 units/mL subcutaneous injection 1-6 Units  1-6 Units Subcutaneous HS    lactulose (CHRONULAC) oral solution 20 g  20 g Oral BID    melatonin tablet 6 mg  6 mg Oral HS    midodrine (PROAMATINE) tablet 15 mg  15 mg Oral TID AC    nicotine (NICODERM CQ) 14 mg/24hr TD 24 hr patch 1 patch  1 patch Transdermal Daily    OLANZapine (ZyPREXA) tablet 10 mg  10 mg Oral HS    ondansetron (ZOFRAN) injection 4 mg  4 mg Intravenous Q8H PRN    pantoprazole (PROTONIX) EC tablet 40 mg  40 mg Oral Early Morning    potassium chloride (Klor-Con M20) CR tablet 40 mEq  40 mEq Oral BID       No Known Allergies      Family and Social Support:   No data recorded    Behavioral Observations: Patient was alert, oriented x 3 and cooperative; patient admitted to depressed mood an anxiety; no overt evidence of psychotic process or confusion; patient appeared aware of reason for hospitalization and medical history    Cognitive Examination    General Cognitive Functioning MMSE = Deficits in working memory and recall    Attention/Concentration Auditory Selective Attention = Within Normal Limits; Auditory Vigilance = Within Normal  Limits; Information Processing Speed = Within Normal Limits    Frontal Systems/Executive Functioning Mental Flexibility/Cognitive Control = Impaired; Working Memory = Impaired Abstract Reasoning = Impaired; Generative Ability = Within Normal Limits,     Language Functioning Phonemic Fluency = Average; Semantic Retrieval = Average; Comprehension of Complex Ideational Material = High Average; Vocabulary Knowledge = Within Normal Limits;     Memory Functioning Narrative Recall - Short Delay = Impaired; Long Delay Narrative Recall = Impaired;     Visuo-Spatial Abilities Not Assessed    Functional Knowledge  Health & Safety Knowledge = Within Normal Limits;     Summary/Impression:  Results of Neuropsychological Exam revealed cognitive deficits in working memory, declarative memory, abstract reasoning ability, and mental flexibility/cognitive control with all other tested areas within normal limits. On a measure assessing awareness of personal health status and ability to evaluate health problems, handle medical emergencies and take safety precautions, patient performed within normal limits. During this encounter, patient appears to have capacity to make informed medical decisions.

## 2024-03-07 NOTE — PROGRESS NOTES
NEPHROLOGY PROGRESS NOTE   Michael Koch 33 y.o. male MRN: [id number]  Unit/Bed#: -01 Encounter: [id number]  Reason for Consult: ARF      SUMMARY:    33 y.o.  man with PMH alcohol hepatitis, liver cirrhosis, hypertension, diabetes, nephrolithiasis, gout, GERD, depression, tobacco use.  Patient was admitted to [facility] in January due to liver failure then transferred to [facility] and [place] for evaluation of transplant unfortunately patient is not a candidate for liver transplant and was transferred back to [facility] to be close to his family.  Hemodialysis was started at [facility] .  Nephrology is consulted for management of DUANE      Assessment/Plan:     Acute renal failure/acute tubular necrosis currently dialysis dependent  -- Present on admission, severe DUANE stage III  -- Currently dialysis dependent  -- Access : right IJ permacath  -- Hemodialysis initiated on February 25, 2024  -- Renal failure suspected to be in the setting of acute tubular necrosis, though could not rule out a component of hepatorenal syndrome  -- Underwent hemodialysis yesterday, treatment was cut short due to persistent hypotension  --Develops intradialytic hypotension, received IV albumin and also received some fluid  --Midodrine increased to 15 mg 3 times daily  --HD for tomorrow     Alcoholic cirrhosis/liver failure  -- Not a candidate for hepatic transplant  -- Overall prognosis will be poor in the setting of liver failure with renal failure  --Palliative care on board, continued outpatient palliative care follow-ups  --Neuropsychiatry consulted     Blood pressure/volume status  -- Extravascular volume overload/anasarca and third spacing  --Midodrine increased to 15 mg 3 times daily  -- Ultrafiltration limited due to hypotension  -- Hypotension secondary to cirrhosis  -- Blood pressure is stable on the lower side, continue current management     Hypokalemia  -- No need to potassium restricted diet  -- Would like to avoid  hypokalemia in the setting of cirrhosis to prevent worsening hepatic encephalopathy  -- Magnesium level is normal  --Potassium level slowly improving  --Continue to dialyze on a high 4K bath  --Continue potassium chloride 40 mEq twice daily  --Calcium remains low can increase potassium chloride to 40 mEq twice daily     Hyponatremia--resolved     High anion gap metabolic acidosis--improving      SUBJECTIVE / INTERVAL HISTORY:    Developed intradialytic hypotension yesterday on dialysis.  Blood pressure improved once off dialysis and reinfused    OBJECTIVE:  Current Weight: Weight - Scale: 84.9 kg (187 lb 2.7 oz)  Vitals:    03/07/24 0944 03/07/24 1100 03/07/24 1125 03/07/24 1130   BP: (!) 95/45 93/51     Pulse:  79 91 99   Resp:       Temp:       TempSrc:       SpO2:       Weight:       Height:           Intake/Output Summary (Last 24 hours) at 3/7/2024 1314  Last data filed at 3/7/2024 0301  Gross per 24 hour   Intake 680 ml   Output 1115 ml   Net -435 ml       Review of Systems:    Constitutional: Negative for chills and fever.   HENT: Negative for ear pain and sore throat.    Eyes: Negative for pain and visual disturbance.   Respiratory: Negative for cough and shortness of breath.    Cardiovascular: Negative for chest pain and palpitations.   Gastrointestinal: Negative for abdominal pain and vomiting.   Genitourinary: Negative for dysuria and hematuria.   Musculoskeletal: Negative for arthralgias and back pain.   Skin: Negative for color change and rash.   Neurological: Negative for seizures and syncope.   12 point ROS has been reviewed.    Physical Exam  Vitals and nursing note reviewed.   Constitutional:       General: He is not in acute distress.     Appearance: He is well-developed.   HENT:      Head: Normocephalic and atraumatic.   Eyes:      General: Scleral icterus present.      Conjunctiva/sclera: Conjunctivae normal.      Pupils: Pupils are equal, round, and reactive to light.   Cardiovascular:      Rate  and Rhythm: Normal rate and regular rhythm.      Heart sounds: S1 normal and S2 normal. No murmur heard.     No friction rub. No gallop.   Pulmonary:      Effort: Pulmonary effort is normal. No respiratory distress.      Breath sounds: Normal breath sounds. No wheezing or rales.   Abdominal:      General: Bowel sounds are normal.      Palpations: Abdomen is soft.      Tenderness: There is no abdominal tenderness. There is no rebound.   Musculoskeletal:         General: Normal range of motion.      Cervical back: Normal range of motion and neck supple.   Skin:     Coloration: Skin is jaundiced.      Findings: No rash.   Neurological:      Mental Status: He is alert and oriented to person, place, and time.   Psychiatric:         Behavior: Behavior normal.         Medications:    Current Facility-Administered Medications:     calcium acetate (PHOSLO) capsule 667 mg, 667 mg, Oral, TID With Meals, JENSEN Fontenot, 667 mg at 03/07/24 1159    calcium carbonate (OYSTER SHELL,OSCAL) 500 mg tablet 1 tablet, 1 tablet, Oral, Daily With Breakfast, JENSEN Fontenot, 1 tablet at 03/07/24 0914    ciprofloxacin (CIPRO) tablet 250 mg, 250 mg, Oral, Q24H, Yuly Dasilvax, CRNP, 250 mg at 03/07/24 0914    diphenhydramine, lidocaine, Al/Mg hydroxide, simethicone (Magic Mouthwash) oral solution 10 mL, 10 mL, Swish & Swallow, Q6H PRN, Yuly Dasilvax, CRNP, 10 mL at 03/06/24 1733    hydrOXYzine HCL (ATARAX) tablet 25 mg, 25 mg, Oral, Q6H PRN, Yuly SIMONS Edenilson, CRNP, 25 mg at 03/03/24 2046    insulin glargine (LANTUS) subcutaneous injection 10 Units 0.1 mL, 10 Units, Subcutaneous, HS, Constance Menendez MD, 10 Units at 03/06/24 2157    insulin lispro (HumALOG/ADMELOG) 100 units/mL subcutaneous injection 1-6 Units, 1-6 Units, Subcutaneous, TID AC, 4 Units at 03/07/24 0915 **AND** Fingerstick Glucose (POCT), , , TID AC, Yuly Dasilvax, CRNP    insulin lispro (HumALOG/ADMELOG) 100 units/mL subcutaneous injection 1-6 Units, 1-6 Units, Subcutaneous, HS,  Yuly SIMONS Edenilson, CRNP, 1 Units at 03/02/24 2044    lactulose (CHRONULAC) oral solution 20 g, 20 g, Oral, BID, Yuly SIMONS Edenilson, CRNP, 20 g at 03/07/24 0914    melatonin tablet 6 mg, 6 mg, Oral, HS, Constance Menendez MD, 6 mg at 03/06/24 2157    midodrine (PROAMATINE) tablet 15 mg, 15 mg, Oral, TID AC, Constance Menendez MD, 15 mg at 03/07/24 1159    nicotine (NICODERM CQ) 14 mg/24hr TD 24 hr patch 1 patch, 1 patch, Transdermal, Daily, Yuly SIMONS Edenilson, CRNP, 1 patch at 03/07/24 0924    OLANZapine (ZyPREXA) tablet 10 mg, 10 mg, Oral, HS, Constance Menendez MD, 10 mg at 03/06/24 2157    ondansetron (ZOFRAN) injection 4 mg, 4 mg, Intravenous, Q8H PRN, Yuly SIMONS Edenilson, CRNP, 4 mg at 03/06/24 1503    pantoprazole (PROTONIX) EC tablet 40 mg, 40 mg, Oral, Early Morning, Yuly SIMONS Edenilson, CRNP, 40 mg at 03/07/24 0554    potassium chloride (Klor-Con M20) CR tablet 40 mEq, 40 mEq, Oral, BID, Roland Kulkarni MD, 40 mEq at 03/07/24 0914    Laboratory Results:  Results from last 7 days   Lab Units 03/07/24  0554 03/06/24  0548 03/05/24  0527 03/04/24  0842 03/03/24  1439 03/03/24  0451 03/02/24  0921 03/02/24  0501 03/01/24  0633   WBC Thousand/uL  --   --  19.04* 19.43*  --  18.59*  --  20.54* 17.56*   HEMOGLOBIN g/dL  --   --  8.8* 8.3*  --  8.1*  --  8.6* 8.9*   HEMATOCRIT %  --   --  23.8* 22.9*  --  23.0*  --  24.0* 24.6*   PLATELETS Thousands/uL  --   --  51* 55*  --  56*  --  55* 73*   POTASSIUM mmol/L 3.1* 2.9* 2.7* 2.9* 3.0* 2.8* 2.9*  --  3.4*   CHLORIDE mmol/L 99 97 96 99 98 97 98  --  97   CO2 mmol/L 24 20* 24 15* 16* 17* 23  --  20*   BUN mg/dL 55* 70* 56* 87* 78* 73* 60*  --  84*   CREATININE mg/dL 6.42* 7.27* 5.73* 8.12* 7.14* 6.64* 5.50*  --  6.24*   CALCIUM mg/dL 6.5* 6.5* 6.3* 6.3* 6.4* 6.3* 6.5*  --  6.6*   MAGNESIUM mg/dL  --   --  2.3 2.5 2.5  --   --   --   --    PHOSPHORUS mg/dL  --   --   --  9.4* 8.2*  --   --   --   --        PLEASE NOTE:  This encounter was completed utilizing the M[employer]ency Direct Speech Voice Recognition  Software. Grammatical errors, random word insertions, pronoun errors and incomplete sentences are occasional consequences of the system due to software limitations, ambient noise and hardware issues.These may be missed by proof reading prior to affixing electronic signature. Any questions or concerns about the content, text or information contained within the body of this dictation should be directly addressed to the physician for clarification. Please do not hesitate to call me directly if you have any any questions or concerns.

## 2024-03-07 NOTE — ASSESSMENT & PLAN NOTE
Developed DUANE on CKD with underlying liver failure   ureteral stent placed at [facility], lithotripsy  Developed oliguria, treated with CRRT then transition to HD  Appreciate nephrology consultation  Patient temporary HD catheter replaced with PermCath by IR on 2/29/2024.  Patient also has alcohol induced hepatitis.  Next hd tomorrow

## 2024-03-08 NOTE — ASSESSMENT & PLAN NOTE
Secondary to liver disease and anomic dysfunction.  Continue midodrine-patient is still having orthostatic hypotension which is limiting his mobility. cont CARLOS stockings.  inCrease midodrine to 17.5 mg 3 times daily added Florinef and decreased lactulose to help decrease diarrhea's.  Increase oral intake.  Monitor orthostasis  Patient's symptoms get worse post dialysis.

## 2024-03-08 NOTE — ASSESSMENT & PLAN NOTE
Continue Zyprexa at bedtime decreased dose to 7.5 mg daily and also decrease melatonin to 3 mg daily  cortisol level is currently normal will recheck again in 1 week as now off steroids.normal ammonia level

## 2024-03-08 NOTE — PROGRESS NOTES
[facility]  Progress Note  Name: Michael Koch I  MRN: [id number]  Unit/Bed#: -01 I Date of Admission: 2/27/2024   Date of Service: 3/8/2024 I Hospital Day: 10    Assessment/Plan   * Alcoholic hepatitis  Assessment & Plan  Currently on Solu-Cortef 25 mg daily -stopped 3/5  Alcoholic cirrhosis  Transferred from [facility] in [place] after liver transplant evaluation, he was found not to be candidate  Continue lactulose but decrease dose to 20 g once daily as ammonia level is normal and patient is having a lot of loose stools  No encephalopathy currently  PT/OT/case management for discharge planning so patient may prepare for transplant reevaluation in the future  Patient has extensive workup done, at this time, not much to offer  Patient does have capacity to make informed medical decisions.  Plan for discharge to SNF for rehab    Orthostatic hypotension  Assessment & Plan  Secondary to liver disease and anomic dysfunction.  Continue midodrine-patient is still having orthostatic hypotension which is limiting his mobility. cont CARLOS stockings.  inCrease midodrine to 17.5 mg 3 times daily added Florinef and decreased lactulose to help decrease diarrhea's.  Increase oral intake.  Monitor orthostasis  Patient's symptoms get worse post dialysis.    Renal failure  Assessment & Plan  Developed DUANE on CKD with underlying liver failure   ureteral stent placed at [facility], lithotripsy  Developed oliguria, treated with CRRT then transition to HD  Appreciate nephrology consultation.cont HD SCHEDULE  Patient temporary HD catheter replaced with PermCath by IR on 2/29/2024.  Patient also has alcohol induced hepatitis.    GERD (gastroesophageal reflux disease)  Assessment & Plan  Continue PPI    Depression  Assessment & Plan  Continue Zyprexa at bedtime decreased dose to 7.5 mg daily and also decrease melatonin to 3 mg daily  cortisol level is currently normal will recheck again in 1 week as now  off steroids.normal ammonia level    ETOH abuse  Assessment & Plan  History alcohol abuse, last drink prior to admission in January  Will need outpatient alcohol counseling prior to transplant reevaluation-remember, patient has referral from [facility] for virtual therapy which supposed to initiated soon.      Tobacco abuse  Assessment & Plan  Nicotine patch  Cessation counseling    Type 2 diabetes mellitus with hyperglycemia, without long-term current use of insulin (HCC)  Assessment & Plan  Lab Results   Component Value Date    HGBA1C 8.8 (H) 01/21/2024       Recent Labs     03/07/24  1056 03/07/24  1514 03/07/24  2057 03/08/24  0731   POCGLU 126 131 111 128         Blood Sugar Average: Last 72 hrs:  (P) [id number]  Sliding-scale insulin with Accu-Cheks STOPPED AS steroids has been discontinued  Carbohydrate controlled diet               VTE Pharmacologic Prophylaxis:   Moderate Risk (Score 3-4) - Pharmacological DVT Prophylaxis Contraindicated. Sequential Compression Devices Ordered.    Mobility:   Basic Mobility Inpatient Raw Score: 16  JH-HL Goal: 5: Stand one or more mins  JH-HLM Achieved: 3: Sit at edge of bed  HLM Goal achieved. Continue to encourage appropriate mobility.    Patient Centered Rounds: I performed bedside rounds with nursing staff today.   Discussions with Specialists or Other Care Team Provider: NONE    Education and Discussions with Family / Patient: WILL UPDATE    Total Time Spent on Date of Encounter in care of patient: 35 mins. This time was spent on one or more of the following: performing physical exam; counseling and coordination of care; obtaining or reviewing history; documenting in the medical record; reviewing/ordering tests, medications or procedures; communicating with other healthcare professionals and discussing with patient's family/caregivers.    Current Length of Stay: 10 day(s)  Current Patient Status: Inpatient   Certification Statement: The patient will  continue to require additional inpatient hospital stay due to hepatic failure  Discharge Plan: Anticipate discharge in 48-72 hrs to rehab facility.    Code Status: Level 1 - Full Code    Subjective:   Patient denies any chest pain or shortness of breath his bowel movements are starting to slow down as his lactulose dose is decreasing.  Understands that he needs to go to rehab and continue dialysis and if he stabilizes and can survive this for another few months then will have repeat evaluation for liver failure    Objective:     Vitals:   Temp (24hrs), Av °F (36.7 °C), Min:97.9 °F (36.6 °C), Max:98.1 °F (36.7 °C)    Temp:  [97.9 °F (36.6 °C)-98.1 °F (36.7 °C)] 98.1 °F (36.7 °C)  HR:  [] 100  Resp:  [18-19] 18  BP: ()/(46-73) 103/61  SpO2:  [98 %-100 %] 100 %  Body mass index is 29.56 kg/m².     Input and Output Summary (last 24 hours):     Intake/Output Summary (Last 24 hours) at 3/8/2024 1347  Last data filed at 3/8/2024 1142  Gross per 24 hour   Intake 1080 ml   Output --   Net 1080 ml       Physical Exam:   Physical Exam  Vitals and nursing note reviewed.   Constitutional:       Appearance: Normal appearance.   HENT:      Head: Normocephalic and atraumatic.      Right Ear: External ear normal.      Left Ear: External ear normal.      Nose: Nose normal.      Mouth/Throat:      Pharynx: Oropharynx is clear.   Eyes:      General: Scleral icterus present.      Pupils: Pupils are equal, round, and reactive to light.   Cardiovascular:      Rate and Rhythm: Normal rate and regular rhythm.      Heart sounds: Normal heart sounds.   Pulmonary:      Effort: Pulmonary effort is normal.      Breath sounds: Normal breath sounds.   Abdominal:      General: Bowel sounds are normal.      Palpations: Abdomen is soft.      Tenderness: There is no abdominal tenderness.   Musculoskeletal:         General: Normal range of motion.      Cervical back: Normal range of motion and neck supple.   Skin:     General: Skin is  warm and dry.      Capillary Refill: Capillary refill takes less than 2 seconds.      Coloration: Skin is jaundiced.   Neurological:      General: No focal deficit present.      Mental Status: He is alert and oriented to person, place, and time.   Psychiatric:      Comments: Psychomotor slowing noted            Additional Data:     Labs:  Results from last 7 days   Lab Units 03/05/24  0527   WBC Thousand/uL 19.04*   HEMOGLOBIN g/dL 8.8*   HEMATOCRIT % 23.8*   PLATELETS Thousands/uL 51*   NEUTROS PCT % 88*   LYMPHS PCT % 5*   MONOS PCT % 5   EOS PCT % 1     Results from last 7 days   Lab Units 03/07/24  0554 03/06/24  0548 03/05/24  0527   SODIUM mmol/L 136   < > 137   POTASSIUM mmol/L 3.1*   < > 2.7*   CHLORIDE mmol/L 99   < > 96   CO2 mmol/L 24   < > 24   BUN mg/dL 55*   < > 56*   CREATININE mg/dL 6.42*   < > 5.73*   ANION GAP mmol/L 13   < > 17   CALCIUM mg/dL 6.5*   < > 6.3*   ALBUMIN g/dL  --   --  2.5*   TOTAL BILIRUBIN mg/dL  --   --  49.17*   ALK PHOS U/L  --   --  288*   ALT U/L  --   --  106*   AST U/L  --   --  83*   GLUCOSE RANDOM mg/dL 148*   < > 98    < > = values in this interval not displayed.     Results from last 7 days   Lab Units 03/05/24  0527   INR  2.09*     Results from last 7 days   Lab Units 03/08/24  0731 03/07/24 2057 03/07/24  1514 03/07/24  1056 03/07/24  0813 03/06/24  2054 03/06/24  1725 03/06/24  1129 03/06/24  0732 03/05/24  2115 03/05/24  1624 03/05/24  1111   POC GLUCOSE mg/dl 128 111 131 126 285* 113 113 221* 109 125 206* 138         Results from last 7 days   Lab Units 03/04/24  0842   LACTIC ACID mmol/L 0.8       Lines/Drains:  Invasive Devices       Peripheral Intravenous Line  Duration             Peripheral IV 03/07/24 Right Antecubital 1 day              Hemodialysis Catheter  Duration             HD Permanent Double Catheter 8 days                          Imaging: none today    Recent Cultures (last 7 days):         Last 24 Hours Medication List:   Current  Facility-Administered Medications   Medication Dose Route Frequency Provider Last Rate    calcitriol  0.25 mcg Oral Daily Niecy OsoriovedJENSEN ba      calcium acetate  1,334 mg Oral TID With Meals Niecy Ibarra, CRNP      calcium carbonate  1 tablet Oral Daily With Breakfast Niecy Ibarra, CRNP      diphenhydramine, lidocaine, Al/Mg hydroxide, simethicone  10 mL Swish & Swallow Q6H PRN Yuly S Edenilson, CRNP      fludrocortisone  0.1 mg Oral Daily Constance Menendez MD      hydrOXYzine HCL  25 mg Oral Q6H PRN Yuly S Edenilson, CRNP      lactulose  20 g Oral Daily Constance Menendez MD      melatonin  3 mg Oral HS Constance Menendez MD      midodrine  17.5 mg Oral TID AC Constance Menendez MD      nicotine  7 mg Transdermal Daily oCnstance Menendez MD      OLANZapine  7.5 mg Oral HS Constance Menendez MD      ondansetron  4 mg Intravenous Q8H PRN Yuly S Edenilson, CRNP      pantoprazole  40 mg Oral Early Morning Yuly S Edenilson, CRNP      potassium chloride  40 mEq Oral BID Roland Kulkarni MD          Today, Patient Was Seen By: Constance Menendez MD    **Please Note: This note may have been constructed using a voice recognition system.**

## 2024-03-08 NOTE — ASSESSMENT & PLAN NOTE
Developed DUANE on CKD with underlying liver failure   ureteral stent placed at [facility], lithotripsy  Developed oliguria, treated with CRRT then transition to HD  Appreciate nephrology consultation.cont HD SCHEDULE  Patient temporary HD catheter replaced with PermCath by IR on 2/29/2024.  Patient also has alcohol induced hepatitis.

## 2024-03-08 NOTE — ASSESSMENT & PLAN NOTE
Lab Results   Component Value Date    HGBA1C 8.8 (H) 01/21/2024       Recent Labs     03/07/24  1056 03/07/24  1514 03/07/24 2057 03/08/24  0731   POCGLU 126 131 111 128         Blood Sugar Average: Last 72 hrs:  (P) [id number]  Sliding-scale insulin with Accu-Cheks STOPPED AS steroids has been discontinued  Carbohydrate controlled diet

## 2024-03-08 NOTE — ASSESSMENT & PLAN NOTE
Currently on Solu-Cortef 25 mg daily -stopped 3/5  Alcoholic cirrhosis  Transferred from [facility] in [place] after liver transplant evaluation, he was found not to be candidate  Continue lactulose but decrease dose to 20 g once daily as ammonia level is normal and patient is having a lot of loose stools  No encephalopathy currently  PT/OT/case management for discharge planning so patient may prepare for transplant reevaluation in the future  Patient has extensive workup done, at this time, not much to offer  Patient does have capacity to make informed medical decisions.  Plan for discharge to SNF for rehab

## 2024-03-08 NOTE — PLAN OF CARE
Target UF Goal  1.5  L as tolerated. Patient dialyzing for 3 hours (ordered 3.5 but pt stated he will only complete 3 hrs) on 4 K bath for serum K of  3.1  per protocol.     Post-Dialysis RN Treatment Note    Blood Pressure:  Pre 97/67 mm/Hg  Post 87/53 mmHg   EDW  TBD kg    Weight:  Pre 85.6 kg   Post 85.0 kg   Mode of weight measurement: Bed Scale   Volume Removed  700 ml    Treatment duration 3 hours   NS given  No    Treatment shortened? Yes, describe: Per patient demands   Medications given during Rx Midodrine 17.5mg oral in last hour of tx   Estimated Kt/V  1.07   Access type: Permacath/TDC   Access Issues: No    Report called to primary nurse   Yes        Problem: METABOLIC, FLUID AND ELECTROLYTES - ADULT  Goal: Electrolytes maintained within normal limits  Description: INTERVENTIONS:  - Monitor labs and assess patient for signs and symptoms of electrolyte imbalances  - Administer electrolyte replacement as ordered  - Monitor response to electrolyte replacements, including repeat lab results as appropriate  - Instruct patient on fluid and nutrition as appropriate  Outcome: Progressing  Goal: Fluid balance maintained  Description: INTERVENTIONS:  - Monitor labs   - Monitor I/O and WT  - Instruct patient on fluid and nutrition as appropriate  - Assess for signs & symptoms of volume excess or deficit  Outcome: Progressing

## 2024-03-08 NOTE — ASSESSMENT & PLAN NOTE
History alcohol abuse, last drink prior to admission in January  Will need outpatient alcohol counseling prior to transplant reevaluation-remember, patient has referral from [facility] for virtual therapy which supposed to initiated soon.

## 2024-03-08 NOTE — PROGRESS NOTES
Pt reports good appetite at this time, says he is hoping for homemade foods/wants to go home. Reporting concern about current fluid restriction. Reports him and his wife prepare meals at home.     Discussed low phosphorus diet with pt including avoidance of processed foods/convenience meals, encouraged home cooked/from scratch meals as much as possible. Discussed foods pt eats regularly high in phosphorus such as cheese, milk, processed meats, discussed alternatives such as small amounts of swiss or brie cheese, almond milk, fresh meats etc.   Provided with Phosphorus Content of Foods handout.   +Nepro daily given inadequate meal completions

## 2024-03-08 NOTE — PROGRESS NOTES
NEPHROLOGY PROGRESS NOTE   Michael Koch 33 y.o. male MRN: [id number]  Unit/Bed#: -01 Encounter: [id number]    Assessment/Plan:    34 yo with alcoholic liver cirrhosis, T2DM, GERD, HTN, smoker, nephrolithiasis admitted to this campus in January for alcoholic hepatitis transferred to [facility] however felt to be not transplant candidate and initiated on hemodialysis at that campus subsequently transferred back.  Nephrology following along for hemodialysis dependent acute kidney injury and electrolyte derangements    1.  Hemodialysis dependent acute kidney injury  Etiology likely ATN/HRS.  Started dialysis at other facility 2/25/2024.  Hemodialysis session was 3/6 for 950 mL removed and normal saline given for hypotension.  Discontinue UF with dialysis and run isovolemic today.  Having significant bowel movements from lactulose.  Agree with albumin which was given this morning.  Acces right chest wall PermCath.    Overall prognosis is poor and ongoing goals of care discussion indicated.  No sign of renal recovery at this time and underlying pathology of liver disease makes patient very poor candidate for dialysis long-term.  2.  Hypotension associated with cirrhosis  Continue midodrine 17.5 mg 3 times daily and will run no UF today.  Caution with fludrocortisone which was started yesterday given persistent hypokalemia.  This may worsen hypokalemia.  Redosed albumin as indicated especially if patient having profuse stools.  3.  Hypokalemia  Awaiting lab work for today.  Suspect will have persistent hypokalemia.  Could consider amiloride however concern this will worsen hypotension and will also need ongoing monitoring of potassium levels in the inpatient and outpatient setting.  Consider trialing IV potassium as oral may have decreased absorption in this case.  Caution with Florinef  4.  Mineral bone disorder:  PG per mL, calcium 6.5 mg/dL, phosphorus 9.4 mg/dL  Increase PhosLo 2 tabs 3 times daily with  "meals.  Start calcitriol 0.25 mcg daily.  Treat with IV calcium if becomes symptomatic from a hypocalcemic perspective.  Continue low phosphorus diet  5.  Acidosis  Improved, continue to dialyze against high bicarb bath  6.  Alcoholic hepatitis, alcoholic cirrhosis   Not a transplant candidate per GI colleagues  7.  Encephalopathy  Ammonia level 23 mmol/L yesterday.  Further evaluation per primary team    ROS  Examined lying in bed.  Unable to lend to ROS due to altered.  States he is in a grocery store.  Per my discussion with primary team, having multiple loose bowel movements per day unable to ambulate due to this.  Very weak.        Historical Information   Past Medical History:   Diagnosis Date    Depression     Diabetes mellitus (HCC)     Gout     Hypertension     Hyponatremia 01/21/2024    Kidney stones      Past Surgical History:   Procedure Laterality Date    CYSTOSCOPY W/ URETERAL STENT PLACEMENT  09/14/2023    HEMODIALYSIS ADULT  3/4/2024    IR TUNNELED DIALYSIS CATHETER PLACEMENT  2/29/2024    US GUIDED MASS BIOPSY (UNSPECIFIED)  02/19/2021     Social History   Social History     Substance and Sexual Activity   Alcohol Use Yes    Comment: \"couple 24 oz cans per day\"     Social History     Substance and Sexual Activity   Drug Use Never     Social History     Tobacco Use   Smoking Status Every Day    Current packs/day: 0.25    Types: Cigarettes   Smokeless Tobacco Never       Family History:   No family history on file.    Medications:  Pertinent medications were reviewed  Current Facility-Administered Medications   Medication Dose Route Frequency Provider Last Rate    calcitriol  0.25 mcg Oral Daily JENSEN Fontenot      calcium acetate  667 mg Oral TID With Meals JENSEN Fontenot      calcium carbonate  1 tablet Oral Daily With Breakfast JENSEN Fontenot      ciprofloxacin  250 mg Oral Q24H Yuly S EdenilsonJENSEN      diphenhydramine, lidocaine, Al/Mg hydroxide, simethicone  10 mL Swish & Swallow Q6H PRN " "Yuly SIMONS Edenilson, CRNP      fludrocortisone  0.1 mg Oral Daily Constance Menendez MD      hydrOXYzine HCL  25 mg Oral Q6H PRN Yuly S Edenilson, CRNP      lactulose  20 g Oral Daily Constance Menendez MD      melatonin  6 mg Oral HS Constance Menendez MD      midodrine  17.5 mg Oral TID AC Constance Menendez MD      nicotine  1 patch Transdermal Daily Yuly S Edenilson, CRNP      OLANZapine  10 mg Oral HS Constance Menendez MD      ondansetron  4 mg Intravenous Q8H PRN Yuly S Edenilson, CRNP      pantoprazole  40 mg Oral Early Morning Yuly S Edenilson, CRNP      potassium chloride  40 mEq Oral BID Roland Kulkarni MD           No Known Allergies      Vitals:   /73 (BP Location: Left arm)   Pulse 101   Temp 98.1 °F (36.7 °C) (Temporal)   Resp 18   Ht 5' 7\" (1.702 m)   Wt 85.6 kg (188 lb 11.4 oz)   SpO2 99%   BMI 29.56 kg/m²   Body mass index is 29.56 kg/m².  SpO2: 99 %,   SpO2 Activity: At Rest,   O2 Device: None (Room air)      Intake/Output Summary (Last 24 hours) at 3/8/2024 1117  Last data filed at 3/8/2024 0947  Gross per 24 hour   Intake 1080 ml   Output --   Net 1080 ml     Invasive Devices       Peripheral Intravenous Line  Duration             Peripheral IV 03/07/24 Right Antecubital 1 day              Hemodialysis Catheter  Duration             HD Permanent Double Catheter 7 days                    Physical Exam  General: conscious, cooperative, in no acute distress  Eyes: conjunctivae pink, anicteric sclerae  ENT: lips and mucous membranes moist  Neck: supple, no JVD, no masses  Chest: clear breath sounds bilaterally, no crackles, ronchus or wheezings  CVS: S1 & S2, normal rate, regular rhythm  Abdomen: soft, non-tender, non-distended, normoactive bowel sounds obese  Extremities: mild edema of both legs  Skin: no rash severely jaundiced  Neuro: awake, lethargic disoriented      Diagnostic Data:  Lab: I have personally reviewed pertinent lab results.,   CBC:  Results from last 7 days   Lab Units 03/05/24  0527   WBC Thousand/uL 19.04* " "  HEMOGLOBIN g/dL 8.8*   HEMATOCRIT % 23.8*   PLATELETS Thousands/uL 51*      CMP: No results found for: \"SODIUM\", \"K\", \"CL\", \"CO2\", \"ANIONGAP\", \"BUN\", \"CREATININE\", \"GLUCOSE\", \"CALCIUM\", \"AST\", \"ALT\", \"ALKPHOS\", \"PROT\", \"BILITOT\", \"EGFR\",   PT/INR: No results found for: \"PT\", \"INR\",   Magnesium: No components found for: \"MAG\",  Phosphorous: No results found for: \"PHOS\"    Microbiology:  @LABNT,(urinecx:7)@        JENSEN Fontenot    Portions of the record may have been created with voice recognition software. Occasional wrong word or \"sound a like\" substitutions may have occurred due to the inherent limitations of voice recognition software. Read the chart carefully and recognize, using context, where substitutions have occurred.  " Pleural effusion Chest pain

## 2024-03-09 NOTE — PLAN OF CARE
Problem: NEUROSENSORY - ADULT  Goal: Achieves stable or improved neurological status  Description: INTERVENTIONS  - Monitor and report changes in neurological status  - Monitor vital signs such as temperature, blood pressure, glucose, and any other labs ordered   - Initiate measures to prevent increased intracranial pressure  - Monitor for seizure activity and implement precautions if appropriate      Outcome: Progressing  Goal: Achieves maximal functionality and self care  Description: INTERVENTIONS  - Monitor swallowing and airway patency with patient fatigue and changes in neurological status  - Encourage and assist patient to increase activity and self care.   - Encourage visually impaired, hearing impaired and aphasic patients to use assistive/communication devices  Outcome: Progressing     Problem: GASTROINTESTINAL - ADULT  Goal: Minimal or absence of nausea and/or vomiting  Description: INTERVENTIONS:  - Administer IV fluids if ordered to ensure adequate hydration  - Maintain NPO status until nausea and vomiting are resolved  - Nasogastric tube if ordered  - Administer ordered antiemetic medications as needed  - Provide nonpharmacologic comfort measures as appropriate  - Advance diet as tolerated, if ordered  - Consider nutrition services referral to assist patient with adequate nutrition and appropriate food choices  Outcome: Progressing  Goal: Maintains or returns to baseline bowel function  Description: INTERVENTIONS:  - Assess bowel function  - Encourage oral fluids to ensure adequate hydration  - Administer IV fluids if ordered to ensure adequate hydration  - Administer ordered medications as needed  - Encourage mobilization and activity  - Consider nutritional services referral to assist patient with adequate nutrition and appropriate food choices  Outcome: Progressing  Goal: Maintains adequate nutritional intake  Description: INTERVENTIONS:  - Monitor percentage of each meal consumed  - Identify  factors contributing to decreased intake, treat as appropriate  - Assist with meals as needed  - Monitor I&O, weight, and lab values if indicated  - Obtain nutrition services referral as needed  Outcome: Progressing  Goal: Oral mucous membranes remain intact  Description: INTERVENTIONS  - Assess oral mucosa and hygiene practices  - Implement preventative oral hygiene regimen  - Implement oral medicated treatments as ordered magic mouth wash  - Initiate Nutrition services referral as needed  Outcome: Progressing     Problem: METABOLIC, FLUID AND ELECTROLYTES - ADULT  Goal: Electrolytes maintained within normal limits  Description: INTERVENTIONS:  - Monitor labs and assess patient for signs and symptoms of electrolyte imbalances  - Administer electrolyte replacement as ordered  - Monitor response to electrolyte replacements, including repeat lab results as appropriate  - Instruct patient on fluid and nutrition as appropriate  Outcome: Progressing  Goal: Fluid balance maintained  Description: INTERVENTIONS:  - Monitor labs   - Monitor I/O and WT  - Instruct patient on fluid and nutrition as appropriate  - Assess for signs & symptoms of volume excess or deficit  Outcome: Progressing  Goal: Glucose maintained within target range  Description: INTERVENTIONS:  - Monitor Blood Glucose as ordered  - Assess for signs and symptoms of hyperglycemia and hypoglycemia  - Administer ordered medications to maintain glucose within target range  - Assess nutritional intake and initiate nutrition service referral as needed  Outcome: Progressing     Problem: HEMATOLOGIC - ADULT  Goal: Maintains hematologic stability  Description: INTERVENTIONS  - Assess for signs and symptoms of bleeding or hemorrhage  - Monitor labs  - Administer supportive blood products/factors as ordered and appropriate  Outcome: Progressing     Problem: Nutrition/Hydration-ADULT  Goal: Nutrient/Hydration intake appropriate for improving, restoring or maintaining  nutritional needs  Description: Monitor and assess patient's nutrition/hydration status for malnutrition. Collaborate with interdisciplinary team and initiate plan and interventions as ordered.  Monitor patient's weight and dietary intake as ordered or per policy. Utilize nutrition screening tool and intervene as necessary. Determine patient's food preferences and provide high-protein, high-caloric foods as appropriate.     INTERVENTIONS:  - Monitor oral intake, urinary output, labs, and treatment plans  - Assess nutrition and hydration status and recommend course of action  - Evaluate amount of meals eaten  - Assist patient with eating if necessary   - Allow adequate time for meals  - Recommend/ encourage appropriate diets, oral nutritional supplements, and vitamin/mineral supplements  - Order, calculate, and assess calorie counts as needed  - Recommend, monitor, and adjust tube feedings and TPN/PPN based on assessed needs  - Assess need for intravenous fluids  - Provide specific nutrition/hydration education as appropriate  - Include patient/family/caregiver in decisions related to nutrition  Outcome: Progressing

## 2024-03-09 NOTE — ASSESSMENT & PLAN NOTE
Developed DUANE on CKD with underlying liver failure   ureteral stent placed at [facility], lithotripsy  Developed oliguria, treated with CRRT then transition to HD  Appreciate nephrology consultation.cont HD SCHEDULE  Patient temporary HD catheter replaced with PermCath by IR on 2/29/2024.  Patient also has alcohol induced hepatitis.

## 2024-03-09 NOTE — ASSESSMENT & PLAN NOTE
Secondary to liver disease and anomic dysfunction.  Continue midodrine-patient is still having orthostatic hypotension which is limiting his mobility. cont CARLOS stockings.  inCrease midodrine to 17.5 mg 3 times daily added Florinef and decreased lactulose to help decrease diarrhea's.  Increase oral intake.  Monitor orthostasis  Patient is unable to stand upright as his blood pressure drops into the 70s and he gets very symptomatic.  Discussed with case management that he needs either our stretcher van or in-house dialysis as that is going to be very difficult to transport him otherwise for outpatient dialysis  And is requesting to go home today however explained to him that due to his significant orthostatic hypotension he cannot be safe discharge to home

## 2024-03-09 NOTE — PROGRESS NOTES
[facility]  Progress Note  Name: Michael Koch I  MRN: [id number]  Unit/Bed#: -01 I Date of Admission: 2/27/2024   Date of Service: 3/9/2024 I Hospital Day: 11    Assessment/Plan   * Alcoholic hepatitis  Assessment & Plan  Currently on Solu-Cortef 25 mg daily -stopped 3/5  Alcoholic cirrhosis  Transferred from [facility] in [place] after liver transplant evaluation, he was found not to be candidate  Continue lactulose but decrease dose to 20 g once daily as ammonia level is normal and patient is having a lot of loose stools  No encephalopathy currently  PT/OT/case management for discharge planning so patient may prepare for transplant reevaluation in the future  Patient has extensive workup done, at this time, not much to offer  Patient does have capacity to make informed medical decisions.  Plan for discharge to SNF for rehab    Orthostatic hypotension  Assessment & Plan  Secondary to liver disease and anomic dysfunction.  Continue midodrine-patient is still having orthostatic hypotension which is limiting his mobility. cont CARLOS stockings.  inCrease midodrine to 17.5 mg 3 times daily added Florinef and decreased lactulose to help decrease diarrhea's.  Increase oral intake.  Monitor orthostasis  Patient is unable to stand upright as his blood pressure drops into the 70s and he gets very symptomatic.  Discussed with case management that he needs either our stretcher van or in-house dialysis as that is going to be very difficult to transport him otherwise for outpatient dialysis  And is requesting to go home today however explained to him that due to his significant orthostatic hypotension he cannot be safe discharge to home    Renal failure  Assessment & Plan  Developed DUANE on CKD with underlying liver failure   ureteral stent placed at [facility], lithotripsy  Developed oliguria, treated with CRRT then transition to HD  Appreciate nephrology consultation.cont HD SCHEDULE  Patient  temporary HD catheter replaced with PermCath by IR on 2/29/2024.  Patient also has alcohol induced hepatitis.    GERD (gastroesophageal reflux disease)  Assessment & Plan  Continue PPI    Depression  Assessment & Plan  Continue Zyprexa at bedtime decreased dose to 7.5 mg daily and also decrease melatonin to 3 mg daily  cortisol level is currently normal will recheck again  as now off steroids.normal ammonia level    ETOH abuse  Assessment & Plan  History alcohol abuse, last drink prior to admission in January  Will need outpatient alcohol counseling prior to transplant reevaluation-remember, patient has referral from [facility] for virtual therapy which supposed to initiated soon.      Type 2 diabetes mellitus with hyperglycemia, without long-term current use of insulin (HCC)  Assessment & Plan  Lab Results   Component Value Date    HGBA1C 8.8 (H) 01/21/2024       Recent Labs     03/07/24  1056 03/07/24  1514 03/07/24  2057 03/08/24  0731   POCGLU 126 131 111 128         Blood Sugar Average: Last 72 hrs:  (P) 148.6507394429476761  Sliding-scale insulin with Accu-Cheks STOPPED AS steroids has been discontinued  Carbohydrate controlled diet               VTE Pharmacologic Prophylaxis:   Moderate Risk (Score 3-4) - Pharmacological DVT Prophylaxis Contraindicated. Sequential Compression Devices Ordered.    Mobility:   Basic Mobility Inpatient Raw Score: 13  JH-HLM Goal: 4: Move to chair/commode  JH-HLM Achieved: 2: Bed activities/Dependent transfer  HLM Goal achieved. Continue to encourage appropriate mobility.    Patient Centered Rounds: I performed bedside rounds with nursing staff today.   Discussions with Specialists or Other Care Team Provider: none    Education and Discussions with Family / Patient: Updated  (sister) via phone.    Total Time Spent on Date of Encounter in care of patient: 45 mins. This time was spent on one or more of the following: performing physical exam; counseling and  coordination of care; obtaining or reviewing history; documenting in the medical record; reviewing/ordering tests, medications or procedures; communicating with other healthcare professionals and discussing with patient's family/caregivers.    Current Length of Stay: 11 day(s)  Current Patient Status: Inpatient   Certification Statement: The patient will continue to require additional inpatient hospital stay due to liver failure  Discharge Plan: Anticipate discharge in 24-48 hrs to rehab facility.    Code Status: Level 1 - Full Code    Subjective:   Patient denies any chest pain or shortness of breath today he is adamant about going home however unable to even stand for 2 minutes without getting symptomatic    Objective:     Vitals:   Temp (24hrs), Av.3 °F (36.8 °C), Min:97.6 °F (36.4 °C), Max:99 °F (37.2 °C)    Temp:  [97.6 °F (36.4 °C)-99 °F (37.2 °C)] 99 °F (37.2 °C)  HR:  [] 98  Resp:  [18] 18  BP: (78-97)/(46-67) 83/46  SpO2:  [95 %-99 %] 99 %  Body mass index is 29.32 kg/m².     Input and Output Summary (last 24 hours):     Intake/Output Summary (Last 24 hours) at 3/9/2024 1215  Last data filed at 3/9/2024 1035  Gross per 24 hour   Intake 1110 ml   Output 1200 ml   Net -90 ml       Physical Exam:   Physical Exam  Vitals and nursing note reviewed.   Constitutional:       Appearance: Normal appearance.   HENT:      Head: Normocephalic and atraumatic.      Right Ear: External ear normal.      Left Ear: External ear normal.      Nose: Nose normal.      Mouth/Throat:      Pharynx: Oropharynx is clear.   Eyes:      General: Scleral icterus present.   Cardiovascular:      Rate and Rhythm: Normal rate and regular rhythm.      Heart sounds: Normal heart sounds.   Pulmonary:      Effort: Pulmonary effort is normal.      Breath sounds: Normal breath sounds.   Abdominal:      General: Bowel sounds are normal.      Palpations: Abdomen is soft.      Tenderness: There is no abdominal tenderness.   Musculoskeletal:          General: Normal range of motion.      Cervical back: Normal range of motion and neck supple.   Skin:     General: Skin is warm and dry.      Capillary Refill: Capillary refill takes less than 2 seconds.      Coloration: Skin is jaundiced.   Neurological:      Mental Status: He is alert and oriented to person, place, and time. Mental status is at baseline.   Psychiatric:         Mood and Affect: Mood normal.            Additional Data:     Labs:  Results from last 7 days   Lab Units 03/05/24  0527   WBC Thousand/uL 19.04*   HEMOGLOBIN g/dL 8.8*   HEMATOCRIT % 23.8*   PLATELETS Thousands/uL 51*   NEUTROS PCT % 88*   LYMPHS PCT % 5*   MONOS PCT % 5   EOS PCT % 1     Results from last 7 days   Lab Units 03/09/24  0532   SODIUM mmol/L 134*   POTASSIUM mmol/L 3.5   CHLORIDE mmol/L 96   CO2 mmol/L 25   BUN mg/dL 43*   CREATININE mg/dL 5.87*   ANION GAP mmol/L 13   CALCIUM mg/dL 6.7*   ALBUMIN g/dL 2.3*   TOTAL BILIRUBIN mg/dL 44.91*   ALK PHOS U/L 195*   ALT U/L 54*   AST U/L 61*   GLUCOSE RANDOM mg/dL 132     Results from last 7 days   Lab Units 03/09/24  0532   INR  2.80*     Results from last 7 days   Lab Units 03/08/24  0731 03/07/24  2057 03/07/24  1514 03/07/24  1056 03/07/24  0813 03/06/24  2054 03/06/24  1725 03/06/24  1129 03/06/24  0732 03/05/24  2115 03/05/24  1624 03/05/24  1111   POC GLUCOSE mg/dl 128 111 131 126 285* 113 113 221* 109 125 206* 138         Results from last 7 days   Lab Units 03/04/24  0842   LACTIC ACID mmol/L 0.8       Lines/Drains:  Invasive Devices       Peripheral Intravenous Line  Duration             Peripheral IV 03/07/24 Right Antecubital 2 days              Hemodialysis Catheter  Duration             HD Permanent Double Catheter 8 days                          Imaging: Reviewed radiology reports from this admission including: chest xray    Recent Cultures (last 7 days):         Last 24 Hours Medication List:   Current Facility-Administered Medications   Medication Dose Route  Frequency Provider Last Rate    calcitriol  0.25 mcg Oral Daily Niecy IbarraEJNP      calcium acetate  1,334 mg Oral TID With Meals Niecy Ibarra CRNP      calcium carbonate  1 tablet Oral Daily With Breakfast Niecy IbarraJENSEN      diphenhydramine, lidocaine, Al/Mg hydroxide, simethicone  10 mL Swish & Swallow Q6H PRN Yuly S Edenilson, CRNP      fludrocortisone  0.1 mg Oral Daily Constance Menendez MD      hydrOXYzine HCL  25 mg Oral Q6H PRN Yuly S Edenilson, CRNP      lactulose  20 g Oral Daily Constance Menendez MD      melatonin  3 mg Oral HS Constance Menendez MD      midodrine  17.5 mg Oral TID AC Constance Menendez MD      nicotine  7 mg Transdermal Daily Constance Menendez MD      OLANZapine  10 mg Oral HS Constance Menendez MD      ondansetron  4 mg Intravenous Q8H PRN Yuly S Edenilson, CRNP      pantoprazole  40 mg Oral Early Morning Yuly S Edenilson, CRNP      potassium chloride  40 mEq Oral BID Roland Kulkarni MD          Today, Patient Was Seen By: Constance Menendez MD    **Please Note: This note may have been constructed using a voice recognition system.**

## 2024-03-09 NOTE — QUICK NOTE
Notified by nursing staff that the patient's wants to see physician as he is upset he wants to leave and go home.  Saw the patient at bedside, I did state that he needs to stay tonight and they can discuss with the day team about going home.  Patient has quite a lot of problems including end-stage liver disease and now currently on dialysis.  He states he wants to go home and his wife will take care of him.  Patient's current MELD score is 43 from previous laboratory. 3 month mortality of 97%

## 2024-03-09 NOTE — ASSESSMENT & PLAN NOTE
Currently on Solu-Cortef 25 mg daily -stopped 3/5  Alcoholic cirrhosis  Transferred from [facility] in [place] after liver transplant evaluation, he was found not to be candidate  Continue lactulose but decrease dose to 20 g once daily as ammonia level is normal and patient is having a lot of loose stools  No encephalopathy currently  PT/OT/case management for discharge planning so patient may prepare for transplant reevaluation in the future  Patient has extensive workup done, at this time, not much to offer  Patient does have capacity to make informed medical decisions.  Plan for discharge to SNF for rehab

## 2024-03-09 NOTE — PROGRESS NOTES
NEPHROLOGY PROGRESS NOTE   Michael Koch 33 y.o. male MRN: [id number]  Unit/Bed#: -[facility] Encounter: [id number]    Assessment/Plan:    32 yo with alcoholic liver cirrhosis, T2DM, GERD, HTN, smoker, nephrolithiasis admitted to this campus in January for alcoholic hepatitis transferred to [facility] however felt to be not transplant candidate and initiated on hemodialysis at that campus subsequently transferred back.  Nephrology following along for hemodialysis dependent acute kidney injury and electrolyte derangements     1.  Hemodialysis dependent acute kidney injury  Etiology likely ATN/HRS.  Started dialysis at other facility 2/25/2024.  Access: Right chest wall PermCath.  Last hemodialysis session was yesterday and treatment was shortened per patient request.  700 and mils were removed.  Next hemodialysis session Monday, recommend running even due to large amounts of loose stool and persistent hypotension.  Suspect edema on the basis of profound hypoalbuminemia and third spacing  2.  Hypotension associated with cirrhosis  Continue midodrine 17.5 mg 3 times daily and Florinef.  Monitor for persistent hypokalemia with Florinef.  Could also consider pyridostigmine  No objection to redosing albumin  3.  Hypokalemia  Potassium balance acceptable today 3.5 mmol/L.  No potassium restriction diet needed.  Continue standing potassium and monitor closely.  Florinef may lower potassium.   4.  Mineral bone disorder:  PG per mL, calcium 6.5 mg/dL, phosphorus 9.4 mg/dL  Continue PhosLo 2 tabs 3 times daily with meals, calcitriol 0.25 mcg daily, calcium supplement.  Periodically monitor phosphorus level.  Continue low phosphorus diet.  Discussed with patient today  5.  Acidosis  Continue to dialyze against high bicarbonate bath-stable on labs today  6.  Alcoholic hepatitis, alcoholic cirrhosis   Not a transplant candidate per GI colleagues.  Did discuss goals of care with patient.  Adamantly states wants to be full  "code including chest compressions and breathing tube.  Made him aware that hemodialysis and his condition will be very challenging given the persistent low blood pressure issues  7.  Encephalopathy  Improved today back to baseline.        ROS  Examined sitting upright in bed.  Much more alert today.  States he wants to go home and does not care how he gets there.  Did discuss full code measures versus hospice with patient.  Adamantly states he wants to be a full code and have everything done even if that means CPR breathing tube.  Did have large amount of loose stool just prior to evaluation.  No dizziness.  A complete 10 point review of systems have been performed and are otherwise negative.       Historical Information   Past Medical History:   Diagnosis Date    Depression     Diabetes mellitus (HCC)     Gout     Hypertension     Hyponatremia 01/21/2024    Kidney stones      Past Surgical History:   Procedure Laterality Date    CYSTOSCOPY W/ URETERAL STENT PLACEMENT  09/14/2023    HEMODIALYSIS ADULT  3/4/2024    IR TUNNELED DIALYSIS CATHETER PLACEMENT  2/29/2024    US GUIDED MASS BIOPSY (UNSPECIFIED)  02/19/2021     Social History   Social History     Substance and Sexual Activity   Alcohol Use Yes    Comment: \"couple 24 oz cans per day\"     Social History     Substance and Sexual Activity   Drug Use Never     Social History     Tobacco Use   Smoking Status Every Day    Current packs/day: 0.25    Types: Cigarettes   Smokeless Tobacco Never       Family History:   No family history on file.    Medications:  Pertinent medications were reviewed  Current Facility-Administered Medications   Medication Dose Route Frequency Provider Last Rate    calcitriol  0.25 mcg Oral Daily Niecy Ibarra, CRNP      calcium acetate  1,334 mg Oral TID With Meals Niecy Ibarra CRRUSSELL      calcium carbonate  1 tablet Oral Daily With Breakfast Niecy Ibarra, CRNP      diphenhydramine, lidocaine, Al/Mg hydroxide, simethicone  10 mL Swish & Swallow " "Q6H PRN Yuly S Edenilson, CRNP      fludrocortisone  0.1 mg Oral Daily Constance Menendez MD      hydrOXYzine HCL  25 mg Oral Q6H PRN Yuly S Edenilson, CRNP      lactulose  20 g Oral Daily Constance Menendez MD      melatonin  3 mg Oral HS Constance Menendez MD      midodrine  17.5 mg Oral TID AC Constance Menendez MD      nicotine  7 mg Transdermal Daily Constance Menendez MD      OLANZapine  7.5 mg Oral HS Constnace Menendez MD      ondansetron  4 mg Intravenous Q8H PRN Yuly S Edenilson, CRNP      pantoprazole  40 mg Oral Early Morning Yuly S Edenilson, CRNP      potassium chloride  40 mEq Oral BID Roland Kulkarni MD           No Known Allergies      Vitals:   BP (!) 86/61 (BP Location: Left arm)   Pulse 102   Temp 98.6 °F (37 °C) (Temporal)   Resp 18   Ht 5' 7\" (1.702 m)   Wt 84.9 kg (187 lb 2.7 oz)   SpO2 95%   BMI 29.32 kg/m²   Body mass index is 29.32 kg/m².  SpO2: 95 %,   SpO2 Activity: At Rest,   O2 Device: None (Room air)      Intake/Output Summary (Last 24 hours) at 3/9/2024 0925  Last data filed at 3/9/2024 0358  Gross per 24 hour   Intake 1710 ml   Output 1200 ml   Net 510 ml     Invasive Devices       Peripheral Intravenous Line  Duration             Peripheral IV 03/07/24 Right Antecubital 2 days              Hemodialysis Catheter  Duration             HD Permanent Double Catheter 8 days                    Physical Exam  General: conscious, cooperative, in no acute distress  Eyes: conjunctivae pink, icteric sclerae  ENT: lips and mucous membranes moist  Neck: supple, no JVD, no masses  Chest: clear breath sounds bilaterally, no crackles, ronchus or wheezings  CVS: S1 & S2, normal rate, regular rhythm  Abdomen: soft, non-tender, non-distended, normoactive bowel sounds obese  Extremities: no edema of both legs  Skin: no rash yearly jaundiced  Neuro: awake, alert, oriented      Diagnostic Data:  Lab: I have personally reviewed pertinent lab results.,   CBC:  Results from last 7 days   Lab Units 03/05/24  0527   WBC Thousand/uL 19.04*   HEMOGLOBIN " "g/dL 8.8*   HEMATOCRIT % 23.8*   PLATELETS Thousands/uL 51*      CMP:   Lab Results   Component Value Date    SODIUM 134 (L) 03/09/2024    K 3.5 03/09/2024    CL 96 03/09/2024    CO2 25 03/09/2024    BUN 43 (H) 03/09/2024    CREATININE 5.87 (H) 03/09/2024    CALCIUM 6.7 (L) 03/09/2024    AST 61 (H) 03/09/2024    ALT 54 (H) 03/09/2024    ALKPHOS 195 (H) 03/09/2024    EGFR 11 03/09/2024   ,   PT/INR:   Lab Results   Component Value Date    INR 2.80 (H) 03/09/2024   ,   Magnesium: No components found for: \"MAG\",  Phosphorous: No results found for: \"PHOS\"    Microbiology:  @LABRCNT,(urinecx:7)@        JENSEN Fontenot    Portions of the record may have been created with voice recognition software. Occasional wrong word or \"sound a like\" substitutions may have occurred due to the inherent limitations of voice recognition software. Read the chart carefully and recognize, using context, where substitutions have occurred.  "

## 2024-03-09 NOTE — ASSESSMENT & PLAN NOTE
Continue Zyprexa at bedtime decreased dose to 7.5 mg daily and also decrease melatonin to 3 mg daily  cortisol level is currently normal will recheck again  as now off steroids.normal ammonia level

## 2024-03-09 NOTE — ASSESSMENT & PLAN NOTE
History alcohol abuse, last drink prior to admission in January  Will need outpatient alcohol counseling prior to transplant reevaluation-remember, patient has referral from [facility] for virtual therapy which supposed to initiated soon.

## 2024-03-09 NOTE — ASSESSMENT & PLAN NOTE
Lab Results   Component Value Date    HGBA1C 8.8 (H) 01/21/2024       Recent Labs     03/07/24  1056 03/07/24  1514 03/07/24 2057 03/08/24  0731   POCGLU 126 131 111 128         Blood Sugar Average: Last 72 hrs:  (P) 148.6158977888452537  Sliding-scale insulin with Accu-Cheks STOPPED AS steroids has been discontinued  Carbohydrate controlled diet

## 2024-03-10 NOTE — PLAN OF CARE
Problem: GASTROINTESTINAL - ADULT  Goal: Minimal or absence of nausea and/or vomiting  Description: INTERVENTIONS:  - Administer IV fluids if ordered to ensure adequate hydration  - Maintain NPO status until nausea and vomiting are resolved  - Nasogastric tube if ordered  - Administer ordered antiemetic medications as needed  - Provide nonpharmacologic comfort measures as appropriate  - Advance diet as tolerated, if ordered  - Consider nutrition services referral to assist patient with adequate nutrition and appropriate food choices  Outcome: Progressing  Goal: Maintains or returns to baseline bowel function  Description: INTERVENTIONS:  - Assess bowel function  - Encourage oral fluids to ensure adequate hydration  - Administer IV fluids if ordered to ensure adequate hydration  - Administer ordered medications as needed  - Encourage mobilization and activity  - Consider nutritional services referral to assist patient with adequate nutrition and appropriate food choices  Outcome: Progressing  Goal: Maintains adequate nutritional intake  Description: INTERVENTIONS:  - Monitor percentage of each meal consumed  - Identify factors contributing to decreased intake, treat as appropriate  - Assist with meals as needed  - Monitor I&O, weight, and lab values if indicated  - Obtain nutrition services referral as needed  Outcome: Progressing  Goal: Oral mucous membranes remain intact  Description: INTERVENTIONS  - Assess oral mucosa and hygiene practices  - Implement preventative oral hygiene regimen  - Implement oral medicated treatments as ordered magic mouth wash  - Initiate Nutrition services referral as needed  Outcome: Progressing     Problem: METABOLIC, FLUID AND ELECTROLYTES - ADULT  Goal: Electrolytes maintained within normal limits  Description: INTERVENTIONS:  - Monitor labs and assess patient for signs and symptoms of electrolyte imbalances  - Administer electrolyte replacement as ordered  - Monitor response to  electrolyte replacements, including repeat lab results as appropriate  - Instruct patient on fluid and nutrition as appropriate  Outcome: Progressing  Goal: Fluid balance maintained  Description: INTERVENTIONS:  - Monitor labs   - Monitor I/O and WT  - Instruct patient on fluid and nutrition as appropriate  - Assess for signs & symptoms of volume excess or deficit  Outcome: Progressing  Goal: Glucose maintained within target range  Description: INTERVENTIONS:  - Monitor Blood Glucose as ordered  - Assess for signs and symptoms of hyperglycemia and hypoglycemia  - Administer ordered medications to maintain glucose within target range  - Assess nutritional intake and initiate nutrition service referral as needed  Outcome: Progressing

## 2024-03-10 NOTE — ASSESSMENT & PLAN NOTE
was on Solu-Cortef 25 mg daily -stopped 3/5  Alcoholic cirrhosis  Transferred from [facility] in [place] after liver transplant evaluation, he was found not to be candidate  Continue lactulose but decrease dose to 10 g once daily as ammonia level is normal and patient is having a lot of loose stools.  Will add Xifaxan and see if that will help decrease his diarrhea frequency.  Check ammonia level every 3 days for now as we have been rapidly decreasing lactulose dose  No encephalopathy currently  Patient has extensive workup done, at this time, not much to offer.AT Present not candidate for liver transplant but should be reevaluated again in a few months  Patient does have capacity to make informed medical decisions.  Plan for discharge to SNF for rehab

## 2024-03-10 NOTE — PROGRESS NOTES
Patient rang call bell at this time and requested thigh high rashida stockings be removed. Patient already had had stockings rolled down to ankles. Attempted to educate patient on risks vs benefits of same and patient stated he wanted them removed now. Stockings removed at patient's request. MD aware.

## 2024-03-10 NOTE — ASSESSMENT & PLAN NOTE
Continue Zyprexa at bedtime decreased dose to 5 mg daily FROM 15MG daily and also decreased melatonin to 3 mg daily  Please note that patient had very bad alcohol withdrawals and was on Precedex drip for some time during the last few weeks.  Also having behavioral issues and was placed on Zyprexa during his hospital course.  Patient probably has more than just depression and needs reeval with psychiatry  Patient having significant orthostatic hypotension and hence trying to decrease dose of Zyprexa.  Will ask psychiatry to reevaluate and placed him on some other agent does not cause hypotension as he still has behavior issues  cortisol level is currently normal will recheck again  in a week as now off steroids.normal ammonia level

## 2024-03-10 NOTE — PROGRESS NOTES
[facility]  Progress Note  Name: Michael Koch I  MRN: [id number]  Unit/Bed#: -[facility] I Date of Admission: 2/27/2024   Date of Service: 3/10/2024 I Hospital Day: 12    Assessment/Plan   * Alcoholic hepatitis  Assessment & Plan  was on Solu-Cortef 25 mg daily -stopped 3/5  Alcoholic cirrhosis  Transferred from [facility] in [place] after liver transplant evaluation, he was found not to be candidate  Continue lactulose but decrease dose to 10 g once daily as ammonia level is normal and patient is having a lot of loose stools.  Will add Xifaxan and see if that will help decrease his diarrhea frequency.  Check ammonia level every 3 days for now as we have been rapidly decreasing lactulose dose  No encephalopathy currently  Patient has extensive workup done, at this time, not much to offer.AT Present not candidate for liver transplant but should be reevaluated again in a few months  Patient does have capacity to make informed medical decisions.  Plan for discharge to SNF for rehab    Renal failure  Assessment & Plan  Developed DUANE on CKD with underlying liver failure   ureteral stent placed at [facility], lithotripsy  Developed oliguria, treated with CRRT then transition to HD  Appreciate nephrology consultation.cont HD SCHEDULE mon,wed,fri  Patient temporary HD catheter replaced with PermCath by IR on 2/29/2024.  Patient also has alcohol induced hepatitis.    Orthostatic hypotension  Assessment & Plan  Secondary to end-stage liver disease and hemodialysis dependent acute kidney injury and autonomic dysfunction.  Continue midodrine-patient is still having orthostatic hypotension which is limiting his mobility. cont thigh high CARLOS stockings.  Cont midodrine 17.5 mg 3 times daily,increase  Florinef 0.2 mg daily .decrease zyprexa dose to 5 mg daily and decreased lactulose to 10 gram once daily .  Increase oral intake.  Monitor orthostasis.prn iv albumin.may trial mestinon low dose as last  resort  Will do 2 d echo and EKG and consulted cardiology for further recommendations  Patient is unable to stand upright as his blood pressure drops into the 70s and he gets very symptomatic.  Discussed with case management that he needs either our stretcher van or in-house dialysis as that is going to be very difficult to transport him otherwise for outpatient dialysis  And is requesting to go home today however explained to him that due to his significant orthostatic hypotension he cannot be safe discharge to home    GERD (gastroesophageal reflux disease)  Assessment & Plan  Continue PPI    Depression  Assessment & Plan  Continue Zyprexa at bedtime decreased dose to 5 mg daily FROM 15MG daily and also decreased melatonin to 3 mg daily  Please note that patient had very bad alcohol withdrawals and was on Precedex drip for some time during the last few weeks.  Also having behavioral issues and was placed on Zyprexa during his hospital course.  Patient probably has more than just depression and needs reeval with psychiatry  Patient having significant orthostatic hypotension and hence trying to decrease dose of Zyprexa.  Will ask psychiatry to reevaluate and placed him on some other agent does not cause hypotension as he still has behavior issues  cortisol level is currently normal will recheck again  in a week as now off steroids.normal ammonia level    ETOH abuse  Assessment & Plan  History alcohol abuse, last drink prior to admission in January  Will need outpatient alcohol counseling prior to transplant reevaluation-patient has referral from [facility] for virtual therapy which supposed to initiated soon.      Tobacco abuse  Assessment & Plan  Nicotine patch  Cessation counseling           VTE Pharmacologic Prophylaxis:   Moderate Risk (Score 3-4) - Pharmacological DVT Prophylaxis Contraindicated. Sequential Compression Devices Ordered.    Mobility:   Basic Mobility Inpatient Raw Score: 13  -[facility] Goal:  4: Move to chair/commode  JH-HLM Achieved: 5: Stand (1 or more minutes)  HLM Goal achieved. Continue to encourage appropriate mobility.    Patient Centered Rounds: I performed bedside rounds with nursing staff today.   Discussions with Specialists or Other Care Team Provider: none    Education and Discussions with Family / Patient: update family    Total Time Spent on Date of Encounter in care of patient: 35 mins. This time was spent on one or more of the following: performing physical exam; counseling and coordination of care; obtaining or reviewing history; documenting in the medical record; reviewing/ordering tests, medications or procedures; communicating with other healthcare professionals and discussing with patient's family/caregivers.    Current Length of Stay: 12 day(s)  Current Patient Status: Inpatient   Certification Statement: The patient will continue to require additional inpatient hospital stay due to acute alcoholic hepatitis and acute kidney injury now on hemodialysis  Discharge Plan: Anticipate discharge in 48-72 hrs to rehab facility.    Code Status: Level 1 - Full Code    Subjective:   Still requesting to go home.  Still having very significant orthostatic hypotension yesterday he was barely able to stand for 2 minutes without having stool incontinence or feeling weak in his legs and needing to sit down.    Objective:     Vitals:   Temp (24hrs), Av.1 °F (36.7 °C), Min:97.6 °F (36.4 °C), Max:98.4 °F (36.9 °C)    Temp:  [97.6 °F (36.4 °C)-98.4 °F (36.9 °C)] 98.4 °F (36.9 °C)  HR:  [101-102] 102  Resp:  [18] 18  BP: ()/(39-61) 97/50  SpO2:  [96 %-98 %] 96 %  Body mass index is 29.25 kg/m².     Input and Output Summary (last 24 hours):   No intake or output data in the 24 hours ending 03/10/24 1129    Physical Exam:   Physical Exam  Vitals and nursing note reviewed.   Constitutional:       Appearance: He is ill-appearing.   HENT:      Head: Normocephalic and atraumatic.      Right Ear:  External ear normal.      Left Ear: External ear normal.      Nose: Nose normal.      Mouth/Throat:      Pharynx: Oropharynx is clear.   Eyes:      General: Scleral icterus present.      Pupils: Pupils are equal, round, and reactive to light.   Cardiovascular:      Rate and Rhythm: Normal rate and regular rhythm.      Heart sounds: Normal heart sounds.   Pulmonary:      Effort: Pulmonary effort is normal.      Breath sounds: Normal breath sounds.   Abdominal:      General: Bowel sounds are normal.      Palpations: Abdomen is soft.      Tenderness: There is no abdominal tenderness.   Musculoskeletal:         General: Normal range of motion.      Cervical back: Normal range of motion and neck supple.   Skin:     General: Skin is warm and dry.      Capillary Refill: Capillary refill takes less than 2 seconds.      Coloration: Skin is jaundiced.   Neurological:      Mental Status: He is alert and oriented to person, place, and time. Mental status is at baseline.   Psychiatric:         Mood and Affect: Mood normal.            Additional Data:     Labs:  Results from last 7 days   Lab Units 03/05/24  0527   WBC Thousand/uL 19.04*   HEMOGLOBIN g/dL 8.8*   HEMATOCRIT % 23.8*   PLATELETS Thousands/uL 51*   NEUTROS PCT % 88*   LYMPHS PCT % 5*   MONOS PCT % 5   EOS PCT % 1     Results from last 7 days   Lab Units 03/10/24  0528 03/09/24  0532   SODIUM mmol/L 133* 134*   POTASSIUM mmol/L 3.2* 3.5   CHLORIDE mmol/L 97 96   CO2 mmol/L 23 25   BUN mg/dL 53* 43*   CREATININE mg/dL 7.46* 5.87*   ANION GAP mmol/L 13 13   CALCIUM mg/dL 7.1* 6.7*   ALBUMIN g/dL  --  2.3*   TOTAL BILIRUBIN mg/dL  --  44.91*   ALK PHOS U/L  --  195*   ALT U/L  --  54*   AST U/L  --  61*   GLUCOSE RANDOM mg/dL 110 132     Results from last 7 days   Lab Units 03/09/24  0532   INR  2.80*     Results from last 7 days   Lab Units 03/08/24  0731 03/07/24 2057 03/07/24  1514 03/07/24  1056 03/07/24  0813 03/06/24  2054 03/06/24  1725 03/06/24  1129  03/06/24  0732 03/05/24  2115 03/05/24  1624 03/05/24  1111   POC GLUCOSE mg/dl 128 111 131 126 285* 113 113 221* 109 125 206* 138         Results from last 7 days   Lab Units 03/04/24  0842   LACTIC ACID mmol/L 0.8       Lines/Drains:  Invasive Devices       Peripheral Intravenous Line  Duration             Peripheral IV 03/07/24 Right Antecubital 3 days              Hemodialysis Catheter  Duration             HD Permanent Double Catheter 9 days                          Imaging: Reviewed radiology reports from this admission including: MRI brain    Recent Cultures (last 7 days):         Last 24 Hours Medication List:   Current Facility-Administered Medications   Medication Dose Route Frequency Provider Last Rate    albumin human  12.5 g Intravenous Once Constance Menendez MD      calcitriol  0.25 mcg Oral Daily JENSEN Fontenot      calcium acetate  1,334 mg Oral TID With Meals JENSEN Fontenot      calcium carbonate  1 tablet Oral Daily With Breakfast JENSEN Fontenot      diphenhydramine, lidocaine, Al/Mg hydroxide, simethicone  10 mL Swish & Swallow Q6H PRN Yuly S Edenilson, CRNP      [START ON 3/11/2024] fludrocortisone  0.2 mg Oral Daily Constance Menendez MD      hydrOXYzine HCL  25 mg Oral Q6H PRN Yuly S Edenilson, CRNP      [START ON 3/11/2024] lactulose  10 g Oral Daily Constance Menendez MD      melatonin  3 mg Oral HS Constance Menendez MD      midodrine  17.5 mg Oral TID AC Constance Menendez MD      nicotine  7 mg Transdermal Daily Constance Menendez MD      OLANZapine  5 mg Oral HS Constance Menendez MD      ondansetron  4 mg Intravenous Q8H PRN Yuly S Edenilson, CRNP      pantoprazole  40 mg Oral Early Morning Yuly S Edenilson, CRNP      potassium chloride  40 mEq Oral BID Roland Kulkarni MD      rifaximin  550 mg Oral Q12H [facility] Constance Menendez MD          Today, Patient Was Seen By: Constance Menendez MD    **Please Note: This note may have been constructed using a voice recognition system.**

## 2024-03-10 NOTE — ASSESSMENT & PLAN NOTE
Secondary to end-stage liver disease and hemodialysis dependent acute kidney injury and autonomic dysfunction.  Continue midodrine-patient is still having orthostatic hypotension which is limiting his mobility. cont thigh high CARLOS stockings.  Cont midodrine 17.5 mg 3 times daily,increase  Florinef 0.2 mg daily .decrease zyprexa dose to 5 mg daily and decreased lactulose to 10 gram once daily .  Increase oral intake.  Monitor orthostasis.prn iv albumin.may trial mestinon low dose as last resort  Will do 2 d echo and EKG and consulted cardiology for further recommendations  Patient is unable to stand upright as his blood pressure drops into the 70s and he gets very symptomatic.  Discussed with case management that he needs either our stretcher van or in-house dialysis as that is going to be very difficult to transport him otherwise for outpatient dialysis  And is requesting to go home today however explained to him that due to his significant orthostatic hypotension he cannot be safe discharge to home

## 2024-03-10 NOTE — ASSESSMENT & PLAN NOTE
Developed DUANE on CKD with underlying liver failure   ureteral stent placed at [facility], lithotripsy  Developed oliguria, treated with CRRT then transition to HD  Appreciate nephrology consultation.cont HD SCHEDULE mon,wed,fri  Patient temporary HD catheter replaced with PermCath by IR on 2/29/2024.  Patient also has alcohol induced hepatitis.

## 2024-03-10 NOTE — PROGRESS NOTES
Call placed to doug to initiate consult for possible med changes secondary to orthostatic hypotension.

## 2024-03-10 NOTE — ASSESSMENT & PLAN NOTE
History alcohol abuse, last drink prior to admission in January  Will need outpatient alcohol counseling prior to transplant reevaluation-[facility] which supposed to initiated soon.

## 2024-03-11 ENCOUNTER — TELEPHONE (OUTPATIENT)
Dept: PSYCHIATRY | Facility: CLINIC | Age: 34
End: 2024-03-11

## 2024-03-11 ENCOUNTER — ANESTHESIA EVENT (OUTPATIENT)
Dept: ANESTHESIOLOGY | Facility: HOSPITAL | Age: 34
End: 2024-03-11

## 2024-03-11 ENCOUNTER — ANESTHESIA (OUTPATIENT)
Dept: ANESTHESIOLOGY | Facility: HOSPITAL | Age: 34
End: 2024-03-11

## 2024-03-11 PROBLEM — D64.9 ACUTE ANEMIA: Status: ACTIVE | Noted: 2024-03-11

## 2024-03-11 NOTE — ASSESSMENT & PLAN NOTE
Developed DUANE on CKD with underlying liver failure   ureteral stent placed at [facility], lithotripsy  Developed oliguria, treated with CRRT then transition to HD  Appreciate nephrology consultation.cont HD SCHEDULE mon,wed,fri  Patient temporary HD catheter replaced with PermCath by IR on 2/29/2024.  Patient also has alcohol induced hepatitis.

## 2024-03-11 NOTE — ASSESSMENT & PLAN NOTE
Hemoglobin is around 8.9.  Patient has intermittently been requiring blood transfusions during his extensive hospitalization courses over the last few weeks.  Today hemoglobin dropped to 6.2.  Will give 2 units PRBC and recheck CBC in a.m. and plan for EGD and colonoscopy in a.m. if medically stable.  Continue Protonix drip and clear liquids for now.  No overt signs of bleeding noted at this time

## 2024-03-11 NOTE — QUICK NOTE
RRT called on this patient, due to unresponsiveness while using bedside commode.    Upon arrival, patient is on the bed, drowsy, but arousable, answering questions appropriately.  Patient has ongoing multiple medical conditions including alcoholic hepatitis not a current liver transplant candidate, hepatorenal syndrome on dialysis.    Patient presents hypotensive early morning, suspect most likely hypotension versus vasovagal.    Blood pressure checked, 82/46-patient denies any other symptoms.  Will give morning dose of midodrine, will also give albumin therapy  After providing medication, recheck blood pressure    RRT team on the bedside, please see the detailed note from code team.

## 2024-03-11 NOTE — CASE MANAGEMENT
Case Management Discharge Planning Note    Patient name Michael Koch  Location /-01 MRN [id number]  : 1990 Date 3/11/2024       Current Admission Date: 2024  Current Admission Diagnosis:Alcoholic hepatitis   Patient Active Problem List    Diagnosis Date Noted    Acute on chronic anemia 2024    Renal failure 2024    Malnutrition (HCC) 2024    Orthostatic hypotension 2024    Acute respiratory failure with hypoxia and hypercapnia (HCC) 2024    Alcoholic hepatitis 2024    ETOH abuse 2024    Depression 2024    GERD (gastroesophageal reflux disease) 2024    BPH (benign prostatic hyperplasia) 2024    RSV infection 2024    Abnormal CT scan 2024    Hepatic encephalopathy (HCC) 2024    N&V (nausea and vomiting) 2024    DUANE (acute kidney injury) (HCC) 2024    Thrombocytopenia (HCC) 2024    Lactic acidosis 2024    Marijuana use 2024    SIRS (systemic inflammatory response syndrome) (HCC) 2024    History of gout 10/04/2023    History of hypertension 10/04/2023    Type 2 diabetes mellitus with hyperglycemia, without long-term current use of insulin (HCC) 10/04/2023    Tobacco abuse 10/04/2023      LOS (days): 13  Geometric Mean LOS (GMLOS) (days): 4.8  Days to GMLOS:-7.9     OBJECTIVE:  Risk of Unplanned Readmission Score: 48.76         Current admission status: Inpatient   Preferred Pharmacy:   [id number] - [place], [place] - [address]  [address] [place] [address]  Phone: [phone] Fax: [phone]    Primary Care Provider: Louie Odonnell DO    Primary Insurance: BLUE CROSS  Secondary Insurance:     DISCHARGE DETAILS:    Discharge planning discussed with:: Patient, Sister Cathryn  Freedom of Choice: Yes  Comments - Freedom of Choice: Patient has chosen [facility] as STR  CM contacted family/caregiver?: Yes  Were Treatment Team discharge  "recommendations reviewed with patient/caregiver?: Yes  Did patient/caregiver verbalize understanding of patient care needs?: Yes  Were patient/caregiver advised of the risks associated with not following Treatment Team discharge recommendations?: Yes    Contacts  Patient Contacts: Cathryn (Sister(  Relationship to Patient:: Family  Contact Method: In Person  Reason/Outcome: Discharge Planning    Requested Home Health Care         Is the patient interested in HHC at discharge?: No    DME Referral Provided  Referral made for DME?: No    Other Referral/Resources/Interventions Provided:  Interventions: Short Term Rehab  Referral Comments: [facility] [place]    Would you like to participate in our [facility] service program?  : No - Declined    Treatment Team Recommendation: Short Term Rehab  Discharge Destination Plan:: Short Term Rehab         112p TCT Cathryn (returning her message) re\" patient - DR spoke to them today about hospice vs care until can be on the transport list - at this time it her brother wishes to continue care until the transport list. CM reviewed STR facilities with HD on sight vs facilities where family would need to transport patient to HD. She agrees to facility with HD on sight is best and will be visiting later if CM wants her present when discussing with patient. Planned to meet 230pm.     320pm CM met with patient and sister Cathryn - reviewed STR with HD on sight - patient agreeable to this LOC and chooses [facility]. A post acute care recommendation was made by your care team for STR.  Discussed Freedom of Choice with patient. List of facilities given to patient via in person. patient aware the list is custom filtered for them by zip code location and that [facility] post acute providers are designated.   Patient and sister will update patient's spouse. CM also provided patient with POA and AD paperwork to look at/complete when needed. They want to make sure patient's " code status remains at next LOC.     CM reserved [facility] in [place]. CM updated them on patient'd discharge timeframe.    CM to monitor patient's care and discharge needs.

## 2024-03-11 NOTE — PROGRESS NOTES
[facility]  Progress Note  Name: Michael Koch I  MRN: [id number]  Unit/Bed#: -[facility] I Date of Admission: 2/27/2024   Date of Service: 3/11/2024 I Hospital Day: 13    Assessment/Plan   * Alcoholic hepatitis  Assessment & Plan  was on Solu-Cortef 25 mg daily -stopped 3/5  Known Alcoholic cirrhosis  Transferred from [facility] in [place] after liver transplant evaluation, he was found not to be candidate  Continue lactulose but decrease dose to 10 g once daily as ammonia level is normal and patient is having a lot of loose stools.  Will add Xifaxan and see if that will help decrease his diarrhea frequency.  Check ammonia level every 3 days for now as we have been rapidly decreasing lactulose dose  No encephalopathy currently  Patient has extensive workup done, at this time, not much to offer.AT Present not candidate for liver transplant but should be reevaluated again in a few months  Patient does have capacity to make informed medical decisions.  Plan for discharge to SNF for rehab which has in house dialyses would be ideal    Acute on chronic anemia  Assessment & Plan    Hemoglobin is around 8.9.  Patient has intermittently been requiring blood transfusions during his extensive hospitalization courses over the last few weeks.  Today hemoglobin dropped to 6.2.  Will give 2 units PRBC and recheck CBC in a.m. and plan for EGD and colonoscopy in a.m. if medically stable.  Continue Protonix drip and clear liquids for now.  No overt signs of bleeding noted at this time    Renal failure  Assessment & Plan  Developed DUANE on CKD with underlying liver failure   ureteral stent placed at [facility], lithotripsy  Developed oliguria, treated with CRRT then transition to HD  Appreciate nephrology consultation.cont HD SCHEDULE mon,wed,fri  Patient temporary HD catheter replaced with PermCath by IR on 2/29/2024.  Patient also has alcohol induced hepatitis.    Orthostatic hypotension  Assessment &  Plan  Secondary to end-stage liver disease and hemodialysis dependent acute kidney injury and autonomic dysfunction.  Continue midodrine-patient is still having orthostatic hypotension which is limiting his mobility. cont thigh high CARLOS stockings.  Cont midodrine 17.5 mg 3 times daily,increase  Florinef 0.2 mg daily .decrease zyprexa dose to 5 mg daily and decreased lactulose to 10 gram once daily .  Increase oral intake.  Monitor orthostasis.prn iv albumin.may trial mestinon low dose as last resort  Will do 2 d echo and EKG and consulted cardiology for further recommendations  Patient is unable to stand upright as his blood pressure drops into the 70s and he gets very symptomatic.  Discussed with case management that he needs either our stretcher van or in-house dialysis as that is going to be very difficult to transport him otherwise for outpatient dialysis  And is requesting to go home today however explained to him that due to his significant orthostatic hypotension he cannot be safe discharge to home    Depression  Assessment & Plan  Continue Zyprexa at bedtime decreased dose to 5 mg daily FROM 15MG daily and also decreased melatonin to 3 mg daily  Please note that patient had very bad alcohol withdrawals and was on Precedex drip for some time during the last few weeks.  Also having behavioral issues and was placed on Zyprexa during his hospital course.  Patient probably has more than just depression and needs reeval with psychiatry  Patient having significant orthostatic hypotension and hence trying to decrease dose of Zyprexa.  Will ask psychiatry to reevaluate and placed him on some other agent does not cause hypotension as he still has behavior issues  cortisol level is currently normal will recheck again  in a week as now off steroids.normal ammonia level    ETOH abuse  Assessment & Plan  History alcohol abuse, last drink prior to admission in January  Will need outpatient alcohol counseling prior to  transplant reevaluation-patient has referral from [facility] for virtual therapy which supposed to initiated soon.      Tobacco abuse  Assessment & Plan  Nicotine patch  Cessation counseling               VTE Pharmacologic Prophylaxis:   Moderate Risk (Score 3-4) - Pharmacological DVT Prophylaxis Contraindicated. Sequential Compression Devices Ordered.    Mobility:   Basic Mobility Inpatient Raw Score: 17  JH-HLM Goal: 5: Stand one or more mins  JH-HLM Achieved: 2: Bed activities/Dependent transfer  HLM Goal achieved. Continue to encourage appropriate mobility.    Patient Centered Rounds: I performed bedside rounds with nursing staff today.   Discussions with Specialists or Other Care Team Provider: johnny nephro and gi    Education and Discussions with Family / Patient: Updated  (wife) via phone.    Total Time Spent on Date of Encounter in care of patient: 45 mins. This time was spent on one or more of the following: performing physical exam; counseling and coordination of care; obtaining or reviewing history; documenting in the medical record; reviewing/ordering tests, medications or procedures; communicating with other healthcare professionals and discussing with patient's family/caregivers.    Current Length of Stay: 13 day(s)  Current Patient Status: Inpatient   Certification Statement: The patient will continue to require additional inpatient hospital stay due to acute on chronic anemia  Discharge Plan: Anticipate discharge in >72 hrs to rehab facility.    Code Status: Level 3 - DNAR and DNI    Subjective:   Patient had rapid response this morning where he passed out and fell on the floor.  Noted to have significant hypotension and also low hemoglobin    Objective:     Vitals:   Temp (24hrs), Av.2 °F (36.8 °C), Min:97.9 °F (36.6 °C), Max:98.8 °F (37.1 °C)    Temp:  [97.9 °F (36.6 °C)-98.8 °F (37.1 °C)] 97.9 °F (36.6 °C)  HR:  [] 98  Resp:  [18-20] 20  BP: (81-96)/(46-50)  82/46  SpO2:  [98 %-100 %] 100 %  Body mass index is 28.82 kg/m².     Input and Output Summary (last 24 hours):     Intake/Output Summary (Last 24 hours) at 3/11/2024 1155  Last data filed at 3/11/2024 0500  Gross per 24 hour   Intake 480 ml   Output 200 ml   Net 280 ml       Physical Exam:   Physical Exam  Vitals and nursing note reviewed.   Constitutional:       Appearance: He is ill-appearing.   HENT:      Head: Normocephalic and atraumatic.      Right Ear: External ear normal.      Left Ear: External ear normal.      Nose: Nose normal.      Mouth/Throat:      Pharynx: Oropharynx is clear.   Eyes:      General: Scleral icterus present.      Pupils: Pupils are equal, round, and reactive to light.   Cardiovascular:      Rate and Rhythm: Normal rate and regular rhythm.      Heart sounds: Normal heart sounds.   Pulmonary:      Effort: Pulmonary effort is normal.      Breath sounds: Normal breath sounds.   Abdominal:      General: Bowel sounds are normal.      Palpations: Abdomen is soft.      Tenderness: There is no abdominal tenderness.   Musculoskeletal:         General: Normal range of motion.      Cervical back: Normal range of motion and neck supple.   Skin:     General: Skin is warm and dry.      Capillary Refill: Capillary refill takes less than 2 seconds.      Coloration: Skin is jaundiced.   Neurological:      Mental Status: He is alert and oriented to person, place, and time. Mental status is at baseline.   Psychiatric:         Mood and Affect: Mood normal.            Additional Data:     Labs:  Results from last 7 days   Lab Units 03/11/24  1032 03/11/24  0638 03/05/24  0527   WBC Thousand/uL  --  18.14* 19.04*   HEMOGLOBIN g/dL 6.2* 6.7* 8.8*   HEMATOCRIT % 17.0* 18.5* 23.8*   PLATELETS Thousands/uL  --  57* 51*   NEUTROS PCT %  --   --  88*   LYMPHS PCT %  --   --  5*   MONOS PCT %  --   --  5   EOS PCT %  --   --  1     Results from last 7 days   Lab Units 03/11/24  0638 03/10/24  0528 03/09/24  0532    SODIUM mmol/L 133*   < > 134*   POTASSIUM mmol/L 3.2*   < > 3.5   CHLORIDE mmol/L 99   < > 96   CO2 mmol/L 20*   < > 25   BUN mg/dL 61*   < > 43*   CREATININE mg/dL 9.27*   < > 5.87*   ANION GAP mmol/L 14   < > 13   CALCIUM mg/dL 7.4*   < > 6.7*   ALBUMIN g/dL  --   --  2.3*   TOTAL BILIRUBIN mg/dL  --   --  44.91*   ALK PHOS U/L  --   --  195*   ALT U/L  --   --  54*   AST U/L  --   --  61*   GLUCOSE RANDOM mg/dL 116   < > 132    < > = values in this interval not displayed.     Results from last 7 days   Lab Units 03/11/24  1032   INR  2.80*     Results from last 7 days   Lab Units 03/11/24  0625 03/08/24  0731 03/07/24 2057 03/07/24  1514 03/07/24  1056 03/07/24  0813 03/06/24  2054 03/06/24  1725 03/06/24  1129 03/06/24  0732 03/05/24  2115 03/05/24  1624   POC GLUCOSE mg/dl 132 128 111 131 126 285* 113 113 221* 109 125 206*               Lines/Drains:  Invasive Devices       Peripheral Intravenous Line  Duration             Peripheral IV 03/07/24 Right Antecubital 4 days    Peripheral IV 03/11/24 Right;Ventral (anterior) Forearm <1 day              Hemodialysis Catheter  Duration             HD Permanent Double Catheter 10 days                          Imaging: Reviewed radiology reports from this admission including: abdominal/pelvic CT    Recent Cultures (last 7 days):         Last 24 Hours Medication List:   Current Facility-Administered Medications   Medication Dose Route Frequency Provider Last Rate    calcitriol  0.25 mcg Oral Daily Niecy IbarraEJNP      calcium acetate  1,334 mg Oral TID With Meals Niecy Ibarra CRNP      calcium carbonate  1 tablet Oral Daily With Breakfast Niecy Ibarra CRNP      diphenhydramine, lidocaine, Al/Mg hydroxide, simethicone  10 mL Swish & Swallow Q6H PRN Yuly S Edenilson, CRNP      fludrocortisone  0.2 mg Oral Daily Constance Menendez MD      hydrOXYzine HCL  25 mg Oral Q6H PRN Yuly S Edenilson, CRNP      lactulose  10 g Oral Daily Constance Menendez MD      melatonin  3 mg Oral HS Constance  MD Shon      midodrine  17.5 mg Oral TID AC Constance Menendez MD      nicotine  7 mg Transdermal Daily Constance Menendez MD      OLANZapine  5 mg Oral HS Constance Menendez MD      ondansetron  4 mg Intravenous Q8H PRN Yulykavon Dasilvax, CRNP      pantoprazole  40 mg Intravenous Q12H MURALI Constance Menendez MD      polyethylene glycol  4,000 mL Oral See Admin Instructions Kayleigh Stahl PA-C      potassium chloride  40 mEq Oral BID Roland Kulkarni MD      rifaximin  550 mg Oral Q12H MRUALI Constance Menendez MD          Today, Patient Was Seen By: Constance Menendez MD    **Please Note: This note may have been constructed using a voice recognition system.**

## 2024-03-11 NOTE — TELEMEDICINE
TeleConsultation - Behavioral Health   Michael Koch 33 y.o. male MRN: [id number]  Unit/Bed#: -01 Encounter: [id number]        REQUIRED DOCUMENTATION:     1. This service was provided via Telemedicine.  2. [place] at [place]  3. TeleMed provider: Bi Valerio MD.  4. Identify all parties in room with patient during tele consult:  pt  5.Patient was then informed that this was a Telemedicine visit and that the exam was being conducted confidentially over secure lines. My office door was closed. No one else was in the room.  Patient acknowledged consent and understanding of privacy and security of the Telemedicine visit, and gave us permission to have the assistant stay in the room in order to assist with the history and to conduct the exam.  I informed the patient that I have reviewed their record in Epic and presented the opportunity for them to ask any questions regarding the visit today.  The patient agreed to participate.       Assessment/Plan     Present on Admission:   Tobacco abuse   ETOH abuse   Alcoholic hepatitis   Depression    Assessment:    Unspecified mood disorder; rule out alcohol induced mood disorder; alcohol use disorder    Treatment Plan:    Recommend discontinuation of Zyprexa as there is no clear indication for the medication at this time.  Instead consider Lexapro initially at 5 mg p.o. daily for depression.  The patient gives his informed consent for the medication.  No suicide precautions are indicated at this time.  Reconsult psychiatry as needed.  Upon discharge the patient would benefit from outpatient psychiatric follow-up for medication management with a psychotherapy/counseling component to assist the patient in optimizing his coping skills for dealing with current life stressors and losses.  I offered referral to pastoral care but he declines that at this time.  Reconsult psychiatry as needed.      Current Medications:     Current Facility-Administered Medications    Medication Dose Route Frequency Provider Last Rate    calcitriol  0.25 mcg Oral Daily Niecy Ibarra, CRNP      calcium acetate  1,334 mg Oral TID With Meals Niecy Ibarra, CRNP      calcium carbonate  1 tablet Oral Daily With Breakfast Niecy Ibarra, CRNP      diphenhydramine, lidocaine, Al/Mg hydroxide, simethicone  10 mL Swish & Swallow Q6H PRN Yuly S Edenilson, CRNP      epoetin sandra  9,000 Units Intravenous Once per day on Monday Wednesday Friday Tyler Stallworth MD      fludrocortisone  0.2 mg Oral Daily Constance Menendez MD      hydrOXYzine HCL  25 mg Oral Q6H PRN Yuly S Edenilson, CRNP      lactulose  10 g Oral Daily Constance Menendez MD      melatonin  3 mg Oral HS Constance Menendez MD      midodrine  17.5 mg Oral TID AC Constance Menendez MD      nicotine  7 mg Transdermal Daily Constance Menendez MD      OLANZapine  5 mg Oral HS Constance Menendez MD      ondansetron  4 mg Intravenous Q8H PRN Yuly S Edenilson, CRNP      pantoprazole  40 mg Intravenous Q12H MURALI Constance Menendez MD      polyethylene glycol  4,000 mL Oral See Admin Instructions Kayleigh Stahl PA-C      potassium chloride  40 mEq Oral BID Roland Kulkarni MD      rifaximin  550 mg Oral Q12H [facility] Constance Menendez MD      sodium bicarbonate  1,300 mg Oral BID after meals Tyler Stallworth MD         Risks / Benefits of Treatment:    Risks, benefits, and possible side effects of medications explained to patient and patient verbalizes understanding.      Other treatment modalities recommended as indicated:    None applicable at this time      Inpatient consult to Psychiatry  Consult performed by: Bi Valerio MD  Consult ordered by: Constance Menendez MD        Physician Requesting Consult: Constance Menendez MD  Principal Problem:Alcoholic hepatitis    Reason for Consult: depression      History of Present Illness      Patient is a 33 y.o. male for whom psychiatry consult has been requested for depression.  The following is documented in the H&P:  Michael Koch is a 33 y.o. male transferred from [facility] in [place].   PMH HTN, DM2, nephrolithiasis, gout, GERD, depression, tobacco use, and alcohol use disorder who presented to [facility] on 1/21/2024 with worsening back pain, nausea and vomiting. Initially managed for HHS, he was additionally found to have significant hyperbilirubinemia (13.8, 9.84) with transaminitis (, ALT 81, ). Reported use of ETOH prior to hospitalization included 2-3 24 oz Twisted Tea beverages with 2-3 shots of whiskey per day for approximately 4 years. Due to concern for alcoholic hepatitis, he received steroids which were later held due to concern for underlying sepsis. Cultures remained negative on empiric cefepime/vancomycin. Additionally, he was managed for ureteral calculi requiring lithotripsy and he developed significant renal injury, presumed secondary to HRS, contrast nephropathy vs cast nephropathy. He was started on RRT. Due to worsening renal and liver function, he was transferred to [facility] for further management on 1/31/2024. He was evaluated for transplant candidacy; however, transfer was required to [facility] due to insurance reasons.  He was admitted to their ICU and CRRT was resumed. He initially required norepinephrine drip for blood pressure support, which was later discontinued. There were recorded multiple episodes of encephalopathy and agitation that required patient's placement on precedex. Liver transplant was consulted and transplant evaluation was performed and patient was found not to be a candidate.  Family then requested transfer back to [facility] to be closer to home while preparing for liver transplant reevaluation.  Hemodynamically stable and in good spirits upon arrival.     review of Systems:  Review of Systems   Constitutional:  Positive for activity change and appetite change. Negative for chills and fever.   HENT:  Positive for sore throat. Negative for ear pain.    Eyes:  Negative for pain and visual disturbance.   Respiratory:   Negative for cough and shortness of breath.    Cardiovascular:  Negative for chest pain and palpitations.   Gastrointestinal:  Negative for abdominal pain and vomiting.   Genitourinary:  Negative for dysuria and hematuria.   Musculoskeletal:  Negative for arthralgias and back pain.   Skin:  Negative for color change and rash.   Neurological:  Positive for weakness. Negative for seizures and syncope.   All other systems reviewed and are negative.        Past Medical and Surgical History:   Medical History        Past Medical History:   Diagnosis Date    Depression      Diabetes mellitus (HCC)      Gout      Hypertension      Hyponatremia 01/21/2024    Kidney stones              Surgical History[]Expand by Default         Past Surgical History:   Procedure Laterality Date    CYSTOSCOPY W/ URETERAL STENT PLACEMENT   09/14/2023    US GUIDED MASS BIOPSY (UNSPECIFIED)   02/19/2021            Meds/Allergies:          Prior to Admission medications    Medication Sig Start Date End Date Taking? Authorizing Provider   ciprofloxacin (CIPRO) 250 mg tablet Take 250 mg by mouth every 24 hours     Yes Historical Provider, MD   guaiFENesin (ROBITUSSIN) 100 mg/5 mL syrup Take 200 mg by mouth 3 (three) times a day as needed for cough or congestion     Yes Historical Provider, MD   hydrocortisone (Solu-CORTEF) 100 mg SOLR Inject 25 mg into a catheter in a vein in the morning     Yes Historical Provider, MD   insulin glargine (LANTUS) 100 units/mL subcutaneous injection Inject 20 Units under the skin every 12 (twelve) hours     Yes Historical Provider, MD   lactulose 20 g/30 mL 20 g by Per J Tube route 2 (two) times a day     Yes Historical Provider, MD   lansoprazole (PREVACID) 3 mg/ml SUSP oral suspension 10 mL by Per J Tube route daily in the early morning     Yes Historical Provider, MD   Melatonin 12 MG TABS Take 12 mg by mouth daily at bedtime     Yes Historical Provider, MD   midodrine (PROAMATINE) 5 mg tablet Take 5 mg by mouth  3 (three) times a day before meals     Yes Historical Provider, MD   OLANZapine (ZyPREXA) 15 mg tablet Take 15 mg by mouth daily at bedtime     Yes Historical Provider, MD   thiamine 100 MG tablet Take 100 mg by mouth daily     Yes Historical Provider, MD   magnesium Oxide (MAG-OX) 400 mg TABS Take 1 tablet (400 mg total) by mouth daily 11/2/23 12/2/23   Constance Menendez MD   nicotine (NICODERM CQ) 21 mg/24 hr TD 24 hr patch Place 1 patch on the skin over 24 hours daily 10/5/23     Constance Menendez MD   tamsulosin (FLOMAX) 0.4 mg Take 0.4 mg by mouth daily at bedtime 9/15/23 10/15/23   Historical Provider, MD   acetaminophen (TYLENOL) 325 mg tablet Take 2 tablets (650 mg total) by mouth every 8 (eight) hours 10/5/23 2/28/24   Constance Menendez MD   allopurinol (ZYLOPRIM) 100 mg tablet Take 100 mg by mouth daily 6/23/23 2/28/24   Historical Provider, MD   celecoxib (CeleBREX) 100 mg capsule Take 100 mg by mouth daily 9/16/23 2/28/24   Historical Provider, MD   colchicine (COLCRYS) 0.6 mg tablet Take 0.6 mg by mouth if needed for muscle/joint pain 9/17/23 2/28/24   Historical Provider, MD   Fluoxetine HCl, PMDD, 20 MG TABS Take 20 mg by mouth daily   2/28/24   Historical Provider, MD   hydrOXYzine HCL (ATARAX) 25 mg tablet Take 25 mg by mouth if needed for anxiety 8/8/23 2/28/24   Historical Provider, MD   lisinopril (ZESTRIL) 10 mg tablet Take 10 mg by mouth daily   2/28/24   Historical Provider, MD   mirtazapine (REMERON) 15 mg tablet Take 15 mg by mouth daily at bedtime as needed 8/31/23 2/28/24   Historical Provider, MD   omeprazole (PriLOSEC) 40 MG capsule Take 40 mg by mouth daily 6/23/23 2/28/24   Historical Provider, MD   ondansetron (ZOFRAN) 4 mg tablet Take 1 tablet by mouth every 6 (six) hours as needed for nausea 1/17/24 2/28/24   Historical Provider, MD   semaglutide, 0.25 or 0.5 mg/dose, (Ozempic, 0.25 or 0.5 MG/DOSE,) 2 mg/1.5 mL injection pen Inject 0.5 mg under the skin once a week 9/29/23 2/28/24   Historical  "Provider, MD   tolterodine (DETROL LA) 4 mg 24 hr capsule Take 4 mg by mouth daily 10/16/23 2/28/24   Historical Provider, MD       .      Allergies: No Known Allergies     Social History:  Marital Status: /Civil Union   Patient Pre-hospital Living Situation: Home  Patient Pre-hospital Level of Mobility: unable to be assessed at time of evaluation  Patient Pre-hospital Diet Restrictions: None  Substance Use History:   Social History           Substance and Sexual Activity   Alcohol Use Yes     Comment: \"couple 24 oz cans per day\"      Social History           Tobacco Use   Smoking Status Every Day    Current packs/day: 0.25    Types: Cigarettes   Smokeless Tobacco Never      Social History          Substance and Sexual Activity   Drug Use Never      It appears that the patient's Zyprexa was started during his hospitalization in [place] where he was being treated for alcohol withdrawal where Precedex drip was required.  It appears that Zyprexa was started for behavioral issues during that hospital course.    Nursing reports no behavioral disturbances here.  He has been fully oriented and cooperative with care.  Affect has been flat.  At times he has been slightly irritable.    In meeting with the patient he reports he has been treated for depression and anxiety in the past.  He does not know his specific diagnoses.  He reports that currently he is \"slightly depressed\".  He denies any other psychiatric concerns.    Social history: Patient reports he is  with children.  He reports no abuse.    Family history: The patient reports he has a daughter with ADHD.    Substance use history: As above    Logan suicide severity risk scale: The patient denies any history of death wishes or suicidal ideation.  No suicide risk identified.    Mental status examination: The patient is alert and well oriented in all spheres.  He appears psychomotor retarded.  Affect is flat.  Sensorium is clear.  Thought process is " logical and linear.  Thought content is reality based.  Associations are tight.  Memory appears to be fair in all spheres.  He appears to be of average intelligence by his use of vocabulary, general fund of knowledge, sentence structure and syntax.  He denies suicidal homicidal ideation.  He denies hallucinations and other psychotic features.  Insight and judgment are intact.    Past Medical History:   Diagnosis Date    Depression     Diabetes mellitus (HCC)     Gout     Hypertension     Hyponatremia 01/21/2024    Kidney stones        Medical Review Of Systems:    Review of Systems    Meds/Allergies     all current active meds have been reviewed  No Known Allergies    Objective     Vital signs in last 24 hours:  Temp:  [97 °F (36.1 °C)-98.8 °F (37.1 °C)] 97.7 °F (36.5 °C)  HR:  [] 94  Resp:  [18-20] 18  BP: ()/(46-70) 97/67      Intake/Output Summary (Last 24 hours) at 3/11/2024 1629  Last data filed at 3/11/2024 1526  Gross per 24 hour   Intake 1980 ml   Output 200 ml   Net 1780 ml         Lab Results: I have personally reviewed all pertinent laboratory/tests results.         Imaging Studies: Echo complete w/ contrast if indicated    Result Date: 3/11/2024  Narrative: Technically difficult but adequate transthoracic echocardiogram study. Mildly increased left ventricular wall thickness, normal left ventricle systolic function with hyperdynamic regional motion.  LVEF is around 67%.  Normal diastolic function. Normal RV size and function. Normal left and right atrial size. Normal aortic valve with minimal sclerosis, no arctic valve stenosis or regurgitation. Mitral valve sclerosis, trace mitral valve regurgitation. Trace tricuspid valve regurgitation. No obvious pulmonary hypertension. Trace to small hemodynamically insignificant pericardial effusion. Compared to report of previous echocardiogram from [facility] on 2/14/2024 there is overall no significant change.     IR tunneled dialysis  catheter placement    Result Date: 2/29/2024  Narrative: Tunneled dialysis catheter placement Clinical History: Renal failure. Hepatic failure. Contrast: 0 Fluoro time: 1.6 Minutes Number of Images: 1 Radiation dose: 82.46 mGy Concious sedation time: N/A Technique: The patient was brought to the interventional radiology suite and identified verbally and by wrist band. The patient was placed supine on the table and the existing temporary catheter was prepped and draped in the usual sterile fashion. All elements of maximal sterile barrier technique were followed (cap, mask, sterile gown, sterile gloves, large sterile sheet, hand hygiene, and 2% chlorhexidine for cutaneous antisepsis). Lidocaine was administered to the skin of the insertion site , and the catheter removed over a Amplatz wire and a peel-away sheath was placed.. A 15.5 Fr 19 cm tip to cuff Titan HD catheter was then advanced over from the right upper chest to the existing access site and through the peel-away sheath catheter tip was then positioned in the right atrium under fluoroscopic control. The external portion of the catheter was sutured to the skin with 2-0 Prolene. A sterile dressing was applied . The neck was closed with Vicryl sutures and glue applied     Impression: Impression: Exchange of temporary catheter for a 19 cm tip to cuff PermCath. Workstation performed: [id number]     XR chest portable ICU    Result Date: 2/28/2024  Narrative: XR CHEST PORTABLE ICU INDICATION: PICC placement. COMPARISON: Earlier the same day and also 1/31/2024 FINDINGS: There is a right-sided double lumen central venous catheter which appears to enter at the jugular vein and has the tip projecting near the caval atrial junction. Again, I do not see a PICC line within the chest or visualized portion of upper extremities. I do not see any central venous access devices entering from below. Low lung volumes without infiltrates or significant atelectasis. No  pneumothorax or pleural effusion. Normal cardiomediastinal silhouette. Bones are unremarkable for age. Normal upper abdomen.     Impression: Again, no visible PICC line within the field-of-view. Right-sided central venous catheter entering from the neck has the tip in the SVC Workstation performed: [id number]     XR humerus left    Result Date: 2/28/2024  Narrative: XR HUMERUS LEFT INDICATION: PICC line evaluation. COMPARISON: None FINDINGS: No acute fracture or dislocation. No significant degenerative changes. No lytic or blastic osseous lesion. There is intravenous catheter projecting near the antecubital fossa. There is no PICC line visible within the left upper extremity or included portion of chest.     Impression: No PICC line present. Please correlate for actual placement of a line in the patient other than the IV catheter at the antecubital fossa Workstation performed: [id number]     MRI brain wo contrast    Result Date: 2/28/2024  Narrative: MRI BRAIN WITHOUT CONTRAST INDICATION: Patient is complaining of numbness of left face. Recently had CT head-which was negative.. COMPARISON:   None. TECHNIQUE:  Multiplanar, multisequence imaging of the brain was performed. IMAGE QUALITY:  Diagnostic. FINDINGS: BRAIN PARENCHYMA:  There is no discrete mass, mass effect or midline shift. There is no intracranial hemorrhage.  There is no evidence of acute infarction and diffusion imaging is unremarkable.  There are no white matter changes in the cerebral hemispheres. VENTRICLES:  Normal for the patient's age. SELLA AND PITUITARY GLAND:  Normal. ORBITS:  Normal. PARANASAL SINUSES: Right maxillary sinus retention cyst and mild scattered mucosal thickening. Mild opacification of the mastoid air cells. VASCULATURE:  Evaluation of the major intracranial vasculature demonstrates appropriate flow voids. CALVARIUM AND SKULL BASE:  Normal. EXTRACRANIAL SOFT TISSUES:  Normal.     Impression: No acute intracranial pathology.  Workstation performed: [id number]     XR chest portable    Addendum Date: 2/28/2024 Addendum:   ADDENDUM: By report, the patient had a PICC line placed although there is no visible PICC line within the field-of-view. It may be within the patient's arm. Correlate clinically    Result Date: 2/28/2024  Narrative: XR CHEST PORTABLE INDICATION: CVC placement. COMPARISON: 1/31/2024 FINDINGS: A right sided jugular central venous catheter is present with the tip projecting over the lower portion of the superior vena cava. A feeding tube is present extending out of the field-of-view into the stomach. A round radiopaque object projects  over the mediastinum. Other artifacts project in the left clavicular region. Clear lungs with suboptimal inspiration. No pneumothorax or pleural effusion. Normal cardiomediastinal silhouette. Bones are unremarkable for age. Normal upper abdomen.     Impression: No acute cardiopulmonary disease. Central line appears satisfactory in position. Feeding tube extends at least to the stomach. Several artifacts are present. The finding projecting in the mediastinal region is of uncertain etiology. This potentially could be within the patient. Correlate  clinically Workstation performed: [id number]     CT head wo contrast    Result Date: 2/27/2024  Narrative: EXAM:  CT HEAD WITHOUT IV CONTRAST COMPARISON:  No Comparison CLINICAL INDICATION: Reported numbness of left lower face. No neuro deficits. No COMPARISON: None none FINDINGS: No acute intracranial hemorrhage or infarct. Brain parenchyma is unremarkable. Ventricles are within normal limits. Posterior fossa structures are unremarkable. Calvarium is unremarkable. Mucosal thickening in the maxillary sinuses. Minimal fluid in the mastoid air cells.    Impression: No acute intracranial abnormality.    MRI abdomen w wo contrast    Result Date: 2/21/2024  Narrative: EXAM: MR ABDOMEN WITHOUT AND WITH IV CONTRAST COMPARISON: Outside MR abdomen 1/30/2024 and  CT abdomen/pelvis 1/21/2024 INDICATION: Liver disease, chronic, tumor screening Liver transplant evaluation. Scan needed prior to selection committee meeting on 2/21/24. TECHNIQUE: Multiplanar, multisequence MRI of the abdomen with and without intravenous gadolinium. Glucagon administered. FINDINGS: Suboptimal examination secondary to motion and contrast bolus timing. Mildly cirrhotic hepatic morphology with enlargement of the caudate and left hepatic lobes and notching of the right hepatic lobe.. No significant hepatic steatosis. No evidence of hepatic iron overload. No focus of non-rim arterial hyperenhancement/nonperipheral washout. Patent portal and hepatic veins. Conventional hepatic arterial anatomy. Gallbladder with cholelithiasis and diffuse wall edema. No definite choledocholithiasis on this nondedicated study. Splenomegaly (15.3 cm CC). Trace ascites. T1 hyperintense focus adjacent to the right inferior liver (24/48); correlate for recent biopsy Lymph nodes in the abdomen are normal in size. Body wall edema. --- Included lung bases demonstrate small left and trace right pleural effusions with adjacent atelectasis/consolidations. Adrenal glands are normal. Pancreas with adjacent hemorrhagic/proteinaceous fluid structures measuring up to 2.5 cm (7/21 and 24/49). Mildly increased pancreatic T2 signal and diminished T1 signal. Pancreas enhances homogenously, though the distalmost tail is poorly visualized. Asymmetrically atrophic left kidney. Left urothelial thickening/enhancement with presumed ureteral stent. No hydronephrosis. Included bowel is normal in caliber. Portal colopathy. Presumed nasogastric tube. Abdominal aorta is nonaneurysmal. No suspicious osseous lesion.     Impression: 1.  Mildly cirrhotic hepatic morphology. No suspicious hepatic lesion. Splenomegaly. Trace abdominal ascites. 2.  Sequela of pancreatitis with hemorrhagic/proteinaceous peripancreatic fluid structures. 3.  Cholelithiasis with  nonspecific gallbladder wall edema; consider repeat right upper quadrant ultrasound to assess for acute cholecystitis if clinically warranted. 4.  Asymmetrically atrophic left kidney.    CT chest wo contrast    Result Date: 2/19/2024  Narrative: EXAM: CT CHEST WITHOUT IV CONTRAST HISTORY: Smoking history. Preliver transplant evaluation TECHNIQUE: CT chest was obtained without intravenous contrast utilizing HRCT technique. Sagittal reformats were performed. COMPARISON: Chest CT from 1/26/2024. CT abdomen pelvis from 2/1/2024 FINDINGS: Medical Devices: Right IJ central catheter terminates near the azygos arch. Left IJ central catheter terminates at the superior cavoatrial junction. Enteric tube courses into the jejunum. Lungs and Airways: Improving multi lobar peribronchial consolidation with some residual patchy groundglass opacities. Bronchial ground glass opacities in the superior segments of both lower lobes. Pleural Space: Small-to-moderate left and small right pleural effusions with adjacent atelectasis. Mediastinum and Irma: Stable right subcarinal lymph node at 14 mm short axis. No new or increasing mediastinal or hilar lymphadenopathy. Esophagus is decompressed. Heart and Pericardium: Low attenuation of the blood pool in relation to the myocardium can be seen with anemia. The cardiac chambers are normal in size.  No calcified plaque in the coronary arteries. No pericardial effusion. Aorta: Normal caliber thoracic aorta. No calcified atherosclerotic plaque. Pulmonary Artery: The main pulmonary artery is normal in size.     Osseous Structures and Chest Wall: No aggressive osseous lesions. No axillary adenopathy. Upper Abdomen: Hepatomegaly with diffuse hepatic steatosis. Splenomegaly. Cholelithiasis.    Impression: 1. No intrathoracic mass . 2. Overall improving multifocal pneumonia from January with residual and waxing waning groundglass opacities. 3. New small to moderate left and small right pleural  effusions with adjacent atelectasis. 4. Stable likely reactive mediastinal and hilar lymph nodes.    US Kidneys Bilateral with Bladder    Result Date: 2/17/2024  Narrative: EXAM: US KIDNEYS BILATERAL WITH BLADDER COMPARISON:  Abdominal ultrasound dated 2/15/2024. CLINICAL INDICATION: Liver and kidney transplant clearance evaluation. FINDINGS: Right Kidney:  Normal echogenicity. Multiple nonobstructing renal calculi, the largest measuring 10 mm in the lower pole. Right Kidney length:  13.4 cm Right Collecting System:  No hydronephrosis. Left Kidney:  Increased cortical echogenicity with cortical thinning. Multiple nonobstructing renal calculi, the largest of which measures 10 mm in the lower pole. Left Kidney length:  10.7 cm Left Collecting System:  Left ureteral stent seen terminating in the urinary bladder. No hydronephrosis. Bladder:  Partially distended with mild bladder wall thickening. Other: Trace left abdominal fluid.    Impression: Atrophic, echogenic left kidney suggesting unilateral medical renal disease. Nonobstructing bilateral renal calculi. No hydronephrosis. Partially imaged left ureteral stent in the urinary bladder. Mild bladder wall thickening, which may be accentuated by incomplete distention or reactive to the stent, though can be correlated with urinalysis to exclude cystitis.    XR abdomen 1 view kub    Result Date: 2/16/2024  Narrative: EXAM:  DX ABDOMEN 1 VIEW COMPARISON:  Renal ultrasound 2/15/2024, abdomen/pelvis 2/1/2024 HISTORY: Nephrolithiasis.    Impression: Left ureteral stent in expected position. 5 mm calcification adjacent to the distal aspect of the ureteral stent may correspond to the left ureteral calculus demonstrated on CT 2/1/2024. Bilateral renal calculi demonstrated on the prior CT are not definitely visualized. Nonobstructive bowel gas pattern. Tip of feeding tube is in the proximal jejunum. Nonspecific curvilinear density projecting over the lower pelvis/pubic symphysis.  No acute osseous abnormality. Partially visualized central lines terminating in the SVC. Retrocardiac/left basilar atelectasis.    DX Chest 1 View    Result Date: 2/15/2024  Narrative: EXAM:  DX CHEST 1 VIEW COMPARISON:  Chest radiographs from 2/14/2024 and back to 1/31/2024. Chest CT from 1/26/2024    Impression: Lines and devices: -Placement of left IJ central catheter with tip terminating at the superior cavoatrial junction. -Stable right IJ catheter with tip terminating in the upper SVC. Similar mid and lower lung zone predominant peribronchial airspace opacities. No measurable pneumothorax. Cardiomediastinal silhouette and great vessels are unchanged. Stable regional skeleton.    US Abdomen Limited plus Abdomen Doppler    Result Date: 2/15/2024  Narrative: EXAM:  US ABDOMEN LIMITED PLUS ABDOMEN DOPPLER TECHNIQUE: Real time gray scale, color Doppler and spectral Doppler imaging was performed. COMPARISON:  Outside CT abdomen pelvis dated 2/1/2024 CLINICAL INDICATION: Liver disease, transaminitis, hyperbilirubinemia FINDINGS: Liver:  Diffuse increased echogenicity. Liver contour remains smooth. No focal liver lesion identified on the images provided. Liver Doppler: Intrahepatic IVC:  Patent Splenic Vein:  Patent with antegrade flow Hepatic Veins:  Patent with antegrade flow MPV:  Patent with antegrade flow MPV velocity:  61 cm/s RPV:  Patent with antegrade flow LPV:  Patent with antegrade flow MHA:  Patent; Peak Systolic Velocity: 194 cm/s, elevated Spleen:  Enlarged Spleen length:  13.9 cm Gallbladder:  Contracted with diffuse wall thickening. Cholelithiasis. Unable to assess for sonographic Kunz sign given patient condition. Intrahepatic Ducts:  Normal Common Bile Duct:  6 mm Common Hepatic Duct:  4 mm Pancreas:  Not well-visualized its entirety given poor acoustic windows and shadowing bowel gas. Ovoid heterogeneous hypoechoic area measured within the pancreatic body at 1.7 x 2.3 x 1.5 cm. Right Kidney:   Normal size and echogenicity. Nephrolithiasis. Right Kidney length:  13.5 cm Right Collecting System:  No Hydronephrosis Left Kidney:  Areas of cortical volume loss and scarring. Nonobstructing stones  lower pole as seen on recent CT. Left Kidney length:  10.0 cm Left Collecting System:  No Hydronephrosis  Aorta:  Normal caliber proximally, distal aspect not well seen. Intrahepatic IVC:  Patent Ascites:  Trace Pleural Effusions:  Rt, Lt    Impression: Increased liver echogenicity as can be seen with hepatocellular disease such as hepatic steatosis. No focal liver lesion identified on the images provided. Patent hepatic vasculature with elevated peak systolic velocity within the main hepatic artery, nonspecific. Antegrade flow within the portal veins. Mild splenomegaly. Trace ascites with small pleural effusions. Cholelithiasis. Gallbladder contracted, likely accounting for wall thickening. No biliary ductal dilatation. Bilateral nephrolithiasis without hydronephrosis. Pancreas not well assessed or visualized in its entirety given poor acoustic windows and shadowing bowel gas. Ovoid heterogeneous hypoechoic masslike area within the pancreas measuring up to 2.3 cm. Recommend correlation with follow-up pancreatic protocol MR.    Echo Transthoracic (TTE)    Result Date: 2/15/2024  Narrative: For the complete report, see the Order-Level Documents. Hemodynamics Heart Rate: 81 BPM Blood Pressure: 92 / 62 mmHg ECG: Sinus rhythm Final Impressions 1. Incomplete TTE as patient refused to continue the exam after parasternal imaging. 2. Normal left ventricular chamber size, no regional wall motion abnormalities, calculated 2-D linear ejection fraction 64%. 3. Normal right ventricular chamber size, normal systolic function, unable to detect peak tricuspid regurgitation velocity for pulmonary artery systolic pressure calculation. 4. No hemodynamically significant valvular heart disease based on limited imaging. 5. Agitated saline  injection resulted in immediate and delayed right to left shunting consistent with patent foramen ovale and intrapulmonary shunt. Degree of shunting is trivial. 6. No  pericardial effusion. 7. There are no previous [facility] echocardiograms available for comparison.    Impression: Echocardiographic images interpreted at [facility]. ECHOCARDIOGRAMColorflow and spectral Doppler were performed to assess valvular heart disease. LEFT VENTRICLE:Normal left ventricular chamber size. Abnormal left ventricular geometry with  concentric remodeling (increased wall thickness to cavity ratio). Calculated 2-D linear left ventricular ejection fraction 64%. No regional wall motion abnormalities. RIGHT VENTRICLE:Normal right ventricular chamber size. Normal right ventricular systolic function. Unable to detect peak tricuspid regurgitation velocity for pulmonary artery systolic pressure calculation. CARDIAC VALVES:Trileaflet aortic valve. Normal aortic valve. No aortic valve regurgitation. Normal mitral valve. Trivial mitral valve regurgitation. Normal pulmonary valve. Trivial pulmonary valve regurgitation. Normal tricuspid valve. Trivial tricuspid valve regurgitation. OTHER ECHO FINDINGS:Normal sinus of Valsalva diameter of 33 mm. Normal ascending aorta diameter. No intracardiac mass or thrombus, but the left atrial appendage cannot be visualized adequately with transthoracic echo to exclude thrombus in this location. No  pericardial effusion. There are no previous [facility] echocardiograms available for comparison. SHUNT STUDYAgitated saline injection(s) performed. Agitated saline injection resulted in immediate and delayed right to left shunting consistent with patent foramen ovale and intrapulmonary shunt. Degree of right to left shunting following saline contrast injection appeared trivial. For the complete report, see the Order-Level Documents.    DX Chest 1 View    Result Date: 2/15/2024  Narrative: EXAM:  DX  CHEST 1 VIEW COMPARISON:  Chest radiograph 1/31/2024, 1/26/2024    Impression: Right IJ introducer with tip in the lower superior vena cava. Low lung volumes. Increased interstitial markings and peribronchial cuffing bilaterally consistent with edema versus bronchitis/pneumonitis. Left lower lobe atelectasis/consolidation. No pleural effusion. No pneumothorax. Persistent enlargement of the cardiomediastinal silhouette. Indeterminate pulmonary vasculature. No acute osseous abnormality.    EKG/Pathology/Other Studies:   Lab Results   Component Value Date    VENTRATE 95 03/11/2024    ATRIALRATE 95 03/11/2024    PRINT 162 03/11/2024    QRSDINT 94 03/11/2024    QTINT 404 03/11/2024    QTCINT 507 03/11/2024    PAXIS 50 03/11/2024    QRSAXIS 15 03/11/2024    TWAVEAXIS 46 03/11/2024        Code Status: Level 3 - DNAR and DNI  Advance Directive and Living Will:      Power of :    POLST:      Screenings:    1. Nutrition Screening  Not available on chart    2. Pain Screening  Not available on chart    3. Suicide Screening  Not available on chart    Counseling / Coordination of Care:    Total floor / unit time spent today 30 minutes. Greater than 50% of total time was spent with the patient and / or family counseling and / or coordination of care. A description of the counseling / coordination of care: Chart review, patient evaluation, coordination and communication with staff, nursing and provider.

## 2024-03-11 NOTE — ANESTHESIA PREPROCEDURE EVALUATION
Procedure:  PRE-OP ONLY    Relevant Problems   ENDO   (+) Type 2 diabetes mellitus with hyperglycemia, without long-term current use of insulin (HCC)      GI/HEPATIC   (+) Alcoholic hepatitis   (+) GERD (gastroesophageal reflux disease)      /RENAL   (+) DUANE (acute kidney injury) (HCC)   (+) BPH (benign prostatic hyperplasia)   (+) Renal failure      HEMATOLOGY   (+) Acute on chronic anemia   (+) Thrombocytopenia (HCC)      NEURO/PSYCH   (+) Depression      PULMONARY   (+) Acute respiratory failure with hypoxia and hypercapnia (HCC)        Physical Exam    Airway    Mallampati score: II  TM Distance: >3 FB  Neck ROM: full     Dental   No notable dental hx     Cardiovascular  Cardiovascular exam normal    Pulmonary  Pulmonary exam normal     Other Findings    Smoking        Anesthesia Plan  ASA Score- 4     Anesthesia Type- IV sedation with anesthesia with ASA Monitors.         Additional Monitors:     Airway Plan:            Plan Factors-Exercise tolerance (METS): >4 METS.    Chart reviewed. EKG reviewed. Imaging results reviewed. Existing labs reviewed. Patient summary reviewed.    Patient is a current smoker.  Patient instructed to abstain from smoking on day of procedure. Patient did not smoke on day of surgery.    Obstructive sleep apnea risk education given perioperatively.        Induction- intravenous.    Postoperative Plan-     Informed Consent- Anesthetic plan and risks discussed with patient.  I personally reviewed this patient with the CRNA. Discussed and agreed on the Anesthesia Plan with the CRNA..

## 2024-03-11 NOTE — PLAN OF CARE
Target UF Goal: no fluid removal, run tx net even today.  Patient dialyzing for  3:30  on 4 K bath for serum K of  3.2  per protocol. Treatment plan reviewed with Nephrology.       Post-Dialysis RN Treatment Note    Blood Pressure:  Pre 95/56 mm/Hg  Post 103/71 mmHg   EDW  TBD kg    Weight:  Pre 83.5 kg   Post 84.0 kg   Mode of weight measurement: Bed Scale   Volume Removed  0 ml    Treatment duration  3:20   NS given  No    Treatment shortened? Yes, describe: by 10 mins due to bicarb cartridge empty   Medications given during Rx 2 units PRBCs during treatment   Estimated Kt/V  1.14   Access type: Permacath/TDC   Access Issues: No    Report called to primary nurse   Yes, Gertrude RN at bedside        Problem: METABOLIC, FLUID AND ELECTROLYTES - ADULT  Goal: Electrolytes maintained within normal limits  Description: INTERVENTIONS:  - Monitor labs and assess patient for signs and symptoms of electrolyte imbalances  - Administer electrolyte replacement as ordered  - Monitor response to electrolyte replacements, including repeat lab results as appropriate  - Instruct patient on fluid and nutrition as appropriate  Outcome: Progressing  Goal: Fluid balance maintained  Description: INTERVENTIONS:  - Monitor labs   - Monitor I/O and WT  - Instruct patient on fluid and nutrition as appropriate  - Assess for signs & symptoms of volume excess or deficit  Outcome: Progressing

## 2024-03-11 NOTE — RAPID RESPONSE
Rapid Response Note  Michael Koch 33 y.o. male MRN: [id number]  Unit/Bed#: -01 Encounter: [id number]    Rapid Response Notification(s):   Response called date/time:  3/11/2024 6:23 AM  Response team arrival date/time:  3/11/2024 6:24 AM  Response end date/time:  3/11/2024 6:28 AM  Level of care:  Medsur  [facility]  Primary reason for rapid response call:  Acute change in neuro status    Rapid Response Intervention(s):   Airway:  None  Breathing:  None  Circulation:  None  Fluids administered:  None  Medications administered:  None       Assessment:   Syncope 2/2 vagal nerve stimulation from having BM  Hypotension 2/2 vagal nerve stimulation vs hypovolemia    Plan:   EKG, Labs-CBC, CMP, MG phos  Midodrine 17.5 mg PO now  Albumin prn   Monitor tele     Rapid Response Outcome:   Transfer:  Remain on floor  Provider notification: Dr. Jean at bedside.    Code Status: Level 1 (Full Code)      Family notified of transfer: N/A  Family member contacted: Dr. Jean will notify family     Background/Situation:   Michael Koch is a 33 y.o. male with PMH of DM, hypotension, ESRD on IHD M, W, F, depression, alcohol use, cirrhosis not deemed a liver transplant candidate at this time,hepatorenal syndrome, who was admitted on 02/27 for alcoholic hepatitis was an RRT this am for a syncopal episode while using bedside commode. Upon my arrival patient was back in hospital bed and Dr. Jean at bedside. Patient complained of lightheadedness when on commode but denies any complaints at present. EKG done -Sinus tachycardia.Blood pressure was 82/46 and patient asymptomatic.    Review of Systems   Constitutional:  Negative for chills, fatigue and fever.   HENT:  Negative for sore throat.    Respiratory:  Negative for cough, shortness of breath and wheezing.    Cardiovascular:  Negative for chest pain and leg swelling.   Gastrointestinal:  Negative for abdominal distention, abdominal pain,  "constipation, nausea and vomiting.   Musculoskeletal:  Negative for myalgias.   Skin:  Negative for color change.   Neurological:  Positive for light-headedness.   Psychiatric/Behavioral:  The patient is not nervous/anxious.    All other systems reviewed and are negative.      Objective:   Vitals:    03/11/24 0626 03/11/24 0627 03/11/24 0628 03/11/24 0628   BP: (!) 82/46  (!) 83/46 (!) 82/46   Pulse: 103 98 96 103   Resp:    20   Temp:   97.9 °F (36.6 °C)    TempSrc:       SpO2: 98% 100% 99% 98%   Weight:       Height:         Physical Exam  Constitutional:       Appearance: He is ill-appearing.   HENT:      Head: Normocephalic and atraumatic.      Mouth/Throat:      Mouth: Mucous membranes are moist.   Eyes:      Pupils: Pupils are equal, round, and reactive to light.   Cardiovascular:      Rate and Rhythm: Regular rhythm. Tachycardia present.   Pulmonary:      Effort: Pulmonary effort is normal. No respiratory distress.      Breath sounds: No wheezing.   Abdominal:      General: Abdomen is flat. There is no distension.      Palpations: Abdomen is soft.   Musculoskeletal:         General: Normal range of motion.      Cervical back: Normal range of motion.      Comments: Right upper chest permacath   Skin:     Coloration: Skin is jaundiced.   Neurological:      Mental Status: He is oriented to person, place, and time. Mental status is at baseline.         Portions of the record may have been created with voice recognition software.  Occasional wrong word or \"sound a like\" substitutions may have occurred due to the inherent limitations of voice recognition software.  Read the chart carefully and recognize, using context, where substitutions have occurred.    JENSEN Rico      "

## 2024-03-11 NOTE — ASSESSMENT & PLAN NOTE
was on Solu-Cortef 25 mg daily -stopped 3/5  Known Alcoholic cirrhosis  Transferred from [facility] in [place] after liver transplant evaluation, he was found not to be candidate  Continue lactulose but decrease dose to 10 g once daily as ammonia level is normal and patient is having a lot of loose stools.  Will add Xifaxan and see if that will help decrease his diarrhea frequency.  Check ammonia level every 3 days for now as we have been rapidly decreasing lactulose dose  No encephalopathy currently  Patient has extensive workup done, at this time, not much to offer.AT Present not candidate for liver transplant but should be reevaluated again in a few months  Patient does have capacity to make informed medical decisions.  Plan for discharge to SNF for rehab which has in house dialyses would be ideal

## 2024-03-11 NOTE — UTILIZATION REVIEW
Continued Stay Review    Date: 3/10                          Current Patient Class: INpatient   Current Level of Care: MEd/surg    HPI:33 y.o. male initially admitted on 2/27     Assessment/Plan: Continue lactulose.  Developed min with underlying liver failure.  Creat 7.46 today.  Continue HD. Continues to have significant orthostatic hypotension and unable to tolerate standing for more than 2 minutes. Recheck cortisol level.     Vital Signs: Vital Signs (last 2 days)    Date/Time Temp Pulse Resp BP MAP (mmHg) SpO2 O2 Device Patient Position - Orthostatic VS   03/10/24 23:06:30 98.1 °F (36.7 °C) 99 18 81/49 Abnormal  60 Abnormal  99 % -- --   03/10/24 1923 98.8 °F (37.1 °C) -- -- -- -- -- -- --   03/10/24 1446 98.4 °F (36.9 °C) -- 18 96/50 65 -- None (Room air) Lying   03/10/24 1055 98.4 °F (36.9 °C) -- -- -- -- -- -- --   03/10/24 1051 -- -- -- 97/50 66 -- -- --   03/10/24 0755 -- -- -- -- -- -- None (Room air) --   03/10/24 0718 98.4 °F (36.9 °C) -- -- 105/61 -- -- -- --   03/10/24 0300 97.6 °F (36.4 °C) -- 18 94/53 67 -- -- --   03/09/24 2339 97.6 °F (36.4 °C) 102 -- 93/49 Abnormal  64 Abnormal  -- -- --   03/09/24 2100 -- -- -- -- -- 96 % -- --   03/09/24 2000 -- -- -- -- -- 98 % None (Room air) --   03/09/24 1917 98.2 °F (36.8 °C) 101 18 95/49 Abnormal  64 Abnormal  -- None (Room air) Lying   03/09/24 1401 98.4 °F (36.9 °C) -- 18 105/53 70 -- None (Room air) Lying   03/09/24 1200 -- 101 -- 72/39 Abnormal  50 Abnormal  98 % -- Standing   03/09/24 1155 -- -- -- 83/56 Abnormal  -- -- -- Sitting   03/09/24 1058 -- 98 -- 83/46 Abnormal  58 Abnormal  99 % -- --   03/09/24 1006 99 °F (37.2 °C) -- 18 82/46 Abnormal  58 Abnormal  -- None (Room air) Lying   03/09/24 0750 -- -- -- -- -- -- None (Room air) --       Pertinent Labs/Diagnostic Results:   3/10 EKG: Narrative & Impression    Normal sinus rhythm  Prolonged QT  Abnormal ECG  When compared with ECG of 21-JAN-2024 13:20,  No significant change was found          Results from last 7 days   Lab Units 03/05/24  0527   WBC Thousand/uL 19.04*   HEMOGLOBIN g/dL 8.8*   HEMATOCRIT % 23.8*   PLATELETS Thousands/uL 51*   NEUTROS ABS Thousands/µL 16.84*         Results from last 7 days   Lab Units 03/10/24  0528 03/09/24  0532 03/08/24  1506 03/07/24  0554 03/06/24  0548 03/05/24  0527   SODIUM mmol/L 133* 134* 135 136   < > 137   POTASSIUM mmol/L 3.2* 3.5 3.4* 3.1*   < > 2.7*   CHLORIDE mmol/L 97 96 99 99   < > 96   CO2 mmol/L 23 25 21 24   < > 24   ANION GAP mmol/L 13 13 15 13   < > 17   BUN mg/dL 53* 43* 64* 55*   < > 56*   CREATININE mg/dL 7.46* 5.87* 7.25* 6.42*   < > 5.73*   EGFR ml/min/1.73sq m 8 11 8 10   < > 11   CALCIUM mg/dL 7.1* 6.7* 7.1* 6.5*   < > 6.3*   MAGNESIUM mg/dL  --   --   --   --   --  2.3    < > = values in this interval not displayed.     Results from last 7 days   Lab Units 03/11/24  0638 03/09/24  0532 03/07/24  0554 03/05/24  0527   AST U/L  --  61*  --  83*   ALT U/L  --  54*  --  106*   ALK PHOS U/L  --  195*  --  288*   TOTAL PROTEIN g/dL  --  3.0*  --  3.4*   ALBUMIN g/dL  --  2.3*  --  2.5*   TOTAL BILIRUBIN mg/dL  --  44.91*  --  49.17*   AMMONIA umol/L 42  --  23  --      Results from last 7 days   Lab Units 03/08/24  0731 03/07/24 2057 03/07/24  1514 03/07/24  1056 03/07/24  0813 03/06/24 2054 03/06/24  1725 03/06/24  1129 03/06/24  0732 03/05/24  2115 03/05/24  1624   POC GLUCOSE mg/dl 128 111 131 126 285* 113 113 221* 109 125 206*     Results from last 7 days   Lab Units 03/10/24  0528 03/09/24  0532 03/08/24  1506 03/07/24  0554 03/06/24  0548 03/05/24  0527   GLUCOSE RANDOM mg/dL 110 132 123 148* 122 98       BETA-HYDROXYBUTYRATE   Date Value Ref Range Status   01/21/2024 0.2 <0.6 mmol/L Final            Results from last 7 days   Lab Units 03/09/24  0532 03/05/24  0527   PROTIME seconds 29.8* 23.8*   INR  2.80* 2.09*       Medications:   Scheduled Medications:  calcitriol, 0.25 mcg, Oral, Daily  calcium acetate, 1,334 mg, Oral, TID With  Meals  calcium carbonate, 1 tablet, Oral, Daily With Breakfast  fludrocortisone, 0.2 mg, Oral, Daily  lactulose, 10 g, Oral, Daily  melatonin, 3 mg, Oral, HS  midodrine, 17.5 mg, Oral, TID AC  nicotine, 7 mg, Transdermal, Daily  OLANZapine, 5 mg, Oral, HS  pantoprazole, 40 mg, Intravenous, Q12H MURALI  potassium chloride, 40 mEq, Oral, BID  rifaximin, 550 mg, Oral, Q12H MURALI      Continuous IV Infusions:     PRN Meds:  diphenhydramine, lidocaine, Al/Mg hydroxide, simethicone, 10 mL, Swish & Swallow, Q6H PRN  hydrOXYzine HCL, 25 mg, Oral, Q6H PRN  ondansetron, 4 mg, Intravenous, Q8H PRN        Discharge Plan: D    Network Utilization Review Department  ATTENTION: Please call with any questions or concerns to [phone] and carefully listen to the prompts so that you are directed to the right person. All voicemails are confidential.   For Discharge needs, contact Care Management DC Support Team at [phone] opt. 2  Send all requests for admission clinical reviews, approved or denied determinations and any other requests to dedicated fax number below belonging to the campus where the patient is receiving treatment. List of dedicated fax numbers for the Facilities:  FACILITY NAME UR FAX NUMBER   ADMISSION DENIALS (Administrative/Medical Necessity) [phone]   DISCHARGE SUPPORT TEAM (NETWORK) [phone]   PARENT CHILD HEALTH (Maternity/NICU/Pediatrics) [phone]   [facility] [phone]   [facility] [phone]   [facility] [phone]   [facility] [phone]   [facility] [phone]   [facility] [phone]   [facility] [phone]   [facility] [phone]   [facility] [phone]   [facility] [phone]   [facility]  [facility] [phone]   [facility] [phone]

## 2024-03-11 NOTE — PHYSICAL THERAPY NOTE
PHYSICAL THERAPY NOTE      Patient Name: Michael Koch  Today's Date: 3/11/2024       03/11/24 2167   Note Type   Note Type Cancelled Session   Cancel Reasons Medical status       PT treatment scheduled for today.  Chart reviewed. Pt admitted with diagnosis of liver failure. At this time, PT session cancelled due to acute drop in Hbg to 6.2. Will follow and see patient as medically appropriate at a later time.       Quintin Schmitz, PT

## 2024-03-11 NOTE — PROGRESS NOTES
Progress Note - Nephrology   Michael CHARLES  33 y.o. male MRN: [id number]  Unit/Bed#: -01 Encounter: [id number]    ASSESSMENT AND PLAN:  33-year-old patient with a history of diabetes mellitus type 2/GERD/hypertension/nephrolithiasis with alcoholic liver cirrhosis was in January for alcoholic hepatitis transferred to [facility] felt not to be a transplant candidate subsequently transferred back.  We are seeing the patient for acute kidney injury electrolyte derangements HD dependent    1.  DUANE: HD dependent.  [place] related to ATN/HRS.  Initiated renal placement therapy/HD 2/25/2024 at an outside institution.  - MWF HD  - Access: Right TDC  - Target weight determined on a regular basis however cannot tolerate significant fluid removal given hypotension  - HD today please see orders    2.  Blood pressure: Chronic now hypotension most likely from HRS awaiting echocardiogram.  I will repeat a cortisol level it should be elevated given the chronic hypotension.  I will also check thyroid profile to complete the workup.    In terms of treatment: Patient on maximal dose midodrine also on Florinef.  Not sure the Florinef is contributing given advanced kidney disease.  I would add droxidopa as a last resort.  Thigh-high teds of course.    3.  Electrolytes/acid-base:  - Metabolic acidosis secondary to diarrhea I would place on sodium bicarbonate plus adjust HD bath please see orders  - Replete hypokalemia cannot use spironolactone given hypotension  - Hyponatremia: Fluid restriction along with HD    4.  MBD of DUANE: Monitor and check phosphorus magnesium in a.m.    5.  Pancytopenia related to cirrhosis  - Check iron studies more recent  - Check stool for occult blood  - Transfuse per primary service apparently receiving 2 units PRBCs today.  Continue to transfuse as needed for hemoglobin less than 7.0    I would also add Epogen to help with hypotension as well as the anemia    6.  Major problem alcoholic hepatitis/alcoholic  "cirrhosis not a transplant candidate per GI.  Now DNR/DNI.  I will discuss with the patient's wife as well he may be agreeable to hospice.      Long-term renal placement therapy/hemodialysis in the setting of end-stage liver disease will not be effective.  Prognosis is very grim.  Again will discuss with the patient's wife and I did discuss this with the primary service of the very poor outcome.        Subjective:   Patient weak otherwise no chest pain or shortness of breath at the moment  Urinating improved today in terms of amount according to the patient  No nausea or vomiting and no significant diarrhea    Objective:     Vitals: Blood pressure (!) 82/46, pulse 98, temperature 97.9 °F (36.6 °C), resp. rate 20, height 5' 7\" (1.702 m), weight 83.5 kg (184 lb), SpO2 100%.,Body mass index is 28.82 kg/m².    Weight (last 2 days)       Date/Time Weight    03/11/24 0843 83.5 (184)    03/11/24 0600 83.9 (184.97)    03/10/24 0600 84.7 (186.73)    03/09/24 0600 84.9 (187.17)              Intake/Output Summary (Last 24 hours) at 3/11/2024 1214  Last data filed at 3/11/2024 0500  Gross per 24 hour   Intake 480 ml   Output 200 ml   Net 280 ml       HD Permanent Double Catheter (Active)   Reasons to continue HD Cath Treatment Therapy 03/10/24 0755   Goal for Removal N/A- chronic HD catheter 03/10/24 0755   Line Necessity Reviewed Yes, reviewed with provider 03/10/24 0755   Site Assessment WDL 03/10/24 0755   Proximal Lumen Status Capped 03/10/24 0755   Medial Lumen Status Capped 03/03/24 2300   Distal Lumen Status Normal saline locked;Needle-free valve changed;Passive disinfecting cap applied 03/09/24 2200   Dressing Type Chlorhexidine dressing 03/10/24 0755   Dressing Status Old drainage 03/10/24 0755   Dressing Intervention Dressing changed 03/09/24 2200   Dressing Change Due 03/13/24 03/09/24 2200       Physical Exam: General:  No acute distress/very weak  Skin:  No acute rash/jaundiced severely  Eyes: Positive scleral " icterus and noninjected  ENT:  Moist mucous membranes  Neck:  Supple, no jugular venous distention, trachea midline, overall appearance is normal  Chest:  Clear to auscultation; right TDC clean and dry  CVS:  Regular rate and rhythm, without a rub or gallops  Abdomen:  Normal bowel sounds, soft and nontender and moderate distention with a fluid wave, minimal lower extremity edema  Extremities:  No edema, and no cyanosis, no significant arthritic changes  Neuro:  No gross focality  Psych:  Alert and oriented and appropriate                Medications:    Scheduled Meds:  Current Facility-Administered Medications   Medication Dose Route Frequency Provider Last Rate    calcitriol  0.25 mcg Oral Daily Niecy IbarraEJNP      calcium acetate  1,334 mg Oral TID With Meals Niecy Ibarra, CRNP      calcium carbonate  1 tablet Oral Daily With Breakfast Niecy Ibarra, CRNP      diphenhydramine, lidocaine, Al/Mg hydroxide, simethicone  10 mL Swish & Swallow Q6H PRN Yuly S Edenilson, CRNP      fludrocortisone  0.2 mg Oral Daily Constance Menendez MD      hydrOXYzine HCL  25 mg Oral Q6H PRN Yuly S Edenilson, CRNP      lactulose  10 g Oral Daily Constance Menendez MD      melatonin  3 mg Oral HS Constance Menendez MD      midodrine  17.5 mg Oral TID AC Constance Menendez MD      nicotine  7 mg Transdermal Daily Constance Menendez MD      OLANZapine  5 mg Oral HS Constance Menendez MD      ondansetron  4 mg Intravenous Q8H PRN Yuly S Edenilson, CRNP      pantoprazole  40 mg Intravenous Q12H MURALI Constance Menendez MD      polyethylene glycol  4,000 mL Oral See Admin Instructions Kayleigh Stahl PA-C      potassium chloride  40 mEq Oral BID Roland Kulkarni MD      rifaximin  550 mg Oral Q12H MURALI Constance Menendez MD         PRN Meds:.  diphenhydramine, lidocaine, Al/Mg hydroxide, simethicone    hydrOXYzine HCL    ondansetron    Continuous Infusions:     Lab, Imaging and other studies: I have personally reviewed pertinent labs.  Laboratory Results:  Results from last 7 days   Lab Units 03/11/24  4441  "03/11/24  0638 03/10/24  0528 03/09/24  0532 03/08/24  1506 03/07/24  0554 03/06/24  0548 03/05/24  0527   WBC Thousand/uL  --  18.14*  --   --   --   --   --  19.04*   HEMOGLOBIN g/dL 6.2* 6.7*  --   --   --   --   --  8.8*   HEMATOCRIT % 17.0* 18.5*  --   --   --   --   --  23.8*   PLATELETS Thousands/uL  --  57*  --   --   --   --   --  51*   POTASSIUM mmol/L  --  3.2* 3.2* 3.5 3.4* 3.1* 2.9* 2.7*   CHLORIDE mmol/L  --  99 97 96 99 99 97 96   CO2 mmol/L  --  20* 23 25 21 24 20* 24   BUN mg/dL  --  61* 53* 43* 64* 55* 70* 56*   CREATININE mg/dL  --  9.27* 7.46* 5.87* 7.25* 6.42* 7.27* 5.73*   CALCIUM mg/dL  --  7.4* 7.1* 6.7* 7.1* 6.5* 6.5* 6.3*   MAGNESIUM mg/dL  --   --   --   --   --   --   --  2.3     Urinalysis:   Lab Results   Component Value Date    COLORU [place] 01/30/2024    CLARITYU Cloudy 01/30/2024    SPECGRAV 1.020 01/30/2024    PHUR 5.5 01/30/2024    LEUKOCYTESUR Negative 01/30/2024    NITRITE Negative 01/30/2024    GLUCOSEU 100 (1/10%) (A) 01/30/2024    KETONESU Trace (A) 01/30/2024    BILIRUBINUR Large (A) 01/30/2024    BLOODU Large (A) 01/30/2024     ABGs: No results found for: \"PH\"  Radiology review:     Portions of the record may have been created with voice recognition software.  Occasional wrong word or \"sound a like\" substitutions may have occurred due to the inherent limitations of voice recognition software.  Read the chart carefully and recognize, using context, where substitutions have occurred.                    "

## 2024-03-11 NOTE — CONSULTS
ENCOUNTER DATE: 03/11/24 4:52 PM  PATIENT NAME: Michael Koch   1990    [id number]  Age: 33 y.o.      Sex: male  ENCOUNTER PROVIDER & AUTHOR: Sarina Lim MD  INPATIENT ATTENDING PHYSICIAN: Constance Menendez MD; PRIMARYCARE PHYSICIAN: Louie Odonnell DO  DATE OF ADMISSION: 2/27/2024 10:29 PM; LENGTH OF STAY: 13 days  *-*-*-*-*-*-*-*-*-*-*-*-*-*-*-*-*-*-*-*-*-*-*-*-*-*-*-*-*-*-*-*-*-*-*-*-*-*-*-*-*-*-*-*-*-*-*-*-*-*-*-*-*-*-   REASON FOR CONSULTATION:   Severe symptomatic hypotension    *-*-*-*-*-*-*-*-*-*-*-*-*-*-*-*-*-*-*-*-*-*-*-*-*-*-*-*-*-*-*-*-*-*-*-*-*-*-*-*-*-*-*-*-*-*-*-*-*-*-*-*-*-*-  CARDIAC ASSESSMENT:     End-stage liver disease, with severe alcohol related hepatitis and grade 3 hepatic encephalopathy.  Acute kidney injury with hemodialysis dependency attributed secondary to contrast nephropathy/because of ATN.  Atrophic left kidney  Severe hypotension with markedly abnormal orthostatic blood pressure response with symptomatic presyncope and syncope, cannot exclude autonomic dysfunction due to end-stage liver disease  History of pancreatitis with hemorrhagic/proteinaceous Trung pancreatic fluid accumulation.  Severe symptomatic anemia,  Metabolic derangement with acidosis and encephalopathy  Diabetes mellitus     Patient Active Problem List   Diagnosis    History of gout    History of hypertension    Type 2 diabetes mellitus with hyperglycemia, without long-term current use of insulin (HCC)    Tobacco abuse    ETOH abuse    Depression    GERD (gastroesophageal reflux disease)    BPH (benign prostatic hyperplasia)    RSV infection    Abnormal CT scan    Hepatic encephalopathy (HCC)    N&V (nausea and vomiting)    DUANE (acute kidney injury) (HCC)    Thrombocytopenia (HCC)    Lactic acidosis    Marijuana use    SIRS (systemic inflammatory response syndrome) (HCC)    Alcoholic hepatitis    Acute respiratory failure with hypoxia and hypercapnia (HCC)    Renal failure    Malnutrition (HCC)     Orthostatic hypotension    Acute on chronic anemia        Etiology of patient's persistent hypotension is multifactorial but is primarily likely due to liver disease related arterial and splanchnic vasodilatation and impaired tissue perfusion.  He has multiorgan failure and he has a very high mortality risk.  He had significant drop in hemoglobin and hematocrit but no fredrick bleeding was noted.  His blood pressures are markedly low despite on high doses of midodrine and being on fludrocortisone.  His echocardiogram today showed hyperdynamic left ventricle overall low intravascular volumes. EGD colonoscopy has been recommended for the patient however patient has a very high risk of having fatal circulatory collapse given his condition.       CARDIAC PLAN:     -- We would like to discuss with gastroenterology team about concerns about active GI bleed. If there is no high suspicion for active GI bleed with noted melena or hematochezia or hematemesis then would recommend holding off on EGD until his blood pressure is consistently better controlled hopefully with fluids and albumin and periodic blood transfusion.  Meanwhile will have to determine his prognostic risk and goals of care based on his current liver status.  If he needs EGD colonoscopy recommend that he should be transferred to a place where he can be on vasopressor and inotropic support and receive CRRT if necessary.  -- Consider noninvasive evaluation for portal hypertension.  Consider reimaging of the abdomen as patient was previously noted to have hemorrhagic pancreatitis on MRI done at [facility] on 2/20/2024.  -- Meanwhile recommend continuing current measures to augment blood pressure.  -- Will follow-up on a.m. cortisol level which has been requested.  -- Will need close monitoring of volume status.    *-*-*-*-*-*-*-*-*-*-*-*-*-*-*-*-*-*-*-*-*-*-*-*-*-*-*-*-*-*-*-*-*-*-*-*-*-*-*-*-*-*-*-*-*-*-*-*-*-*-*-*-*-*-  HISTORY OF PRESENT ILLNESS     Patient is  a 33-year-old young man with medical history significant for:  Suspected end-stage liver disease with alcohol-related cirrhosis and encephalopathy  Marked metabolic abnormalities due to liver disease  Thrombocytopenia due to liver disease  Protein calorie malnutrition  History of alcohol abuse  History of chronic tobacco dependence and substance abuse with marijuana  Depression  Recent hypotension with persistently low blood pressures  History of gout  Acute kidney injury superimposed over chronic kidney disease suspected hepatorenal syndrome    Patient had initially presented to [facility] on 1/21/2024 with severe back pain nausea and vomiting.  He was noted to be in acute liver failure which was attributed to alcohol use.  He was also noted to be in renal failure and had obstructive uropathy.  He was showing signs of hepatic encephalopathy and  had worsening renal function.  He was transferred to [facility] on 1/31/2024 for consideration for liver transplant.  Based on his medical insurance he was subsequently transferred to [facility] for evaluation.  He was hospitalized there in ICU and was treated for acute alcoholic hepatitis with acute on chronic liver failure and renal failure which was initially attributed to contrast nephropathy.  He was treated with CRRT and vasopressor support with norepinephrine before conversion to hemodialysis with blood pressure augmentation using midodrine.  During the hospitalization he had multiple episodes of encephalopathy and agitation that required him to be sedated with Precedex drip.  Liver transplant team was consulted and evaluation was performed but patient was found not to be candidate.  As per the patient and his family he was advised that he can be reconsidered for liver transplant if he remains alcohol free for 6 months and is following with counseling sessions.      At that time decision was made for sending back  patient to [place] to be closer to home.  He has been in the hospital during current admission since 2/27/2024.  He has been having progressively worsening blood pressures and is on high-dose of midodrine and Florinef for blood pressure augmentation.  His hope is to sustain himself with hemodialysis until he becomes eligible for reconsideration for liver transplant.  Recently his noted to have acute drop in hemoglobin from 8.8-6.7 and subsequently to 6.2 this morning.  He has had no overt GI bleeding.  He has been recommended to undergo EGD colonoscopy for further evaluation and for variceal screening.  Cardiology is asked for preoperative risk assessment and comanagement of blood pressure for this procedure.  From a symptom perspective patient has symptomatic hypotension and syncope and near syncope.  He has been able to continue hemodialysis with receiving albumin and PRBC and fluids around the hemodialysis along with blood pressure augmentation with fludrocortisone 0.2 mg daily and midodrine 17.5 mg 3 times daily with meals.    He has no previous history of coronary artery disease or congestive heart failure or arrhythmias in the past.  He was diagnosed with hypertension 3 years back.    According to his sister who is with him in the room at the time of my evaluation patient's family history is significant for sudden death of patient's younger brother.  Cause was not known.    Patient has been a chronic smoker and had significant alcohol drinks until his admission current illness started initially on January 21, 2024.    Prior to admission he lived with his wife and they have 3 children.    Patient recent blood work is significant for sodium 133 potassium 3.2 chloride 99 bicarb 20 BUN 61 creatinine 9.27 calcium 7.4 ammonia level 42 today.  Recent drop in hemoglobin from baseline of 8-8.8 to 6.2 this morning.  WBC count at 18.14 platelet count of 57 ANC count 16.84  AST was 61 ALT 54 alk phos 195 total protein  "3.0 albumin 2.3 bilirubin 44.91  Lactic acid was 0.8 on 3/4/2024    *-*-*-*-*-*-*-*-*-*-*-*-*-*-*-*-*-*-*-*-*-*-*-*-*-*-*-*-*-*-*-*-*-*-*-*-*-*-*-*-*-*-*-*-*-*-*-*-*-*-*-*-*-*  PAST MEDICAL HISTORY:     Past Medical History:   Diagnosis Date    Depression     Diabetes mellitus (HCC)     Gout     Hypertension     Hyponatremia 01/21/2024    Kidney stones     PAST SURGICAL HISTORY     Past Surgical History:   Procedure Laterality Date    CYSTOSCOPY W/ URETERAL STENT PLACEMENT  09/14/2023    HEMODIALYSIS ADULT  3/4/2024    IR TUNNELED DIALYSIS CATHETER PLACEMENT  2/29/2024    US GUIDED MASS BIOPSY (UNSPECIFIED)  02/19/2021          FAMILY HISTORY     No family history on file.  SOCIAL HISTORY     Social History     Tobacco Use   Smoking Status Every Day    Current packs/day: 0.25    Types: Cigarettes   Smokeless Tobacco Never      Social History     Substance and Sexual Activity   Alcohol Use Yes    Comment: \"couple 24 oz cans per day\"     Social History     Substance and Sexual Activity   Drug Use Never    [unfilled]     *-*-*-*-*-*-*-*-*-*-*-*-*-*-*-*-*-*-*-*-*-*-*-*-*-*-*-*-*-*-*-*-*-*-*-*-*-*-*-*-*-*-*-*-*-*-*-*-*-*-*-*-*-*  ALLERGIES     No Known Allergies  CURRENT SCHEDULED MEDICATIONS       Current Facility-Administered Medications:     calcitriol (ROCALTROL) capsule 0.25 mcg, 0.25 mcg, Oral, Daily, Niecy Ibarra, CRNP, 0.25 mcg at 03/11/24 0822    calcium acetate (PHOSLO) capsule 1,334 mg, 1,334 mg, Oral, TID With Meals, Niecy Ibarra, CRNP, 1,334 mg at 03/11/24 1244    calcium carbonate (OYSTER SHELL,OSCAL) 500 mg tablet 1 tablet, 1 tablet, Oral, Daily With Breakfast, JENSEN Fontenot, 1 tablet at 03/11/24 0822    diphenhydramine, lidocaine, Al/Mg hydroxide, simethicone (Magic Mouthwash) oral solution 10 mL, 10 mL, Swish & Swallow, Q6H PRN, JENSEN Moreland, 10 mL at 03/07/24 1705    epoetin sandra (EPOGEN,PROCRIT) injection 9,000 Units, 9,000 Units, Intravenous, Once per day on Monday Wednesday FridayTyler" MD Basim    fludrocortisone (FLORINEF) tablet 0.2 mg, 0.2 mg, Oral, Daily, Constance Menendez MD, 0.2 mg at 03/11/24 0822    hydrOXYzine HCL (ATARAX) tablet 25 mg, 25 mg, Oral, Q6H PRN, Yuly S Edenilson, CRNP, 25 mg at 03/10/24 2152    lactulose (CHRONULAC) oral solution 10 g, 10 g, Oral, Daily, Constance Menendez MD, 10 g at 03/11/24 0822    melatonin tablet 3 mg, 3 mg, Oral, HS, Constance Menendez MD, 3 mg at 03/10/24 2135    midodrine (PROAMATINE) tablet 17.5 mg, 17.5 mg, Oral, TID AC, Constance Menendez MD, 17.5 mg at 03/11/24 1244    nicotine (NICODERM CQ) 7 mg/24hr TD 24 hr patch 7 mg, 7 mg, Transdermal, Daily, Constance Menendez MD, 7 mg at 03/11/24 0822    OLANZapine (ZyPREXA) tablet 5 mg, 5 mg, Oral, HS, Constance Menendez MD, 5 mg at 03/10/24 2135    ondansetron (ZOFRAN) injection 4 mg, 4 mg, Intravenous, Q8H PRN, Yuly Dasilvax, CRNP, 4 mg at 03/06/24 1503    pantoprazole (PROTONIX) injection 40 mg, 40 mg, Intravenous, Q12H [facility], Constance Menendez MD    polyethylene glycol (GOLYTELY) bowel prep 4,000 mL, 4,000 mL, Oral, See Admin Instructions, Kayleigh Stahl PA-C    potassium chloride (Klor-Con M20) CR tablet 40 mEq, 40 mEq, Oral, BID, Roland Kulkarni MD, 40 mEq at 03/11/24 0822    rifaximin (XIFAXAN) tablet 550 mg, 550 mg, Oral, Q12H MURALI, Constance Menendez MD, 550 mg at 03/11/24 0822    sodium bicarbonate tablet 1,300 mg, 1,300 mg, Oral, BID after meals, Tyler Stallworth MD, 1,300 mg at 03/11/24 1244     *-*-*-*-*-*-*-*-*-*-*-*-*-*-*-*-*-*-*-*-*-*-*-*-*-*-*-*-*-*-*-*-*-*-*-*-*-*-*-*-*-*-*-*-*-*-*-*-*-*-*-*-*-*   REVIEW OF SYSTEMS     Positive for: As noted above in HPI  Negative for: All remaining as reviewed below and in HPI. SYSTEM SYMPTOMS REVIEWED:  General--weight change, fever, night sweats  Respiratoryl-- Wheezing, shortness of breath, cough, URI symptoms, sputum, blood  Cardiovascular--chest pain, syncope, dyspnea on exertion, edema, decline in exercise tolerance, claudication   Gastrointestinal--persistent vomiting, diarrhea, abdominal distention,  blood in stool   Muscular or skeletal--joint pain or swelling   Neurologic--headaches, syncope, abnormal movement  Hematologic--history of easy bruising and bleeding   Endocrine--thyroid enlargement, heat or cold intolerance, polyuria   Psychiatric--anxiety, depression      *-*-*-*-*-*-*-*-*-*-*-*-*-*-*-*-*-*-*-*-*-*-*-*-*-*-*-*-*-*-*-*-*-*-*-*-*-*-*-*-*-*-*-*-*-*-*-*-*-*-*-*-*-*-   VITAL SIGNS       Vitals:    24 1615 24 1628 24 1630 24 1645   BP:  (!) 89/48 90/70    BP Location:  Left arm Left arm    Pulse: 93 101 96 91   Resp:  18 18    Temp:  98.2 °F (36.8 °C)     TempSrc:  Temporal     SpO2:       Weight:       Height:         TEMPERATURE HISTORY 24H: Temp (24hrs), Av.7 °F (36.5 °C), Min:97 °F (36.1 °C), Max:98.8 °F (37.1 °C)   . BLOOD PRESSURE HISTORY: Systolic (36hrs), Av , Min:78 , Max:105    Diastolic (36hrs), Av, Min:46, Max:70      Weight    24 0540 24 0600 24 0600 03/10/24 0600   Weight: 84.9 kg (187 lb 2.7 oz) 85.6 kg (188 lb 11.4 oz) 84.9 kg (187 lb 2.7 oz) 84.7 kg (186 lb 11.7 oz)    24 0600 24 0843   Weight: 83.9 kg (184 lb 15.5 oz) 83.5 kg (184 lb)      Body mass index is 28.82 kg/m². Wt Readings from Last 10 Encounters:   24 83.5 kg (184 lb)   24 98.8 kg (217 lb 13 oz)   24 94.4 kg (208 lb 2.2 oz)   10/30/23 87 kg (191 lb 12.8 oz)   10/03/23 88.4 kg (194 lb 12.8 oz)   19 90.7 kg (200 lb)      Intake/Output Summary (Last 24 hours) at 3/11/2024 1652  Last data filed at 3/11/2024 1628  Gross per 24 hour   Intake 2330 ml   Output 200 ml   Net 2130 ml        *-*-*-*-*-*-*-*-*-*-*-*-*-*-*-*-*-*-*-*-*-*-*-*-*-*-*-*-*-*-*-*-*-*-*-*-*-*-*-*-*-*-*-*-*-*-*-*-*-*-*-*-*-*-   PHYSICAL EXAMINATION:     General Appearance:  Somnolent, fatigued appearance, in no respiratory distress, appears older than stated age   Head, Eyes, ENT:  Severe jaundice and scleral icterus, dry mucous membranes   Neck:   Supple, no carotid bruit or  "JVD   Back:     Symmetric, no curvature.   Lungs:     Respirations unlabored.  Decreased breath sounds are noted bilaterally at bases without crackles.   Chest wall:    No tenderness or deformity   Heart:    Regular rate and rhythm, S1 and S2 normal, no murmur, rub  or gallop.  Slightly distended, nontender   Abdomen:     Soft, non-tender, No obvious masses, or organomegaly   Extremities:   Extremities warm, no cyanosis or edema    Skin: Severe jaundice     *-*-*-*-*-*-*-*-*-*-*-*-*-*-*-*-*-*-*-*-*-*-*-*-*-*-*-*-*-*-*-*-*-*-*-*-*-*-*-*-*-*-*-*-*-*-*-*-*-*-*-*-*-*-   LABORATORY DATA:   I have personally reviewed pertinent labs.    CMP:  Results from last 7 days   Lab Units 03/11/24  0638 03/10/24  0528 03/09/24  0532 03/06/24  0548 03/05/24  0527   SODIUM mmol/L 133* 133* 134*   < > 137   POTASSIUM mmol/L 3.2* 3.2* 3.5   < > 2.7*   CHLORIDE mmol/L 99 97 96   < > 96   CO2 mmol/L 20* 23 25   < > 24   BUN mg/dL 61* 53* 43*   < > 56*   CREATININE mg/dL 9.27* 7.46* 5.87*   < > 5.73*   CALCIUM mg/dL 7.4* 7.1* 6.7*   < > 6.3*   ALK PHOS U/L  --   --  195*  --  288*   ALT U/L  --   --  54*  --  106*   AST U/L  --   --  61*  --  83*    < > = values in this interval not displayed.        Results from last 7 days   Lab Units 03/05/24  0527   MAGNESIUM mg/dL 2.3        Cardiac Profile:       Invalid input(s): \"CKMBP\"                 Blood Gas Analysis:             CBC:   Results from last 7 days   Lab Units 03/11/24  1032 03/11/24  0638 03/05/24  0527   WBC Thousand/uL  --  18.14* 19.04*   HEMOGLOBIN g/dL 6.2* 6.7* 8.8*   HEMATOCRIT % 17.0* 18.5* 23.8*   PLATELETS Thousands/uL  --  57* 51*     PT/INR:   Lab Results   Component Value Date    INR 2.80 (H) 03/11/2024   , Microbiology:            *-*-*-*-*-*-*-*-*-*-*-*-*-*-*-*-*-*-*-*-*-*-*-*-*-*-*-*-*-*-*-*-*-*-*-*-*-*-*-*-*-*-*-*-*-*-*-*-*-*-*-*-*-*-  TELEMETRY, LAST ECG:  Telemetry reviewed. ... Currently not on telemetry.     Results for orders placed or performed during the " hospital encounter of 24   ECG 12 lead   Result Value    Ventricular Rate 99    Atrial Rate 99    MI Interval 154    QRSD Interval 90    QT Interval 394    QTC Interval 505    P Axis 53    QRS Axis 13    T Wave Axis 34    Narrative    Normal sinus rhythm  Prolonged QT  Abnormal ECG  When compared with ECG of 2024 13:20,  No significant change was found  Confirmed by Sarina Lim ([id number]) on 3/11/2024 7:29:40 AM        *-*-*-*-*-*-*-*-*-*-*-*-*-*-*-*-*-*-*-*-*-*-*-*-*-*-*-*-*-*-*-*-*-*-*-*-*-*-*-*-*-*-*-*-*-*-*-*-*-*-*-*-*-*-  IMAGING STUDIES REPORTS: Imaging studies results reviewed....  No valid procedures specified.  No Chest XR results available for this patient.    *-*-*-*-*-*-*-*-*-*-*-*-*-*-*-*-*-*-*-*-*-*-*-*-*-*-*-*-*-*-*-*-*-*-*-*-*-*-*-*-*-*-*-*-*-*-*-*-*-*-*-*-*-*-  AVAILABLE OLD CARDIAC TESTS REPORTS:   Results for orders placed during the hospital encounter of 20    Echo complete with contrast if indicated    Narrative  [address] [address]    Transthoracic Echocardiogram  2D, M-mode, Doppler, and Color Doppler    Study date:  27-Aug-2020    Patient: ANTOINETTE MENSAH  MR number: [id number]  Account number: [id number]  : 1990  Age: 30 years  Gender: Male  Status: Outpatient  Location: [facility] Echo Lab  Height: 67 in  Weight: 200 lb  BP: 135/ 97 mmHg    Indications: Family History of Ischemic Heart Disease    Diagnoses: Z82.49 - Family history of ischemic heart disease and other diseases of the circulatory system    Sonographer:  SOY Fernandez  Referring Physician:  Silvia Schwartz PA-C  [facility]  Interpreting Physician:  Cj Mock DO    SUMMARY    LEFT VENTRICLE:  Ejection fraction was estimated in the range of 55 % to 60 %.  There were no regional wall motion abnormalities.    MITRAL VALVE:  There was mild regurgitation.    TRICUSPID VALVE:  There was trace  regurgitation.    HISTORY: PRIOR HISTORY: family history, smoker    PROCEDURE: The study was performed in the [facility] Echo Lab. This was a routine study. The transthoracic approach was used. The study included complete 2D imaging, M-mode, complete spectral Doppler, and color Doppler. The heart  rate was 94 bpm, at the start of the study. Images were obtained from the parasternal, apical, subcostal, and suprasternal notch acoustic windows. Echocardiographic views were limited due to lung interference. Image quality was adequate.    LEFT VENTRICLE: Size was normal. Ejection fraction was estimated in the range of 55 % to 60 %. There were no regional wall motion abnormalities.    RIGHT VENTRICLE: The size was normal. Systolic function was normal.    LEFT ATRIUM: Size was normal.    RIGHT ATRIUM: Size was normal.    MITRAL VALVE: Valve structure was normal. DOPPLER: There was mild regurgitation.    AORTIC VALVE: Leaflets exhibited mildly increased thickness. DOPPLER: There was no evidence for stenosis. There was no regurgitation.    TRICUSPID VALVE: The valve structure was normal. DOPPLER: There was trace regurgitation.    PULMONIC VALVE: DOPPLER: There was no evidence for stenosis. There was no regurgitation.    PERICARDIUM: There was no pericardial effusion.    AORTA: The root exhibited normal size.    SYSTEMIC VEINS: IVC: The inferior vena cava was normal in size. Respirophasic changes were normal.    SYSTEM MEASUREMENT TABLES    2D  %FS: 35.42 %  AV Diam: 3.09 cm  EDV(Teich): 117.33 ml  EF(Teich): 64.6 %  ESV(Teich): 41.53 ml  IVSd: 0.84 cm  LA Diam: 3.38 cm  LAAs A4C: 14.12 cm2  LAESV A-L A4C: 34.37 ml  LAESV MOD A4C: 32.47 ml  LALs A4C: 4.93 cm  LVEDV MOD A4C: 101.03 ml  LVEF MOD A4C: 62 %  LVESV MOD A4C: 38.39 ml  LVIDd: 4.98 cm  LVIDs: 3.22 cm  LVLd A4C: 7.98 cm  LVLs A4C: 6.69 cm  LVPWd: 0.94 cm  RAAs A4C: 9.62 cm2  RAESV A-L: 19 ml  RAESV MOD: 18.87 ml  RALs: 4.13 cm  RVIDd: 2.22 cm  RWT: 0.38  SV  MOD A4C: 62.64 ml  SV(Teich): 75.79 ml    MM  TAPSE: 1.41 cm    PW  E' Av.13 m/s  E' Lat: 0.14 m/s  E' Sept: 0.12 m/s  E/E' Av.54  E/E' Lat: 6.12  E/E' Sept: 7.02  MV A Jose: 0.69 m/s  MV Dec Juab: 5.33 m/s2  MV DecT: 158.27 ms  MV E Jose: 0.84 m/s  MV E/A Ratio: 1.22    [facility] Accredited Echocardiography Laboratory    Prepared and electronically signed by    Cj Mock DO  Signed 27-Aug-2020 14:20:30    No results found for this or any previous visit.    No results found for this or any previous visit.    No results found for this or any previous visit.       *-*-*-*-*-*-*-*-*-*-*-*-*-*-*-*-*-*-*-*-*-*-*-*-*-*-*-*-*-*-*-*-*-*-*-*-*-*-*-*-*-*-*-*-*-*-*-*-*-*-*-*-*-*-            *-*-*-*-*-*-*-*-*-*-*-*-*-*-*-*-*-*-*-*-*-*-*-*-*-*-*-*-*-*-*-*-*-*-*-*-*-*-*-*-*-*-*-*-*-*-*-*-*-*-*-*-*-*-  SIGNATURES:   @SIG@   Sarina Lim MD     CC:   Louie Odonnell DO   No ref. provider found

## 2024-03-11 NOTE — ASSESSMENT & PLAN NOTE
History alcohol abuse, last drink prior to admission in January  Will need outpatient alcohol counseling prior to transplant reevaluation-patient has referral from [facility] for virtual therapy which supposed to initiated soon.

## 2024-03-11 NOTE — PROGRESS NOTES
Patient:    MRN:  [id number]    Aidin Request ID:  [id number]    Level of care reserved:  Skilled Nursing Facility    Partner Reserved:  [facility]  , [place] [id number] [phone]    Clinical needs requested:    Geography searched:  10 miles around [id number]    Start of Service:    Request sent:  11:36am EST on 3/8/2024 by Brandy Figueredo    Partner reserved:  3:29pm EDT on 3/11/2024 by Estee Malin    Choice list shared:  8:19am EDT on 3/11/2024 by Estee Malin

## 2024-03-11 NOTE — PROGRESS NOTES
Progress Note - [facility]  Michael Koch 33 y.o. male MRN: [id number]  Unit/Bed#: -01 Encounter: [id number]      ASSESSMENT & PLAN    Acute anemia   Patient admitted with alcoholic hepatitis and resulting liver failure with acute drop in Hgb from 8.8 > 6.7 this AM, repeat 6.2  No overt GI bleeding  No prior endoscopic evaluation  Recommend EGD and colonoscopy tomorrow, 3/12  Clear liquid diet today with bowel prep this evening  NPO after midnight  Continue to monitor Hgb and transfuse <7    Liver failure  Alcoholic hepatitis  Alcoholic cirrhosis of liver with ascites and encephalopathy  Hepatorenal syndrome  Severe hyperbilirubinemia  Elevated LFTs   MELD-Na 45  Serologic liver workup unremarkable   On IV solu-cortef 25mg daily, transitioned to fludrocortisone 0.2mg daily  Ascites - SPB prophylaxis w/ ciprofloxacin, abd US 2/15 with trace ascites  Hepatic encephalopathy - continue lactulose, xifaxan  Variceal screening - planning for EGD 3/12  HCC screening - abd US 2/15 no masses  HRS - on dialysis MWF  Patient will need close outpatient follow up with hepatology       Reason for Consult / Principal Problem: anemia    HPI: Michael Koch is a 33 y.o. year old male with a PMHx type 2 DM, gout, HTN, hx nephrolithiasis, depression/anxiety, ETOH abuse, tobacco abuse, marijuana use who is admitted with alcoholic hepatitis.     GI following for alcoholic hepatitis and liver failure. Asked to see again this AM for anemia. Hgb dropped from 8.8 on 3/5 to 6.7 this AM. Repeat 6.2. Patient had a RRT called this AM for lightheadedness on the toilet suspected to be 2/2 vagal nerve stimulation and resulting hypotension.  No overt GI bleeding. No abdominal pain.     Patient has received a few transfusions during prolonged hospitalization at various hospitals. Last transfusion was prior to re-admission at [facility].    No prior EGD or colonoscopy.     Endoscopic risk assessment:  Anticoagulation use:  "no  Diabetes medication: insulin  CVS history: no  Abdominal surgeries: no  Sleep apnea: no  O2 use: no    Past Medical History:   Diagnosis Date    Depression     Diabetes mellitus (HCC)     Gout     Hypertension     Hyponatremia 01/21/2024    Kidney stones      Past Surgical History:   Procedure Laterality Date    CYSTOSCOPY W/ URETERAL STENT PLACEMENT  09/14/2023    HEMODIALYSIS ADULT  3/4/2024    IR TUNNELED DIALYSIS CATHETER PLACEMENT  2/29/2024    US GUIDED MASS BIOPSY (UNSPECIFIED)  02/19/2021     Social History   Social History     Substance and Sexual Activity   Alcohol Use Yes    Comment: \"couple 24 oz cans per day\"     Social History     Substance and Sexual Activity   Drug Use Never     Social History     Tobacco Use   Smoking Status Every Day    Current packs/day: 0.25    Types: Cigarettes   Smokeless Tobacco Never       No family history on file.    Medications Prior to Admission   Medication    ciprofloxacin (CIPRO) 250 mg tablet    guaiFENesin (ROBITUSSIN) 100 mg/5 mL syrup    hydrocortisone (Solu-CORTEF) 100 mg SOLR    insulin glargine (LANTUS) 100 units/mL subcutaneous injection    lactulose 20 g/30 mL    lansoprazole (PREVACID) 3 mg/ml SUSP oral suspension    Melatonin 12 MG TABS    midodrine (PROAMATINE) 5 mg tablet    OLANZapine (ZyPREXA) 15 mg tablet    thiamine 100 MG tablet    magnesium Oxide (MAG-OX) 400 mg TABS    nicotine (NICODERM CQ) 21 mg/24 hr TD 24 hr patch    tamsulosin (FLOMAX) 0.4 mg     Current Facility-Administered Medications   Medication Dose Route Frequency    calcitriol (ROCALTROL) capsule 0.25 mcg  0.25 mcg Oral Daily    calcium acetate (PHOSLO) capsule 1,334 mg  1,334 mg Oral TID With Meals    calcium carbonate (OYSTER SHELL,OSCAL) 500 mg tablet 1 tablet  1 tablet Oral Daily With Breakfast    diphenhydramine, lidocaine, Al/Mg hydroxide, simethicone (Magic Mouthwash) oral solution 10 mL  10 mL Swish & Swallow Q6H PRN    fludrocortisone (FLORINEF) tablet 0.2 mg  0.2 mg Oral " "Daily    hydrOXYzine HCL (ATARAX) tablet 25 mg  25 mg Oral Q6H PRN    lactulose (CHRONULAC) oral solution 10 g  10 g Oral Daily    melatonin tablet 3 mg  3 mg Oral HS    midodrine (PROAMATINE) tablet 17.5 mg  17.5 mg Oral TID AC    nicotine (NICODERM CQ) 7 mg/24hr TD 24 hr patch 7 mg  7 mg Transdermal Daily    OLANZapine (ZyPREXA) tablet 5 mg  5 mg Oral HS    ondansetron (ZOFRAN) injection 4 mg  4 mg Intravenous Q8H PRN    pantoprazole (PROTONIX) injection 40 mg  40 mg Intravenous Q12H MURALI    potassium chloride (Klor-Con M20) CR tablet 40 mEq  40 mEq Oral BID    rifaximin (XIFAXAN) tablet 550 mg  550 mg Oral Q12H MURALI     No Known Allergies    Physical Exam  Constitutional:       General: He is not in acute distress.     Appearance: He is ill-appearing.   HENT:      Mouth/Throat:      Mouth: Mucous membranes are moist.   Eyes:      General: Scleral icterus present.   Pulmonary:      Effort: Pulmonary effort is normal. No respiratory distress.   Abdominal:      General: Abdomen is flat. There is no distension.      Tenderness: There is no abdominal tenderness. There is no guarding.   Skin:     General: Skin is warm and dry.      Coloration: Skin is jaundiced.   Neurological:      Mental Status: He is alert.       Most Recent Vital Signs:  Vitals:    03/11/24 0628 03/11/24 0628 03/11/24 0717 03/11/24 0843   BP: (!) 83/46 (!) 82/46  (!) 82/46   Pulse: 96 103 94 98   Resp:  20     Temp: 97.9 °F (36.6 °C)  97.9 °F (36.6 °C)    TempSrc:       SpO2: 99% 98% 100%    Weight:    83.5 kg (184 lb)   Height:    5' 7\" (1.702 m)       Intake/Output Summary (Last 24 hours) at 3/11/2024 1053  Last data filed at 3/11/2024 0500  Gross per 24 hour   Intake 480 ml   Output 200 ml   Net 280 ml       LABS/IMAGING  Lab Results: I have reviewed all relevant lab results during this hospitalization.    Imaging Studies:  I have reviewed all the relevant images during this hospitalizations    Counseling / Coordination of Care  Total time spent " today 45 minutes. Greater than 50% of total time was spent with the patient and / or family counseling and / or coordination of care.    Kayleigh Stahl PA-C

## 2024-03-11 NOTE — TELEPHONE ENCOUNTER
Consult placed on [facility] queue at 0827 to be seen by an [facility] psychiatrist.

## 2024-03-12 PROBLEM — Z99.2 HEMODIALYSIS PATIENT (HCC): Status: ACTIVE | Noted: 2024-03-12

## 2024-03-12 PROBLEM — D64.9 ANEMIA: Status: ACTIVE | Noted: 2024-03-12

## 2024-03-12 NOTE — PROGRESS NOTES
[facility]  Progress Note  Name: Michael Koch I  MRN: [id number]  Unit/Bed#: -01 I Date of Admission: 2/27/2024   Date of Service: 3/12/2024 I Hospital Day: 14    Assessment/Plan   * Alcoholic hepatitis  Assessment & Plan  was on Solu-Cortef 25 mg daily -stopped 3/5  Known Alcoholic cirrhosis  Transferred from [facility] in [place] after liver transplant evaluation, he was found not to be candidate  Continue lactulose but decrease dose to 10 g once daily as ammonia level is normal and patient is having a lot of loose stools.  Will add Xifaxan and see if that will help decrease his diarrhea frequency.  Check ammonia level every 3 days for now as we have been rapidly decreasing lactulose dose  No encephalopathy currently  Patient has extensive workup done, at this time, not much to offer.AT Present not candidate for liver transplant but should be reevaluated again in a few months  Patient does have capacity to make informed medical decisions.  Plan for discharge to SNF for rehab which has in house dialyses would be ideal    Acute on chronic anemia  Assessment & Plan  Hemoglobin is around 8.9.  Patient has intermittently been requiring blood transfusions during his extensive hospitalization courses over the last few weeks.  Today hemoglobin dropped to 6.2.  Will give 2 units PRBC and recheck CBC in a.m. and plan for EGD and colonoscopy in a.m. if medically stable.  Continue Protonix drip and clear liquids for now.  No overt signs of bleeding noted at this time    Initial plan was to proceed with EGD and colonoscopy.  Due to patient ongoing complex medical conditions and persistent hypertension, cardiology consulted for preop clearance-Per cardiology recommendation, patient will be very high risk patients.  Unless there is active bleeding visualized, recommending to defer the procedure if possible.  In case without any active bleeding, patient still needs EGD and colonoscopy in  that case cardiology recommending to transfer the patient to tertiary center.  Discussed with gastroenterologist, will hold off procedure at this time    Orthostatic hypotension  Assessment & Plan  Secondary to end-stage liver disease and hemodialysis dependent acute kidney injury and autonomic dysfunction.  Continue midodrine-patient is still having orthostatic hypotension which is limiting his mobility. cont thigh high CARLOS stockings.  Cont midodrine 17.5 mg 3 times daily,increase  Florinef 0.2 mg daily .decrease zyprexa dose to 5 mg daily and decreased lactulose to 10 gram once daily .  Increase oral intake.  Monitor orthostasis.prn iv albumin.may trial mestinon low dose as last resort  Will do 2 d echo and EKG and consulted cardiology for further recommendations  Patient is unable to stand upright as his blood pressure drops into the 70s and he gets very symptomatic.  Discussed with case management that he needs either our stretcher van or in-house dialysis as that is going to be very difficult to transport him otherwise for outpatient dialysis  And is requesting to go home today however explained to him that due to his significant orthostatic hypotension he cannot be safe discharge to home    Renal failure  Assessment & Plan  Developed DUANE on CKD with underlying liver failure   ureteral stent placed at [facility], lithotripsy  Developed oliguria, treated with CRRT then transition to HD  Appreciate nephrology consultation.cont HD SCHEDULE mon,wed,fri  Patient temporary HD catheter replaced with PermCath by IR on 2/29/2024.  Patient also has alcohol induced hepatitis.    Depression  Assessment & Plan  Continue Zyprexa at bedtime decreased dose to 5 mg daily FROM 15MG daily and also decreased melatonin to 3 mg daily  Please note that patient had very bad alcohol withdrawals and was on Precedex drip for some time during the last few weeks.  Also having behavioral issues and was placed on Zyprexa during his hospital  course.  Patient probably has more than just depression and needs reeval with psychiatry  Patient having significant orthostatic hypotension and hence trying to decrease dose of Zyprexa.  Will ask psychiatry to reevaluate and placed him on some other agent does not cause hypotension as he still has behavior issues  cortisol level is currently normal will recheck again  in a week as now off steroids.normal ammonia level    ETOH abuse  Assessment & Plan  History alcohol abuse, last drink prior to admission in January  Will need outpatient alcohol counseling prior to transplant reevaluation-patient has referral from [facility] [place] [facility] for virtual therapy which supposed to initiated soon.      Tobacco abuse  Assessment & Plan  Nicotine patch  Cessation counseling             VTE Pharmacologic Prophylaxis:    Patient has multiple complex medical conditions with anemia and thrombocytopenia, holding anticoagulation at this time.  Patient INR is already therapeutic, 2.80 from liver disease.    Mobility:   Basic Mobility Inpatient Raw Score: 10  -HLM Goal: 4: Move to chair/commode  JH-HLM Achieved: 3: Sit at edge of bed  HLM Goal NOT achieved. Continue with multidisciplinary rounding and encourage appropriate mobility to improve upon HLM goals.    Patient Centered Rounds: I performed bedside rounds with nursing staff today.   Discussions with Specialists or Other Care Team Provider: Gastroenterology, nephrology, cardiology note reviewed    Education and Discussions with Family / Patient: Updated  (mother) on bedside      Current Length of Stay: 14 day(s)  Current Patient Status: Inpatient   Certification Statement: The patient will continue to require additional inpatient hospital stay due to monitor above conditions  Discharge Plan:  To be due to might    Code Status: Level 3 - DNAR and DNI    Subjective:   Seen and evaluated and examined.  Lying on the bed.  Feeling fatigued.  Denies any dizziness,  lightheadedness.    Objective:     Vitals:   Temp (24hrs), Av.8 °F (36.6 °C), Min:97 °F (36.1 °C), Max:98.8 °F (37.1 °C)    Temp:  [97 °F (36.1 °C)-98.8 °F (37.1 °C)] 98.6 °F (37 °C)  HR:  [] 90  Resp:  [16-18] 16  BP: ()/(39-71) 88/50  SpO2:  [95 %-100 %] 98 %  Body mass index is 28.76 kg/m².     Input and Output Summary (last 24 hours):     Intake/Output Summary (Last 24 hours) at 3/12/2024 1507  Last data filed at 3/12/2024 1402  Gross per 24 hour   Intake 3820 ml   Output 804 ml   Net 3016 ml       Physical Exam:   Physical Exam  Vitals and nursing note reviewed.   Constitutional:       Appearance: Normal appearance. He is ill-appearing.   Eyes:      General: Scleral icterus present.      Pupils: Pupils are equal, round, and reactive to light.   Cardiovascular:      Rate and Rhythm: Normal rate.      Heart sounds:      No friction rub. No gallop.   Pulmonary:      Effort: Pulmonary effort is normal. No respiratory distress.      Breath sounds: No stridor. No wheezing or rhonchi.   Abdominal:      General: There is distension.      Palpations: Abdomen is soft.      Tenderness: There is no abdominal tenderness. There is no rebound.      Hernia: No hernia is present.   Musculoskeletal:      Right lower leg: No edema.      Left lower leg: No edema.   Neurological:      Mental Status: He is alert and oriented to person, place, and time.      Cranial Nerves: No cranial nerve deficit.      Sensory: No sensory deficit.      Motor: No weakness.      Coordination: Coordination normal.         Additional Data:     Labs:  Results from last 7 days   Lab Units 24  0610   WBC Thousand/uL 17.09*   HEMOGLOBIN g/dL 8.5*   HEMATOCRIT % 23.1*   PLATELETS Thousands/uL 54*     Results from last 7 days   Lab Units 24  0610 03/10/24  0528 24  0532   SODIUM mmol/L 136   < > 134*   POTASSIUM mmol/L 3.0*   < > 3.5   CHLORIDE mmol/L 98   < > 96   CO2 mmol/L 27   < > 25   BUN mg/dL 36*   < > 43*    CREATININE mg/dL 6.22*   < > 5.87*   ANION GAP mmol/L 11   < > 13   CALCIUM mg/dL 7.0*   < > 6.7*   ALBUMIN g/dL  --   --  2.3*   TOTAL BILIRUBIN mg/dL  --   --  44.91*   ALK PHOS U/L  --   --  195*   ALT U/L  --   --  54*   AST U/L  --   --  61*   GLUCOSE RANDOM mg/dL 103   < > 132    < > = values in this interval not displayed.     Results from last 7 days   Lab Units 03/11/24  1032   INR  2.80*     Results from last 7 days   Lab Units 03/11/24  0625 03/08/24  0731 03/07/24  2057 03/07/24  1514 03/07/24  1056 03/07/24  0813 03/06/24  2054 03/06/24  1725 03/06/24  1129 03/06/24  0732 03/05/24  2115 03/05/24  1624   POC GLUCOSE mg/dl 132 128 111 131 126 285* 113 113 221* 109 125 206*               Lines/Drains:  Invasive Devices       Peripheral Intravenous Line  Duration             Peripheral IV 03/11/24 Right;Ventral (anterior) Forearm 1 day              Hemodialysis Catheter  Duration             HD Permanent Double Catheter 12 days                          Imaging: No pertinent imaging reviewed.    Recent Cultures (last 7 days):         Last 24 Hours Medication List:   Current Facility-Administered Medications   Medication Dose Route Frequency Provider Last Rate    calcitriol  0.25 mcg Oral Daily Niecy OsoriovedJENSEN ba      calcium acetate  1,334 mg Oral TID With Meals Niecy Ibarra, CRNP      calcium carbonate  1 tablet Oral Daily With Breakfast Niecy Ibarra, CRNP      diphenhydramine, lidocaine, Al/Mg hydroxide, simethicone  10 mL Swish & Swallow Q6H PRN Yuly S Edenilson, CRNP      epoetin sandra  9,000 Units Intravenous Once per day on Monday Wednesday Friday Tyler Stallworth MD      escitalopram  5 mg Oral Daily Constance Menendez MD      fludrocortisone  0.2 mg Oral Daily Constance Menendez MD      hydrOXYzine HCL  25 mg Oral Q6H PRN Yuly S Edenilson, CRNP      lactulose  10 g Oral Daily Constance Menendez MD      melatonin  3 mg Oral HS Constance Menendez MD      midodrine  17.5 mg Oral TID AC Constance Menendez MD      nicotine  7 mg Transdermal  Daily Constance Menendez MD      ondansetron  4 mg Intravenous Q8H PRN JENSEN Moreland      pantoprazole  40 mg Intravenous Q12H MURALI Constance Menendez MD      polyethylene glycol  4,000 mL Oral See Admin Instructions Kayleigh Stahl PA-C      potassium chloride  40 mEq Oral BID Roland Kulkarni MD      rifaximin  550 mg Oral Q12H MURALI Constance Menendez MD      sodium bicarbonate  1,300 mg Oral BID after meals Tyler Stallworth MD          Today, Patient Was Seen By: Tiburcio Jean MD    **Please Note: This note may have been constructed using a voice recognition system.**

## 2024-03-12 NOTE — NURSING NOTE
Messaged SLIM regarding clarification with diet being clear and NPO after midnight due to having colonoscopy and EGD. Bowel prep protocol was not started during hours 3385-1182. I did try to message GI on call to clarify order and if I should start bowel prep at a different time. GI did not respond so bowel prep was not started.

## 2024-03-12 NOTE — ASSESSMENT & PLAN NOTE
Hemoglobin is around 8.9.  Patient has intermittently been requiring blood transfusions during his extensive hospitalization courses over the last few weeks.  Today hemoglobin dropped to 6.2.  Will give 2 units PRBC and recheck CBC in a.m. and plan for EGD and colonoscopy in a.m. if medically stable.  Continue Protonix drip and clear liquids for now.  No overt signs of bleeding noted at this time    Initial plan was to proceed with EGD and colonoscopy.  Due to patient ongoing complex medical conditions and persistent hypertension, cardiology consulted for preop clearance-Per cardiology recommendation, patient will be very high risk patients.  Unless there is active bleeding visualized, recommending to defer the procedure if possible.  In case without any active bleeding, patient still needs EGD and colonoscopy in that case cardiology recommending to transfer the patient to tertiary center.  Discussed with gastroenterologist, will hold off procedure at this time

## 2024-03-12 NOTE — PROGRESS NOTES
NEPHROLOGY PROGRESS NOTE    Michael Koch 33 y.o. male MRN: [id number]  Unit/Bed#: -01 Encounter: [id number]  Reason for Consult: Renal failure    The patient is awake alert offers no specific complaint.  I asked about dialysis finished up yesterday he said okay.  Says that he is weak and cannot walk at this point.  Otherwise no distress and breathing comfortably.    ASSESSMENT/PLAN:  1.  Renal    The patient developed acute renal failure and has been dialysis dependent since February 25 as reviewed in the note.  There is no signs of renal recovery as no significant urine output.  Today creatinine 6.2 potassium was 3.  Hemoglobin is 8.5 after packed red blood cell transfusion.    He is having periods of hypotension is on medication to try and increase that so he can tolerate dialysis hemodynamically.  Continue midodrine for supportive care.  Will also use low dialysate temp to help support blood pressure.    Hemodialysis Monday Wednesday Friday  Will start Epogen on dialysis tomorrow for anemia.    2.  Cirrhosis of the liver    As stated in the chart the patient had been evaluated for liver transplant at this point is not a candidate.  He has renal failure and cirrhosis.  Goals of care discussions have been had by other physicians and awaiting any changes in his level of care.  For now supportive care.      SUBJECTIVE:  Review of Systems   Constitutional: Positive for malaise/fatigue. Negative for chills, diaphoresis and fever.   HENT: Negative.     Eyes: Negative.    Cardiovascular:  Negative for chest pain, dyspnea on exertion and orthopnea.   Respiratory:  Negative for cough, shortness of breath, sputum production and wheezing.    Gastrointestinal:  Negative for abdominal pain, diarrhea and nausea.   Neurological:  Positive for weakness. Negative for dizziness, focal weakness and headaches.   Psychiatric/Behavioral:  Negative for altered mental status.        OBJECTIVE:  Current Weight: Weight - Scale:  "83.3 kg (183 lb 10.3 oz)  Vitals:Temp (24hrs), Av.7 °F (36.5 °C), Min:97 °F (36.1 °C), Max:98.8 °F (37.1 °C)  Current: Temperature: 98.8 °F (37.1 °C)   Blood pressure (!) 92/48, pulse 102, temperature 98.8 °F (37.1 °C), resp. rate 16, height 5' 7\" (1.702 m), weight 83.3 kg (183 lb 10.3 oz), SpO2 100%. , Body mass index is 28.76 kg/m².      Intake/Output Summary (Last 24 hours) at 3/12/2024 09  Last data filed at 3/11/2024 1900  Gross per 24 hour   Intake 2640 ml   Output 804 ml   Net 1836 ml       Physical Exam: BP (!) 92/48   Pulse 102   Temp 98.8 °F (37.1 °C)   Resp 16   Ht 5' 7\" (1.702 m)   Wt 83.3 kg (183 lb 10.3 oz)   SpO2 100%   BMI 28.76 kg/m²   Physical Exam  Constitutional:       General: He is not in acute distress.     Appearance: He is ill-appearing. He is not toxic-appearing.   HENT:      Head: Normocephalic and atraumatic.      Nose: Nose normal.   Eyes:      General: Scleral icterus present.      Extraocular Movements: Extraocular movements intact.   Cardiovascular:      Rate and Rhythm: Normal rate and regular rhythm.      Heart sounds:      No friction rub. No gallop.   Pulmonary:      Effort: Pulmonary effort is normal. No respiratory distress.      Breath sounds: No wheezing or rales.   Abdominal:      General: Bowel sounds are normal. There is no distension.      Palpations: Abdomen is soft.      Tenderness: There is no abdominal tenderness. There is no rebound.   Musculoskeletal:      Cervical back: Normal range of motion and neck supple.   Skin:     Coloration: Skin is jaundiced.   Neurological:      Mental Status: He is alert and oriented to person, place, and time.         Medications:    Current Facility-Administered Medications:     calcitriol (ROCALTROL) capsule 0.25 mcg, 0.25 mcg, Oral, Daily, JENSEN Fontenot, 0.25 mcg at 24 0841    calcium acetate (PHOSLO) capsule 1,334 mg, 1,334 mg, Oral, TID With Meals, JENSEN Fontenot, 1,334 mg at 24 0845    calcium " carbonate (OYSTER SHELL,OSCAL) 500 mg tablet 1 tablet, 1 tablet, Oral, Daily With Breakfast, Niecy Ibarra, CRNP, 1 tablet at 03/12/24 0845    diphenhydramine, lidocaine, Al/Mg hydroxide, simethicone (Magic Mouthwash) oral solution 10 mL, 10 mL, Swish & Swallow, Q6H PRN, Yuly S Edenilson, CRNP, 10 mL at 03/07/24 1705    epoetin sandra (EPOGEN,PROCRIT) injection 9,000 Units, 9,000 Units, Intravenous, Once per day on Monday Wednesday Friday, Tyler Stallworth MD, 9,000 Units at 03/11/24 1738    escitalopram (LEXAPRO) tablet 5 mg, 5 mg, Oral, Daily, Constance Menendez MD, 5 mg at 03/12/24 0841    fludrocortisone (FLORINEF) tablet 0.2 mg, 0.2 mg, Oral, Daily, Constance Menendez MD, 0.2 mg at 03/12/24 0841    hydrOXYzine HCL (ATARAX) tablet 25 mg, 25 mg, Oral, Q6H PRN, Yuly Dasilvax, CRNP, 25 mg at 03/10/24 2152    lactulose (CHRONULAC) oral solution 10 g, 10 g, Oral, Daily, Constance Menendez MD, 10 g at 03/12/24 0841    melatonin tablet 3 mg, 3 mg, Oral, HS, Constance Menendez MD, 3 mg at 03/11/24 2159    midodrine (PROAMATINE) tablet 17.5 mg, 17.5 mg, Oral, TID AC, Constance Menendez MD, 17.5 mg at 03/12/24 0643    nicotine (NICODERM CQ) 7 mg/24hr TD 24 hr patch 7 mg, 7 mg, Transdermal, Daily, Constance Menendez MD, 7 mg at 03/11/24 0822    ondansetron (ZOFRAN) injection 4 mg, 4 mg, Intravenous, Q8H PRN, Yuly S Edenilson, CRNP, 4 mg at 03/06/24 1503    pantoprazole (PROTONIX) injection 40 mg, 40 mg, Intravenous, Q12H MURALI, Constance Menendez MD, 40 mg at 03/12/24 0842    polyethylene glycol (GOLYTELY) bowel prep 4,000 mL, 4,000 mL, Oral, See Admin Instructions, Kayleigh Stahl PA-C    potassium chloride (Klor-Con M20) CR tablet 40 mEq, 40 mEq, Oral, BID, Roland Kulkarni MD, 40 mEq at 03/12/24 0841    rifaximin (XIFAXAN) tablet 550 mg, 550 mg, Oral, Q12H MURALI, Constance Menendez MD, 550 mg at 03/12/24 0841    sodium bicarbonate tablet 1,300 mg, 1,300 mg, Oral, BID after meals, Tyler Stallworth MD, 1,300 mg at 03/12/24 0841    Laboratory Results:  Lab Results   Component Value Date    WBC  "17.09 (H) 03/12/2024    HGB 8.5 (L) 03/12/2024    HCT 23.1 (L) 03/12/2024    MCV 83 03/12/2024    PLT 54 (L) 03/12/2024     Lab Results   Component Value Date    SODIUM 136 03/12/2024    K 3.0 (L) 03/12/2024    CL 98 03/12/2024    CO2 27 03/12/2024    BUN 36 (H) 03/12/2024    CREATININE 6.22 (H) 03/12/2024    GLUC 103 03/12/2024    CALCIUM 7.0 (L) 03/12/2024     Lab Results   Component Value Date    CALCIUM 7.0 (L) 03/12/2024    PHOS 2.5 (L) 03/12/2024     No results found for: \"LABPROT\"    "

## 2024-03-12 NOTE — PHYSICAL THERAPY NOTE
" PHYSICAL THERAPY TREATMENT NOTE      NAME:  Michael Koch  DATE: 03/12/24    Length Of Stay: 14  Performed at least 2 patient identifiers during session: Name and Birthday    TREATMENT FLOW SHEET:       03/12/24 1302   PT Last Visit   PT Visit Date 03/12/24   Note Type   Note Type Treatment  (progress note/POC update)   Pain Assessment   Pain Assessment Tool 0-10   Pain Score No Pain   Restrictions/Precautions   Weight Bearing Precautions Per Order No   Other Precautions Bed Alarm;Fall Risk  (hypotension)   General   Chart Reviewed Yes   Response to Previous Treatment Patient reporting fatigue but able to participate.   Family/Caregiver Present No   Cognition   Arousal/Participation Alert   Orientation Level Oriented X4   Subjective   Subjective \"I'm scared to even sit up\"   Bed Mobility   Supine to Sit 5  Supervision   Additional items Increased time required   Sit to Supine 5  Supervision   Additional items Increased time required   Transfers   Sit to Stand Unable to assess   Additional items   (significantly orthostatic (see vitals section))   Ambulation/Elevation   Gait Assistance Not tested   Additional items   (unsafe)   Stair Management Assistance Not tested   Balance   Static Sitting Good   Dynamic Sitting Fair +   Endurance Deficit   Endurance Deficit Yes   Activity Tolerance   Activity Tolerance Patient limited by fatigue;Other (Comment)  (ortrhostatic hypotension)   Exercises   Glute Sets Supine;10 reps  (bridges)   Hip Flexion Supine;15 reps;AROM;Bilateral  (SLRs)   Knee Flexion Stretch Supine;15 reps;AROM;Bilateral  (single knee to chest)   Assessment   Prognosis Guarded   Problem List Decreased strength;Decreased endurance;Impaired balance;Decreased mobility;Impaired judgement;Decreased safety awareness;Obesity;Pain   Assessment Pt tolerates tx session poorly.  He continues to be limited significantly by symptomatic orthostatic hypotension (see vitals section).  Pt not deemed safe to perform sit to " stand transfer this date.  Pt's goals/POC updated today.   Barriers to Discharge Other (Comment)   Barriers to Discharge Comments medical status, CAMILO, dialysis, significant orthostasis with symptoms, inability to safely ambulate   Goals   Patient Goals to just feel better   STG Expiration Date 03/26/24   Short Term Goal #2 Pt will perform bed mobility independently in order to re-position self and prepare for transfers.  Pt will tolerate sitting EOB for 5 minutes mod (I) in order to improve upright tolerance.  Pt will perform sit to stand and stand pivot transfers SPV with most appropriate AD to increase functional independence.  Pt will ambulate 15 ft with RW SPV in order to improve activity tolerance & reduce fall risk.  Pt will tolerate sitting OOB for 3 hrs straight in order to improve upright & OOB tolerance.   PT Treatment Day 7   Plan   Treatment/Interventions ADL retraining;Functional transfer training;LE strengthening/ROM;Elevations;Therapeutic exercise;Endurance training;Equipment eval/education;Patient/family training;Bed mobility;Gait training;Compensatory technique education   Progress No functional improvements   PT Frequency 3-5x/wk   Discharge Recommendation   Rehab Resource Intensity Level, PT II (Moderate Resource Intensity)   Additional Comments if pt were to choose to go home, he would benefit from hospital bed, wheelchair, BSC, & RW along with medical transportation in/out of the home   AM-PAC Basic Mobility Inpatient   Turning in Flat Bed Without Bedrails 3   Lying on Back to Sitting on Edge of Flat Bed Without Bedrails 3   Moving Bed to Chair 1   Standing Up From Chair Using Arms 1   Walk in Room 1   Climb 3-5 Stairs With Railing 1   Basic Mobility Inpatient Raw Score 10   Turning Head Towards Sound 4   Follow Simple Instructions 4   Low Function Basic Mobility Raw Score  18   Low Function Basic Mobility Standardized Score  29.25   Highest Level Of Mobility   -[facility] Goal 4: Move to  chair/commode   -[facility] Achieved 3: Sit at edge of bed   Education   Education Provided Home exercise program   Patient Demonstrates verbal understanding   End of Consult   Patient Position at End of Consult Supine;Bed/Chair alarm activated;All needs within reach      03/12/24 1306 03/12/24 1309 03/12/24 1319   Vitals   Pulse 92 98 94   Blood Pressure (!) 94/47 (!) 64/39 (!) 88/50   MAP (mmHg) (!) 63 (!) 47 (!) 63   Patient Position - Orthostatic VS Lying - Orthostatic VS Sitting - Orthostatic VS Lying       The patient's AM-PAC Basic Mobility Inpatient Short Form Raw Score is 10. A Raw score of less than or equal to 16 suggests the patient may benefit from discharge to post-acute rehabilitation services. Please also refer to the recommendation of the Physical Therapist for safe discharge planning.         Quintin Schmitz, PT,DPT

## 2024-03-12 NOTE — ASSESSMENT & PLAN NOTE
was on Solu-Cortef 25 mg daily -stopped 3/5  Known Alcoholic cirrhosis  Transferred from [facility] in [place] after liver transplant evaluation, he was found not to be candidate  Continue lactulose but decrease dose to 10 g once daily as ammonia level is normal and patient is having a lot of loose stools.  Will add Xifaxan and see if that will help decrease his diarrhea frequency.  Check ammonia level every 3 days for now as we have been rapidly decreasing lactulose dose  No encephalopathy currently  Patient has extensive workup done, at this time, not much to offer.AT Present not candidate for liver transplant but should be reevaluated again in a few months  Patient does have capacity to make informed medical decisions.  Plan for discharge to SNF for rehab which has in house dialyses would be ideal

## 2024-03-12 NOTE — PROGRESS NOTES
CARDIOLOGY INPATIENT FOLLOW-UP PROGRESS NOTE  *-*-*-*-*-*-*-*-*-*-*-*-*-*-*-*-*-*-*-*-*-*-*-*-*-*-*-*-*-*-*-*-*-*-*-*-*-*-*-*-*-*-*-*-*-*-*-*-*-*-*-*-*-*-  ENCOUNTER DATE: 03/12/24 5:00 PM   AUTHOR: Sarina Lim MD  PATIENT: Michael Koch   1990    [id number]   33 y.o.   male  INPATIENT HOSPITALIST PHYSICIAN: Constance Menendez MD  DATE OF ADMISSION: 2/27/2024 10:29 PM; LENGTH OF STAY: 14 days  *-*-*-*-*-*-*-*-*-*-*-*-*-*-*-*-*-*-*-*-*-*-*-*-*-*-*-*-*-*-*-*-*-*-*-*-*-*-*-*-*-*-*-*-*-*-*-*-*-*-*-*-*-*-    CARDIOLOGY ASSESSMENT:  End-stage liver disease, with severe alcohol related hepatitis and grade 3 hepatic encephalopathy.  Acute kidney injury with hemodialysis dependency attributed secondary to contrast nephropathy/because of ATN.  Atrophic left kidney  Severe hypotension with markedly abnormal orthostatic blood pressure response with symptomatic presyncope and syncope, cannot exclude autonomic dysfunction due to end-stage liver disease  History of pancreatitis with hemorrhagic/proteinaceous Trung pancreatic fluid accumulation.  Severe symptomatic anemia,  Metabolic derangement with acidosis and encephalopathy  Diabetes mellitus    *-*-*-*-*-*-*-*-*-*-*-*-*-*-*-*-*-*-*-*-*-*-*-*-*-*-*-*-*-*-*-*-*-*-*-*-*-*-*-*-*-*-*-*-*-*-*-*-*-*-*-*-*-*-  CURRENT SCHEDULED MEDICATIONS:    Current Facility-Administered Medications:     calcitriol (ROCALTROL) capsule 0.25 mcg, 0.25 mcg, Oral, Daily, JENSEN Fontenot, 0.25 mcg at 03/12/24 0841    calcium acetate (PHOSLO) capsule 1,334 mg, 1,334 mg, Oral, TID With Meals, Niecy OsoriovedJENSEN ba, 1,334 mg at 03/12/24 1152    calcium carbonate (OYSTER SHELL,OSCAL) 500 mg tablet 1 tablet, 1 tablet, Oral, Daily With Breakfast, JENSEN Fontenot, 1 tablet at 03/12/24 0845    diphenhydramine, lidocaine, Al/Mg hydroxide, simethicone (Magic Mouthwash) oral solution 10 mL, 10 mL, Swish & Swallow, Q6H PRN, JENSEN Moreland, 10 mL at 03/07/24 1705    epoetin sandra (EPOGEN,PROCRIT) injection  9,000 Units, 9,000 Units, Intravenous, Once per day on Monday Wednesday Friday, Tyler Stallworth MD, 9,000 Units at 03/11/24 1738    escitalopram (LEXAPRO) tablet 5 mg, 5 mg, Oral, Daily, Constance Menendez MD, 5 mg at 03/12/24 0841    fludrocortisone (FLORINEF) tablet 0.2 mg, 0.2 mg, Oral, Daily, Constance Menendez MD, 0.2 mg at 03/12/24 0841    hydrOXYzine HCL (ATARAX) tablet 25 mg, 25 mg, Oral, Q6H PRN, Yuly Dasilvax, CRNP, 25 mg at 03/10/24 2152    lactulose (CHRONULAC) oral solution 10 g, 10 g, Oral, Daily, Constance Menendez MD, 10 g at 03/12/24 0841    melatonin tablet 3 mg, 3 mg, Oral, HS, Constance Menendez MD, 3 mg at 03/11/24 2159    midodrine (PROAMATINE) tablet 17.5 mg, 17.5 mg, Oral, TID AC, Constance Menendez MD, 17.5 mg at 03/12/24 1152    nicotine (NICODERM CQ) 7 mg/24hr TD 24 hr patch 7 mg, 7 mg, Transdermal, Daily, Constance Menendez MD, 7 mg at 03/12/24 0936    ondansetron (ZOFRAN) injection 4 mg, 4 mg, Intravenous, Q8H PRN, Yuly Dasilvax, EJNP, 4 mg at 03/06/24 1503    pantoprazole (PROTONIX) injection 40 mg, 40 mg, Intravenous, Q12H MURALI, Constance Menendez MD, 40 mg at 03/12/24 0842    polyethylene glycol (GOLYTELY) bowel prep 4,000 mL, 4,000 mL, Oral, See Admin Instructions, Kayleigh Stahl PA-C    potassium chloride (Klor-Con M20) CR tablet 40 mEq, 40 mEq, Oral, BID, Roland Kulkarni MD, 40 mEq at 03/12/24 0841    rifaximin (XIFAXAN) tablet 550 mg, 550 mg, Oral, Q12H MURALI, Constance Menendez MD, 550 mg at 03/12/24 0841    sodium bicarbonate tablet 1,300 mg, 1,300 mg, Oral, BID after meals, Tyler Stallworth MD, 1,300 mg at 03/12/24 0841    Current Core Cardiac medications:  Midodrine 17.5 mg twice daily fludrocortisone 0.2 mg daily, potassium chloride 40 mEq twice daily.  Sodium bicarbonate 1300 mg twice daily.  Hemoglobin 8.5 hematocrit 23.1 platelet count 54 WBC count 17.09    PERTINENT LABS AND OTHER INVESTIGATIONS:    Labs 03/12/24 : Sodium 136 potassium 3.0 chloride 98 bicarb 27 BUN 36 creatinine 6.22 calcium 7.0 phosphorus 2.5 magnesium  1.9.  Random serum cortisol level 12.1, normal    Ammonia level yesterday 42  LFTs 3/11/2024: AST was 61 ALT 54 alk phos 195 total protein 3.0 albumin 2.3 bilirubin 44.91  Lactic acid was 0.8 on 3/4/2024    ECHOCARDIOGRAM AND OTHER CARDIAC TESTS RESULTS:   Echocardiogram 3/11/2024:  Mildly increased left ventricular wall thickness, normal left ventricle systolic function with hyperdynamic regional motion.  LVEF is around 67%.  Normal diastolic function.  Normal RV size and function.  Normal left and right atrial size.  Normal aortic valve with minimal sclerosis, no arctic valve stenosis or regurgitation.  Mitral valve sclerosis, trace mitral valve regurgitation.  Trace tricuspid valve regurgitation.  No obvious pulmonary hypertension.  Trace to small hemodynamically insignificant pericardial effusion.    *-*-*-*-*-*-*-*-*-*-*-*-*-*-*-*-*-*-*-*-*-*-*-*-*-*-*-*-*-*-*-*-*-*-*-*-*-*-*-*-*-*-*-*-*-*-*-*-*-*-*-*-*-*-  Patient likely has liver disease related dysautonomia although cannot exclude concurrent sepsis.    PLAN:  -- Will continue current doses of midodrine with fludrocortisone.  -- Request checking aldosterone and renin levels with a.m. labs to rule out hypoaldosteronism.  -- Continue IV fluid support with intermittent albumin.  -- Continued goals of care discussion and feasibility to discharge to nursing home if there is no sustained hypotension over the next 24 to 48 hours.      *-*-*-*-*-*-*-*-*-*-*-*-*-*-*-*-*-*-*-*-*-*-*-*-*-*-*-*-*-*-*-*-*-*-*-*-*-*-*-*-*-*-*-*-*-*-*-*-*-*-*-*-*-*-  INTERVAL CHANGES / HISTORY OF PRESENT ILLNESS:   No acute interval change patient continues to have severe orthostatic hypotension but he is less symptomatic.  At the time of my evaluation patient is sitting in his bed and denies dizziness.    *-*-*-*-*-*-*-*-*-*-*-*-*-*-*-*-*-*-*-*-*-*-*-*-*-*-*-*-*-*-*-*-*-*-*-*-*-*-*-*-*-*-*-*-*-*-*-*-*-*-*-*-*-*    PERTINENT REVIEW OF SYMPTOMS: As noted above in  HPI    *-*-*-*-*-*-*-*-*-*-*-*-*-*-*-*-*-*-*-*-*-*-*-*-*-*-*-*-*-*-*-*-*-*-*-*-*-*-*-*-*-*-*-*-*-*-*-*-*-*-*-*-*-*-  VITAL SIGNS:  Vitals:    24 1306 24 1309 24 1319 24 1442   BP: (!) 94/47 (!) 64/39 (!) 88/50 (!) 88/50   Pulse: 92 98 94 90   Resp:       Temp:    98.6 °F (37 °C)   TempSrc:       SpO2: 97% 95% 98% 98%   Weight:       Height:          Temp (24hrs), Av.5 °F (36.9 °C), Min:97.9 °F (36.6 °C), Max:98.8 °F (37.1 °C)  Current: Temperature: 98.6 °F (37 °C)  Body mass index is 28.76 kg/m².  Body surface area is 1.95 meters squared.      Intake/Output Summary (Last 24 hours) at 3/12/2024 1700  Last data filed at 3/12/2024 1402  Gross per 24 hour   Intake 1930 ml   Output 804 ml   Net 1126 ml       Weight    24 0600 24 0600 03/10/24 0600 24 0600   Weight: 85.6 kg (188 lb 11.4 oz) 84.9 kg (187 lb 2.7 oz) 84.7 kg (186 lb 11.7 oz) 83.9 kg (184 lb 15.5 oz)    24 0843 24 0544   Weight: 83.5 kg (184 lb) 83.3 kg (183 lb 10.3 oz)       Wt Readings from Last 3 Encounters:   24 83.3 kg (183 lb 10.3 oz)   24 98.8 kg (217 lb 13 oz)   24 94.4 kg (208 lb 2.2 oz)        *-*-*-*-*-*-*-*-*-*-*-*-*-*-*-*-*-*-*-*-*-*-*-*-*-*-*-*-*-*-*-*-*-*-*-*-*-*-*-*-*-*-*-*-*-*-*-*-*-*-*-*-*-*-  PHYSICAL EXAM:  General Appearance:    Alert, cooperative, in no respiratory distress, appears much older than stated age   Head, Eyes, ENT:  Severe jaundice and scleral icterus, moist mucous mebranes.   Neck:   Supple, no carotid bruit or JVD   Back:     Symmetric, no curvature.   Lungs:     Respirations unlabored. Clear to auscultation bilaterally,    Chest wall:    No tenderness or deformity   Heart:    Regular rate and rhythm, S1 and S2 normal, no murmur, rub  or gallop.   Abdomen:   Soft nontender   Extremities:   Extremities warm, no cyanosis or edema    Skin: Severe jaundice and dryness of skin.  "    *-*-*-*-*-*-*-*-*-*-*-*-*-*-*-*-*-*-*-*-*-*-*-*-*-*-*-*-*-*-*-*-*-*-*-*-*-*-*-*-*-*-*-*-*-*-*-*-*-*-*-*-*-*-  TELEMETRY, LAST ECG:  Telemetry reviewed. ... Not reviewed     Results for orders placed or performed during the hospital encounter of 02/27/24   ECG 12 lead   Result Value    Ventricular Rate 99    Atrial Rate 99    GA Interval 154    QRSD Interval 90    QT Interval 394    QTC Interval 505    P Axis 53    QRS Axis 13    T Wave Axis 34    Narrative    Normal sinus rhythm  Prolonged QT  Abnormal ECG  When compared with ECG of 21-JAN-2024 13:20,  No significant change was found  Confirmed by Sarina Lim ([id number]) on 3/11/2024 7:29:40 AM        *-*-*-*-*-*-*-*-*-*-*-*-*-*-*-*-*-*-*-*-*-*-*-*-*-*-*-*-*-*-*-*-*-*-*-*-*-*-*-*-*-*-*-*-*-*-*-*-*-*-*-*-*-*  LABORATORY DATA:  I have personally reviewed pertinent labs.    CMP:  Results from last 7 days   Lab Units 03/12/24  0610 03/11/24  0638 03/10/24  0528 03/09/24  0532   SODIUM mmol/L 136 133* 133* 134*   POTASSIUM mmol/L 3.0* 3.2* 3.2* 3.5   CHLORIDE mmol/L 98 99 97 96   CO2 mmol/L 27 20* 23 25   BUN mg/dL 36* 61* 53* 43*   CREATININE mg/dL 6.22* 9.27* 7.46* 5.87*   CALCIUM mg/dL 7.0* 7.4* 7.1* 6.7*   ALK PHOS U/L  --   --   --  195*   ALT U/L  --   --   --  54*   AST U/L  --   --   --  61*        Results from last 7 days   Lab Units 03/12/24  0610   MAGNESIUM mg/dL 1.9     Results from last 7 days   Lab Units 03/12/24  0610   PHOSPHORUS mg/dL 2.5*    Cardiac Profile:       Invalid input(s): \"CKMBP\"          Results from last 7 days   Lab Units 03/12/24  0610   TSH 3RD GENERATON uIU/mL 2.559   FREE T4 ng/dL 0.37*        Arterial Blood Gas Analysis:    Venous Blood Gas Analysis:       Invalid input(s): \"PCOVEN\"     CBC:   Results from last 7 days   Lab Units 03/12/24  0610 03/11/24  1032 03/11/24  0638   WBC Thousand/uL 17.09*  --  18.14*   HEMOGLOBIN g/dL 8.5* 6.2* 6.7*   HEMATOCRIT % 23.1* 17.0* 18.5*   PLATELETS Thousands/uL 54*  --  57*     PT/INR: No " "results found for: \"PT\", \"INR\", Microbiology:        *-*-*-*-*-*-*-*-*-*-*-*-*-*-*-*-*-*-*-*-*-*-*-*-*-*-*-*-*-*-*-*-*-*-*-*-*-*-*-*-*-*-*-*-*-*-*-*-*-*-*-*-*-*-  IMAGING STUDIES REPORTS: Imaging studies results reviewed....  No valid procedures specified.  No Chest XR results available for this patient.    *-*-*-*-*-*-*-*-*-*-*-*-*-*-*-*-*-*-*-*-*-*-*-*-*-*-*-*-*-*-*-*-*-*-*-*-*-*-*-*-*-*-*-*-*-*-*-*-*-*-*-*-*-*-  AVAILABLE OLD CARDIAC TESTS REPORTS:   Results for orders placed during the hospital encounter of 20    Echo complete with contrast if [place] [address]    Transthoracic Echocardiogram  2D, M-mode, Doppler, and Color Doppler    Study date:  27-Aug-2020    Patient: ANTOINETTE MENSAH  MR number: [id number]  Account number: [id number]  : 1990  Age: 30 years  Gender: Male  Status: Outpatient  Location: [facility]  Height: 67 in  Weight: 200 lb  BP: 135/ 97 mmHg    Indications: Family History of Ischemic Heart Disease    Diagnoses: Z82.49 - Family history of ischemic heart disease and other diseases of the circulatory system    Sonographer:  SOY Fernandez  Referring Physician:  Silvia Schwartz PA-C  Group:  [facility]  Interpreting Physician:  Cj Mock, DO    SUMMARY    LEFT VENTRICLE:  Ejection fraction was estimated in the range of 55 % to 60 %.  There were no regional wall motion abnormalities.    MITRAL VALVE:  There was mild regurgitation.    TRICUSPID VALVE:  There was trace regurgitation.    HISTORY: PRIOR HISTORY: family history, smoker    PROCEDURE: The study was performed in the [facility]. This was a routine study. The transthoracic approach was used. The study included complete 2D imaging, M-mode, complete spectral Doppler, and color Doppler. The heart  rate was 94 bpm, at the start of the study. Images were obtained from the parasternal, apical, " subcostal, and suprasternal notch acoustic windows. Echocardiographic views were limited due to lung interference. Image quality was adequate.    LEFT VENTRICLE: Size was normal. Ejection fraction was estimated in the range of 55 % to 60 %. There were no regional wall motion abnormalities.    RIGHT VENTRICLE: The size was normal. Systolic function was normal.    LEFT ATRIUM: Size was normal.    RIGHT ATRIUM: Size was normal.    MITRAL VALVE: Valve structure was normal. DOPPLER: There was mild regurgitation.    AORTIC VALVE: Leaflets exhibited mildly increased thickness. DOPPLER: There was no evidence for stenosis. There was no regurgitation.    TRICUSPID VALVE: The valve structure was normal. DOPPLER: There was trace regurgitation.    PULMONIC VALVE: DOPPLER: There was no evidence for stenosis. There was no regurgitation.    PERICARDIUM: There was no pericardial effusion.    AORTA: The root exhibited normal size.    SYSTEMIC VEINS: IVC: The inferior vena cava was normal in size. Respirophasic changes were normal.    SYSTEM MEASUREMENT TABLES    2D  %FS: 35.42 %  AV Diam: 3.09 cm  EDV(Teich): 117.33 ml  EF(Teich): 64.6 %  ESV(Teich): 41.53 ml  IVSd: 0.84 cm  LA Diam: 3.38 cm  LAAs A4C: 14.12 cm2  LAESV A-L A4C: 34.37 ml  LAESV MOD A4C: 32.47 ml  LALs A4C: 4.93 cm  LVEDV MOD A4C: 101.03 ml  LVEF MOD A4C: 62 %  LVESV MOD A4C: 38.39 ml  LVIDd: 4.98 cm  LVIDs: 3.22 cm  LVLd A4C: 7.98 cm  LVLs A4C: 6.69 cm  LVPWd: 0.94 cm  RAAs A4C: 9.62 cm2  RAESV A-L: 19 ml  RAESV MOD: 18.87 ml  RALs: 4.13 cm  RVIDd: 2.22 cm  RWT: 0.38  SV MOD A4C: 62.64 ml  SV(Teich): 75.79 ml    MM  TAPSE: 1.41 cm    PW  E' Av.13 m/s  E' Lat: 0.14 m/s  E' Sept: 0.12 m/s  E/E' Av.54  E/E' Lat: 6.12  E/E' Sept: 7.02  MV A Jose: 0.69 m/s  MV Dec [place]: 5.33 m/s2  MV DecT: 158.27 ms  MV E Jose: 0.84 m/s  MV E/A Ratio: 1.22    [employer] Accredited Echocardiography Laboratory    Prepared and electronically signed by    Cj Mock,  DO  Signed 27-Aug-2020 14:20:30    No results found for this or any previous visit.    No results found for this or any previous visit.    No results found for this or any previous visit.       *-*-*-*-*-*-*-*-*-*-*-*-*-*-*-*-*-*-*-*-*-*-*-*-*-*-*-*-*-*-*-*-*-*-*-*-*-*-*-*-*-*-*-*-*-*-*-*-*-*-*-*-*-*-  SIGNATURES:   @SIG@   Sarina Lim MD

## 2024-03-12 NOTE — ASSESSMENT & PLAN NOTE
Developed DUANE on CKD with underlying liver failure   ureteral stent placed at [facility], lithotripsy  Developed oliguria, treated with CRRT then transition to HD  Appreciate nephrology consultation.cont HD SCHEDULE mon,wed,fri  Patient temporary HD catheter replaced with PermCath by IR on 2/29/2024.  Patient also has alcohol induced hepatitis.

## 2024-03-12 NOTE — PLAN OF CARE
Problem: PHYSICAL THERAPY ADULT  Goal: Performs mobility at highest level of function for planned discharge setting.  See evaluation for individualized goals.  Description: Treatment/Interventions: Functional transfer training, LE strengthening/ROM, Elevations, Therapeutic exercise, Endurance training, Patient/family training, Equipment eval/education, Gait training, Compensatory technique education, Spoke to nursing, OT, Spoke to case management  Equipment Recommended: Vigneshe       See flowsheet documentation for full assessment, interventions and recommendations.  Outcome: Not Progressing  Note: Prognosis: Guarded  Problem List: Decreased strength, Decreased endurance, Impaired balance, Decreased mobility, Impaired judgement, Decreased safety awareness, Obesity, Pain  Assessment: Pt tolerates tx session poorly.  He continues to be limited significantly by symptomatic orthostatic hypotension (see vitals section).  Pt not deemed safe to perform sit to stand transfer this date.  Pt's goals/POC updated today.  Barriers to Discharge: Other (Comment)  Barriers to Discharge Comments: medical status, CAMILO, dialysis, significant orthostasis with symptoms, inability to safely ambulate  Rehab Resource Intensity Level, PT: II (Moderate Resource Intensity)    See flowsheet documentation for full assessment.

## 2024-03-12 NOTE — ASSESSMENT & PLAN NOTE
History alcohol abuse, last drink prior to admission in January  Will need outpatient alcohol counseling prior to transplant reevaluation-patient has referral from [facility] for virtual therapy which supposed to initiated soon.

## 2024-03-13 PROBLEM — D64.9 ANEMIA: Status: RESOLVED | Noted: 2024-03-12 | Resolved: 2024-03-13

## 2024-03-13 NOTE — ASSESSMENT & PLAN NOTE
[facility]    [address] [address]    Phone:  [phone]    Fax:  [phone]       Thank You for choosing us for your health care visit. We are glad to serve you and happy to provide you with this summary of your visit. Please help us to ensure we have accurate records. If you find anything that needs to be changed, please let our staff know as soon as possible.          Your Demographic Information     Patient Name Sex     Marilee Griffin Female 1952       Ethnic Group Patient Race    Not of  or  Origin White      Your Visit Details     Date & Time Provider Department    2017 2:15 PM Rigoberto Sales MD [facility]      Your Upcoming Appointment*(Max 10)     Wednesday May 03, 2017  9:00 AM CDT   Follow-up Visit with Joselin North PA-C   [facility] ([facility])    [address]  [facility] [id number] [phone]            2017  8:30 AM CDT   EXTENDED FOLLOW UP with Katerine Mejia PA-C   [facility] ([facility])    [address] [address] [phone]              7:45 AM CDT   Lab Visit with [facility]   [facility] ([facility]note 3[facility]    [address] [place] [address]   [phone]              8:30 AM CDT   Follow-up Visit with Tonny Galeano MD   [facility] ([facility]note 3])    [address] [place] [address]   [phone]            2017  2:15 PM CDT   Follow-up Visit with Rigoberto Sales MD   [facility] ([facility])    [address] [id number] [phone]              Developed DUANE on CKD with underlying liver failure   ureteral stent placed at [facility], lithotripsy  Developed oliguria, treated with CRRT then transition to HD  Appreciate nephrology consultation.cont HD SCHEDULE mon,wed,fri  Patient temporary HD catheter replaced with PermCath by IR on 2/29/2024.  Patient also has alcohol induced hepatitis.   2017  8:15 AM CDT   Lab Only with [facility]   Counts include 234 beds at the [facility] ([facility])    [address]  [facility] [id number] [phone]            Tuesday October 10, 2017  9:00 AM CDT   Follow-up Visit with Harinder Meneses MD   Counts include 234 beds at the [facility] ([facility])    [address]  [facility] [address] [phone]              Your Vitals Were     BP Height Weight LMP BMI Smoking Status    140/74 5' 4\" (1.626 m) 155 lb (70.3 kg) 03/22/2002 26.61 kg/m2 Former Smoker      Medications Prescribed or Re-Ordered Today     None      Your Current Medications Are        Disp Refills Start End    exemestane (AROMASIN) 25 MG tablet 30 tablet 5 4/20/2017     Sig - Route: Take 1 tablet by mouth daily. 0 - Oral    thyroid (ARMOUR THYROID) 60 MG tablet 30 tablet 5 12/8/2016     Sig - Route: Take 1 tablet by mouth daily. - Oral    Class: Eprescribe    busPIRone (BUSPAR) 5 MG tablet 60 tablet 3 11/8/2016     Sig: TAKE ONE TABLET BY MOUTH TWICE DAILY    Class: Eprescribe    Calcium Carbonate-Vit D-Min (CALCIUM 600 + MINERALS PO)        Sig - Route: Take 1 capsule by mouth 2 times daily. - Oral    Class: Historical Med    Cholecalciferol 2000 UNITS CAPS        Sig - Route: Take 1 capsule by mouth daily. - Oral    Class: Historical Med    fish oil-omega-3 fatty acids 1000 MG CAPS        Sig - Route: Take 2,000 mg by mouth daily. - Oral    Class: Historical Med      Allergies     Fosamax Other (See Comments)    Jaw pain, tooth loss    Amoxicillin     hives    Erythromycin NAUSEA    Lexapro HEADACHES    Morphine     Hallucinations    Vicodin [Hydrocodone-acetaminophen]     Diaphoresis, weakness      Immunizations History as of 5/1/2017     Name Date    HERPES ZOSTER SHINGLES 2/17/2015    Hepatitis B Adult 10/12/1988, 5/20/1988, 4/15/1988    Influenza 10/3/2016, 10/5/2015, 10/1/2013, 10/3/2011, 10/6/2010, 9/28/2009, 10/20/2008, 10/29/2007,  10/14/2006, 11/7/2005, 10/11/2004, 10/3/2003, 10/14/2002, 10/1/2002, 10/23/2001, 10/19/1999, 11/16/1998, 10/28/1997, 10/21/1996, 10/17/1995, 10/21/1994, 10/22/1993, 11/6/1992, 11/21/1991    Influenza A novel H1N1 11/5/2009    PPD 10/5/1987, 7/20/1983    Td:Adult type tetanus/diphtheria 2/7/2003    Tdap 4/9/2012      Problem List as of 5/1/2017     Personal history of malignant neoplasm of unspecified female genital organ    Adhesive capsulitis of left shoulder    Nontoxic multinodular goiter    Subclinical hypothyroidism    Malignant neoplasm of overlapping sites of right female breast    Closed nondisplaced fracture of styloid process of right radius            Patient Instructions     None

## 2024-03-13 NOTE — PLAN OF CARE
Target UF Goal  0  L, run tx net even, as tolerated. Patient dialyzing for  3:30  on 4 K bath for serum K of  3.2  per protocol. Treatment plan reviewed with Nephrology.       Post-Dialysis RN Treatment Note    Blood Pressure:  Pre 78/ mm/Hg  Post 95/62 mmHg   EDW  TBD kg    Weight:  Pre 83.9 kg   Post 83.9 kg   Mode of weight measurement: Bed Scale   Volume Removed  0 ml    Treatment duration  3:07   NS given Yes, 200cc for hypotension   Treatment shortened? Yes, describe: 23 mins per patient request (cold and tired and wanted off)   Medications given during Rx None Reported   Estimated Kt/V  0.90   Access type: Permacath/TDC   Access Issues: Yes, describe: Just sticky on arterial lumen, hard time tolerating blood pump, so lines reversed     Report called to primary nurse   Yes, Gertrude CANTU       Problem: METABOLIC, FLUID AND ELECTROLYTES - ADULT  Goal: Electrolytes maintained within normal limits  Description: INTERVENTIONS:  - Monitor labs and assess patient for signs and symptoms of electrolyte imbalances  - Administer electrolyte replacement as ordered  - Monitor response to electrolyte replacements, including repeat lab results as appropriate  - Instruct patient on fluid and nutrition as appropriate  Outcome: Progressing  Goal: Fluid balance maintained  Description: INTERVENTIONS:  - Monitor labs   - Monitor I/O and WT  - Instruct patient on fluid and nutrition as appropriate  - Assess for signs & symptoms of volume excess or deficit  Outcome: Progressing

## 2024-03-13 NOTE — ASSESSMENT & PLAN NOTE
was on Solu-Cortef 25 mg daily -stopped 3/5  Known Alcoholic cirrhosis  Transferred from [facility] in [place] after liver transplant evaluation, he was found not to be candidate  Continue lactulose but decrease dose to 10 g once daily as ammonia level is normal and patient is having a lot of loose stools.  Will add Xifaxan and see if that will help decrease his diarrhea frequency.  Check ammonia level every 3 days for now as we have been rapidly decreasing lactulose dose  No encephalopathy currently  Patient has extensive workup done, at this time, not much to offer.AT Present not candidate for liver transplant but should be reevaluated again in a few months  Patient does have capacity to make informed medical decisions.  Plan for discharge to SNF for rehab which has in house dialyses would be ideal

## 2024-03-13 NOTE — PROGRESS NOTES
NEPHROLOGY PROGRESS NOTE    Michael Koch 33 y.o. male MRN: [id number]  Unit/Bed#: -01 Encounter: [id number]  Reason for Consult: Dialysis dependent renal failure    Patient is awake although he is sleepy.  His sister was in the room as well.  When he was sat up just to get an x-ray done he felt a little lightheaded his blood pressure has been dropping when he changes position.  Otherwise he is in no distress.  Pleasant for me this morning.    ASSESSMENT/PLAN:  1.  Renal    The patient is dialysis dependent renal failure due to hepatorenal syndrome and ATN.  No significant urine output and creatinine rises between treatments so no signs of renal recovery.  Patient has hypokalemia so dialyze on 4 potassium bath.  Looks like he has been on some oral supplementation for over a week.  Potassium is 3.2.  Bicarbonate is normalized we will discontinue sodium bicarbonate tablets as he is on dialysis and is dialyzed against a high bicarbonate concentration.    Dialysis Monday Wednesday Friday including today.  Plan for 3.5-hour treatment blood flow 350 cc/min dialysate flow 800 cc/min 4 potassium bath     On midodrine for hemodynamic support run dialysate temp 36 °C as this may help support hemodynamically as well.    Epogen on dialysis for anemia    2.  Orthostatic hypotension    3.  Advanced cirrhosis of the liver.      SUBJECTIVE:  Review of Systems   Constitutional: Positive for malaise/fatigue. Negative for chills, diaphoresis and fever.   HENT: Negative.     Eyes: Negative.    Cardiovascular:  Negative for chest pain, dyspnea on exertion and orthopnea.   Respiratory:  Negative for cough, shortness of breath, sputum production and wheezing.    Gastrointestinal:  Negative for abdominal pain, diarrhea, melena and nausea.   Neurological:  Negative for dizziness, focal weakness, headaches and light-headedness.   Psychiatric/Behavioral:  Negative for altered mental status, hallucinations and hypervigilance.   "      OBJECTIVE:  Current Weight: Weight - Scale: 83.9 kg (184 lb 15.5 oz)  Vitals:Temp (24hrs), Av.2 °F (36.8 °C), Min:97.9 °F (36.6 °C), Max:98.6 °F (37 °C)  Current: Temperature: 98.1 °F (36.7 °C)   Blood pressure 99/54, pulse 94, temperature 98.1 °F (36.7 °C), resp. rate 19, height 5' 7\" (1.702 m), weight 83.9 kg (184 lb 15.5 oz), SpO2 100%. , Body mass index is 28.97 kg/m².      Intake/Output Summary (Last 24 hours) at 3/13/2024 1035  Last data filed at 3/13/2024 1015  Gross per 24 hour   Intake 1980 ml   Output --   Net 1980 ml       Physical Exam: BP 99/54   Pulse 94   Temp 98.1 °F (36.7 °C)   Resp 19   Ht 5' 7\" (1.702 m)   Wt 83.9 kg (184 lb 15.5 oz)   SpO2 100%   BMI 28.97 kg/m²   Physical Exam  Constitutional:       General: He is not in acute distress.     Appearance: He is ill-appearing. He is not toxic-appearing.   HENT:      Head: Normocephalic and atraumatic.      Nose: Nose normal.      Mouth/Throat:      Mouth: Mucous membranes are dry.   Eyes:      General: Scleral icterus present.      Extraocular Movements: Extraocular movements intact.   Cardiovascular:      Rate and Rhythm: Normal rate and regular rhythm.      Heart sounds:      No friction rub. No gallop.   Pulmonary:      Effort: Pulmonary effort is normal. No respiratory distress.      Breath sounds: No wheezing or rales.   Abdominal:      General: Bowel sounds are normal. There is no distension.      Palpations: Abdomen is soft.      Tenderness: There is no abdominal tenderness. There is no rebound.   Musculoskeletal:      Cervical back: Normal range of motion and neck supple.   Skin:     Coloration: Skin is jaundiced.   Neurological:      Mental Status: He is alert.   Psychiatric:         Mood and Affect: Mood normal.         Behavior: Behavior normal.         Thought Content: Thought content normal.         Medications:    Current Facility-Administered Medications:     calcitriol (ROCALTROL) capsule 0.25 mcg, 0.25 mcg, Oral, " Daily, Niecy OsoriovedEJ baNP, 0.25 mcg at 03/13/24 0806    calcium acetate (PHOSLO) capsule 1,334 mg, 1,334 mg, Oral, TID With Meals, Niecy Ibarra, CRNP, 1,334 mg at 03/13/24 0806    calcium carbonate (OYSTER SHELL,OSCAL) 500 mg tablet 1 tablet, 1 tablet, Oral, Daily With Breakfast, Niecy Osoriovedo CRNP, 1 tablet at 03/13/24 0806    diphenhydramine, lidocaine, Al/Mg hydroxide, simethicone (Magic Mouthwash) oral solution 10 mL, 10 mL, Swish & Swallow, Q6H PRN, Yuly SIMONS Edenilson, CRNP, 10 mL at 03/07/24 1705    epoetin sandra (EPOGEN,PROCRIT) injection 9,000 Units, 9,000 Units, Intravenous, Once per day on Monday Wednesday Friday, Tyler Stallworth MD, 9,000 Units at 03/13/24 0805    escitalopram (LEXAPRO) tablet 5 mg, 5 mg, Oral, Daily, Constance Menendez MD, 5 mg at 03/13/24 0806    fludrocortisone (FLORINEF) tablet 0.2 mg, 0.2 mg, Oral, Daily, Constance Menendez MD, 0.2 mg at 03/13/24 0806    hydrOXYzine HCL (ATARAX) tablet 25 mg, 25 mg, Oral, Q6H PRN, Yuly Dasilvax, CRNP, 25 mg at 03/13/24 0308    lactulose (CHRONULAC) oral solution 10 g, 10 g, Oral, Daily, Constance Menendez MD, 10 g at 03/13/24 0805    melatonin tablet 3 mg, 3 mg, Oral, HS, Constance Menendez MD, 3 mg at 03/12/24 2112    midodrine (PROAMATINE) tablet 17.5 mg, 17.5 mg, Oral, TID AC, Constance Menendez MD, 17.5 mg at 03/13/24 0607    nicotine (NICODERM CQ) 7 mg/24hr TD 24 hr patch 7 mg, 7 mg, Transdermal, Daily, Constance Menendez MD, 7 mg at 03/13/24 0805    ondansetron (ZOFRAN) injection 4 mg, 4 mg, Intravenous, Q8H PRN, Yuly SIMONS Edenilson, CRNP, 4 mg at 03/06/24 1503    pantoprazole (PROTONIX) injection 40 mg, 40 mg, Intravenous, Q12H [facility], Constance Menendez MD, 40 mg at 03/13/24 0805    polyethylene glycol (GOLYTELY) bowel prep 4,000 mL, 4,000 mL, Oral, See Admin Instructions, Kayleigh tSahl PA-C    potassium chloride (Klor-Con M20) CR tablet 40 mEq, 40 mEq, Oral, BID, Roland Kulkarni MD, 40 mEq at 03/13/24 0806    rifaximin (XIFAXAN) tablet 550 mg, 550 mg, Oral, Q12H MURALI, Constance Menendez MD, 550 mg at 03/13/24  "0806    sodium bicarbonate tablet 1,300 mg, 1,300 mg, Oral, BID after meals, Tyler Stallworth MD, 1,300 mg at 03/13/24 0805    Laboratory Results:  Lab Results   Component Value Date    WBC 18.53 (H) 03/13/2024    HGB 8.5 (L) 03/13/2024    HCT 23.7 (L) 03/13/2024    MCV 83 03/13/2024    PLT 67 (L) 03/13/2024     Lab Results   Component Value Date    SODIUM 134 (L) 03/13/2024    K 3.2 (L) 03/13/2024    CL 98 03/13/2024    CO2 24 03/13/2024    BUN 46 (H) 03/13/2024    CREATININE 7.95 (H) 03/13/2024    GLUC 111 03/13/2024    CALCIUM 7.5 (L) 03/13/2024     Lab Results   Component Value Date    CALCIUM 7.5 (L) 03/13/2024    PHOS 2.5 (L) 03/12/2024     No results found for: \"LABPROT\"    "

## 2024-03-13 NOTE — ASSESSMENT & PLAN NOTE
History alcohol abuse, last drink prior to admission in January  Will need outpatient alcohol counseling prior to transplant reevaluation-patient has referral from [facility] for virtual therapy which supposed to initiated soon.

## 2024-03-13 NOTE — ASSESSMENT & PLAN NOTE
Secondary to end-stage liver disease and hemodialysis dependent acute kidney injury and autonomic dysfunction.  Continue midodrine-patient is still having orthostatic hypotension which is limiting his mobility. cont thigh high CARLOS stockings.  Cont midodrine 17.5 mg 3 times daily,increase  Florinef 0.2 mg daily .decrease zyprexa dose to 5 mg daily and decreased lactulose to 10 gram once daily .  Increase oral intake.  Monitor orthostasis.prn iv albumin.may trial mestinon low dose as last resort  Will do 2 d echo and EKG and consulted cardiology for further recommendations  Patient is unable to stand upright as his blood pressure drops into the 70s and he gets very symptomatic.  Discussed with case management that he needs either our stretcher van or in-house dialysis as that is going to be very difficult to transport him otherwise for outpatient dialysis  And is requesting to go home today however explained to him that due to his significant orthostatic hypotension he cannot be safe discharge to home  Patient is still having orthostatic hypotension even with maximum dose of midodrine and fludrocortisone

## 2024-03-13 NOTE — NURSING NOTE
Patient noncompliant with diet. Patient and family educated on diet restrictions. Patient continues to be noncompliant and eat outside sources high in sodium.

## 2024-03-13 NOTE — CASE MANAGEMENT
Case Management Discharge Planning Note    Patient name Michael Koch  Location /-01 MRN [id number]  : 1990 Date 3/13/2024       Current Admission Date: 2024  Current Admission Diagnosis:Alcoholic hepatitis   Patient Active Problem List    Diagnosis Date Noted    Hemodialysis patient (HCC) 2024    Anemia 2024    Acute on chronic anemia 2024    Renal failure 2024    Malnutrition (HCC) 2024    Orthostatic hypotension 2024    Acute respiratory failure with hypoxia and hypercapnia (HCC) 2024    Alcoholic hepatitis 2024    ETOH abuse 2024    Depression 2024    GERD (gastroesophageal reflux disease) 2024    BPH (benign prostatic hyperplasia) 2024    RSV infection 2024    Abnormal CT scan 2024    Hepatic encephalopathy (HCC) 2024    N&V (nausea and vomiting) 2024    DUANE (acute kidney injury) (HCC) 2024    Thrombocytopenia (HCC) 2024    Lactic acidosis 2024    Marijuana use 2024    SIRS (systemic inflammatory response syndrome) (HCC) 2024    History of gout 10/04/2023    History of hypertension 10/04/2023    Type 2 diabetes mellitus with hyperglycemia, without long-term current use of insulin (HCC) 10/04/2023    Tobacco abuse 10/04/2023      LOS (days): 15  Geometric Mean LOS (GMLOS) (days): 4.8  Days to GMLOS:-9.9     OBJECTIVE:  Risk of Unplanned Readmission Score: 52.34         Current admission status: Inpatient   Preferred Pharmacy:   [id number] - [place], [place] - [address]  [address]  [place] [address]  Phone: [phone] Fax: [phone]    Primary Care Provider: Louie Odonnell DO    Primary Insurance: BLUE CROSS  Secondary Insurance:     DISCHARGE DETAILS:        [facility] but [facility], onsite HD provider, has not yet approved patient for HD at facility. They have some concerns and  would like to see patient more clinically stable prior to discharge. [facility] inquired if patient has been considered for terlipressin infusion at [place].    CM to follow.

## 2024-03-13 NOTE — PROGRESS NOTES
Progress Note - [facility]  Michael Koch 33 y.o. male MRN: [id number]  Unit/Bed#: -01 Encounter: [id number]      ASSESSMENT & PLAN    Acute anemia   Patient admitted with alcoholic hepatitis and resulting liver failure with acute drop in Hgb from 8.8 > 6.7 this AM, repeat 6.2  No overt GI bleeding  No prior endoscopic evaluation  Initially planned for EGD/colon on 3/12 but was cancelled after cardiology evaluation  Appreciate input, patient would be high risk for sedation from cardiac standpoint and would not recommend proceeding unless develops overt GI bleeding  If needing endoscopic eval, consider transferring to facility where he could be on vasopressor and inotropic support and receive CRRT if needed  Patient s/p 2U pRBCs on 3/11 and has remained stable at 8.5 for the past 2 days  Continue to monitor Hgb and for overt GI bleeding    Liver failure  Alcoholic hepatitis  Alcoholic cirrhosis of liver with ascites and encephalopathy  Hepatorenal syndrome  Severe hyperbilirubinemia  Elevated LFTs   MELD-Na 43  Serologic liver workup unremarkable   On IV solu-cortef 25mg daily, transitioned to fludrocortisone 0.2mg daily  Ascites - SPB prophylaxis w/ ciprofloxacin, abd US 2/15 with trace ascites  Hepatic encephalopathy - continue lactulose, xifaxan  Variceal screening - needs outpatient EGD  HCC screening - abd US 2/15 no masses  HRS - on dialysis MWF  Patient will need close outpatient follow up with hepatology       Reason for Consult / Principal Problem: anemia    HPI: Michael Koch is a 33 y.o. year old male with a PMHx type 2 DM, gout, HTN, hx nephrolithiasis, depression/anxiety, ETOH abuse, tobacco abuse, marijuana use who is admitted with alcoholic hepatitis.     Patient seen at bedside. Denies any overt GI bleeding. Getting dialysis today. He is anxious to be discharged. He is waiting on SNF placement. He is alert and oriented x 3.    Past Medical History:   Diagnosis Date     "Depression     Diabetes mellitus (HCC)     Gout     Hypertension     Hyponatremia 01/21/2024    Kidney stones      Past Surgical History:   Procedure Laterality Date    CYSTOSCOPY W/ URETERAL STENT PLACEMENT  09/14/2023    HEMODIALYSIS ADULT  3/4/2024    IR TUNNELED DIALYSIS CATHETER PLACEMENT  2/29/2024    US GUIDED MASS BIOPSY (UNSPECIFIED)  02/19/2021     Social History   Social History     Substance and Sexual Activity   Alcohol Use Yes    Comment: \"couple 24 oz cans per day\"     Social History     Substance and Sexual Activity   Drug Use Never     Social History     Tobacco Use   Smoking Status Every Day    Current packs/day: 0.25    Types: Cigarettes   Smokeless Tobacco Never       No family history on file.    Medications Prior to Admission   Medication    ciprofloxacin (CIPRO) 250 mg tablet    guaiFENesin (ROBITUSSIN) 100 mg/5 mL syrup    hydrocortisone (Solu-CORTEF) 100 mg SOLR    insulin glargine (LANTUS) 100 units/mL subcutaneous injection    lactulose 20 g/30 mL    lansoprazole (PREVACID) 3 mg/ml SUSP oral suspension    Melatonin 12 MG TABS    midodrine (PROAMATINE) 5 mg tablet    OLANZapine (ZyPREXA) 15 mg tablet    thiamine 100 MG tablet    magnesium Oxide (MAG-OX) 400 mg TABS    nicotine (NICODERM CQ) 21 mg/24 hr TD 24 hr patch    tamsulosin (FLOMAX) 0.4 mg     Current Facility-Administered Medications   Medication Dose Route Frequency    calcitriol (ROCALTROL) capsule 0.25 mcg  0.25 mcg Oral Daily    calcium acetate (PHOSLO) capsule 1,334 mg  1,334 mg Oral TID With Meals    calcium carbonate (OYSTER SHELL,OSCAL) 500 mg tablet 1 tablet  1 tablet Oral Daily With Breakfast    diphenhydramine, lidocaine, Al/Mg hydroxide, simethicone (Magic Mouthwash) oral solution 10 mL  10 mL Swish & Swallow Q6H PRN    epoetin sandra (EPOGEN,PROCRIT) injection 9,000 Units  9,000 Units Intravenous Once per day on Monday Wednesday Friday    escitalopram (LEXAPRO) tablet 5 mg  5 mg Oral Daily    fludrocortisone (FLORINEF) " tablet 0.2 mg  0.2 mg Oral Daily    hydrOXYzine HCL (ATARAX) tablet 25 mg  25 mg Oral Q6H PRN    lactulose (CHRONULAC) oral solution 10 g  10 g Oral Daily    melatonin tablet 3 mg  3 mg Oral HS    midodrine (PROAMATINE) tablet 17.5 mg  17.5 mg Oral TID AC    nicotine (NICODERM CQ) 7 mg/24hr TD 24 hr patch 7 mg  7 mg Transdermal Daily    ondansetron (ZOFRAN) injection 4 mg  4 mg Intravenous Q8H PRN    pantoprazole (PROTONIX) injection 40 mg  40 mg Intravenous Q12H MURALI    polyethylene glycol (GOLYTELY) bowel prep 4,000 mL  4,000 mL Oral See Admin Instructions    potassium chloride (Klor-Con M20) CR tablet 40 mEq  40 mEq Oral BID    rifaximin (XIFAXAN) tablet 550 mg  550 mg Oral Q12H MURALI    sodium bicarbonate tablet 1,300 mg  1,300 mg Oral BID after meals     No Known Allergies    Physical Exam  Constitutional:       General: He is not in acute distress.     Appearance: He is ill-appearing.   HENT:      Mouth/Throat:      Mouth: Mucous membranes are moist.   Eyes:      General: Scleral icterus present.   Pulmonary:      Effort: Pulmonary effort is normal. No respiratory distress.   Abdominal:      General: Abdomen is flat. There is no distension.      Tenderness: There is no abdominal tenderness. There is no guarding.   Skin:     General: Skin is warm and dry.      Coloration: Skin is jaundiced.   Neurological:      Mental Status: He is alert.       Most Recent Vital Signs:  Vitals:    03/12/24 2050 03/12/24 2216 03/13/24 0448 03/13/24 0625   BP:  101/53 99/53 99/54   BP Location:   Left arm    Pulse:  96 95 94   Resp:  18 19    Temp:  98.2 °F (36.8 °C) 98.2 °F (36.8 °C) 98.1 °F (36.7 °C)   TempSrc:   Temporal    SpO2: 97% 97% 99% 100%   Weight:   83.9 kg (184 lb 15.5 oz)    Height:           Intake/Output Summary (Last 24 hours) at 3/13/2024 0913  Last data filed at 3/13/2024 0401  Gross per 24 hour   Intake 1380 ml   Output --   Net 1380 ml       LABS/IMAGING  Lab Results: I have reviewed all relevant lab results  during this hospitalization.    Imaging Studies:  I have reviewed all the relevant images during this hospitalizations    Counseling / Coordination of Care  Total time spent today 20 minutes. Greater than 50% of total time was spent with the patient and / or family counseling and / or coordination of care.    Kayleigh Stahl PA-C

## 2024-03-13 NOTE — PROGRESS NOTES
[facility]  Progress Note  Name: Michael Koch I  MRN: [id number]  Unit/Bed#: -01 I Date of Admission: 2/27/2024   Date of Service: 3/13/2024 I Hospital Day: 15    Assessment/Plan   * Alcoholic hepatitis  Assessment & Plan  was on Solu-Cortef 25 mg daily -stopped 3/5  Known Alcoholic cirrhosis  Transferred from [facility] in [place] after liver transplant evaluation, he was found not to be candidate  Continue lactulose but decrease dose to 10 g once daily as ammonia level is normal and patient is having a lot of loose stools.  Will add Xifaxan and see if that will help decrease his diarrhea frequency.  Check ammonia level every 3 days for now as we have been rapidly decreasing lactulose dose  No encephalopathy currently  Patient has extensive workup done, at this time, not much to offer.AT Present not candidate for liver transplant but should be reevaluated again in a few months  Patient does have capacity to make informed medical decisions.  Plan for discharge to SNF for rehab which has in house dialyses would be ideal    Acute on chronic anemia  Assessment & Plan  Hemoglobin is around 8.9.  Patient has intermittently been requiring blood transfusions during his extensive hospitalization courses over the last few weeks.  Today hemoglobin dropped to 6.2.  Will give 2 units PRBC and recheck CBC in a.m. and plan for EGD and colonoscopy in a.m. if medically stable.  Continue Protonix drip and clear liquids for now.  No overt signs of bleeding noted at this time    Initial plan was to proceed with EGD and colonoscopy.  Due to patient ongoing complex medical conditions and persistent hypertension, cardiology consulted for preop clearance-Per cardiology recommendation, patient will be very high risk patients.  Unless there is active bleeding visualized, recommending to defer the procedure if possible.  In case without any active bleeding, patient still needs EGD and colonoscopy in  that case cardiology recommending to transfer the patient to tertiary center.  Discussed with gastroenterologist, will hold off procedure at this time    Orthostatic hypotension  Assessment & Plan  Secondary to end-stage liver disease and hemodialysis dependent acute kidney injury and autonomic dysfunction.  Continue midodrine-patient is still having orthostatic hypotension which is limiting his mobility. cont thigh high CARLOS stockings.  Cont midodrine 17.5 mg 3 times daily,increase  Florinef 0.2 mg daily .decrease zyprexa dose to 5 mg daily and decreased lactulose to 10 gram once daily .  Increase oral intake.  Monitor orthostasis.prn iv albumin.may trial mestinon low dose as last resort  Will do 2 d echo and EKG and consulted cardiology for further recommendations  Patient is unable to stand upright as his blood pressure drops into the 70s and he gets very symptomatic.  Discussed with case management that he needs either our stretcher van or in-house dialysis as that is going to be very difficult to transport him otherwise for outpatient dialysis  And is requesting to go home today however explained to him that due to his significant orthostatic hypotension he cannot be safe discharge to home  Patient is still having orthostatic hypotension even with maximum dose of midodrine and fludrocortisone    Renal failure  Assessment & Plan  Developed DUANE on CKD with underlying liver failure   ureteral stent placed at [facility], lithotripsy  Developed oliguria, treated with CRRT then transition to HD  Appreciate nephrology consultation.cont HD SCHEDULE mon,wed,fri  Patient temporary HD catheter replaced with PermCath by IR on 2/29/2024.  Patient also has alcohol induced hepatitis.    Depression  Assessment & Plan  Continue Zyprexa at bedtime decreased dose to 5 mg daily FROM 15MG daily and also decreased melatonin to 3 mg daily  Please note that patient had very bad alcohol withdrawals and was on Precedex drip for some time  during the last few weeks.  Also having behavioral issues and was placed on Zyprexa during his hospital course.  Patient probably has more than just depression and needs reeval with psychiatry  Patient having significant orthostatic hypotension and hence trying to decrease dose of Zyprexa.  Will ask psychiatry to reevaluate and placed him on some other agent does not cause hypotension as he still has behavior issues  cortisol level is currently normal will recheck again  in a week as now off steroids.normal ammonia level    ETOH abuse  Assessment & Plan  History alcohol abuse, last drink prior to admission in January  Will need outpatient alcohol counseling prior to transplant reevaluation-patient has referral from [facility] for virtual therapy which supposed to initiated soon.      Tobacco abuse  Assessment & Plan  Nicotine patch  Cessation counseling               VTE Pharmacologic Prophylaxis:    None    Mobility:   Basic Mobility Inpatient Raw Score: 9  -HLM Goal: 3: Sit at edge of bed  JH-HLM Achieved: 2: Bed activities/Dependent transfer  HLM Goal achieved. Continue to encourage appropriate mobility.    Patient Centered Rounds: I performed bedside rounds with nursing staff today.   Discussions with Specialists or Other Care Team Provider: Monitor above condition    Education and Discussions with Family / Patient: Updated  (sister) at bedside.      Current Length of Stay: 15 day(s)  Current Patient Status: Inpatient   Certification Statement: The patient will continue to require additional inpatient hospital stay due to monitor above conditions  Discharge Plan:  To be determined    Code Status: Level 3 - DNAR and DNI    Subjective:   Seen and evaluated and examined.  Resting comfortably.  Denies any significant complaint.    Objective:     Vitals:   Temp (24hrs), Av.2 °F (36.8 °C), Min:98.1 °F (36.7 °C), Max:98.2 °F (36.8 °C)    Temp:  [98.1 °F (36.7 °C)-98.2 °F (36.8 °C)] 98.1 °F  (36.7 °C)  HR:  [70-97] 94  Resp:  [18-19] 18  BP: ()/(46-63) 87/55  SpO2:  [97 %-100 %] 100 %  Body mass index is 28.97 kg/m².     Input and Output Summary (last 24 hours):     Intake/Output Summary (Last 24 hours) at 3/13/2024 1547  Last data filed at 3/13/2024 1345  Gross per 24 hour   Intake 1240 ml   Output --   Net 1240 ml       Physical Exam:   Physical Exam  Vitals and nursing note reviewed.   Constitutional:       Appearance: Normal appearance. He is ill-appearing. He is not diaphoretic.   HENT:      Mouth/Throat:      Mouth: Mucous membranes are moist.      Pharynx: No oropharyngeal exudate.   Eyes:      General: Scleral icterus present.      Pupils: Pupils are equal, round, and reactive to light.   Cardiovascular:      Rate and Rhythm: Normal rate.      Heart sounds: No murmur heard.     No friction rub. No gallop.   Pulmonary:      Effort: Pulmonary effort is normal. No respiratory distress.      Breath sounds: No stridor. No wheezing or rhonchi.   Abdominal:      General: Abdomen is flat. Bowel sounds are normal. There is distension.      Tenderness: There is no abdominal tenderness. There is no guarding or rebound.      Hernia: No hernia is present.   Musculoskeletal:      Right lower leg: No edema.      Left lower leg: No edema.   Skin:     General: Skin is warm.   Neurological:      General: No focal deficit present.      Mental Status: He is alert and oriented to person, place, and time.      Cranial Nerves: No cranial nerve deficit.      Sensory: No sensory deficit.      Motor: No weakness.      Coordination: Coordination normal.          Additional Data:     Labs:  Results from last 7 days   Lab Units 03/13/24  0443   WBC Thousand/uL 18.53*   HEMOGLOBIN g/dL 8.5*   HEMATOCRIT % 23.7*   PLATELETS Thousands/uL 67*   NEUTROS PCT % 69   LYMPHS PCT % 16   MONOS PCT % 13*   EOS PCT % 1     Results from last 7 days   Lab Units 03/13/24  0443   SODIUM mmol/L 134*   POTASSIUM mmol/L 3.2*   CHLORIDE  mmol/L 98   CO2 mmol/L 24   BUN mg/dL 46*   CREATININE mg/dL 7.95*   ANION GAP mmol/L 12   CALCIUM mg/dL 7.5*   ALBUMIN g/dL 2.4*   TOTAL BILIRUBIN mg/dL 45.15*   ALK PHOS U/L 183*   ALT U/L 39   AST U/L 40*   GLUCOSE RANDOM mg/dL 111     Results from last 7 days   Lab Units 03/13/24  0443   INR  2.31*     Results from last 7 days   Lab Units 03/11/24  0625 03/08/24  0731 03/07/24  2057 03/07/24  1514 03/07/24  1056 03/07/24  0813 03/06/24  2054 03/06/24  1725   POC GLUCOSE mg/dl 132 128 111 131 126 285* 113 113         Results from last 7 days   Lab Units 03/13/24  0443   LACTIC ACID mmol/L 1.0       Lines/Drains:  Invasive Devices       Peripheral Intravenous Line  Duration             Peripheral IV 03/11/24 Right;Ventral (anterior) Forearm 2 days              Hemodialysis Catheter  Duration             HD Permanent Double Catheter 13 days                          Imaging: No pertinent imaging reviewed.    Recent Cultures (last 7 days):         Last 24 Hours Medication List:   Current Facility-Administered Medications   Medication Dose Route Frequency Provider Last Rate    calcitriol  0.25 mcg Oral Daily Niecy OsoriovedJENSEN ba      calcium acetate  1,334 mg Oral TID With Meals Niecy Ibarra, CRNP      calcium carbonate  1 tablet Oral Daily With Breakfast Niecy OsoriovedJENSEN ba      diphenhydramine, lidocaine, Al/Mg hydroxide, simethicone  10 mL Swish & Swallow Q6H PRN Yuly S Edenilson, CRNP      epoetin sandra  9,000 Units Intravenous Once per day on Monday Wednesday Friday Tyler Stallworth MD      escitalopram  5 mg Oral Daily Constance Menendez MD      fludrocortisone  0.2 mg Oral Daily Constance Menendez MD      hydrOXYzine HCL  25 mg Oral Q6H PRN Yuly S Edenilson, CRNP      lactulose  10 g Oral Daily Constance Menendez MD      melatonin  3 mg Oral HS Constance Menendez MD      midodrine  17.5 mg Oral TID AC Constance Menendez MD      nicotine  7 mg Transdermal Daily Constance Menendez MD      ondansetron  4 mg Intravenous Q8H PRN Yuly S Edenilson, CRNP      pantoprazole   40 mg Intravenous Q12H MURALI Menendez MD      polyethylene glycol  4,000 mL Oral See Admin Instructions Kayleigh Stahl PA-C      potassium chloride  40 mEq Oral BID Roland Kulkarni MD      rifaximin  550 mg Oral Q12H MURALI Menendez MD          Today, Patient Was Seen By: Tiburcio Jean MD    **Please Note: This note may have been constructed using a voice recognition system.**

## 2024-03-14 PROBLEM — N19 RENAL FAILURE: Status: RESOLVED | Noted: 2024-02-28 | Resolved: 2024-03-14

## 2024-03-14 PROBLEM — Z99.2 ANEMIA DUE TO CHRONIC KIDNEY DISEASE, ON CHRONIC DIALYSIS (HCC): Status: ACTIVE | Noted: 2024-03-14

## 2024-03-14 PROBLEM — N18.6 ANEMIA DUE TO CHRONIC KIDNEY DISEASE, ON CHRONIC DIALYSIS (HCC): Status: ACTIVE | Noted: 2024-03-14

## 2024-03-14 PROBLEM — D63.1 ANEMIA DUE TO CHRONIC KIDNEY DISEASE, ON CHRONIC DIALYSIS (HCC): Status: ACTIVE | Noted: 2024-03-14

## 2024-03-14 NOTE — ASSESSMENT & PLAN NOTE
History alcohol abuse, last drink prior to admission in January  Will need outpatient alcohol counseling prior to transplant reevaluation-patient has referral from [facility] for virtual therapy which supposed to initiated soon.

## 2024-03-14 NOTE — ASSESSMENT & PLAN NOTE
Secondary to end-stage liver disease and hemodialysis dependent acute kidney injury and autonomic dysfunction.  Continue midodrine-patient is still having orthostatic hypotension which is limiting his mobility. cont thigh high CARLOS stockings.  Cont midodrine 17.5 mg 3 times daily,increase  Florinef 0.2 mg daily .decrease zyprexa dose to 5 mg daily and decreased lactulose to 10 gram once daily .  Increase oral intake.  Monitor orthostasis.prn iv albumin.may trial mestinon low dose as last resort  Echocardiogram reviewed  Patient is on higher dose of midodrine and fludrocortisone, is still patient is having orthostatic hypotension especially with movement.

## 2024-03-14 NOTE — CASE MANAGEMENT
Case Management Discharge Planning Note    Patient name Michael Koch  Location /-01 MRN [id number]  : 1990 Date 3/14/2024       Current Admission Date: 2024  Current Admission Diagnosis:Alcoholic hepatitis   Patient Active Problem List    Diagnosis Date Noted    Anemia due to chronic kidney disease, on chronic dialysis (HCC) 2024    Hemodialysis patient (HCC) 2024    Acute on chronic anemia 2024    Renal failure 2024    Malnutrition (HCC) 2024    Orthostatic hypotension 2024    Acute respiratory failure with hypoxia and hypercapnia (HCC) 2024    Alcoholic hepatitis 2024    ETOH abuse 2024    Depression 2024    GERD (gastroesophageal reflux disease) 2024    BPH (benign prostatic hyperplasia) 2024    RSV infection 2024    Abnormal CT scan 2024    Hepatic encephalopathy (HCC) 2024    N&V (nausea and vomiting) 2024    DUANE (acute kidney injury) (HCC) 2024    Thrombocytopenia (HCC) 2024    Lactic acidosis 2024    Marijuana use 2024    SIRS (systemic inflammatory response syndrome) (HCC) 2024    History of gout 10/04/2023    History of hypertension 10/04/2023    Type 2 diabetes mellitus with hyperglycemia, without long-term current use of insulin (HCC) 10/04/2023    Tobacco abuse 10/04/2023      LOS (days): 16  Geometric Mean LOS (GMLOS) (days): 4.8  Days to GMLOS:-10.7     OBJECTIVE:  Risk of Unplanned Readmission Score: 53.64         Current admission status: Inpatient   Preferred Pharmacy:   [id number] - [place], [place] - [address]  [address]  [place] [address]  Phone: [phone] Fax: [phone]    Primary Care Provider: Louie Odonnell DO    Primary Insurance: BLUE CROSS  Secondary Insurance:     DISCHARGE DETAILS:     1000 CM met with patient and MD. CM discussed with patient STR facility accepted in [place] however  [place], in house dialysis provider, concerned for patients orthostatic hypotension and has not yet accepted patient for dialysis in house. CM discussed with patient concern it may take some time for discharge. Patient indicated he does not desire to pass in hospital or in STR, patient desires to pass at home. Patient questioning discharge home with no treatment, ie. hospice.  CM to follow.

## 2024-03-14 NOTE — ASSESSMENT & PLAN NOTE
Lab Results   Component Value Date    EGFR 11 03/14/2024    EGFR 8 03/13/2024    EGFR 10 03/12/2024    CREATININE 5.92 (H) 03/14/2024    CREATININE 7.95 (H) 03/13/2024    CREATININE 6.22 (H) 03/12/2024   Continue conservative management

## 2024-03-14 NOTE — PROGRESS NOTES
NEPHROLOGY PROGRESS NOTE    Michael Koch 33 y.o. male MRN: [id number]  Unit/Bed#: -[place] Encounter: [id number]  Reason for Consult: Renal failure dependent on dialysis    Patient is relatively stable over the evening.  The patient underwent dialysis yesterday note was reviewed patient was relatively stable although remained hypotensive.  Stable presently still gets lightheaded when sits up.    ASSESSMENT/PLAN:  1.  Renal    The patient has dialysis dependent renal failure no signs of recovery due to ATN and hepatorenal syndrome.  He had dialysis yesterday I reviewed the dialysis nurses note they were unable to remove volume due to hypotension.  Labs show persistent hypokalemia he is on oral supplementation and dialyzed on a 4 potassium bath so should not remove potassium.  Open is 8.3.    Hemodialysis   Supportive care with midodrine and low dialysate temp to try and support blood pressure  Epogen on dialysis for anemia    2.  Cirrhosis of the liver    Patient is not a transplant candidate and was evaluated recently in the transplant center.  He now suffers from orthostatic hypotension and remains with severe hyperbilirubinemia and renal failure.    GI is following.    SUBJECTIVE:  Review of Systems   Constitutional: Positive for malaise/fatigue. Negative for chills and diaphoresis.   HENT: Negative.     Eyes: Negative.    Cardiovascular:  Negative for chest pain, dyspnea on exertion and orthopnea.   Respiratory:  Negative for cough, shortness of breath, sputum production and wheezing.    Gastrointestinal:  Negative for abdominal pain, diarrhea, nausea and vomiting.   Neurological:  Positive for light-headedness. Negative for focal weakness and headaches.   Psychiatric/Behavioral:  Negative for altered mental status, hallucinations and hypervigilance.        OBJECTIVE:  Current Weight: Weight - Scale: 85 kg (187 lb 6.3 oz)  Vitals:Temp (24hrs), Av °F (36.7 °C), Min:97.2 °F (36.2  "°C), Max:98.6 °F (37 °C)  Current: Temperature: (!) 97.2 °F (36.2 °C)   Blood pressure (!) 87/48, pulse 94, temperature (!) 97.2 °F (36.2 °C), resp. rate 18, height 5' 7\" (1.702 m), weight 85 kg (187 lb 6.3 oz), SpO2 100%. , Body mass index is 29.35 kg/m².      Intake/Output Summary (Last 24 hours) at 3/14/2024 0948  Last data filed at 3/14/2024 0246  Gross per 24 hour   Intake 1030 ml   Output 546 ml   Net 484 ml       Physical Exam: BP (!) 87/48   Pulse 94   Temp (!) 97.2 °F (36.2 °C)   Resp 18   Ht 5' 7\" (1.702 m)   Wt 85 kg (187 lb 6.3 oz)   SpO2 100%   BMI 29.35 kg/m²   Physical Exam  Constitutional:       General: He is not in acute distress.     Appearance: He is ill-appearing. He is not toxic-appearing.   HENT:      Head: Normocephalic and atraumatic.      Nose: Nose normal.      Mouth/Throat:      Mouth: Mucous membranes are dry.   Eyes:      General: Scleral icterus present.   Cardiovascular:      Rate and Rhythm: Normal rate and regular rhythm.      Heart sounds:      No friction rub. No gallop.   Pulmonary:      Effort: Pulmonary effort is normal. No respiratory distress.      Breath sounds: No wheezing or rales.   Abdominal:      General: Bowel sounds are normal.      Tenderness: There is no abdominal tenderness.   Musculoskeletal:      Cervical back: Normal range of motion and neck supple.   Skin:     Coloration: Skin is jaundiced.   Neurological:      Mental Status: He is alert and oriented to person, place, and time.   Psychiatric:         Mood and Affect: Mood normal.         Behavior: Behavior normal.         Thought Content: Thought content normal.         Medications:    Current Facility-Administered Medications:     calcitriol (ROCALTROL) capsule 0.25 mcg, 0.25 mcg, Oral, Daily, Niecy Ibarra, CRNP, 0.25 mcg at 03/14/24 0838    calcium acetate (PHOSLO) capsule 1,334 mg, 1,334 mg, Oral, TID With Meals, Niecy Ibarra, CRNP, 1,334 mg at 03/14/24 0844    calcium carbonate (OYSTER SHELL,OSCAL) 500 " mg tablet 1 tablet, 1 tablet, Oral, Daily With Breakfast, Niecy Osoriovedo, CRNP, 1 tablet at 03/14/24 0843    diphenhydramine, lidocaine, Al/Mg hydroxide, simethicone (Magic Mouthwash) oral solution 10 mL, 10 mL, Swish & Swallow, Q6H PRN, Yuly S Edenilson, CRNP, 10 mL at 03/07/24 1705    epoetin sandra (EPOGEN,PROCRIT) injection 9,000 Units, 9,000 Units, Intravenous, Once per day on Monday Wednesday Friday, Tyler Satllworth MD, 9,000 Units at 03/13/24 0805    escitalopram (LEXAPRO) tablet 5 mg, 5 mg, Oral, Daily, Constance Menendez MD, 5 mg at 03/14/24 0838    fludrocortisone (FLORINEF) tablet 0.2 mg, 0.2 mg, Oral, Daily, Constance Menendez MD, 0.2 mg at 03/14/24 0838    hydrOXYzine HCL (ATARAX) tablet 25 mg, 25 mg, Oral, Q6H PRN, Yuly Dasilvax, CRNP, 25 mg at 03/13/24 0308    lactulose (CHRONULAC) oral solution 10 g, 10 g, Oral, Daily, Constance Menendez MD, 10 g at 03/14/24 0839    melatonin tablet 3 mg, 3 mg, Oral, HS, Constance Menendez MD, 3 mg at 03/13/24 2108    midodrine (PROAMATINE) tablet 17.5 mg, 17.5 mg, Oral, TID AC, Constance Menendez MD, 17.5 mg at 03/14/24 0601    nicotine (NICODERM CQ) 7 mg/24hr TD 24 hr patch 7 mg, 7 mg, Transdermal, Daily, Constance Menendez MD, 7 mg at 03/14/24 0840    ondansetron (ZOFRAN) injection 4 mg, 4 mg, Intravenous, Q8H PRN, Yuly SIMONS Edenilson, CRNP, 4 mg at 03/06/24 1503    pantoprazole (PROTONIX) injection 40 mg, 40 mg, Intravenous, Q12H MURALI, Constance Menendez MD, 40 mg at 03/14/24 0838    polyethylene glycol (GOLYTELY) bowel prep 4,000 mL, 4,000 mL, Oral, See Admin Instructions, Kayleigh Stahl PA-C    potassium chloride (Klor-Con M20) CR tablet 40 mEq, 40 mEq, Oral, BID, Roland Kulkarni MD, 40 mEq at 03/14/24 0839    potassium chloride 20 mEq IVPB (premix), 20 mEq, Intravenous, Q2H, Yuly Dasilvax, CRNP, Last Rate: 50 mL/hr at 03/14/24 0840, 20 mEq at 03/14/24 0840    rifaximin (XIFAXAN) tablet 550 mg, 550 mg, Oral, Q12H MURALI, Constance Menendez MD, 550 mg at 03/14/24 0839    Laboratory Results:  Lab Results   Component Value Date    WBC  "20.54 (H) 03/14/2024    HGB 8.3 (L) 03/14/2024    HCT 22.6 (L) 03/14/2024    MCV 82 03/14/2024    PLT 73 (L) 03/14/2024     Lab Results   Component Value Date    SODIUM 134 (L) 03/14/2024    K 2.7 (LL) 03/14/2024    CL 96 03/14/2024    CO2 28 03/14/2024    BUN 28 (H) 03/14/2024    CREATININE 5.92 (H) 03/14/2024    GLUC 108 03/14/2024    CALCIUM 7.3 (L) 03/14/2024     Lab Results   Component Value Date    CALCIUM 7.3 (L) 03/14/2024    PHOS 2.5 (L) 03/12/2024     No results found for: \"LABPROT\"    "

## 2024-03-14 NOTE — PROGRESS NOTES
[facility]  Progress Note  Name: Michael Koch I  MRN: [id number]  Unit/Bed#: -01 I Date of Admission: 2/27/2024   Date of Service: 3/14/2024 I Hospital Day: 16    Assessment/Plan   * Alcoholic hepatitis  Assessment & Plan  was on Solu-Cortef 25 mg daily -stopped 3/5  Known Alcoholic cirrhosis  Transferred from [facility] [place] [facility] in [place] after liver transplant evaluation, he was found not to be candidate  Continue lactulose but decrease dose to 10 g once daily as ammonia level is normal and patient is having a lot of loose stools.   Xifaxan and see if that will help decrease his diarrhea frequency.  Check ammonia level every 3 days for now as we have been rapidly decreasing lactulose dose  No encephalopathy currently  Patient has extensive workup done, at this time, not much to offer.AT Present not candidate for liver transplant but should be reevaluated again in a few months  Patient does have capacity to make informed medical decisions.  Plan for discharge to SNF for rehab which has in house dialyses would be ideal    Anemia due to chronic kidney disease, on chronic dialysis (HCC)  Assessment & Plan  Lab Results   Component Value Date    EGFR 11 03/14/2024    EGFR 8 03/13/2024    EGFR 10 03/12/2024    CREATININE 5.92 (H) 03/14/2024    CREATININE 7.95 (H) 03/13/2024    CREATININE 6.22 (H) 03/12/2024   Continue conservative management    Acute on chronic anemia  Assessment & Plan  Hemoglobin is around 8.9.  Patient has intermittently been requiring blood transfusions during his extensive hospitalization courses over the last few weeks.  Today hemoglobin dropped to 6.2.  Will give 2 units PRBC and recheck CBC in a.m. and plan for EGD and colonoscopy in a.m. if medically stable.  Continue Protonix drip and clear liquids for now.  No overt signs of bleeding noted at this time    Initial plan was to proceed with EGD and colonoscopy.  Due to patient ongoing complex medical conditions and  persistent hypertension, cardiology consulted for preop clearance-Per cardiology recommendation, patient will be very high risk patients.  Unless there is active bleeding visualized, recommending to defer the procedure if possible.  In case without any active bleeding, patient still needs EGD and colonoscopy in that case cardiology recommending to transfer the patient to tertiary center.  Discussed with gastroenterologist, will hold off procedure at this time    Orthostatic hypotension  Assessment & Plan  Secondary to end-stage liver disease and hemodialysis dependent acute kidney injury and autonomic dysfunction.  Continue midodrine-patient is still having orthostatic hypotension which is limiting his mobility. cont thigh high CARLOS stockings.  Cont midodrine 17.5 mg 3 times daily,increase  Florinef 0.2 mg daily .decrease zyprexa dose to 5 mg daily and decreased lactulose to 10 gram once daily .  Increase oral intake.  Monitor orthostasis.prn iv albumin.may trial mestinon low dose as last resort  Echocardiogram reviewed  Patient is on higher dose of midodrine and fludrocortisone, is still patient is having orthostatic hypotension especially with movement.    Depression  Assessment & Plan  Continue Zyprexa at bedtime decreased dose to 5 mg daily FROM 15MG daily and also decreased melatonin to 3 mg daily  Please note that patient had very bad alcohol withdrawals and was on Precedex drip for some time during the last few weeks.  Also having behavioral issues and was placed on Zyprexa during his hospital course.  Patient probably has more than just depression and needs reeval with psychiatry  Patient having significant orthostatic hypotension and hence trying to decrease dose of Zyprexa.  Will ask psychiatry to reevaluate and placed him on some other agent does not cause hypotension as he still has behavior issues  cortisol level is currently normal will recheck again  in a week as now off steroids.normal ammonia  level    ETOH abuse  Assessment & Plan  History alcohol abuse, last drink prior to admission in January  Will need outpatient alcohol counseling prior to transplant reevaluation-patient has referral from [facility] for virtual therapy which supposed to initiated soon.      Tobacco abuse  Assessment & Plan  Nicotine patch  Cessation counseling             VTE Pharmacologic Prophylaxis:    Liver cirrhosis with therapeutic INR    Mobility:   Basic Mobility Inpatient Raw Score: 13  -HLM Goal: 4: Move to chair/commode  JH-HLM Achieved: 3: Sit at edge of bed  HLM Goal NOT achieved. Continue with multidisciplinary rounding and encourage appropriate mobility to improve upon HLM goals.    Patient Centered Rounds: I performed bedside rounds with nursing staff today.   Discussions with Specialists or Other Care Team Provider: Nephrology    Education and Discussions with Family / Patient: Updated  (wife) over the phone    Current Length of Stay: 16 day(s)  Current Patient Status: Inpatient   Certification Statement: The patient will continue to require additional inpatient hospital stay due to monitor above conditions  Discharge Plan:  To be determined    Code Status: Level 3 - DNAR and DNI    Subjective:   Seen and evaluated during RN.  Resting comfortably.  Feels tired and fatigued.    Objective:     Vitals:   Temp (24hrs), Av °F (36.7 °C), Min:97.2 °F (36.2 °C), Max:98.6 °F (37 °C)    Temp:  [97.2 °F (36.2 °C)-98.6 °F (37 °C)] 97.2 °F (36.2 °C)  HR:  [] 96  Resp:  [18] 18  BP: (73-96)/(45-63) 88/47  SpO2:  [95 %-100 %] 100 %  Body mass index is 29.35 kg/m².     Input and Output Summary (last 24 hours):     Intake/Output Summary (Last 24 hours) at 3/14/2024 1246  Last data filed at 3/14/2024 0840  Gross per 24 hour   Intake 1010 ml   Output 546 ml   Net 464 ml       Physical Exam:   Physical Exam  Vitals and nursing note reviewed.   Constitutional:       Appearance: Normal appearance. He is  not ill-appearing or diaphoretic.   HENT:      Mouth/Throat:      Mouth: Mucous membranes are moist.      Pharynx: No oropharyngeal exudate.   Eyes:      General: No scleral icterus.        Left eye: No discharge.      Extraocular Movements: Extraocular movements intact.      Conjunctiva/sclera: Conjunctivae normal.      Pupils: Pupils are equal, round, and reactive to light.   Cardiovascular:      Rate and Rhythm: Normal rate.      Heart sounds: No murmur heard.     No friction rub. No gallop.   Pulmonary:      Effort: Pulmonary effort is normal. No respiratory distress.      Breath sounds: No wheezing.   Abdominal:      General: Abdomen is flat. There is no distension.      Tenderness: There is no abdominal tenderness.      Hernia: No hernia is present.   Skin:     Capillary Refill: Capillary refill takes less than 2 seconds.      Coloration: Skin is jaundiced.      Findings: No erythema or lesion.   Neurological:      General: No focal deficit present.      Mental Status: He is alert and oriented to person, place, and time.      Cranial Nerves: No cranial nerve deficit.      Sensory: No sensory deficit.      Coordination: Coordination normal.         Additional Data:     Labs:  Results from last 7 days   Lab Units 03/14/24  0506   WBC Thousand/uL 20.54*   HEMOGLOBIN g/dL 8.3*   HEMATOCRIT % 22.6*   PLATELETS Thousands/uL 73*   NEUTROS PCT % 67   LYMPHS PCT % 15   MONOS PCT % 15*   EOS PCT % 1     Results from last 7 days   Lab Units 03/14/24  0506   SODIUM mmol/L 134*   POTASSIUM mmol/L 2.7*   CHLORIDE mmol/L 96   CO2 mmol/L 28   BUN mg/dL 28*   CREATININE mg/dL 5.92*   ANION GAP mmol/L 10   CALCIUM mg/dL 7.3*   ALBUMIN g/dL 2.3*   TOTAL BILIRUBIN mg/dL 43.82*   ALK PHOS U/L 178*   ALT U/L 35   AST U/L 36   GLUCOSE RANDOM mg/dL 108     Results from last 7 days   Lab Units 03/13/24  0443   INR  2.31*     Results from last 7 days   Lab Units 03/11/24  0625 03/08/24  0731 03/07/24  2057 03/07/24  1514   POC GLUCOSE  mg/dl 132 128 111 131         Results from last 7 days   Lab Units 03/13/24  0443   LACTIC ACID mmol/L 1.0       Lines/Drains:  Invasive Devices       Peripheral Intravenous Line  Duration             Peripheral IV 03/11/24 Right;Ventral (anterior) Forearm 3 days              Hemodialysis Catheter  Duration             HD Permanent Double Catheter 13 days                          Imaging: No pertinent imaging reviewed.    Recent Cultures (last 7 days):         Last 24 Hours Medication List:   Current Facility-Administered Medications   Medication Dose Route Frequency Provider Last Rate    calcitriol  0.25 mcg Oral Daily Niecy Ibarra, CRNP      calcium acetate  1,334 mg Oral TID With Meals Niecy Ibarra CRRUSSELL      calcium carbonate  1 tablet Oral Daily With Breakfast Niecy OsoriovedJENSEN ba      diphenhydramine, lidocaine, Al/Mg hydroxide, simethicone  10 mL Swish & Swallow Q6H PRN Yuly S Edenilson, CRNP      epoetin sandra  9,000 Units Intravenous Once per day on Monday Wednesday Friday Tyler Stallworth MD      escitalopram  5 mg Oral Daily Constance Menendez MD      fludrocortisone  0.2 mg Oral Daily Constance Menendez MD      hydrOXYzine HCL  25 mg Oral Q6H PRN Yuly S Edenilson, CRNP      lactulose  10 g Oral Daily Constance Menendez MD      melatonin  3 mg Oral HS Constance Menendez MD      midodrine  17.5 mg Oral TID AC Constance Menendez MD      nicotine  7 mg Transdermal Daily Constance Menendez MD      ondansetron  4 mg Intravenous Q8H PRN Yuly S Edenilson, CRNP      pantoprazole  40 mg Intravenous Q12H MURALI Constance Menendez MD      polyethylene glycol  4,000 mL Oral See Admin Instructions Kayleigh Stahl PA-C      potassium chloride  40 mEq Oral BID Roland Kulkarni MD      rifaximin  550 mg Oral Q12H MURALI Constance Menendez MD          Today, Patient Was Seen By: Tiburcio Jean MD    **Please Note: This note may have been constructed using a voice recognition system.**

## 2024-03-14 NOTE — PLAN OF CARE
Problem: NEUROSENSORY - ADULT  Goal: Achieves stable or improved neurological status  Description: INTERVENTIONS  - Monitor and report changes in neurological status  - Monitor vital signs such as temperature, blood pressure, glucose, and any other labs ordered   - Initiate measures to prevent increased intracranial pressure  - Monitor for seizure activity and implement precautions if appropriate      Outcome: Progressing  Goal: Achieves maximal functionality and self care  Description: INTERVENTIONS  - Monitor swallowing and airway patency with patient fatigue and changes in neurological status  - Encourage and assist patient to increase activity and self care.   - Encourage visually impaired, hearing impaired and aphasic patients to use assistive/communication devices  Outcome: Progressing     Problem: GASTROINTESTINAL - ADULT  Goal: Minimal or absence of nausea and/or vomiting  Description: INTERVENTIONS:  - Administer IV fluids if ordered to ensure adequate hydration  - Maintain NPO status until nausea and vomiting are resolved  - Nasogastric tube if ordered  - Administer ordered antiemetic medications as needed  - Provide nonpharmacologic comfort measures as appropriate  - Advance diet as tolerated, if ordered  - Consider nutrition services referral to assist patient with adequate nutrition and appropriate food choices  Outcome: Progressing  Goal: Maintains or returns to baseline bowel function  Description: INTERVENTIONS:  - Assess bowel function  - Encourage oral fluids to ensure adequate hydration  - Administer IV fluids if ordered to ensure adequate hydration  - Administer ordered medications as needed  - Encourage mobilization and activity  - Consider nutritional services referral to assist patient with adequate nutrition and appropriate food choices  Outcome: Progressing  Goal: Maintains adequate nutritional intake  Description: INTERVENTIONS:  - Monitor percentage of each meal consumed  - Identify  factors contributing to decreased intake, treat as appropriate  - Assist with meals as needed  - Monitor I&O, weight, and lab values if indicated  - Obtain nutrition services referral as needed  Outcome: Progressing  Goal: Oral mucous membranes remain intact  Description: INTERVENTIONS  - Assess oral mucosa and hygiene practices  - Implement preventative oral hygiene regimen  - Implement oral medicated treatments as ordered magic mouth wash  - Initiate Nutrition services referral as needed  Outcome: Progressing     Problem: METABOLIC, FLUID AND ELECTROLYTES - ADULT  Goal: Electrolytes maintained within normal limits  Description: INTERVENTIONS:  - Monitor labs and assess patient for signs and symptoms of electrolyte imbalances  - Administer electrolyte replacement as ordered  - Monitor response to electrolyte replacements, including repeat lab results as appropriate  - Instruct patient on fluid and nutrition as appropriate  Outcome: Progressing  Goal: Fluid balance maintained  Description: INTERVENTIONS:  - Monitor labs   - Monitor I/O and WT  - Instruct patient on fluid and nutrition as appropriate  - Assess for signs & symptoms of volume excess or deficit  Outcome: Progressing     Problem: HEMATOLOGIC - ADULT  Goal: Maintains hematologic stability  Description: INTERVENTIONS  - Assess for signs and symptoms of bleeding or hemorrhage  - Monitor labs  - Administer supportive blood products/factors as ordered and appropriate  Outcome: Progressing     Problem: MUSCULOSKELETAL - ADULT  Goal: Maintain or return mobility to safest level of function  Description: INTERVENTIONS:  - Assess patient's ability to carry out ADLs; assess patient's baseline for ADL function and identify physical deficits which impact ability to perform ADLs (bathing, care of mouth/teeth, toileting, grooming, dressing, etc.)  - Assess/evaluate cause of self-care deficits   - Assess range of motion  - Assess patient's mobility  - Assess patient's  need for assistive devices and provide as appropriate  - Encourage maximum independence but intervene and supervise when necessary  - Involve family in performance of ADLs  - Assess for home care needs following discharge   - Consider OT consult to assist with ADL evaluation and planning for discharge  - Provide patient education as appropriate  Outcome: Progressing  Goal: Maintain proper alignment of affected body part  Description: INTERVENTIONS:  - Support, maintain and protect limb and body alignment  - Provide patient/ family with appropriate education  Outcome: Progressing     Problem: Prexisting or High Potential for Compromised Skin Integrity  Goal: Skin integrity is maintained or improved  Description: INTERVENTIONS:  - Identify patients at risk for skin breakdown  - Assess and monitor skin integrity  - Assess and monitor nutrition and hydration status  - Monitor labs   - Assess for incontinence   - Turn and reposition patient  - Assist with mobility/ambulation  - Relieve pressure over bony prominences  - Avoid friction and shearing  - Provide appropriate hygiene as needed including keeping skin clean and dry  - Evaluate need for skin moisturizer/barrier cream  - Collaborate with interdisciplinary team   - Patient/family teaching  - Consider wound care consult   Outcome: Progressing     Problem: Nutrition/Hydration-ADULT  Goal: Nutrient/Hydration intake appropriate for improving, restoring or maintaining nutritional needs  Description: Monitor and assess patient's nutrition/hydration status for malnutrition. Collaborate with interdisciplinary team and initiate plan and interventions as ordered.  Monitor patient's weight and dietary intake as ordered or per policy. Utilize nutrition screening tool and intervene as necessary. Determine patient's food preferences and provide high-protein, high-caloric foods as appropriate.     INTERVENTIONS:  - Monitor oral intake, urinary output, labs, and treatment plans  -  Assess nutrition and hydration status and recommend course of action  - Evaluate amount of meals eaten  - Assist patient with eating if necessary   - Allow adequate time for meals  - Recommend/ encourage appropriate diets, oral nutritional supplements, and vitamin/mineral supplements  - Order, calculate, and assess calorie counts as needed  - Recommend, monitor, and adjust tube feedings and TPN/PPN based on assessed needs  - Assess need for intravenous fluids  - Provide specific nutrition/hydration education as appropriate  - Include patient/family/caregiver in decisions related to nutrition  Outcome: Progressing

## 2024-03-14 NOTE — ASSESSMENT & PLAN NOTE
was on Solu-Cortef 25 mg daily -stopped 3/5  Known Alcoholic cirrhosis  Transferred from [facility] in [place] after liver transplant evaluation, he was found not to be candidate  Continue lactulose but decrease dose to 10 g once daily as ammonia level is normal and patient is having a lot of loose stools.   Xifaxan and see if that will help decrease his diarrhea frequency.  Check ammonia level every 3 days for now as we have been rapidly decreasing lactulose dose  No encephalopathy currently  Patient has extensive workup done, at this time, not much to offer.AT Present not candidate for liver transplant but should be reevaluated again in a few months  Patient does have capacity to make informed medical decisions.  Plan for discharge to SNF for rehab which has in house dialyses would be ideal

## 2024-03-15 NOTE — ASSESSMENT & PLAN NOTE
was on Solu-Cortef 25 mg daily -stopped 3/5  Known Alcoholic cirrhosis  Transferred from [facility] in [place] after liver transplant evaluation, he was found not to be candidate  Continue lactulose but decrease dose to 10 g once daily as ammonia level is normal and patient is having a lot of loose stools.   Xifaxan and see if that will help decrease his diarrhea frequency.  Check ammonia level every 3 days for now as we have been rapidly decreasing lactulose dose  No encephalopathy currently  Patient has extensive workup done, at this time, not much to offer.AT Present not candidate for liver transplant but should be reevaluated again in a few months  Patient does have capacity to make informed medical decisions.  Plan for discharge to SNF for rehab which has in house dialyses would be ideal

## 2024-03-15 NOTE — HEMODIALYSIS
Post-Dialysis RN Treatment Note    Blood Pressure:  Pre 86/52 mm/Hg  Post 90/55 mmHg   EDW  TBD kg    Weight:  Pre 85.3 kg   Post 83.9 kg   Mode of weight measurement: Bed Scale   Volume Removed  500 ml    Treatment duration 135 minutes    NS given  No    Treatment shortened? Yes, describe: Pt only wanted to run on machine for 2 hours and 20 minutes    Medications given during Rx Epogen 9000 u IVP   Estimated Kt/V  0.86   Access type: Permacath/TDC   Access Issues: No    Verbal Report to primary nurse   Yes Radha Ding RN

## 2024-03-15 NOTE — UTILIZATION REVIEW
Continued Stay Review for 3/14/24    Date: 3/15/24                          Current Patient Class: IP Current Level of Care: MS    HPI:33 y.o. male initially admitted on 2/27/24 - DX:  Alcoholic hepatitis  / Anemia due to chronic kidney disease, on chronic dialysis / Orthostatic hypotension     Assessment/Plan:   3/14/24: Patient is on higher dose of midodrine and fludrocortisone, is still patient is having orthostatic hypotension especially with movement. Feels tired and fatigued. K 2.9; Cr  6.53  Plan: cont iv protonix; rec'd KCL iv x4; pain control (see below); monitor labs; monitor BP's     Vital Signs:   Date/Time Temp Pulse Resp BP MAP (mmHg) SpO2 O2 Device Patient Position - Orthostatic VS   03/14/24 23:01:02 97.9 °F (36.6 °C) 99 18 98/57 71 100 % None (Room air) Lying   03/14/24 2043 -- 88 -- 88/48 Abnormal  -- -- -- --   03/14/24 2015 -- -- -- -- -- 100 % None (Room air) --   03/14/24 15:03:18 98.4 °F (36.9 °C) 91 -- 88/47 Abnormal  61 Abnormal  99 % -- --   03/14/24 11:16:59 -- 96 -- 88/47 Abnormal  61 Abnormal  100 % -- --   03/14/24 1002 -- -- -- -- -- 100 % None (Room air) --   03/14/24 07:10:06 97.2 °F (36.2 °C) Abnormal  94 -- 87/48 Abnormal  61 Abnormal  100 % -- --   03/14/24 06:06:04 -- 98 -- 86/45 Abnormal  59 Abnormal  99 %         Pertinent Labs/Diagnostic Results:      Results from last 7 days   Lab Units 03/15/24  0454 03/14/24  0506 03/13/24  0443 03/12/24  0610 03/11/24  1032   WBC Thousand/uL 20.91* 20.54* 18.53* 17.09*  --    HEMOGLOBIN g/dL 8.4* 8.3* 8.5* 8.5* 6.2*   HEMATOCRIT % 23.9* 22.6* 23.7* 23.1* 17.0*   PLATELETS Thousands/uL 86* 73* 67* 54*  --    NEUTROS ABS Thousands/µL 14.71* 13.76* 12.75*  --   --         Results from last 7 days   Lab Units 03/15/24  0454 03/14/24  1436 03/14/24  0506 03/13/24  0443 03/12/24  0610   SODIUM mmol/L 135 135 134* 134* 136   POTASSIUM mmol/L 3.9 2.9* 2.7* 3.2* 3.0*   CHLORIDE mmol/L 100 98 96 98 98   CO2 mmol/L 25 28 28 24 27   ANION GAP mmol/L  10 9 10 12 11   BUN mg/dL 36* 31* 28* 46* 36*   CREATININE mg/dL 7.75* 6.53* 5.92* 7.95* 6.22*   EGFR ml/min/1.73sq m 8 10 11 8 10   CALCIUM mg/dL 7.6* 7.6* 7.3* 7.5* 7.0*   MAGNESIUM mg/dL  --   --   --  2.0 1.9   PHOSPHORUS mg/dL  --   --   --   --  2.5*     Results from last 7 days   Lab Units 03/15/24  0454 03/14/24  0506 03/13/24  0443 03/11/24  0638 03/09/24  0532   AST U/L 47* 36 40*  --  61*   ALT U/L 33 35 39  --  54*   ALK PHOS U/L 188* 178* 183*  --  195*   TOTAL PROTEIN g/dL 3.1* 3.2* 3.1*  --  3.0*   ALBUMIN g/dL 2.3* 2.3* 2.4*  --  2.3*   TOTAL BILIRUBIN mg/dL 41.97* 43.82* 45.15*  --  44.91*   AMMONIA umol/L  --   --   --  42  --      Results from last 7 days   Lab Units 03/11/24  0625   POC GLUCOSE mg/dl 132     Results from last 7 days   Lab Units 03/15/24  0454 03/14/24  1436 03/14/24  0506 03/13/24  0443 03/12/24  0610 03/11/24  0638 03/10/24  0528 03/09/24  0532 03/08/24  1506   GLUCOSE RANDOM mg/dL 108 109 108 111 103 116 110 132 123        BETA-HYDROXYBUTYRATE   Date Value Ref Range Status   01/21/2024 0.2 <0.6 mmol/L Final         Results from last 7 days   Lab Units 03/15/24  0454 03/13/24  0443 03/11/24  1032   PROTIME seconds 27.6* 25.7* 29.8*   INR  2.54* 2.31* 2.80*     Results from last 7 days   Lab Units 03/12/24  0610   TSH 3RD GENERATON uIU/mL 2.559        Results from last 7 days   Lab Units 03/13/24  0443   LACTIC ACID mmol/L 1.0      Results from last 7 days   Lab Units 03/12/24  0610   FERRITIN ng/mL 4,404*   IRON ug/dL 78   TIBC ug/dL <133*        Results from last 7 days   Lab Units 03/12/24  0637   UNIT PRODUCT CODE  [id number]  [id number]   UNIT NUMBER  [id number]  [id number]   UNITABO  O  O   UNITRH  POS  POS   CROSSMATCH  Compatible  Compatible   UNIT DISPENSE STATUS  Presumed Trans  Presumed Trans   UNIT PRODUCT VOL ml 350  350          Medications:   Scheduled Medications:  calcitriol, 0.25 mcg, Oral, Daily  calcium acetate, 1,334 mg, Oral, TID With  Meals  calcium carbonate, 1 tablet, Oral, Daily With Breakfast  epoetin sandra, 9,000 Units, Intravenous, Once per day on Monday Wednesday Friday  escitalopram, 5 mg, Oral, Daily  fludrocortisone, 0.2 mg, Oral, Daily  lactulose, 10 g, Oral, Daily  melatonin, 3 mg, Oral, HS  midodrine, 17.5 mg, Oral, TID AC  nicotine, 7 mg, Transdermal, Daily  pantoprazole, 40 mg, Intravenous, Q12H MURALI  polyethylene glycol, 4,000 mL, Oral, See Admin Instructions  potassium chloride, 40 mEq, Oral, BID  rifaximin, 550 mg, Oral, Q12H MURALI    potassium chloride 20 mEq IVPB (premix)  (3/14 rec'd x4)  Dose: 20 mEq  Freq: Every 2 hours Route: IV  Last Dose: 20 mEq (03/14/24 1809)  Start: 03/14/24 1600 End: 03/14/24 2009     Continuous IV Infusions: None       PRN Meds:  diphenhydramine, lidocaine, Al/Mg hydroxide, simethicone, 10 mL, Swish & Swallow, Q6H PRN  hydrOXYzine HCL, 25 mg, Oral, Q6H PRN  ondansetron, 4 mg, Intravenous, Q8H PRN  morphine injection 2 mg Intravenous, Q6H PRN   oxyCODONE (ROXICODONE) IR tablet 2.5 mg  oral once (3/14 rec'd x1)          Discharge Plan: D    Network Utilization Review Department  ATTENTION: Please call with any questions or concerns to [phone] and carefully listen to the prompts so that you are directed to the right person. All voicemails are confidential.   For Discharge needs, contact Care Management DC Support Team at [phone] opt. 2  Send all requests for admission clinical reviews, approved or denied determinations and any other requests to dedicated fax number below belonging to the campus where the patient is receiving treatment. List of dedicated fax numbers for the Facilities:  FACILITY NAME UR FAX NUMBER   ADMISSION DENIALS (Administrative/Medical Necessity) [phone]   DISCHARGE SUPPORT TEAM (NETWORK) [phone]   PARENT CHILD HEALTH (Maternity/NICU/Pediatrics) [phone]   [facility] [phone]   [facility] [phone]    [facility] [phone]   General acute hospital [phone]   [facility] [phone]   [facility] [phone]   [facility] [phone]   [facility] [phone]   [facility] [phone]   [facility] [phone]   General acute hospital [phone]   [facility] [phone]

## 2024-03-15 NOTE — PROGRESS NOTES
[facility]  Progress Note  Name: Michael Koch I  MRN: [id number]  Unit/Bed#: -01 I Date of Admission: 2/27/2024   Date of Service: 3/15/2024 I Hospital Day: 17    Assessment/Plan   * Alcoholic hepatitis  Assessment & Plan  was on Solu-Cortef 25 mg daily -stopped 3/5  Known Alcoholic cirrhosis  Transferred from [facility] in [place] after liver transplant evaluation, he was found not to be candidate  Continue lactulose but decrease dose to 10 g once daily as ammonia level is normal and patient is having a lot of loose stools.   Xifaxan and see if that will help decrease his diarrhea frequency.  Check ammonia level every 3 days for now as we have been rapidly decreasing lactulose dose  No encephalopathy currently  Patient has extensive workup done, at this time, not much to offer.AT Present not candidate for liver transplant but should be reevaluated again in a few months  Patient does have capacity to make informed medical decisions.  Plan for discharge to SNF for rehab which has in house dialyses would be ideal    Anemia due to chronic kidney disease, on chronic dialysis (HCC)  Assessment & Plan  Lab Results   Component Value Date    EGFR 8 03/15/2024    EGFR 10 03/14/2024    EGFR 11 03/14/2024    CREATININE 7.75 (H) 03/15/2024    CREATININE 6.53 (H) 03/14/2024    CREATININE 5.92 (H) 03/14/2024   Continue conservative management    Acute on chronic anemia  Assessment & Plan  Hemoglobin is around 8.9.  Patient has intermittently been requiring blood transfusions during his extensive hospitalization courses over the last few weeks.  Today hemoglobin dropped to 6.2.  Will give 2 units PRBC and recheck CBC in a.m. and plan for EGD and colonoscopy in a.m. if medically stable.  Continue Protonix drip and clear liquids for now.  No overt signs of bleeding noted at this time    Initial plan was to proceed with EGD and colonoscopy.  Due to patient ongoing complex medical conditions and  persistent hypertension, cardiology consulted for preop clearance-Per cardiology recommendation, patient will be very high risk patients.  Unless there is active bleeding visualized, recommending to defer the procedure if possible.  In case without any active bleeding, patient still needs EGD and colonoscopy in that case cardiology recommending to transfer the patient to tertiary center.  Discussed with gastroenterologist, will hold off procedure at this time    Orthostatic hypotension  Assessment & Plan  Secondary to end-stage liver disease and hemodialysis dependent acute kidney injury and autonomic dysfunction.  Continue midodrine-patient is still having orthostatic hypotension which is limiting his mobility. cont thigh high CARLOS stockings.  Cont midodrine 17.5 mg 3 times daily,increase  Florinef 0.2 mg daily .decrease zyprexa dose to 5 mg daily and decreased lactulose to 10 gram once daily .  Increase oral intake.  Monitor orthostasis.prn iv albumin.may trial mestinon low dose as last resort  Echocardiogram reviewed  Patient is on higher dose of midodrine and fludrocortisone, is still patient is having orthostatic hypotension especially with movement.    Depression  Assessment & Plan  Continue Zyprexa at bedtime decreased dose to 5 mg daily FROM 15MG daily and also decreased melatonin to 3 mg daily  Please note that patient had very bad alcohol withdrawals and was on Precedex drip for some time during the last few weeks.  Also having behavioral issues and was placed on Zyprexa during his hospital course.  Patient probably has more than just depression and needs reeval with psychiatry  Patient having significant orthostatic hypotension and hence trying to decrease dose of Zyprexa.  Will ask psychiatry to reevaluate and placed him on some other agent does not cause hypotension as he still has behavior issues  cortisol level is currently normal will recheck again  in a week as now off steroids.normal ammonia  level    ETOH abuse  Assessment & Plan  History alcohol abuse, last drink prior to admission in January  Will need outpatient alcohol counseling prior to transplant reevaluation-patient has referral from [facility] for virtual therapy which supposed to initiated soon.      Tobacco abuse  Assessment & Plan  Nicotine patch  Cessation counseling             VTE Pharmacologic Prophylaxis:   Low Risk (Score 0-2) - Encourage Ambulation.  INR is 2.54    Mobility:   Basic Mobility Inpatient Raw Score: 13  JH-HLM Goal: 4: Move to chair/commode  JH-HLM Achieved: 2: Bed activities/Dependent transfer  JH-HLM Goal achieved. Continue to encourage appropriate mobility.    Patient Centered Rounds: I performed bedside rounds with nursing staff today.   Discussions with Specialists or Other Care Team Provider: Nephrology    Education and Discussions with Family / Patient: Updated  -left voice message for wife      Current Length of Stay: 17 day(s)  Current Patient Status: Inpatient   Certification Statement: The patient will continue to require additional inpatient hospital stay due to monitor above conditions  Discharge Plan:  To be determined    Code Status: Level 3 - DNAR and DNI    Subjective:     Evaluated and examined.  Patient mentation fluctuates, during the event, patient remained alert and oriented.  Denies any significant complaint.  Resting, patient is still having hypotensive episode.    Objective:     Vitals:   Temp (24hrs), Av.9 °F (36.6 °C), Min:97.7 °F (36.5 °C), Max:98.4 °F (36.9 °C)    Temp:  [97.7 °F (36.5 °C)-98.4 °F (36.9 °C)] 97.7 °F (36.5 °C)  HR:  [] 92  Resp:  [16-18] 16  BP: (78-98)/(45-57) 89/57  SpO2:  [99 %-100 %] 100 %  Body mass index is 29.45 kg/m².     Input and Output Summary (last 24 hours):     Intake/Output Summary (Last 24 hours) at 3/15/2024 1449  Last data filed at 3/15/2024 1339  Gross per 24 hour   Intake 1460 ml   Output 0 ml   Net 1460 ml       Physical  Exam:   Physical Exam  Vitals and nursing note reviewed.   Constitutional:       Appearance: Normal appearance. He is ill-appearing. He is not diaphoretic.   HENT:      Mouth/Throat:      Mouth: Mucous membranes are moist.      Pharynx: No oropharyngeal exudate or posterior oropharyngeal erythema.   Eyes:      General: Scleral icterus present.         Left eye: No discharge.      Conjunctiva/sclera: Conjunctivae normal.      Pupils: Pupils are equal, round, and reactive to light.   Cardiovascular:      Heart sounds: No murmur heard.     No friction rub. No gallop.   Pulmonary:      Effort: Pulmonary effort is normal. No respiratory distress.      Breath sounds: No stridor. No wheezing or rhonchi.   Abdominal:      General: There is no distension.      Palpations: Abdomen is soft.      Tenderness: There is no abdominal tenderness.   Musculoskeletal:      Right lower leg: No edema.      Left lower leg: No edema.   Skin:     Capillary Refill: Capillary refill takes less than 2 seconds.      Coloration: Skin is jaundiced. Skin is not pale.      Findings: No bruising or erythema.   Neurological:      Mental Status: He is alert.          Additional Data:     Labs:  Results from last 7 days   Lab Units 03/15/24  0454   WBC Thousand/uL 20.91*   HEMOGLOBIN g/dL 8.4*   HEMATOCRIT % 23.9*   PLATELETS Thousands/uL 86*   NEUTROS PCT % 70   LYMPHS PCT % 12*   MONOS PCT % 13*   EOS PCT % 2     Results from last 7 days   Lab Units 03/15/24  0454   SODIUM mmol/L 135   POTASSIUM mmol/L 3.9   CHLORIDE mmol/L 100   CO2 mmol/L 25   BUN mg/dL 36*   CREATININE mg/dL 7.75*   ANION GAP mmol/L 10   CALCIUM mg/dL 7.6*   ALBUMIN g/dL 2.3*   TOTAL BILIRUBIN mg/dL 41.97*   ALK PHOS U/L 188*   ALT U/L 33   AST U/L 47*   GLUCOSE RANDOM mg/dL 108     Results from last 7 days   Lab Units 03/15/24  0454   INR  2.54*     Results from last 7 days   Lab Units 03/11/24  0625   POC GLUCOSE mg/dl 132         Results from last 7 days   Lab Units  03/13/24  0443   LACTIC ACID mmol/L 1.0       Lines/Drains:  Invasive Devices       Peripheral Intravenous Line  Duration             Peripheral IV 03/15/24 Dorsal (posterior);Left Forearm <1 day              Hemodialysis Catheter  Duration             HD Permanent Double Catheter 15 days                          Imaging: No pertinent imaging reviewed.    Recent Cultures (last 7 days):         Last 24 Hours Medication List:   Current Facility-Administered Medications   Medication Dose Route Frequency Provider Last Rate    calcitriol  0.25 mcg Oral Daily Niecy Ibarra, CRNP      calcium acetate  1,334 mg Oral TID With Meals Niecy Ibarra, CRNP      calcium carbonate  1 tablet Oral Daily With Breakfast Niecy IbarraJENSEN      diphenhydramine, lidocaine, Al/Mg hydroxide, simethicone  10 mL Swish & Swallow Q6H PRN Yuly S Edenilson, CRNP      epoetin sandra  9,000 Units Intravenous Once per day on Monday Wednesday Friday Tyler Stallworth MD      escitalopram  5 mg Oral Daily Constance Menendez MD      fludrocortisone  0.2 mg Oral Daily Constance Menendez MD      hydrOXYzine HCL  25 mg Oral Q6H PRN Yuly S Edenilson, CRNP      lactulose  10 g Oral Daily Constance Menendez MD      lidocaine  1 patch Topical Q24H Tiburcio Jean MD      melatonin  3 mg Oral HS Constance Menendez MD      midodrine  17.5 mg Oral TID AC Constance Menendez MD      nicotine  7 mg Transdermal Daily Constance Menendez MD      ondansetron  4 mg Intravenous Q8H PRN Yuly S Edenilson, CRNP      pantoprazole  40 mg Intravenous Q12H MURALI Constance Menendez MD      polyethylene glycol  4,000 mL Oral See Admin Instructions Kayleigh Stahl PA-C      potassium chloride  40 mEq Oral BID Roland Kulkarni MD      rifaximin  550 mg Oral Q12H MURALI Menendez MD          Today, Patient Was Seen By: Tiburcio Jean MD    **Please Note: This note may have been constructed using a voice recognition system.**

## 2024-03-15 NOTE — PLAN OF CARE
Run even today maintain Systolic BP greater than 80. Patient dialyzing for  2 hours and 20 minutes does not want to run for 3.5 hours   on 4 K bath for serum K of  3.9  per protocol. Treatment plan reviewed with Nephrology.     Problem: METABOLIC, FLUID AND ELECTROLYTES - ADULT  Goal: Electrolytes maintained within normal limits  Description: INTERVENTIONS:  - Monitor labs and assess patient for signs and symptoms of electrolyte imbalances  - Administer electrolyte replacement as ordered  - Monitor response to electrolyte replacements, including repeat lab results as appropriate  - Instruct patient on fluid and nutrition as appropriate  Outcome: Progressing  Goal: Fluid balance maintained  Description: INTERVENTIONS:  - Monitor labs   - Monitor I/O and WT  - Instruct patient on fluid and nutrition as appropriate  - Assess for signs & symptoms of volume excess or deficit  Outcome: Progressing

## 2024-03-15 NOTE — NURSING NOTE
Dr Jean and Denise Jordan  made aware of BP this am. Patient is asymptomatic. However, he already had the midodrine at about 0635.

## 2024-03-15 NOTE — PROGRESS NOTES
NEPHROLOGY PROGRESS NOTE   Michael Koch 33 y.o. male MRN: [id number]  Unit/Bed#: -01 Encounter: [id number]      ASSESSMENT & PLAN    33-year-old male with a past medical history of type 2 diabetes, GERD, hypertension, nephrolithiasis and alcoholic liver cirrhosis with acute alcoholic hepatitis who was actually transferred to [facility] and then [facility] [place] [facility] and then was transferred back due to not being a transplant candidate    Acute kidney injury  The patient is anuric, dialysis was attempted this morning however blood pressures were in the 70s systolic so this was on hold.  Will reevaluate in the afternoon otherwise we will plan on dialysis tomorrow  Has a right tunneled dialysis catheter  Will monitor for fluid removal  Chronic refractory hypotension  Patient is on midodrine 17.5 mg 3 times a day as well as fludrocortisone  Exhibiting orthostatic hypotension  Avoid morphine has active metabolites with acute kidney injury can worsen blood pressure as well as mental status  Metabolic acidosis  Monitor in the setting of cirrhosis  Hypokalemia  Will use a 4K bath  Hyponatremia  Sodium acceptable  CKD bone mineral disease  Calcium acetate 2 tablets with each meal, on calcitriol 0.25 mcg daily  End-stage liver disease  Unfortunately does not appear to be a candidate for any advanced therapies  Hepatorenal syndrome?  There was some question of transfer [facility] however if the end game is that he is not a transplant candidate then this this would likely not affect overall outcome  Seems to have been deemed not a candidate for transplant from [facility] [place] [facility].  Could consider reevaluation in another center  His bilirubin is now up to 41 he has refractory hypotension and has not been handling dialysis well.  Ongoing goals of care discussions    Discussed this with Dr. Jean and he was in agreement he will consider potential discussion with another transplant center versus goals of  care    SUBJECTIVE:    Patient was seen today, he is sitting up, he has no acute chest pain or shortness of breath he has no fevers or chills    12 point review of systems was otherwise negative besides what is mentioned above.    Medications:    Current Facility-Administered Medications:     calcitriol (ROCALTROL) capsule 0.25 mcg, 0.25 mcg, Oral, Daily, Niecy Ibarra, CRNP, 0.25 mcg at 03/14/24 0838    calcium acetate (PHOSLO) capsule 1,334 mg, 1,334 mg, Oral, TID With Meals, Niecy Ibarra, CRNP, 1,334 mg at 03/15/24 0944    calcium carbonate (OYSTER SHELL,OSCAL) 500 mg tablet 1 tablet, 1 tablet, Oral, Daily With Breakfast, Niecy Osoriovedmejia CRNP, 1 tablet at 03/14/24 0843    diphenhydramine, lidocaine, Al/Mg hydroxide, simethicone (Magic Mouthwash) oral solution 10 mL, 10 mL, Swish & Swallow, Q6H PRN, Yuly Pyle, CRNP, 10 mL at 03/07/24 1705    epoetin sandra (EPOGEN,PROCRIT) injection 9,000 Units, 9,000 Units, Intravenous, Once per day on Monday Wednesday Friday, Tyler Stallworth MD, 9,000 Units at 03/13/24 0805    escitalopram (LEXAPRO) tablet 5 mg, 5 mg, Oral, Daily, Constance Menendez MD, 5 mg at 03/15/24 0856    fludrocortisone (FLORINEF) tablet 0.2 mg, 0.2 mg, Oral, Daily, Constance Menendez MD, 0.2 mg at 03/15/24 0856    hydrOXYzine HCL (ATARAX) tablet 25 mg, 25 mg, Oral, Q6H PRN, Yuly DasilvaxEJNP, 25 mg at 03/13/24 0308    lactulose (CHRONULAC) oral solution 10 g, 10 g, Oral, Daily, Constance Menendez MD, 10 g at 03/15/24 0939    melatonin tablet 3 mg, 3 mg, Oral, HS, Constance Menendez MD, 3 mg at 03/14/24 2328    midodrine (PROAMATINE) tablet 17.5 mg, 17.5 mg, Oral, TID AC, Constance Menendez MD, 17.5 mg at 03/15/24 0639    nicotine (NICODERM CQ) 7 mg/24hr TD 24 hr patch 7 mg, 7 mg, Transdermal, Daily, Constance Menendez MD, 7 mg at 03/15/24 0858    ondansetron (ZOFRAN) injection 4 mg, 4 mg, Intravenous, Q8H PRN, Yuly Dasilvax, CRNP, 4 mg at 03/06/24 1503    pantoprazole (PROTONIX) injection 40 mg, 40 mg, Intravenous, Q12H MURALI, Constance Menendez MD,  40 mg at 03/15/24 0856    polyethylene glycol (GOLYTELY) bowel prep 4,000 mL, 4,000 mL, Oral, See Admin Instructions, Kayleigh Stahl PA-C    potassium chloride (Klor-Con M20) CR tablet 40 mEq, 40 mEq, Oral, BID, Roland Kulkarni MD, 40 mEq at 03/15/24 0856    rifaximin (XIFAXAN) tablet 550 mg, 550 mg, Oral, Q12H MURALI, Constance Menendez MD, 550 mg at 03/15/24 0856    OBJECTIVE:    Vitals:    03/15/24 0643 03/15/24 0718 03/15/24 0744 03/15/24 0904   BP: (!) 78/45 (!) 79/45 (!) 79/46 (!) 80/47   BP Location:  Left arm     Pulse: 95 91 91 102   Resp:  18     Temp:  97.7 °F (36.5 °C)     TempSrc:  Temporal     SpO2: 100%  99% 100%   Weight:       Height:            Temp:  [97.7 °F (36.5 °C)-98.4 °F (36.9 °C)] 97.7 °F (36.5 °C)  HR:  [] 102  Resp:  [18] 18  BP: (78-98)/(45-57) 80/47  SpO2:  [99 %-100 %] 100 %     Body mass index is 29.45 kg/m².    Weight (last 2 days)       Date/Time Weight    03/15/24 0600 85.3 (188.05)    03/14/24 0534 85 (187.39)    03/14/24 0455 85 (187.39)    03/13/24 04:48:16 83.9 (184.97)            I/O last 3 completed shifts:  In: 1020 [P.O.:600; IV Piggyback:420]  Out: 100 [Urine:100]    I/O this shift:  In: 100 [IV Piggyback:100]  Out: -       Physical exam:    General: no acute distress, cooperative  Eyes: conjunctivae pink, anicteric sclerae  ENT: lips and mucous membranes moist, no exudates, normal external ears  Neck: ROM intact, no JVD  Chest: No respiratory distress, no accessory muscle use  CVS: normal rate, non pericardial friction rub  Abdomen: Stented abdomen  Extremities: no edema of both legs  Skin: Jaundice  Neuro: No asterixis or myoclonus  Psych:  Pleasant affect    Invasive Devices:      Lab Results:   Results from last 7 days   Lab Units 03/15/24  0454 03/14/24  1436 03/14/24  0506 03/13/24  0443 03/12/24  0610 03/11/24  1032 03/11/24  0638 03/10/24  0528 03/09/24  0532   WBC Thousand/uL 20.91*  --  20.54* 18.53* 17.09*  --  18.14*  --   --    HEMOGLOBIN g/dL 8.4*  --  8.3* 8.5* 8.5*  "6.2* 6.7*   < >  --    HEMATOCRIT % 23.9*  --  22.6* 23.7* 23.1* 17.0* 18.5*   < >  --    PLATELETS Thousands/uL 86*  --  73* 67* 54*  --  57*  --   --    POTASSIUM mmol/L 3.9 2.9* 2.7* 3.2* 3.0*  --  3.2*   < > 3.5   CHLORIDE mmol/L 100 98 96 98 98  --  99   < > 96   CO2 mmol/L 25 28 28 24 27  --  20*   < > 25   BUN mg/dL 36* 31* 28* 46* 36*  --  61*   < > 43*   CREATININE mg/dL 7.75* 6.53* 5.92* 7.95* 6.22*  --  9.27*   < > 5.87*   CALCIUM mg/dL 7.6* 7.6* 7.3* 7.5* 7.0*  --  7.4*   < > 6.7*   MAGNESIUM mg/dL  --   --   --  2.0 1.9  --   --   --   --    PHOSPHORUS mg/dL  --   --   --   --  2.5*  --   --   --   --    ALK PHOS U/L 188*  --  178* 183*  --   --   --   --  195*   ALT U/L 33  --  35 39  --   --   --   --  54*   AST U/L 47*  --  36 40*  --   --   --   --  61*    < > = values in this interval not displayed.         Portions of the record may have been created with voice recognition software. Occasional wrong word or \"sound a like\" substitutions may have occurred due to the inherent limitations of voice recognition software. Read the chart carefully and recognize, using context, where substitutions have occurred.If you have any questions, please contact the dictating provider.    "

## 2024-03-15 NOTE — ASSESSMENT & PLAN NOTE
History alcohol abuse, last drink prior to admission in January  Will need outpatient alcohol counseling prior to transplant reevaluation-patient has referral from [facility] for virtual therapy which supposed to initiated soon.

## 2024-03-16 NOTE — PROGRESS NOTES
[facility]  [facility]  Name: Michael Koch I  MRN: [id number]  Unit/Bed#: -01 I Date of Admission: 2/27/2024   Date of Service: 3/16/2024 I Hospital Day: 18    Assessment/Plan   * Alcoholic hepatitis  Assessment & Plan  was on Solu-Cortef 25 mg daily -stopped 3/5  Known Alcoholic cirrhosis  MELDS 44  Transferred from [facility] in [place] after liver transplant evaluation, he was found not to be candidate  Continue lactulose but decrease dose to 10 g once daily as ammonia level is normal and patient is having a lot of loose stools.   Xifaxan and see if that will help decrease his diarrhea frequency.  Check ammonia level every 3 days for now as we have been rapidly decreasing lactulose dose  No encephalopathy currently  Patient has extensive workup done, at this time, not much to offer.AT Present not candidate for liver transplant but should be reevaluated again in a few months  Patient does have capacity to make informed medical decisions.  Plan for discharge to SNF for rehab which has in house dialyses would be ideal    Anemia due to chronic kidney disease, on chronic dialysis (HCC)  Assessment & Plan  Lab Results   Component Value Date    EGFR 11 03/16/2024    EGFR 8 03/15/2024    EGFR 10 03/14/2024    CREATININE 6.01 (H) 03/16/2024    CREATININE 7.75 (H) 03/15/2024    CREATININE 6.53 (H) 03/14/2024   Continue conservative management    Acute on chronic anemia  Assessment & Plan  Hemoglobin is around 8.9.  Patient has intermittently been requiring blood transfusions during his extensive hospitalization courses over the last few weeks.  Today hemoglobin dropped to 6.2.  Will give 2 units PRBC and recheck CBC in a.m. and plan for EGD and colonoscopy in a.m. if medically stable.  Continue Protonix drip and clear liquids for now.  No overt signs of bleeding noted at this time    Initial plan was to proceed with EGD and colonoscopy.  Due to patient ongoing complex medical  conditions and persistent hypertension, cardiology consulted for preop clearance-Per cardiology recommendation, patient will be very high risk patients.  Unless there is active bleeding visualized, recommending to defer the procedure if possible.  In case without any active bleeding, patient still needs EGD and colonoscopy in that case cardiology recommending to transfer the patient to tertiary center.  Discussed with gastroenterologist, will hold off procedure at this time    Orthostatic hypotension  Assessment & Plan  Secondary to end-stage liver disease and hemodialysis dependent acute kidney injury and autonomic dysfunction.  Continue midodrine-patient is still having orthostatic hypotension which is limiting his mobility. cont thigh high CARLOS stockings.  Cont midodrine 17.5 mg 3 times daily,increase  Florinef 0.2 mg daily .decrease zyprexa dose to 5 mg daily and decreased lactulose to 10 gram once daily .  Increase oral intake.  Monitor orthostasis.prn iv albumin.may trial mestinon low dose as last resort  Echocardiogram reviewed  Patient is on higher dose of midodrine and fludrocortisone, is still patient is having orthostatic hypotension especially with movement.  Blood pressure is still running low, will give you.  Albumin therapy    Depression  Assessment & Plan  Continue Zyprexa at bedtime decreased dose to 5 mg daily FROM 15MG daily and also decreased melatonin to 3 mg daily  Please note that patient had very bad alcohol withdrawals and was on Precedex drip for some time during the last few weeks.  Also having behavioral issues and was placed on Zyprexa during his hospital course.  Patient probably has more than just depression and needs reeval with psychiatry  Patient having significant orthostatic hypotension and hence trying to decrease dose of Zyprexa.  Will ask psychiatry to reevaluate and placed him on some other agent does not cause hypotension as he still has behavior issues  cortisol level is  currently normal will recheck again  in a week as now off steroids.normal ammonia level    ETOH abuse  Assessment & Plan  History alcohol abuse, last drink prior to admission in January  Will need outpatient alcohol counseling prior to transplant reevaluation-patient has referral from [facility] for virtual therapy which supposed to initiated soon.      Tobacco abuse  Assessment & Plan  Nicotine patch  Cessation counseling             VTE Pharmacologic Prophylaxis:    Therapeutic INR from liver cirrhosis-holding anticoagulation    Mobility:   Basic Mobility Inpatient Raw Score: 13  JH-HLM Goal: 4: Move to chair/commode  JH-HLM Achieved: 1: Laying in bed (bed bound)  JH-HLM Goal NOT achieved. Continue with multidisciplinary rounding and encourage appropriate mobility to improve upon JH-HLM goals.    Patient Centered Rounds: I performed bedside rounds with nursing staff today.   Discussions with Specialists or Other Care Team Provider: None    Education and Discussions with Family / Patient: Updated  (sister) via phone.Also left voice message for wife      Current Length of Stay: 18 day(s)  Current Patient Status: Inpatient   Certification Statement: The patient will continue to require additional inpatient hospital stay due to monitor above condition  Discharge Plan:  To be determined    Code Status: Level 3 - DNAR and DNI    Subjective:   Seen and evaluated during exam.  Feeling tired and fatigued, laying on the bed.  Denies any complaint.    Objective:     Vitals:   Temp (24hrs), Av.9 °F (36.6 °C), Min:97.7 °F (36.5 °C), Max:98.4 °F (36.9 °C)    Temp:  [97.7 °F (36.5 °C)-98.4 °F (36.9 °C)] 98.4 °F (36.9 °C)  HR:  [] 89  Resp:  [16-18] 16  BP: (71-95)/(36-76) 87/46  SpO2:  [95 %-100 %] 96 %  Body mass index is 28.28 kg/m².     Input and Output Summary (last 24 hours):     Intake/Output Summary (Last 24 hours) at 3/16/2024 1212  Last data filed at 3/16/2024 0300  Gross per 24 hour    Intake 710 ml   Output 600 ml   Net 110 ml       Physical Exam:   Physical Exam  Vitals and nursing note reviewed.   Constitutional:       Appearance: He is not diaphoretic.   HENT:      Nose: No congestion or rhinorrhea.      Mouth/Throat:      Mouth: Mucous membranes are moist.      Pharynx: No oropharyngeal exudate or posterior oropharyngeal erythema.   Eyes:      General: Scleral icterus present.         Right eye: No discharge.         Left eye: No discharge.   Cardiovascular:      Rate and Rhythm: Normal rate.      Heart sounds:      No friction rub. No gallop.   Pulmonary:      Effort: Pulmonary effort is normal. No respiratory distress.      Breath sounds: No stridor. No wheezing or rhonchi.   Abdominal:      General: There is no distension.      Palpations: Abdomen is soft. There is no mass.      Tenderness: There is no abdominal tenderness.      Hernia: No hernia is present.   Skin:     Capillary Refill: Capillary refill takes less than 2 seconds.      Coloration: Skin is jaundiced.      Findings: No lesion or rash.   Neurological:      General: No focal deficit present.      Mental Status: He is alert and oriented to person, place, and time.      Cranial Nerves: No cranial nerve deficit.      Sensory: No sensory deficit.      Motor: No weakness.      Coordination: Coordination normal.          Additional Data:     Labs:  Results from last 7 days   Lab Units 03/16/24  0442 03/15/24  0454   WBC Thousand/uL 21.54* 20.91*   HEMOGLOBIN g/dL 8.0* 8.4*   HEMATOCRIT % 22.2* 23.9*   PLATELETS Thousands/uL 77* 86*   BANDS PCT % 6  --    NEUTROS PCT %  --  70   LYMPHS PCT %  --  12*   LYMPHO PCT % 11*  --    MONOS PCT %  --  13*   MONO PCT % 11  --    EOS PCT % 1 2     Results from last 7 days   Lab Units 03/16/24  0442   SODIUM mmol/L 134*   POTASSIUM mmol/L 2.9*   CHLORIDE mmol/L 96   CO2 mmol/L 29   BUN mg/dL 24   CREATININE mg/dL 6.01*   ANION GAP mmol/L 9   CALCIUM mg/dL 7.6*   ALBUMIN g/dL 2.5*   TOTAL  BILIRUBIN mg/dL 42.52*   ALK PHOS U/L 188*   ALT U/L 27   AST U/L 39   GLUCOSE RANDOM mg/dL 93     Results from last 7 days   Lab Units 03/16/24  0442   INR  2.71*     Results from last 7 days   Lab Units 03/11/24  0625   POC GLUCOSE mg/dl 132         Results from last 7 days   Lab Units 03/13/24  0443   LACTIC ACID mmol/L 1.0       Lines/Drains:  Invasive Devices       Peripheral Intravenous Line  Duration             Peripheral IV 03/15/24 Dorsal (posterior);Left Forearm 1 day              Hemodialysis Catheter  Duration             HD Permanent Double Catheter 15 days                          Imaging: No pertinent imaging reviewed.    Recent Cultures (last 7 days):         Last 24 Hours Medication List:   Current Facility-Administered Medications   Medication Dose Route Frequency Provider Last Rate    acetaminophen  650 mg Oral Q6H PRN Swati Vaughn MD      Albumin 25%  12.5 g Intravenous Once Tiburcoi Jean MD      calcitriol  0.25 mcg Oral Daily Niecy OsoriovedJENSEN ba      calcium acetate  1,334 mg Oral TID With Meals Niecy OsoriovedJENSEN ba      calcium carbonate  1 tablet Oral Daily With Breakfast Niecy OsoriovedJENSEN ba      diphenhydramine, lidocaine, Al/Mg hydroxide, simethicone  10 mL Swish & Swallow Q6H PRN Yuly S Edenilson, CRNP      epoetin sandra  9,000 Units Intravenous Once per day on Monday Wednesday Friday Tyler Stallworth MD      escitalopram  5 mg Oral Daily Constance Menendez MD      fludrocortisone  0.2 mg Oral Daily Constance Menendez MD      hydrOXYzine HCL  25 mg Oral Q6H PRN Yuly S Edenilson, CRNP      lactulose  10 g Oral Daily Constance Menendez MD      lidocaine  1 patch Topical Q24H Tiburcio Jean MD      melatonin  3 mg Oral HS Constance Menendez MD      midodrine  17.5 mg Oral TID AC Constance Menendez MD      nicotine  7 mg Transdermal Daily Constance Menendez MD      ondansetron  4 mg Intravenous Q8H PRN Yuly S Edenilson, CRNP      pantoprazole  40 mg Intravenous Q12H MURALI Constance Menendez MD      polyethylene glycol  4,000 mL Oral See  Admin Instructions Kayleigh Stahl PA-C      potassium chloride  40 mEq Oral BID Roland Kulkarni MD      potassium chloride  20 mEq Intravenous Q2H Tiburcio Jean MD 20 mEq (03/16/24 0930)    rifaximin  550 mg Oral Q12H [facility] Constance Menendez MD          Today, Patient Was Seen By: Tiburcio Jean MD    **Please Note: This note may have been constructed using a voice recognition system.**

## 2024-03-16 NOTE — QUICK NOTE
Will try to reach out to [facility] via PACS-discussed the case with hepatology/transplant department to see if patient qualifies for terlipressin treatment.    Awaiting for response from ALYSSA Villavicencio

## 2024-03-16 NOTE — ASSESSMENT & PLAN NOTE
Lab Results   Component Value Date    EGFR 11 03/16/2024    EGFR 8 03/15/2024    EGFR 10 03/14/2024    CREATININE 6.01 (H) 03/16/2024    CREATININE 7.75 (H) 03/15/2024    CREATININE 6.53 (H) 03/14/2024   Continue conservative management

## 2024-03-16 NOTE — PROGRESS NOTES
"Progress Note - Nephrology   Michael Koch 33 y.o. male MRN: [id number]  Unit/Bed#: -01 Encounter: [id number]    A/P:  Acute kidney injury, dialysis dependent with suspected etiology as Hepatorenal syndrome.  Patient with refractory hypotension that has complicated his dialysis sessions, goal to maintain SBP>80 during dialysis.   Appears euvolemic at present.  Last tx; 3/15 for 2 hr 20 min per pt preference.    Plan for HD 3/18. Will continue midodrine at 17.5mg TID dose (maximal dose in HRS recommended 15mg TID).  Continue compression stockings.   During HD will utilize low temperature baths.  Can utilize PRN albumin 25% 12.5 gram for symptomatic hypotension.  Patient turned down by [facility] for liver transplant. Recommend contacting [facility] for transfer consideration for terlipressin/transplant evaluation. Patient wants to continue with aggressive care for now. He does not seem to be ready for hospice.     2. Refractory hypotension due to ESLD  Increase midodrine to 20mg q 8 and fludrocortisone 0.2mg/day.  Utilize PRN albumin to help with oncotic pressure.    3. Hypokalemia  Continue KCL 40MEQ BID. Additional 20meq x2 doses given today. 4 K bath with HD.    4. CKD BMD   Continue calcitriol 0.25 mcg daily.  Continue calcium acetate 2 tablet with meal.    5. ESLD   If patient wants aggressive care ongoing, recommend reaching out to [facility] to see if he would be a candidate for terlipressin/transplant evaluation.      Follow up reason for today's visit:     Alcoholic hepatitis      Subjective:   Patient seen and examined today. Denies complaints. Hypotension an ongoing issue SBP in 70s this AM requiring albumin.  A complete 10 point review of systems was performed and is otherwise negative.     Objective:     Vitals: Blood pressure 96/52, pulse 90, temperature 98.1 °F (36.7 °C), temperature source Temporal, resp. rate 18, height 5' 7\" (1.702 m), weight 81.9 kg (180 lb 8.9 oz), SpO2 97%.,Body mass index is " "28.28 kg/m².    Weight (last 2 days)       Date/Time Weight    03/16/24 0300 81.9 (180.56)    03/15/24 0600 85.3 (188.05)    03/14/24 0534 85 (187.39)    03/14/24 0455 85 (187.39)              Intake/Output Summary (Last 24 hours) at 3/16/2024 1952  Last data filed at 3/16/2024 1917  Gross per 24 hour   Intake 480 ml   Output 100 ml   Net 380 ml     I/O last 3 completed shifts:  In: 1410 [P.O.:840; I.V.:470; IV Piggyback:100]  Out: 600 [Urine:100; Other:500]    HD Permanent Double Catheter (Active)   Reasons to continue HD Cath Treatment Therapy 03/16/24 0900   Goal for Removal N/A- chronic HD catheter 03/16/24 0900   Line Necessity Reviewed Yes, reviewed with provider 03/16/24 0900   Site Assessment WDL;Clean;Dry;Intact 03/16/24 0900   Proximal Lumen Status Capped 03/16/24 0900   Medial Lumen Status Capped 03/03/24 2300   Distal Lumen Status Capped 03/16/24 0900   Dressing Type Transparent;Securing device;Chlorhexidine dressing 03/16/24 0900   Dressing Status Clean;Dry;Intact 03/16/24 0900   Dressing Intervention 7 day central line dressing changed 03/13/24 1325   Dressing Change Due 03/20/24 03/15/24 1715       Physical Exam: BP 96/52 (BP Location: Right arm)   Pulse 90   Temp 98.1 °F (36.7 °C) (Temporal)   Resp 18   Ht 5' 7\" (1.702 m)   Wt 81.9 kg (180 lb 8.9 oz)   SpO2 97%   BMI 28.28 kg/m²     General Appearance:    Alert, cooperative, no distress, appears stated age   Head:    Normocephalic, without obvious abnormality, atraumatic   Eyes:    Conjunctiva/corneas clear   Ears:    Normal external ears   Nose:   Nares normal, septum midline, mucosa normal, no drainage    or sinus tenderness   Throat:   Lips, mucosa, and tongue normal; teeth and gums normal   Neck:    Back:      Lungs:     Clear to auscultation bilaterally, respirations unlabored   Chest wall:    No tenderness or deformity   Heart:    Regular rate and rhythm, S1 and S2 normal, no murmur, rub   or gallop   Abdomen:     Soft, non-tender, bowel " "sounds active   Extremities:   Extremities normal, atraumatic, no cyanosis or edema   Skin:   Skin color, texture, turgor normal, no rashes or lesions   Lymph nodes:   Cervical normal   Neurologic:   CNII-XII intact            Lab, Imaging and other studies: I have personally reviewed pertinent labs.  CBC:   Lab Results   Component Value Date    WBC 21.54 (H) 03/16/2024    HGB 8.0 (L) 03/16/2024    HCT 22.2 (L) 03/16/2024    MCV 82 03/16/2024    PLT 77 (L) 03/16/2024    RBC 2.72 (L) 03/16/2024    MCH 29.4 03/16/2024    MCHC 36.0 03/16/2024    RDW 17.2 (H) 03/16/2024    NRBC 1 03/16/2024     CMP:   Lab Results   Component Value Date    K 2.9 (L) 03/16/2024    CL 96 03/16/2024    CO2 29 03/16/2024    BUN 24 03/16/2024    CREATININE 6.01 (H) 03/16/2024    CALCIUM 7.6 (L) 03/16/2024    AST 39 03/16/2024    ALT 27 03/16/2024    ALKPHOS 188 (H) 03/16/2024    EGFR 11 03/16/2024       .  Results from last 7 days   Lab Units 03/16/24  0442 03/15/24  0454 03/14/24  1436 03/14/24  0506   POTASSIUM mmol/L 2.9* 3.9 2.9* 2.7*   CHLORIDE mmol/L 96 100 98 96   CO2 mmol/L 29 25 28 28   BUN mg/dL 24 36* 31* 28*   CREATININE mg/dL 6.01* 7.75* 6.53* 5.92*   CALCIUM mg/dL 7.6* 7.6* 7.6* 7.3*   ALK PHOS U/L 188* 188*  --  178*   ALT U/L 27 33  --  35   AST U/L 39 47*  --  36         Phosphorus: No results found for: \"PHOS\"  Magnesium: No results found for: \"MG\"  Urinalysis: No results found for: \"COLORU\", \"CLARITYU\", \"SPECGRAV\", \"PHUR\", \"LEUKOCYTESUR\", \"NITRITE\", \"PROTEINUA\", \"GLUCOSEU\", \"KETONESU\", \"BILIRUBINUR\", \"BLOODU\"  Ionized Calcium: No results found for: \"CAION\"  Coagulation:   Lab Results   Component Value Date    INR 2.71 (H) 03/16/2024     Troponin: No results found for: \"TROPONINI\"  ABG: No results found for: \"PHART\", \"LTU0XGW\", \"PO2ART\", \"PCY8RBZ\", \"S8VKVMWX\", \"BEART\", \"SOURCE\"  Radiology review:     IMAGING  No results found.    Current Facility-Administered Medications   Medication Dose Route Frequency    acetaminophen " (TYLENOL) tablet 650 mg  650 mg Oral Q6H PRN    albumin human (FLEXBUMIN) 25 % injection 25 g  25 g Intravenous Q6H    calcitriol (ROCALTROL) capsule 0.25 mcg  0.25 mcg Oral Daily    calcium acetate (PHOSLO) capsule 1,334 mg  1,334 mg Oral TID With Meals    calcium carbonate (OYSTER SHELL,OSCAL) 500 mg tablet 1 tablet  1 tablet Oral Daily With Breakfast    diphenhydramine, lidocaine, Al/Mg hydroxide, simethicone (Magic Mouthwash) oral solution 10 mL  10 mL Swish & Swallow Q6H PRN    epoetin sandra (EPOGEN,PROCRIT) injection 9,000 Units  9,000 Units Intravenous Once per day on Monday Wednesday Friday    escitalopram (LEXAPRO) tablet 5 mg  5 mg Oral Daily    fludrocortisone (FLORINEF) tablet 0.2 mg  0.2 mg Oral Daily    hydrOXYzine HCL (ATARAX) tablet 25 mg  25 mg Oral Q6H PRN    lactulose (CHRONULAC) oral solution 10 g  10 g Oral Daily    lidocaine (LIDODERM) 5 % patch 1 patch  1 patch Topical Q24H    melatonin tablet 3 mg  3 mg Oral HS    [START ON 3/17/2024] midodrine (PROAMATINE) tablet 20 mg  20 mg Oral TID AC    nicotine (NICODERM CQ) 7 mg/24hr TD 24 hr patch 7 mg  7 mg Transdermal Daily    ondansetron (ZOFRAN) injection 4 mg  4 mg Intravenous Q8H PRN    pantoprazole (PROTONIX) injection 40 mg  40 mg Intravenous Q12H MURALI    polyethylene glycol (GOLYTELY) bowel prep 4,000 mL  4,000 mL Oral See Admin Instructions    potassium chloride (Klor-Con M20) CR tablet 40 mEq  40 mEq Oral BID    rifaximin (XIFAXAN) tablet 550 mg  550 mg Oral Q12H MURALI     Medications Discontinued During This Encounter   Medication Reason    acetaminophen (TYLENOL) 325 mg tablet     allopurinol (ZYLOPRIM) 100 mg tablet     celecoxib (CeleBREX) 100 mg capsule     colchicine (COLCRYS) 0.6 mg tablet     Fluoxetine HCl, PMDD, 20 MG TABS     hydrOXYzine HCL (ATARAX) 25 mg tablet     lisinopril (ZESTRIL) 10 mg tablet     mirtazapine (REMERON) 15 mg tablet     omeprazole (PriLOSEC) 40 MG capsule     ondansetron (ZOFRAN) 4 mg tablet     semaglutide,  0.25 or 0.5 mg/dose, (Ozempic, 0.25 or 0.5 MG/DOSE,) 2 mg/1.5 mL injection pen     tolterodine (DETROL LA) 4 mg 24 hr capsule     enoxaparin (LOVENOX) subcutaneous injection 40 mg     insulin lispro (HumALOG/ADMELOG) 100 units/mL subcutaneous injection 1-6 Units     insulin lispro (HumALOG/ADMELOG) 100 units/mL subcutaneous injection 1-6 Units     insulin glargine (LANTUS) subcutaneous injection 20 Units 0.2 mL     insulin lispro (HumALOG/ADMELOG) 100 units/mL subcutaneous injection 1-6 Units     lactulose (CHRONULAC) oral solution 20 g     midodrine (PROAMATINE) tablet 5 mg     hydrocortisone (Solu-CORTEF) injection 25 mg     midodrine (PROAMATINE) tablet 10 mg     insulin glargine (LANTUS) subcutaneous injection 20 Units 0.2 mL     dextromethorphan-guaiFENesin (ROBITUSSIN DM) oral syrup 10 mL     thiamine tablet 100 mg     melatonin tablet 9 mg     OLANZapine (ZyPREXA) tablet 15 mg     insulin glargine (LANTUS) subcutaneous injection 10 Units 0.1 mL     lactulose (CHRONULAC) oral solution 20 g     lactulose (CHRONULAC) oral solution 10 g     insulin glargine (LANTUS) subcutaneous injection 10 Units 0.1 mL     midodrine (PROAMATINE) tablet 15 mg     insulin lispro (HumALOG/ADMELOG) 100 units/mL subcutaneous injection 1-6 Units     insulin lispro (HumALOG/ADMELOG) 100 units/mL subcutaneous injection 1-6 Units     calcium acetate (PHOSLO) capsule 667 mg     ciprofloxacin (CIPRO) tablet 250 mg     melatonin tablet 6 mg     OLANZapine (ZyPREXA) tablet 10 mg     nicotine (NICODERM CQ) 14 mg/24hr TD 24 hr patch 1 patch     OLANZapine (ZyPREXA) tablet 7.5 mg     fludrocortisone (FLORINEF) tablet 0.1 mg     OLANZapine (ZyPREXA) tablet 10 mg     lactulose (CHRONULAC) oral solution 20 g     pantoprazole (PROTONIX) EC tablet 40 mg     OLANZapine (ZyPREXA) tablet 5 mg     sodium bicarbonate tablet 1,300 mg     morphine injection 2 mg     midodrine (PROAMATINE) tablet 17.5 mg        Silvia Katz, DO      This progress note  was produced in part using a dictation device which may document imprecise wording from author's original intent.

## 2024-03-16 NOTE — ASSESSMENT & PLAN NOTE
History alcohol abuse, last drink prior to admission in January  Will need outpatient alcohol counseling prior to transplant reevaluation-patient has referral from [facility] for virtual therapy which supposed to initiated soon.

## 2024-03-16 NOTE — PLAN OF CARE
Problem: CARDIOVASCULAR - ADULT  Goal: Maintains optimal cardiac output and hemodynamic stability  Description: INTERVENTIONS:  - Monitor I/O, vital signs and rhythm monitor blood pressures hypotensive  - Monitor for S/S and trends of decreased cardiac output  - Administer and titrate ordered vasoactive medications to optimize hemodynamic stability  - Assess quality of pulses, skin color and temperature  - Assess for signs of decreased coronary artery perfusion  - Instruct patient to report change in severity of symptoms  Outcome: Not Progressing

## 2024-03-16 NOTE — ASSESSMENT & PLAN NOTE
Secondary to end-stage liver disease and hemodialysis dependent acute kidney injury and autonomic dysfunction.  Continue midodrine-patient is still having orthostatic hypotension which is limiting his mobility. cont thigh high CARLOS stockings.  Cont midodrine 17.5 mg 3 times daily,increase  Florinef 0.2 mg daily .decrease zyprexa dose to 5 mg daily and decreased lactulose to 10 gram once daily .  Increase oral intake.  Monitor orthostasis.prn iv albumin.may trial mestinon low dose as last resort  Echocardiogram reviewed  Patient is on higher dose of midodrine and fludrocortisone, is still patient is having orthostatic hypotension especially with movement.  Blood pressure is still running low, will give you.  Albumin therapy

## 2024-03-16 NOTE — ASSESSMENT & PLAN NOTE
was on Solu-Cortef 25 mg daily -stopped 3/5  Known Alcoholic cirrhosis  MELDS 44  Transferred from [facility] in [place] after liver transplant evaluation, he was found not to be candidate  Continue lactulose but decrease dose to 10 g once daily as ammonia level is normal and patient is having a lot of loose stools.   Xifaxan and see if that will help decrease his diarrhea frequency.  Check ammonia level every 3 days for now as we have been rapidly decreasing lactulose dose  No encephalopathy currently  Patient has extensive workup done, at this time, not much to offer.AT Present not candidate for liver transplant but should be reevaluated again in a few months  Patient does have capacity to make informed medical decisions.  Plan for discharge to SNF for rehab which has in house dialyses would be ideal

## 2024-03-17 NOTE — PROGRESS NOTES
[facility]  Progress Note  Name: Michael Koch I  MRN: [id number]  Unit/Bed#: -01 I Date of Admission: 2/27/2024   Date of Service: 3/17/2024 I Hospital Day: 19    Assessment/Plan   * Alcoholic hepatitis  Assessment & Plan  was on Solu-Cortef 25 mg daily -stopped 3/5  Known Alcoholic cirrhosis  MELDS 45  Transferred from [facility] in [place] after liver transplant evaluation, he was found not to be candidate  Continue lactulose but decrease dose to 10 g once daily as ammonia level is normal and patient is having a lot of loose stools.   Xifaxan and see if that will help decrease his diarrhea frequency.  Check ammonia level every 3 days for now as we have been rapidly decreasing lactulose dose  No encephalopathy currently  Patient has extensive workup done, at this time, not much to offer.AT Present not candidate for liver transplant but should be reevaluated again in a few months  Patient does have capacity to make informed medical decisions.   Patient is still having significant orthostatic hypotension and unable to sit up.  Adjusting medication.  Also after getting permission from patient, trying to reach out to [facility] to see if patient is a candidate for Terlipressin treatmnet or not    Anemia due to chronic kidney disease, on chronic dialysis (HCC)  Assessment & Plan  Lab Results   Component Value Date    EGFR 9 03/17/2024    EGFR 11 03/16/2024    EGFR 8 03/15/2024    CREATININE 7.05 (H) 03/17/2024    CREATININE 6.01 (H) 03/16/2024    CREATININE 7.75 (H) 03/15/2024   Continue conservative management    Hemodialysis patient (HCC)  Assessment & Plan  Continue treatment as per nephrology recommendation, due to orthostatic hypotension, will need to modify patient treatment.    Acute on chronic anemia  Assessment & Plan  Hemoglobin is around 8.9.  Patient has intermittently been requiring blood transfusions during his extensive hospitalization courses over the last few weeks.   Today hemoglobin dropped to 6.2.  Will give 2 units PRBC and recheck CBC in a.m. and plan for EGD and colonoscopy in a.m. if medically stable.  Continue Protonix drip and clear liquids for now.  No overt signs of bleeding noted at this time    Initial plan was to proceed with EGD and colonoscopy.  Due to patient ongoing complex medical conditions and persistent hypertension, cardiology consulted for preop clearance-Per cardiology recommendation, patient will be very high risk patients.  Unless there is active bleeding visualized, recommending to defer the procedure if possible.  In case without any active bleeding, patient still needs EGD and colonoscopy in that case cardiology recommending to transfer the patient to tertiary center.  Discussed with gastroenterologist, will hold off procedure at this time    Orthostatic hypotension  Assessment & Plan  Secondary to end-stage liver disease and hemodialysis dependent acute kidney injury and autonomic dysfunction.  Continue midodrine-patient is still having orthostatic hypotension which is limiting his mobility. cont thigh high CARLOS stockings.  Cont midodrine 17.5 mg 3 times daily,increase  Florinef 0.2 mg daily .decrease zyprexa dose to 5 mg daily and decreased lactulose to 10 gram once daily .  Increase oral intake.  Monitor orthostasis.prn iv albumin.may trial mestinon low dose as last resort  Echocardiogram reviewed  Patient is on higher dose of midodrine and fludrocortisone, is still patient is having orthostatic hypotension especially with movement.  Blood pressure is still running low, will give you.  Albumin therapy    Depression  Assessment & Plan  Continue Zyprexa at bedtime decreased dose to 5 mg daily FROM 15MG daily and also decreased melatonin to 3 mg daily  Please note that patient had very bad alcohol withdrawals and was on Precedex drip for some time during the last few weeks.  Also having behavioral issues and was placed on Zyprexa during his hospital  course.  Patient probably has more than just depression and needs reeval with psychiatry  Patient having significant orthostatic hypotension and hence trying to decrease dose of Zyprexa.  Will ask psychiatry to reevaluate and placed him on some other agent does not cause hypotension as he still has behavior issues  cortisol level is currently normal will recheck again  in a week as now off steroids.normal ammonia level    ETOH abuse  Assessment & Plan  History alcohol abuse, last drink prior to admission in January  Will need outpatient alcohol counseling prior to transplant reevaluation-patient has referral from [facility] for virtual therapy which supposed to initiated soon.      Tobacco abuse  Assessment & Plan  Nicotine patch  Cessation counseling               VTE Pharmacologic Prophylaxis:    Holding anticoagulation since patient INR is supratherapeutic    Mobility:   Basic Mobility Inpatient Raw Score: 13  JH-HLM Goal: 4: Move to chair/commode  JH-HLM Achieved: 2: Bed activities/Dependent transfer  JH-HLM Goal NOT achieved. Continue with multidisciplinary rounding and encourage appropriate mobility to improve upon JH-HLM goals.    Patient Centered Rounds: I performed bedside rounds with nursing staff today.   Discussions with Specialists or Other Care Team Provider:     Education and Discussions with Family / Patient: Updated  (wife and sister) at bedside.    Current Length of Stay: 19 day(s)  Current Patient Status: Inpatient   Certification Statement: The patient will continue to require additional inpatient hospital stay due to monitor above monitoring  Discharge Plan:  To be determined    Code Status: Level 3 - DNAR and DNI    Subjective:   Seen and evaluated during daytime.  Lying on the bed, feels tired and fatigued.    Objective:     Vitals:   Temp (24hrs), Av °F (36.7 °C), Min:97.9 °F (36.6 °C), Max:98.1 °F (36.7 °C)    Temp:  [97.9 °F (36.6 °C)-98.1 °F (36.7 °C)] 97.9 °F  (36.6 °C)  HR:  [] 93  Resp:  [18] 18  BP: ()/(49-59) 100/59  SpO2:  [97 %-98 %] 97 %  Body mass index is 28.07 kg/m².     Input and Output Summary (last 24 hours):     Intake/Output Summary (Last 24 hours) at 3/17/2024 1148  Last data filed at 3/17/2024 0844  Gross per 24 hour   Intake 960 ml   Output --   Net 960 ml       Physical Exam:   Physical Exam  Vitals and nursing note reviewed.   Constitutional:       Appearance: Normal appearance. He is ill-appearing. He is not diaphoretic.   Eyes:      General: Scleral icterus present.         Right eye: No discharge.         Left eye: No discharge.   Cardiovascular:      Rate and Rhythm: Normal rate.      Heart sounds: No murmur heard.     No friction rub. No gallop.   Pulmonary:      Effort: Pulmonary effort is normal. No respiratory distress.      Breath sounds: No stridor. No wheezing or rhonchi.   Abdominal:      General: Bowel sounds are normal. There is no distension.      Palpations: Abdomen is soft. There is no mass.      Tenderness: There is no abdominal tenderness.      Hernia: No hernia is present.   Musculoskeletal:      Right lower leg: No edema.      Left lower leg: No edema.   Neurological:      Mental Status: He is alert and oriented to person, place, and time.      Cranial Nerves: No cranial nerve deficit.      Sensory: No sensory deficit.      Motor: No weakness.      Coordination: Coordination normal.         Additional Data:     Labs:  Results from last 7 days   Lab Units 03/17/24  0423 03/16/24  0442 03/15/24  0454   WBC Thousand/uL 20.27* 21.54* 20.91*   HEMOGLOBIN g/dL 7.5* 8.0* 8.4*   HEMATOCRIT % 21.4* 22.2* 23.9*   PLATELETS Thousands/uL 78* 77* 86*   BANDS PCT %  --  6  --    NEUTROS PCT %  --   --  70   LYMPHS PCT %  --   --  12*   LYMPHO PCT %  --  11*  --    MONOS PCT %  --   --  13*   MONO PCT %  --  11  --    EOS PCT %  --  1 2     Results from last 7 days   Lab Units 03/17/24  0423   SODIUM mmol/L 136   POTASSIUM mmol/L 3.1*    CHLORIDE mmol/L 100   CO2 mmol/L 26   BUN mg/dL 31*   CREATININE mg/dL 7.05*   ANION GAP mmol/L 10   CALCIUM mg/dL 8.1*   ALBUMIN g/dL 3.0*   TOTAL BILIRUBIN mg/dL 44.07*   ALK PHOS U/L 151*   ALT U/L 20   AST U/L 30   GLUCOSE RANDOM mg/dL 105     Results from last 7 days   Lab Units 03/17/24  0852   INR  3.16*     Results from last 7 days   Lab Units 03/11/24  0625   POC GLUCOSE mg/dl 132         Results from last 7 days   Lab Units 03/13/24  0443   LACTIC ACID mmol/L 1.0       Lines/Drains:  Invasive Devices       Peripheral Intravenous Line  Duration             Peripheral IV 03/15/24 Dorsal (posterior);Left Forearm 2 days              Hemodialysis Catheter  Duration             HD Permanent Double Catheter 16 days                          Imaging: No pertinent imaging reviewed.    Recent Cultures (last 7 days):         Last 24 Hours Medication List:   Current Facility-Administered Medications   Medication Dose Route Frequency Provider Last Rate    acetaminophen  650 mg Oral Q6H PRN Swati Vaughn MD      albumin human  25 g Intravenous Q6H Tiburcio Jean MD      calcitriol  0.25 mcg Oral Daily Niecy Ibarra, CRNP      calcium acetate  1,334 mg Oral TID With Meals Niecy Ibarra, CRNP      calcium carbonate  1 tablet Oral Daily With Breakfast Niecy Ibarra, CRNP      diphenhydramine, lidocaine, Al/Mg hydroxide, simethicone  10 mL Swish & Swallow Q6H PRN Yuly S Edenilson, CRNP      epoetin sandra  9,000 Units Intravenous Once per day on Monday Wednesday Friday Tyler Stallworth MD      escitalopram  5 mg Oral Daily Constance Menendez MD      fludrocortisone  0.2 mg Oral Daily Constance Menendez MD      hydrOXYzine HCL  25 mg Oral Q6H PRN Yuly S Edenilson, CRNP      lactulose  10 g Oral Daily Constance Menendez MD      lidocaine  1 patch Topical Q24H Tiburcio Jean MD      melatonin  3 mg Oral HS Constance Menendez MD      midodrine  17.5 mg Oral TID AC Silvia Katz DO      nicotine  7 mg Transdermal Daily Constance Menendez MD      ondansetron   4 mg Intravenous Q8H PRN Yuly S Edenilson, CRNP      pantoprazole  40 mg Intravenous Q12H MURALI Menendez MD      polyethylene glycol  4,000 mL Oral See Admin Instructions Kayleigh Stahl PA-C      potassium chloride  40 mEq Oral BID Roland Kulkarni MD      rifaximin  550 mg Oral Q12H MURALI Menendez MD          Today, Patient Was Seen By: Tiburcio Jean MD    **Please Note: This note may have been constructed using a voice recognition system.**

## 2024-03-17 NOTE — ASSESSMENT & PLAN NOTE
History alcohol abuse, last drink prior to admission in January  Will need outpatient alcohol counseling prior to transplant reevaluation-patient has referral from [facility] for virtual therapy which supposed to initiated soon.

## 2024-03-17 NOTE — QUICK NOTE
Patient not seen and examined today.  Chart reviewed.  Potassium low 3.1.  Recommend 20 meq x 1 in addition to KCL 40meq BID.  Infuse albumin PRN for symptomatic hypotension. Consider MAP goal > 60.

## 2024-03-17 NOTE — ASSESSMENT & PLAN NOTE
Lab Results   Component Value Date    EGFR 9 03/17/2024    EGFR 11 03/16/2024    EGFR 8 03/15/2024    CREATININE 7.05 (H) 03/17/2024    CREATININE 6.01 (H) 03/16/2024    CREATININE 7.75 (H) 03/15/2024   Continue conservative management

## 2024-03-17 NOTE — PLAN OF CARE
Problem: METABOLIC, FLUID AND ELECTROLYTES - ADULT  Goal: Electrolytes maintained within normal limits  Description: INTERVENTIONS:  - Monitor labs and assess patient for signs and symptoms of electrolyte imbalances -potassium  - Administer electrolyte replacement as ordered  - Monitor response to electrolyte replacements, including repeat lab results as appropriate  - Instruct patient on fluid and nutrition as appropriate  Outcome: Progressing

## 2024-03-17 NOTE — ASSESSMENT & PLAN NOTE
was on Solu-Cortef 25 mg daily -stopped 3/5  Known Alcoholic cirrhosis  MELDS 45  Transferred from [facility] in [place] after liver transplant evaluation, he was found not to be candidate  Continue lactulose but decrease dose to 10 g once daily as ammonia level is normal and patient is having a lot of loose stools.   Xifaxan and see if that will help decrease his diarrhea frequency.  Check ammonia level every 3 days for now as we have been rapidly decreasing lactulose dose  No encephalopathy currently  Patient has extensive workup done, at this time, not much to offer.AT Present not candidate for liver transplant but should be reevaluated again in a few months  Patient does have capacity to make informed medical decisions.   Patient is still having significant orthostatic hypotension and unable to sit up.  Adjusting medication.  Also after getting permission from patient, trying to reach out to [facility] to see if patient is a candidate for Terlipressin treatmnet or not

## 2024-03-17 NOTE — ASSESSMENT & PLAN NOTE
Continue treatment as per nephrology recommendation, due to orthostatic hypotension, will need to modify patient treatment.

## 2024-03-17 NOTE — QUICK NOTE
Received Call back from [facility], hepatology-attending.    Discussed briefly about patient's conditions and ongoing issues.  Also requesting if patient is a candidate for Terlipressin.    After reviewing the case, per hepatology, patient is not a good candidate for liver transplant at this moment.  Also since patient already started on dialysis, is not a candidate for terlipressin too.

## 2024-03-18 NOTE — ASSESSMENT & PLAN NOTE
Secondary to end-stage liver disease and hemodialysis dependent acute kidney injury and autonomic dysfunction.  Continue midodrine-patient is still having orthostatic hypotension which is limiting his mobility. cont thigh high CARLOS stockings.  Cont midodrine 17.5 mg 3 times daily,increase  Florinef 0.2 mg daily .decrease zyprexa dose to 5 mg daily and decreased lactulose to 10 gram once daily .  Increase oral intake.  Monitor orthostasis.prn iv albumin.may trial mestinon low dose as last resort  Echocardiogram reviewed  Patient is on higher dose of midodrine and fludrocortisone, is still patient is having orthostatic hypotension especially with movement.  Blood pressure is still running low, will give you.  Albumin therapy    Discussed with hepatologist at [facility]-regarding if patient will be candidate for terlipressin  treatment or not, after discussing the case with hepatologist, per hepatologist, patient is not a candidate for the above treatment at this time  At this time, in this campus, the treatment options and knowledge limits is limited for this patient.  And family is aware about this.  Will try to have family discussion again regarding goals of care and treatment plan and options.

## 2024-03-18 NOTE — PLAN OF CARE
Problem: NEUROSENSORY - ADULT  Goal: Achieves stable or improved neurological status  Description: INTERVENTIONS  - Monitor and report changes in neurological status  - Monitor vital signs such as temperature, blood pressure, glucose, and any other labs ordered   - Initiate measures to prevent increased intracranial pressure  - Monitor for seizure activity and implement precautions if appropriate      Outcome: Progressing  Goal: Achieves maximal functionality and self care  Description: INTERVENTIONS  - Monitor swallowing and airway patency with patient fatigue and changes in neurological status  - Encourage and assist patient to increase activity and self care.   - Encourage visually impaired, hearing impaired and aphasic patients to use assistive/communication devices  Outcome: Progressing     Problem: GASTROINTESTINAL - ADULT  Goal: Minimal or absence of nausea and/or vomiting  Description: INTERVENTIONS:  - Administer IV fluids if ordered to ensure adequate hydration  - Maintain NPO status until nausea and vomiting are resolved  - Nasogastric tube if ordered  - Administer ordered antiemetic medications as needed  - Provide nonpharmacologic comfort measures as appropriate  - Advance diet as tolerated, if ordered  - Consider nutrition services referral to assist patient with adequate nutrition and appropriate food choices  Outcome: Progressing  Goal: Maintains or returns to baseline bowel function  Description: INTERVENTIONS:  - Assess bowel function  - Encourage oral fluids to ensure adequate hydration  - Administer IV fluids if ordered to ensure adequate hydration  - Administer ordered medications as needed  - Encourage mobilization and activity  - Consider nutritional services referral to assist patient with adequate nutrition and appropriate food choices  Outcome: Progressing  Goal: Maintains adequate nutritional intake  Description: INTERVENTIONS:  - Monitor percentage of each meal consumed  - Identify  factors contributing to decreased intake, treat as appropriate  - Assist with meals as needed  - Monitor I&O, weight, and lab values if indicated  - Obtain nutrition services referral as needed  Outcome: Progressing  Goal: Oral mucous membranes remain intact  Description: INTERVENTIONS  - Assess oral mucosa and hygiene practices  - Implement preventative oral hygiene regimen  - Implement oral medicated treatments as ordered magic mouth wash  - Initiate Nutrition services referral as needed  Outcome: Progressing     Problem: METABOLIC, FLUID AND ELECTROLYTES - ADULT  Goal: Electrolytes maintained within normal limits  Description: INTERVENTIONS:  - Monitor labs and assess patient for signs and symptoms of electrolyte imbalances -potassium  - Administer electrolyte replacement as ordered  - Monitor response to electrolyte replacements, including repeat lab results as appropriate  - Instruct patient on fluid and nutrition as appropriate  Outcome: Progressing  Goal: Fluid balance maintained  Description: INTERVENTIONS:  - Monitor labs   - Monitor I/O and WT  - Instruct patient on fluid and nutrition as appropriate  - Assess for signs & symptoms of volume excess or deficit  Outcome: Progressing     Problem: HEMATOLOGIC - ADULT  Goal: Maintains hematologic stability  Description: INTERVENTIONS  - Assess for signs and symptoms of bleeding or hemorrhage  - Monitor labs  - Administer supportive blood products/factors as ordered and appropriate  Outcome: Progressing     Problem: MUSCULOSKELETAL - ADULT  Goal: Maintain or return mobility to safest level of function  Description: INTERVENTIONS:  - Assess patient's ability to carry out ADLs; assess patient's baseline for ADL function and identify physical deficits which impact ability to perform ADLs (bathing, care of mouth/teeth, toileting, grooming, dressing, etc.)  - Assess/evaluate cause of self-care deficits   - Assess range of motion  - Assess patient's mobility  - Assess  patient's need for assistive devices and provide as appropriate  - Encourage maximum independence but intervene and supervise when necessary  - Involve family in performance of ADLs  - Assess for home care needs following discharge   - Consider OT consult to assist with ADL evaluation and planning for discharge  - Provide patient education as appropriate  Outcome: Progressing  Goal: Maintain proper alignment of affected body part  Description: INTERVENTIONS:  - Support, maintain and protect limb and body alignment  - Provide patient/ family with appropriate education  Outcome: Progressing     Problem: Prexisting or High Potential for Compromised Skin Integrity  Goal: Skin integrity is maintained or improved  Description: INTERVENTIONS:  - Identify patients at risk for skin breakdown  - Assess and monitor skin integrity  - Assess and monitor nutrition and hydration status  - Monitor labs   - Assess for incontinence   - Turn and reposition patient  - Assist with mobility/ambulation  - Relieve pressure over bony prominences  - Avoid friction and shearing  - Provide appropriate hygiene as needed including keeping skin clean and dry  - Evaluate need for skin moisturizer/barrier cream  - Collaborate with interdisciplinary team   - Patient/family teaching  - Consider wound care consult   Outcome: Progressing     Problem: Nutrition/Hydration-ADULT  Goal: Nutrient/Hydration intake appropriate for improving, restoring or maintaining nutritional needs  Description: Monitor and assess patient's nutrition/hydration status for malnutrition. Collaborate with interdisciplinary team and initiate plan and interventions as ordered.  Monitor patient's weight and dietary intake as ordered or per policy. Utilize nutrition screening tool and intervene as necessary. Determine patient's food preferences and provide high-protein, high-caloric foods as appropriate.     INTERVENTIONS:  - Monitor oral intake, urinary output, labs, and treatment  plans  - Assess nutrition and hydration status and recommend course of action  - Evaluate amount of meals eaten  - Assist patient with eating if necessary   - Allow adequate time for meals  - Recommend/ encourage appropriate diets, oral nutritional supplements, and vitamin/mineral supplements  - Order, calculate, and assess calorie counts as needed  - Recommend, monitor, and adjust tube feedings and TPN/PPN based on assessed needs  - Assess need for intravenous fluids  - Provide specific nutrition/hydration education as appropriate  - Include patient/family/caregiver in decisions related to nutrition  Outcome: Progressing     Problem: CARDIOVASCULAR - ADULT  Goal: Maintains optimal cardiac output and hemodynamic stability  Description: INTERVENTIONS:  - Monitor I/O, vital signs and rhythm monitor blood pressures hypotensive  - Monitor for S/S and trends of decreased cardiac output  - Administer and titrate ordered vasoactive medications to optimize hemodynamic stability  - Assess quality of pulses, skin color and temperature  - Assess for signs of decreased coronary artery perfusion  - Instruct patient to report change in severity of symptoms  Outcome: Progressing

## 2024-03-18 NOTE — ASSESSMENT & PLAN NOTE
Continue treatment as per nephrology recommendation, due to orthostatic hypotension, will need to modify patient treatment.    Discussed with hepatologist at [facility]-regarding if patient will be candidate for terlipressin  treatment or not, after discussing the case with hepatologist, per hepatologist, patient is not a candidate for the above treatment at this time  At this time, in this campus, the treatment options and knowledge limits is limited for this patient.  And family is aware about this.  Will try to have family discussion again regarding goals of care and treatment plan and options.

## 2024-03-18 NOTE — UTILIZATION REVIEW
Continued Stay Review    Date: 3/18/24                           Current Patient Class: IP  Current Level of Care: MS    HPI:33 y.o. male initially admitted on 2/27/24 - DX:  Alcoholic hepatitis / Anemia due to chronic kidney disease, on chronic dialysis /     Assessment/Plan:   3/18/24: feeling tired and fatigued. Ill-appearing; (+) scleral icterus;   Discussed with hepatologist at [facility]-regarding if patient will be candidate for terlipressin  treatment or not, after discussing the case with hepatologist, per hepatologist, patient is not a candidate for the above treatment at this time  At this time, in this campus, the treatment options and knowledge limits is limited for this patient.  And family is aware about this.  Will try to have family discussion again regarding goals of care and treatment plan and options.  Plan: HD today    Vital Signs:   Date/Time Temp Pulse Resp BP MAP (mmHg) SpO2 O2 Device Patient Position - Orthostatic VS   03/18/24 1600 -- -- 18 85/58 Abnormal  68 -- -- Lying   03/18/24 1530 -- 105 18 97/63 73 -- -- Lying   03/18/24 1500 -- 102 18 97/66 75 -- -- Lying   03/18/24 1440 -- -- 18 98/63 74 -- -- Lying   03/18/24 1430 98.1 °F (36.7 °C) 98 18 107/56 71 -- -- Lying   03/18/24 13:00:55 -- 98 -- 99/59 72 99 % -- --   03/18/24 11:10:49 -- 103 -- 89/46 Abnormal  60 Abnormal  98 % -- --   03/18/24 0900 -- 98 -- -- -- -- -- --   03/18/24 0735 -- -- -- -- -- 99 % None (Room air) --   03/18/24 06:38:57 -- 100 -- 98/57 71 99 % -- --   03/18/24 05:57:41 97.9 °F (36.6 °C) 101 17 96/55 69 100 %         Pertinent Labs/Diagnostic Results:      Results from last 7 days   Lab Units 03/18/24  1423 03/17/24  0423 03/16/24  0442 03/15/24  0454 03/14/24  0506   WBC Thousand/uL 20.97* 20.27* 21.54* 20.91* 20.54*   HEMOGLOBIN g/dL 7.5* 7.5* 8.0* 8.4* 8.3*   HEMATOCRIT % 20.9* 21.4* 22.2* 23.9* 22.6*   PLATELETS Thousands/uL 89* 78* 77* 86* 73*   NEUTROS ABS Thousands/µL 15.11*  --   --  14.71* 13.76*   BANDS  PCT %  --   --  6  --   --         Results from last 7 days   Lab Units 03/18/24  1423 03/17/24  0423 03/16/24  0442 03/15/24  0454 03/14/24  1436 03/14/24  0506 03/13/24 0443 03/12/24  0610   SODIUM mmol/L 137 136 134* 135 135   < > 134* 136   POTASSIUM mmol/L 2.9* 3.1* 2.9* 3.9 2.9*   < > 3.2* 3.0*   CHLORIDE mmol/L 102 100 96 100 98   < > 98 98   CO2 mmol/L 23 26 29 25 28   < > 24 27   ANION GAP mmol/L 12 10 9 10 9   < > 12 11   BUN mg/dL 41* 31* 24 36* 31*   < > 46* 36*   CREATININE mg/dL 9.31* 7.05* 6.01* 7.75* 6.53*   < > 7.95* 6.22*   EGFR ml/min/1.73sq m 6 9 11 8 10   < > 8 10   CALCIUM mg/dL 8.7 8.1* 7.6* 7.6* 7.6*   < > 7.5* 7.0*   MAGNESIUM mg/dL  --   --   --   --   --   --  2.0 1.9   PHOSPHORUS mg/dL  --   --   --   --   --   --   --  2.5*    < > = values in this interval not displayed.     Results from last 7 days   Lab Units 03/18/24  1423 03/17/24  0423 03/16/24  0442 03/15/24  0454 03/14/24  0506   AST U/L 33 30 39 47* 36   ALT U/L 20 20 27 33 35   ALK PHOS U/L 159* 151* 188* 188* 178*   TOTAL PROTEIN g/dL 3.5* 3.5* 3.2* 3.1* 3.2*   ALBUMIN g/dL 2.9* 3.0* 2.5* 2.3* 2.3*   TOTAL BILIRUBIN mg/dL 47.67* 44.07* 42.52* 41.97* 43.82*        Results from last 7 days   Lab Units 03/18/24 1423 03/17/24  0423 03/16/24  0442 03/15/24  0454 03/14/24  1436 03/14/24  0506 03/13/24  0443 03/12/24  0610   GLUCOSE RANDOM mg/dL 133 105 93 108 109 108 111 103        BETA-HYDROXYBUTYRATE   Date Value Ref Range Status   01/21/2024 0.2 <0.6 mmol/L Final         Results from last 7 days   Lab Units 03/18/24  1423 03/17/24  0852 03/16/24  0442   PROTIME seconds 28.0* 32.7* 29.1*   INR  2.58* 3.16* 2.71*     Results from last 7 days   Lab Units 03/12/24  0610   TSH 3RD GENERATON uIU/mL 2.559        Results from last 7 days   Lab Units 03/13/24  0443   LACTIC ACID mmol/L 1.0        Results from last 7 days   Lab Units 03/12/24  0610   FERRITIN ng/mL 4,404*   IRON ug/dL 78   TIBC ug/dL <133*        Results from last 7 days    Lab Units 03/12/24  0637   UNIT PRODUCT CODE  C0407L05  [id number]   UNIT NUMBER  [id number]  [id number]   UNITABO  O  O   UNITRH  POS  POS   CROSSMATCH  Compatible  Compatible   UNIT DISPENSE STATUS  Presumed Trans  Presumed Trans   UNIT PRODUCT VOL ml 350  350       Medications:   Scheduled Medications:  calcitriol, 0.25 mcg, Oral, Daily  calcium carbonate, 1 tablet, Oral, Daily With Breakfast  epoetin sandra, 9,000 Units, Intravenous, Once per day on Monday Wednesday Friday  escitalopram, 5 mg, Oral, Daily  fludrocortisone, 0.2 mg, Oral, Daily  lactulose, 10 g, Oral, Daily  lidocaine, 1 patch, Topical, Q24H  melatonin, 3 mg, Oral, HS  midodrine, 17.5 mg, Oral, TID AC  nicotine, 7 mg, Transdermal, Daily  pantoprazole, 40 mg, Oral, BID  polyethylene glycol, 4,000 mL, Oral, See Admin Instructions  potassium chloride, 40 mEq, Oral, BID  rifaximin, 550 mg, Oral, Q12H MURALI      Continuous IV Infusions:  none       PRN Meds:  acetaminophen, 650 mg, Oral, Q6H PRN  diphenhydramine, lidocaine, Al/Mg hydroxide, simethicone, 10 mL, Swish & Swallow, Q6H PRN  hydrOXYzine HCL, 25 mg, Oral, Q6H PRN  ondansetron, 4 mg, Intravenous, Q8H PRN        Discharge Plan: [facility] Utilization Review Department  ATTENTION: Please call with any questions or concerns to [phone] and carefully listen to the prompts so that you are directed to the right person. All voicemails are confidential.   For Discharge needs, contact Care Management DC Support Team at [phone] opt. 2  Send all requests for admission clinical reviews, approved or denied determinations and any other requests to dedicated fax number below belonging to the campus where the patient is receiving treatment. List of dedicated fax numbers for the Facilities:  FACILITY NAME UR FAX NUMBER   ADMISSION DENIALS (Administrative/Medical Necessity) [phone]   DISCHARGE SUPPORT TEAM (NETWORK) [phone]   PARENT CHILD HEALTH (Maternity/NICU/Pediatrics)  [phone]   [facility] [phone]   [facility] [phone]   [facility] [phone]   [facility] [phone]   [facility] [phone]   [facility] [phone]   [facility] [phone]   [facility] [phone]   [facility] [phone]   [facility] [phone]   [facility] [phone]   [facility] [phone]

## 2024-03-18 NOTE — PLAN OF CARE
Problem: NEUROSENSORY - ADULT  Goal: Achieves stable or improved neurological status  Description: INTERVENTIONS  - Monitor and report changes in neurological status  - Monitor vital signs such as temperature, blood pressure, glucose, and any other labs ordered   - Initiate measures to prevent increased intracranial pressure  - Monitor for seizure activity and implement precautions if appropriate      Outcome: Progressing     Problem: GASTROINTESTINAL - ADULT  Goal: Minimal or absence of nausea and/or vomiting  Description: INTERVENTIONS:  - Administer IV fluids if ordered to ensure adequate hydration  - Maintain NPO status until nausea and vomiting are resolved  - Nasogastric tube if ordered  - Administer ordered antiemetic medications as needed  - Provide nonpharmacologic comfort measures as appropriate  - Advance diet as tolerated, if ordered  - Consider nutrition services referral to assist patient with adequate nutrition and appropriate food choices  Outcome: Progressing

## 2024-03-18 NOTE — PLAN OF CARE
Target UF Goal  No fluid removal; run tx net even . Patient dialyzing for  3.5  on 4 K bath for serum K of  3.1  per protocol. Treatment plan reviewed with Nephrology.       Post-Dialysis RN Treatment Note    Blood Pressure:  Pre 107/56 mm/Hg  Post 85/55 mmHg   EDW  TBD kg    Weight:  Pre 82.9 kg   Post 82.9 kg   Mode of weight measurement: Bed Scale   Volume Removed  0 ml    Treatment duration  2:45   NS given  No    Treatment shortened? Yes, describe: First, patient only agreeable to 3hr treatment; but ended even earlier than that as BP kept trending lower every 15 mins despite no fluid removal and Midodrine given (ended 14 mins earlier than 3 hr goal set by patient)   Medications given during Rx Epogen 9,000 units IV   Estimated Kt/V  1.02   Access type: Permacath/TDC   Access Issues: No    Report called to primary nurse   Yes, Lainey RN at bedside        Problem: METABOLIC, FLUID AND ELECTROLYTES - ADULT  Goal: Electrolytes maintained within normal limits  Description: INTERVENTIONS:  - Monitor labs and assess patient for signs and symptoms of electrolyte imbalances -potassium  - Administer electrolyte replacement as ordered  - Monitor response to electrolyte replacements, including repeat lab results as appropriate  - Instruct patient on fluid and nutrition as appropriate  Outcome: Progressing  Goal: Fluid balance maintained  Description: INTERVENTIONS:  - Monitor labs   - Monitor I/O and WT  - Instruct patient on fluid and nutrition as appropriate  - Assess for signs & symptoms of volume excess or deficit  Outcome: Progressing

## 2024-03-18 NOTE — ASSESSMENT & PLAN NOTE
History alcohol abuse, last drink prior to admission in January  Will need outpatient alcohol counseling prior to transplant reevaluation-patient has referral from [facility] for virtual therapy which supposed to initiated soon.    Discussed with hepatologist at [facility]-regarding if patient will be candidate for terlipressin  treatment or not, after discussing the case with hepatologist, per hepatologist, patient is not a candidate for the above treatment at this time  At this time, in this campus, the treatment options and knowledge limits is limited for this patient.  And family is aware about this.  Will try to have family discussion again regarding goals of care and treatment plan and options.

## 2024-03-18 NOTE — PROGRESS NOTES
"Progress Note - Nephrology   Michael ARACELI Koch 33 y.o. male MRN: [id number]  Unit/Bed#: -01 Encounter: [id number]    ASSESSMENT AND PLAN:  1.  DUANE HD dependent: Secondary to hepatorenal syndrome  - MWF schedule  - Access: Right TDC  - Chronic severe hypotension on midodrine more than maximal dose and albumin as needed for symptomatic hypotension  - HD today    2.  Refractory hypotension secondary to end-stage liver disease: Maximal midodrine and more than usual at 17.5 mg every 8 hours; Florinef 0.2 mg daily.  Use as needed albumin    3.  Electrolytes/acid-base:  - Patient has had hypokalemia replete to avoid hepatic encephalopathy: Currently on KCl 40 mill equivalents twice a day adjust HD bath    4.  MBD of DUANE:  - Patient with borderline hypophosphatemia will follow-up level: Will stop phosphate binder  - Calcitriol 0.25 mcg daily for secondary hyperparathyroidism of DUANE    5.  Anemia of end-stage liver disease/DUANE:  - Follow hemoglobin last was 7.5 from 3/17  - On Epogen with HD  - Ferritin greater than 4000 no iron  - Transfuse as needed per primary service for hemoglobin less than 7.0    6.  ESLD: Not a candidate for transplant per [facility] and now unfortunately per [facility] not a candidate for Terlipressin given advanced kidney disease on HD.  Prognosis very grim.  May not be able to go home/rehab may require nursing home stay.    Discussed with primary service regarding HD as a consequence/result we will continue the same for now pending family decision for overall goals of care    Subjective:   Patient denies any acute symptoms  No nausea vomiting or diarrhea  No chest pain or shortness of breath    Objective:     Vitals: Blood pressure 98/57, pulse 98, temperature 97.9 °F (36.6 °C), resp. rate 17, height 5' 7\" (1.702 m), weight 82.9 kg (182 lb 12.2 oz), SpO2 99%.,Body mass index is 28.62 kg/m².    Weight (last 2 days)       Date/Time Weight    03/18/24 0600 82.9 (182.76)    03/17/24 " 0537 81.3 (179.23)    03/16/24 0300 81.9 (180.56)              Intake/Output Summary (Last 24 hours) at 3/18/2024 1029  Last data filed at 3/18/2024 0841  Gross per 24 hour   Intake 1520 ml   Output --   Net 1520 ml       HD Permanent Double Catheter (Active)   Reasons to continue HD Cath Treatment Therapy 03/18/24 0859   Goal for Removal N/A- chronic HD catheter 03/18/24 0859   Line Necessity Reviewed Yes, reviewed with provider 03/18/24 0859   Site Assessment WDL 03/18/24 0859   Proximal Lumen Status Capped 03/18/24 0859   Medial Lumen Status Capped 03/03/24 2300   Distal Lumen Status Capped 03/18/24 0859   Dressing Type Transparent;Chlorhexidine dressing 03/18/24 0859   Dressing Status Clean;Dry;Intact 03/18/24 0859   Dressing Intervention 7 day central line dressing changed 03/13/24 1325   Dressing Change Due 03/20/24 03/15/24 1715       Physical Exam: General:  No acute distress but very chronically ill-appearing  Skin:  No acute rash/severe jaundice  Eyes: Positive scleral icterus and noninjected  ENT:  Moist mucous membranes  Neck:  Supple, no jugular venous distention, trachea midline, overall appearance is normal  Chest:  Clear to auscultation; right TDC clean and dry  CVS:  Regular rate and rhythm, without a rub or gallops  Abdomen:  Normal bowel sounds, soft and nontender and moderately distended with a fluid wave  Extremities:  No edema, and no cyanosis, no significant arthritic changes  Neuro:  No gross focality  Psych:  Alert and oriented and appropriate                Medications:    Scheduled Meds:  Current Facility-Administered Medications   Medication Dose Route Frequency Provider Last Rate    acetaminophen  650 mg Oral Q6H PRN Swati Vaughn MD      calcitriol  0.25 mcg Oral Daily Niecy JENSEN Ibarra      calcium acetate  1,334 mg Oral TID With Meals Niecy Ibarra, CRNP      calcium carbonate  1 tablet Oral Daily With Breakfast Niecy Iabrra, CRNP      diphenhydramine, lidocaine, Al/Mg hydroxide,  simethicone  10 mL Swish & Swallow Q6H PRN Yuly S Edenilson, CRNP      epoetin sandra  9,000 Units Intravenous Once per day on Monday Wednesday Friday Tyler Stallworth MD      escitalopram  5 mg Oral Daily Constance Menendez MD      fludrocortisone  0.2 mg Oral Daily Constance Menendez MD      hydrOXYzine HCL  25 mg Oral Q6H PRN Yuly S Edenilson, CRNP      lactulose  10 g Oral Daily Constance Menendez MD      lidocaine  1 patch Topical Q24H Tiburcio Jean MD      melatonin  3 mg Oral HS Constance Menendez MD      midodrine  17.5 mg Oral TID AC Silvia aKtz DO      nicotine  7 mg Transdermal Daily Constance Menendez MD      ondansetron  4 mg Intravenous Q8H PRN Yuly S Edenilson, CRNP      pantoprazole  40 mg Oral BID if Jayce Foster MD      polyethylene glycol  4,000 mL Oral See Admin Instructions Kayleigh Stahl PA-C      potassium chloride  40 mEq Oral BID Roland Kulkarni MD      rifaximin  550 mg Oral Q12H MURALI Constance Menendez MD         PRN Meds:.  acetaminophen    diphenhydramine, lidocaine, Al/Mg hydroxide, simethicone    hydrOXYzine HCL    ondansetron    Continuous Infusions:     Lab, Imaging and other studies: I have personally reviewed pertinent labs.  Laboratory Results:  Results from last 7 days   Lab Units 03/17/24  0423 03/16/24  0442 03/15/24  0454 03/14/24  1436 03/14/24  0506 03/13/24  0443 03/12/24  0610 03/11/24  1032   WBC Thousand/uL 20.27* 21.54* 20.91*  --  20.54* 18.53* 17.09*  --    HEMOGLOBIN g/dL 7.5* 8.0* 8.4*  --  8.3* 8.5* 8.5* 6.2*   HEMATOCRIT % 21.4* 22.2* 23.9*  --  22.6* 23.7* 23.1* 17.0*   PLATELETS Thousands/uL 78* 77* 86*  --  73* 67* 54*  --    POTASSIUM mmol/L 3.1* 2.9* 3.9 2.9* 2.7* 3.2* 3.0*  --    CHLORIDE mmol/L 100 96 100 98 96 98 98  --    CO2 mmol/L 26 29 25 28 28 24 27  --    BUN mg/dL 31* 24 36* 31* 28* 46* 36*  --    CREATININE mg/dL 7.05* 6.01* 7.75* 6.53* 5.92* 7.95* 6.22*  --    CALCIUM mg/dL 8.1* 7.6* 7.6* 7.6* 7.3* 7.5* 7.0*  --    MAGNESIUM mg/dL  --   --   --   --   --  2.0 1.9  --    PHOSPHORUS mg/dL  --    "--   --   --   --   --  2.5*  --      Urinalysis:   Lab Results   Component Value Date    COLORU Rac[place] 01/30/2024    CLARITYU Cloudy 01/30/2024    SPECGRAV 1.020 01/30/2024    PHUR 5.5 01/30/2024    LEUKOCYTESUR Negative 01/30/2024    NITRITE Negative 01/30/2024    GLUCOSEU 100 (1/10%) (A) 01/30/2024    KETONESU Trace (A) 01/30/2024    BILIRUBINUR Large (A) 01/30/2024    BLOODU Large (A) 01/30/2024     ABGs: No results found for: \"PH\"  Radiology review:     Portions of the record may have been created with voice recognition software.  Occasional wrong word or \"sound a like\" substitutions may have occurred due to the inherent limitations of voice recognition software.  Read the chart carefully and recognize, using context, where substitutions have occurred.                    "

## 2024-03-18 NOTE — CASE MANAGEMENT
Case Management Discharge Planning Note    Patient name Michael Koch  Location /-01 MRN [id number]  : 1990 Date 3/18/2024       Current Admission Date: 2024  Current Admission Diagnosis:Alcoholic hepatitis   Patient Active Problem List    Diagnosis Date Noted    Anemia due to chronic kidney disease, on chronic dialysis (HCC) 2024    Hemodialysis patient (HCC) 2024    Acute on chronic anemia 2024    Malnutrition (HCC) 2024    Orthostatic hypotension 2024    Acute respiratory failure with hypoxia and hypercapnia (HCC) 2024    Alcoholic hepatitis 2024    ETOH abuse 2024    Depression 2024    GERD (gastroesophageal reflux disease) 2024    BPH (benign prostatic hyperplasia) 2024    RSV infection 2024    Abnormal CT scan 2024    Hepatic encephalopathy (HCC) 2024    N&V (nausea and vomiting) 2024    DUANE (acute kidney injury) (HCC) 2024    Thrombocytopenia (HCC) 2024    Lactic acidosis 2024    Marijuana use 2024    SIRS (systemic inflammatory response syndrome) (HCC) 2024    History of gout 10/04/2023    History of hypertension 10/04/2023    Type 2 diabetes mellitus with hyperglycemia, without long-term current use of insulin (HCC) 10/04/2023    Tobacco abuse 10/04/2023      LOS (days): 20  Geometric Mean LOS (GMLOS) (days): 4.8  Days to GMLOS:-14.9     OBJECTIVE:  Risk of Unplanned Readmission Score: 52.44         Current admission status: Inpatient   Preferred Pharmacy:   [id number] - [place], [place] - [address]  [address]  [place] [address]  Phone: [phone] Fax: [phone]    Primary Care Provider: Louie Odonnell DO    Primary Insurance: BLUE CROSS  Secondary Insurance:     DISCHARGE DETAILS:        CM received call from Toya ([employer]) patient's HD time is going to be  - noon. They would like update when  patient ready for discharge. Her number is [phone].     CM received fax to document change of HD time.    CM to follow patient's care and discharge needs.

## 2024-03-18 NOTE — PROGRESS NOTES
The pantoprazole has / have been converted to Oral per [facility] IV-to-PO Auto-Conversion Protocol for Adults as approved by the Pharmacy and Therapeutics Committee. The patient met all eligible criteria:  1) Age = 18 years old   2) Received at least one dose of the IV form   3) Receiving at least one other scheduled oral/enteral medication   4) Tolerating an oral/enteral diet   and did not have any exclusions:   1) Critical care patient   2) Active GI bleed (IF assessing H2RAs or PPIs)   3) Continuous tube feeding (IF assessing cipro, doxycycline, levofloxacin, minocycline, rifampin, or voriconazole)   4) Receiving PO vancomycin (IF assessing metronidazole)   5) Persistent nausea and/or vomiting   6) Ileus or gastrointestinal obstruction   7) Fiorella/nasogastric tube set for continuous suction   8) Specific order not to automatically convert to PO (in the order's comments or if discussed in the most recent Infectious Disease or primary team's progress notes).

## 2024-03-18 NOTE — PROGRESS NOTES
[facility]  Progress Note  Name: Michael Koch I  MRN: [id number]  Unit/Bed#: -01 I Date of Admission: 2/27/2024   Date of Service: 3/18/2024 I Hospital Day: 20    Assessment/Plan   * Alcoholic hepatitis  Assessment & Plan  was on Solu-Cortef 25 mg daily -stopped 3/5  Known Alcoholic cirrhosis  MELDS 45  Transferred from [facility] in [place] after liver transplant evaluation, he was found not to be candidate  Continue lactulose but decrease dose to 10 g once daily as ammonia level is normal and patient is having a lot of loose stools.   Xifaxan and see if that will help decrease his diarrhea frequency.  Check ammonia level every 3 days for now as we have been rapidly decreasing lactulose dose  No encephalopathy currently  Patient has extensive workup done, at this time, not much to offer.AT Present not candidate for liver transplant but should be reevaluated again in a few months  Patient does have capacity to make informed medical decisions.   Patient is still having significant orthostatic hypotension and unable to sit up.  Adjusting medication.  Discussed with hepatologist at [facility]-regarding if patient will be candidate for terlipressin  treatment or not, after discussing the case with hepatologist, per hepatologist, patient is not a candidate for the above treatment at this time  At this time, in this [place], the treatment options and knowledge limits is limited for this patient.  And family is aware about this.  Will try to have family discussion again regarding goals of care and treatment plan and options.    Anemia due to chronic kidney disease, on chronic dialysis (HCC)  Assessment & Plan  Lab Results   Component Value Date    EGFR 9 03/17/2024    EGFR 11 03/16/2024    EGFR 8 03/15/2024    CREATININE 7.05 (H) 03/17/2024    CREATININE 6.01 (H) 03/16/2024    CREATININE 7.75 (H) 03/15/2024   Continue conservative management    Hemodialysis patient (HCC)  Assessment &  Plan  Continue treatment as per nephrology recommendation, due to orthostatic hypotension, will need to modify patient treatment.    Discussed with hepatologist at [facility]-regarding if patient will be candidate for terlipressin  treatment or not, after discussing the case with hepatologist, per hepatologist, patient is not a candidate for the above treatment at this time  At this time, in this campus, the treatment options and knowledge limits is limited for this patient.  And family is aware about this.  Will try to have family discussion again regarding goals of care and treatment plan and options.    Acute on chronic anemia  Assessment & Plan  Hemoglobin is around 8.9.  Patient has intermittently been requiring blood transfusions during his extensive hospitalization courses over the last few weeks.  Today hemoglobin dropped to 6.2.  Will give 2 units PRBC and recheck CBC in a.m. and plan for EGD and colonoscopy in a.m. if medically stable.  Continue Protonix drip and clear liquids for now.  No overt signs of bleeding noted at this time    Initial plan was to proceed with EGD and colonoscopy.  Due to patient ongoing complex medical conditions and persistent hypertension, cardiology consulted for preop clearance-Per cardiology recommendation, patient will be very high risk patients.  Unless there is active bleeding visualized, recommending to defer the procedure if possible.  In case without any active bleeding, patient still needs EGD and colonoscopy in that case cardiology recommending to transfer the patient to tertiary center.  Discussed with gastroenterologist, will hold off procedure at this time    Orthostatic hypotension  Assessment & Plan  Secondary to end-stage liver disease and hemodialysis dependent acute kidney injury and autonomic dysfunction.  Continue midodrine-patient is still having orthostatic hypotension which is limiting his mobility. cont thigh high CARLOS stockings.  Cont midodrine 17.5 mg 3  times daily,increase  Florinef 0.2 mg daily .decrease zyprexa dose to 5 mg daily and decreased lactulose to 10 gram once daily .  Increase oral intake.  Monitor orthostasis.prn iv albumin.may trial mestinon low dose as last resort  Echocardiogram reviewed  Patient is on higher dose of midodrine and fludrocortisone, is still patient is having orthostatic hypotension especially with movement.  Blood pressure is still running low, will give you.  Albumin therapy    Discussed with hepatologist at [facility]-regarding if patient will be candidate for terlipressin  treatment or not, after discussing the case with hepatologist, per hepatologist, patient is not a candidate for the above treatment at this time  At this time, in this campus, the treatment options and knowledge limits is limited for this patient.  And family is aware about this.  Will try to have family discussion again regarding goals of care and treatment plan and options.    Depression  Assessment & Plan  Continue Zyprexa at bedtime decreased dose to 5 mg daily FROM 15MG daily and also decreased melatonin to 3 mg daily  Please note that patient had very bad alcohol withdrawals and was on Precedex drip for some time during the last few weeks.  Also having behavioral issues and was placed on Zyprexa during his hospital course.  Patient probably has more than just depression and needs reeval with psychiatry  Patient having significant orthostatic hypotension and hence trying to decrease dose of Zyprexa.  Will ask psychiatry to reevaluate and placed him on some other agent does not cause hypotension as he still has behavior issues  cortisol level is currently normal will recheck again  in a week as now off steroids.normal ammonia level    ETOH abuse  Assessment & Plan  History alcohol abuse, last drink prior to admission in January  Will need outpatient alcohol counseling prior to transplant reevaluation-patient has referral from [facility] for virtual  therapy which supposed to initiated soon.    Discussed with hepatologist at [facility]-regarding if patient will be candidate for terlipressin  treatment or not, after discussing the case with hepatologist, per hepatologist, patient is not a candidate for the above treatment at this time  At this time, in this campus, the treatment options and knowledge limits is limited for this patient.  And family is aware about this.  Will try to have family discussion again regarding goals of care and treatment plan and options.      Tobacco abuse  Assessment & Plan  Nicotine patch  Cessation counseling             VTE Pharmacologic Prophylaxis:    Patient INR is therapeutic    Mobility:   Basic Mobility Inpatient Raw Score: 13  JH-HLM Goal: 4: Move to chair/commode  JH-HLM Achieved: 2: Bed activities/Dependent transfer  JH-HLM Goal NOT achieved. Continue with multidisciplinary rounding and encourage appropriate mobility to improve upon JH-HLM goals.    Patient Centered Rounds: I performed bedside rounds with nursing staff today.   Discussions with Specialists or Other Care Team Provider: Nephrology    Education and Discussions with Family / Patient: Updated  (wife) via phone.    Current Length of Stay: 20 day(s)  Current Patient Status: Inpatient   Certification Statement: The patient will continue to require additional inpatient hospital stay due to monitor above conditions  Discharge Plan:  To be determined    Code Status: Level 3 - DNAR and DNI    Subjective:   Seen and evaluated and examined.  Lying in the bed, feeling tired and fatigued.    Objective:     Vitals:   Temp (24hrs), Av.9 °F (36.6 °C), Min:97.6 °F (36.4 °C), Max:98.1 °F (36.7 °C)    Temp:  [97.6 °F (36.4 °C)-98.1 °F (36.7 °C)] 97.9 °F (36.6 °C)  HR:  [] 103  Resp:  [17-18] 17  BP: (89-99)/(46-57) 89/46  SpO2:  [98 %-100 %] 98 %  Body mass index is 28.62 kg/m².     Input and Output Summary (last 24 hours):     Intake/Output Summary (Last 24  hours) at 3/18/2024 1122  Last data filed at 3/18/2024 0841  Gross per 24 hour   Intake 1520 ml   Output --   Net 1520 ml       Physical Exam:   Physical Exam  Vitals and nursing note reviewed.   Constitutional:       Appearance: Normal appearance. He is ill-appearing. He is not diaphoretic.   HENT:      Mouth/Throat:      Mouth: Mucous membranes are moist.   Eyes:      General: Scleral icterus present.      Pupils: Pupils are equal, round, and reactive to light.   Cardiovascular:      Rate and Rhythm: Normal rate.      Heart sounds: No murmur heard.     No friction rub. No gallop.   Pulmonary:      Effort: Pulmonary effort is normal. No respiratory distress.      Breath sounds: No stridor. No wheezing or rhonchi.   Abdominal:      General: There is no distension.      Palpations: Abdomen is soft. There is no mass.      Tenderness: There is no abdominal tenderness.      Hernia: No hernia is present.   Musculoskeletal:      Right lower leg: No edema.      Left lower leg: No edema.   Skin:     Capillary Refill: Capillary refill takes less than 2 seconds.   Neurological:      General: No focal deficit present.      Mental Status: He is alert and oriented to person, place, and time.      Cranial Nerves: No cranial nerve deficit.      Sensory: No sensory deficit.      Motor: No weakness.      Coordination: Coordination normal.          Additional Data:     Labs:  Results from last 7 days   Lab Units 03/17/24  0423 03/16/24  0442 03/15/24  0454   WBC Thousand/uL 20.27* 21.54* 20.91*   HEMOGLOBIN g/dL 7.5* 8.0* 8.4*   HEMATOCRIT % 21.4* 22.2* 23.9*   PLATELETS Thousands/uL 78* 77* 86*   BANDS PCT %  --  6  --    NEUTROS PCT %  --   --  70   LYMPHS PCT %  --   --  12*   LYMPHO PCT %  --  11*  --    MONOS PCT %  --   --  13*   MONO PCT %  --  11  --    EOS PCT %  --  1 2     Results from last 7 days   Lab Units 03/17/24  0423   SODIUM mmol/L 136   POTASSIUM mmol/L 3.1*   CHLORIDE mmol/L 100   CO2 mmol/L 26   BUN mg/dL 31*    CREATININE mg/dL 7.05*   ANION GAP mmol/L 10   CALCIUM mg/dL 8.1*   ALBUMIN g/dL 3.0*   TOTAL BILIRUBIN mg/dL 44.07*   ALK PHOS U/L 151*   ALT U/L 20   AST U/L 30   GLUCOSE RANDOM mg/dL 105     Results from last 7 days   Lab Units 03/17/24  0852   INR  3.16*             Results from last 7 days   Lab Units 03/13/24  0443   LACTIC ACID mmol/L 1.0       Lines/Drains:  Invasive Devices       Peripheral Intravenous Line  Duration             Peripheral IV 03/15/24 Dorsal (posterior);Left Forearm 3 days              Hemodialysis Catheter  Duration             HD Permanent Double Catheter 17 days                          Imaging: No pertinent imaging reviewed.    Recent Cultures (last 7 days):         Last 24 Hours Medication List:   Current Facility-Administered Medications   Medication Dose Route Frequency Provider Last Rate    acetaminophen  650 mg Oral Q6H PRN Swait Vaughn MD      calcitriol  0.25 mcg Oral Daily Niecy Ibarra, CRNP      calcium carbonate  1 tablet Oral Daily With Breakfast Niecy Ibarra, CRNP      diphenhydramine, lidocaine, Al/Mg hydroxide, simethicone  10 mL Swish & Swallow Q6H PRN Yuly S Edenilson, CRNP      epoetin sandra  9,000 Units Intravenous Once per day on Monday Wednesday Friday Tyler Stallworth MD      escitalopram  5 mg Oral Daily Constance Menendez MD      fludrocortisone  0.2 mg Oral Daily Constance Menednez MD      hydrOXYzine HCL  25 mg Oral Q6H PRN Yuly S Edenilson, CRNP      lactulose  10 g Oral Daily Constance Menendez MD      lidocaine  1 patch Topical Q24H Tiburcio Jean MD      melatonin  3 mg Oral HS Constance Menendez MD      midodrine  17.5 mg Oral TID AC Silvia Katz DO      nicotine  7 mg Transdermal Daily Constance Menendez MD      ondansetron  4 mg Intravenous Q8H PRN Yuly S Edenilson, CRNP      pantoprazole  40 mg Oral BID Ricky Foster MD      polyethylene glycol  4,000 mL Oral See Admin Instructions Kayleigh Stahl PA-C      potassium chloride  40 mEq Oral BID Roland Kulkarni MD      rifaximin  550 mg  Oral Q12H MURALI Constance Menendez MD          Today, Patient Was Seen By: Tiburcio Jean MD    **Please Note: This note may have been constructed using a voice recognition system.**

## 2024-03-18 NOTE — ASSESSMENT & PLAN NOTE
was on Solu-Cortef 25 mg daily -stopped 3/5  Known Alcoholic cirrhosis  MELDS 45  Transferred from [facility] in [place] after liver transplant evaluation, he was found not to be candidate  Continue lactulose but decrease dose to 10 g once daily as ammonia level is normal and patient is having a lot of loose stools.   Xifaxan and see if that will help decrease his diarrhea frequency.  Check ammonia level every 3 days for now as we have been rapidly decreasing lactulose dose  No encephalopathy currently  Patient has extensive workup done, at this time, not much to offer.AT Present not candidate for liver transplant but should be reevaluated again in a few months  Patient does have capacity to make informed medical decisions.   Patient is still having significant orthostatic hypotension and unable to sit up.  Adjusting medication.  Discussed with hepatologist at [facility]-regarding if patient will be candidate for terlipressin  treatment or not, after discussing the case with hepatologist, per hepatologist, patient is not a candidate for the above treatment at this time  At this time, in this campus, the treatment options and knowledge limits is limited for this patient.  And family is aware about this.  Will try to have family discussion again regarding goals of care and treatment plan and options.

## 2024-03-19 NOTE — OCCUPATIONAL THERAPY NOTE
Occupational Therapy Cancel Note    Patient Name: Michael Koch  Today's Date: 3/19/2024     03/19/24 1446   Note Type   Note Type Cancelled Session   Cancel Reasons Medical status     Chart reviewed. Spoke with ALONDRA Garcia prior to OT tx session who reports pt is currently receiving blood transfusion d/t low Hgb. Will continue to follow case and address as medically appropriate and as time allows.    Pinky Blount, OTR/L

## 2024-03-19 NOTE — ASSESSMENT & PLAN NOTE
Continue treatment as per nephrology recommendation, due to orthostatic hypotension, will need to modify patient treatment hemodialysis twice a week every Monday and Friday

## 2024-03-19 NOTE — PLAN OF CARE
Problem: NEUROSENSORY - ADULT  Goal: Achieves stable or improved neurological status  Description: INTERVENTIONS  - Monitor and report changes in neurological status  - Monitor vital signs such as temperature, blood pressure, glucose, and any other labs ordered   - Initiate measures to prevent increased intracranial pressure  - Monitor for seizure activity and implement precautions if appropriate      Outcome: Progressing  Goal: Achieves maximal functionality and self care  Description: INTERVENTIONS  - Monitor swallowing and airway patency with patient fatigue and changes in neurological status  - Encourage and assist patient to increase activity and self care.   - Encourage visually impaired, hearing impaired and aphasic patients to use assistive/communication devices  Outcome: Progressing     Problem: GASTROINTESTINAL - ADULT  Goal: Minimal or absence of nausea and/or vomiting  Description: INTERVENTIONS:  - Administer IV fluids if ordered to ensure adequate hydration  - Maintain NPO status until nausea and vomiting are resolved  - Nasogastric tube if ordered  - Administer ordered antiemetic medications as needed  - Provide nonpharmacologic comfort measures as appropriate  - Advance diet as tolerated, if ordered  - Consider nutrition services referral to assist patient with adequate nutrition and appropriate food choices  Outcome: Progressing  Goal: Maintains or returns to baseline bowel function  Description: INTERVENTIONS:  - Assess bowel function  - Encourage oral fluids to ensure adequate hydration  - Administer IV fluids if ordered to ensure adequate hydration  - Administer ordered medications as needed  - Encourage mobilization and activity  - Consider nutritional services referral to assist patient with adequate nutrition and appropriate food choices  Outcome: Progressing  Goal: Maintains adequate nutritional intake  Description: INTERVENTIONS:  - Monitor percentage of each meal consumed  - Identify  factors contributing to decreased intake, treat as appropriate  - Assist with meals as needed  - Monitor I&O, weight, and lab values if indicated  - Obtain nutrition services referral as needed  Outcome: Not Progressing  Goal: Oral mucous membranes remain intact  Description: INTERVENTIONS  - Assess oral mucosa and hygiene practices  - Implement preventative oral hygiene regimen  - Implement oral medicated treatments as ordered magic mouth wash  - Initiate Nutrition services referral as needed  Outcome: Progressing     Problem: METABOLIC, FLUID AND ELECTROLYTES - ADULT  Goal: Electrolytes maintained within normal limits  Description: INTERVENTIONS:  - Monitor labs and assess patient for signs and symptoms of electrolyte imbalances -potassium  - Administer electrolyte replacement as ordered  - Monitor response to electrolyte replacements, including repeat lab results as appropriate  - Instruct patient on fluid and nutrition as appropriate  Outcome: Progressing  Goal: Fluid balance maintained  Description: INTERVENTIONS:  - Monitor labs   - Monitor I/O and WT  - Instruct patient on fluid and nutrition as appropriate  - Assess for signs & symptoms of volume excess or deficit  Outcome: Progressing     Problem: HEMATOLOGIC - ADULT  Goal: Maintains hematologic stability  Description: INTERVENTIONS  - Assess for signs and symptoms of bleeding or hemorrhage  - Monitor labs  - Administer supportive blood products/factors as ordered and appropriate  Outcome: Progressing     Problem: MUSCULOSKELETAL - ADULT  Goal: Maintain or return mobility to safest level of function  Description: INTERVENTIONS:  - Assess patient's ability to carry out ADLs; assess patient's baseline for ADL function and identify physical deficits which impact ability to perform ADLs (bathing, care of mouth/teeth, toileting, grooming, dressing, etc.)  - Assess/evaluate cause of self-care deficits   - Assess range of motion  - Assess patient's mobility  -  Assess patient's need for assistive devices and provide as appropriate  - Encourage maximum independence but intervene and supervise when necessary  - Involve family in performance of ADLs  - Assess for home care needs following discharge   - Consider OT consult to assist with ADL evaluation and planning for discharge  - Provide patient education as appropriate  Outcome: Progressing  Goal: Maintain proper alignment of affected body part  Description: INTERVENTIONS:  - Support, maintain and protect limb and body alignment  - Provide patient/ family with appropriate education  Outcome: Progressing     Problem: CARDIOVASCULAR - ADULT  Goal: Maintains optimal cardiac output and hemodynamic stability  Description: INTERVENTIONS:  - Monitor I/O, vital signs and rhythm monitor blood pressures hypotensive  - Monitor for S/S and trends of decreased cardiac output  - Administer and titrate ordered vasoactive medications to optimize hemodynamic stability  - Assess quality of pulses, skin color and temperature  - Assess for signs of decreased coronary artery perfusion  - Instruct patient to report change in severity of symptoms  Outcome: Progressing     Problem: Prexisting or High Potential for Compromised Skin Integrity  Goal: Skin integrity is maintained or improved  Description: INTERVENTIONS:  - Identify patients at risk for skin breakdown  - Assess and monitor skin integrity  - Assess and monitor nutrition and hydration status  - Monitor labs   - Assess for incontinence   - Turn and reposition patient  - Assist with mobility/ambulation  - Relieve pressure over bony prominences  - Avoid friction and shearing  - Provide appropriate hygiene as needed including keeping skin clean and dry  - Evaluate need for skin moisturizer/barrier cream  - Collaborate with interdisciplinary team   - Patient/family teaching  - Consider wound care consult   Outcome: Progressing     Problem: Nutrition/Hydration-ADULT  Goal: Nutrient/Hydration  intake appropriate for improving, restoring or maintaining nutritional needs  Description: Monitor and assess patient's nutrition/hydration status for malnutrition. Collaborate with interdisciplinary team and initiate plan and interventions as ordered.  Monitor patient's weight and dietary intake as ordered or per policy. Utilize nutrition screening tool and intervene as necessary. Determine patient's food preferences and provide high-protein, high-caloric foods as appropriate.     INTERVENTIONS:  - Monitor oral intake, urinary output, labs, and treatment plans  - Assess nutrition and hydration status and recommend course of action  - Evaluate amount of meals eaten  - Assist patient with eating if necessary   - Allow adequate time for meals  - Recommend/ encourage appropriate diets, oral nutritional supplements, and vitamin/mineral supplements  - Order, calculate, and assess calorie counts as needed  - Recommend, monitor, and adjust tube feedings and TPN/PPN based on assessed needs  - Assess need for intravenous fluids  - Provide specific nutrition/hydration education as appropriate  - Include patient/family/caregiver in decisions related to nutrition  Outcome: Not Progressing

## 2024-03-19 NOTE — CASE MANAGEMENT
Case Management Discharge Planning Note    Patient name Michael Koch  Location /-01 MRN [id number]  : 1990 Date 3/19/2024       Current Admission Date: 2024  Current Admission Diagnosis:Alcoholic hepatitis   Patient Active Problem List    Diagnosis Date Noted    Anemia due to chronic kidney disease, on chronic dialysis (HCC) 2024    Hemodialysis patient (HCC) 2024    Acute on chronic anemia 2024    Malnutrition (HCC) 2024    Orthostatic hypotension 2024    Acute respiratory failure with hypoxia and hypercapnia (HCC) 2024    Alcoholic hepatitis 2024    ETOH abuse 2024    Depression 2024    GERD (gastroesophageal reflux disease) 2024    BPH (benign prostatic hyperplasia) 2024    RSV infection 2024    Abnormal CT scan 2024    Hepatic encephalopathy (HCC) 2024    N&V (nausea and vomiting) 2024    DUANE (acute kidney injury) (HCC) 2024    Thrombocytopenia (HCC) 2024    Lactic acidosis 2024    Marijuana use 2024    SIRS (systemic inflammatory response syndrome) (HCC) 2024    History of gout 10/04/2023    History of hypertension 10/04/2023    Type 2 diabetes mellitus with hyperglycemia, without long-term current use of insulin (HCC) 10/04/2023    Tobacco abuse 10/04/2023      LOS (days): 21  Geometric Mean LOS (GMLOS) (days): 4.8  Days to GMLOS:-15.7     OBJECTIVE:  Risk of Unplanned Readmission Score: 48.66         Current admission status: Inpatient   Preferred Pharmacy:   [id number] - [place], [place] - [address]  [address]  [place] [address]  Phone: [phone] Fax: [phone]    Primary Care Provider: Louie Odonnell DO    Primary Insurance: BLUE CROSS  Secondary Insurance:     DISCHARGE DETAILS:    Spoke to provider who discussed case with renal: Plan is HD twice a week. Patient at this time will be WC bound at this time. RISA  communicating with [facility] with this plan. Checking to see if they will be able to accept patient this week with having HD on Mondays/ Fridays.  Await determination.

## 2024-03-19 NOTE — ASSESSMENT & PLAN NOTE
Hemoglobin is around 8.9.  Patient has intermittently been requiring blood transfusions during his extensive hospitalization courses over the last few weeks.  Today hemoglobin dropped to 6.7.  Will give 1 units PRBC and recheck CBC in a.m. and plan for EGD and colonoscopy deferred due to medical complexity.  Continue Protonix.No overt signs of bleeding noted at this time  Per cardiology recommendation, patient will be very high risk patients.  Unless there is active bleeding visualized, recommending to defer the procedure if possible.  In case without any active bleeding, patient still needs EGD and colonoscopy in that case cardiology recommending to transfer the patient to tertiary center.  Discussed with gastroenterologist, will hold off procedure at this time

## 2024-03-19 NOTE — ASSESSMENT & PLAN NOTE
Lab Results   Component Value Date    EGFR 11 03/19/2024    EGFR 6 03/18/2024    EGFR 9 03/17/2024    CREATININE 6.12 (H) 03/19/2024    CREATININE 9.31 (H) 03/18/2024    CREATININE 7.05 (H) 03/17/2024   Continue conservative management. receiving transfusion again today

## 2024-03-19 NOTE — PROGRESS NOTES
"Progress Note - Nephrology   Michael ARACELI Koch 33 y.o. male MRN: [id number]  Unit/Bed#: -01 Encounter: [id number]    ASSESSMENT AND PLAN:  1.  DUANE HD dependent: Secondary to hepatorenal syndrome  - MWF schedule  - Access: Right TDC  - Chronic severe hypotension on midodrine more than maximal dose and albumin as needed for symptomatic hypotension  - HD     2.  Refractory hypotension secondary to end-stage liver disease: Maximal midodrine and more than usual at 17.5 mg every 8 hours; Florinef 0.2 mg daily.  Use as needed albumin     3.  Electrolytes/acid-base:  - Patient has had hypokalemia replete to avoid hepatic encephalopathy: Currently on KCl 40 mill equivalents twice a day adjust HD bath.  I would give an extra 40 mill equivalents today     4.  MBD of DUANE:  - Patient with hypophosphatemia will of course stop phosphate binders give phosphorus  - Calcitriol 0.25 mcg daily for secondary hyperparathyroidism of DUANE  - Magnesium acceptable at 2.0     5.  Anemia of end-stage liver disease/DUANE:  - Follow hemoglobin now 6.7  - On Epogen with HD  - Ferritin greater than 4000 no iron  - Transfuse as needed per primary service for hemoglobin less than 7.0     6.  ESLD: Not a candidate for transplant per [facility] and now unfortunately per [facility] not a candidate for Terlipressin given advanced kidney disease on HD.  Prognosis very grim.  May not be able to go home/rehab may require nursing home stay.    Discussed with primary services consequent/result patient most likely will be transfused, also we can trend his labs may consider twice a week HD  Prognosis very guarded      Subjective:   Patient slightly lethargic but answers questions  He denies any chest pain or shortness of breath  He denies any nausea vomiting or diarrhea    Objective:     Vitals: Blood pressure 101/62, pulse 98, temperature 98.6 °F (37 °C), resp. rate 18, height 5' 7\" (1.702 m), weight 82.9 kg (182 lb 12.2 oz), SpO2 97%.,Body " mass index is 28.62 kg/m².    Weight (last 2 days)       Date/Time Weight    03/18/24 0600 82.9 (182.76)    03/17/24 0537 81.3 (179.23)              Intake/Output Summary (Last 24 hours) at 3/19/2024 1040  Last data filed at 3/19/2024 1016  Gross per 24 hour   Intake 1030 ml   Output 701 ml   Net 329 ml       HD Permanent Double Catheter (Active)   Reasons to continue HD Cath Treatment Therapy 03/19/24 0754   Goal for Removal N/A- chronic HD catheter 03/19/24 0754   Line Necessity Reviewed Yes, reviewed with provider 03/19/24 0754   Site Assessment WDL 03/19/24 0754   Proximal Lumen Status Capped 03/19/24 0754   Medial Lumen Status Capped 03/03/24 2300   Distal Lumen Status Capped 03/19/24 0754   Dressing Type Transparent;Chlorhexidine dressing 03/19/24 0754   Dressing Status Dry;Clean;Intact 03/18/24 2044   Dressing Intervention 7 day central line dressing changed 03/18/24 2044   Dressing Change Due 03/20/24 03/18/24 2044       Physical Exam: General:  No acute distress but very weak appearing  Skin:  No acute rash/severe jaundice  Eyes: Positive scleral icterus and noninjected  ENT:  Moist mucous membranes  Neck:  Supple, no jugular venous distention, trachea midline, overall appearance is normal  Chest:  Clear to auscultation; right TDC clean and dry just slight blood underneath the bandage  CVS:  Regular rate and rhythm, without a rub or gallops  Abdomen:  Normal bowel sounds, soft and nontender and moderately distended with a fluid wave  Extremities:  No edema, and no cyanosis, no significant arthritic changes  Neuro:  No gross focality but difficult to assess patient not very cooperative  Psych: Slightly lethargic today but answers questions appropriately                Medications:    Scheduled Meds:  Current Facility-Administered Medications   Medication Dose Route Frequency Provider Last Rate    acetaminophen  650 mg Oral Q6H PRN Swati Vaughn MD      albumin human  25 g Intravenous Q6H Tiburcio SCHAFFER  MD Ted Stopped (03/19/24 0915)    calcitriol  0.25 mcg Oral Daily JENSEN Fontenot      calcium carbonate  1 tablet Oral Daily With Breakfast Niecy IbarraJENSEN      diphenhydramine, lidocaine, Al/Mg hydroxide, simethicone  10 mL Swish & Swallow Q6H PRN Yuly S Edenilson, CRNP      epoetin sandra  9,000 Units Intravenous Once per day on Monday Wednesday Friday Tyler Stallworth MD      escitalopram  5 mg Oral Daily Constance Menendez MD      fludrocortisone  0.2 mg Oral Daily Constance Menendez MD      hydrOXYzine HCL  25 mg Oral Q6H PRN Yuly S Edenilson, CRNP      lactulose  10 g Oral Daily Constance Menendez MD      lidocaine  1 patch Topical Q24H Tiburciosiva Jean MD      melatonin  3 mg Oral HS Constance Menendez MD      midodrine  17.5 mg Oral TID AC Silvia Katz DO      nicotine  7 mg Transdermal Daily Constance Menendez MD      ondansetron  4 mg Intravenous Q8H PRN Yuly S Edenilson, CRNP      pantoprazole  40 mg Oral BID Saif Jayce Foster MD      polyethylene glycol  4,000 mL Oral See Admin Instructions Kayleigh Stahl PA-C      potassium chloride  40 mEq Oral BID Roland Kulkarni MD      rifaximin  550 mg Oral Q12H MURALI Constance Menendez MD         PRN Meds:.  acetaminophen    diphenhydramine, lidocaine, Al/Mg hydroxide, simethicone    hydrOXYzine HCL    ondansetron    Continuous Infusions:     Lab, Imaging and other studies: I have personally reviewed pertinent labs.  Laboratory Results:  Results from last 7 days   Lab Units 03/19/24  0638 03/18/24  1423 03/17/24  0423 03/16/24  0442 03/15/24  0454 03/14/24  1436 03/14/24  0506 03/13/24  0443   WBC Thousand/uL 19.29* 20.97* 20.27* 21.54* 20.91*  --  20.54* 18.53*   HEMOGLOBIN g/dL 6.7* 7.5* 7.5* 8.0* 8.4*  --  8.3* 8.5*   HEMATOCRIT % 18.6* 20.9* 21.4* 22.2* 23.9*  --  22.6* 23.7*   PLATELETS Thousands/uL 73* 89* 78* 77* 86*  --  73* 67*   POTASSIUM mmol/L 3.1* 2.9* 3.1* 2.9* 3.9 2.9* 2.7* 3.2*   CHLORIDE mmol/L 97 102 100 96 100 98 96 98   CO2 mmol/L 29 23 26 29 25 28 28 24   BUN mg/dL 22 41* 31* 24 36* 31*  "28* 46*   CREATININE mg/dL 6.12* 9.31* 7.05* 6.01* 7.75* 6.53* 5.92* 7.95*   CALCIUM mg/dL 7.9* 8.7 8.1* 7.6* 7.6* 7.6* 7.3* 7.5*   MAGNESIUM mg/dL 2.0  --   --   --   --   --   --  2.0   PHOSPHORUS mg/dL <1.0*  --   --   --   --   --   --   --      Urinalysis:   Lab Results   Component Value Date    COLORU Racquel 01/30/2024    CLARITYU Cloudy 01/30/2024    SPECGRAV 1.020 01/30/2024    PHUR 5.5 01/30/2024    LEUKOCYTESUR Negative 01/30/2024    NITRITE Negative 01/30/2024    GLUCOSEU 100 (1/10%) (A) 01/30/2024    KETONESU Trace (A) 01/30/2024    BILIRUBINUR Large (A) 01/30/2024    BLOODU Large (A) 01/30/2024     ABGs: No results found for: \"PH\"  Radiology review:     Portions of the record may have been created with voice recognition software.  Occasional wrong word or \"sound a like\" substitutions may have occurred due to the inherent limitations of voice recognition software.  Read the chart carefully and recognize, using context, where substitutions have occurred.                    "

## 2024-03-19 NOTE — ASSESSMENT & PLAN NOTE
was on Solu-Cortef 25 mg daily -stopped 3/5  Known Alcoholic cirrhosis  MELDS 45  Transferred from [facility] in [place] after liver transplant evaluation, he was found not to be candidate  Continue lactulose but decrease dose to 10 g once daily as ammonia level is normal and patient is having a lot of loose stools. Placed on  Xifaxan and see if that will help decrease his diarrhea frequency.  Check ammonia level in am  No encephalopathy currently  Patient has extensive workup done, at this time, not much to offer.AT Present not candidate for liver transplant but should be reevaluated again in a few months  Patient does have capacity to make informed medical decisions.   Patient is still having significant orthostatic hypotension and unable to sit up.  Adjusting medication.  Discussed with hepatologist at [facility]-regarding if patient will be candidate for terlipressin  treatment or not, after discussing the case with hepatologist, per hepatologist, patient is not a candidate for the above treatment at this time  Prognosis is poor.  Patient probably needs to be wheelchair-bound and unable to ambulate at this time due to severe orthostatic hypotension.  Continue efforts of trying to find placement in a skilled nursing facility where he can receive in-house dialysis as he is unable to safely transfer and travel to an outside facility for dialysis

## 2024-03-19 NOTE — PHYSICAL THERAPY NOTE
PHYSICAL THERAPY NOTE          Patient Name: Michael Koch  Today's Date: 3/19/2024       03/19/24 8359   Note Type   Note Type Cancelled Session   Cancel Reasons Medical status       Chart reviewed. Spoke with RN, Radha who reports patient is currently receiving blood transfusion. Pt with hemoglobin at 6.7 this date. Will cancel therapy services this date and see patient as medically appropriate at a later time.    Анна May, PT,DPT

## 2024-03-19 NOTE — ASSESSMENT & PLAN NOTE
Continue Zyprexa at bedtime decreased dose to 5 mg daily FROM 15MG daily and also decreased melatonin to 3 mg daily  Please note that patient had very bad alcohol withdrawals and was on Precedex drip for some time during the last few weeks.  Also having behavioral issues and was placed on Zyprexa during his hospital course.   Patient having significant orthostatic hypotension and hence discontinued Zyprexa last week and now on low-dose Lexapro and stable  cortisol level is currently normal will recheck again  in a week as now off steroids.normal ammonia level

## 2024-03-19 NOTE — ASSESSMENT & PLAN NOTE
Secondary to end-stage liver disease and hemodialysis dependent acute kidney injury and autonomic dysfunction.  Continue midodrine-patient is still having orthostatic hypotension which is limiting his mobility. cont thigh high CARLOS stockings. increase midodrine 20 mg 3 times daily,increase  Florinef 0.2 mg daily and decreased lactulose to 10 gram once daily .  Increase oral intake.  Monitor orthostasis.prn iv albumin.  Crease dialysis to 2 times per week  Echocardiogram reviewed  Patient is on higher dose of midodrine and fludrocortisone, is still patient is having orthostatic hypotension especially with movement.  Blood pressure is still running low, might probably be bedbound/wheelchair-bound secondary to his significant orthostatic hypotension and high risk for falls

## 2024-03-19 NOTE — PROGRESS NOTES
[facility]  Progress Note  Name: Michael Koch I  MRN: [id number]  Unit/Bed#: -01 I Date of Admission: 2/27/2024   Date of Service: 3/19/2024 I Hospital Day: 21    Assessment/Plan   * Alcoholic hepatitis  Assessment & Plan  was on Solu-Cortef 25 mg daily -stopped 3/5  Known Alcoholic cirrhosis  MELDS 45  Transferred from [facility] in [place] after liver transplant evaluation, he was found not to be candidate  Continue lactulose but decrease dose to 10 g once daily as ammonia level is normal and patient is having a lot of loose stools. Placed on  Xifaxan and see if that will help decrease his diarrhea frequency.  Check ammonia level in am  No encephalopathy currently  Patient has extensive workup done, at this time, not much to offer.AT Present not candidate for liver transplant but should be reevaluated again in a few months  Patient does have capacity to make informed medical decisions.   Patient is still having significant orthostatic hypotension and unable to sit up.  Adjusting medication.  Discussed with hepatologist at [facility]-regarding if patient will be candidate for terlipressin  treatment or not, after discussing the case with hepatologist, per hepatologist, patient is not a candidate for the above treatment at this time  Prognosis is poor.  Patient probably needs to be wheelchair-bound and unable to ambulate at this time due to severe orthostatic hypotension.  Continue efforts of trying to find placement in a skilled nursing facility where he can receive in-house dialysis as he is unable to safely transfer and travel to an outside facility for dialysis    Acute on chronic anemia  Assessment & Plan  Hemoglobin is around 8.9.  Patient has intermittently been requiring blood transfusions during his extensive hospitalization courses over the last few weeks.  Today hemoglobin dropped to 6.7.  Will give 1 units PRBC and recheck CBC in a.m. and plan for EGD and colonoscopy  deferred due to medical complexity.  Continue Protonix.No overt signs of bleeding noted at this time  Per cardiology recommendation, patient will be very high risk patients.  Unless there is active bleeding visualized, recommending to defer the procedure if possible.  In case without any active bleeding, patient still needs EGD and colonoscopy in that case cardiology recommending to transfer the patient to tertiary center.  Discussed with gastroenterologist, will hold off procedure at this time    Orthostatic hypotension  Assessment & Plan  Secondary to end-stage liver disease and hemodialysis dependent acute kidney injury and autonomic dysfunction.  Continue midodrine-patient is still having orthostatic hypotension which is limiting his mobility. cont thigh high CARLOS stockings. increase midodrine 20 mg 3 times daily,increase  Florinef 0.2 mg daily and decreased lactulose to 10 gram once daily .  Increase oral intake.  Monitor orthostasis.prn iv albumin.  Crease dialysis to 2 times per week  Echocardiogram reviewed  Patient is on higher dose of midodrine and fludrocortisone, is still patient is having orthostatic hypotension especially with movement.  Blood pressure is still running low, might probably be bedbound/wheelchair-bound secondary to his significant orthostatic hypotension and high risk for falls      Anemia due to chronic kidney disease, on chronic dialysis (HCC)  Assessment & Plan  Lab Results   Component Value Date    EGFR 11 03/19/2024    EGFR 6 03/18/2024    EGFR 9 03/17/2024    CREATININE 6.12 (H) 03/19/2024    CREATININE 9.31 (H) 03/18/2024    CREATININE 7.05 (H) 03/17/2024   Continue conservative management. receiving transfusion again today    Hemodialysis patient (HCC)  Assessment & Plan  Continue treatment as per nephrology recommendation, due to orthostatic hypotension, will need to modify patient treatment hemodialysis twice a week every Monday and Friday    Depression  Assessment &  Plan  Continue Zyprexa at bedtime decreased dose to 5 mg daily FROM 15MG daily and also decreased melatonin to 3 mg daily  Please note that patient had very bad alcohol withdrawals and was on Precedex drip for some time during the last few weeks.  Also having behavioral issues and was placed on Zyprexa during his hospital course.   Patient having significant orthostatic hypotension and hence discontinued Zyprexa last week and now on low-dose Lexapro and stable  cortisol level is currently normal will recheck again  in a week as now off steroids.normal ammonia level    ETOH abuse  Assessment & Plan  History alcohol abuse, last drink prior to admission in January  Will need outpatient alcohol counseling prior to transplant reevaluation-patient has referral from [facility] for virtual therapy which supposed to initiated soon.    Discussed with hepatologist at [facility]-regarding if patient will be candidate for terlipressin  treatment or not, after discussing the case with hepatologist, per hepatologist, patient is not a candidate for the above treatment at this time           Hypokalemia and hypophosphatemia: replace electrolytes as per nephrology      VTE Pharmacologic Prophylaxis:   Moderate Risk (Score 3-4) - Pharmacological DVT Prophylaxis Contraindicated. Sequential Compression Devices Ordered.    Mobility:   Basic Mobility Inpatient Raw Score: 13  JH-HLM Goal: 4: Move to chair/commode  JH-HLM Achieved: 2: Bed activities/Dependent transfer  JH-HLM Goal achieved. Continue to encourage appropriate mobility.    Patient Centered Rounds: I performed bedside rounds with nursing staff today.   Discussions with Specialists or Other Care Team Provider: With nephrology    Education and Discussions with Family / Patient:     Total Time Spent on Date of Encounter in care of patient: 35 mins. This time was spent on one or more of the following: performing physical exam; counseling and coordination of care; obtaining or  reviewing history; documenting in the medical record; reviewing/ordering tests, medications or procedures; communicating with other healthcare professionals and discussing with patient's family/caregivers.    Current Length of Stay: 21 day(s)  Current Patient Status: Inpatient   Certification Statement: The patient will continue to require additional inpatient hospital stay due to end-stage liver disease  Discharge Plan: Anticipate discharge in 48-72 hrs to rehab facility.    Code Status: Level 3 - DNAR and DNI    Subjective:   Patient denies any chest pain or shortness of breath he complains of fatigue.  He feels his diarrhea is a little bit better than before    Objective:     Vitals:   Temp (24hrs), Av.4 °F (36.9 °C), Min:98.1 °F (36.7 °C), Max:98.6 °F (37 °C)    Temp:  [98.1 °F (36.7 °C)-98.6 °F (37 °C)] 98.6 °F (37 °C)  HR:  [] 102  Resp:  [16-18] 18  BP: ()/(42-66) 90/47  SpO2:  [96 %-99 %] 97 %  Body mass index is 28.62 kg/m².     Input and Output Summary (last 24 hours):     Intake/Output Summary (Last 24 hours) at 3/19/2024 1251  Last data filed at 3/19/2024 1016  Gross per 24 hour   Intake 1030 ml   Output 701 ml   Net 329 ml       Physical Exam:   Physical Exam  Vitals and nursing note reviewed.   Constitutional:       Appearance: He is ill-appearing.   HENT:      Head: Normocephalic and atraumatic.      Right Ear: External ear normal.      Left Ear: External ear normal.      Nose: Nose normal.      Mouth/Throat:      Pharynx: Oropharynx is clear.   Eyes:      General: Scleral icterus present.   Cardiovascular:      Rate and Rhythm: Normal rate and regular rhythm.      Heart sounds: Normal heart sounds.   Pulmonary:      Effort: Pulmonary effort is normal.      Breath sounds: Normal breath sounds.   Abdominal:      General: Bowel sounds are normal. There is distension.      Palpations: Abdomen is soft.      Tenderness: There is no abdominal tenderness.   Musculoskeletal:         General:  Normal range of motion.      Cervical back: Normal range of motion and neck supple.   Skin:     General: Skin is warm and dry.      Capillary Refill: Capillary refill takes less than 2 seconds.      Coloration: Skin is jaundiced.   Neurological:      Mental Status: He is alert and oriented to person, place, and time. Mental status is at baseline.   Psychiatric:      Comments: Psychomotor slowing noted            Additional Data:     Labs:  Results from last 7 days   Lab Units 03/19/24  0638 03/17/24  0423 03/16/24  0442   WBC Thousand/uL 19.29*   < > 21.54*   HEMOGLOBIN g/dL 6.7*   < > 8.0*   HEMATOCRIT % 18.6*   < > 22.2*   PLATELETS Thousands/uL 73*   < > 77*   BANDS PCT %  --   --  6   NEUTROS PCT % 68   < >  --    LYMPHS PCT % 14   < >  --    LYMPHO PCT %  --   --  11*   MONOS PCT % 15*   < >  --    MONO PCT %  --   --  11   EOS PCT % 1   < > 1    < > = values in this interval not displayed.     Results from last 7 days   Lab Units 03/19/24  0638   SODIUM mmol/L 137   POTASSIUM mmol/L 3.1*   CHLORIDE mmol/L 97   CO2 mmol/L 29   BUN mg/dL 22   CREATININE mg/dL 6.12*   ANION GAP mmol/L 11   CALCIUM mg/dL 7.9*   ALBUMIN g/dL 3.4*   TOTAL BILIRUBIN mg/dL 47.38*   ALK PHOS U/L 144*   ALT U/L 19   AST U/L 42*   GLUCOSE RANDOM mg/dL 109     Results from last 7 days   Lab Units 03/19/24  0638   INR  3.06*             Results from last 7 days   Lab Units 03/13/24  0443   LACTIC ACID mmol/L 1.0       Lines/Drains:  Invasive Devices       Peripheral Intravenous Line  Duration             Peripheral IV 03/19/24 Distal;Left;Upper;Ventral (anterior) Arm <1 day              Hemodialysis Catheter  Duration             HD Permanent Double Catheter 18 days                          Imaging: Reviewed radiology reports from this admission including: chest xray    Recent Cultures (last 7 days):         Last 24 Hours Medication List:   Current Facility-Administered Medications   Medication Dose Route Frequency Provider Last Rate     acetaminophen  650 mg Oral Q6H PRN Swati Vaughn MD      calcitriol  0.25 mcg Oral Daily JENSEN Fontenot      [START ON 3/20/2024] calcium carbonate  1 tablet Oral Early Morning Tyler Stallworth MD      diphenhydramine, lidocaine, Al/Mg hydroxide, simethicone  10 mL Swish & Swallow Q6H PRN Yuly S Edenilson, CRNP      epoetin sandra  9,000 Units Intravenous Once per day on Monday Wednesday Friday Tyler Stallworth MD      escitalopram  5 mg Oral Daily Constance Menendez MD      fludrocortisone  0.2 mg Oral Daily Constance Menendez MD      hydrOXYzine HCL  25 mg Oral Q6H PRN Yuly S Edenilsno, CRNP      lactulose  10 g Oral Daily Constance Menendez MD      lidocaine  1 patch Topical Q24H Tiburcio Jean MD      melatonin  3 mg Oral HS Constance Menendez MD      midodrine  20 mg Oral TID AC Constance Menendez MD      nicotine  7 mg Transdermal Daily Constance Menendez MD      ondansetron  4 mg Intravenous Q8H PRN Yuly S Edenilson, CRNP      pantoprazole  40 mg Oral BID Ricky Foster MD      potassium chloride  40 mEq Oral BID Roland Kulkarni MD      potassium phosphate  12 mmol Intravenous Once Tyler Stallworth MD 12 mmol (03/19/24 1214)    potassium-sodium phosphateS  1 tablet Oral TID With Meals Tyler Stallworth MD      rifaximin  550 mg Oral Q12H MURALI Constance Menendez MD          Today, Patient Was Seen By: Constance Menendez MD    **Please Note: This note may have been constructed using a voice recognition system.**

## 2024-03-19 NOTE — ASSESSMENT & PLAN NOTE
History alcohol abuse, last drink prior to admission in January  Will need outpatient alcohol counseling prior to transplant reevaluation-patient has referral from [facility] for virtual therapy which supposed to initiated soon.    Discussed with hepatologist at [facility]-regarding if patient will be candidate for terlipressin  treatment or not, after discussing the case with hepatologist, per hepatologist, patient is not a candidate for the above treatment at this time

## 2024-03-20 NOTE — CASE MANAGEMENT
Case Management Discharge Planning Note    Patient name Michael Koch  Location /-01 MRN [id number]  : 1990 Date 3/20/2024       Current Admission Date: 2024  Current Admission Diagnosis:Alcoholic hepatitis   Patient Active Problem List    Diagnosis Date Noted    Anemia due to chronic kidney disease, on chronic dialysis (HCC) 2024    Hemodialysis patient (HCC) 2024    Acute on chronic anemia 2024    Malnutrition (HCC) 2024    Orthostatic hypotension 2024    Acute respiratory failure with hypoxia and hypercapnia (HCC) 2024    Alcoholic hepatitis 2024    ETOH abuse 2024    Depression 2024    GERD (gastroesophageal reflux disease) 2024    BPH (benign prostatic hyperplasia) 2024    RSV infection 2024    Abnormal CT scan 2024    Hepatic encephalopathy (HCC) 2024    N&V (nausea and vomiting) 2024    DUANE (acute kidney injury) (HCC) 2024    Thrombocytopenia (HCC) 2024    Lactic acidosis 2024    Marijuana use 2024    SIRS (systemic inflammatory response syndrome) (HCC) 2024    History of gout 10/04/2023    History of hypertension 10/04/2023    Type 2 diabetes mellitus with hyperglycemia, without long-term current use of insulin (HCC) 10/04/2023    Tobacco abuse 10/04/2023      LOS (days): 22  Geometric Mean LOS (GMLOS) (days): 4.8  Days to GMLOS:-16.6     OBJECTIVE:  Risk of Unplanned Readmission Score: 56.85         Current admission status: Inpatient   Preferred Pharmacy:   [id number] - [place], [place] - [address]  [address]  [place] [address]  Phone: [phone] Fax: [phone]    Primary Care Provider: Louie Odonnell DO    Primary Insurance: BLUE CROSS  Secondary Insurance:     DISCHARGE DETAILS:     Requested therapy to see in anticipation of obtaining an auth for  [place] with the HD on site.

## 2024-03-20 NOTE — ASSESSMENT & PLAN NOTE
Secondary to end-stage liver disease and hemodialysis dependent acute kidney injury and autonomic dysfunction.  Continue midodrine-patient is still having orthostatic hypotension which is limiting his mobility. cont thigh high CARLOS stockings. increase midodrine 20 mg 3 times daily,increase  Florinef 0.2 mg daily and decreased lactulose to 10 gram once daily .  Increase oral intake.  Monitor orthostasis.prn iv albumin.  deCrease dialysis to 2 times per week  Echocardiogram reviewed  Patient is on higher dose of midodrine and fludrocortisone, is still patient is having orthostatic hypotension especially with movement.  Blood pressure is still running low, might probably be bedbound/wheelchair-bound secondary to his significant orthostatic hypotension and high risk for falls  Off all medications as patient is now on hospice care

## 2024-03-20 NOTE — ASSESSMENT & PLAN NOTE
Lab Results   Component Value Date    EGFR 8 03/20/2024    EGFR 11 03/19/2024    EGFR 6 03/18/2024    CREATININE 7.59 (H) 03/20/2024    CREATININE 6.12 (H) 03/19/2024    CREATININE 9.31 (H) 03/18/2024   Now on hospice

## 2024-03-20 NOTE — PROGRESS NOTES
Progress Note - Nephrology   Michael ARACELI Koch 33 y.o. male MRN: [id number]  Unit/Bed#: -01 Encounter: [id number]    ASSESSMENT AND PLAN:  1.  DUANE HD dependent: Secondary to hepatorenal syndrome  - MWF schedule  - Access: Right TDC  - Chronic severe hypotension on midodrine more than maximal dose and albumin as needed for symptomatic hypotension  - HD today     2.  Refractory hypotension secondary to end-stage liver disease: Maximal midodrine and more than usual at 17.5 mg every 8 hours; Florinef 0.2 mg daily.  Use as needed albumin: Over systolic 100 at the moment     3.  Electrolytes/acid-base:  - Severe hypokalemia will replete on a daily regimen but will add today given severe hypokalemia most likely related to GI losses with lactulose     4.  MBD of DUANE:  - Patient with hypophosphatemia will replete  - Calcitriol 0.25 mcg daily for secondary hyperparathyroidism of DUANE  - Magnesium 1.9 will replete     5.  Anemia of end-stage liver disease/DUANE:  - Follow hemoglobin now 8.7  - On Epogen with HD  - Ferritin greater than 4000 no iron  - Transfuse as needed per primary service for hemoglobin less than 7.0     6.  ESLD: Not a candidate for transplant per [facility] and now unfortunately per [facility] not a candidate for Terlipressin given advanced kidney disease on HD.  Prognosis very grim.  May not be able to go home/rehab may require nursing home stay.    Discussed overall prognosis with the patient who is alert and oriented today along with his sister at the bedside.  I told him his prognosis is extremely poor and that without a liver transplant he will pass away.  Dialysis will be very difficult.  I recommended comfort measures and hospice and they are going to discuss this.      Subjective:   Patient fairly asymptomatic today  No chest pain or shortness of breath  No nausea vomiting does have loose stools      Objective:     Vitals: Blood pressure 105/64, pulse (!) 107, temperature 97.9 °F  "(36.6 °C), resp. rate 18, height 5' 7\" (1.702 m), weight 83.5 kg (184 lb 1.4 oz), SpO2 99%.,Body mass index is 28.83 kg/m².    Weight (last 2 days)       Date/Time Weight    03/20/24 0600 83.5 (184.08)    03/18/24 0600 82.9 (182.76)              Intake/Output Summary (Last 24 hours) at 3/20/2024 1000  Last data filed at 3/20/2024 0918  Gross per 24 hour   Intake 1680 ml   Output 75 ml   Net 1605 ml       HD Permanent Double Catheter (Active)   Reasons to continue HD Cath Treatment Therapy 03/19/24 1945   Goal for Removal N/A- chronic HD catheter 03/19/24 1945   Line Necessity Reviewed Yes, reviewed with provider 03/19/24 1945   Site Assessment WDL 03/19/24 1945   Proximal Lumen Status Capped 03/19/24 1945   Medial Lumen Status Capped 03/03/24 2300   Distal Lumen Status Blood return noted 03/19/24 1945   Dressing Type Chlorhexidine dressing 03/19/24 1945   Dressing Status Clean;Dry;Intact 03/19/24 1945   Dressing Intervention 7 day central line dressing changed 03/19/24 1945   Dressing Change Due 03/20/24 03/19/24 1945       Physical Exam: General:  No acute distress  Skin:  No acute rash/severe jaundice  Eyes: Positive scleral icterus and noninjected  ENT:  Moist mucous membranes  Neck:  Supple, no jugular venous distention, trachea midline, overall appearance is normal  Chest:  Clear to auscultation  CVS:  Regular rate and rhythm, without a rub or gallops  Abdomen:  Normal bowel sounds, soft and nontender moderate distention with a fluid wave although not tense  Extremities:  No edema, and no cyanosis, no significant arthritic changes  Neuro:  No gross focality  Psych:  Alert and oriented and appropriate                Medications:    Scheduled Meds:  Current Facility-Administered Medications   Medication Dose Route Frequency Provider Last Rate    acetaminophen  650 mg Oral Q6H PRN Swati Vuaghn MD      calcitriol  0.25 mcg Oral Daily JENSEN Fontenot      calcium carbonate  1 tablet Oral Early Morning Tyler " MD Basim      diphenhydramine, lidocaine, Al/Mg hydroxide, simethicone  10 mL Swish & Swallow Q6H PRN Yuly S Edenilson, CRNP      epoetin sandra  9,000 Units Intravenous Once per day on Monday Wednesday Friday Tyler Stallworth MD      escitalopram  5 mg Oral Daily Constance Menendez MD      fludrocortisone  0.2 mg Oral Daily Constance Menendez MD      hydrOXYzine HCL  25 mg Oral Q6H PRN Yuly S Deenilson, CRNP      lactulose  10 g Oral Daily Constance Menendez MD      lidocaine  1 patch Topical Q24H Tiburcio Jean MD      melatonin  3 mg Oral HS Constance Menendez MD      midodrine  20 mg Oral TID AC Constance Menendez MD      nicotine  7 mg Transdermal Daily Constance Menendez MD      ondansetron  4 mg Intravenous Q8H PRN Yuly S Edenilson, CRNP      pantoprazole  40 mg Oral BID Ricky Foster MD      potassium chloride  40 mEq Oral BID Roland Kulkarni MD      potassium chloride  20 mEq Intravenous Q2H Tyler Stallworth MD 20 mEq (03/20/24 0857)    potassium-sodium phosphateS  1 tablet Oral TID With Meals Tyler Stallworth MD      rifaximin  550 mg Oral Q12H [facility] Constance Menendez MD         PRN Meds:.  acetaminophen    diphenhydramine, lidocaine, Al/Mg hydroxide, simethicone    hydrOXYzine HCL    ondansetron    Continuous Infusions:     Lab, Imaging and other studies: I have personally reviewed pertinent labs.  Laboratory Results:  Results from last 7 days   Lab Units 03/20/24  0630 03/19/24  0638 03/18/24  1423 03/17/24  0423 03/16/24  0442 03/15/24  0454 03/14/24  1436 03/14/24  0506   WBC Thousand/uL 22.21* 19.29* 20.97* 20.27* 21.54* 20.91*  --  20.54*   HEMOGLOBIN g/dL 8.7* 6.7* 7.5* 7.5* 8.0* 8.4*  --  8.3*   HEMATOCRIT % 24.2* 18.6* 20.9* 21.4* 22.2* 23.9*  --  22.6*   PLATELETS Thousands/uL 77* 73* 89* 78* 77* 86*  --  73*   POTASSIUM mmol/L 2.4* 3.1* 2.9* 3.1* 2.9* 3.9 2.9* 2.7*   CHLORIDE mmol/L 99 97 102 100 96 100 98 96   CO2 mmol/L 24 29 23 26 29 25 28 28   BUN mg/dL 30* 22 41* 31* 24 36* 31* 28*   CREATININE mg/dL 7.59* 6.12* 9.31* 7.05* 6.01* 7.75* 6.53*  "5.92*   CALCIUM mg/dL 8.0* 7.9* 8.7 8.1* 7.6* 7.6* 7.6* 7.3*   MAGNESIUM mg/dL 1.9 2.0  --   --   --   --   --   --    PHOSPHORUS mg/dL 1.4* <1.0*  --   --   --   --   --   --      Urinalysis:   Lab Results   Component Value Date    COLORU [place] 01/30/2024    CLARITYU Cloudy 01/30/2024    SPECGRAV 1.020 01/30/2024    PHUR 5.5 01/30/2024    LEUKOCYTESUR Negative 01/30/2024    NITRITE Negative 01/30/2024    GLUCOSEU 100 (1/10%) (A) 01/30/2024    KETONESU Trace (A) 01/30/2024    BILIRUBINUR Large (A) 01/30/2024    BLOODU Large (A) 01/30/2024     ABGs: No results found for: \"PH\"  Radiology review:     Portions of the record may have been created with voice recognition software.  Occasional wrong word or \"sound a like\" substitutions may have occurred due to the inherent limitations of voice recognition software.  Read the chart carefully and recognize, using context, where substitutions have occurred.                    "

## 2024-03-20 NOTE — CASE MANAGEMENT
Case Management Discharge Planning Note    Patient name Michael Koch  Location /-01 MRN [id number]  : 1990 Date 3/20/2024       Current Admission Date: 2024  Current Admission Diagnosis:Alcoholic hepatitis   Patient Active Problem List    Diagnosis Date Noted    Anemia due to chronic kidney disease, on chronic dialysis (HCC) 2024    Hemodialysis patient (HCC) 2024    Acute on chronic anemia 2024    Malnutrition (HCC) 2024    Orthostatic hypotension 2024    Acute respiratory failure with hypoxia and hypercapnia (HCC) 2024    Alcoholic hepatitis 2024    ETOH abuse 2024    Depression 2024    GERD (gastroesophageal reflux disease) 2024    BPH (benign prostatic hyperplasia) 2024    RSV infection 2024    Abnormal CT scan 2024    Hepatic encephalopathy (HCC) 2024    N&V (nausea and vomiting) 2024    DUANE (acute kidney injury) (HCC) 2024    Thrombocytopenia (HCC) 2024    Lactic acidosis 2024    Marijuana use 2024    SIRS (systemic inflammatory response syndrome) (HCC) 2024    History of gout 10/04/2023    History of hypertension 10/04/2023    Type 2 diabetes mellitus with hyperglycemia, without long-term current use of insulin (HCC) 10/04/2023    Tobacco abuse 10/04/2023      LOS (days): 22  Geometric Mean LOS (GMLOS) (days): 4.8  Days to GMLOS:-16.8     OBJECTIVE:  Risk of Unplanned Readmission Score: 56.92         Current admission status: Inpatient   Preferred Pharmacy:   [id number] - [place], [place] - [address] [facility]  [address] [place] [address]  Phone: [phone] Fax: [phone]    Primary Care Provider: Louie Odonnell DO    Primary Insurance: BLUE CROSS  Secondary Insurance:     DISCHARGE DETAILS:    Aware patient has decided on comfort/hospice care. Per Nephrology.   This writer and Provider met with Michael and Mrs Koch.  "  Patient does not want to continue HD.   Michael just wants to go home and be comfortable.   When discussing Hospice options, Jo feels she can not have him at home with the children on hospice, \"she does not want them to see their father dying, and does not want him to die in their home.\"  Per Jo\" going to another family member is not an option.\" Support provided. Encourage Jo to reach out to her family, as Michael is verbalizing he \"just wants to go home.\" Reviewed possible hospice house in [place] as an option, vs a skilled facility. We discussed financial obligations at SNF and family would need to apply for MA.    Plan is to initiate Comfort Care, No HD and re assess in the next 24hours.   POLST form was done by the provider.      DC plan to be determined Comfort Care currently.                                                                    "

## 2024-03-20 NOTE — ASSESSMENT & PLAN NOTE
Continue treatment as per nephrology recommendation, due to orthostatic hypotension, will need to modify patient treatment hemodialysis twice a week every Monday and Friday  For now patient decided on hospice care and hence hemodialysis will be discontinued

## 2024-03-20 NOTE — PROGRESS NOTES
"Pt with very minimal food intake, 0-25% meal completions per nursing flowsheets. Sister at bedside reporting same, stating she was bringing in meatballs subs, pizza, etc from restaurants that pt requested though pt would only take 1-2 bites of these or sometimes still refuse them. Nepro supplement at bedside not opened, pt stating \"I will not drink that.\" even after discussing high calorie/protein content that pt would benefit from.     Will d/c Nepro given pt refusal.   Will trial homemade milkshake daily which pt was agreeable to, send with prosource supplement which can be mixed into milkshake for additional 15 grams PRO.   Will d/c low phosphorus restriction given low P levels, supplementing at this time.   Will liberalize salt restriction to 4gm FISH to open food options available to pt.   Fair BG control, will liberalize to CCD 3.   Fluid restriction per MD.   "

## 2024-03-20 NOTE — PLAN OF CARE
Problem: NEUROSENSORY - ADULT  Goal: Achieves stable or improved neurological status  Description: INTERVENTIONS  - Monitor and report changes in neurological status  - Monitor vital signs such as temperature, blood pressure, glucose, and any other labs ordered   - Initiate measures to prevent increased intracranial pressure  - Monitor for seizure activity and implement precautions if appropriate      3/20/2024 0248 by Beverly Reyes RN  Outcome: Progressing  3/20/2024 0248 by Beverly Reyes RN  Outcome: Progressing  Goal: Achieves maximal functionality and self care  Description: INTERVENTIONS  - Monitor swallowing and airway patency with patient fatigue and changes in neurological status  - Encourage and assist patient to increase activity and self care.   - Encourage visually impaired, hearing impaired and aphasic patients to use assistive/communication devices  3/20/2024 0248 by Beverly Reyes RN  Outcome: Progressing  3/20/2024 0248 by Beverly Reyes RN  Outcome: Progressing     Problem: GASTROINTESTINAL - ADULT  Goal: Minimal or absence of nausea and/or vomiting  Description: INTERVENTIONS:  - Administer IV fluids if ordered to ensure adequate hydration  - Maintain NPO status until nausea and vomiting are resolved  - Nasogastric tube if ordered  - Administer ordered antiemetic medications as needed  - Provide nonpharmacologic comfort measures as appropriate  - Advance diet as tolerated, if ordered  - Consider nutrition services referral to assist patient with adequate nutrition and appropriate food choices  3/20/2024 0248 by Beverly Reyes RN  Outcome: Progressing  3/20/2024 0248 by Beverly Reyes RN  Outcome: Progressing  Goal: Maintains or returns to baseline bowel function  Description: INTERVENTIONS:  - Assess bowel function  - Encourage oral fluids to ensure adequate hydration  - Administer IV fluids if ordered to ensure adequate hydration  - Administer ordered medications as needed  -  Encourage mobilization and activity  - Consider nutritional services referral to assist patient with adequate nutrition and appropriate food choices  3/20/2024 0248 by Beverly Reyes RN  Outcome: Not Progressing  3/20/2024 0248 by Beverly Reyes RN  Outcome: Progressing  Goal: Maintains adequate nutritional intake  Description: INTERVENTIONS:  - Monitor percentage of each meal consumed  - Identify factors contributing to decreased intake, treat as appropriate  - Assist with meals as needed  - Monitor I&O, weight, and lab values if indicated  - Obtain nutrition services referral as needed  3/20/2024 0248 by Beverly Reyes RN  Outcome: Not Progressing  3/20/2024 0248 by Beverly Reyes RN  Outcome: Progressing  Goal: Oral mucous membranes remain intact  Description: INTERVENTIONS  - Assess oral mucosa and hygiene practices  - Implement preventative oral hygiene regimen  - Implement oral medicated treatments as ordered magic mouth wash  - Initiate Nutrition services referral as needed  3/20/2024 0248 by Beverly Reyes RN  Outcome: Progressing  3/20/2024 0248 by Beverly Reyes RN  Outcome: Progressing     Problem: METABOLIC, FLUID AND ELECTROLYTES - ADULT  Goal: Electrolytes maintained within normal limits  Description: INTERVENTIONS:  - Monitor labs and assess patient for signs and symptoms of electrolyte imbalances -potassium  - Administer electrolyte replacement as ordered  - Monitor response to electrolyte replacements, including repeat lab results as appropriate  - Instruct patient on fluid and nutrition as appropriate  3/20/2024 0248 by Beverly Reyes RN  Outcome: Not Progressing  3/20/2024 0248 by Beverly Reyes RN  Outcome: Progressing  Goal: Fluid balance maintained  Description: INTERVENTIONS:  - Monitor labs   - Monitor I/O and WT  - Instruct patient on fluid and nutrition as appropriate  - Assess for signs & symptoms of volume excess or deficit  3/20/2024 0248 by Beverly Reyes  RN  Outcome: Progressing  3/20/2024 0248 by Beverly Reyes RN  Outcome: Progressing     Problem: HEMATOLOGIC - ADULT  Goal: Maintains hematologic stability  Description: INTERVENTIONS  - Assess for signs and symptoms of bleeding or hemorrhage  - Monitor labs  - Administer supportive blood products/factors as ordered and appropriate  3/20/2024 0248 by Beverly Reyes RN  Outcome: Progressing  3/20/2024 0248 by Beverly Reyes RN  Outcome: Progressing     Problem: MUSCULOSKELETAL - ADULT  Goal: Maintain or return mobility to safest level of function  Description: INTERVENTIONS:  - Assess patient's ability to carry out ADLs; assess patient's baseline for ADL function and identify physical deficits which impact ability to perform ADLs (bathing, care of mouth/teeth, toileting, grooming, dressing, etc.)  - Assess/evaluate cause of self-care deficits   - Assess range of motion  - Assess patient's mobility  - Assess patient's need for assistive devices and provide as appropriate  - Encourage maximum independence but intervene and supervise when necessary  - Involve family in performance of ADLs  - Assess for home care needs following discharge   - Consider OT consult to assist with ADL evaluation and planning for discharge  - Provide patient education as appropriate  3/20/2024 0248 by Beverly Reyes RN  Outcome: Progressing  3/20/2024 0248 by Beverly Reyes RN  Outcome: Progressing  Goal: Maintain proper alignment of affected body part  Description: INTERVENTIONS:  - Support, maintain and protect limb and body alignment  - Provide patient/ family with appropriate education  3/20/2024 0248 by Beverly Reyes RN  Outcome: Progressing  3/20/2024 0248 by Beverly Reyes RN  Outcome: Progressing     Problem: CARDIOVASCULAR - ADULT  Goal: Maintains optimal cardiac output and hemodynamic stability  Description: INTERVENTIONS:  - Monitor I/O, vital signs and rhythm monitor blood pressures hypotensive  - Monitor for  S/S and trends of decreased cardiac output  - Administer and titrate ordered vasoactive medications to optimize hemodynamic stability  - Assess quality of pulses, skin color and temperature  - Assess for signs of decreased coronary artery perfusion  - Instruct patient to report change in severity of symptoms  3/20/2024 0248 by Beverly Reyes RN  Outcome: Not Progressing  3/20/2024 0248 by Beverly Reyes RN  Outcome: Progressing     Problem: Prexisting or High Potential for Compromised Skin Integrity  Goal: Skin integrity is maintained or improved  Description: INTERVENTIONS:  - Identify patients at risk for skin breakdown  - Assess and monitor skin integrity  - Assess and monitor nutrition and hydration status  - Monitor labs   - Assess for incontinence   - Turn and reposition patient  - Assist with mobility/ambulation  - Relieve pressure over bony prominences  - Avoid friction and shearing  - Provide appropriate hygiene as needed including keeping skin clean and dry  - Evaluate need for skin moisturizer/barrier cream  - Collaborate with interdisciplinary team   - Patient/family teaching  - Consider wound care consult   3/20/2024 0248 by Beverly Reyes RN  Outcome: Progressing  3/20/2024 0248 by Beverly Reyes RN  Outcome: Progressing     Problem: Nutrition/Hydration-ADULT  Goal: Nutrient/Hydration intake appropriate for improving, restoring or maintaining nutritional needs  Description: Monitor and assess patient's nutrition/hydration status for malnutrition. Collaborate with interdisciplinary team and initiate plan and interventions as ordered.  Monitor patient's weight and dietary intake as ordered or per policy. Utilize nutrition screening tool and intervene as necessary. Determine patient's food preferences and provide high-protein, high-caloric foods as appropriate.     INTERVENTIONS:  - Monitor oral intake, urinary output, labs, and treatment plans  - Assess nutrition and hydration status and  recommend course of action  - Evaluate amount of meals eaten  - Assist patient with eating if necessary   - Allow adequate time for meals  - Recommend/ encourage appropriate diets, oral nutritional supplements, and vitamin/mineral supplements  - Order, calculate, and assess calorie counts as needed  - Recommend, monitor, and adjust tube feedings and TPN/PPN based on assessed needs  - Assess need for intravenous fluids  - Provide specific nutrition/hydration education as appropriate  - Include patient/family/caregiver in decisions related to nutrition  3/20/2024 0248 by Beverly Reyes RN  Outcome: Not Progressing  3/20/2024 0248 by Beverly Reyes, RN  Outcome: Progressing

## 2024-03-20 NOTE — PROGRESS NOTES
[facility]  Progress Note  Name: Michael Koch I  MRN: [id number]  Unit/Bed#: -01 I Date of Admission: 2/27/2024   Date of Service: 3/20/2024 I Hospital Day: 22    Assessment/Plan   * Alcoholic hepatitis  Assessment & Plan  was on Solu-Cortef 25 mg daily -stopped 3/5  Known Alcoholic cirrhosis  MELDS 45  Transferred from [facility] in [place] after liver transplant evaluation, he was found not to be candidate  Continue lactulose but decrease dose to 10 g once daily as ammonia level is normal and patient is having a lot of loose stools. Placed on  Xifaxan and see if that will help decrease his diarrhea frequency.  Check ammonia level in am  No encephalopathy currently  Patient has extensive workup done, at this time, not much to offer.AT Present not candidate for liver transplant but should be reevaluated again in a few months  Patient does have capacity to make informed medical decisions.   Patient is still having significant orthostatic hypotension and unable to sit up.  Adjusting medication.  Discussed with hepatologist at [facility]-regarding if patient will be candidate for terlipressin  treatment or not, after discussing the case with hepatologist, per hepatologist, patient is not a candidate for the above treatment at this time  Prognosis is poor.  Patient probably needs to be wheelchair-bound and unable to ambulate at this time due to severe orthostatic hypotension.  Continue efforts of trying to find placement in a skilled nursing facility where he can receive in-house dialysis as he is unable to safely transfer and travel to an outside facility for dialysis  3/20/24 : goals Of care discussion with patient and wife at bedside and patient decided to go on hospice care    Acute on chronic anemia  Assessment & Plan  Hemoglobin is around 8.9.  Patient has intermittently been requiring blood transfusions during his extensive hospitalization courses over the last few weeks.   Today hemoglobin dropped to 6.7.  Will give 1 units PRBC and recheck CBC in a.m. and plan for EGD and colonoscopy deferred due to medical complexity.  Continue Protonix.No overt signs of bleeding noted at this time  Per cardiology recommendation, patient will be very high risk patients.  Unless there is active bleeding visualized, recommending to defer the procedure if possible.  In case without any active bleeding, patient still needs EGD and colonoscopy in that case cardiology recommending to transfer the patient to tertiary center.  Discussed with gastroenterologist, will hold off procedure at this time  Patient now placed on hospice care    Orthostatic hypotension  Assessment & Plan  Secondary to end-stage liver disease and hemodialysis dependent acute kidney injury and autonomic dysfunction.  Continue midodrine-patient is still having orthostatic hypotension which is limiting his mobility. cont thigh high CARLOS stockings. increase midodrine 20 mg 3 times daily,increase  Florinef 0.2 mg daily and decreased lactulose to 10 gram once daily .  Increase oral intake.  Monitor orthostasis.prn iv albumin.  deCrease dialysis to 2 times per week  Echocardiogram reviewed  Patient is on higher dose of midodrine and fludrocortisone, is still patient is having orthostatic hypotension especially with movement.  Blood pressure is still running low, might probably be bedbound/wheelchair-bound secondary to his significant orthostatic hypotension and high risk for falls  Off all medications as patient is now on hospice care      Anemia due to chronic kidney disease, on chronic dialysis (HCC)  Assessment & Plan  Lab Results   Component Value Date    EGFR 8 03/20/2024    EGFR 11 03/19/2024    EGFR 6 03/18/2024    CREATININE 7.59 (H) 03/20/2024    CREATININE 6.12 (H) 03/19/2024    CREATININE 9.31 (H) 03/18/2024   Now on hospice    Hemodialysis patient (HCC)  Assessment & Plan  Continue treatment as per nephrology recommendation, due  to orthostatic hypotension, will need to modify patient treatment hemodialysis twice a week every Monday and Friday  For now patient decided on hospice care and hence hemodialysis will be discontinued         VTE Pharmacologic Prophylaxis:   Pharmacologic: Patient has refused VTE prophylaxis  Mechanical VTE Prophylaxis in Place: Yes    Patient Centered Rounds: I have performed bedside rounds with nursing staff today.    Discussions with Specialists or Other Care Team Provider: johnny nephro    Education and Discussions with Family / Patient: Discussed with patient, his sister and his wife    Time Spent for Care: 45 minutes.  More than 50% of total time spent on counseling and coordination of care as described above.    Current Length of Stay: 22 day(s)    Current Patient Status: Inpatient   Certification Statement: The patient will continue to require additional inpatient hospital stay due to end-stage liver disease    Discharge Plan: Patient placed on hospice care currently also evaluate for hospice house.  Family unable to take him home at this time.  Patient's wishes to be home on hospice    Code Status: Level 4 - Comfort Care      Subjective:   Patient complains of generalized weakness and fatigue.  Still having 9-10 bowel movements per day.  Discussion today about goals of care and he decided to go on hospice at home however family unable to accommodate him at home and hence will place on inpatient hospice while here    Objective:     Vitals:   Temp (24hrs), Av.4 °F (36.9 °C), Min:97.8 °F (36.6 °C), Max:98.8 °F (37.1 °C)    Temp:  [97.8 °F (36.6 °C)-98.8 °F (37.1 °C)] 97.9 °F (36.6 °C)  HR:  [] 107  Resp:  [16-19] 18  BP: ()/(48-64) 105/64  SpO2:  [97 %-100 %] 99 %  Body mass index is 28.83 kg/m².     Input and Output Summary (last 24 hours):       Intake/Output Summary (Last 24 hours) at 3/20/2024 1338  Last data filed at 3/20/2024 0918  Gross per 24 hour   Intake 1070 ml   Output 25 ml   Net 1045         Physical Exam:     Physical Exam  Vitals and nursing note reviewed.   Constitutional:       Appearance: He is ill-appearing.   HENT:      Head: Normocephalic and atraumatic.      Right Ear: External ear normal.      Left Ear: External ear normal.      Nose: Nose normal.      Mouth/Throat:      Pharynx: Oropharynx is clear.   Eyes:      General: Scleral icterus present.   Cardiovascular:      Rate and Rhythm: Normal rate and regular rhythm.      Heart sounds: Normal heart sounds.   Pulmonary:      Effort: Pulmonary effort is normal.      Breath sounds: Normal breath sounds.   Abdominal:      General: Bowel sounds are normal.      Palpations: Abdomen is soft.      Tenderness: There is no abdominal tenderness.   Musculoskeletal:      Cervical back: Normal range of motion and neck supple.      Right lower leg: No edema.      Left lower leg: No edema.   Skin:     General: Skin is warm and dry.      Capillary Refill: Capillary refill takes less than 2 seconds.      Coloration: Skin is jaundiced.   Neurological:      Mental Status: He is alert and oriented to person, place, and time. Mental status is at baseline.   Psychiatric:         Mood and Affect: Mood normal.           Additional Data:     Labs:    Results from last 7 days   Lab Units 03/20/24  0630 03/19/24  0638 03/17/24  0423 03/16/24  0442   WBC Thousand/uL 22.21* 19.29*   < > 21.54*   HEMOGLOBIN g/dL 8.7* 6.7*   < > 8.0*   HEMATOCRIT % 24.2* 18.6*   < > 22.2*   PLATELETS Thousands/uL 77* 73*   < > 77*   BANDS PCT %  --   --   --  6   NEUTROS PCT %  --  68   < >  --    LYMPHS PCT %  --  14   < >  --    LYMPHO PCT %  --   --   --  11*   MONOS PCT %  --  15*   < >  --    MONO PCT %  --   --   --  11   EOS PCT %  --  1   < > 1    < > = values in this interval not displayed.     Results from last 7 days   Lab Units 03/20/24  0630 03/19/24  0638   SODIUM mmol/L 137 137   POTASSIUM mmol/L 2.4* 3.1*   CHLORIDE mmol/L 99 97   CO2 mmol/L 24 29   BUN mg/dL 30* 22    CREATININE mg/dL 7.59* 6.12*   ANION GAP mmol/L 14* 11   CALCIUM mg/dL 8.0* 7.9*   ALBUMIN g/dL  --  3.4*   TOTAL BILIRUBIN mg/dL  --  47.38*   ALK PHOS U/L  --  144*   ALT U/L  --  19   AST U/L  --  42*   GLUCOSE RANDOM mg/dL 138 109     Results from last 7 days   Lab Units 03/19/24  0638   INR  3.06*     Results from last 7 days   Lab Units 03/20/24  0116   POC GLUCOSE mg/dl 141*                   * I Have Reviewed All Lab Data Listed Above.  * Additional Pertinent Lab Tests Reviewed: All Labs For Current Hospital Admission Reviewed    Imaging:    Imaging Reports Reviewed Today Include: none  Imaging Personally Reviewed by Myself Includes:  none    Recent Cultures (last 7 days):           Last 24 Hours Medication List:   Current Facility-Administered Medications   Medication Dose Route Frequency Provider Last Rate    diphenhydramine, lidocaine, Al/Mg hydroxide, simethicone  10 mL Swish & Swallow Q6H PRN Yuly S Edenilson, CRNP      escitalopram  5 mg Oral Daily Constance Menendez MD      hydrOXYzine HCL  25 mg Oral Q6H PRN Yuly S Edenilson, CRNP      lidocaine  1 patch Topical Q24H Tiburcio Jean MD      LORazepam  1 mg Intravenous Q10 Min PRN Constance Menendez MD      melatonin  3 mg Oral HS Constance Menendez MD      Morphine Sulfate (Concentrate)  5 mg Oral Q1H PRN Constance Menendez MD      ondansetron  4 mg Intravenous Q8H PRN Yuly S Edenilson, CRNP      pantoprazole  40 mg Oral BID Ricky Foster MD          Today, Patient Was Seen By: Constance Menendez MD    ** Please Note: Dictation voice to text software may have been used in the creation of this document. **

## 2024-03-20 NOTE — ASSESSMENT & PLAN NOTE
was on Solu-Cortef 25 mg daily -stopped 3/5  Known Alcoholic cirrhosis  MELDS 45  Transferred from [facility] in [place] after liver transplant evaluation, he was found not to be candidate  Continue lactulose but decrease dose to 10 g once daily as ammonia level is normal and patient is having a lot of loose stools. Placed on  Xifaxan and see if that will help decrease his diarrhea frequency.  Check ammonia level in am  No encephalopathy currently  Patient has extensive workup done, at this time, not much to offer.AT Present not candidate for liver transplant but should be reevaluated again in a few months  Patient does have capacity to make informed medical decisions.   Patient is still having significant orthostatic hypotension and unable to sit up.  Adjusting medication.  Discussed with hepatologist at [facility]-regarding if patient will be candidate for terlipressin  treatment or not, after discussing the case with hepatologist, per hepatologist, patient is not a candidate for the above treatment at this time  Prognosis is poor.  Patient probably needs to be wheelchair-bound and unable to ambulate at this time due to severe orthostatic hypotension.  Continue efforts of trying to find placement in a skilled nursing facility where he can receive in-house dialysis as he is unable to safely transfer and travel to an outside facility for dialysis  3/20/24 : goals Of care discussion with patient and wife at bedside and patient decided to go on hospice care

## 2024-03-20 NOTE — ASSESSMENT & PLAN NOTE
Hemoglobin is around 8.9.  Patient has intermittently been requiring blood transfusions during his extensive hospitalization courses over the last few weeks.  Today hemoglobin dropped to 6.7.  Will give 1 units PRBC and recheck CBC in a.m. and plan for EGD and colonoscopy deferred due to medical complexity.  Continue Protonix.No overt signs of bleeding noted at this time  Per cardiology recommendation, patient will be very high risk patients.  Unless there is active bleeding visualized, recommending to defer the procedure if possible.  In case without any active bleeding, patient still needs EGD and colonoscopy in that case cardiology recommending to transfer the patient to tertiary center.  Discussed with gastroenterologist, will hold off procedure at this time  Patient now placed on hospice care

## 2024-03-21 NOTE — PROGRESS NOTES
[facility]  Progress Note  Name: Michael Koch I  MRN: [id number]  Unit/Bed#: -01 I Date of Admission: 2/27/2024   Date of Service: 3/21/2024 I Hospital Day: 23    Assessment/Plan   * Alcoholic hepatitis  Assessment & Plan  was on Solu-Cortef 25 mg daily -stopped 3/5  Known Alcoholic cirrhosis  MELDS 45  Transferred from [facility] in [place] after liver transplant evaluation, he was found not to be candidate  Continue lactulose but decrease dose to 10 g once daily as ammonia level is normal and patient is having a lot of loose stools. Placed on  Xifaxan and see if that will help decrease his diarrhea frequency.  Check ammonia level in am  No encephalopathy currently  Patient has extensive workup done, at this time, not much to offer.AT Present not candidate for liver transplant but should be reevaluated again in a few months  Patient does have capacity to make informed medical decisions.   Patient is still having significant orthostatic hypotension and unable to sit up.  Adjusting medication.  Discussed with hepatologist at [facility]-regarding if patient will be candidate for terlipressin  treatment or not, after discussing the case with hepatologist, per hepatologist, patient is not a candidate for the above treatment at this time  Prognosis is poor.  Patient probably needs to be wheelchair-bound and unable to ambulate at this time due to severe orthostatic hypotension.  Continue efforts of trying to find placement in a skilled nursing facility where he can receive in-house dialysis as he is unable to safely transfer and travel to an outside facility for dialysis  3/20/24 : goals Of care discussion with patient and wife at bedside and patient decided to go on hospice care    Acute on chronic anemia  Assessment & Plan  Hemoglobin is around 8.9.  Patient has intermittently been requiring blood transfusions during his extensive hospitalization courses over the last few weeks.   Today hemoglobin dropped to 6.7.  Will give 1 units PRBC and recheck CBC in a.m. and plan for EGD and colonoscopy deferred due to medical complexity.  Continue Protonix.No overt signs of bleeding noted at this time  Per cardiology recommendation, patient will be very high risk patients.  Unless there is active bleeding visualized, recommending to defer the procedure if possible.  In case without any active bleeding, patient still needs EGD and colonoscopy in that case cardiology recommending to transfer the patient to tertiary center.  Discussed with gastroenterologist, will hold off procedure at this time  Patient now placed on hospice care    Anemia due to chronic kidney disease, on chronic dialysis (HCC)  Assessment & Plan  Lab Results   Component Value Date    EGFR 8 03/20/2024    EGFR 11 03/19/2024    EGFR 6 03/18/2024    CREATININE 7.59 (H) 03/20/2024    CREATININE 6.12 (H) 03/19/2024    CREATININE 9.31 (H) 03/18/2024   Now on hospice    Hemodialysis patient ([facility])  Assessment & Plan  Continue treatment as per nephrology recommendation, due to orthostatic hypotension, will need to modify patient treatment hemodialysis twice a week every Monday and Friday  For now patient decided on hospice care and hence hemodialysis will be discontinued               VTE Pharmacologic Prophylaxis:   Moderate Risk (Score 3-4) - Pharmacological DVT Prophylaxis Contraindicated. Sequential Compression Devices Ordered.    Mobility:   Basic Mobility Inpatient Raw Score: 13  JH-HLM Goal: 4: Move to chair/commode  JH-HLM Achieved: 2: Bed activities/Dependent transfer  JH-HLM Goal achieved. Continue to encourage appropriate mobility.    Patient Centered Rounds: I performed bedside rounds with nursing staff today.   Discussions with Specialists or Other Care Team Provider: none    Education and Discussions with Family / Patient: Updated  (wife) at bedside.    Total Time Spent on Date of Encounter in care of patient: 35  mins. This time was spent on one or more of the following: performing physical exam; counseling and coordination of care; obtaining or reviewing history; documenting in the medical record; reviewing/ordering tests, medications or procedures; communicating with other healthcare professionals and discussing with patient's family/caregivers.    Current Length of Stay: 23 day(s)  Current Patient Status: Inpatient   Certification Statement: The patient will continue to require additional inpatient hospital stay due to end-stage liver disease  Discharge Plan: Anticipate discharge in 48-72 hrs to inpatient hospice versus remain here    Code Status: Level 4 - Comfort Care    Subjective:   complains of fatigue.  He is happy to see his son here.  Wife is also present at bedside.  Denies any pain or discomfort is comfortable    Objective:     Vitals:   Temp (24hrs), Av °F (36.1 °C), Min:97 °F (36.1 °C), Max:97 °F (36.1 °C)    Temp:  [97 °F (36.1 °C)] 97 °F (36.1 °C)  HR:  [82-92] 92  Resp:  [16-18] 16  BP: (138)/(81) 138/81  SpO2:  [96 %-98 %] 96 %  Body mass index is 28.83 kg/m².     Input and Output Summary (last 24 hours):   No intake or output data in the 24 hours ending 24 1047    Physical Exam:   Physical Exam  Vitals and nursing note reviewed.   Constitutional:       Appearance: He is ill-appearing.   HENT:      Head: Normocephalic and atraumatic.      Right Ear: External ear normal.      Left Ear: External ear normal.      Nose: Nose normal.      Mouth/Throat:      Pharynx: Oropharynx is clear.   Eyes:      General: Scleral icterus present.   Cardiovascular:      Rate and Rhythm: Normal rate and regular rhythm.      Heart sounds: Normal heart sounds.   Pulmonary:      Effort: Pulmonary effort is normal.      Breath sounds: Normal breath sounds.   Abdominal:      General: Bowel sounds are normal. There is distension.      Palpations: Abdomen is soft.      Tenderness: There is no abdominal tenderness.    Musculoskeletal:         General: Normal range of motion.      Cervical back: Normal range of motion and neck supple.   Skin:     General: Skin is warm and dry.      Capillary Refill: Capillary refill takes less than 2 seconds.      Coloration: Skin is jaundiced.   Neurological:      General: No focal deficit present.      Mental Status: He is alert and oriented to person, place, and time.   Psychiatric:         Mood and Affect: Mood normal.            Additional Data:     Labs:  Results from last 7 days   Lab Units 03/20/24  0630 03/19/24  0638 03/17/24  0423 03/16/24  0442   WBC Thousand/uL 22.21* 19.29*   < > 21.54*   HEMOGLOBIN g/dL 8.7* 6.7*   < > 8.0*   HEMATOCRIT % 24.2* 18.6*   < > 22.2*   PLATELETS Thousands/uL 77* 73*   < > 77*   BANDS PCT %  --   --   --  6   NEUTROS PCT %  --  68   < >  --    LYMPHS PCT %  --  14   < >  --    LYMPHO PCT %  --   --   --  11*   MONOS PCT %  --  15*   < >  --    MONO PCT %  --   --   --  11   EOS PCT %  --  1   < > 1    < > = values in this interval not displayed.     Results from last 7 days   Lab Units 03/20/24  0630 03/19/24  0638   SODIUM mmol/L 137 137   POTASSIUM mmol/L 2.4* 3.1*   CHLORIDE mmol/L 99 97   CO2 mmol/L 24 29   BUN mg/dL 30* 22   CREATININE mg/dL 7.59* 6.12*   ANION GAP mmol/L 14* 11   CALCIUM mg/dL 8.0* 7.9*   ALBUMIN g/dL  --  3.4*   TOTAL BILIRUBIN mg/dL  --  47.38*   ALK PHOS U/L  --  144*   ALT U/L  --  19   AST U/L  --  42*   GLUCOSE RANDOM mg/dL 138 109     Results from last 7 days   Lab Units 03/19/24  0638   INR  3.06*     Results from last 7 days   Lab Units 03/20/24  0116   POC GLUCOSE mg/dl 141*               Lines/Drains:  Invasive Devices       Peripheral Intravenous Line  Duration             Peripheral IV 03/19/24 Distal;Left;Upper;Ventral (anterior) Arm 2 days              Hemodialysis Catheter  Duration             HD Permanent Double Catheter 20 days                          Imaging: none today    Recent Cultures (last 7 days):          Last 24 Hours Medication List:   Current Facility-Administered Medications   Medication Dose Route Frequency Provider Last Rate    diphenhydramine, lidocaine, Al/Mg hydroxide, simethicone  10 mL Swish & Swallow Q6H PRN Yuly S Edenilson, CRNP      escitalopram  5 mg Oral Daily Constance Menendez MD      hydrOXYzine HCL  25 mg Oral Q6H PRN Yuly S Edenilson, CRNP      lidocaine  1 patch Topical Q24H Tiburcio Jean MD      LORazepam  1 mg Intravenous Q10 Min PRN Constance Menendez MD      melatonin  3 mg Oral HS Constance Menendez MD      Morphine Sulfate (Concentrate)  5 mg Oral Q1H PRN Constance Menendez MD      ondansetron  4 mg Intravenous Q8H PRN Yuly S Edenilson, CRNP      pantoprazole  40 mg Oral BID Ricky Foster MD          Today, Patient Was Seen By: Constance Menendez MD    **Please Note: This note may have been constructed using a voice recognition system.**

## 2024-03-21 NOTE — ASSESSMENT & PLAN NOTE
was on Solu-Cortef 25 mg daily -stopped 3/5  Known Alcoholic cirrhosis  MELDS 45  Transferred from [facility] in [place] after liver transplant evaluation, he was found not to be candidate  Continue lactulose but decrease dose to 10 g once daily as ammonia level is normal and patient is having a lot of loose stools. Placed on  Xifaxan and see if that will help decrease his diarrhea frequency.  Check ammonia level in am  No encephalopathy currently  Patient has extensive workup done, at this time, not much to offer.AT Present not candidate for liver transplant but should be reevaluated again in a few months  Patient does have capacity to make informed medical decisions.   Patient is still having significant orthostatic hypotension and unable to sit up.  Adjusting medication.  Discussed with hepatologist at [facility]-regarding if patient will be candidate for terlipressin  treatment or not, after discussing the case with hepatologist, per hepatologist, patient is not a candidate for the above treatment at this time  Prognosis is poor.  Patient probably needs to be wheelchair-bound and unable to ambulate at this time due to severe orthostatic hypotension.  Continue efforts of trying to find placement in a skilled nursing facility where he can receive in-house dialysis as he is unable to safely transfer and travel to an outside facility for dialysis  3/20/24 : goals Of care discussion with patient and wife at bedside and patient decided to go on hospice care

## 2024-03-21 NOTE — PLAN OF CARE
Problem: NEUROSENSORY - ADULT  Goal: Achieves stable or improved neurological status  Description: INTERVENTIONS  - Monitor and report changes in neurological status  - Monitor vital signs such as temperature, blood pressure, glucose, and any other labs ordered   - Initiate measures to prevent increased intracranial pressure  - Monitor for seizure activity and implement precautions if appropriate      Outcome: Progressing  Goal: Achieves maximal functionality and self care  Description: INTERVENTIONS  - Monitor swallowing and airway patency with patient fatigue and changes in neurological status  - Encourage and assist patient to increase activity and self care.   - Encourage visually impaired, hearing impaired and aphasic patients to use assistive/communication devices  Outcome: Progressing     Problem: GASTROINTESTINAL - ADULT  Goal: Minimal or absence of nausea and/or vomiting  Description: INTERVENTIONS:  - Administer IV fluids if ordered to ensure adequate hydration  - Maintain NPO status until nausea and vomiting are resolved  - Nasogastric tube if ordered  - Administer ordered antiemetic medications as needed  - Provide nonpharmacologic comfort measures as appropriate  - Advance diet as tolerated, if ordered  - Consider nutrition services referral to assist patient with adequate nutrition and appropriate food choices  Outcome: Progressing  Goal: Maintains or returns to baseline bowel function  Description: INTERVENTIONS:  - Assess bowel function  - Encourage oral fluids to ensure adequate hydration  - Administer IV fluids if ordered to ensure adequate hydration  - Administer ordered medications as needed  - Encourage mobilization and activity  - Consider nutritional services referral to assist patient with adequate nutrition and appropriate food choices  Outcome: Progressing  Goal: Maintains adequate nutritional intake  Description: INTERVENTIONS:  - Monitor percentage of each meal consumed  - Identify  factors contributing to decreased intake, treat as appropriate  - Assist with meals as needed  - Monitor I&O, weight, and lab values if indicated  - Obtain nutrition services referral as needed  Outcome: Not Progressing  Goal: Oral mucous membranes remain intact  Description: INTERVENTIONS  - Assess oral mucosa and hygiene practices  - Implement preventative oral hygiene regimen  - Implement oral medicated treatments as ordered magic mouth wash  - Initiate Nutrition services referral as needed  Outcome: Progressing     Problem: METABOLIC, FLUID AND ELECTROLYTES - ADULT  Goal: Electrolytes maintained within normal limits  Description: INTERVENTIONS:  - Monitor labs and assess patient for signs and symptoms of electrolyte imbalances -potassium  - Administer electrolyte replacement as ordered  - Monitor response to electrolyte replacements, including repeat lab results as appropriate  - Instruct patient on fluid and nutrition as appropriate  Outcome: Not Progressing  Goal: Fluid balance maintained  Description: INTERVENTIONS:  - Monitor labs   - Monitor I/O and WT  - Instruct patient on fluid and nutrition as appropriate  - Assess for signs & symptoms of volume excess or deficit  Outcome: Progressing     Problem: HEMATOLOGIC - ADULT  Goal: Maintains hematologic stability  Description: INTERVENTIONS  - Assess for signs and symptoms of bleeding or hemorrhage  - Monitor labs  - Administer supportive blood products/factors as ordered and appropriate  Outcome: Not Progressing     Problem: MUSCULOSKELETAL - ADULT  Goal: Maintain or return mobility to safest level of function  Description: INTERVENTIONS:  - Assess patient's ability to carry out ADLs; assess patient's baseline for ADL function and identify physical deficits which impact ability to perform ADLs (bathing, care of mouth/teeth, toileting, grooming, dressing, etc.)  - Assess/evaluate cause of self-care deficits   - Assess range of motion  - Assess patient's  mobility  - Assess patient's need for assistive devices and provide as appropriate  - Encourage maximum independence but intervene and supervise when necessary  - Involve family in performance of ADLs  - Assess for home care needs following discharge   - Consider OT consult to assist with ADL evaluation and planning for discharge  - Provide patient education as appropriate  Outcome: Progressing  Goal: Maintain proper alignment of affected body part  Description: INTERVENTIONS:  - Support, maintain and protect limb and body alignment  - Provide patient/ family with appropriate education  Outcome: Progressing     Problem: CARDIOVASCULAR - ADULT  Goal: Maintains optimal cardiac output and hemodynamic stability  Description: INTERVENTIONS:  - Monitor I/O, vital signs and rhythm monitor blood pressures hypotensive  - Monitor for S/S and trends of decreased cardiac output  - Administer and titrate ordered vasoactive medications to optimize hemodynamic stability  - Assess quality of pulses, skin color and temperature  - Assess for signs of decreased coronary artery perfusion  - Instruct patient to report change in severity of symptoms  Outcome: Progressing     Problem: Prexisting or High Potential for Compromised Skin Integrity  Goal: Skin integrity is maintained or improved  Description: INTERVENTIONS:  - Identify patients at risk for skin breakdown  - Assess and monitor skin integrity  - Assess and monitor nutrition and hydration status  - Monitor labs   - Assess for incontinence   - Turn and reposition patient  - Assist with mobility/ambulation  - Relieve pressure over bony prominences  - Avoid friction and shearing  - Provide appropriate hygiene as needed including keeping skin clean and dry  - Evaluate need for skin moisturizer/barrier cream  - Collaborate with interdisciplinary team   - Patient/family teaching  - Consider wound care consult   Outcome: Progressing     Problem: Nutrition/Hydration-ADULT  Goal:  Nutrient/Hydration intake appropriate for improving, restoring or maintaining nutritional needs  Description: Monitor and assess patient's nutrition/hydration status for malnutrition. Collaborate with interdisciplinary team and initiate plan and interventions as ordered.  Monitor patient's weight and dietary intake as ordered or per policy. Utilize nutrition screening tool and intervene as necessary. Determine patient's food preferences and provide high-protein, high-caloric foods as appropriate.     INTERVENTIONS:  - Monitor oral intake, urinary output, labs, and treatment plans  - Assess nutrition and hydration status and recommend course of action  - Evaluate amount of meals eaten  - Assist patient with eating if necessary   - Allow adequate time for meals  - Recommend/ encourage appropriate diets, oral nutritional supplements, and vitamin/mineral supplements  - Order, calculate, and assess calorie counts as needed  - Recommend, monitor, and adjust tube feedings and TPN/PPN based on assessed needs  - Assess need for intravenous fluids  - Provide specific nutrition/hydration education as appropriate  - Include patient/family/caregiver in decisions related to nutrition  Outcome: Not Progressing

## 2024-03-21 NOTE — ACP (ADVANCE CARE PLANNING)
Advanced Care Planning Progress Note    Serious Illness Conversation    1. What is your understanding now of where you are with your illness?  Prognostic Understanding: appropriate understanding of prognosis  After extensive conversations with patient and poor prognosis, likely needing to be wheelchair-bound, unable to ambulate due to orthostatic hypotension, now needing dialysis, prognosis remains poor. At this time, patient would like to transition to hospice.      2. How much information about what is likely to be ahead with your illness would you like to have?  Information: patient wants to be fully informed     3. What did you (clinician) communicate to the patient?  Prognostic Communication: Time - I wish we were not in this situation, but I am worried that time may be as short as days to weeks (express as a range, e.g. days to weeks, weeks to months, months to a year).  Explained that going on hospice will stop patient's dialysis which will cause build up of toxic chemicals. Stopping dialysis will cause you to pass and deteriorate soon. Patient understands and would like to go home on hospice.      4. If your health situation worsens, what are your most important goals?  Goals: be at home, be physically comfortable  His goal is to be at home and pass at home.      5. What are the biggest fears and worries about the future and your health?  Fears/Worries: pain     6. What abilities are so critical to your life that you cannot imagine living without them?  Unacceptable Function: being chronically confused or not being myself, being in pain or very uncomfortable     7. What gives you strength as you think about the future with your illness?  Family support, getting to see children coming into the hospital     8. If you become sicker, how much are you willing to go through for the possibility of gaining more time?  Be in the hospital: No Have a feeding tube: No   Be in the ICU: No Live in a nursing home: No   Be  on a ventilator: No Be uncomfortable: No   Be on dialysis: No Undergo aggressive test and/or procedures: No   Patient would like to go home on hospice care.   9. How much does your proxy and family know about your priorities and wishes?  Discussion Discussion: extensive discussion with family about goals and wishes     I’ve heard you say that transitioning to hospice and going home on hospice is really important to you. Keeping that in mind, and what we know about your illness, I recommend that we stop treatment at this time and work on getting you home or placed for hospice. This will help us make sure that your treatment plans reflect what’s important to you.     How does this plan sound to you? I will do everything I can to help you through this.  Patient verbalized understanding of the plan     I have spent 30 minutes speaking with my patient on advanced care planning today or during this visit     Advanced directives         Shilpa Johnson PA-C

## 2024-03-21 NOTE — QUICK NOTE
Discussed with Dr. Menendez: The patient has decided for hospice no further hemodialysis.  We will see the patient as needed please feel free to call if I can be of any further assistance thank you so much for allowing us to participate in the patient's care

## 2024-03-22 NOTE — ASSESSMENT & PLAN NOTE
was on Solu-Cortef 25 mg daily -stopped 3/5  Known Alcoholic cirrhosis  MELDS 45  Transferred from [facility] [place] [facility] in [place] after liver transplant evaluation, he was found not to be candidate  Continue lactulose but decrease dose to 10 g once daily as ammonia level is normal and patient is having a lot of loose stools. Placed on  Xifaxan and see if that will help decrease his diarrhea frequency.  Check ammonia level in am  No encephalopathy currently  Patient has extensive workup done, at this time, not much to offer.AT Present not candidate for liver transplant but should be reevaluated again in a few months  Patient does have capacity to make informed medical decisions.   Patient is still having significant orthostatic hypotension and unable to sit up.  Adjusting medication.  Discussed with hepatologist at [facility]-regarding if patient will be candidate for terlipressin  treatment or not, after discussing the case with hepatologist, per hepatologist, patient is not a candidate for the above treatment at this time  Prognosis is poor.  Patient probably needs to be wheelchair-bound and unable to ambulate at this time due to severe orthostatic hypotension.  Continue efforts of trying to find placement in a skilled nursing facility where he can receive in-house dialysis as he is unable to safely transfer and travel to an outside facility for dialysis  3/20/24 : goals Of care discussion with patient and wife at bedside and patient decided to go on hospice care

## 2024-03-22 NOTE — PROGRESS NOTES
Pastoral Care Progress Note    3/22/2024  Patient: Michael Koch : 1990  Admission Date & Time: 2024  MRN: [id number] [facility]: [id number]    CH initiated support visit to pt in setting of comfort care. Pt received CH reclined in bed, wife present.  CH spoke briefly to pt, but he expressed his need to sleep. Pt shared his perception that he will return home on hospice.  CH spoke to wife privately regarding her intentions on placement for hospice. Wife of pt shared: her family and the pt's have mutually agreed that the pt's children should not see Michael die. To this end, wife's wish it to not taking the pt home on hospice. Wife of pt shared her perception that pt will die at [facility], imminently.   Wife shared her wish is that the friends and family of the pt be able to visit him at [facility] as it is closer to where their families live.  Wife shared regarding the hx of the pt's illness, the children's current understanding of the pt's illness and what she intends to tell them when he dies. Wife shared regarding her current emotional exhaustion.  Wife attests family is well supported and that she feel supported in her Faith varun.  CH facilitated conversation regarding family's plans, how the children are informed and supported, and who supports wife.    Wife shared she will be bringing the children to visit this afternoon, but they may not return again when she determines the pt is no longer appropriate for visitation.             Chaplaincy Interventions Utilized:   Empowerment: Encouraged focus on present and Provided guilt counseling    Exploration: Explored relational needs & resources    Collaboration: Consulted with interdisciplinary team    Relationship Building: Listened empathically      Chaplaincy Outcomes Achieved:  Identified meaningful connections    Spiritual Coping Strategies Utilized:   Spiritual guilt

## 2024-03-22 NOTE — UTILIZATION REVIEW
"Continued Stay Review    Date: 3/22/24                           Current Patient Class: IP  Current Level of Care: MS    HPI:33 y.o. male initially admitted on 2/27/24 - DX:  Alcoholic hepatitis     Assessment/Plan:   3/22/24: States that he feels weak; scleral icterus; abdominal distension; jaundiced    Per CM Note:   Aware of consult for Hospice Consult: Discussion was held with wife Jo and provider and spouse does not want patient to come home on hospice related to her children, she does not want them expose to seeing there father dying.  Per Jo, there is not any other family member that can assist at this time.     Discussed SNF for Hospice Care with patient needing to go in [place] pending.     Patient's wishes to go home, however Jo does not feel this is the best for the children.      Patient is currently on Comfort Care.  CM will make a referral for [facility] as patient progresses for the hospice house.       Vital Signs: /81 (BP Location: Left arm)   Pulse 81   Temp (!) 97 °F (36.1 °C) (Temporal)   Resp 16   Ht 5' 7\" (1.702 m)   Wt 83.5 kg (184 lb 1.4 oz)   SpO2 96%   BMI 28.83 kg/m²       Pertinent Labs/Diagnostic Results:     Results from last 7 days   Lab Units 03/20/24 0630 03/19/24 0638 03/18/24 1423 03/17/24 0423 03/16/24  0442   WBC Thousand/uL 22.21* 19.29* 20.97* 20.27* 21.54*   HEMOGLOBIN g/dL 8.7* 6.7* 7.5* 7.5* 8.0*   HEMATOCRIT % 24.2* 18.6* 20.9* 21.4* 22.2*   PLATELETS Thousands/uL 77* 73* 89* 78* 77*   NEUTROS ABS Thousands/µL  --  13.20* 15.11*  --   --    BANDS PCT %  --   --   --   --  6        Results from last 7 days   Lab Units 03/20/24 0630 03/19/24 0638 03/18/24 1423 03/17/24 0423 03/16/24  0442   SODIUM mmol/L 137 137 137 136 134*   POTASSIUM mmol/L 2.4* 3.1* 2.9* 3.1* 2.9*   CHLORIDE mmol/L 99 97 102 100 96   CO2 mmol/L 24 29 23 26 29   ANION GAP mmol/L 14* 11 12 10 9   BUN mg/dL 30* 22 41* 31* 24   CREATININE mg/dL 7.59* 6.12* 9.31* 7.05* " 6.01*   EGFR ml/min/1.73sq m 8 11 6 9 11   CALCIUM mg/dL 8.0* 7.9* 8.7 8.1* 7.6*   MAGNESIUM mg/dL 1.9 2.0  --   --   --    PHOSPHORUS mg/dL 1.4* <1.0*  --   --   --      Results from last 7 days   Lab Units 03/20/24  0630 03/19/24  0638 03/18/24  1423 03/17/24  0423 03/16/24  0442   AST U/L  --  42* 33 30 39   ALT U/L  --  19 20 20 27   ALK PHOS U/L  --  144* 159* 151* 188*   TOTAL PROTEIN g/dL  --  3.8* 3.5* 3.5* 3.2*   ALBUMIN g/dL  --  3.4* 2.9* 3.0* 2.5*   TOTAL BILIRUBIN mg/dL  --  47.38* 47.67* 44.07* 42.52*   AMMONIA umol/L 70  --   --   --   --      Results from last 7 days   Lab Units 03/20/24  0116   POC GLUCOSE mg/dl 141*     Results from last 7 days   Lab Units 03/20/24  0630 03/19/24  0638 03/18/24  1423 03/17/24  0423 03/16/24  0442   GLUCOSE RANDOM mg/dL 138 109 133 105 93       BETA-HYDROXYBUTYRATE   Date Value Ref Range Status   01/21/2024 0.2 <0.6 mmol/L Final        Results from last 7 days   Lab Units 03/19/24  0638 03/18/24  1423 03/17/24  0852   PROTIME seconds 32.0* 28.0* 32.7*   INR  3.06* 2.58* 3.16*       Results from last 7 days   Lab Units 03/20/24  0637   UNIT PRODUCT CODE  [id number]   UNIT NUMBER  [id number]   UNITABO  O   UNITRH  POS   CROSSMATCH  Compatible   UNIT DISPENSE STATUS  Presumed Trans   UNIT PRODUCT VOL ml 350       Medications:   Scheduled Medications:  escitalopram, 5 mg, Oral, Daily  lidocaine, 1 patch, Topical, Q24H  melatonin, 3 mg, Oral, HS  pantoprazole, 40 mg, Oral, BID      Continuous IV Infusions: None       PRN Meds:  diphenhydramine, lidocaine, Al/Mg hydroxide, simethicone, 10 mL, Swish & Swallow, Q6H PRN  hydrOXYzine HCL, 25 mg, Oral, Q6H PRN  LORazepam, 1 mg, Intravenous, Q10 Min PRN  Morphine Sulfate (Concentrate), 5 mg, Oral, Q1H PRN  ondansetron, 4 mg, Intravenous, Q8H PRN        Discharge Plan: D    Network Utilization Review Department  ATTENTION: Please call with any questions or concerns to [phone] and carefully listen to the prompts so that  you are directed to the right person. All voicemails are confidential.   For Discharge needs, contact Care Management DC Support Team at [phone] opt. 2  Send all requests for admission clinical reviews, approved or denied determinations and any other requests to dedicated fax number below belonging to the campus where the patient is receiving treatment. List of dedicated fax numbers for the Facilities:  FACILITY NAME UR FAX NUMBER   ADMISSION DENIALS (Administrative/Medical Necessity) [phone]   DISCHARGE SUPPORT TEAM (NETWORK) [phone]   PARENT CHILD HEALTH (Maternity/NICU/Pediatrics) [phone]   [facility] [phone]   [facility] [phone]   [facility] [phone]   [facility] [phone]   Critical access hospital [phone]   [facility] [phone]   [facility] [phone]   [facility] [phone]   [facility] [phone]   [facility] [phone]   [facility] [phone]   [facility] [phone]

## 2024-03-22 NOTE — CASE MANAGEMENT
Case Management Progress Note    Patient name Michael Koch  Location /-01 MRN [id number]  : 1990 Date 3/22/2024       LOS (days): 24  Geometric Mean LOS (GMLOS) (days): 4.8  Days to GMLOS:-18.7        OBJECTIVE:        Current admission status: Inpatient  Preferred Pharmacy:   [id number] - [place], [place] - [address] [facility]  [address] [facility]  [place] [address]  Phone: [phone] Fax: [phone]    Primary Care Provider: Louie Odonnell DO    Primary Insurance: BLUE CROSS  Secondary Insurance:     PROGRESS NOTE:    Aware of consult for Hospice Consult: Discussion was held with wife Jo and provider and spouse does not want patient to come home on hospice related to her children, she does not want them expose to seeing there father dying.  Per Jo, there is not any other family member that can assist at this time.    Discussed SNF for Hospice Care with patient needing to go in [place] pending.    Patient's wishes to go home, however Jo does not feel this is the best for the children.     Patient is currently on Comfort Care.  CM will make a referral for [facility] as patient progresses for the hospice house.      3:30  Did speak to patient's spouse: Jo and sister Cathryn.  They are aware we may need to pursue a SNF that would accept [place] pending  for Hospice.  - [facility] referral for SNF was made    Per Jo patient will have secondary insurance  with [employer]- [facility].    CM will follow up with referrals.

## 2024-03-22 NOTE — PROGRESS NOTES
[facility]  Progress Note  Name: Michael Koch I  MRN: [id number]  Unit/Bed#: -01 I Date of Admission: 2/27/2024   Date of Service: 3/22/2024 I Hospital Day: 24    Assessment/Plan   * Alcoholic hepatitis  Assessment & Plan  was on Solu-Cortef 25 mg daily -stopped 3/5  Known Alcoholic cirrhosis  MELDS 45  Transferred from [facility] in [place] after liver transplant evaluation, he was found not to be candidate  Continue lactulose but decrease dose to 10 g once daily as ammonia level is normal and patient is having a lot of loose stools. Placed on  Xifaxan and see if that will help decrease his diarrhea frequency.  Check ammonia level in am  No encephalopathy currently  Patient has extensive workup done, at this time, not much to offer.AT Present not candidate for liver transplant but should be reevaluated again in a few months  Patient does have capacity to make informed medical decisions.   Patient is still having significant orthostatic hypotension and unable to sit up.  Adjusting medication.  Discussed with hepatologist at [facility]-regarding if patient will be candidate for terlipressin  treatment or not, after discussing the case with hepatologist, per hepatologist, patient is not a candidate for the above treatment at this time  Prognosis is poor.  Patient probably needs to be wheelchair-bound and unable to ambulate at this time due to severe orthostatic hypotension.  Continue efforts of trying to find placement in a skilled nursing facility where he can receive in-house dialysis as he is unable to safely transfer and travel to an outside facility for dialysis  3/20/24 : goals Of care discussion with patient and wife at bedside and patient decided to go on hospice care    Acute on chronic anemia  Assessment & Plan  Hemoglobin is around 8.9.  Patient has intermittently been requiring blood transfusions during his extensive hospitalization courses over the last few weeks.   Today hemoglobin dropped to 6.7.  Will give 1 units PRBC and recheck CBC in a.m. and plan for EGD and colonoscopy deferred due to medical complexity.  Continue Protonix.No overt signs of bleeding noted at this time  Per cardiology recommendation, patient will be very high risk patients.  Unless there is active bleeding visualized, recommending to defer the procedure if possible.  In case without any active bleeding, patient still needs EGD and colonoscopy in that case cardiology recommending to transfer the patient to tertiary center.  Discussed with gastroenterologist, will hold off procedure at this time  Patient now placed on hospice care               VTE Pharmacologic Prophylaxis:   Moderate Risk (Score 3-4) - Pharmacological DVT Prophylaxis Contraindicated. Sequential Compression Devices Ordered.    Mobility:   Basic Mobility Inpatient Raw Score: 13  -[facility] Goal: 4: Move to chair/commode  JH-HLM Achieved: 2: Bed activities/Dependent transfer  JH-HLM Goal achieved. Continue to encourage appropriate mobility.    Patient Centered Rounds: I performed bedside rounds with nursing staff today.   Discussions with Specialists or Other Care Team Provider: none    Education and Discussions with Family / Patient: Updated  (wife) at bedside.    Total Time Spent on Date of Encounter in care of patient: 35 mins. This time was spent on one or more of the following: performing physical exam; counseling and coordination of care; obtaining or reviewing history; documenting in the medical record; reviewing/ordering tests, medications or procedures; communicating with other healthcare professionals and discussing with patient's family/caregivers.    Current Length of Stay: 24 day(s)  Current Patient Status: Inpatient   Certification Statement: The patient will continue to require additional inpatient hospital stay due to end stage liver disease now on hospice  Discharge Plan: Anticipate discharge in 48-72 hrs to  possibly will pass away    Code Status: Level 4 - Comfort Care    Subjective:   States that he feels weak but otherwise comfortable denies any pain or discomfort family at bedside    Objective:     Vitals:   No data recorded.    HR:  [80-84] 80  Resp:  [16-17] 17  Body mass index is 28.83 kg/m².     Input and Output Summary (last 24 hours):     Intake/Output Summary (Last 24 hours) at 3/22/2024 1413  Last data filed at 3/22/2024 0941  Gross per 24 hour   Intake 120 ml   Output --   Net 120 ml       Physical Exam:   Physical Exam  Vitals and nursing note reviewed.   Constitutional:       Appearance: He is ill-appearing.   HENT:      Head: Normocephalic and atraumatic.      Right Ear: External ear normal.      Left Ear: External ear normal.      Nose: Nose normal.      Mouth/Throat:      Pharynx: Oropharynx is clear.   Eyes:      General: Scleral icterus present.   Cardiovascular:      Rate and Rhythm: Normal rate and regular rhythm.      Heart sounds: Normal heart sounds.   Pulmonary:      Effort: Pulmonary effort is normal.      Breath sounds: Normal breath sounds.   Abdominal:      General: Bowel sounds are normal. There is distension.      Palpations: Abdomen is soft.      Tenderness: There is no abdominal tenderness.   Musculoskeletal:         General: Normal range of motion.      Cervical back: Normal range of motion and neck supple.   Skin:     General: Skin is warm and dry.      Capillary Refill: Capillary refill takes less than 2 seconds.      Coloration: Skin is jaundiced.   Neurological:      Mental Status: He is alert. Mental status is at baseline.   Psychiatric:         Mood and Affect: Mood normal.            Additional Data:     Labs:  Results from last 7 days   Lab Units 03/20/24  0630 03/19/24  0638 03/17/24  0423 03/16/24  0442   WBC Thousand/uL 22.21* 19.29*   < > 21.54*   HEMOGLOBIN g/dL 8.7* 6.7*   < > 8.0*   HEMATOCRIT % 24.2* 18.6*   < > 22.2*   PLATELETS Thousands/uL 77* 73*   < > 77*   BANDS  PCT %  --   --   --  6   NEUTROS PCT %  --  68   < >  --    LYMPHS PCT %  --  14   < >  --    LYMPHO PCT %  --   --   --  11*   MONOS PCT %  --  15*   < >  --    MONO PCT %  --   --   --  11   EOS PCT %  --  1   < > 1    < > = values in this interval not displayed.     Results from last 7 days   Lab Units 03/20/24  0630 03/19/24  0638   SODIUM mmol/L 137 137   POTASSIUM mmol/L 2.4* 3.1*   CHLORIDE mmol/L 99 97   CO2 mmol/L 24 29   BUN mg/dL 30* 22   CREATININE mg/dL 7.59* 6.12*   ANION GAP mmol/L 14* 11   CALCIUM mg/dL 8.0* 7.9*   ALBUMIN g/dL  --  3.4*   TOTAL BILIRUBIN mg/dL  --  47.38*   ALK PHOS U/L  --  144*   ALT U/L  --  19   AST U/L  --  42*   GLUCOSE RANDOM mg/dL 138 109     Results from last 7 days   Lab Units 03/19/24  0638   INR  3.06*     Results from last 7 days   Lab Units 03/20/24  0116   POC GLUCOSE mg/dl 141*               Lines/Drains:  Invasive Devices       Peripheral Intravenous Line  Duration             Peripheral IV 03/19/24 Distal;Left;Upper;Ventral (anterior) Arm 3 days              Hemodialysis Catheter  Duration             HD Permanent Double Catheter 22 days                          Imaging: none    Recent Cultures (last 7 days):         Last 24 Hours Medication List:   Current Facility-Administered Medications   Medication Dose Route Frequency Provider Last Rate    diphenhydramine, lidocaine, Al/Mg hydroxide, simethicone  10 mL Swish & Swallow Q6H PRN Yuly S Edenilson, CRNP      escitalopram  5 mg Oral Daily Constance Menendez MD      hydrOXYzine HCL  25 mg Oral Q6H PRN Yuly S Edenilson, CRNP      lidocaine  1 patch Topical Q24H Tiburcio Jean MD      LORazepam  1 mg Intravenous Q10 Min PRN Constance Menendez MD      melatonin  3 mg Oral HS Constance Menendez MD      Morphine Sulfate (Concentrate)  5 mg Oral Q1H PRN Constance Menendez MD      ondansetron  4 mg Intravenous Q8H PRN Yuly S Edenilson, CRNP      pantoprazole  40 mg Oral BID Ricky Foster MD          Today, Patient Was Seen By: Constance Menendez,  MD    **Please Note: This note may have been constructed using a voice recognition system.**

## 2024-03-23 NOTE — PLAN OF CARE
Problem: NEUROSENSORY - ADULT  Goal: Achieves stable or improved neurological status  Description: INTERVENTIONS  - Monitor and report changes in neurological status  - Monitor vital signs such as temperature, blood pressure, glucose, and any other labs ordered   - Initiate measures to prevent increased intracranial pressure  - Monitor for seizure activity and implement precautions if appropriate      Outcome: Progressing  Goal: Achieves maximal functionality and self care  Description: INTERVENTIONS  - Monitor swallowing and airway patency with patient fatigue and changes in neurological status  - Encourage and assist patient to increase activity and self care.   - Encourage visually impaired, hearing impaired and aphasic patients to use assistive/communication devices  Outcome: Progressing     Problem: GASTROINTESTINAL - ADULT  Goal: Minimal or absence of nausea and/or vomiting  Description: INTERVENTIONS:  - Administer IV fluids if ordered to ensure adequate hydration  - Maintain NPO status until nausea and vomiting are resolved  - Nasogastric tube if ordered  - Administer ordered antiemetic medications as needed  - Provide nonpharmacologic comfort measures as appropriate  - Advance diet as tolerated, if ordered  - Consider nutrition services referral to assist patient with adequate nutrition and appropriate food choices  Outcome: Progressing     Problem: METABOLIC, FLUID AND ELECTROLYTES - ADULT  Goal: Electrolytes maintained within normal limits  Description: INTERVENTIONS:  - Monitor labs and assess patient for signs and symptoms of electrolyte imbalances -potassium  - Administer electrolyte replacement as ordered  - Monitor response to electrolyte replacements, including repeat lab results as appropriate  - Instruct patient on fluid and nutrition as appropriate  Outcome: Progressing  Goal: Fluid balance maintained  Description: INTERVENTIONS:  - Monitor labs   - Monitor I/O and WT  - Instruct patient on  fluid and nutrition as appropriate  - Assess for signs & symptoms of volume excess or deficit  Outcome: Progressing     Problem: HEMATOLOGIC - ADULT  Goal: Maintains hematologic stability  Description: INTERVENTIONS  - Assess for signs and symptoms of bleeding or hemorrhage  - Monitor labs  - Administer supportive blood products/factors as ordered and appropriate  Outcome: Progressing     Problem: PAIN - ADULT  Goal: Verbalizes/displays adequate comfort level or baseline comfort level  Description: Interventions:  - Encourage patient to monitor pain and request assistance  - Assess pain using appropriate pain scale  - Administer analgesics based on type and severity of pain and evaluate response  - Implement non-pharmacological measures as appropriate and evaluate response  - Consider cultural and social influences on pain and pain management  - Notify physician/advanced practitioner if interventions unsuccessful or patient reports new pain  Outcome: Progressing

## 2024-03-23 NOTE — PLAN OF CARE
Problem: NEUROSENSORY - ADULT  Goal: Achieves stable or improved neurological status  Description: INTERVENTIONS  - Monitor and report changes in neurological status  - Monitor vital signs such as temperature, blood pressure, glucose, and any other labs ordered   - Initiate measures to prevent increased intracranial pressure  - Monitor for seizure activity and implement precautions if appropriate      Outcome: Not Progressing  Goal: Achieves maximal functionality and self care  Description: INTERVENTIONS  - Monitor swallowing and airway patency with patient fatigue and changes in neurological status  - Encourage and assist patient to increase activity and self care.   - Encourage visually impaired, hearing impaired and aphasic patients to use assistive/communication devices  Outcome: Not Progressing

## 2024-03-23 NOTE — PROGRESS NOTES
[facility]  Progress Note  Name: Michael Koch I  MRN: [id number]  Unit/Bed#: -01 I Date of Admission: 2/27/2024   Date of Service: 3/23/2024 I Hospital Day: 25    Assessment/Plan   * Alcoholic hepatitis  Assessment & Plan  was on Solu-Cortef 25 mg daily -stopped 3/5  Known Alcoholic cirrhosis  MELDS 45  Transferred from [facility] in [place] after liver transplant evaluation, he was found not to be candidate  Continue lactulose but decrease dose to 10 g once daily as ammonia level is normal and patient is having a lot of loose stools. Placed on  Xifaxan and see if that will help decrease his diarrhea frequency.  Check ammonia level in am  No encephalopathy currently  Patient has extensive workup done, at this time, not much to offer.AT Present not candidate for liver transplant but should be reevaluated again in a few months  Patient does have capacity to make informed medical decisions.   Patient is still having significant orthostatic hypotension and unable to sit up.  Adjusting medication.  Discussed with hepatologist at [facility]-regarding if patient will be candidate for terlipressin  treatment or not, after discussing the case with hepatologist, per hepatologist, patient is not a candidate for the above treatment at this time  Prognosis is poor.  Patient probably needs to be wheelchair-bound and unable to ambulate at this time due to severe orthostatic hypotension.  Continue efforts of trying to find placement in a skilled nursing facility where he can receive in-house dialysis as he is unable to safely transfer and travel to an outside facility for dialysis  3/20/24 : goals Of care discussion with patient and wife at bedside and patient decided to go on hospice care    Cont comfort care.  denies any pain.  Looks very weak and lethargic.  Barely eating or drinking    Acute on chronic anemia  Assessment & Plan  Hemoglobin is around 8.9.  Patient has intermittently been  requiring blood transfusions during his extensive hospitalization courses over the last few weeks.  Today hemoglobin dropped to 6.7.  Will give 1 units PRBC and recheck CBC in a.m. and plan for EGD and colonoscopy deferred due to medical complexity.  Continue Protonix.No overt signs of bleeding noted at this time  Per cardiology recommendation, patient will be very high risk patients.  Unless there is active bleeding visualized, recommending to defer the procedure if possible.  In case without any active bleeding, patient still needs EGD and colonoscopy in that case cardiology recommending to transfer the patient to tertiary center.  Discussed with gastroenterologist, will hold off procedure at this time  Patient now placed on hospice care               VTE Pharmacologic Prophylaxis:   Moderate Risk (Score 3-4) - Pharmacological DVT Prophylaxis Contraindicated. Sequential Compression Devices Ordered.    Mobility:   Basic Mobility Inpatient Raw Score: 10  JH-HLM Goal: 4: Move to chair/commode  JH-HLM Achieved: 1: Laying in bed  JH-HLM Goal achieved. Continue to encourage appropriate mobility.    Patient Centered Rounds: I performed bedside rounds with nursing staff today.   Discussions with Specialists or Other Care Team Provider: none    Education and Discussions with Family / Patient: Updated  (wife) at bedside.    Total Time Spent on Date of Encounter in care of patient: 35 mins. This time was spent on one or more of the following: performing physical exam; counseling and coordination of care; obtaining or reviewing history; documenting in the medical record; reviewing/ordering tests, medications or procedures; communicating with other healthcare professionals and discussing with patient's family/caregivers.    Current Length of Stay: 25 day(s)  Current Patient Status: Inpatient   Certification Statement: The patient will continue to require additional inpatient hospital stay due to comfort  care  Discharge Plan: Anticipate discharge in 48-72 hrs to may  versus go to snf    Code Status: Level 4 - Comfort Care    Subjective:   Patient appears to be very weak and lethargic.  Denies any pain.  Wife at bedside.  Barely eating or drinking    Objective:     Vitals:   No data recorded.    HR:  [79-81] 79  Resp:  [16-18] 18  Body mass index is 28.83 kg/m².     Input and Output Summary (last 24 hours):     Intake/Output Summary (Last 24 hours) at 3/23/2024 1138  Last data filed at 3/23/2024 0700  Gross per 24 hour   Intake 60 ml   Output 50 ml   Net 10 ml       Physical Exam:   Physical Exam  Constitutional:       Appearance: He is ill-appearing.   HENT:      Head: Normocephalic.      Mouth/Throat:      Mouth: Mucous membranes are dry.   Eyes:      General: Scleral icterus present.      Pupils: Pupils are equal, round, and reactive to light.   Cardiovascular:      Rate and Rhythm: Normal rate and regular rhythm.   Pulmonary:      Effort: Pulmonary effort is normal.      Breath sounds: Normal breath sounds.   Abdominal:      General: Bowel sounds are normal. There is distension.      Palpations: Abdomen is soft.   Skin:     Coloration: Skin is jaundiced.   Neurological:      Comments: Oriented to person and place.  Psychomotor slowing noted            Additional Data:     Labs:  Results from last 7 days   Lab Units 24  0630 24  0638   WBC Thousand/uL 22.21* 19.29*   HEMOGLOBIN g/dL 8.7* 6.7*   HEMATOCRIT % 24.2* 18.6*   PLATELETS Thousands/uL 77* 73*   NEUTROS PCT %  --  68   LYMPHS PCT %  --  14   MONOS PCT %  --  15*   EOS PCT %  --  1     Results from last 7 days   Lab Units 24  0630 24  0638   SODIUM mmol/L 137 137   POTASSIUM mmol/L 2.4* 3.1*   CHLORIDE mmol/L 99 97   CO2 mmol/L 24 29   BUN mg/dL 30* 22   CREATININE mg/dL 7.59* 6.12*   ANION GAP mmol/L 14* 11   CALCIUM mg/dL 8.0* 7.9*   ALBUMIN g/dL  --  3.4*   TOTAL BILIRUBIN mg/dL  --  47.38*   ALK PHOS U/L  --  144*   ALT U/L   --  19   AST U/L  --  42*   GLUCOSE RANDOM mg/dL 138 109     Results from last 7 days   Lab Units 03/19/24  0638   INR  3.06*     Results from last 7 days   Lab Units 03/20/24  0116   POC GLUCOSE mg/dl 141*               Lines/Drains:  Invasive Devices       Peripheral Intravenous Line  Duration             Peripheral IV 03/19/24 Distal;Left;Upper;Ventral (anterior) Arm 4 days              Hemodialysis Catheter  Duration             HD Permanent Double Catheter 22 days                          Imaging: none    Recent Cultures (last 7 days):         Last 24 Hours Medication List:   Current Facility-Administered Medications   Medication Dose Route Frequency Provider Last Rate    diphenhydramine, lidocaine, Al/Mg hydroxide, simethicone  10 mL Swish & Swallow Q6H PRN Yuly S Edenilson, CRNP      escitalopram  5 mg Oral Daily Constance Menendez MD      hydrOXYzine HCL  25 mg Oral Q6H PRN Yuly S Edenilson, CRNP      lidocaine  1 patch Topical Q24H Tiburcio Jean MD      LORazepam  1 mg Intravenous Q10 Min PRN Constance Menendez MD      melatonin  3 mg Oral HS Constance Menendez MD      Morphine Sulfate (Concentrate)  5 mg Oral Q1H PRN Constance Menendez MD      ondansetron  4 mg Intravenous Q8H PRN Yuly S Edenilson, CRNP      pantoprazole  40 mg Oral BID Ricky Foster MD          Today, Patient Was Seen By: Constance Menendez MD    **Please Note: This note may have been constructed using a voice recognition system.**

## 2024-03-23 NOTE — ASSESSMENT & PLAN NOTE
was on Solu-Cortef 25 mg daily -stopped 3/5  Known Alcoholic cirrhosis  MELDS 45  Transferred from [facility] in [place] after liver transplant evaluation, he was found not to be candidate  Continue lactulose but decrease dose to 10 g once daily as ammonia level is normal and patient is having a lot of loose stools. Placed on  Xifaxan and see if that will help decrease his diarrhea frequency.  Check ammonia level in am  No encephalopathy currently  Patient has extensive workup done, at this time, not much to offer.AT Present not candidate for liver transplant but should be reevaluated again in a few months  Patient does have capacity to make informed medical decisions.   Patient is still having significant orthostatic hypotension and unable to sit up.  Adjusting medication.  Discussed with hepatologist at [facility]-regarding if patient will be candidate for terlipressin  treatment or not, after discussing the case with hepatologist, per hepatologist, patient is not a candidate for the above treatment at this time  Prognosis is poor.  Patient probably needs to be wheelchair-bound and unable to ambulate at this time due to severe orthostatic hypotension.  Continue efforts of trying to find placement in a skilled nursing facility where he can receive in-house dialysis as he is unable to safely transfer and travel to an outside facility for dialysis  3/20/24 : goals Of care discussion with patient and wife at bedside and patient decided to go on hospice care    Cont comfort care.  denies any pain.  Looks very weak and lethargic.  Barely eating or drinking

## 2024-03-24 NOTE — PROGRESS NOTES
[facility]  Progress Note  Name: Michael Koch I  MRN: [id number]  Unit/Bed#: -01 I Date of Admission: 2/27/2024   Date of Service: 3/24/2024 I Hospital Day: 26    Assessment/Plan   * Alcoholic hepatitis  Assessment & Plan  was on Solu-Cortef 25 mg daily -stopped 3/5  Known Alcoholic cirrhosis  MELDS 45  Transferred from [facility] in [place] after liver transplant evaluation, he was found not to be candidate  Continue lactulose but decrease dose to 10 g once daily as ammonia level is normal and patient is having a lot of loose stools. Placed on  Xifaxan and see if that will help decrease his diarrhea frequency.  Check ammonia level in am  No encephalopathy currently  Patient has extensive workup done, at this time, not much to offer.AT Present not candidate for liver transplant but should be reevaluated again in a few months  Patient does have capacity to make informed medical decisions.   Patient is still having significant orthostatic hypotension and unable to sit up.  Adjusting medication.  Discussed with hepatologist at [facility]-regarding if patient will be candidate for terlipressin  treatment or not, after discussing the case with hepatologist, per hepatologist, patient is not a candidate for the above treatment at this time  Prognosis is poor.  Patient probably needs to be wheelchair-bound and unable to ambulate at this time due to severe orthostatic hypotension.  Continue efforts of trying to find placement in a skilled nursing facility where he can receive in-house dialysis as he is unable to safely transfer and travel to an outside facility for dialysis  3/20/24 : goals Of care discussion with patient and wife at bedside and patient decided to go on hospice care    Cont comfort care.  denies any pain.  Looks very weak and lethargic.  Barely eating or drinking    Acute on chronic anemia  Assessment & Plan  Hemoglobin is around 8.9.  Patient has intermittently been  requiring blood transfusions during his extensive hospitalization courses over the last few weeks.  Today hemoglobin dropped to 6.7.  Will give 1 units PRBC and recheck CBC in a.m. and plan for EGD and colonoscopy deferred due to medical complexity.  Continue Protonix.No overt signs of bleeding noted at this time  Per cardiology recommendation, patient will be very high risk patients.  Unless there is active bleeding visualized, recommending to defer the procedure if possible.  In case without any active bleeding, patient still needs EGD and colonoscopy in that case cardiology recommending to transfer the patient to tertiary center.  Discussed with gastroenterologist, will hold off procedure at this time  Patient now placed on hospice care               VTE Pharmacologic Prophylaxis:   Moderate Risk (Score 3-4) - Pharmacological DVT Prophylaxis Contraindicated. Sequential Compression Devices Ordered.    Mobility:   Basic Mobility Inpatient Raw Score: 8  -HLM Goal: 3: Sit at edge of bed  JH-HLM Achieved: 2: Bed activities/Dependent transfer  JH-HLM Goal achieved. Continue to encourage appropriate mobility.    Patient Centered Rounds: I performed bedside rounds with nursing staff today.   Discussions with Specialists or Other Care Team Provider: none    Education and Discussions with Family / Patient: Updated  (wife) at bedside.    Total Time Spent on Date of Encounter in care of patient: 35 mins. This time was spent on one or more of the following: performing physical exam; counseling and coordination of care; obtaining or reviewing history; documenting in the medical record; reviewing/ordering tests, medications or procedures; communicating with other healthcare professionals and discussing with patient's family/caregivers.    Current Length of Stay: 26 day(s)  Current Patient Status: Inpatient   Certification Statement: The patient will continue to require additional inpatient hospital stay due to  liver failure  Discharge Plan: Anticipate discharge in 48-72 hrs to may pass away    Code Status: Level 4 - Comfort Care    Subjective:   Patient is very lethargic barely eating or drinking however still oriented to person and place    Objective:     Vitals:   No data recorded.    HR:  [80-81] 80  Resp:  [18] 18  Body mass index is 28.87 kg/m².     Input and Output Summary (last 24 hours):   No intake or output data in the 24 hours ending 03/24/24 1043    Physical Exam:   Physical Exam  Constitutional:       Appearance: He is ill-appearing.   HENT:      Head: Normocephalic.      Mouth/Throat:      Mouth: Mucous membranes are dry.   Cardiovascular:      Rate and Rhythm: Normal rate and regular rhythm.   Pulmonary:      Effort: Pulmonary effort is normal.      Breath sounds: Normal breath sounds.   Abdominal:      General: Bowel sounds are normal.      Palpations: Abdomen is soft.   Skin:     Coloration: Skin is jaundiced.      Comments: Very severely jaundiced with very deep yellow skin color   Neurological:      Mental Status: Mental status is at baseline.   Psychiatric:         Mood and Affect: Mood normal.            Additional Data:     Labs:  Results from last 7 days   Lab Units 03/20/24  0630 03/19/24  0638   WBC Thousand/uL 22.21* 19.29*   HEMOGLOBIN g/dL 8.7* 6.7*   HEMATOCRIT % 24.2* 18.6*   PLATELETS Thousands/uL 77* 73*   NEUTROS PCT %  --  68   LYMPHS PCT %  --  14   MONOS PCT %  --  15*   EOS PCT %  --  1     Results from last 7 days   Lab Units 03/20/24  0630 03/19/24  0638   SODIUM mmol/L 137 137   POTASSIUM mmol/L 2.4* 3.1*   CHLORIDE mmol/L 99 97   CO2 mmol/L 24 29   BUN mg/dL 30* 22   CREATININE mg/dL 7.59* 6.12*   ANION GAP mmol/L 14* 11   CALCIUM mg/dL 8.0* 7.9*   ALBUMIN g/dL  --  3.4*   TOTAL BILIRUBIN mg/dL  --  47.38*   ALK PHOS U/L  --  144*   ALT U/L  --  19   AST U/L  --  42*   GLUCOSE RANDOM mg/dL 138 109     Results from last 7 days   Lab Units 03/19/24  0638   INR  3.06*     Results from  last 7 days   Lab Units 03/20/24  0116   POC GLUCOSE mg/dl 141*               Lines/Drains:  Invasive Devices       Peripheral Intravenous Line  Duration             Peripheral IV 03/19/24 Distal;Left;Upper;Ventral (anterior) Arm 5 days              Hemodialysis Catheter  Duration             HD Permanent Double Catheter 23 days                          Imaging: none    Recent Cultures (last 7 days):         Last 24 Hours Medication List:   Current Facility-Administered Medications   Medication Dose Route Frequency Provider Last Rate    diphenhydramine, lidocaine, Al/Mg hydroxide, simethicone  10 mL Swish & Swallow Q6H PRN Yuly S Edenilson, CRNP      escitalopram  5 mg Oral Daily Constance Menendez MD      hydrOXYzine HCL  25 mg Oral Q6H PRN Yuly S Edenilson, CRNP      lidocaine  1 patch Topical Q24H Tiburcio Jean MD      LORazepam  1 mg Intravenous Q10 Min PRN Constance Menendez MD      melatonin  3 mg Oral HS Constance Menendez MD      Morphine Sulfate (Concentrate)  5 mg Oral Q1H PRN Constance Menendez MD      ondansetron  4 mg Intravenous Q8H PRN Yuly S Edenilson, CRNP      pantoprazole  40 mg Oral BID Ricky Foster MD          Today, Patient Was Seen By: Constance Menendez MD    **Please Note: This note may have been constructed using a voice recognition system.**

## 2024-03-24 NOTE — ASSESSMENT & PLAN NOTE
was on Solu-Cortef 25 mg daily -stopped 3/5  Known Alcoholic cirrhosis  MELDS 45  Transferred from [facility] in [place] after liver transplant evaluation, he was found not to be candidate  Continue lactulose but decrease dose to 10 g once daily as ammonia level is normal and patient is having a lot of loose stools. Placed on  Xifaxan and see if that will help decrease his diarrhea frequency.  Check ammonia level in am  No encephalopathy currently  Patient has extensive workup done, at this time, not much to offer.AT Present not candidate for liver transplant but should be reevaluated again in a few months  Patient does have capacity to make informed medical decisions.   Patient is still having significant orthostatic hypotension and unable to sit up.  Adjusting medication.  Discussed with hepatologist at [facility]-regarding if patient will be candidate for terlipressin  treatment or not, after discussing the case with hepatologist, per hepatologist, patient is not a candidate for the above treatment at this time  Prognosis is poor.  Patient probably needs to be wheelchair-bound and unable to ambulate at this time due to severe orthostatic hypotension.  Continue efforts of trying to find placement in a skilled nursing facility where he can receive in-house dialysis as he is unable to safely transfer and travel to an outside facility for dialysis  3/20/24 : goals Of care discussion with patient and wife at bedside and patient decided to go on hospice care    Cont comfort care.  denies any pain.  Looks very weak and lethargic.  Barely eating or drinking

## 2024-03-24 NOTE — CASE MANAGEMENT
Case Management Discharge Planning Note    Patient name Michael Koch  Location /-01 MRN [id number]  : 1990 Date 3/24/2024       Current Admission Date: 2024  Current Admission Diagnosis:Alcoholic hepatitis   Patient Active Problem List    Diagnosis Date Noted    Anemia due to chronic kidney disease, on chronic dialysis (HCC) 2024    Hemodialysis patient (HCC) 2024    Acute on chronic anemia 2024    Malnutrition (HCC) 2024    Orthostatic hypotension 2024    Acute respiratory failure with hypoxia and hypercapnia (HCC) 2024    Alcoholic hepatitis 2024    ETOH abuse 2024    Depression 2024    GERD (gastroesophageal reflux disease) 2024    BPH (benign prostatic hyperplasia) 2024    RSV infection 2024    Abnormal CT scan 2024    Hepatic encephalopathy (HCC) 2024    N&V (nausea and vomiting) 2024    DUANE (acute kidney injury) (HCC) 2024    Thrombocytopenia (HCC) 2024    Lactic acidosis 2024    Marijuana use 2024    SIRS (systemic inflammatory response syndrome) (HCC) 2024    History of gout 10/04/2023    History of hypertension 10/04/2023    Type 2 diabetes mellitus with hyperglycemia, without long-term current use of insulin (HCC) 10/04/2023    Tobacco abuse 10/04/2023      LOS (days): 26  Geometric Mean LOS (GMLOS) (days): 4.8  Days to GMLOS:-20.9     OBJECTIVE:  Risk of Unplanned Readmission Score: 36.57         Current admission status: Inpatient   Preferred Pharmacy:   [id number] - [place], [place] - [address]  [address]  [place] [address]  Phone: [phone] Fax: [phone]    Primary Care Provider: Louie Odonnell DO    Primary Insurance: BLUE CROSS  Secondary Insurance:     DISCHARGE DETAILS:    Received a call from Nursing.  Sister wanted an update on status for dc.  CM called Cathryn, who was visiting Michael, who per Cathryn was  alert and oriented today.   Cathryn is aware that [facility] has made referrals for SNFs for patient late in the day on Friday and we have not received any acceptance at this time.  Cathryn is aware we are looking for a facility that will take MA pending.      [facility] will continue to work on dc.

## 2024-03-24 NOTE — PLAN OF CARE
Problem: GASTROINTESTINAL - ADULT  Goal: Minimal or absence of nausea and/or vomiting  Description: INTERVENTIONS:  - Administer IV fluids if ordered to ensure adequate hydration  - Maintain NPO status until nausea and vomiting are resolved  - Nasogastric tube if ordered  - Administer ordered antiemetic medications as needed  - Provide nonpharmacologic comfort measures as appropriate  - Advance diet as tolerated, if ordered  - Consider nutrition services referral to assist patient with adequate nutrition and appropriate food choices  Outcome: Progressing     Problem: PAIN - ADULT  Goal: Verbalizes/displays adequate comfort level or baseline comfort level  Description: Interventions:  - Encourage patient to monitor pain and request assistance  - Assess pain using appropriate pain scale  - Administer analgesics based on type and severity of pain and evaluate response  - Implement non-pharmacological measures as appropriate and evaluate response  - Consider cultural and social influences on pain and pain management  - Notify physician/advanced practitioner if interventions unsuccessful or patient reports new pain  Outcome: Progressing

## 2024-03-25 PROBLEM — Z51.5 HOSPICE CARE: Status: ACTIVE | Noted: 2024-01-01

## 2024-03-25 NOTE — CASE MANAGEMENT
Case Management Discharge Planning Note    Patient name Michael Koch  Location /-01 MRN [id number]  : 1990 Date 3/25/2024       Current Admission Date: 2024  Current Admission Diagnosis:Alcoholic hepatitis   Patient Active Problem List    Diagnosis Date Noted    Hospice care 2024    Anemia due to chronic kidney disease, on chronic dialysis (HCC) 2024    Hemodialysis patient (HCC) 2024    Acute on chronic anemia 2024    Malnutrition (HCC) 2024    Orthostatic hypotension 2024    Acute respiratory failure with hypoxia and hypercapnia (HCC) 2024    Alcoholic hepatitis 2024    ETOH abuse 2024    Depression 2024    GERD (gastroesophageal reflux disease) 2024    BPH (benign prostatic hyperplasia) 2024    RSV infection 2024    Abnormal CT scan 2024    Hepatic encephalopathy (HCC) 2024    N&V (nausea and vomiting) 2024    DUANE (acute kidney injury) (HCC) 2024    Thrombocytopenia (HCC) 2024    Lactic acidosis 2024    Marijuana use 2024    SIRS (systemic inflammatory response syndrome) (HCC) 2024    History of gout 10/04/2023    History of hypertension 10/04/2023    Type 2 diabetes mellitus with hyperglycemia, without long-term current use of insulin (HCC) 10/04/2023    Tobacco abuse 10/04/2023      LOS (days): 27  Geometric Mean LOS (GMLOS) (days): 4.8  Days to GMLOS:-21.9     OBJECTIVE:  Risk of Unplanned Readmission Score: 36.97         Current admission status: Inpatient   Preferred Pharmacy:   [id number] - [place] - [address] [facility]  [address]  [place] [address]  Phone: [phone] Fax: [phone]    Primary Care Provider: Louie Odonnell DO    Primary Insurance: BLUE CROSS  Secondary Insurance:     DISCHARGE DETAILS:     CM completed PASSR and MA 51 forms. CM faxed LOC assessment request to [facility] and sent copy by by email.      CM to follow patients care and discharge needs.

## 2024-03-25 NOTE — PROGRESS NOTES
[facility]  Progress Note  Name: Michael Koch I  MRN: [id number]  Unit/Bed#: -01 I Date of Admission: 2/27/2024   Date of Service: 3/25/2024 I Hospital Day: 27    Assessment/Plan   Hospice care  Assessment & Plan  Patient has advanced liver damage secondary to alcohol abuse.  Also has renal dysfunction and was recently on dialysis along with hyperbilirubinemia and acute on chronic anemia.  Patient was not felt to be a transplant candidate on his recent transplant evaluations at tertiary centers and was transferred back here and few days ago decided to go on hospice care.cont supportive measures  Currently still awake alert and oriented and denies any pain or discomfort.  Disposition to be discussed with case management today         VTE Pharmacologic Prophylaxis:   Pharmacologic: Pharmacologic VTE Prophylaxis contraindicated due to hospice  Mechanical VTE Prophylaxis in Place: Yes    Patient Centered Rounds: I have performed bedside rounds with nursing staff today.    Discussions with Specialists or Other Care Team Provider: none    Education and Discussions with Family / Patient: wife at bedside    Time Spent for Care: 20 minutes.  More than 50% of total time spent on counseling and coordination of care as described above.    Current Length of Stay: 27 day(s)    Current Patient Status: Inpatient   Certification Statement: The patient will continue to require additional inpatient hospital stay due to end stage liver disease    Discharge Plan: tbd    Code Status: Level 4 - Comfort Care      Subjective:   Patient denies any complaints ate a little bit of his cereal today    Objective:     Vitals:   No data recorded.    HR:  [78] 78  Resp:  [17] 17  Body mass index is 28.87 kg/m².     Input and Output Summary (last 24 hours):       Intake/Output Summary (Last 24 hours) at 3/25/2024 1006  Last data filed at 3/25/2024 0941  Gross per 24 hour   Intake 120 ml   Output --   Net 120 ml        Physical Exam:     Physical Exam  Vitals and nursing note reviewed.   Constitutional:       Appearance: He is ill-appearing.   HENT:      Head: Normocephalic and atraumatic.      Right Ear: External ear normal.      Left Ear: External ear normal.      Nose: Nose normal.      Mouth/Throat:      Pharynx: Oropharynx is clear.   Eyes:      General: Scleral icterus present.   Cardiovascular:      Rate and Rhythm: Normal rate and regular rhythm.      Heart sounds: Normal heart sounds.   Pulmonary:      Effort: Pulmonary effort is normal.      Breath sounds: Normal breath sounds.   Abdominal:      General: Bowel sounds are normal. There is distension.      Palpations: Abdomen is soft.      Tenderness: There is no abdominal tenderness.   Musculoskeletal:         General: Normal range of motion.      Cervical back: Normal range of motion and neck supple.   Skin:     General: Skin is warm and dry.      Capillary Refill: Capillary refill takes less than 2 seconds.      Coloration: Skin is jaundiced.   Neurological:      Mental Status: He is alert and oriented to person, place, and time. Mental status is at baseline.   Psychiatric:         Mood and Affect: Mood normal.           Additional Data:     Labs:    Results from last 7 days   Lab Units 03/20/24  0630 03/19/24  0638   WBC Thousand/uL 22.21* 19.29*   HEMOGLOBIN g/dL 8.7* 6.7*   HEMATOCRIT % 24.2* 18.6*   PLATELETS Thousands/uL 77* 73*   NEUTROS PCT %  --  68   LYMPHS PCT %  --  14   MONOS PCT %  --  15*   EOS PCT %  --  1     Results from last 7 days   Lab Units 03/20/24  0630 03/19/24  0638   SODIUM mmol/L 137 137   POTASSIUM mmol/L 2.4* 3.1*   CHLORIDE mmol/L 99 97   CO2 mmol/L 24 29   BUN mg/dL 30* 22   CREATININE mg/dL 7.59* 6.12*   ANION GAP mmol/L 14* 11   CALCIUM mg/dL 8.0* 7.9*   ALBUMIN g/dL  --  3.4*   TOTAL BILIRUBIN mg/dL  --  47.38*   ALK PHOS U/L  --  144*   ALT U/L  --  19   AST U/L  --  42*   GLUCOSE RANDOM mg/dL 138 109     Results from last 7 days    Lab Units 03/19/24  0638   INR  3.06*     Results from last 7 days   Lab Units 03/20/24  0116   POC GLUCOSE mg/dl 141*                   * I Have Reviewed All Lab Data Listed Above.  * Additional Pertinent Lab Tests Reviewed: All Labs For Current Hospital Admission Reviewed    Imaging:    Imaging Reports Reviewed Today Include: none  Imaging Personally Reviewed by Myself Includes:  none    Recent Cultures (last 7 days):           Last 24 Hours Medication List:   Current Facility-Administered Medications   Medication Dose Route Frequency Provider Last Rate    diphenhydramine, lidocaine, Al/Mg hydroxide, simethicone  10 mL Swish & Swallow Q6H PRN Yuly S Edenilson, CRNP      escitalopram  5 mg Oral Daily Constance Menendez MD      hydrOXYzine HCL  25 mg Oral Q6H PRN Yuly S Edenilson, CRNP      lidocaine  1 patch Topical Q24H Tiburcio Jean MD      LORazepam  1 mg Intravenous Q10 Min PRN Constance Menendez MD      melatonin  3 mg Oral HS Constance Menendez MD      Morphine Sulfate (Concentrate)  5 mg Oral Q1H PRN Constance Menendez MD      ondansetron  4 mg Intravenous Q8H PRN Yuly S Edenilson, CRNP      pantoprazole  40 mg Oral BID Ricky Foster MD          Today, Patient Was Seen By: Constance Menendez MD    ** Please Note: Dictation voice to text software may have been used in the creation of this document. **

## 2024-03-25 NOTE — PLAN OF CARE
Problem: GASTROINTESTINAL - ADULT  Goal: Minimal or absence of nausea and/or vomiting  Description: INTERVENTIONS:  - Administer IV fluids if ordered to ensure adequate hydration  - Maintain NPO status until nausea and vomiting are resolved  - Nasogastric tube if ordered  - Administer ordered antiemetic medications as needed  - Provide nonpharmacologic comfort measures as appropriate  - Advance diet as tolerated, if ordered  - Consider nutrition services referral to assist patient with adequate nutrition and appropriate food choices  Outcome: Progressing     Problem: PAIN - ADULT  Goal: Verbalizes/displays adequate comfort level or baseline comfort level  Description: Interventions:  - Encourage patient to monitor pain and request assistance  - Assess pain using appropriate pain scale  - Administer analgesics based on type and severity of pain and evaluate response  - Implement non-pharmacological measures as appropriate and evaluate response  - Consider cultural and social influences on pain and pain management  - Notify physician/advanced practitioner if interventions unsuccessful or patient reports new pain  Outcome: Progressing     Problem: Communication Deficit  Goal: Patient/caregiver/family will effectively communicate symptoms, needs and concerns  Outcome: Progressing     Problem: Dyspnea at end of life  Goal: Patient/caregiver/family will demonstrate understanding of an ability to manage respiratory symptoms at end of life  Outcome: Progressing     Problem: Imminent death  Goal: Collaborate with patient, family, caregiver and interdisciplinary team to minimize end of life symptoms  Outcome: Progressing

## 2024-03-25 NOTE — ASSESSMENT & PLAN NOTE
Patient has advanced liver damage secondary to alcohol abuse.  Also has renal dysfunction and was recently on dialysis along with hyperbilirubinemia and acute on chronic anemia.  Patient was not felt to be a transplant candidate on his recent transplant evaluations at tertiary centers and was transferred back here and few days ago decided to go on hospice care.cont supportive measures  Currently still awake alert and oriented and denies any pain or discomfort.  Disposition to be discussed with case management today

## 2024-03-25 NOTE — NURSING NOTE
Patient initially asked to get OOB to the chair. Dr. Menendez said it would be ok to get him out with the elida lift, since his BP would drop too low when attempting to  the past.  They verbalized understanding. Patient then stated he sees scorpions in his room, and he does not want to get up at this time. I did explain that there are no scorpions and that he is hallucinating.Will monitor.

## 2024-03-26 NOTE — PLAN OF CARE
Problem: Dyspnea at end of life  Goal: Patient/caregiver/family will demonstrate understanding of an ability to manage respiratory symptoms at end of life  Outcome: Progressing     Problem: Imminent death  Goal: Collaborate with patient, family, caregiver and interdisciplinary team to minimize end of life symptoms  Outcome: Progressing

## 2024-03-26 NOTE — CASE MANAGEMENT
Case Management Discharge Planning Note    Patient name Michael Koch  Location /-01 MRN [id number]  : 1990 Date 3/26/2024       Current Admission Date: 2024  Current Admission Diagnosis:Alcoholic hepatitis   Patient Active Problem List    Diagnosis Date Noted    Hospice care 2024    Anemia due to chronic kidney disease, on chronic dialysis (HCC) 2024    Hemodialysis patient (HCC) 2024    Acute on chronic anemia 2024    Malnutrition (HCC) 2024    Orthostatic hypotension 2024    Acute respiratory failure with hypoxia and hypercapnia (HCC) 2024    Alcoholic hepatitis 2024    ETOH abuse 2024    Depression 2024    GERD (gastroesophageal reflux disease) 2024    BPH (benign prostatic hyperplasia) 2024    RSV infection 2024    Abnormal CT scan 2024    Hepatic encephalopathy (HCC) 2024    N&V (nausea and vomiting) 2024    DUANE (acute kidney injury) (HCC) 2024    Thrombocytopenia (HCC) 2024    Lactic acidosis 2024    Marijuana use 2024    SIRS (systemic inflammatory response syndrome) (HCC) 2024    History of gout 10/04/2023    History of hypertension 10/04/2023    Type 2 diabetes mellitus with hyperglycemia, without long-term current use of insulin (HCC) 10/04/2023    Tobacco abuse 10/04/2023      LOS (days): 28  Geometric Mean LOS (GMLOS) (days): 4.8  Days to GMLOS:-22.7     OBJECTIVE:  Risk of Unplanned Readmission Score: 37.32         Current admission status: Inpatient   Preferred Pharmacy:   [id number] - [place], [place] - [address]  [address]  [place] [address]  Phone: [phone] Fax: [phone]    Primary Care Provider: Louie Odonnell DO    Primary Insurance: BLUE CROSS  Secondary Insurance:     DISCHARGE DETAILS:        As per [facility] Insurance Ver[employer], pt will be active with [facility] on 24, RISA  updating [facility], inquiring if their facility can accept pt utilizing [employer] , awaiting reply

## 2024-03-26 NOTE — ASSESSMENT & PLAN NOTE
Patient has advanced liver damage secondary to alcohol abuse.  Also has renal dysfunction and was recently on dialysis along with hyperbilirubinemia and acute on chronic anemia.  Patient was not felt to be a transplant candidate on his recent transplant evaluations at tertiary centers and was transferred back here and few days ago decided to go on hospice care.cont supportive measures  Started having some visual hallucinations yesterday and got very restless and agitated overnight will continue symptom control with Ativan added Haldol and continue morphine for pain control if required.  Asked case management to have hospice house evaluate him

## 2024-03-26 NOTE — PROGRESS NOTES
[facility]  [facility]  Name: Michael Koch I  MRN: [id number]  Unit/Bed#: -01 I Date of Admission: 2/27/2024   Date of Service: 3/26/2024 I Hospital Day: 28    Assessment/Plan   Hospice care  Assessment & Plan  Patient has advanced liver damage secondary to alcohol abuse.  Also has renal dysfunction and was recently on dialysis along with hyperbilirubinemia and acute on chronic anemia.  Patient was not felt to be a transplant candidate on his recent transplant evaluations at tertiary centers and was transferred back here and few days ago decided to go on hospice care.cont supportive measures  Started having some visual hallucinations yesterday and got very restless and agitated overnight will continue symptom control with Ativan added Haldol and continue morphine for pain control if required.  Asked case management to have hospice house evaluate him         VTE Pharmacologic Prophylaxis:   Pharmacologic: Pharmacologic VTE Prophylaxis contraindicated due to hospice  Mechanical VTE Prophylaxis in Place: No    Patient Centered Rounds: I have performed bedside rounds with nursing staff today.    Discussions with Specialists or Other Care Team Provider: none    Education and Discussions with Family / Patient: discussed with mother and wife at bedside    Time Spent for Care: 20 minutes.  More than 50% of total time spent on counseling and coordination of care as described above.    Current Length of Stay: 28 day(s)    Current Patient Status: Inpatient   Certification Statement: The patient will continue to require additional inpatient hospital stay due to hospice    Discharge Plan: ask hospice house for evaluation    Code Status: Level 4 - Comfort Care      Subjective:   Patient is somnolent today.  Does not know where he is.  Overnight reported by nursing staff he was very restless and agitated requiring Ativan    Objective:     Vitals:   No data recorded.    HR:  [76-87]  76  Resp:  [16-18] 16  Body mass index is 28.87 kg/m².     Input and Output Summary (last 24 hours):       Intake/Output Summary (Last 24 hours) at 3/26/2024 1218  Last data filed at 3/26/2024 0825  Gross per 24 hour   Intake 150 ml   Output --   Net 150 ml       Physical Exam:     Physical Exam  Vitals and nursing note reviewed.   Constitutional:       Appearance: He is ill-appearing and toxic-appearing.   HENT:      Head: Normocephalic and atraumatic.      Right Ear: External ear normal.      Left Ear: External ear normal.      Nose: Nose normal.      Mouth/Throat:      Pharynx: Oropharynx is clear.   Eyes:      General: Scleral icterus present.      Pupils: Pupils are equal, round, and reactive to light.   Cardiovascular:      Rate and Rhythm: Normal rate and regular rhythm.      Heart sounds: Normal heart sounds.   Pulmonary:      Effort: Pulmonary effort is normal.      Breath sounds: Normal breath sounds.   Abdominal:      General: Bowel sounds are normal.      Palpations: Abdomen is soft.      Tenderness: There is no abdominal tenderness.   Musculoskeletal:      Cervical back: Normal range of motion.      Right lower leg: No edema.      Left lower leg: No edema.   Skin:     General: Skin is dry.      Capillary Refill: Capillary refill takes less than 2 seconds.      Coloration: Skin is jaundiced.   Neurological:      Comments: Psychomotor slowing noted and confused and somnolent today           Additional Data:     Labs:    Results from last 7 days   Lab Units 03/20/24  0630   WBC Thousand/uL 22.21*   HEMOGLOBIN g/dL 8.7*   HEMATOCRIT % 24.2*   PLATELETS Thousands/uL 77*     Results from last 7 days   Lab Units 03/20/24  0630   SODIUM mmol/L 137   POTASSIUM mmol/L 2.4*   CHLORIDE mmol/L 99   CO2 mmol/L 24   BUN mg/dL 30*   CREATININE mg/dL 7.59*   ANION GAP mmol/L 14*   CALCIUM mg/dL 8.0*   GLUCOSE RANDOM mg/dL 138         Results from last 7 days   Lab Units 03/20/24  0116   POC GLUCOSE mg/dl 141*                    * I Have Reviewed All Lab Data Listed Above.  * Additional Pertinent Lab Tests Reviewed: No New Labs Available For Today    Imaging:    Imaging Reports Reviewed Today Include: none  Imaging Personally Reviewed by Myself Includes:  none    Recent Cultures (last 7 days):           Last 24 Hours Medication List:   Current Facility-Administered Medications   Medication Dose Route Frequency Provider Last Rate    diphenhydramine, lidocaine, Al/Mg hydroxide, simethicone  10 mL Swish & Swallow Q6H PRN Yuly S Edenilson, CRNP      escitalopram  5 mg Oral Daily Consatnce Menendez MD      haloperidol  2 mg Oral Q6H PRN Constance Menendez MD      hydrOXYzine HCL  25 mg Oral Q6H PRN Yuly S Edenilson, CRNP      lidocaine  1 patch Topical Q24H Tiburcio Jean MD      LORazepam  1 mg Intravenous Q10 Min PRN Bob Arias DO      melatonin  3 mg Oral HS Constance Menendez MD      Morphine Sulfate (Concentrate)  5 mg Oral Q1H PRN Constance Menendez MD      ondansetron  4 mg Intravenous Q8H PRN Yuly S Edenilson, CRNP      pantoprazole  40 mg Oral BID Ricky Foster MD          Today, Patient Was Seen By: Constance Menendez MD    ** Please Note: Dictation voice to text software may have been used in the creation of this document. **

## 2024-03-26 NOTE — UTILIZATION REVIEW
"Continued Stay Review    Date: 3/26/24                           Current Patient Class: IP  Current Level of Care: MS    HPI:33 y.o. male initially admitted on 2/27/24 - DX:  Hospice care     Assessment/Plan:   3/26/24:   Patient is somnolent today. Does not know where he is. Started having some visual hallucinations yesterday and got very restless and agitated overnight will continue symptom control with Ativan added Haldol and continue morphine for pain control if required. Asked case management to have hospice house evaluate him.   require additional inpatient hospital stay due to hospice     Per CM note: CM looking into [facility], inquiring if their facility can accept pt utilizing [employer] , awaiting reply        Vital Signs: /81 (BP Location: Left arm)   Pulse 76   Temp (!) 97 °F (36.1 °C) (Temporal)   Resp 16   Ht 5' 7\" (1.702 m)   Wt 83.6 kg (184 lb 4.9 oz)   SpO2 96%   BMI 28.87 kg/m²       Pertinent Labs/Diagnostic Results:      Results from last 7 days   Lab Units 03/20/24  0630   WBC Thousand/uL 22.21*   HEMOGLOBIN g/dL 8.7*   HEMATOCRIT % 24.2*   PLATELETS Thousands/uL 77*        Results from last 7 days   Lab Units 03/20/24  0630   SODIUM mmol/L 137   POTASSIUM mmol/L 2.4*   CHLORIDE mmol/L 99   CO2 mmol/L 24   ANION GAP mmol/L 14*   BUN mg/dL 30*   CREATININE mg/dL 7.59*   EGFR ml/min/1.73sq m 8   CALCIUM mg/dL 8.0*   MAGNESIUM mg/dL 1.9   PHOSPHORUS mg/dL 1.4*     Results from last 7 days   Lab Units 03/20/24  0630   AMMONIA umol/L 70     Results from last 7 days   Lab Units 03/20/24  0116   POC GLUCOSE mg/dl 141*     Results from last 7 days   Lab Units 03/20/24  0630   GLUCOSE RANDOM mg/dL 138        BETA-HYDROXYBUTYRATE   Date Value Ref Range Status   01/21/2024 0.2 <0.6 mmol/L Final         Results from last 7 days   Lab Units 03/20/24  0637   UNIT PRODUCT CODE  [id number]   UNIT NUMBER  [id number]   UNITABO  O   UNITRH  POS   CROSSMATCH  Compatible   UNIT DISPENSE STATUS  Presumed " Trans   UNIT PRODUCT VOL ml 350       Medications:   Scheduled Medications:  escitalopram, 5 mg, Oral, Daily  lidocaine, 1 patch, Topical, Q24H  melatonin, 3 mg, Oral, HS  pantoprazole, 40 mg, Oral, BID      Continuous IV Infusions: None       PRN Meds:  diphenhydramine, lidocaine, Al/Mg hydroxide, simethicone, 10 mL, Swish & Swallow, Q6H PRN  haloperidol, 2 mg, Oral, Q6H PRN  hydrOXYzine HCL, 25 mg, Oral, Q6H PRN  LORazepam, 1 mg, Intravenous, Q10 Min PRN  (3/26 recd x1 so far today)   Morphine Sulfate (Concentrate), 5 mg, Oral, Q1H PRN  ondansetron, 4 mg, Intravenous, Q8H PRN        Discharge Plan: D    Network Utilization Review Department  ATTENTION: Please call with any questions or concerns to [phone] and carefully listen to the prompts so that you are directed to the right person. All voicemails are confidential.   For Discharge needs, contact Care Management DC Support Team at [phone] opt. 2  Send all requests for admission clinical reviews, approved or denied determinations and any other requests to dedicated fax number below belonging to the campus where the patient is receiving treatment. List of dedicated fax numbers for the Facilities:  FACILITY NAME UR FAX NUMBER   ADMISSION DENIALS (Administrative/Medical Necessity) [phone]   DISCHARGE SUPPORT TEAM (NETWORK) [phone]   PARENT CHILD HEALTH (Maternity/NICU/Pediatrics) [phone]   [facility] [phone]   [facility] [phone]   [facility] [phone]   [facility] [phone]   [facility] [phone]   [facility] [phone]   [facility] [phone]   [facility] [phone]   [facility] [phone]   [facility] [phone]   [facility]  [facility] [phone]   [facility] [phone]

## 2024-03-26 NOTE — CASE MANAGEMENT
Case Management Discharge Planning Note    Patient name Michael Koch  Location /-01 MRN [id number]  : 1990 Date 3/26/2024       Current Admission Date: 2024  Current Admission Diagnosis:Alcoholic hepatitis   Patient Active Problem List    Diagnosis Date Noted    Hospice care 2024    Anemia due to chronic kidney disease, on chronic dialysis (HCC) 2024    Hemodialysis patient (HCC) 2024    Acute on chronic anemia 2024    Malnutrition (HCC) 2024    Orthostatic hypotension 2024    Acute respiratory failure with hypoxia and hypercapnia (HCC) 2024    Alcoholic hepatitis 2024    ETOH abuse 2024    Depression 2024    GERD (gastroesophageal reflux disease) 2024    BPH (benign prostatic hyperplasia) 2024    RSV infection 2024    Abnormal CT scan 2024    Hepatic encephalopathy (HCC) 2024    N&V (nausea and vomiting) 2024    DUANE (acute kidney injury) (HCC) 2024    Thrombocytopenia (HCC) 2024    Lactic acidosis 2024    Marijuana use 2024    SIRS (systemic inflammatory response syndrome) (HCC) 2024    History of gout 10/04/2023    History of hypertension 10/04/2023    Type 2 diabetes mellitus with hyperglycemia, without long-term current use of insulin (HCC) 10/04/2023    Tobacco abuse 10/04/2023      LOS (days): 28  Geometric Mean LOS (GMLOS) (days): 4.8  Days to GMLOS:-22.5     OBJECTIVE:  Risk of Unplanned Readmission Score: 37.25         Current admission status: Inpatient   Preferred Pharmacy:   [facility] - [place], [place] - [address]  [address]  [place] [address]  Phone: [phone] Fax: [phone]    Primary Care Provider: Louie Odonnell DO    Primary Insurance: BLUE CROSS  Secondary Insurance:     DISCHARGE DETAILS:    Spoke to Kaursister at the bedside on 3.25 at 5 PM.  Discussed referrals were made for SNFs for dc,  "however shared that the difficulty with this is Michael does not have a payor for room and board.  Cost is private pay vs finding an acceptance that will take patient [place] pending. [facility] is willing to accept with a payor source.  Also, shared a level of care is necessary from the [facility]..    We discussed the hospice house as an alternative dc plan, however currently patient is comfortable and the appropriate setting for hospice would be Home Hospice or at a SNF.  Reviewed that Home Hospice is not possible. Sister understands this and she feels Michael is holding on to go home. We discussed that the CM is very uncomfortable with the decision that Jo made to tell Michaelt \"we are waiting to go home and waiting for the authorization.\" As this is not factual.    Aware Jo shared, Michael and there family will be active April 1st for [place] [place] Medicaid, this will need to be verified on April 1st.      Active listening and support was provided. Michael's sister will be updating her family.    Forward insurance information ( [employer] card information) for April 1st to .  "

## 2024-03-27 NOTE — ASSESSMENT & PLAN NOTE
Patient has advanced liver damage secondary to alcohol abuse.  Also has renal dysfunction and was recently on dialysis along with hyperbilirubinemia and acute on chronic anemia.  Patient was not felt to be a transplant candidate on his recent transplant evaluations at tertiary centers and was transferred back here to [facility] and decided to go on hospice care to cont supportive measures  Patient has started to have some hallucinations and worsening mental status, signs of further overall decline  Continue as needed morphine, Haldol.  He did get Ativan previously and this seemed to improve him, certainly can add as a as needed medication if Haldol is not improving his symptoms appropriately  April 1st patient will have the option of going to SNF as Medicaid will kick in untill then he will stay here

## 2024-03-27 NOTE — NURSING NOTE
Patient seemed agitated. Was talking to his wife stating he wants his shoes to get up and walk. His confusion is increasing. He is having visual hallucintaions. He is confused to situation, time and place. He was noted to have his legs over the side of the bed. Will give ativan and monitor.

## 2024-03-27 NOTE — PLAN OF CARE
Problem: GASTROINTESTINAL - ADULT  Goal: Minimal or absence of nausea and/or vomiting  Description: INTERVENTIONS:  - Administer IV fluids if ordered to ensure adequate hydration  - Maintain NPO status until nausea and vomiting are resolved  - Nasogastric tube if ordered  - Administer ordered antiemetic medications as needed  - Provide nonpharmacologic comfort measures as appropriate  - Advance diet as tolerated, if ordered  - Consider nutrition services referral to assist patient with adequate nutrition and appropriate food choices  3/26/2024 2024 by Madyson Arboleda LPN  Outcome: Progressing  3/26/2024 2023 by Madyson Arboleda LPN  Outcome: Progressing     Problem: PAIN - ADULT  Goal: Verbalizes/displays adequate comfort level or baseline comfort level  Description: Interventions:  - Encourage patient to monitor pain and request assistance  - Assess pain using appropriate pain scale  - Administer analgesics based on type and severity of pain and evaluate response  - Implement non-pharmacological measures as appropriate and evaluate response  - Consider cultural and social influences on pain and pain management  - Notify physician/advanced practitioner if interventions unsuccessful or patient reports new pain  3/26/2024 2024 by Madyson Arboleda LPN  Outcome: Progressing  3/26/2024 2023 by Madyson Arboleda LPN  Outcome: Progressing     Problem: Communication Deficit  Goal: Patient/caregiver/family will effectively communicate symptoms, needs and concerns  3/26/2024 2024 by Madyson Arboleda LPN  Outcome: Progressing  3/26/2024 2023 by Madyson Arboleda LPN  Outcome: Progressing     Problem: Dyspnea at end of life  Goal: Patient/caregiver/family will demonstrate understanding of an ability to manage respiratory symptoms at end of life  3/26/2024 2024 by Madyson Arboleda LPN  Outcome: Progressing  3/26/2024 2023 by Madyson Arboleda LPN  Outcome: Progressing     Problem: Imminent death  Goal: Collaborate with patient,  family, caregiver and interdisciplinary team to minimize end of life symptoms  3/26/2024 2024 by Madyson Arboleda LPN  Outcome: Progressing  3/26/2024 2023 by Madyson Arboleda LPN  Outcome: Progressing

## 2024-03-27 NOTE — PROGRESS NOTES
[facility]  Progress Note  Name: Michael Koch I  MRN: [id number]  Unit/Bed#: -01 I Date of Admission: 2/27/2024   Date of Service: 3/27/2024 I Hospital Day: 29    Assessment/Plan   Hospice care  Assessment & Plan  Patient has advanced liver damage secondary to alcohol abuse.  Also has renal dysfunction and was recently on dialysis along with hyperbilirubinemia and acute on chronic anemia.  Patient was not felt to be a transplant candidate on his recent transplant evaluations at tertiary centers and was transferred back here and few days ago decided to go on hospice care.cont supportive measures  Started having some visual hallucinations yesterday and got very restless and agitated overnight will continue symptom control with Ativan added Haldol and continue morphine for pain control if required.  Asked case management to have hospice house evaluate him               VTE Pharmacologic Prophylaxis:   Moderate Risk (Score 3-4) - Pharmacological DVT Prophylaxis Contraindicated. Sequential Compression Devices Ordered.    Mobility:   Basic Mobility Inpatient Raw Score: 8  -[facility] Goal: 3: Sit at edge of bed  JH-HLM Achieved: 2: Bed activities/Dependent transfer  JH-HLM Goal achieved. Continue to encourage appropriate mobility.    Patient Centered Rounds: I performed bedside rounds with nursing staff today.   Discussions with Specialists or Other Care Team Provider: none    Education and Discussions with Family / Patient: Updated  (wife) at bedside.    Total Time Spent on Date of Encounter in care of patient: 35 mins. This time was spent on one or more of the following: performing physical exam; counseling and coordination of care; obtaining or reviewing history; documenting in the medical record; reviewing/ordering tests, medications or procedures; communicating with other healthcare professionals and discussing with patient's family/caregivers.    Current Length of Stay:  29 day(s)  Current Patient Status: Inpatient   Certification Statement: The patient will continue to require additional inpatient hospital stay due to hospice  Discharge Plan: pending placement efforts    Code Status: Level 4 - Comfort Care    Subjective:   Patient denies any complaints today is very weak lethargic slow to respond to questions but does know that he is at the hospital    Objective:     Vitals:   No data recorded.    HR:  [72-82] 82  Resp:  [16-18] 18  Body mass index is 28.87 kg/m².     Input and Output Summary (last 24 hours):     Intake/Output Summary (Last 24 hours) at 3/27/2024 1038  Last data filed at 3/27/2024 0901  Gross per 24 hour   Intake 120 ml   Output --   Net 120 ml       Physical Exam:   Physical Exam  Constitutional:       Appearance: He is ill-appearing.   HENT:      Head: Normocephalic.      Mouth/Throat:      Mouth: Mucous membranes are dry.   Eyes:      General: Scleral icterus present.      Pupils: Pupils are equal, round, and reactive to light.   Cardiovascular:      Rate and Rhythm: Normal rate and regular rhythm.   Pulmonary:      Effort: Pulmonary effort is normal.      Breath sounds: Normal breath sounds.   Abdominal:      General: Abdomen is flat. There is distension.      Palpations: Abdomen is soft.      Tenderness: There is no abdominal tenderness.   Musculoskeletal:         General: No swelling or tenderness.   Skin:     Coloration: Skin is jaundiced.   Neurological:      Mental Status: Mental status is at baseline.            Additional Data:     Labs:                            Lines/Drains:  Invasive Devices       Peripheral Intravenous Line  Duration             Peripheral IV 03/26/24 Right Antecubital 1 day              Hemodialysis Catheter  Duration             HD Permanent Double Catheter 26 days                          Imaging: No pertinent imaging reviewed.    Recent Cultures (last 7 days):         Last 24 Hours Medication List:   Current  Facility-Administered Medications   Medication Dose Route Frequency Provider Last Rate    diphenhydramine, lidocaine, Al/Mg hydroxide, simethicone  10 mL Swish & Swallow Q6H PRN Yuly S Edenilson, CRNP      escitalopram  5 mg Oral Daily Constance Menendez MD      haloperidol  2 mg Oral Q6H PRN Constance Menendez MD      hydrOXYzine HCL  25 mg Oral Q6H PRN Yuly S Edenilson, CRNP      lidocaine  1 patch Topical Q24H Tiburcio Jean MD      LORazepam  1 mg Intravenous Q10 Min PRN Bob Arias DO      melatonin  3 mg Oral HS Constance Menendez MD      Morphine Sulfate (Concentrate)  5 mg Oral Q1H PRN Constance Menendez MD      ondansetron  4 mg Intravenous Q8H PRN Yuly S Edenilson, CRNP      pantoprazole  40 mg Oral BID Ricky Foster MD          Today, Patient Was Seen By: Constance Menendez MD    **Please Note: This note may have been constructed using a voice recognition system.**

## 2024-03-27 NOTE — PLAN OF CARE
Problem: GASTROINTESTINAL - ADULT  Goal: Minimal or absence of nausea and/or vomiting  Description: INTERVENTIONS:  - Administer IV fluids if ordered to ensure adequate hydration  - Maintain NPO status until nausea and vomiting are resolved  - Nasogastric tube if ordered  - Administer ordered antiemetic medications as needed  - Provide nonpharmacologic comfort measures as appropriate  - Advance diet as tolerated, if ordered  - Consider nutrition services referral to assist patient with adequate nutrition and appropriate food choices  Outcome: Not Progressing     Problem: PAIN - ADULT  Goal: Verbalizes/displays adequate comfort level or baseline comfort level  Description: Interventions:  - Encourage patient to monitor pain and request assistance  - Assess pain using appropriate pain scale  - Administer analgesics based on type and severity of pain and evaluate response  - Implement non-pharmacological measures as appropriate and evaluate response  - Consider cultural and social influences on pain and pain management  - Notify physician/advanced practitioner if interventions unsuccessful or patient reports new pain  Outcome: Not Progressing     Problem: Communication Deficit  Goal: Patient/caregiver/family will effectively communicate symptoms, needs and concerns  Outcome: Not Progressing     Problem: Dyspnea at end of life  Goal: Patient/caregiver/family will demonstrate understanding of an ability to manage respiratory symptoms at end of life  Outcome: Not Progressing     Problem: Imminent death  Goal: Collaborate with patient, family, caregiver and interdisciplinary team to minimize end of life symptoms  Outcome: Not Progressing

## 2024-03-28 NOTE — PROGRESS NOTES
Pastoral Care Progress Note    3/28/2024  Patient: Michael Koch : 1990  Admission Date & Time: 2024  MRN: [id number] CSN: [id number]    CH in consult with RN initiated support visit to family of pt. Mother of pt bedside.  Mother pt shared about her emotional challenges at this time. Mother of pt shared about her anticipatory grief. Mother of pt shared that the pt's brother  of a cardiac condition 4 years ago.   Mother of pt shared about her support system which she feels is strong and helpful.  CH provided empathetic listening and support.               Chaplaincy Interventions Utilized:   Empowerment: Provided anticipatory guidance and Provided grief counseling    Exploration: Explored relational needs & resources    Relationship Building: Listened empathically      Spiritual Coping Strategies Utilized:   Connectedness       24 1400   Clinical Encounter Type   Visited With Patient and family together  (mother, nephew)   Routine Visit Follow-up   Referral From Nurse

## 2024-03-28 NOTE — CASE MANAGEMENT
Case Management Discharge Planning Note    Patient name Michael Koch  Location /-01 MRN [id number]  : 1990 Date 3/28/2024       Current Admission Date: 2024  Current Admission Diagnosis:Alcoholic hepatitis   Patient Active Problem List    Diagnosis Date Noted    Hospice care 2024    Anemia due to chronic kidney disease, on chronic dialysis (HCC) 2024    Hemodialysis patient (HCC) 2024    Acute on chronic anemia 2024    Malnutrition (HCC) 2024    Orthostatic hypotension 2024    Acute respiratory failure with hypoxia and hypercapnia (HCC) 2024    Alcoholic hepatitis 2024    ETOH abuse 2024    Depression 2024    GERD (gastroesophageal reflux disease) 2024    BPH (benign prostatic hyperplasia) 2024    RSV infection 2024    Abnormal CT scan 2024    Hepatic encephalopathy (HCC) 2024    N&V (nausea and vomiting) 2024    DUANE (acute kidney injury) (HCC) 2024    Thrombocytopenia (HCC) 2024    Lactic acidosis 2024    Marijuana use 2024    SIRS (systemic inflammatory response syndrome) (HCC) 2024    History of gout 10/04/2023    History of hypertension 10/04/2023    Type 2 diabetes mellitus with hyperglycemia, without long-term current use of insulin (HCC) 10/04/2023    Tobacco abuse 10/04/2023      LOS (days): 30  Geometric Mean LOS (GMLOS) (days): 4.8  Days to GMLOS:-24.8     OBJECTIVE:  Risk of Unplanned Readmission Score: 38.5         Current admission status: Inpatient   Preferred Pharmacy:   [id number] - [place], [place] - [address]  [address]  [place] [address]  Phone: [phone] Fax: [phone]    Primary Care Provider: Louie Odonnell DO    Primary Insurance: BLUE CROSS  Secondary Insurance:     DISCHARGE DETAILS:        CM reached out to [facility] to confirm discharge planning and patient's care.     CM to follow  for patient care/discharge needs.

## 2024-03-28 NOTE — UTILIZATION REVIEW
Continued Stay Review    Date:    3/28/24                       Current Patient Class: inpatient     Current Level of Care: med surg    HPI:33 y.o. male initially admitted on  2/27/24 Dx Hospice care    Assessment/Plan:   Patient denies any complaints today is very weak lethargic slow to respond to questions but does know that he is at the hospital     Patient has advanced liver damage secondary to alcohol abuse.  Also has renal dysfunction and was recently on dialysis along with hyperbilirubinemia and acute on chronic anemia.  Patient was not felt to be a transplant candidate on his recent transplant evaluations at tertiary centers and was transferred back here and few days ago decided to go on hospice care.cont supportive measures  Started having some visual hallucinations yesterday and got very restless and agitated overnight will continue symptom control with Ativan added Haldol and continue morphine for pain control if required.  Asked case management to have hospice house evaluate him  IV Ativan  Asked case management to have hospice house evaluate him  April 1st patient will have the option of going to SNF as Medicaid will kick in untill then he will stay here     Vital Signs:   03/27/24 1700 80 20 -- --   03/27/24 1545 -- 20       Pertinent Labs/Diagnostic Results:          Medications:   Scheduled Medications:  escitalopram, 5 mg, Oral, Daily  lidocaine, 1 patch, Topical, Q24H  melatonin, 3 mg, Oral, HS  pantoprazole, 40 mg, Oral, BID      Continuous IV Infusions:     PRN Meds:  diphenhydramine, lidocaine, Al/Mg hydroxide, simethicone, 10 mL, Swish & Swallow, Q6H PRN  haloperidol, 2 mg, Oral, Q6H PRN  hydrOXYzine HCL, 25 mg, Oral, Q6H PRN  LORazepam, 1 mg, Intravenous, Q10 Min PRN  Morphine Sulfate (Concentrate), 5 mg, Oral, Q1H PRN  ondansetron, 4 mg, Intravenous, Q8H PRN        Discharge Plan:     Network Utilization Review Department  ATTENTION: Please call with any questions or concerns to [phone]  and carefully listen to the prompts so that you are directed to the right person. All voicemails are confidential.   For Discharge needs, contact Care Management DC Support Team at [phone] opt. 2  Send all requests for admission clinical reviews, approved or denied determinations and any other requests to dedicated fax number below belonging to the campus where the patient is receiving treatment. List of dedicated fax numbers for the Facilities:  FACILITY NAME UR FAX NUMBER   ADMISSION DENIALS (Administrative/Medical Necessity) [phone]   DISCHARGE SUPPORT TEAM (NETWORK) [phone]   PARENT CHILD HEALTH (Maternity/NICU/Pediatrics) [phone]   [facility] [phone]   [facility] [phone]   Critical access hospital [phone]   [facility] [phone]   [facility] [phone]   [facility] [phone]   [facility] [phone]   [facility] [phone]   [facility] [phone]   [facility] [phone]   [facility] [phone]   [facility] [phone]

## 2024-03-28 NOTE — PROGRESS NOTES
[facility]  Progress Note  Name: Michael Koch I  MRN: [id number]  Unit/Bed#: -01 I Date of Admission: 2/27/2024   Date of Service: 3/28/2024 I Hospital Day: 30    Assessment/Plan   Hospice care  Assessment & Plan  Patient has advanced liver damage secondary to alcohol abuse.  Also has renal dysfunction and was recently on dialysis along with hyperbilirubinemia and acute on chronic anemia.  Patient was not felt to be a transplant candidate on his recent transplant evaluations at tertiary centers and was transferred back here and few days ago decided to go on hospice care.cont supportive measures  Started having some visual hallucinations yesterday and got very restless and agitated overnight will continue symptom control with Ativan added Haldol and continue morphine for pain control if required.  Asked case management to have hospice house evaluate him  April 1st patient will have the option of going to SNF as Medicaid will kick in untill then he will stay here               VTE Pharmacologic Prophylaxis:   Moderate Risk (Score 3-4) - Pharmacological DVT Prophylaxis Contraindicated. Sequential Compression Devices Ordered.    Mobility:   Basic Mobility Inpatient Raw Score: 8  -M Goal: 3: Sit at edge of bed  JH-HLM Achieved: 2: Bed activities/Dependent transfer  JH-HLM Goal achieved. Continue to encourage appropriate mobility.    Patient Centered Rounds: I performed bedside rounds with nursing staff today.   Discussions with Specialists or Other Care Team Provider: none    Education and Discussions with Family / Patient: Updated  (wife) at bedside.    Total Time Spent on Date of Encounter in care of patient: 35 mins. This time was spent on one or more of the following: performing physical exam; counseling and coordination of care; obtaining or reviewing history; documenting in the medical record; reviewing/ordering tests, medications or procedures; communicating  with other healthcare professionals and discussing with patient's family/caregivers.    Current Length of Stay: 30 day(s)  Current Patient Status: Inpatient   Certification Statement: The patient will continue to require additional inpatient hospital stay due to hospice  Discharge Plan: pending placement efforts    Code Status: Level 4 - Comfort Care    Subjective:   Feels well, denies any acute complaints.     Objective:     Vitals:   No data recorded.    HR:  [80] 80  Resp:  [20] 20  Body mass index is 28.87 kg/m².     Input and Output Summary (last 24 hours):     Intake/Output Summary (Last 24 hours) at 3/28/2024 1117  Last data filed at 3/27/2024 1326  Gross per 24 hour   Intake 0 ml   Output 25 ml   Net -25 ml       Physical Exam:   Physical Exam  Constitutional:       Appearance: He is ill-appearing.   HENT:      Head: Normocephalic.      Mouth/Throat:      Mouth: Mucous membranes are dry.   Eyes:      General: Scleral icterus present.      Pupils: Pupils are equal, round, and reactive to light.   Cardiovascular:      Rate and Rhythm: Normal rate and regular rhythm.   Pulmonary:      Effort: Pulmonary effort is normal.      Breath sounds: Normal breath sounds.   Abdominal:      General: Abdomen is flat. There is distension.      Palpations: Abdomen is soft.      Tenderness: There is no abdominal tenderness.   Musculoskeletal:         General: No swelling or tenderness.   Skin:     Coloration: Skin is jaundiced.   Neurological:      Mental Status: Mental status is at baseline.            Additional Data:     Labs:                            Lines/Drains:  Invasive Devices       Peripheral Intravenous Line  Duration             Peripheral IV 03/26/24 Right Antecubital 2 days              Hemodialysis Catheter  Duration             HD Permanent Double Catheter 27 days                          Imaging: No pertinent imaging reviewed.    Recent Cultures (last 7 days):         Last 24 Hours Medication List:   Current  Facility-Administered Medications   Medication Dose Route Frequency Provider Last Rate    diphenhydramine, lidocaine, Al/Mg hydroxide, simethicone  10 mL Swish & Swallow Q6H PRN Yuly S Edenilson, CRNP      escitalopram  5 mg Oral Daily Constance Menendez MD      haloperidol  2 mg Oral Q6H PRN Constance Menendez MD      hydrOXYzine HCL  25 mg Oral Q6H PRN Yuly S Edenilson, CRNP      lidocaine  1 patch Topical Q24H Tiburcio Jean MD      LORazepam  1 mg Intravenous Q10 Min PRN Bob Arias DO      melatonin  3 mg Oral HS Constance Menendez MD      Morphine Sulfate (Concentrate)  5 mg Oral Q1H PRN Constance Menendez MD      ondansetron  4 mg Intravenous Q8H PRN Yuly S Edenilson, CRNP      pantoprazole  40 mg Oral BID Ricky Foster MD          Today, Patient Was Seen By: Jabari Corona MD    **Please Note: This note may have been constructed using a voice recognition system.**

## 2024-03-29 NOTE — PROGRESS NOTES
[facility]  Progress Note  Name: Michael Koch I  MRN: [id number]  Unit/Bed#: -01 I Date of Admission: 2/27/2024   Date of Service: 3/29/2024 I Hospital Day: 31    Assessment/Plan   Hospice care  Assessment & Plan  Patient has advanced liver damage secondary to alcohol abuse.  Also has renal dysfunction and was recently on dialysis along with hyperbilirubinemia and acute on chronic anemia.  Patient was not felt to be a transplant candidate on his recent transplant evaluations at tertiary centers and was transferred back here to [facility] and decided to go on hospice care to cont supportive measures  Patient has started to have some hallucinations and worsening mental status, signs of further overall decline  Continue as needed morphine, Haldol.  He did get Ativan previously and this seemed to improve him, certainly can add as a as needed medication if Haldol is not improving his symptoms appropriately  April 1st patient will have the option of going to SNF as Medicaid will kick in untill then he will stay here               VTE Pharmacologic Prophylaxis:    hospice care    Mobility:   Basic Mobility Inpatient Raw Score: 7  -[facility] Goal: 2: Bed activities/Dependent transfer  -[facility] Achieved: 2: Bed activities/Dependent transfer  Hopice care    Patient Centered Rounds: I performed bedside rounds with nursing staff today.   Discussions with Specialists or Other Care Team Provider: none    Education and Discussions with Family / Patient: Updated  (sister) at bedside.    Total Time Spent on Date of Encounter in care of patient: 18 mins. This time was spent on one or more of the following: performing physical exam; counseling and coordination of care; obtaining or reviewing history; documenting in the medical record; reviewing/ordering tests, medications or procedures; communicating with other healthcare professionals and discussing with patient's family/caregivers.    Current  Length of Stay: 31 day(s)  Current Patient Status: Inpatient   Certification Statement: The patient will continue to require additional inpatient hospital stay due to hospice care  Discharge Plan:  possible discharge to NH April 1st for further hospice care    Code Status: Level 4 - Comfort Care    Subjective:   Patient offers no complaints, appears comfortable    Objective:     Vitals:   No data recorded.    HR:  [84] 84  Resp:  [12] 12  Body mass index is 28.87 kg/m².     Input and Output Summary (last 24 hours):     Intake/Output Summary (Last 24 hours) at 3/29/2024 1542  Last data filed at 3/29/2024 1300  Gross per 24 hour   Intake 0 ml   Output --   Net 0 ml       Physical Exam:   Patient appears comfortably resting in bed, significantly jaundiced, no labored breathing, no signs of agitation at this time    Additional Data:     Labs:                            Lines/Drains:  Invasive Devices       Peripheral Intravenous Line  Duration             Peripheral IV 03/26/24 Right Antecubital 3 days              Hemodialysis Catheter  Duration             HD Permanent Double Catheter 29 days                          Imaging: No pertinent imaging reviewed.    Recent Cultures (last 7 days):         Last 24 Hours Medication List:   Current Facility-Administered Medications   Medication Dose Route Frequency Provider Last Rate    diphenhydramine, lidocaine, Al/Mg hydroxide, simethicone  10 mL Swish & Swallow Q6H PRN Yuly S Edenilson, CRNP      escitalopram  5 mg Oral Daily Constance Menendez MD      haloperidol  2 mg Oral Q6H PRN Constance Menendez MD      hydrOXYzine HCL  25 mg Oral Q6H PRN Yuly S Edenilson, CRNP      lidocaine  1 patch Topical Q24H Tiburcio Jean MD      melatonin  3 mg Oral HS Constance Menendez MD      Morphine Sulfate (Concentrate)  5 mg Oral Q1H PRN Constance Menendez MD      ondansetron  4 mg Intravenous Q8H PRN Yuly S Edenilson, CRNP      pantoprazole  40 mg Oral BID Ricky Foster MD          Today, Patient Was Seen By: iNlesh  Mejia Cantu, DO    **Please Note: This note may have been constructed using a voice recognition system.**

## 2024-03-30 NOTE — PROGRESS NOTES
[facility]  Progress Note  Name: Michael Koch I  MRN: [id number]  Unit/Bed#: -01 I Date of Admission: 2/27/2024   Date of Service: 3/30/2024 I Hospital Day: 32    Assessment/Plan   Hospice care  Assessment & Plan  Patient has advanced liver damage secondary to alcohol abuse.  Also has renal dysfunction and was recently on dialysis along with hyperbilirubinemia and acute on chronic anemia.  Patient was not felt to be a transplant candidate on his recent transplant evaluations at tertiary centers and was transferred back here to [facility] and decided to go on hospice care to cont supportive measures  Patient has started to have some hallucinations and worsening mental status, signs of further overall decline  Continue as needed morphine, Haldol.  He did get Ativan previously and this seemed to improve him, certainly can add as a as needed medication if Haldol is not improving his symptoms appropriately  He is becoming more somnolent, and IV morphine as needed was added only if he cannot take p.o.  April 1st patient will possibly have the option of going to SNF as Medicaid will kick in untill then he will stay here, definitive plans can not be confirmed until then             VTE Pharmacologic Prophylaxis:    Hospice    Mobility:   Basic Mobility Inpatient Raw Score: 6  -[facility] Goal: 2: Bed activities/Dependent transfer  [facility] Achieved: 2: Bed activities/Dependent transfer  Hospice    Patient Centered Rounds: I performed bedside rounds with nursing staff today.none   Discussions with Specialists or Other Care Team Provider: none    Education and Discussions with Family / Patient: Updated  (wife) at bedside.    Total Time Spent on Date of Encounter in care of patient: 32 mins. This time was spent on one or more of the following: performing physical exam; counseling and coordination of care; obtaining or reviewing history; documenting in the medical record; reviewing/ordering  tests, medications or procedures; communicating with other healthcare professionals and discussing with patient's family/caregivers.    Current Length of Stay: 32 day(s)  Current Patient Status: Inpatient   Certification Statement: The patient will continue to require additional inpatient hospital stay due to hospice, end-stage liver disease/failure  Discharge Plan: Anticipate discharge in 48-72 hrs to pending social/insurance issues and patient condition    Code Status: Level 4 - Comfort Care    Subjective:   Patient is comfortable appearing, opens his eyes but does not respond significantly to verbal stimulation.  Family at bedside and reports that at times he will appear uncomfortable but has mostly been somnolent, calm and sleeping    Objective:     Vitals:   No data recorded.    HR:  [73-90] 90  Resp:  [7-14] 7  Body mass index is 28.87 kg/m².     Input and Output Summary (last 24 hours):     Intake/Output Summary (Last 24 hours) at 3/30/2024 1133  Last data filed at 3/29/2024 1300  Gross per 24 hour   Intake 0 ml   Output --   Net 0 ml       Physical Exam:   patient appears comfortable, no evidence of respiratory distress, opens eyes to verbal stimulation but no significant response    Additional Data:     Labs:                            Lines/Drains:  Invasive Devices       Peripheral Intravenous Line  Duration             Peripheral IV 03/26/24 Right Antecubital 4 days              Hemodialysis Catheter  Duration             HD Permanent Double Catheter 29 days                          Imaging: No pertinent imaging reviewed.    Recent Cultures (last 7 days):         Last 24 Hours Medication List:   Current Facility-Administered Medications   Medication Dose Route Frequency Provider Last Rate    diphenhydramine, lidocaine, Al/Mg hydroxide, simethicone  10 mL Swish & Swallow Q6H PRN Yuly Pyle, CRNP      escitalopram  5 mg Oral Daily Constance Menendez MD      haloperidol  2 mg Oral Q6H PRN Constance Menendez MD       hydrOXYzine HCL  25 mg Oral Q6H PRN Yuly S Edenilson, CRNP      lidocaine  1 patch Topical Q24H Tiburcio Jean MD      melatonin  3 mg Oral HS Constance Menendez MD      morphine injection  2 mg Intravenous Q1H PRN Nilesh Cantu DO      Morphine Sulfate (Concentrate)  5 mg Oral Q1H PRN Constance Menendez MD      ondansetron  4 mg Intravenous Q8H PRN Yuly S Edenilson, CRNP      pantoprazole  40 mg Oral BID Ricky Foster MD          Today, Patient Was Seen By: Nilesh Cantu DO    **Please Note: This note may have been constructed using a voice recognition system.**

## 2024-03-30 NOTE — NURSING NOTE
Entered patients room to administer medications. Opens eyes to verbal stimuli. Resps 7. Unable to administer medications as patient not able to follow instructions or swallow safely at this time.

## 2024-03-30 NOTE — ASSESSMENT & PLAN NOTE
Patient has advanced liver damage secondary to alcohol abuse.  Also has renal dysfunction and was recently on dialysis along with hyperbilirubinemia and acute on chronic anemia.  Patient was not felt to be a transplant candidate on his recent transplant evaluations at tertiary centers and was transferred back here to [facility] and decided to go on hospice care to cont supportive measures  Patient has started to have some hallucinations and worsening mental status, signs of further overall decline  Continue as needed morphine, Haldol.  He did get Ativan previously and this seemed to improve him, certainly can add as a as needed medication if Haldol is not improving his symptoms appropriately  He is becoming more somnolent, and IV morphine as needed was added only if he cannot take p.o.  April 1st patient will possibly have the option of going to SNF as Medicaid will kick in untill then he will stay here, definitive plans can not be confirmed until then

## 2024-03-31 NOTE — NURSING NOTE
Pt with shallow respirations, longer periods of apnea, RR 7-8. Appears comfortable. Will continue to monitor.

## 2024-03-31 NOTE — NURSING NOTE
Pt continues to be lethargic, does not appear to be in any distress. RR 12. Will continue to monitor.

## 2024-03-31 NOTE — NURSING NOTE
Soaked saline gauze applied to eyes and head elevated. Patient is a candidate for cornea donation.Hospital supervisor(Pinky) said ok for body to go to the [facility]. She is aware that we are awaiting the eye bank to return call after contacting his family.

## 2024-03-31 NOTE — CASE MANAGEMENT
Case Management Discharge Planning Note    Patient name Michael Koch  Location /-01 MRN [id number]  : 1990 Date 3/31/2024       Current Admission Date: 2024  Current Admission Diagnosis:Alcoholic hepatitis   Patient Active Problem List    Diagnosis Date Noted    Hospice care 2024    Anemia due to chronic kidney disease, on chronic dialysis (HCC) 2024    Hemodialysis patient (HCC) 2024    Acute on chronic anemia 2024    Malnutrition (HCC) 2024    Orthostatic hypotension 2024    Acute respiratory failure with hypoxia and hypercapnia (HCC) 2024    Alcoholic hepatitis 2024    ETOH abuse 2024    Depression 2024    GERD (gastroesophageal reflux disease) 2024    BPH (benign prostatic hyperplasia) 2024    RSV infection 2024    Abnormal CT scan 2024    Hepatic encephalopathy (HCC) 2024    N&V (nausea and vomiting) 2024    DUANE (acute kidney injury) (HCC) 2024    Thrombocytopenia (HCC) 2024    Lactic acidosis 2024    Marijuana use 2024    SIRS (systemic inflammatory response syndrome) (HCC) 2024    History of gout 10/04/2023    History of hypertension 10/04/2023    Type 2 diabetes mellitus with hyperglycemia, without long-term current use of insulin (HCC) 10/04/2023    Tobacco abuse 10/04/2023      LOS (days): 33  Geometric Mean LOS (GMLOS) (days): 4.9  Days to GMLOS:-27.4     OBJECTIVE:  Risk of Unplanned Readmission Score: 38.98         Current admission status: Inpatient   Preferred Pharmacy:   [id number] - [place], [place] - [address]  [address]  [place] [address]  Phone: [phone] Fax: [phone]    Primary Care Provider: Louie Odonnell DO    Primary Insurance: BLUE CROSS  Secondary Insurance:     DISCHARGE DETAILS:     CM met with patient's sister by request for update on discharge plan. They were unaware of patient  potential to go to facility 4/1. CM explained that is when insurance changes with added layer of patient facility can potentially be covered. CM explained that on Monday the facility able to take patient can look into financial if all works out then can look to setup discharge for patient depending on their current functioning. CM explained we are trying to find a plan for all individuals. Family was confused how additional insurance came forward as they were not aware of applying. CM discussed that things can happen with the financial team. Family was worried that with patient's new insurance cards would the family loose their current insurance. While meeting, patient slept most of the time but at one point did begin heavy breathing and tighten arms. Patient's sister rubbed patent's arm and encouraged him it's okay which calmed patient down and they were able to go back to sleep. She keeps patient's wife and other family members updated after conversations and the family seems to be on the same page. They are not interested in the [facility] due to distance and have no family available during the day that can be with patient due to work and other responsibilities.     CM to follow patient's care and discharge needs.

## 2024-03-31 NOTE — UTILIZATION REVIEW
Continued Stay Review for 3/30/24 and 3/31/24    Date: 3/31/24                          Current Patient Class: IP  Current Level of Care: MS    HPI:33 y.o. male initially admitted on 24 - DX: Alcoholic hepatitis     Date: 3/30/24  Hospice care - He is becoming more somnolent, and IV morphine as needed was added only if he cannot take p.o.;  patient will possibly have the option of going to SNF as Medicaid will kick in untill then he will stay here, definitive plans can not be confirmed until then    Date: 3/31/24  INPATIENT DEATH NOTE   Date of Death: 3/31/24  Time of Death:  7:00 AM  Preliminary Cause of Death: Respiratory failure, acute     Vital Signs:   Date/Time Pulse Resp O2 Device   24 2154 91 10 Abnormal  --   24 1924 -- -- None (Room air)   24 1708 82 8 Abnormal  --   24 1212 -- 8 Abnormal  --   24 0926 90 7 Abnormal  --   24 2310 73 14 --   24 0636 84 12        Pertinent Labs/Diagnostic Results:     BETA-HYDROXYBUTYRATE   Date Value Ref Range Status   2024 0.2 <0.6 mmol/L Final        Medications:   Scheduled Medications:  escitalopram, 5 mg, Oral, Daily  lidocaine, 1 patch, Topical, Q24H  melatonin, 3 mg, Oral, HS  pantoprazole, 40 mg, Oral, BID      Continuous IV Infusions:  None     PRN Meds:  diphenhydramine, lidocaine, Al/Mg hydroxide, simethicone, 10 mL, Swish & Swallow, Q6H PRN  glycopyrrolate, 0.1 mg, Intravenous, Q4H PRN  haloperidol, 2 mg, Oral, Q6H PRN  hydrOXYzine HCL, 25 mg, Oral, Q6H PRN  morphine injection, 2 mg, Intravenous, Q1H PRN  Morphine Sulfate (Concentrate), 5 mg, Oral, Q1H PRN  (3/30 rec'd x4)   ondansetron, 4 mg, Intravenous, Q8H PRN        Discharge Plan:     Network Utilization Review Department  ATTENTION: Please call with any questions or concerns to [phone] and carefully listen to the prompts so that you are directed to the right person. All voicemails are confidential.   For Discharge needs, contact  Care Management DC Support Team at [phone] opt. 2  Send all requests for admission clinical reviews, approved or denied determinations and any other requests to dedicated fax number below belonging to the campus where the patient is receiving treatment. List of dedicated fax numbers for the Facilities:  FACILITY NAME UR FAX NUMBER   ADMISSION DENIALS (Administrative/Medical Necessity) [phone]   DISCHARGE SUPPORT TEAM (NETWORK) [phone]   PARENT CHILD HEALTH (Maternity/NICU/Pediatrics) [phone]   [facility] [phone]   General acute hospital [phone]   [facility] [phone]   [facility] [phone]   [facility] [phone]   [facility] [phone]   [facility] [phone]   [facility] [phone]   [facility] [phone]   [facility] [phone]   [facility] [phone]   [facility] [phone]

## 2024-03-31 NOTE — DEATH NOTE
INPATIENT DEATH NOTE  Michael Koch 33 y.o. male MRN: [id number]  Unit/Bed#: -01 Encounter: [id number]    Date, Time and Cause of Death    Date of Death: 3/31/24  Time of Death:  7:00 AM  Preliminary Cause of Death: Respiratory failure, acute (HCC)  Entered by: Yuly STYLES[MH1.1]       Attribution       MH1.1 JENSEN Moreland 24 07:04             Patient's Information  Pronounced by: Yuly STYLES  Did the patient's death occur in the ED?: No  Did the patient's death occur in the OR?: No  Did the patient's death occur less than 10 days post-op?: No  Did the patient's death occur within 24 hours of admission?: No  Was code status DNR at the time of death?: Yes    PHYSICAL EXAM:  Unresponsive to noxious stimuli, Spontaneous respirations absent, Breath sounds absent, Carotid pulse absent, Heart sounds absent, Pupillary light reflex absent, and Corneal blink reflex absent    Medical Examiner notification criteria:  NONE APPLICABLE   Medical Examiner's office notified?:  No, does not meet ME notification criteria     Family Notification  Was the family notified?: Yes  Date Notified: 24  Time Notified: 705  Notified by: Yuly STYLES  Name of Family Notified of Death: Jo Koch   Relationship to Patient: Spouse  Family Notification Route: Telephone  Was the family told to contact a  home?: No    Primary Service Attending Physician notified?:  yes - Attending:  Nilesh Cantu, DO    Physician/Resident responsible for completing Discharge Summary:  Counts

## 2024-03-31 NOTE — NURSING NOTE
Pt moaning with increased secretions, medicated with morphine Iv per orders. Will continue to monitor.

## 2024-03-31 NOTE — ASSESSMENT & PLAN NOTE
Patient has advanced liver damage secondary to alcohol abuse.  Also has renal dysfunction and was recently on dialysis along with hyperbilirubinemia and acute on chronic anemia.  Patient was not felt to be a transplant candidate on his recent transplant evaluations at tertiary centers and was transferred back here to [facility] and decided to go on hospice care to cont supportive measures  Patient showed ongoing decline, was cared for with supportive measures and ultimately passed on 3/31

## 2024-03-31 NOTE — NURSING NOTE
Pt continues to be lethargic, minimal response to stimuli. Appears to be comfortable. Will continue to monitor.

## 2024-03-31 NOTE — DISCHARGE SUMMARY
"[facility]  Discharge- Michael Koch 1990, 33 y.o. male MRN: [id number]  Unit/Bed#: -[place] Encounter: [id number]  Primary Care Provider: Louie Odonnell DO   Date and time admitted to hospital: 2/27/2024 10:29 PM    Hospice care  Assessment & Plan  Patient has advanced liver damage secondary to alcohol abuse.  Also has renal dysfunction and was recently on dialysis along with hyperbilirubinemia and acute on chronic anemia.  Patient was not felt to be a transplant candidate on his recent transplant evaluations at tertiary centers and was transferred back here to [facility] and decided to go on hospice care to cont supportive measures  Patient showed ongoing decline, was cared for with supportive measures and ultimately passed on 3/31          Medical Problems       Resolved Problems  Date Reviewed: 3/31/2024            Resolved    Renal failure 3/14/2024     Resolved by  Tiburcio Jean MD    Anemia 3/13/2024     Resolved by  Tiburcio Jean MD          Admission Date:   Admission Orders (From admission, onward)       Ordered        02/27/24 2326  Inpatient Admission  Once                            Admitting Diagnosis: Liver failure (HCC) [K72.90]    HPI: \"Michael Koch is a 33 y.o. male transferred from [facility] in [place].  PMH HTN, DM2, nephrolithiasis, gout, GERD, depression, tobacco use, and alcohol use disorder who presented to [facility] on 1/21/2024 with worsening back pain, nausea and vomiting. Initially managed for HHS, he was additionally found to have significant hyperbilirubinemia (13.8, 9.84) with transaminitis (, ALT 81, ). Reported use of ETOH prior to hospitalization included 2-3 24 oz Twisted Tea beverages with 2-3 shots of whiskey per day for approximately 4 years. Due to concern for alcoholic hepatitis, he received steroids which were later held due to concern for underlying sepsis. Cultures remained negative on empiric cefepime/vancomycin. 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 He was started on RRT. Due to worsening renal and liver function, he was transferred to [facility] for further management on 1/31/2024. He was evaluated for transplant candidacy; however, transfer was required to [facility] due to insurance reasons.  He was admitted to their ICU and CRRT was resumed. He initially required norepinephrine drip for blood pressure support, which was later discontinued. There were recorded multiple episodes of encephalopathy and agitation that required patient's placement on precedex. Liver transplant was consulted and transplant evaluation was performed and patient was found not to be a candidate.  Family then requested transfer back to [facility] to be closer to home while preparing for liver transplant reevaluation.  Hemodynamically stable and in good spirits upon arrival \" per H&P    Procedures Performed: No orders of the defined types were placed in this encounter.      Summary of Hospital Course: Michael initially presented for advanced liver failure, he was transferred for transplant evaluation and not felt to be a transplant candidate as above, at [facility] in [place]. He developed kidney failure as well and was started on CRRT. He returned to [facility] and continued with HD, had ongoing issues with significant weakness, anemia requiring blood transfusions, orthostatic hypotension, and with further decline there was ongoing [facility] discussion - he and his family ultimately decided to pursue hospice care on 3/20. He remained for a prolonged stay due to difficulty placement due to insurance. Ultimately he declined and passed on 3/31.    Significant Findings, Care, Treatment and Services Provided: Liver Failure, Anemia requiring blood transfusions, Renal failure requiring HD. "     Complications: none    Condition at Time of Death: , comfortable    Final Diagnosis: End Stage liver Failure    Date, Time and Cause of Death    Date of Death: 3/31/24  Time of Death:  7:00 AM  Preliminary Cause of Death: Respiratory failure, acute (HCC)  Entered by: Yuly STYLES[MH1.1]       Attribution       MH1.1 JENSEN Moreland 24 07:04            PCP: Louie Odonnell DO    Disposition:

## 2024-04-01 NOTE — UTILIZATION REVIEW
NOTIFICATION OF ADMISSION DISCHARGE   This is a Notification of Discharge from [facility]. Please be advised that this patient has been discharge from our facility. Below you will find the admission and discharge date and time including the patient’s disposition.   UTILIZATION REVIEW CONTACT:  Malaika Cao  Utilization   Network Utilization Review Department  Phone: [phone] x carefully listen to the prompts. All voicemails are confidential.  Email: [email or web address]     ADMISSION INFORMATION  PRESENTATION DATE: 2024 10:29 PM  OBERVATION ADMISSION DATE:   INPATIENT ADMISSION DATE: 24 10:29 PM   DISCHARGE DATE: 3/31/2024 11:29 AM   DISPOSITION:    Network Utilization Review Department  ATTENTION: Please call with any questions or concerns to [phone] and carefully listen to the prompts so that you are directed to the right person. All voicemails are confidential.   For Discharge needs, contact Care Management DC Support Team at [phone] opt. 2  Send all requests for admission clinical reviews, approved or denied determinations and any other requests to dedicated fax number below belonging to the campus where the patient is receiving treatment. List of dedicated fax numbers for the Facilities:  FACILITY NAME UR FAX NUMBER   ADMISSION DENIALS (Administrative/Medical Necessity) [phone]   DISCHARGE SUPPORT TEAM ([facility]) [phone]   PARENT CHILD HEALTH (Maternity/NICU/Pediatrics) [phone]   [facility] [phone]   [facility] [phone]   [facility] [phone]   [facility] [phone]   [facility] [phone]   [facility] [phone]   [facility] [phone]   [facility] [phone]    [facility] [phone]   [facility] [phone]   General acute hospital [phone]   [facility] [phone]

## 2024-06-11 NOTE — CASE MANAGEMENT
Case Management Discharge Planning Note    Patient name Michael Koch  Location /-01 MRN [id number]  : 1990 Date 3/8/2024       Current Admission Date: 2024  Current Admission Diagnosis:Alcoholic hepatitis   Patient Active Problem List    Diagnosis Date Noted    Renal failure 2024    Malnutrition (HCC) 2024    Orthostatic hypotension 2024    Acute respiratory failure with hypoxia and hypercapnia (HCC) 2024    Alcoholic hepatitis 2024    ETOH abuse 2024    Depression 2024    GERD (gastroesophageal reflux disease) 2024    BPH (benign prostatic hyperplasia) 2024    RSV infection 2024    Abnormal CT scan 2024    Hepatic encephalopathy (HCC) 2024    N&V (nausea and vomiting) 2024    DUANE (acute kidney injury) (HCC) 2024    Thrombocytopenia (HCC) 2024    Lactic acidosis 2024    Marijuana use 2024    SIRS (systemic inflammatory response syndrome) (HCC) 2024    History of gout 10/04/2023    History of hypertension 10/04/2023    Type 2 diabetes mellitus with hyperglycemia, without long-term current use of insulin (HCC) 10/04/2023    Tobacco abuse 10/04/2023      LOS (days): 10  Geometric Mean LOS (GMLOS) (days): 4.8  Days to GMLOS:-4.7     OBJECTIVE:  Risk of Unplanned Readmission Score: 39.17         Current admission status: Inpatient   Preferred Pharmacy:   [id number] - [place], [place] - [address]  [address]  [place] [address]  Phone: [phone] Fax: [phone]    Primary Care Provider: Louie Odonnell DO    Primary Insurance: BLUE CROSS  Secondary Insurance:     DISCHARGE DETAILS:        Results of CM [facility] STR referral: [facility], [facility], [facility], [facility] and [facility] unable to accept patient.     [facility] able to accept patient if family can transport  to outpatient HD.  [facility] STR referral pending. Facility exploring transport options for HD for patient.     Results of acute AIDIN referral: [facility] unable to accept, [facility] acute Under Review.    Following rounds today; CM submitted submitted AIDIN referral for STR with in-house HD: [facility], [facility], [facility] and [facility].                                                                                                                     procedure, 1 or more areas, each 15 minutes; neuromuscular reeducation of movement, balance, coordination, kinesthetic sense, posture, and/or proprioception for sitting and/or standing activities        TREATMENT PLAN   Plan: Cont POC- Continue emphasis/focus on exercise progression, promoting relaxation, increasing ROM, reduce/eliminate soft tissue swelling/inflammation/restriction, allowing for proper ROM, static and dynamic balance, and kinesthetic sense and proprioception. Next visit plan to progress weights, progress reps, add new exercises, and progress balance  initiate land therapy     Electronically Signed by Sai Cintron PT, DPT, CNS     Date: 06/11/2024       Note: If patient does not return for scheduled/recommended follow up visits, this note will serve as a discharge from care along with the most recent update on progress.